# Patient Record
Sex: FEMALE | Race: BLACK OR AFRICAN AMERICAN | NOT HISPANIC OR LATINO | URBAN - METROPOLITAN AREA
[De-identification: names, ages, dates, MRNs, and addresses within clinical notes are randomized per-mention and may not be internally consistent; named-entity substitution may affect disease eponyms.]

---

## 2016-01-11 RX ORDER — ASPIRIN/CALCIUM CARB/MAGNESIUM 324 MG
1 TABLET ORAL
Qty: 0 | Refills: 0 | DISCHARGE
Start: 2016-01-11

## 2016-01-14 RX ORDER — CARVEDILOL PHOSPHATE 80 MG/1
1 CAPSULE, EXTENDED RELEASE ORAL
Qty: 0 | Refills: 0 | DISCHARGE
Start: 2016-01-14

## 2017-02-01 ENCOUNTER — INPATIENT (INPATIENT)
Facility: HOSPITAL | Age: 62
LOS: 11 days | Discharge: HOME HEALTH | DRG: 253 | End: 2017-02-13
Attending: SURGERY | Admitting: SURGERY
Payer: MEDICAID

## 2017-02-01 VITALS
DIASTOLIC BLOOD PRESSURE: 99 MMHG | WEIGHT: 149.91 LBS | SYSTOLIC BLOOD PRESSURE: 199 MMHG | OXYGEN SATURATION: 100 % | TEMPERATURE: 97 F | HEIGHT: 63 IN | RESPIRATION RATE: 24 BRPM | HEART RATE: 91 BPM

## 2017-02-01 DIAGNOSIS — Z90.710 ACQUIRED ABSENCE OF BOTH CERVIX AND UTERUS: Chronic | ICD-10-CM

## 2017-02-01 DIAGNOSIS — I73.9 PERIPHERAL VASCULAR DISEASE, UNSPECIFIED: ICD-10-CM

## 2017-02-01 DIAGNOSIS — Z41.8 ENCOUNTER FOR OTHER PROCEDURES FOR PURPOSES OTHER THAN REMEDYING HEALTH STATE: ICD-10-CM

## 2017-02-01 DIAGNOSIS — I48.91 UNSPECIFIED ATRIAL FIBRILLATION: ICD-10-CM

## 2017-02-01 DIAGNOSIS — I16.0 HYPERTENSIVE URGENCY: ICD-10-CM

## 2017-02-01 DIAGNOSIS — J45.909 UNSPECIFIED ASTHMA, UNCOMPLICATED: ICD-10-CM

## 2017-02-01 DIAGNOSIS — I25.10 ATHEROSCLEROTIC HEART DISEASE OF NATIVE CORONARY ARTERY WITHOUT ANGINA PECTORIS: ICD-10-CM

## 2017-02-01 DIAGNOSIS — G81.90 HEMIPLEGIA, UNSPECIFIED AFFECTING UNSPECIFIED SIDE: ICD-10-CM

## 2017-02-01 DIAGNOSIS — G40.909 EPILEPSY, UNSPECIFIED, NOT INTRACTABLE, WITHOUT STATUS EPILEPTICUS: ICD-10-CM

## 2017-02-01 DIAGNOSIS — E11.9 TYPE 2 DIABETES MELLITUS WITHOUT COMPLICATIONS: ICD-10-CM

## 2017-02-01 DIAGNOSIS — T82.897A OTHER SPECIFIED COMPLICATION OF CARDIAC PROSTHETIC DEVICES, IMPLANTS AND GRAFTS, INITIAL ENCOUNTER: Chronic | ICD-10-CM

## 2017-02-01 LAB
ALBUMIN SERPL ELPH-MCNC: 3.7 G/DL — SIGNIFICANT CHANGE UP (ref 3.5–5)
ALP SERPL-CCNC: 101 U/L — SIGNIFICANT CHANGE UP (ref 40–120)
ALT FLD-CCNC: 21 U/L DA — SIGNIFICANT CHANGE UP (ref 10–60)
ANION GAP SERPL CALC-SCNC: 10 MMOL/L — SIGNIFICANT CHANGE UP (ref 5–17)
AST SERPL-CCNC: 17 U/L — SIGNIFICANT CHANGE UP (ref 10–40)
BILIRUB DIRECT SERPL-MCNC: 0.1 MG/DL — SIGNIFICANT CHANGE UP (ref 0–0.2)
BILIRUB INDIRECT FLD-MCNC: 0.5 MG/DL — SIGNIFICANT CHANGE UP (ref 0.2–1)
BILIRUB SERPL-MCNC: 0.6 MG/DL — SIGNIFICANT CHANGE UP (ref 0.2–1.2)
BUN SERPL-MCNC: 10 MG/DL — SIGNIFICANT CHANGE UP (ref 7–18)
CALCIUM SERPL-MCNC: 9 MG/DL — SIGNIFICANT CHANGE UP (ref 8.4–10.5)
CHLORIDE SERPL-SCNC: 108 MMOL/L — SIGNIFICANT CHANGE UP (ref 96–108)
CHOLEST SERPL-MCNC: 231 MG/DL — HIGH (ref 10–199)
CK MB BLD-MCNC: 1.3 % — SIGNIFICANT CHANGE UP (ref 0–3.5)
CK MB BLD-MCNC: <1 % — SIGNIFICANT CHANGE UP (ref 0–3.5)
CK MB CFR SERPL CALC: 1.2 NG/ML — SIGNIFICANT CHANGE UP (ref 0–3.6)
CK MB CFR SERPL CALC: <1 NG/ML — SIGNIFICANT CHANGE UP (ref 0–3.6)
CK SERPL-CCNC: 92 U/L — SIGNIFICANT CHANGE UP (ref 21–215)
CK SERPL-CCNC: 99 U/L — SIGNIFICANT CHANGE UP (ref 21–215)
CO2 SERPL-SCNC: 23 MMOL/L — SIGNIFICANT CHANGE UP (ref 22–31)
CREAT SERPL-MCNC: 0.83 MG/DL — SIGNIFICANT CHANGE UP (ref 0.5–1.3)
D DIMER BLD IA.RAPID-MCNC: 271 NG/ML DDU — HIGH
GLUCOSE SERPL-MCNC: 96 MG/DL — SIGNIFICANT CHANGE UP (ref 70–99)
HCT VFR BLD CALC: 46 % — HIGH (ref 34.5–45)
HDLC SERPL-MCNC: 44 MG/DL — SIGNIFICANT CHANGE UP (ref 40–125)
HGB BLD-MCNC: 14.5 G/DL — SIGNIFICANT CHANGE UP (ref 11.5–15.5)
LACTATE SERPL-SCNC: 1.4 MMOL/L — SIGNIFICANT CHANGE UP (ref 0.7–2)
LIPID PNL WITH DIRECT LDL SERPL: 172 MG/DL — SIGNIFICANT CHANGE UP
MAGNESIUM SERPL-MCNC: 2.2 MG/DL — SIGNIFICANT CHANGE UP (ref 1.8–2.4)
MCHC RBC-ENTMCNC: 26.5 PG — LOW (ref 27–34)
MCHC RBC-ENTMCNC: 31.4 GM/DL — LOW (ref 32–36)
MCV RBC AUTO: 84.3 FL — SIGNIFICANT CHANGE UP (ref 80–100)
NT-PROBNP SERPL-SCNC: 221 PG/ML — HIGH (ref 0–125)
PHOSPHATE SERPL-MCNC: 2.6 MG/DL — SIGNIFICANT CHANGE UP (ref 2.5–4.5)
PLATELET # BLD AUTO: 274 K/UL — SIGNIFICANT CHANGE UP (ref 150–400)
POTASSIUM SERPL-MCNC: 3.9 MMOL/L — SIGNIFICANT CHANGE UP (ref 3.5–5.3)
POTASSIUM SERPL-SCNC: 3.9 MMOL/L — SIGNIFICANT CHANGE UP (ref 3.5–5.3)
PROT SERPL-MCNC: 7.4 G/DL — SIGNIFICANT CHANGE UP (ref 6–8.3)
RBC # BLD: 5.46 M/UL — HIGH (ref 3.8–5.2)
RBC # FLD: 12.6 % — SIGNIFICANT CHANGE UP (ref 10.3–14.5)
SODIUM SERPL-SCNC: 141 MMOL/L — SIGNIFICANT CHANGE UP (ref 135–145)
TOTAL CHOLESTEROL/HDL RATIO MEASUREMENT: 5.2 RATIO — SIGNIFICANT CHANGE UP (ref 3.3–7.1)
TRIGL SERPL-MCNC: 76 MG/DL — SIGNIFICANT CHANGE UP (ref 10–149)
TROPONIN I SERPL-MCNC: 0.06 NG/ML — HIGH (ref 0–0.04)
TROPONIN I SERPL-MCNC: 0.07 NG/ML — HIGH (ref 0–0.04)
TSH SERPL-MCNC: 1.72 UU/ML — SIGNIFICANT CHANGE UP (ref 0.34–4.82)
WBC # BLD: 5.6 K/UL — SIGNIFICANT CHANGE UP (ref 3.8–10.5)
WBC # FLD AUTO: 5.6 K/UL — SIGNIFICANT CHANGE UP (ref 3.8–10.5)

## 2017-02-01 PROCEDURE — 73620 X-RAY EXAM OF FOOT: CPT | Mod: 26,RT

## 2017-02-01 PROCEDURE — 99284 EMERGENCY DEPT VISIT MOD MDM: CPT | Mod: 25

## 2017-02-01 PROCEDURE — 99283 EMERGENCY DEPT VISIT LOW MDM: CPT | Mod: 25

## 2017-02-01 PROCEDURE — 70450 CT HEAD/BRAIN W/O DYE: CPT | Mod: 26

## 2017-02-01 PROCEDURE — 93925 LOWER EXTREMITY STUDY: CPT | Mod: 26

## 2017-02-01 PROCEDURE — 71010: CPT | Mod: 26

## 2017-02-01 RX ORDER — TOPIRAMATE 25 MG
100 TABLET ORAL DAILY
Qty: 0 | Refills: 0 | Status: DISCONTINUED | OUTPATIENT
Start: 2017-02-01 | End: 2017-02-09

## 2017-02-01 RX ORDER — MORPHINE SULFATE 50 MG/1
2 CAPSULE, EXTENDED RELEASE ORAL EVERY 6 HOURS
Qty: 0 | Refills: 0 | Status: DISCONTINUED | OUTPATIENT
Start: 2017-02-01 | End: 2017-02-08

## 2017-02-01 RX ORDER — ASPIRIN/CALCIUM CARB/MAGNESIUM 324 MG
325 TABLET ORAL DAILY
Qty: 0 | Refills: 0 | Status: DISCONTINUED | OUTPATIENT
Start: 2017-02-01 | End: 2017-02-09

## 2017-02-01 RX ORDER — ERGOCALCIFEROL 1.25 MG/1
50000 CAPSULE ORAL
Qty: 0 | Refills: 0 | Status: DISCONTINUED | OUTPATIENT
Start: 2017-02-01 | End: 2017-02-09

## 2017-02-01 RX ORDER — ENOXAPARIN SODIUM 100 MG/ML
40 INJECTION SUBCUTANEOUS DAILY
Qty: 0 | Refills: 0 | Status: DISCONTINUED | OUTPATIENT
Start: 2017-02-01 | End: 2017-02-09

## 2017-02-01 RX ORDER — PANTOPRAZOLE SODIUM 20 MG/1
40 TABLET, DELAYED RELEASE ORAL
Qty: 0 | Refills: 0 | Status: DISCONTINUED | OUTPATIENT
Start: 2017-02-01 | End: 2017-02-09

## 2017-02-01 RX ORDER — ACETAMINOPHEN 500 MG
650 TABLET ORAL EVERY 6 HOURS
Qty: 0 | Refills: 0 | Status: DISCONTINUED | OUTPATIENT
Start: 2017-02-01 | End: 2017-02-09

## 2017-02-01 RX ORDER — CARVEDILOL PHOSPHATE 80 MG/1
12.5 CAPSULE, EXTENDED RELEASE ORAL EVERY 12 HOURS
Qty: 0 | Refills: 0 | Status: DISCONTINUED | OUTPATIENT
Start: 2017-02-01 | End: 2017-02-09

## 2017-02-01 RX ORDER — IPRATROPIUM/ALBUTEROL SULFATE 18-103MCG
3 AEROSOL WITH ADAPTER (GRAM) INHALATION EVERY 6 HOURS
Qty: 0 | Refills: 0 | Status: DISCONTINUED | OUTPATIENT
Start: 2017-02-01 | End: 2017-02-01

## 2017-02-01 RX ORDER — DIVALPROEX SODIUM 500 MG/1
500 TABLET, DELAYED RELEASE ORAL DAILY
Qty: 0 | Refills: 0 | Status: DISCONTINUED | OUTPATIENT
Start: 2017-02-01 | End: 2017-02-09

## 2017-02-01 RX ORDER — DILTIAZEM HCL 120 MG
180 CAPSULE, EXT RELEASE 24 HR ORAL DAILY
Qty: 0 | Refills: 0 | Status: DISCONTINUED | OUTPATIENT
Start: 2017-02-01 | End: 2017-02-09

## 2017-02-01 RX ORDER — MORPHINE SULFATE 50 MG/1
2 CAPSULE, EXTENDED RELEASE ORAL ONCE
Qty: 0 | Refills: 0 | Status: DISCONTINUED | OUTPATIENT
Start: 2017-02-01 | End: 2017-02-01

## 2017-02-01 RX ORDER — HEPARIN SODIUM 5000 [USP'U]/ML
5000 INJECTION INTRAVENOUS; SUBCUTANEOUS EVERY 8 HOURS
Qty: 0 | Refills: 0 | Status: DISCONTINUED | OUTPATIENT
Start: 2017-02-01 | End: 2017-02-01

## 2017-02-01 RX ORDER — LABETALOL HCL 100 MG
10 TABLET ORAL ONCE
Qty: 0 | Refills: 0 | Status: COMPLETED | OUTPATIENT
Start: 2017-02-01 | End: 2017-02-01

## 2017-02-01 RX ORDER — MONTELUKAST 4 MG/1
10 TABLET, CHEWABLE ORAL AT BEDTIME
Qty: 0 | Refills: 0 | Status: DISCONTINUED | OUTPATIENT
Start: 2017-02-01 | End: 2017-02-09

## 2017-02-01 RX ORDER — SODIUM CHLORIDE 9 MG/ML
3 INJECTION INTRAMUSCULAR; INTRAVENOUS; SUBCUTANEOUS ONCE
Qty: 0 | Refills: 0 | Status: COMPLETED | OUTPATIENT
Start: 2017-02-01 | End: 2017-02-01

## 2017-02-01 RX ORDER — SIMVASTATIN 20 MG/1
40 TABLET, FILM COATED ORAL AT BEDTIME
Qty: 0 | Refills: 0 | Status: DISCONTINUED | OUTPATIENT
Start: 2017-02-01 | End: 2017-02-09

## 2017-02-01 RX ADMIN — DIVALPROEX SODIUM 500 MILLIGRAM(S): 500 TABLET, DELAYED RELEASE ORAL at 17:29

## 2017-02-01 RX ADMIN — MORPHINE SULFATE 2 MILLIGRAM(S): 50 CAPSULE, EXTENDED RELEASE ORAL at 14:30

## 2017-02-01 RX ADMIN — MORPHINE SULFATE 2 MILLIGRAM(S): 50 CAPSULE, EXTENDED RELEASE ORAL at 21:53

## 2017-02-01 RX ADMIN — Medication 40 MILLIGRAM(S): at 22:03

## 2017-02-01 RX ADMIN — MONTELUKAST 10 MILLIGRAM(S): 4 TABLET, CHEWABLE ORAL at 22:03

## 2017-02-01 RX ADMIN — CARVEDILOL PHOSPHATE 12.5 MILLIGRAM(S): 80 CAPSULE, EXTENDED RELEASE ORAL at 17:29

## 2017-02-01 RX ADMIN — Medication 10 MILLIGRAM(S): at 13:52

## 2017-02-01 RX ADMIN — MORPHINE SULFATE 2 MILLIGRAM(S): 50 CAPSULE, EXTENDED RELEASE ORAL at 18:16

## 2017-02-01 RX ADMIN — MORPHINE SULFATE 2 MILLIGRAM(S): 50 CAPSULE, EXTENDED RELEASE ORAL at 13:52

## 2017-02-01 RX ADMIN — Medication 3 MILLILITER(S): at 16:40

## 2017-02-01 RX ADMIN — MORPHINE SULFATE 2 MILLIGRAM(S): 50 CAPSULE, EXTENDED RELEASE ORAL at 18:40

## 2017-02-01 RX ADMIN — SODIUM CHLORIDE 3 MILLILITER(S): 9 INJECTION INTRAMUSCULAR; INTRAVENOUS; SUBCUTANEOUS at 10:37

## 2017-02-01 RX ADMIN — Medication 180 MILLIGRAM(S): at 17:29

## 2017-02-01 RX ADMIN — Medication 325 MILLIGRAM(S): at 17:29

## 2017-02-01 RX ADMIN — Medication 100 MILLIGRAM(S): at 17:30

## 2017-02-01 RX ADMIN — ERGOCALCIFEROL 50000 UNIT(S): 1.25 CAPSULE ORAL at 17:29

## 2017-02-01 RX ADMIN — SIMVASTATIN 40 MILLIGRAM(S): 20 TABLET, FILM COATED ORAL at 22:03

## 2017-02-01 RX ADMIN — Medication 3 MILLILITER(S): at 13:52

## 2017-02-01 RX ADMIN — MORPHINE SULFATE 2 MILLIGRAM(S): 50 CAPSULE, EXTENDED RELEASE ORAL at 22:44

## 2017-02-01 NOTE — ED PROVIDER NOTE - PLAN OF CARE
Likely the underlying factor contributing to pt's worsening RLE pain and hemiparesis. Administer labetalol 10 mg IVP X 1, morphine 2 mg IVP X 1, closely monitor vitals.  Vascular consult. Admit to medicine. CXR (-) acute pathology. EKG 86 NSR, biatrial enlargement, RBBB. F/u u/s duplex arterial lower extremity and f/u CT Head w/o cont.

## 2017-02-01 NOTE — ED PROVIDER NOTE - ATTENDING CONTRIBUTION TO CARE
62 yo with elevated BP. pt also with right LE tenderness x 2 weeks. Pt with HTNsive urgency. Vascular stuidies to r/o arteria insufficiency. MAR, PMD Dr. Metz Cardiologist and podaitry endorsed. Pt agrees with admission. I had a detailed discussion with the patient and/or guardian regarding the historical points, exam findings, and any diagnostic results supporting the admit diagnosis.   right foot with +1 pedalpulses + tenderness, distal radial and ulnar pulses intact, cap refill < 2 seconds, full range of motion of arm +elbow flexion/extension/supination and pronation intact, +flexion and extension of all digits at DIP and PIP.

## 2017-02-01 NOTE — ED PROVIDER NOTE - CARE PLAN
Principal Discharge DX:	Hypertensive urgency  Goal:	Likely the underlying factor contributing to pt's worsening RLE pain and hemiparesis. Administer labetalol 10 mg IVP X 1, morphine 2 mg IVP X 1, closely monitor vitals.  Vascular consult. Admit to medicine. CXR (-) acute pathology. EKG 86 NSR, biatrial enlargement, RBBB. F/u u/s duplex arterial lower extremity and f/u CT Head w/o cont.  Secondary Diagnosis:	Ischemic pain of foot at rest  Secondary Diagnosis:	Hemiparesis

## 2017-02-01 NOTE — H&P ADULT. - PROBLEM SELECTOR PLAN 3
- patient reports that right sided-weakness was at baseline with the exception of right foot weakness due to pain   - CT head- chronic left MCA territorial infarct without significant change and small chronic infarct also noted in the right parietal lobe unchanged; no acute infarct or hemorrhage

## 2017-02-01 NOTE — H&P ADULT. - PROBLEM SELECTOR PLAN 1
- likely due to missed medications this morning (patient said she did not take them)  - BP at the time of presentation was 199/99, repeat 190/126 without intervention in the ED; labetalol that had been ordered prior was given, improved to 165/96  - when patient arrived on floor, BP was 216/83- home medications coreg, cardizem given, morphine given for right foot pain- improved to 176/78  - continue home medications  - continue pain control  - CT head negative for acute infarct   - first troponin 0.064, follow up 2 more sets  - EKG NSR 86bpm   - continue telemetry monitoring   - cardiology consulted- Dr. Beal  - patient likely needs adjustment of antihypertensive regimen

## 2017-02-01 NOTE — H&P ADULT. - PROBLEM SELECTOR PLAN 7
- as per patient she is no longer on janumet or any diabetic medications  - monitor accuchecks, add coverage if necessary   - follow up HbA1c

## 2017-02-01 NOTE — H&P ADULT. - PROBLEM SELECTOR PLAN 8
- continue topiramate 100mg daily, depakote ER 500mg daily as per home medication regimen  - patient denies recent seizure activity

## 2017-02-01 NOTE — ED PROVIDER NOTE - PROGRESS NOTE DETAILS
Repeat vitals obtained, stable elevated BP.  Labetalol ordered.  Nebs. CT Head, U/s duplex arterial lower extremity.  Pt notified about pertinent vitals and s/s.  Pt counseled on her condition.  Pt agrees to hospital admission Repeat vitals obtained, stable elevated BP.  Labetalol ordered.  Nebs. CT Head, U/s duplex arterial lower extremity.  Podiatry consulted. Pt notified about pertinent vitals and s/s.  Pt counseled on her condition.  Pt agrees to hospital admission

## 2017-02-01 NOTE — H&P ADULT. - HISTORY OF PRESENT ILLNESS
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 She has had congestion with cough productive of white sputum for 2 weeks with mild wheezing which persists despite using inhalers. She denies fevers, chills. She does report that the grandchildren that she lives with have had similar cold symptoms. She does, however, report that her cough is baseline and that she has been short of breath on exertion for many years. She reports that the right foot pain is at the arch, 9/10 in intensity, non-radiating, and associated with increased weakness in the right foot. The pain is present at rest and at exertion. She does not have changes in vision, slurred speech. She denies headache, chest pain, nausea, vomiting, diarrhea, abdominal pain, urinary symptoms, recent travel. She reports that she did not take her medications this morning but she is usually compliant. She ambulates with a cane. She lives with her 2 sons, 1 daughter, and 3 grandchildren. She is an active smoker (1 pack of cigarettes every 2 weeks for about 40 years) but has recently tried using a vaporizer instead. She denies having ever seen a cardiologist. She cannot remember the name of her vascular surgeon.

## 2017-02-01 NOTE — H&P ADULT. - PROBLEM SELECTOR PLAN 2
- patient has +1 DP pulses bilaterally  - reports that she has not followed up with her vascular surgeon recently  - follow up vascular consult

## 2017-02-01 NOTE — ED PROVIDER NOTE - PMH
Asthma  never intubated  CAD (coronary artery disease)    CVA (cerebral infarction)  times 3 with residual rt sided weakness  Diabetes    Gastroesophageal reflux    HTN (hypertension)    S/P Cardiac Cath  3yrs ago.report unknown

## 2017-02-01 NOTE — ED ADULT NURSE NOTE - ED STAT RN HANDOFF DETAILS
Received pt A Received pt a+ox3 from JOY Dang, pt ambulatory w/ assistance, R sided weakness noted, necrosis of R great and 2nd toe noted on assessment.

## 2017-02-01 NOTE — ED ADULT NURSE NOTE - OBJECTIVE STATEMENT
I was having difficulty in breathing for 2 weeks I was having rt side weakness I uses the cane to walk I have the rt great 4th toe pain for 2 weeks

## 2017-02-01 NOTE — H&P ADULT. - PROBLEM SELECTOR PLAN 6
- patient has cold symptoms, mild bibasilar expiratory wheeze   - Chest xray negative for effusion, consolidation   - likely mild asthma exacerbation secondary to URTI  - no need to rule out influenza at this time as patient has not had fevers, myalgias  - continue duonebs  - will give 40 prednisone, to be tapered as wheezing improves - patient has cold symptoms, mild bibasilar expiratory wheeze   - Chest xray negative for effusion, consolidation   - likely mild asthma exacerbation secondary to URTI  - no need to rule out influenza at this time as patient has not had fevers, myalgias  - continue duonebs  - discussed with attending, will give solu-medrol 40mg every 12 hours and levaquin 500mg daily for bronchitis

## 2017-02-01 NOTE — H&P ADULT. - PROBLEM SELECTOR PLAN 5
- continue coreg 12.5mg twice daily, diltiazem CD 180mg daily with parameters  - patient is not on anticoagulation   - likely paroxysmal, EKG shows sinus rhythm

## 2017-02-01 NOTE — H&P ADULT. - ASSESSMENT
Patient is a 61 year-old female from home with PMH of asthma/COPD, left MCA CVA, small chronic right parietal lobe infarct, seizures, DM type II, CAD s/p stent, HTN, atrial fibrillation, PAD s/p right fem-pop bypass, GERD who presented with cold symptoms and worsening right foot pain over a course of 2 weeks, is admitted for hypertensive urgency, vascular evaluation, and asthma exacerbation.

## 2017-02-02 LAB
ANION GAP SERPL CALC-SCNC: 10 MMOL/L — SIGNIFICANT CHANGE UP (ref 5–17)
BASOPHILS # BLD AUTO: 0 K/UL — SIGNIFICANT CHANGE UP (ref 0–0.2)
BASOPHILS NFR BLD AUTO: 0.8 % — SIGNIFICANT CHANGE UP (ref 0–2)
BUN SERPL-MCNC: 15 MG/DL — SIGNIFICANT CHANGE UP (ref 7–18)
CALCIUM SERPL-MCNC: 9.2 MG/DL — SIGNIFICANT CHANGE UP (ref 8.4–10.5)
CHLORIDE SERPL-SCNC: 107 MMOL/L — SIGNIFICANT CHANGE UP (ref 96–108)
CK MB BLD-MCNC: <1 % — SIGNIFICANT CHANGE UP (ref 0–3.5)
CK MB CFR SERPL CALC: <1 NG/ML — SIGNIFICANT CHANGE UP (ref 0–3.6)
CK SERPL-CCNC: 96 U/L — SIGNIFICANT CHANGE UP (ref 21–215)
CO2 SERPL-SCNC: 21 MMOL/L — LOW (ref 22–31)
CREAT SERPL-MCNC: 0.81 MG/DL — SIGNIFICANT CHANGE UP (ref 0.5–1.3)
EOSINOPHIL # BLD AUTO: 0 K/UL — SIGNIFICANT CHANGE UP (ref 0–0.5)
EOSINOPHIL NFR BLD AUTO: 0 % — SIGNIFICANT CHANGE UP (ref 0–6)
GLUCOSE SERPL-MCNC: 142 MG/DL — HIGH (ref 70–99)
HBA1C BLD-MCNC: 6.4 % — HIGH (ref 4–5.6)
HCT VFR BLD CALC: 44.2 % — SIGNIFICANT CHANGE UP (ref 34.5–45)
HGB BLD-MCNC: 14 G/DL — SIGNIFICANT CHANGE UP (ref 11.5–15.5)
LYMPHOCYTES # BLD AUTO: 1.1 K/UL — SIGNIFICANT CHANGE UP (ref 1–3.3)
LYMPHOCYTES # BLD AUTO: 26 % — SIGNIFICANT CHANGE UP (ref 13–44)
MAGNESIUM SERPL-MCNC: 2.3 MG/DL — SIGNIFICANT CHANGE UP (ref 1.8–2.4)
MCHC RBC-ENTMCNC: 26.4 PG — LOW (ref 27–34)
MCHC RBC-ENTMCNC: 31.6 GM/DL — LOW (ref 32–36)
MCV RBC AUTO: 83.6 FL — SIGNIFICANT CHANGE UP (ref 80–100)
MONOCYTES # BLD AUTO: 0.1 K/UL — SIGNIFICANT CHANGE UP (ref 0–0.9)
MONOCYTES NFR BLD AUTO: 2.1 % — SIGNIFICANT CHANGE UP (ref 2–14)
NEUTROPHILS # BLD AUTO: 3.1 K/UL — SIGNIFICANT CHANGE UP (ref 1.8–7.4)
NEUTROPHILS NFR BLD AUTO: 71.1 % — SIGNIFICANT CHANGE UP (ref 43–77)
PHOSPHATE SERPL-MCNC: 3.3 MG/DL — SIGNIFICANT CHANGE UP (ref 2.5–4.5)
PLATELET # BLD AUTO: 292 K/UL — SIGNIFICANT CHANGE UP (ref 150–400)
POTASSIUM SERPL-MCNC: 3.9 MMOL/L — SIGNIFICANT CHANGE UP (ref 3.5–5.3)
POTASSIUM SERPL-SCNC: 3.9 MMOL/L — SIGNIFICANT CHANGE UP (ref 3.5–5.3)
RBC # BLD: 5.29 M/UL — HIGH (ref 3.8–5.2)
RBC # FLD: 12.4 % — SIGNIFICANT CHANGE UP (ref 10.3–14.5)
SODIUM SERPL-SCNC: 138 MMOL/L — SIGNIFICANT CHANGE UP (ref 135–145)
TROPONIN I SERPL-MCNC: 0.07 NG/ML — HIGH (ref 0–0.04)
VALPROATE SERPL-MCNC: 29 UG/ML — LOW (ref 50–100)
WBC # BLD: 4.3 K/UL — SIGNIFICANT CHANGE UP (ref 3.8–10.5)
WBC # FLD AUTO: 4.3 K/UL — SIGNIFICANT CHANGE UP (ref 3.8–10.5)

## 2017-02-02 PROCEDURE — 74174 CTA ABD&PLVS W/CONTRAST: CPT | Mod: 26,59

## 2017-02-02 PROCEDURE — 73706 CT ANGIO LWR EXTR W/O&W/DYE: CPT | Mod: 26,50

## 2017-02-02 RX ADMIN — Medication 100 MILLIGRAM(S): at 13:11

## 2017-02-02 RX ADMIN — MORPHINE SULFATE 2 MILLIGRAM(S): 50 CAPSULE, EXTENDED RELEASE ORAL at 06:00

## 2017-02-02 RX ADMIN — DIVALPROEX SODIUM 500 MILLIGRAM(S): 500 TABLET, DELAYED RELEASE ORAL at 13:11

## 2017-02-02 RX ADMIN — CARVEDILOL PHOSPHATE 12.5 MILLIGRAM(S): 80 CAPSULE, EXTENDED RELEASE ORAL at 17:34

## 2017-02-02 RX ADMIN — PANTOPRAZOLE SODIUM 40 MILLIGRAM(S): 20 TABLET, DELAYED RELEASE ORAL at 06:00

## 2017-02-02 RX ADMIN — Medication 325 MILLIGRAM(S): at 13:11

## 2017-02-02 RX ADMIN — SIMVASTATIN 40 MILLIGRAM(S): 20 TABLET, FILM COATED ORAL at 22:39

## 2017-02-02 RX ADMIN — Medication 40 MILLIGRAM(S): at 17:33

## 2017-02-02 RX ADMIN — MONTELUKAST 10 MILLIGRAM(S): 4 TABLET, CHEWABLE ORAL at 21:51

## 2017-02-02 RX ADMIN — MORPHINE SULFATE 2 MILLIGRAM(S): 50 CAPSULE, EXTENDED RELEASE ORAL at 06:30

## 2017-02-02 RX ADMIN — Medication 40 MILLIGRAM(S): at 05:59

## 2017-02-02 RX ADMIN — MORPHINE SULFATE 2 MILLIGRAM(S): 50 CAPSULE, EXTENDED RELEASE ORAL at 21:52

## 2017-02-02 RX ADMIN — MORPHINE SULFATE 2 MILLIGRAM(S): 50 CAPSULE, EXTENDED RELEASE ORAL at 22:25

## 2017-02-02 RX ADMIN — ENOXAPARIN SODIUM 40 MILLIGRAM(S): 100 INJECTION SUBCUTANEOUS at 13:12

## 2017-02-02 RX ADMIN — Medication 180 MILLIGRAM(S): at 05:59

## 2017-02-02 RX ADMIN — CARVEDILOL PHOSPHATE 12.5 MILLIGRAM(S): 80 CAPSULE, EXTENDED RELEASE ORAL at 05:59

## 2017-02-03 LAB
ANION GAP SERPL CALC-SCNC: 11 MMOL/L — SIGNIFICANT CHANGE UP (ref 5–17)
BUN SERPL-MCNC: 25 MG/DL — HIGH (ref 7–18)
CALCIUM SERPL-MCNC: 8.9 MG/DL — SIGNIFICANT CHANGE UP (ref 8.4–10.5)
CHLORIDE SERPL-SCNC: 106 MMOL/L — SIGNIFICANT CHANGE UP (ref 96–108)
CO2 SERPL-SCNC: 19 MMOL/L — LOW (ref 22–31)
CREAT SERPL-MCNC: 1.09 MG/DL — SIGNIFICANT CHANGE UP (ref 0.5–1.3)
GLUCOSE SERPL-MCNC: 178 MG/DL — HIGH (ref 70–99)
HCT VFR BLD CALC: 43.4 % — SIGNIFICANT CHANGE UP (ref 34.5–45)
HGB BLD-MCNC: 13.7 G/DL — SIGNIFICANT CHANGE UP (ref 11.5–15.5)
MAGNESIUM SERPL-MCNC: 2.5 MG/DL — HIGH (ref 1.8–2.4)
MCHC RBC-ENTMCNC: 26.3 PG — LOW (ref 27–34)
MCHC RBC-ENTMCNC: 31.6 GM/DL — LOW (ref 32–36)
MCV RBC AUTO: 83 FL — SIGNIFICANT CHANGE UP (ref 80–100)
PHOSPHATE SERPL-MCNC: 2.6 MG/DL — SIGNIFICANT CHANGE UP (ref 2.5–4.5)
PLATELET # BLD AUTO: 312 K/UL — SIGNIFICANT CHANGE UP (ref 150–400)
POTASSIUM SERPL-MCNC: 4.7 MMOL/L — SIGNIFICANT CHANGE UP (ref 3.5–5.3)
POTASSIUM SERPL-SCNC: 4.7 MMOL/L — SIGNIFICANT CHANGE UP (ref 3.5–5.3)
RBC # BLD: 5.23 M/UL — HIGH (ref 3.8–5.2)
RBC # FLD: 12.1 % — SIGNIFICANT CHANGE UP (ref 10.3–14.5)
SODIUM SERPL-SCNC: 136 MMOL/L — SIGNIFICANT CHANGE UP (ref 135–145)
WBC # BLD: 8.1 K/UL — SIGNIFICANT CHANGE UP (ref 3.8–10.5)
WBC # FLD AUTO: 8.1 K/UL — SIGNIFICANT CHANGE UP (ref 3.8–10.5)

## 2017-02-03 PROCEDURE — 76937 US GUIDE VASCULAR ACCESS: CPT | Mod: 26

## 2017-02-03 PROCEDURE — 77001 FLUOROGUIDE FOR VEIN DEVICE: CPT | Mod: 26

## 2017-02-03 PROCEDURE — 36569 INSJ PICC 5 YR+ W/O IMAGING: CPT

## 2017-02-03 PROCEDURE — 93880 EXTRACRANIAL BILAT STUDY: CPT | Mod: 26

## 2017-02-03 RX ORDER — SODIUM CHLORIDE 9 MG/ML
10 INJECTION INTRAMUSCULAR; INTRAVENOUS; SUBCUTANEOUS
Qty: 0 | Refills: 0 | Status: DISCONTINUED | OUTPATIENT
Start: 2017-02-03 | End: 2017-02-09

## 2017-02-03 RX ORDER — SODIUM CHLORIDE 9 MG/ML
10 INJECTION INTRAMUSCULAR; INTRAVENOUS; SUBCUTANEOUS EVERY 12 HOURS
Qty: 0 | Refills: 0 | Status: DISCONTINUED | OUTPATIENT
Start: 2017-02-03 | End: 2017-02-09

## 2017-02-03 RX ORDER — SODIUM CHLORIDE 9 MG/ML
20 INJECTION INTRAMUSCULAR; INTRAVENOUS; SUBCUTANEOUS ONCE
Qty: 0 | Refills: 0 | Status: DISCONTINUED | OUTPATIENT
Start: 2017-02-03 | End: 2017-02-09

## 2017-02-03 RX ADMIN — SODIUM CHLORIDE 10 MILLILITER(S): 9 INJECTION INTRAMUSCULAR; INTRAVENOUS; SUBCUTANEOUS at 21:38

## 2017-02-03 RX ADMIN — Medication 100 MILLIGRAM(S): at 12:18

## 2017-02-03 RX ADMIN — MORPHINE SULFATE 2 MILLIGRAM(S): 50 CAPSULE, EXTENDED RELEASE ORAL at 21:37

## 2017-02-03 RX ADMIN — Medication 40 MILLIGRAM(S): at 06:10

## 2017-02-03 RX ADMIN — PANTOPRAZOLE SODIUM 40 MILLIGRAM(S): 20 TABLET, DELAYED RELEASE ORAL at 06:10

## 2017-02-03 RX ADMIN — CARVEDILOL PHOSPHATE 12.5 MILLIGRAM(S): 80 CAPSULE, EXTENDED RELEASE ORAL at 06:10

## 2017-02-03 RX ADMIN — MORPHINE SULFATE 2 MILLIGRAM(S): 50 CAPSULE, EXTENDED RELEASE ORAL at 23:12

## 2017-02-03 RX ADMIN — Medication 180 MILLIGRAM(S): at 06:11

## 2017-02-03 RX ADMIN — CARVEDILOL PHOSPHATE 12.5 MILLIGRAM(S): 80 CAPSULE, EXTENDED RELEASE ORAL at 17:21

## 2017-02-03 RX ADMIN — MONTELUKAST 10 MILLIGRAM(S): 4 TABLET, CHEWABLE ORAL at 21:37

## 2017-02-03 RX ADMIN — SIMVASTATIN 40 MILLIGRAM(S): 20 TABLET, FILM COATED ORAL at 21:37

## 2017-02-03 RX ADMIN — DIVALPROEX SODIUM 500 MILLIGRAM(S): 500 TABLET, DELAYED RELEASE ORAL at 12:17

## 2017-02-03 RX ADMIN — Medication 325 MILLIGRAM(S): at 12:17

## 2017-02-03 RX ADMIN — SODIUM CHLORIDE 10 MILLILITER(S): 9 INJECTION INTRAMUSCULAR; INTRAVENOUS; SUBCUTANEOUS at 21:40

## 2017-02-03 RX ADMIN — Medication 100 MILLIGRAM(S): at 17:21

## 2017-02-03 RX ADMIN — MORPHINE SULFATE 2 MILLIGRAM(S): 50 CAPSULE, EXTENDED RELEASE ORAL at 07:42

## 2017-02-03 RX ADMIN — ENOXAPARIN SODIUM 40 MILLIGRAM(S): 100 INJECTION SUBCUTANEOUS at 12:17

## 2017-02-03 RX ADMIN — MORPHINE SULFATE 2 MILLIGRAM(S): 50 CAPSULE, EXTENDED RELEASE ORAL at 08:10

## 2017-02-04 LAB
ANION GAP SERPL CALC-SCNC: 10 MMOL/L — SIGNIFICANT CHANGE UP (ref 5–17)
BUN SERPL-MCNC: 21 MG/DL — HIGH (ref 7–18)
CALCIUM SERPL-MCNC: 8.8 MG/DL — SIGNIFICANT CHANGE UP (ref 8.4–10.5)
CHLORIDE SERPL-SCNC: 107 MMOL/L — SIGNIFICANT CHANGE UP (ref 96–108)
CO2 SERPL-SCNC: 20 MMOL/L — LOW (ref 22–31)
CREAT SERPL-MCNC: 0.9 MG/DL — SIGNIFICANT CHANGE UP (ref 0.5–1.3)
GLUCOSE SERPL-MCNC: 118 MG/DL — HIGH (ref 70–99)
HCT VFR BLD CALC: 39.5 % — SIGNIFICANT CHANGE UP (ref 34.5–45)
HGB BLD-MCNC: 13 G/DL — SIGNIFICANT CHANGE UP (ref 11.5–15.5)
MCHC RBC-ENTMCNC: 27.2 PG — SIGNIFICANT CHANGE UP (ref 27–34)
MCHC RBC-ENTMCNC: 32.9 GM/DL — SIGNIFICANT CHANGE UP (ref 32–36)
MCV RBC AUTO: 82.8 FL — SIGNIFICANT CHANGE UP (ref 80–100)
PLATELET # BLD AUTO: 241 K/UL — SIGNIFICANT CHANGE UP (ref 150–400)
POTASSIUM SERPL-MCNC: 4.3 MMOL/L — SIGNIFICANT CHANGE UP (ref 3.5–5.3)
POTASSIUM SERPL-SCNC: 4.3 MMOL/L — SIGNIFICANT CHANGE UP (ref 3.5–5.3)
RBC # BLD: 4.77 M/UL — SIGNIFICANT CHANGE UP (ref 3.8–5.2)
RBC # FLD: 12.2 % — SIGNIFICANT CHANGE UP (ref 10.3–14.5)
SODIUM SERPL-SCNC: 137 MMOL/L — SIGNIFICANT CHANGE UP (ref 135–145)
TOPIRAMATE SERPL-MCNC: 2.3 MCG/ML — SIGNIFICANT CHANGE UP
WBC # BLD: 11.6 K/UL — HIGH (ref 3.8–10.5)
WBC # FLD AUTO: 11.6 K/UL — HIGH (ref 3.8–10.5)

## 2017-02-04 PROCEDURE — 93971 EXTREMITY STUDY: CPT | Mod: 26,RT

## 2017-02-04 RX ORDER — DOCUSATE SODIUM 100 MG
100 CAPSULE ORAL DAILY
Qty: 0 | Refills: 0 | Status: DISCONTINUED | OUTPATIENT
Start: 2017-02-04 | End: 2017-02-09

## 2017-02-04 RX ORDER — AMLODIPINE BESYLATE 2.5 MG/1
5 TABLET ORAL ONCE
Qty: 0 | Refills: 0 | Status: COMPLETED | OUTPATIENT
Start: 2017-02-04 | End: 2017-02-04

## 2017-02-04 RX ORDER — SENNA PLUS 8.6 MG/1
2 TABLET ORAL AT BEDTIME
Qty: 0 | Refills: 0 | Status: DISCONTINUED | OUTPATIENT
Start: 2017-02-04 | End: 2017-02-09

## 2017-02-04 RX ORDER — IPRATROPIUM/ALBUTEROL SULFATE 18-103MCG
3 AEROSOL WITH ADAPTER (GRAM) INHALATION ONCE
Qty: 0 | Refills: 0 | Status: COMPLETED | OUTPATIENT
Start: 2017-02-04 | End: 2017-02-04

## 2017-02-04 RX ADMIN — Medication 40 MILLIGRAM(S): at 06:28

## 2017-02-04 RX ADMIN — MORPHINE SULFATE 2 MILLIGRAM(S): 50 CAPSULE, EXTENDED RELEASE ORAL at 07:07

## 2017-02-04 RX ADMIN — MORPHINE SULFATE 2 MILLIGRAM(S): 50 CAPSULE, EXTENDED RELEASE ORAL at 21:31

## 2017-02-04 RX ADMIN — DIVALPROEX SODIUM 500 MILLIGRAM(S): 500 TABLET, DELAYED RELEASE ORAL at 11:25

## 2017-02-04 RX ADMIN — Medication 3 MILLILITER(S): at 00:19

## 2017-02-04 RX ADMIN — SODIUM CHLORIDE 10 MILLILITER(S): 9 INJECTION INTRAMUSCULAR; INTRAVENOUS; SUBCUTANEOUS at 06:32

## 2017-02-04 RX ADMIN — CARVEDILOL PHOSPHATE 12.5 MILLIGRAM(S): 80 CAPSULE, EXTENDED RELEASE ORAL at 06:09

## 2017-02-04 RX ADMIN — CARVEDILOL PHOSPHATE 12.5 MILLIGRAM(S): 80 CAPSULE, EXTENDED RELEASE ORAL at 17:14

## 2017-02-04 RX ADMIN — MORPHINE SULFATE 2 MILLIGRAM(S): 50 CAPSULE, EXTENDED RELEASE ORAL at 21:56

## 2017-02-04 RX ADMIN — Medication 100 MILLIGRAM(S): at 11:25

## 2017-02-04 RX ADMIN — MONTELUKAST 10 MILLIGRAM(S): 4 TABLET, CHEWABLE ORAL at 21:30

## 2017-02-04 RX ADMIN — ENOXAPARIN SODIUM 40 MILLIGRAM(S): 100 INJECTION SUBCUTANEOUS at 11:24

## 2017-02-04 RX ADMIN — SENNA PLUS 2 TABLET(S): 8.6 TABLET ORAL at 21:31

## 2017-02-04 RX ADMIN — SODIUM CHLORIDE 10 MILLILITER(S): 9 INJECTION INTRAMUSCULAR; INTRAVENOUS; SUBCUTANEOUS at 21:00

## 2017-02-04 RX ADMIN — PANTOPRAZOLE SODIUM 40 MILLIGRAM(S): 20 TABLET, DELAYED RELEASE ORAL at 06:09

## 2017-02-04 RX ADMIN — MORPHINE SULFATE 2 MILLIGRAM(S): 50 CAPSULE, EXTENDED RELEASE ORAL at 06:32

## 2017-02-04 RX ADMIN — SIMVASTATIN 40 MILLIGRAM(S): 20 TABLET, FILM COATED ORAL at 21:30

## 2017-02-04 RX ADMIN — Medication 325 MILLIGRAM(S): at 11:25

## 2017-02-04 RX ADMIN — Medication 180 MILLIGRAM(S): at 06:09

## 2017-02-04 RX ADMIN — AMLODIPINE BESYLATE 5 MILLIGRAM(S): 2.5 TABLET ORAL at 21:30

## 2017-02-05 ENCOUNTER — TRANSCRIPTION ENCOUNTER (OUTPATIENT)
Age: 62
End: 2017-02-05

## 2017-02-05 LAB
ANION GAP SERPL CALC-SCNC: 9 MMOL/L — SIGNIFICANT CHANGE UP (ref 5–17)
BUN SERPL-MCNC: 19 MG/DL — HIGH (ref 7–18)
CALCIUM SERPL-MCNC: 8.8 MG/DL — SIGNIFICANT CHANGE UP (ref 8.4–10.5)
CHLORIDE SERPL-SCNC: 108 MMOL/L — SIGNIFICANT CHANGE UP (ref 96–108)
CO2 SERPL-SCNC: 21 MMOL/L — LOW (ref 22–31)
CREAT SERPL-MCNC: 0.89 MG/DL — SIGNIFICANT CHANGE UP (ref 0.5–1.3)
GLUCOSE SERPL-MCNC: 89 MG/DL — SIGNIFICANT CHANGE UP (ref 70–99)
HCT VFR BLD CALC: 42.7 % — SIGNIFICANT CHANGE UP (ref 34.5–45)
HGB BLD-MCNC: 13.6 G/DL — SIGNIFICANT CHANGE UP (ref 11.5–15.5)
INR BLD: 1.11 RATIO — SIGNIFICANT CHANGE UP (ref 0.88–1.16)
MCHC RBC-ENTMCNC: 26.4 PG — LOW (ref 27–34)
MCHC RBC-ENTMCNC: 32 GM/DL — SIGNIFICANT CHANGE UP (ref 32–36)
MCV RBC AUTO: 82.5 FL — SIGNIFICANT CHANGE UP (ref 80–100)
PLATELET # BLD AUTO: 269 K/UL — SIGNIFICANT CHANGE UP (ref 150–400)
POTASSIUM SERPL-MCNC: 3.7 MMOL/L — SIGNIFICANT CHANGE UP (ref 3.5–5.3)
POTASSIUM SERPL-SCNC: 3.7 MMOL/L — SIGNIFICANT CHANGE UP (ref 3.5–5.3)
PROTHROM AB SERPL-ACNC: 12.4 SEC — SIGNIFICANT CHANGE UP (ref 10–13.1)
RBC # BLD: 5.18 M/UL — SIGNIFICANT CHANGE UP (ref 3.8–5.2)
RBC # FLD: 12.3 % — SIGNIFICANT CHANGE UP (ref 10.3–14.5)
SODIUM SERPL-SCNC: 138 MMOL/L — SIGNIFICANT CHANGE UP (ref 135–145)
WBC # BLD: 12 K/UL — HIGH (ref 3.8–10.5)
WBC # FLD AUTO: 12 K/UL — HIGH (ref 3.8–10.5)

## 2017-02-05 PROCEDURE — 71010: CPT | Mod: 26

## 2017-02-05 RX ORDER — IPRATROPIUM/ALBUTEROL SULFATE 18-103MCG
3 AEROSOL WITH ADAPTER (GRAM) INHALATION ONCE
Qty: 0 | Refills: 0 | Status: COMPLETED | OUTPATIENT
Start: 2017-02-05 | End: 2017-02-05

## 2017-02-05 RX ORDER — IPRATROPIUM/ALBUTEROL SULFATE 18-103MCG
3 AEROSOL WITH ADAPTER (GRAM) INHALATION EVERY 6 HOURS
Qty: 0 | Refills: 0 | Status: DISCONTINUED | OUTPATIENT
Start: 2017-02-05 | End: 2017-02-08

## 2017-02-05 RX ADMIN — SODIUM CHLORIDE 10 MILLILITER(S): 9 INJECTION INTRAMUSCULAR; INTRAVENOUS; SUBCUTANEOUS at 15:41

## 2017-02-05 RX ADMIN — SENNA PLUS 2 TABLET(S): 8.6 TABLET ORAL at 21:27

## 2017-02-05 RX ADMIN — MORPHINE SULFATE 2 MILLIGRAM(S): 50 CAPSULE, EXTENDED RELEASE ORAL at 21:03

## 2017-02-05 RX ADMIN — MORPHINE SULFATE 2 MILLIGRAM(S): 50 CAPSULE, EXTENDED RELEASE ORAL at 20:31

## 2017-02-05 RX ADMIN — CARVEDILOL PHOSPHATE 12.5 MILLIGRAM(S): 80 CAPSULE, EXTENDED RELEASE ORAL at 06:03

## 2017-02-05 RX ADMIN — MONTELUKAST 10 MILLIGRAM(S): 4 TABLET, CHEWABLE ORAL at 21:27

## 2017-02-05 RX ADMIN — Medication 180 MILLIGRAM(S): at 06:03

## 2017-02-05 RX ADMIN — DIVALPROEX SODIUM 500 MILLIGRAM(S): 500 TABLET, DELAYED RELEASE ORAL at 12:02

## 2017-02-05 RX ADMIN — Medication 100 MILLIGRAM(S): at 12:02

## 2017-02-05 RX ADMIN — Medication 3 MILLILITER(S): at 00:51

## 2017-02-05 RX ADMIN — Medication 3 MILLILITER(S): at 20:37

## 2017-02-05 RX ADMIN — ENOXAPARIN SODIUM 40 MILLIGRAM(S): 100 INJECTION SUBCUTANEOUS at 12:02

## 2017-02-05 RX ADMIN — Medication 40 MILLIGRAM(S): at 06:03

## 2017-02-05 RX ADMIN — Medication 3 MILLILITER(S): at 15:17

## 2017-02-05 RX ADMIN — PANTOPRAZOLE SODIUM 40 MILLIGRAM(S): 20 TABLET, DELAYED RELEASE ORAL at 06:03

## 2017-02-05 RX ADMIN — SIMVASTATIN 40 MILLIGRAM(S): 20 TABLET, FILM COATED ORAL at 21:27

## 2017-02-05 RX ADMIN — CARVEDILOL PHOSPHATE 12.5 MILLIGRAM(S): 80 CAPSULE, EXTENDED RELEASE ORAL at 17:16

## 2017-02-05 RX ADMIN — Medication 325 MILLIGRAM(S): at 12:03

## 2017-02-05 NOTE — DISCHARGE NOTE ADULT - HOME CARE AGENCY
[facility] ([facility]) [phone]         [place] [address]

## 2017-02-05 NOTE — DISCHARGE NOTE ADULT - CARE PROVIDERS DIRECT ADDRESSES
[email or web address]

## 2017-02-05 NOTE — DISCHARGE NOTE ADULT - HOSPITAL COURSE
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 She received left side femoral-femoral vascular bypass surgery for left femoral thrombus. And right femoral artery bypass-revision surgery on 2/9/17.    1) PAD, s/p right femoral artery bypass-revision surgery  - 2/9/17, vascular Dr. Cook/Vidya.  - CTA abdomen and pelvis with contrast and L.E: Occluded femoral-femoral and femoral-popliteal bypass.   - pulses feeble in both lower extremities, check pulse every 4 hrs.   - s/p IR PROCEDURE  stent placement left extreimity, S/P bypass IN RIGHT EXTREMITY  - F/U with vascular surgery    2) s/p hypertensive urgency 2/2 noncomplaince with medications   - BP stable now B.P -199/99 on admission   - c/w coreg ,diltiazem and hydralazine for now  - F/U with cardio .    3) Hemiparesis.   - h/o CVA, patient reports that right sided-weakness at baseline   - CT head- chronic left MCA territorial infarct without significant change and small chronic infarct also noted in the right parietal lobe unchanged; no acute infarct or hemorrhage.   - c/w full dose aspirin, penidng PT evaluation.    4) CAD (coronary artery disease).    - continue with aspirin, statin, beta blocker. No chest pain.  - Stress test on 2/8/17: normal with EF 70%    5) atrial fibrillation  - continue coreg 12.5mg twice daily, diltiazem CD 180mg daily with parameters likely paroxysomal  - patient is not on anticoagulation     6) Asthma  - likely in mild asthma exacerbation secondary to URTI  - c/w duoneb q 6hrs, s/p steroid taper on the floor however solumedrol was restarted in ICU for wheezing, tapering down with prednisone now.   - Chest xray negative for effusion, consolidation.  - Incentive spirometry      Patient for discharge home with home PT and outpatient follow up.

## 2017-02-05 NOTE — DISCHARGE NOTE ADULT - NS AS DC STROKE ED MATERIALS
Risk Factors for Stroke/Prescribed Medications/Need for Followup After Discharge/Stroke Education Booklet/Stroke Warning Signs and Symptoms/Call 911 for Stroke

## 2017-02-05 NOTE — DISCHARGE NOTE ADULT - MEDICATION SUMMARY - MEDICATIONS TO TAKE
I will START or STAY ON the medications listed below when I get home from the hospital:    predniSONE 10 mg oral tablet  -- 3 tab(s) by mouth once a day x 2 days  2 tab(s) by mouth once a day x 2 days  1 tab(s) by mouth once a day x 2 days  -- It is very important that you take or use this exactly as directed.  Do not skip doses or discontinue unless directed by your doctor.  Obtain medical advice before taking any non-prescription drugs as some may affect the action of this medication.  Take with food or milk.    -- Indication: For Asthma    acetaminophen-oxyCODONE 325 mg-5 mg oral tablet  -- 1 tab(s) by mouth every 6 hours, As Needed, Moderate Pain MDD:4  -- Indication: For prn pain     aspirin 325 mg oral tablet  -- 1 tab(s) by mouth once a day  -- Indication: For CAD (coronary artery disease)    lisinopril 20 mg oral tablet  -- 1 tab(s) by mouth once a day  -- Indication: For Htn     diltiaZEM 180 mg/24 hours oral capsule, extended release  -- 1 cap(s) by mouth once a day  -- Indication: For Htn     divalproex sodium 500 mg oral tablet, extended release  -- 1 tab(s) by mouth once a day  -- Indication: For Seizure disorder    topiramate 100 mg oral tablet  -- 1 tab(s) by mouth once a day  -- Indication: For Seizure disorder    simvastatin 40 mg oral tablet  -- 1 tab(s) by mouth once a day (at bedtime)  -- Indication: For Hld    carvedilol 12.5 mg oral tablet  -- 1 tab(s) by mouth every 12 hours  -- Indication: For Htn    albuterol-ipratropium 2.5 mg-0.5 mg/3 mL inhalation solution  -- 3 milliliter(s) inhaled every 24 hours  -- Indication: For Asthma    Advair Diskus 250 mcg-50 mcg inhalation powder  -- 1 puff(s) inhaled every 6 hours  -- Indication: For Asthma    montelukast 10 mg oral tablet  -- 1 tab(s) by mouth once a day (at bedtime)  -- Indication: For Asthma    omeprazole 20 mg oral delayed release capsule  -- 1 cap(s) by mouth once a day  -- Indication: For gerd     hydrALAZINE 50 mg oral tablet  -- 1 tab(s) by mouth 3 times a day  -- Indication: For Htn     Vitamin D2 50,000 intl units (1.25 mg) oral capsule  -- 1 cap(s) by mouth once a week  -- Indication: For vitamin

## 2017-02-05 NOTE — DISCHARGE NOTE ADULT - CARE PLAN
Principal Discharge DX:	Ischemic pain of foot at rest  Goal:	wound healing  Instructions for follow-up, activity and diet:	please follow up with Dr. Dasilva within 1 week  No heavy lifting or straining  Diet as tolerated  Secondary Diagnosis:	Seizure disorder  Goal:	Please continue current regimen and follow up with PCP  Secondary Diagnosis:	Essential hypertension  Goal:	Please continue the regimen attached and follow up with PCP  Secondary Diagnosis:	Gastroesophageal reflux disease, esophagitis presence not specified  Goal:	Please continue current regimen and follow up with PCP  Secondary Diagnosis:	Coronary artery disease involving native coronary artery, angina presence unspecified, unspecified whether native or transplanted heart  Goal:	Please continue current regimen and follow up with PCP  Secondary Diagnosis:	Uncomplicated asthma, unspecified asthma severity  Goal:	Please continue current regimen and follow up with PCP

## 2017-02-05 NOTE — DISCHARGE NOTE ADULT - PLAN OF CARE
wound healing please follow up with Dr. Dasilva within 1 week  No heavy lifting or straining  Diet as tolerated Please continue current regimen and follow up with PCP Please continue the regimen attached and follow up with PCP

## 2017-02-05 NOTE — DISCHARGE NOTE ADULT - CARE PROVIDER_API CALL
Loyd Dasilva), Surgery; Vascular Surgery  [address]  [place] [address]  Phone: [phone]  Fax: [phone]

## 2017-02-05 NOTE — DISCHARGE NOTE ADULT - NSTOBACCOHOTLINE_GEN_A_CS
[facility] Smokers Quitline ([id number]) [facility] Smokers Quitline ([phone])

## 2017-02-05 NOTE — DISCHARGE NOTE ADULT - OTHER SIGNIFICANT FINDINGS
Patient ID: [id number] Patient Name: DOUG APPLE   YOB: 1955 Sex: F      EXAM: CT ANGIO LWR EXT (W)AW IC BI    EXAM: CT ANGIO ABD PELV (W)AW IC      PROCEDURE DATE: 02/02/2017      INTERPRETATION: CLINICAL INFORMATION: Femorofemoral bypass. Right  femoropopliteal bypass. Right foot pain.    CT ANGIOGRAM ABDOMEN, PELVIS, AND LOWER EXTREMITIES:    TECHNIQUE: Initially, nonenhanced CT is obtained through the calves. Then,  following the rapid administration of intravenous contrast, CT angiography  is performed through the abdomen, pelvis, and lower extremities down to the  toes. Delayed images through the calves is then also obtained. Sagittal and  coronal reformats as well as 3D multiplanar reconstruction is performed.    Initially, the study is obtained with the report of extravasation. Weight  based Omnipaque 350 was administered intravenously without complication.    PRIOR EXAMINATION: April 11, 2015    FINDINGS:    ABDOMEN AND PELVIS ARTERIAL SYSTEM:    ABDOMINAL AORTA: Widely patent.  CELIAC ARTERY: Widely patent.  SUPERIOR MESENTERIC ARTERY (SMA): Widely patent. Mild atherosclerotic  disease.  INFERIOR MESENTERIC ARTERY: Patent.  RIGHT RENAL ARTERY: Widely patent.  LEFT RENAL ARTERY: Widely patent.    RIGHT LOWER EXTREMITY:    COMMON ILIAC ARTERY: Heavy calcific atherosclerotic disease with moderate  stenosis.  EXTERNAL ILIAC ARTERY: Occluded.  INTERNAL ILIAC ARTERY: Patent but severely stenosed at their origin.  COMMON FEMORAL ARTERY: Occluded. Femoral-femoral bypass occluded.  FEMORAL ARTERY: Occluded native femoral artery and bypass graft.  PROFUNDA FEMORAL ARTERY: Origin occluded but then branches reconstituted  proximally.  POPLITEAL ARTERY: Reconstituted and patent but moderately narrowed.  ANTERIOR TIBIAL ARTERY: Occluded at the origin but then reconstituted and  widely patent proximally into the foot.  TIBIOPERONEAL TRUNK: Occluded.  POSTERIOR TIBIAL ARTERY: Only minimal flow proximally.  PERONEAL ARTERY: Only minimal flow.    LEFT LOWER EXTREMITY:    COMMON ILIAC ARTERY: Widely patent.  EXTERNAL ILIAC ARTERY: Moderate focal stenosis proximally.  INTERNAL ILIAC ARTERY: Occluded.  COMMON FEMORAL ARTERY: Widely patent.  FEMORAL ARTERY: Occluded and then reconstituted distally.  PROFUNDA FEMORAL ARTERY: Widely patent.  POPLITEAL ARTERY: Widely patent.  ANTERIOR TIBIAL ARTERY: Widely patent.  TIBIOPERONEAL TRUNK: Widely patent.  POSTERIOR TIBIAL ARTERY: Widely patent.  PERONEAL ARTERY: Widely patent.    ADDITIONAL FINDINGS: Small left hepatic cyst. Renal cysts.    IMPRESSION:    Right lower extremity: Occluded femoral-femoral and femoral-popliteal  bypass. Popliteal artery reconstituted but moderately narrowed. Poor runoff  primarily via the anterior tibial artery which is occluded at the origin but  then reconstituted proximally.    Left lower extremity: Femoral artery mostly occluded with reconstitution of  the distal femoral artery and popliteal artery with three-vessel runoff.              LISA LU M.D., ATTENDING RADIOLOGIST  This document has been electronically signed. Feb 6 2017 12:34PM

## 2017-02-05 NOTE — DISCHARGE NOTE ADULT - SECONDARY DIAGNOSIS.
Seizure disorder Essential hypertension Gastroesophageal reflux disease, esophagitis presence not specified Coronary artery disease involving native coronary artery, angina presence unspecified, unspecified whether native or transplanted heart Uncomplicated asthma, unspecified asthma severity

## 2017-02-06 LAB
ANION GAP SERPL CALC-SCNC: 10 MMOL/L — SIGNIFICANT CHANGE UP (ref 5–17)
BUN SERPL-MCNC: 17 MG/DL — SIGNIFICANT CHANGE UP (ref 7–18)
CALCIUM SERPL-MCNC: 8.5 MG/DL — SIGNIFICANT CHANGE UP (ref 8.4–10.5)
CHLORIDE SERPL-SCNC: 110 MMOL/L — HIGH (ref 96–108)
CO2 SERPL-SCNC: 21 MMOL/L — LOW (ref 22–31)
CREAT SERPL-MCNC: 0.89 MG/DL — SIGNIFICANT CHANGE UP (ref 0.5–1.3)
GLUCOSE SERPL-MCNC: 13 MG/DL — CRITICAL LOW (ref 70–99)
HCT VFR BLD CALC: 44.3 % — SIGNIFICANT CHANGE UP (ref 34.5–45)
HGB BLD-MCNC: 14.1 G/DL — SIGNIFICANT CHANGE UP (ref 11.5–15.5)
MCHC RBC-ENTMCNC: 26.2 PG — LOW (ref 27–34)
MCHC RBC-ENTMCNC: 31.8 GM/DL — LOW (ref 32–36)
MCV RBC AUTO: 82.7 FL — SIGNIFICANT CHANGE UP (ref 80–100)
PLATELET # BLD AUTO: 267 K/UL — SIGNIFICANT CHANGE UP (ref 150–400)
POTASSIUM SERPL-MCNC: 3.6 MMOL/L — SIGNIFICANT CHANGE UP (ref 3.5–5.3)
POTASSIUM SERPL-SCNC: 3.6 MMOL/L — SIGNIFICANT CHANGE UP (ref 3.5–5.3)
RBC # BLD: 5.36 M/UL — HIGH (ref 3.8–5.2)
RBC # FLD: 12.2 % — SIGNIFICANT CHANGE UP (ref 10.3–14.5)
SODIUM SERPL-SCNC: 141 MMOL/L — SIGNIFICANT CHANGE UP (ref 135–145)
WBC # BLD: 9.3 K/UL — SIGNIFICANT CHANGE UP (ref 3.8–10.5)
WBC # FLD AUTO: 9.3 K/UL — SIGNIFICANT CHANGE UP (ref 3.8–10.5)

## 2017-02-06 RX ORDER — HYDRALAZINE HCL 50 MG
25 TABLET ORAL ONCE
Qty: 0 | Refills: 0 | Status: COMPLETED | OUTPATIENT
Start: 2017-02-06 | End: 2017-02-06

## 2017-02-06 RX ADMIN — Medication 3 MILLILITER(S): at 08:05

## 2017-02-06 RX ADMIN — SIMVASTATIN 40 MILLIGRAM(S): 20 TABLET, FILM COATED ORAL at 21:37

## 2017-02-06 RX ADMIN — ENOXAPARIN SODIUM 40 MILLIGRAM(S): 100 INJECTION SUBCUTANEOUS at 12:43

## 2017-02-06 RX ADMIN — CARVEDILOL PHOSPHATE 12.5 MILLIGRAM(S): 80 CAPSULE, EXTENDED RELEASE ORAL at 05:22

## 2017-02-06 RX ADMIN — Medication 100 MILLIGRAM(S): at 17:16

## 2017-02-06 RX ADMIN — DIVALPROEX SODIUM 500 MILLIGRAM(S): 500 TABLET, DELAYED RELEASE ORAL at 12:43

## 2017-02-06 RX ADMIN — Medication 25 MILLIGRAM(S): at 01:17

## 2017-02-06 RX ADMIN — CARVEDILOL PHOSPHATE 12.5 MILLIGRAM(S): 80 CAPSULE, EXTENDED RELEASE ORAL at 17:16

## 2017-02-06 RX ADMIN — SENNA PLUS 2 TABLET(S): 8.6 TABLET ORAL at 21:37

## 2017-02-06 RX ADMIN — MONTELUKAST 10 MILLIGRAM(S): 4 TABLET, CHEWABLE ORAL at 21:37

## 2017-02-06 RX ADMIN — Medication 180 MILLIGRAM(S): at 05:22

## 2017-02-06 RX ADMIN — Medication 325 MILLIGRAM(S): at 12:43

## 2017-02-06 RX ADMIN — Medication 3 MILLILITER(S): at 14:08

## 2017-02-06 RX ADMIN — Medication 40 MILLIGRAM(S): at 05:22

## 2017-02-06 RX ADMIN — SODIUM CHLORIDE 10 MILLILITER(S): 9 INJECTION INTRAMUSCULAR; INTRAVENOUS; SUBCUTANEOUS at 21:20

## 2017-02-06 RX ADMIN — Medication 100 MILLIGRAM(S): at 12:43

## 2017-02-06 RX ADMIN — PANTOPRAZOLE SODIUM 40 MILLIGRAM(S): 20 TABLET, DELAYED RELEASE ORAL at 05:22

## 2017-02-06 RX ADMIN — Medication 3 MILLILITER(S): at 21:23

## 2017-02-07 LAB
ANION GAP SERPL CALC-SCNC: 10 MMOL/L — SIGNIFICANT CHANGE UP (ref 5–17)
BUN SERPL-MCNC: 19 MG/DL — HIGH (ref 7–18)
CALCIUM SERPL-MCNC: 9.1 MG/DL — SIGNIFICANT CHANGE UP (ref 8.4–10.5)
CHLORIDE SERPL-SCNC: 111 MMOL/L — HIGH (ref 96–108)
CO2 SERPL-SCNC: 20 MMOL/L — LOW (ref 22–31)
CREAT SERPL-MCNC: 0.99 MG/DL — SIGNIFICANT CHANGE UP (ref 0.5–1.3)
GLUCOSE SERPL-MCNC: 83 MG/DL — SIGNIFICANT CHANGE UP (ref 70–99)
POTASSIUM SERPL-MCNC: 3.8 MMOL/L — SIGNIFICANT CHANGE UP (ref 3.5–5.3)
POTASSIUM SERPL-SCNC: 3.8 MMOL/L — SIGNIFICANT CHANGE UP (ref 3.5–5.3)
SODIUM SERPL-SCNC: 141 MMOL/L — SIGNIFICANT CHANGE UP (ref 135–145)

## 2017-02-07 RX ORDER — HYDRALAZINE HCL 50 MG
25 TABLET ORAL ONCE
Qty: 0 | Refills: 0 | Status: COMPLETED | OUTPATIENT
Start: 2017-02-07 | End: 2017-02-07

## 2017-02-07 RX ADMIN — Medication 180 MILLIGRAM(S): at 06:10

## 2017-02-07 RX ADMIN — MORPHINE SULFATE 2 MILLIGRAM(S): 50 CAPSULE, EXTENDED RELEASE ORAL at 20:44

## 2017-02-07 RX ADMIN — CARVEDILOL PHOSPHATE 12.5 MILLIGRAM(S): 80 CAPSULE, EXTENDED RELEASE ORAL at 06:10

## 2017-02-07 RX ADMIN — Medication 325 MILLIGRAM(S): at 11:56

## 2017-02-07 RX ADMIN — Medication 25 MILLIGRAM(S): at 21:13

## 2017-02-07 RX ADMIN — Medication 3 MILLILITER(S): at 20:40

## 2017-02-07 RX ADMIN — Medication 3 MILLILITER(S): at 08:19

## 2017-02-07 RX ADMIN — SIMVASTATIN 40 MILLIGRAM(S): 20 TABLET, FILM COATED ORAL at 21:06

## 2017-02-07 RX ADMIN — DIVALPROEX SODIUM 500 MILLIGRAM(S): 500 TABLET, DELAYED RELEASE ORAL at 11:56

## 2017-02-07 RX ADMIN — SENNA PLUS 2 TABLET(S): 8.6 TABLET ORAL at 21:06

## 2017-02-07 RX ADMIN — ENOXAPARIN SODIUM 40 MILLIGRAM(S): 100 INJECTION SUBCUTANEOUS at 11:57

## 2017-02-07 RX ADMIN — MONTELUKAST 10 MILLIGRAM(S): 4 TABLET, CHEWABLE ORAL at 21:06

## 2017-02-07 RX ADMIN — CARVEDILOL PHOSPHATE 12.5 MILLIGRAM(S): 80 CAPSULE, EXTENDED RELEASE ORAL at 17:12

## 2017-02-07 RX ADMIN — Medication 100 MILLIGRAM(S): at 12:04

## 2017-02-07 RX ADMIN — Medication 100 MILLIGRAM(S): at 11:56

## 2017-02-07 RX ADMIN — Medication 30 MILLIGRAM(S): at 06:10

## 2017-02-07 RX ADMIN — PANTOPRAZOLE SODIUM 40 MILLIGRAM(S): 20 TABLET, DELAYED RELEASE ORAL at 06:10

## 2017-02-07 RX ADMIN — MORPHINE SULFATE 2 MILLIGRAM(S): 50 CAPSULE, EXTENDED RELEASE ORAL at 21:12

## 2017-02-08 ENCOUNTER — RESULT REVIEW (OUTPATIENT)
Age: 62
End: 2017-02-08

## 2017-02-08 PROCEDURE — 36200 PLACE CATHETER IN AORTA: CPT | Mod: 59

## 2017-02-08 PROCEDURE — 37220: CPT | Mod: LT

## 2017-02-08 PROCEDURE — 76000 FLUOROSCOPY <1 HR PHYS/QHP: CPT | Mod: 26,59

## 2017-02-08 PROCEDURE — 75716 ARTERY X-RAYS ARMS/LEGS: CPT | Mod: 26,59

## 2017-02-08 PROCEDURE — 37221: CPT | Mod: LT

## 2017-02-08 PROCEDURE — 37223: CPT | Mod: LT

## 2017-02-08 RX ORDER — ALBUTEROL 90 UG/1
2.5 AEROSOL, METERED ORAL EVERY 6 HOURS
Qty: 0 | Refills: 0 | Status: DISCONTINUED | OUTPATIENT
Start: 2017-02-08 | End: 2017-02-09

## 2017-02-08 RX ORDER — HYDRALAZINE HCL 50 MG
25 TABLET ORAL THREE TIMES A DAY
Qty: 0 | Refills: 0 | Status: DISCONTINUED | OUTPATIENT
Start: 2017-02-08 | End: 2017-02-09

## 2017-02-08 RX ORDER — IPRATROPIUM/ALBUTEROL SULFATE 18-103MCG
3 AEROSOL WITH ADAPTER (GRAM) INHALATION EVERY 6 HOURS
Qty: 0 | Refills: 0 | Status: DISCONTINUED | OUTPATIENT
Start: 2017-02-08 | End: 2017-02-08

## 2017-02-08 RX ADMIN — Medication 325 MILLIGRAM(S): at 11:49

## 2017-02-08 RX ADMIN — SENNA PLUS 2 TABLET(S): 8.6 TABLET ORAL at 22:07

## 2017-02-08 RX ADMIN — Medication 30 MILLIGRAM(S): at 05:44

## 2017-02-08 RX ADMIN — MORPHINE SULFATE 2 MILLIGRAM(S): 50 CAPSULE, EXTENDED RELEASE ORAL at 22:30

## 2017-02-08 RX ADMIN — Medication 100 MILLIGRAM(S): at 11:50

## 2017-02-08 RX ADMIN — SIMVASTATIN 40 MILLIGRAM(S): 20 TABLET, FILM COATED ORAL at 22:07

## 2017-02-08 RX ADMIN — Medication 180 MILLIGRAM(S): at 05:44

## 2017-02-08 RX ADMIN — MORPHINE SULFATE 2 MILLIGRAM(S): 50 CAPSULE, EXTENDED RELEASE ORAL at 22:08

## 2017-02-08 RX ADMIN — PANTOPRAZOLE SODIUM 40 MILLIGRAM(S): 20 TABLET, DELAYED RELEASE ORAL at 05:45

## 2017-02-08 RX ADMIN — DIVALPROEX SODIUM 500 MILLIGRAM(S): 500 TABLET, DELAYED RELEASE ORAL at 11:50

## 2017-02-08 RX ADMIN — ENOXAPARIN SODIUM 40 MILLIGRAM(S): 100 INJECTION SUBCUTANEOUS at 11:50

## 2017-02-08 RX ADMIN — ERGOCALCIFEROL 50000 UNIT(S): 1.25 CAPSULE ORAL at 19:49

## 2017-02-08 RX ADMIN — Medication 100 MILLIGRAM(S): at 11:51

## 2017-02-08 RX ADMIN — Medication 25 MILLIGRAM(S): at 22:08

## 2017-02-08 RX ADMIN — CARVEDILOL PHOSPHATE 12.5 MILLIGRAM(S): 80 CAPSULE, EXTENDED RELEASE ORAL at 18:35

## 2017-02-08 RX ADMIN — MONTELUKAST 10 MILLIGRAM(S): 4 TABLET, CHEWABLE ORAL at 22:07

## 2017-02-09 ENCOUNTER — TRANSCRIPTION ENCOUNTER (OUTPATIENT)
Age: 62
End: 2017-02-09

## 2017-02-09 LAB
ALBUMIN SERPL ELPH-MCNC: 2.6 G/DL — LOW (ref 3.5–5)
ALP SERPL-CCNC: 59 U/L — SIGNIFICANT CHANGE UP (ref 40–120)
ALT FLD-CCNC: 13 U/L DA — SIGNIFICANT CHANGE UP (ref 10–60)
ANION GAP SERPL CALC-SCNC: 12 MMOL/L — SIGNIFICANT CHANGE UP (ref 5–17)
ANION GAP SERPL CALC-SCNC: 9 MMOL/L — SIGNIFICANT CHANGE UP (ref 5–17)
APTT BLD: 26.7 SEC — LOW (ref 27.5–37.4)
AST SERPL-CCNC: 6 U/L — LOW (ref 10–40)
BILIRUB SERPL-MCNC: 0.3 MG/DL — SIGNIFICANT CHANGE UP (ref 0.2–1.2)
BUN SERPL-MCNC: 18 MG/DL — SIGNIFICANT CHANGE UP (ref 7–18)
BUN SERPL-MCNC: 23 MG/DL — HIGH (ref 7–18)
CALCIUM SERPL-MCNC: 8.4 MG/DL — SIGNIFICANT CHANGE UP (ref 8.4–10.5)
CALCIUM SERPL-MCNC: 8.7 MG/DL — SIGNIFICANT CHANGE UP (ref 8.4–10.5)
CHLORIDE SERPL-SCNC: 106 MMOL/L — SIGNIFICANT CHANGE UP (ref 96–108)
CHLORIDE SERPL-SCNC: 106 MMOL/L — SIGNIFICANT CHANGE UP (ref 96–108)
CK MB BLD-MCNC: <3.3 % — SIGNIFICANT CHANGE UP (ref 0–3.5)
CK MB CFR SERPL CALC: <1 NG/ML — SIGNIFICANT CHANGE UP (ref 0–3.6)
CK SERPL-CCNC: 30 U/L — SIGNIFICANT CHANGE UP (ref 21–215)
CO2 SERPL-SCNC: 20 MMOL/L — LOW (ref 22–31)
CO2 SERPL-SCNC: 25 MMOL/L — SIGNIFICANT CHANGE UP (ref 22–31)
CREAT SERPL-MCNC: 0.85 MG/DL — SIGNIFICANT CHANGE UP (ref 0.5–1.3)
CREAT SERPL-MCNC: 1 MG/DL — SIGNIFICANT CHANGE UP (ref 0.5–1.3)
GLUCOSE SERPL-MCNC: 83 MG/DL — SIGNIFICANT CHANGE UP (ref 70–99)
GLUCOSE SERPL-MCNC: 97 MG/DL — SIGNIFICANT CHANGE UP (ref 70–99)
HCT VFR BLD CALC: 37 % — SIGNIFICANT CHANGE UP (ref 34.5–45)
HCT VFR BLD CALC: 43.5 % — SIGNIFICANT CHANGE UP (ref 34.5–45)
HGB BLD-MCNC: 12.8 G/DL — SIGNIFICANT CHANGE UP (ref 11.5–15.5)
HGB BLD-MCNC: 13.9 G/DL — SIGNIFICANT CHANGE UP (ref 11.5–15.5)
INR BLD: 0.98 RATIO — SIGNIFICANT CHANGE UP (ref 0.88–1.16)
LACTATE SERPL-SCNC: 0.6 MMOL/L — LOW (ref 0.7–2)
LYMPHOCYTES # BLD AUTO: 36 % — SIGNIFICANT CHANGE UP (ref 13–44)
MAGNESIUM SERPL-MCNC: 2.1 MG/DL — SIGNIFICANT CHANGE UP (ref 1.8–2.4)
MCHC RBC-ENTMCNC: 26.1 PG — LOW (ref 27–34)
MCHC RBC-ENTMCNC: 28 PG — SIGNIFICANT CHANGE UP (ref 27–34)
MCHC RBC-ENTMCNC: 31.8 GM/DL — LOW (ref 32–36)
MCHC RBC-ENTMCNC: 34.4 GM/DL — SIGNIFICANT CHANGE UP (ref 32–36)
MCV RBC AUTO: 81.2 FL — SIGNIFICANT CHANGE UP (ref 80–100)
MCV RBC AUTO: 81.8 FL — SIGNIFICANT CHANGE UP (ref 80–100)
MONOCYTES NFR BLD AUTO: 15 % — HIGH (ref 2–14)
NEUTROPHILS NFR BLD AUTO: 47 % — SIGNIFICANT CHANGE UP (ref 43–77)
PHOSPHATE SERPL-MCNC: 3.2 MG/DL — SIGNIFICANT CHANGE UP (ref 2.5–4.5)
PLATELET # BLD AUTO: 283 K/UL — SIGNIFICANT CHANGE UP (ref 150–400)
PLATELET # BLD AUTO: 299 K/UL — SIGNIFICANT CHANGE UP (ref 150–400)
POTASSIUM SERPL-MCNC: 3.5 MMOL/L — SIGNIFICANT CHANGE UP (ref 3.5–5.3)
POTASSIUM SERPL-MCNC: 3.6 MMOL/L — SIGNIFICANT CHANGE UP (ref 3.5–5.3)
POTASSIUM SERPL-SCNC: 3.5 MMOL/L — SIGNIFICANT CHANGE UP (ref 3.5–5.3)
POTASSIUM SERPL-SCNC: 3.6 MMOL/L — SIGNIFICANT CHANGE UP (ref 3.5–5.3)
PROT SERPL-MCNC: 5.5 G/DL — LOW (ref 6–8.3)
PROTHROM AB SERPL-ACNC: 11 SEC — SIGNIFICANT CHANGE UP (ref 10–13.1)
RBC # BLD: 4.56 M/UL — SIGNIFICANT CHANGE UP (ref 3.8–5.2)
RBC # BLD: 5.32 M/UL — HIGH (ref 3.8–5.2)
RBC # FLD: 12.6 % — SIGNIFICANT CHANGE UP (ref 10.3–14.5)
RBC # FLD: 12.7 % — SIGNIFICANT CHANGE UP (ref 10.3–14.5)
SODIUM SERPL-SCNC: 138 MMOL/L — SIGNIFICANT CHANGE UP (ref 135–145)
SODIUM SERPL-SCNC: 140 MMOL/L — SIGNIFICANT CHANGE UP (ref 135–145)
TROPONIN I SERPL-MCNC: 0.05 NG/ML — HIGH (ref 0–0.04)
WBC # BLD: 12.8 K/UL — HIGH (ref 3.8–10.5)
WBC # BLD: 13.2 K/UL — HIGH (ref 3.8–10.5)
WBC # FLD AUTO: 12.8 K/UL — HIGH (ref 3.8–10.5)
WBC # FLD AUTO: 13.2 K/UL — HIGH (ref 3.8–10.5)

## 2017-02-09 PROCEDURE — 88304 TISSUE EXAM BY PATHOLOGIST: CPT | Mod: 26

## 2017-02-09 PROCEDURE — 35661 BPG FEMORAL-FEMORAL: CPT | Mod: RT

## 2017-02-09 PROCEDURE — 35372 RECHANNELING OF ARTERY: CPT | Mod: 59,RT

## 2017-02-09 PROCEDURE — 35875 REMOVAL OF CLOT IN GRAFT: CPT | Mod: 59,RT

## 2017-02-09 PROCEDURE — 35372 RECHANNELING OF ARTERY: CPT | Mod: 82,59,RT

## 2017-02-09 PROCEDURE — 88311 DECALCIFY TISSUE: CPT | Mod: 26

## 2017-02-09 PROCEDURE — 35661 BPG FEMORAL-FEMORAL: CPT | Mod: 82,RT

## 2017-02-09 RX ORDER — IPRATROPIUM/ALBUTEROL SULFATE 18-103MCG
3 AEROSOL WITH ADAPTER (GRAM) INHALATION EVERY 6 HOURS
Qty: 0 | Refills: 0 | Status: DISCONTINUED | OUTPATIENT
Start: 2017-02-09 | End: 2017-02-13

## 2017-02-09 RX ORDER — HYDRALAZINE HCL 50 MG
25 TABLET ORAL THREE TIMES A DAY
Qty: 0 | Refills: 0 | Status: DISCONTINUED | OUTPATIENT
Start: 2017-02-09 | End: 2017-02-11

## 2017-02-09 RX ORDER — ONDANSETRON 8 MG/1
4 TABLET, FILM COATED ORAL EVERY 6 HOURS
Qty: 0 | Refills: 0 | Status: DISCONTINUED | OUTPATIENT
Start: 2017-02-09 | End: 2017-02-13

## 2017-02-09 RX ORDER — SODIUM CHLORIDE 9 MG/ML
1000 INJECTION INTRAMUSCULAR; INTRAVENOUS; SUBCUTANEOUS ONCE
Qty: 0 | Refills: 0 | Status: COMPLETED | OUTPATIENT
Start: 2017-02-09 | End: 2017-02-09

## 2017-02-09 RX ORDER — MORPHINE SULFATE 50 MG/1
4 CAPSULE, EXTENDED RELEASE ORAL EVERY 4 HOURS
Qty: 0 | Refills: 0 | Status: DISCONTINUED | OUTPATIENT
Start: 2017-02-09 | End: 2017-02-13

## 2017-02-09 RX ORDER — DILTIAZEM HCL 120 MG
180 CAPSULE, EXT RELEASE 24 HR ORAL DAILY
Qty: 0 | Refills: 0 | Status: DISCONTINUED | OUTPATIENT
Start: 2017-02-09 | End: 2017-02-13

## 2017-02-09 RX ORDER — SODIUM CHLORIDE 9 MG/ML
1000 INJECTION INTRAMUSCULAR; INTRAVENOUS; SUBCUTANEOUS
Qty: 0 | Refills: 0 | Status: DISCONTINUED | OUTPATIENT
Start: 2017-02-09 | End: 2017-02-13

## 2017-02-09 RX ORDER — HYDROMORPHONE HYDROCHLORIDE 2 MG/ML
0.5 INJECTION INTRAMUSCULAR; INTRAVENOUS; SUBCUTANEOUS EVERY 4 HOURS
Qty: 0 | Refills: 0 | Status: DISCONTINUED | OUTPATIENT
Start: 2017-02-09 | End: 2017-02-09

## 2017-02-09 RX ORDER — ENOXAPARIN SODIUM 100 MG/ML
40 INJECTION SUBCUTANEOUS DAILY
Qty: 0 | Refills: 0 | Status: DISCONTINUED | OUTPATIENT
Start: 2017-02-09 | End: 2017-02-13

## 2017-02-09 RX ORDER — CARVEDILOL PHOSPHATE 80 MG/1
12.5 CAPSULE, EXTENDED RELEASE ORAL EVERY 12 HOURS
Qty: 0 | Refills: 0 | Status: DISCONTINUED | OUTPATIENT
Start: 2017-02-09 | End: 2017-02-13

## 2017-02-09 RX ORDER — HYDROMORPHONE HYDROCHLORIDE 2 MG/ML
0.5 INJECTION INTRAMUSCULAR; INTRAVENOUS; SUBCUTANEOUS
Qty: 0 | Refills: 0 | Status: DISCONTINUED | OUTPATIENT
Start: 2017-02-09 | End: 2017-02-13

## 2017-02-09 RX ORDER — PANTOPRAZOLE SODIUM 20 MG/1
40 TABLET, DELAYED RELEASE ORAL
Qty: 0 | Refills: 0 | Status: DISCONTINUED | OUTPATIENT
Start: 2017-02-09 | End: 2017-02-13

## 2017-02-09 RX ORDER — SODIUM CHLORIDE 9 MG/ML
1000 INJECTION, SOLUTION INTRAVENOUS
Qty: 0 | Refills: 0 | Status: DISCONTINUED | OUTPATIENT
Start: 2017-02-09 | End: 2017-02-09

## 2017-02-09 RX ORDER — MONTELUKAST 4 MG/1
10 TABLET, CHEWABLE ORAL AT BEDTIME
Qty: 0 | Refills: 0 | Status: DISCONTINUED | OUTPATIENT
Start: 2017-02-09 | End: 2017-02-13

## 2017-02-09 RX ORDER — SIMVASTATIN 20 MG/1
40 TABLET, FILM COATED ORAL AT BEDTIME
Qty: 0 | Refills: 0 | Status: DISCONTINUED | OUTPATIENT
Start: 2017-02-09 | End: 2017-02-13

## 2017-02-09 RX ORDER — ACETAMINOPHEN 500 MG
650 TABLET ORAL EVERY 6 HOURS
Qty: 0 | Refills: 0 | Status: DISCONTINUED | OUTPATIENT
Start: 2017-02-09 | End: 2017-02-13

## 2017-02-09 RX ORDER — DOCUSATE SODIUM 100 MG
100 CAPSULE ORAL DAILY
Qty: 0 | Refills: 0 | Status: DISCONTINUED | OUTPATIENT
Start: 2017-02-09 | End: 2017-02-13

## 2017-02-09 RX ORDER — ASPIRIN/CALCIUM CARB/MAGNESIUM 324 MG
325 TABLET ORAL DAILY
Qty: 0 | Refills: 0 | Status: DISCONTINUED | OUTPATIENT
Start: 2017-02-09 | End: 2017-02-13

## 2017-02-09 RX ORDER — DIVALPROEX SODIUM 500 MG/1
500 TABLET, DELAYED RELEASE ORAL DAILY
Qty: 0 | Refills: 0 | Status: DISCONTINUED | OUTPATIENT
Start: 2017-02-09 | End: 2017-02-13

## 2017-02-09 RX ORDER — INSULIN LISPRO 100/ML
VIAL (ML) SUBCUTANEOUS
Qty: 0 | Refills: 0 | Status: DISCONTINUED | OUTPATIENT
Start: 2017-02-09 | End: 2017-02-09

## 2017-02-09 RX ADMIN — HYDROMORPHONE HYDROCHLORIDE 0.5 MILLIGRAM(S): 2 INJECTION INTRAMUSCULAR; INTRAVENOUS; SUBCUTANEOUS at 15:43

## 2017-02-09 RX ADMIN — DIVALPROEX SODIUM 500 MILLIGRAM(S): 500 TABLET, DELAYED RELEASE ORAL at 23:59

## 2017-02-09 RX ADMIN — SODIUM CHLORIDE 2000 MILLILITER(S): 9 INJECTION INTRAMUSCULAR; INTRAVENOUS; SUBCUTANEOUS at 20:20

## 2017-02-09 RX ADMIN — Medication 180 MILLIGRAM(S): at 18:50

## 2017-02-09 RX ADMIN — MONTELUKAST 10 MILLIGRAM(S): 4 TABLET, CHEWABLE ORAL at 21:33

## 2017-02-09 RX ADMIN — PANTOPRAZOLE SODIUM 40 MILLIGRAM(S): 20 TABLET, DELAYED RELEASE ORAL at 18:50

## 2017-02-09 RX ADMIN — HYDROMORPHONE HYDROCHLORIDE 0.5 MILLIGRAM(S): 2 INJECTION INTRAMUSCULAR; INTRAVENOUS; SUBCUTANEOUS at 16:14

## 2017-02-09 RX ADMIN — ENOXAPARIN SODIUM 40 MILLIGRAM(S): 100 INJECTION SUBCUTANEOUS at 18:50

## 2017-02-09 RX ADMIN — PANTOPRAZOLE SODIUM 40 MILLIGRAM(S): 20 TABLET, DELAYED RELEASE ORAL at 05:46

## 2017-02-09 RX ADMIN — SIMVASTATIN 40 MILLIGRAM(S): 20 TABLET, FILM COATED ORAL at 21:29

## 2017-02-09 RX ADMIN — CARVEDILOL PHOSPHATE 12.5 MILLIGRAM(S): 80 CAPSULE, EXTENDED RELEASE ORAL at 18:52

## 2017-02-09 RX ADMIN — Medication 40 MILLIGRAM(S): at 21:29

## 2017-02-09 RX ADMIN — HYDROMORPHONE HYDROCHLORIDE 0.5 MILLIGRAM(S): 2 INJECTION INTRAMUSCULAR; INTRAVENOUS; SUBCUTANEOUS at 18:02

## 2017-02-09 RX ADMIN — Medication 325 MILLIGRAM(S): at 18:50

## 2017-02-09 RX ADMIN — Medication 180 MILLIGRAM(S): at 05:46

## 2017-02-09 RX ADMIN — Medication 25 MILLIGRAM(S): at 05:46

## 2017-02-09 RX ADMIN — Medication 3 MILLILITER(S): at 20:00

## 2017-02-09 RX ADMIN — SODIUM CHLORIDE 70 MILLILITER(S): 9 INJECTION INTRAMUSCULAR; INTRAVENOUS; SUBCUTANEOUS at 21:29

## 2017-02-09 RX ADMIN — HYDROMORPHONE HYDROCHLORIDE 0.5 MILLIGRAM(S): 2 INJECTION INTRAMUSCULAR; INTRAVENOUS; SUBCUTANEOUS at 14:50

## 2017-02-09 RX ADMIN — Medication 100 MILLIGRAM(S): at 18:50

## 2017-02-09 RX ADMIN — CARVEDILOL PHOSPHATE 12.5 MILLIGRAM(S): 80 CAPSULE, EXTENDED RELEASE ORAL at 05:46

## 2017-02-09 RX ADMIN — SODIUM CHLORIDE 2000 MILLILITER(S): 9 INJECTION INTRAMUSCULAR; INTRAVENOUS; SUBCUTANEOUS at 23:40

## 2017-02-09 NOTE — DIETITIAN INITIAL EVALUATION ADULT. - NUTRITION INTERVENTION
Collaboration and Referral of Nutrition Care/Meals and Snack/Feeding Assistance/Medical Food Supplements

## 2017-02-09 NOTE — DIETITIAN INITIAL EVALUATION ADULT. - MD RECOMMEND
Advance diet or consider alternate nutrition support if NPO prolonged further; Add Glucerna Shake 1can tid as medically feasible (660kcal, 30g protein) when diet advanced as medically feasible

## 2017-02-09 NOTE — DIETITIAN INITIAL EVALUATION ADULT. - DIET TYPE
NPO after midnight/DASH/TLC (sodium and cholesterol restricted diet)/consistent carbohydrate (evening snack)

## 2017-02-09 NOTE — DIETITIAN INITIAL EVALUATION ADULT. - OTHER INFO
nutrition assessment for length of stay; lives home with family; anorexia and wt loss per H&P and confirmed with pt; denied GI distress, chewing or swallowing problem at present, no specific food choices obtained; eating better since admission, 60 to 100% intake per flow sheets; 1st and 2dn toe necrosis, for Feb-Pop bypass surgery today, NPO at present; wt data from [facility] EMR: 137.3 lb 2/2/17-->141.9 lb 2/9/17

## 2017-02-09 NOTE — DIETITIAN INITIAL EVALUATION ADULT. - NS AS NUTRI INTERV MEALS SNACK
Advance diet or consider alternate nutrition support if NPO prolonged further/Composition of meals/snacks/Diets modified for specific foods and ingredients

## 2017-02-09 NOTE — DIETITIAN INITIAL EVALUATION ADULT. - PROBLEM SELECTOR PLAN 6
- patient has cold symptoms, mild bibasilar expiratory wheeze   - Chest xray negative for effusion, consolidation   - likely mild asthma exacerbation secondary to URTI  - no need to rule out influenza at this time as patient has not had fevers, myalgias  - continue duonebs  - discussed with attending, will give solu-medrol 40mg every 12 hours and levaquin 500mg daily for bronchitis

## 2017-02-10 LAB
ALBUMIN SERPL ELPH-MCNC: 2.7 G/DL — LOW (ref 3.5–5)
ALP SERPL-CCNC: 61 U/L — SIGNIFICANT CHANGE UP (ref 40–120)
ALT FLD-CCNC: 10 U/L DA — SIGNIFICANT CHANGE UP (ref 10–60)
ANION GAP SERPL CALC-SCNC: 9 MMOL/L — SIGNIFICANT CHANGE UP (ref 5–17)
AST SERPL-CCNC: 6 U/L — LOW (ref 10–40)
BASOPHILS # BLD AUTO: 0.2 K/UL — SIGNIFICANT CHANGE UP (ref 0–0.2)
BASOPHILS NFR BLD AUTO: 1 % — SIGNIFICANT CHANGE UP (ref 0–2)
BILIRUB SERPL-MCNC: 0.4 MG/DL — SIGNIFICANT CHANGE UP (ref 0.2–1.2)
BUN SERPL-MCNC: 18 MG/DL — SIGNIFICANT CHANGE UP (ref 7–18)
CALCIUM SERPL-MCNC: 7.6 MG/DL — LOW (ref 8.4–10.5)
CHLORIDE SERPL-SCNC: 110 MMOL/L — HIGH (ref 96–108)
CO2 SERPL-SCNC: 22 MMOL/L — SIGNIFICANT CHANGE UP (ref 22–31)
CREAT SERPL-MCNC: 0.88 MG/DL — SIGNIFICANT CHANGE UP (ref 0.5–1.3)
EOSINOPHIL # BLD AUTO: 0 K/UL — SIGNIFICANT CHANGE UP (ref 0–0.5)
EOSINOPHIL NFR BLD AUTO: 0 % — SIGNIFICANT CHANGE UP (ref 0–6)
GLUCOSE SERPL-MCNC: 156 MG/DL — HIGH (ref 70–99)
HCT VFR BLD CALC: 36.1 % — SIGNIFICANT CHANGE UP (ref 34.5–45)
HGB BLD-MCNC: 11.9 G/DL — SIGNIFICANT CHANGE UP (ref 11.5–15.5)
LYMPHOCYTES # BLD AUTO: 1.2 K/UL — SIGNIFICANT CHANGE UP (ref 1–3.3)
LYMPHOCYTES # BLD AUTO: 7.2 % — LOW (ref 13–44)
MAGNESIUM SERPL-MCNC: 2.1 MG/DL — SIGNIFICANT CHANGE UP (ref 1.8–2.4)
MCHC RBC-ENTMCNC: 27.2 PG — SIGNIFICANT CHANGE UP (ref 27–34)
MCHC RBC-ENTMCNC: 33 GM/DL — SIGNIFICANT CHANGE UP (ref 32–36)
MCV RBC AUTO: 82.4 FL — SIGNIFICANT CHANGE UP (ref 80–100)
MONOCYTES # BLD AUTO: 1 K/UL — HIGH (ref 0–0.9)
MONOCYTES NFR BLD AUTO: 6.2 % — SIGNIFICANT CHANGE UP (ref 2–14)
NEUTROPHILS # BLD AUTO: 14.4 K/UL — HIGH (ref 1.8–7.4)
NEUTROPHILS NFR BLD AUTO: 85.6 % — HIGH (ref 43–77)
PHOSPHATE SERPL-MCNC: 4.1 MG/DL — SIGNIFICANT CHANGE UP (ref 2.5–4.5)
PLATELET # BLD AUTO: 270 K/UL — SIGNIFICANT CHANGE UP (ref 150–400)
POTASSIUM SERPL-MCNC: 4 MMOL/L — SIGNIFICANT CHANGE UP (ref 3.5–5.3)
POTASSIUM SERPL-SCNC: 4 MMOL/L — SIGNIFICANT CHANGE UP (ref 3.5–5.3)
PROT SERPL-MCNC: 5.6 G/DL — LOW (ref 6–8.3)
RBC # BLD: 4.39 M/UL — SIGNIFICANT CHANGE UP (ref 3.8–5.2)
RBC # FLD: 12.9 % — SIGNIFICANT CHANGE UP (ref 10.3–14.5)
SODIUM SERPL-SCNC: 141 MMOL/L — SIGNIFICANT CHANGE UP (ref 135–145)
WBC # BLD: 16.8 K/UL — HIGH (ref 3.8–10.5)
WBC # FLD AUTO: 16.8 K/UL — HIGH (ref 3.8–10.5)

## 2017-02-10 PROCEDURE — 71010: CPT | Mod: 26

## 2017-02-10 RX ADMIN — Medication 325 MILLIGRAM(S): at 11:35

## 2017-02-10 RX ADMIN — Medication 3 MILLILITER(S): at 09:50

## 2017-02-10 RX ADMIN — Medication 3 MILLILITER(S): at 03:57

## 2017-02-10 RX ADMIN — MORPHINE SULFATE 4 MILLIGRAM(S): 50 CAPSULE, EXTENDED RELEASE ORAL at 22:30

## 2017-02-10 RX ADMIN — Medication 3 MILLILITER(S): at 20:24

## 2017-02-10 RX ADMIN — MORPHINE SULFATE 4 MILLIGRAM(S): 50 CAPSULE, EXTENDED RELEASE ORAL at 12:19

## 2017-02-10 RX ADMIN — Medication 40 MILLIGRAM(S): at 05:23

## 2017-02-10 RX ADMIN — Medication 40 MILLIGRAM(S): at 13:46

## 2017-02-10 RX ADMIN — CARVEDILOL PHOSPHATE 12.5 MILLIGRAM(S): 80 CAPSULE, EXTENDED RELEASE ORAL at 17:02

## 2017-02-10 RX ADMIN — CARVEDILOL PHOSPHATE 12.5 MILLIGRAM(S): 80 CAPSULE, EXTENDED RELEASE ORAL at 05:24

## 2017-02-10 RX ADMIN — DIVALPROEX SODIUM 500 MILLIGRAM(S): 500 TABLET, DELAYED RELEASE ORAL at 12:18

## 2017-02-10 RX ADMIN — MORPHINE SULFATE 4 MILLIGRAM(S): 50 CAPSULE, EXTENDED RELEASE ORAL at 11:38

## 2017-02-10 RX ADMIN — Medication 180 MILLIGRAM(S): at 05:25

## 2017-02-10 RX ADMIN — SIMVASTATIN 40 MILLIGRAM(S): 20 TABLET, FILM COATED ORAL at 21:39

## 2017-02-10 RX ADMIN — MONTELUKAST 10 MILLIGRAM(S): 4 TABLET, CHEWABLE ORAL at 21:39

## 2017-02-10 RX ADMIN — MORPHINE SULFATE 4 MILLIGRAM(S): 50 CAPSULE, EXTENDED RELEASE ORAL at 21:27

## 2017-02-10 RX ADMIN — Medication 25 MILLIGRAM(S): at 13:46

## 2017-02-10 RX ADMIN — Medication 3 MILLILITER(S): at 14:47

## 2017-02-10 RX ADMIN — Medication 25 MILLIGRAM(S): at 05:24

## 2017-02-10 RX ADMIN — Medication 100 MILLIGRAM(S): at 11:35

## 2017-02-10 RX ADMIN — ENOXAPARIN SODIUM 40 MILLIGRAM(S): 100 INJECTION SUBCUTANEOUS at 11:35

## 2017-02-10 RX ADMIN — Medication 25 MILLIGRAM(S): at 21:39

## 2017-02-10 RX ADMIN — SODIUM CHLORIDE 70 MILLILITER(S): 9 INJECTION INTRAMUSCULAR; INTRAVENOUS; SUBCUTANEOUS at 13:46

## 2017-02-10 RX ADMIN — PANTOPRAZOLE SODIUM 40 MILLIGRAM(S): 20 TABLET, DELAYED RELEASE ORAL at 06:34

## 2017-02-11 LAB
ANION GAP SERPL CALC-SCNC: 11 MMOL/L — SIGNIFICANT CHANGE UP (ref 5–17)
BUN SERPL-MCNC: 14 MG/DL — SIGNIFICANT CHANGE UP (ref 7–18)
CALCIUM SERPL-MCNC: 8.4 MG/DL — SIGNIFICANT CHANGE UP (ref 8.4–10.5)
CHLORIDE SERPL-SCNC: 110 MMOL/L — HIGH (ref 96–108)
CO2 SERPL-SCNC: 21 MMOL/L — LOW (ref 22–31)
CREAT SERPL-MCNC: 0.67 MG/DL — SIGNIFICANT CHANGE UP (ref 0.5–1.3)
GLUCOSE SERPL-MCNC: 154 MG/DL — HIGH (ref 70–99)
HCT VFR BLD CALC: 33.2 % — LOW (ref 34.5–45)
HGB BLD-MCNC: 11 G/DL — LOW (ref 11.5–15.5)
MAGNESIUM SERPL-MCNC: 2.3 MG/DL — SIGNIFICANT CHANGE UP (ref 1.8–2.4)
MCHC RBC-ENTMCNC: 27.5 PG — SIGNIFICANT CHANGE UP (ref 27–34)
MCHC RBC-ENTMCNC: 33.3 GM/DL — SIGNIFICANT CHANGE UP (ref 32–36)
MCV RBC AUTO: 82.5 FL — SIGNIFICANT CHANGE UP (ref 80–100)
PHOSPHATE SERPL-MCNC: 1.9 MG/DL — LOW (ref 2.5–4.5)
PLATELET # BLD AUTO: 261 K/UL — SIGNIFICANT CHANGE UP (ref 150–400)
POTASSIUM SERPL-MCNC: 3.8 MMOL/L — SIGNIFICANT CHANGE UP (ref 3.5–5.3)
POTASSIUM SERPL-SCNC: 3.8 MMOL/L — SIGNIFICANT CHANGE UP (ref 3.5–5.3)
RBC # BLD: 4.02 M/UL — SIGNIFICANT CHANGE UP (ref 3.8–5.2)
RBC # FLD: 13 % — SIGNIFICANT CHANGE UP (ref 10.3–14.5)
SODIUM SERPL-SCNC: 142 MMOL/L — SIGNIFICANT CHANGE UP (ref 135–145)
WBC # BLD: 24.4 K/UL — HIGH (ref 3.8–10.5)
WBC # FLD AUTO: 24.4 K/UL — HIGH (ref 3.8–10.5)

## 2017-02-11 RX ORDER — HYDRALAZINE HCL 50 MG
50 TABLET ORAL THREE TIMES A DAY
Qty: 0 | Refills: 0 | Status: DISCONTINUED | OUTPATIENT
Start: 2017-02-11 | End: 2017-02-11

## 2017-02-11 RX ORDER — SODIUM,POTASSIUM PHOSPHATES 278-250MG
1 POWDER IN PACKET (EA) ORAL
Qty: 0 | Refills: 0 | Status: COMPLETED | OUTPATIENT
Start: 2017-02-11 | End: 2017-02-11

## 2017-02-11 RX ORDER — HYDRALAZINE HCL 50 MG
25 TABLET ORAL ONCE
Qty: 0 | Refills: 0 | Status: COMPLETED | OUTPATIENT
Start: 2017-02-11 | End: 2017-02-11

## 2017-02-11 RX ORDER — HYDRALAZINE HCL 50 MG
50 TABLET ORAL THREE TIMES A DAY
Qty: 0 | Refills: 0 | Status: DISCONTINUED | OUTPATIENT
Start: 2017-02-11 | End: 2017-02-13

## 2017-02-11 RX ADMIN — Medication 3 MILLILITER(S): at 20:12

## 2017-02-11 RX ADMIN — Medication 40 MILLIGRAM(S): at 06:07

## 2017-02-11 RX ADMIN — MORPHINE SULFATE 4 MILLIGRAM(S): 50 CAPSULE, EXTENDED RELEASE ORAL at 21:04

## 2017-02-11 RX ADMIN — Medication 1 TABLET(S): at 17:52

## 2017-02-11 RX ADMIN — Medication 325 MILLIGRAM(S): at 11:06

## 2017-02-11 RX ADMIN — DIVALPROEX SODIUM 500 MILLIGRAM(S): 500 TABLET, DELAYED RELEASE ORAL at 11:06

## 2017-02-11 RX ADMIN — Medication 180 MILLIGRAM(S): at 06:07

## 2017-02-11 RX ADMIN — MORPHINE SULFATE 4 MILLIGRAM(S): 50 CAPSULE, EXTENDED RELEASE ORAL at 21:30

## 2017-02-11 RX ADMIN — Medication 1 TABLET(S): at 21:10

## 2017-02-11 RX ADMIN — Medication 3 MILLILITER(S): at 15:41

## 2017-02-11 RX ADMIN — Medication 3 MILLILITER(S): at 09:15

## 2017-02-11 RX ADMIN — Medication 1 TABLET(S): at 10:09

## 2017-02-11 RX ADMIN — Medication 100 MILLIGRAM(S): at 11:06

## 2017-02-11 RX ADMIN — Medication 50 MILLIGRAM(S): at 21:10

## 2017-02-11 RX ADMIN — Medication 1 TABLET(S): at 11:07

## 2017-02-11 RX ADMIN — CARVEDILOL PHOSPHATE 12.5 MILLIGRAM(S): 80 CAPSULE, EXTENDED RELEASE ORAL at 17:52

## 2017-02-11 RX ADMIN — SIMVASTATIN 40 MILLIGRAM(S): 20 TABLET, FILM COATED ORAL at 21:10

## 2017-02-11 RX ADMIN — Medication 3 MILLILITER(S): at 02:44

## 2017-02-11 RX ADMIN — Medication 25 MILLIGRAM(S): at 14:44

## 2017-02-11 RX ADMIN — SODIUM CHLORIDE 70 MILLILITER(S): 9 INJECTION INTRAMUSCULAR; INTRAVENOUS; SUBCUTANEOUS at 02:11

## 2017-02-11 RX ADMIN — ENOXAPARIN SODIUM 40 MILLIGRAM(S): 100 INJECTION SUBCUTANEOUS at 11:06

## 2017-02-11 RX ADMIN — PANTOPRAZOLE SODIUM 40 MILLIGRAM(S): 20 TABLET, DELAYED RELEASE ORAL at 06:07

## 2017-02-11 RX ADMIN — MONTELUKAST 10 MILLIGRAM(S): 4 TABLET, CHEWABLE ORAL at 21:10

## 2017-02-11 RX ADMIN — CARVEDILOL PHOSPHATE 12.5 MILLIGRAM(S): 80 CAPSULE, EXTENDED RELEASE ORAL at 06:08

## 2017-02-11 RX ADMIN — Medication 25 MILLIGRAM(S): at 13:04

## 2017-02-11 RX ADMIN — Medication 25 MILLIGRAM(S): at 06:07

## 2017-02-12 LAB
ANION GAP SERPL CALC-SCNC: 9 MMOL/L — SIGNIFICANT CHANGE UP (ref 5–17)
BUN SERPL-MCNC: 13 MG/DL — SIGNIFICANT CHANGE UP (ref 7–18)
CALCIUM SERPL-MCNC: 8.3 MG/DL — LOW (ref 8.4–10.5)
CHLORIDE SERPL-SCNC: 110 MMOL/L — HIGH (ref 96–108)
CO2 SERPL-SCNC: 23 MMOL/L — SIGNIFICANT CHANGE UP (ref 22–31)
CREAT SERPL-MCNC: 0.75 MG/DL — SIGNIFICANT CHANGE UP (ref 0.5–1.3)
GLUCOSE SERPL-MCNC: 120 MG/DL — HIGH (ref 70–99)
HCT VFR BLD CALC: 33 % — LOW (ref 34.5–45)
HGB BLD-MCNC: 10.8 G/DL — LOW (ref 11.5–15.5)
MAGNESIUM SERPL-MCNC: 2.3 MG/DL — SIGNIFICANT CHANGE UP (ref 1.8–2.4)
MCHC RBC-ENTMCNC: 27.1 PG — SIGNIFICANT CHANGE UP (ref 27–34)
MCHC RBC-ENTMCNC: 32.9 GM/DL — SIGNIFICANT CHANGE UP (ref 32–36)
MCV RBC AUTO: 82.4 FL — SIGNIFICANT CHANGE UP (ref 80–100)
PHOSPHATE SERPL-MCNC: 2.1 MG/DL — LOW (ref 2.5–4.5)
PLATELET # BLD AUTO: 286 K/UL — SIGNIFICANT CHANGE UP (ref 150–400)
POTASSIUM SERPL-MCNC: 3.7 MMOL/L — SIGNIFICANT CHANGE UP (ref 3.5–5.3)
POTASSIUM SERPL-SCNC: 3.7 MMOL/L — SIGNIFICANT CHANGE UP (ref 3.5–5.3)
RBC # BLD: 4.01 M/UL — SIGNIFICANT CHANGE UP (ref 3.8–5.2)
RBC # FLD: 12.9 % — SIGNIFICANT CHANGE UP (ref 10.3–14.5)
SODIUM SERPL-SCNC: 142 MMOL/L — SIGNIFICANT CHANGE UP (ref 135–145)
WBC # BLD: 18.4 K/UL — HIGH (ref 3.8–10.5)
WBC # FLD AUTO: 18.4 K/UL — HIGH (ref 3.8–10.5)

## 2017-02-12 RX ORDER — INSULIN LISPRO 100/ML
VIAL (ML) SUBCUTANEOUS
Qty: 0 | Refills: 0 | Status: DISCONTINUED | OUTPATIENT
Start: 2017-02-12 | End: 2017-02-13

## 2017-02-12 RX ORDER — GLUCAGON INJECTION, SOLUTION 0.5 MG/.1ML
1 INJECTION, SOLUTION SUBCUTANEOUS ONCE
Qty: 0 | Refills: 0 | Status: DISCONTINUED | OUTPATIENT
Start: 2017-02-12 | End: 2017-02-13

## 2017-02-12 RX ORDER — DEXTROSE 50 % IN WATER 50 %
25 SYRINGE (ML) INTRAVENOUS ONCE
Qty: 0 | Refills: 0 | Status: DISCONTINUED | OUTPATIENT
Start: 2017-02-12 | End: 2017-02-13

## 2017-02-12 RX ORDER — DEXTROSE 50 % IN WATER 50 %
12.5 SYRINGE (ML) INTRAVENOUS ONCE
Qty: 0 | Refills: 0 | Status: DISCONTINUED | OUTPATIENT
Start: 2017-02-12 | End: 2017-02-13

## 2017-02-12 RX ORDER — DEXTROSE 50 % IN WATER 50 %
1 SYRINGE (ML) INTRAVENOUS ONCE
Qty: 0 | Refills: 0 | Status: DISCONTINUED | OUTPATIENT
Start: 2017-02-12 | End: 2017-02-13

## 2017-02-12 RX ORDER — SODIUM CHLORIDE 9 MG/ML
1000 INJECTION, SOLUTION INTRAVENOUS
Qty: 0 | Refills: 0 | Status: DISCONTINUED | OUTPATIENT
Start: 2017-02-12 | End: 2017-02-13

## 2017-02-12 RX ADMIN — Medication 3 MILLILITER(S): at 21:15

## 2017-02-12 RX ADMIN — Medication 50 MILLIGRAM(S): at 05:33

## 2017-02-12 RX ADMIN — ENOXAPARIN SODIUM 40 MILLIGRAM(S): 100 INJECTION SUBCUTANEOUS at 12:54

## 2017-02-12 RX ADMIN — CARVEDILOL PHOSPHATE 12.5 MILLIGRAM(S): 80 CAPSULE, EXTENDED RELEASE ORAL at 18:19

## 2017-02-12 RX ADMIN — Medication 3 MILLILITER(S): at 09:00

## 2017-02-12 RX ADMIN — Medication 40 MILLIGRAM(S): at 05:33

## 2017-02-12 RX ADMIN — MONTELUKAST 10 MILLIGRAM(S): 4 TABLET, CHEWABLE ORAL at 21:42

## 2017-02-12 RX ADMIN — SIMVASTATIN 40 MILLIGRAM(S): 20 TABLET, FILM COATED ORAL at 21:42

## 2017-02-12 RX ADMIN — PANTOPRAZOLE SODIUM 40 MILLIGRAM(S): 20 TABLET, DELAYED RELEASE ORAL at 06:25

## 2017-02-12 RX ADMIN — Medication 50 MILLIGRAM(S): at 21:41

## 2017-02-12 RX ADMIN — Medication 100 MILLIGRAM(S): at 12:54

## 2017-02-12 RX ADMIN — Medication 180 MILLIGRAM(S): at 05:33

## 2017-02-12 RX ADMIN — Medication 50 MILLIGRAM(S): at 13:05

## 2017-02-12 RX ADMIN — Medication 325 MILLIGRAM(S): at 12:54

## 2017-02-12 RX ADMIN — Medication 3 MILLILITER(S): at 14:21

## 2017-02-12 RX ADMIN — CARVEDILOL PHOSPHATE 12.5 MILLIGRAM(S): 80 CAPSULE, EXTENDED RELEASE ORAL at 05:34

## 2017-02-12 RX ADMIN — DIVALPROEX SODIUM 500 MILLIGRAM(S): 500 TABLET, DELAYED RELEASE ORAL at 13:57

## 2017-02-12 NOTE — PHYSICAL THERAPY INITIAL EVALUATION ADULT - TRANSFER SAFETY CONCERNS NOTED: SIT/STAND, REHAB EVAL
decreased balance during turns/inability to maintain weight-bearing restrictions w/o assist/losing balance/decreased safety awareness/decreased weight-shifting ability

## 2017-02-12 NOTE — PHYSICAL THERAPY INITIAL EVALUATION ADULT - CRITERIA FOR SKILLED THERAPEUTIC INTERVENTIONS
risk reduction/prevention/rehab potential/functional limitations in following categories/impairments found

## 2017-02-12 NOTE — PHYSICAL THERAPY INITIAL EVALUATION ADULT - ACTIVE RANGE OF MOTION EXAMINATION, REHAB EVAL
deficits as listed below/R shoulder flexion and abduction is limited secondary to previous stroke/Left UE Active ROM was WFL (within functional limits)/bilateral  lower extremity Active ROM was WFL (within functional limits)

## 2017-02-13 VITALS
DIASTOLIC BLOOD PRESSURE: 75 MMHG | OXYGEN SATURATION: 100 % | TEMPERATURE: 99 F | SYSTOLIC BLOOD PRESSURE: 150 MMHG | RESPIRATION RATE: 16 BRPM | HEART RATE: 70 BPM

## 2017-02-13 LAB
HCT VFR BLD CALC: 32.1 % — LOW (ref 34.5–45)
HGB BLD-MCNC: 10.6 G/DL — LOW (ref 11.5–15.5)
MCHC RBC-ENTMCNC: 27.4 PG — SIGNIFICANT CHANGE UP (ref 27–34)
MCHC RBC-ENTMCNC: 33 GM/DL — SIGNIFICANT CHANGE UP (ref 32–36)
MCV RBC AUTO: 83 FL — SIGNIFICANT CHANGE UP (ref 80–100)
PLATELET # BLD AUTO: 285 K/UL — SIGNIFICANT CHANGE UP (ref 150–400)
RBC # BLD: 3.87 M/UL — SIGNIFICANT CHANGE UP (ref 3.8–5.2)
RBC # FLD: 13.3 % — SIGNIFICANT CHANGE UP (ref 10.3–14.5)
SURGICAL PATHOLOGY FINAL REPORT - CH: SIGNIFICANT CHANGE UP
WBC # BLD: 15.3 K/UL — HIGH (ref 3.8–10.5)
WBC # FLD AUTO: 15.3 K/UL — HIGH (ref 3.8–10.5)

## 2017-02-13 PROCEDURE — 85730 THROMBOPLASTIN TIME PARTIAL: CPT

## 2017-02-13 PROCEDURE — C1768: CPT

## 2017-02-13 PROCEDURE — 99285 EMERGENCY DEPT VISIT HI MDM: CPT | Mod: 25

## 2017-02-13 PROCEDURE — 88304 TISSUE EXAM BY PATHOLOGIST: CPT

## 2017-02-13 PROCEDURE — 93005 ELECTROCARDIOGRAM TRACING: CPT

## 2017-02-13 PROCEDURE — A9502: CPT

## 2017-02-13 PROCEDURE — C1769: CPT

## 2017-02-13 PROCEDURE — 36200 PLACE CATHETER IN AORTA: CPT

## 2017-02-13 PROCEDURE — 83880 ASSAY OF NATRIURETIC PEPTIDE: CPT

## 2017-02-13 PROCEDURE — 37223: CPT

## 2017-02-13 PROCEDURE — 80053 COMPREHEN METABOLIC PANEL: CPT

## 2017-02-13 PROCEDURE — 86850 RBC ANTIBODY SCREEN: CPT

## 2017-02-13 PROCEDURE — 73620 X-RAY EXAM OF FOOT: CPT

## 2017-02-13 PROCEDURE — 85379 FIBRIN DEGRADATION QUANT: CPT

## 2017-02-13 PROCEDURE — C1894: CPT

## 2017-02-13 PROCEDURE — 94640 AIRWAY INHALATION TREATMENT: CPT

## 2017-02-13 PROCEDURE — 70450 CT HEAD/BRAIN W/O DYE: CPT

## 2017-02-13 PROCEDURE — 77001 FLUOROGUIDE FOR VEIN DEVICE: CPT

## 2017-02-13 PROCEDURE — 93925 LOWER EXTREMITY STUDY: CPT

## 2017-02-13 PROCEDURE — 78452 HT MUSCLE IMAGE SPECT MULT: CPT

## 2017-02-13 PROCEDURE — 86900 BLOOD TYPING SEROLOGIC ABO: CPT

## 2017-02-13 PROCEDURE — 75716 ARTERY X-RAYS ARMS/LEGS: CPT

## 2017-02-13 PROCEDURE — 71045 X-RAY EXAM CHEST 1 VIEW: CPT

## 2017-02-13 PROCEDURE — C1751: CPT

## 2017-02-13 PROCEDURE — C1876: CPT

## 2017-02-13 PROCEDURE — 93017 CV STRESS TEST TRACING ONLY: CPT

## 2017-02-13 PROCEDURE — 93971 EXTREMITY STUDY: CPT

## 2017-02-13 PROCEDURE — 80164 ASSAY DIPROPYLACETIC ACD TOT: CPT

## 2017-02-13 PROCEDURE — 37220: CPT

## 2017-02-13 PROCEDURE — 84443 ASSAY THYROID STIM HORMONE: CPT

## 2017-02-13 PROCEDURE — 76937 US GUIDE VASCULAR ACCESS: CPT

## 2017-02-13 PROCEDURE — 36569 INSJ PICC 5 YR+ W/O IMAGING: CPT

## 2017-02-13 PROCEDURE — 83735 ASSAY OF MAGNESIUM: CPT

## 2017-02-13 PROCEDURE — C1725: CPT

## 2017-02-13 PROCEDURE — 76000 FLUOROSCOPY <1 HR PHYS/QHP: CPT

## 2017-02-13 PROCEDURE — 85610 PROTHROMBIN TIME: CPT

## 2017-02-13 PROCEDURE — 83036 HEMOGLOBIN GLYCOSYLATED A1C: CPT

## 2017-02-13 PROCEDURE — 88311 DECALCIFY TISSUE: CPT

## 2017-02-13 PROCEDURE — 82550 ASSAY OF CK (CPK): CPT

## 2017-02-13 PROCEDURE — 80201 ASSAY OF TOPIRAMATE: CPT

## 2017-02-13 PROCEDURE — 82553 CREATINE MB FRACTION: CPT

## 2017-02-13 PROCEDURE — 80061 LIPID PANEL: CPT

## 2017-02-13 PROCEDURE — 84100 ASSAY OF PHOSPHORUS: CPT

## 2017-02-13 PROCEDURE — 84484 ASSAY OF TROPONIN QUANT: CPT

## 2017-02-13 PROCEDURE — 74174 CTA ABD&PLVS W/CONTRAST: CPT

## 2017-02-13 PROCEDURE — 80048 BASIC METABOLIC PNL TOTAL CA: CPT

## 2017-02-13 PROCEDURE — C1889: CPT

## 2017-02-13 PROCEDURE — 93880 EXTRACRANIAL BILAT STUDY: CPT

## 2017-02-13 PROCEDURE — 86920 COMPATIBILITY TEST SPIN: CPT

## 2017-02-13 PROCEDURE — 86901 BLOOD TYPING SEROLOGIC RH(D): CPT

## 2017-02-13 PROCEDURE — 83605 ASSAY OF LACTIC ACID: CPT

## 2017-02-13 PROCEDURE — 73706 CT ANGIO LWR EXTR W/O&W/DYE: CPT

## 2017-02-13 PROCEDURE — 85027 COMPLETE CBC AUTOMATED: CPT

## 2017-02-13 PROCEDURE — 80076 HEPATIC FUNCTION PANEL: CPT

## 2017-02-13 RX ORDER — ALBUTEROL 90 UG/1
1 AEROSOL, METERED ORAL EVERY 6 HOURS
Qty: 0 | Refills: 0 | Status: DISCONTINUED | OUTPATIENT
Start: 2017-02-13 | End: 2017-02-13

## 2017-02-13 RX ORDER — LISINOPRIL 2.5 MG/1
1 TABLET ORAL
Qty: 30 | Refills: 0 | OUTPATIENT
Start: 2017-02-13 | End: 2017-03-15

## 2017-02-13 RX ORDER — HYDRALAZINE HCL 50 MG
1 TABLET ORAL
Qty: 0 | Refills: 0 | DISCHARGE
Start: 2017-02-13

## 2017-02-13 RX ORDER — OXYCODONE HYDROCHLORIDE 5 MG/1
1 TABLET ORAL
Qty: 20 | Refills: 0 | OUTPATIENT
Start: 2017-02-13 | End: 2017-02-18

## 2017-02-13 RX ORDER — LISINOPRIL 2.5 MG/1
20 TABLET ORAL DAILY
Qty: 0 | Refills: 0 | Status: DISCONTINUED | OUTPATIENT
Start: 2017-02-13 | End: 2017-02-13

## 2017-02-13 RX ORDER — HYDRALAZINE HCL 50 MG
1 TABLET ORAL
Qty: 90 | Refills: 0
Start: 2017-02-13

## 2017-02-13 RX ADMIN — Medication 40 MILLIGRAM(S): at 05:27

## 2017-02-13 RX ADMIN — LISINOPRIL 20 MILLIGRAM(S): 2.5 TABLET ORAL at 11:45

## 2017-02-13 RX ADMIN — Medication 2: at 16:30

## 2017-02-13 RX ADMIN — DIVALPROEX SODIUM 500 MILLIGRAM(S): 500 TABLET, DELAYED RELEASE ORAL at 11:44

## 2017-02-13 RX ADMIN — ENOXAPARIN SODIUM 40 MILLIGRAM(S): 100 INJECTION SUBCUTANEOUS at 11:44

## 2017-02-13 RX ADMIN — Medication 180 MILLIGRAM(S): at 05:27

## 2017-02-13 RX ADMIN — CARVEDILOL PHOSPHATE 12.5 MILLIGRAM(S): 80 CAPSULE, EXTENDED RELEASE ORAL at 05:27

## 2017-02-13 RX ADMIN — Medication 325 MILLIGRAM(S): at 11:44

## 2017-02-13 RX ADMIN — Medication 3 MILLILITER(S): at 09:40

## 2017-02-13 RX ADMIN — Medication 3: at 11:45

## 2017-02-13 RX ADMIN — Medication 50 MILLIGRAM(S): at 11:44

## 2017-02-13 RX ADMIN — Medication 50 MILLIGRAM(S): at 05:27

## 2017-02-13 RX ADMIN — PANTOPRAZOLE SODIUM 40 MILLIGRAM(S): 20 TABLET, DELAYED RELEASE ORAL at 07:30

## 2017-02-13 RX ADMIN — Medication 100 MILLIGRAM(S): at 11:44

## 2017-02-17 DIAGNOSIS — Z95.5 PRESENCE OF CORONARY ANGIOPLASTY IMPLANT AND GRAFT: ICD-10-CM

## 2017-02-17 DIAGNOSIS — I16.0 HYPERTENSIVE URGENCY: ICD-10-CM

## 2017-02-17 DIAGNOSIS — F17.210 NICOTINE DEPENDENCE, CIGARETTES, UNCOMPLICATED: ICD-10-CM

## 2017-02-17 DIAGNOSIS — I73.9 PERIPHERAL VASCULAR DISEASE, UNSPECIFIED: ICD-10-CM

## 2017-02-17 DIAGNOSIS — K21.9 GASTRO-ESOPHAGEAL REFLUX DISEASE WITHOUT ESOPHAGITIS: ICD-10-CM

## 2017-02-17 DIAGNOSIS — M25.571 PAIN IN RIGHT ANKLE AND JOINTS OF RIGHT FOOT: ICD-10-CM

## 2017-02-17 DIAGNOSIS — E11.9 TYPE 2 DIABETES MELLITUS WITHOUT COMPLICATIONS: ICD-10-CM

## 2017-02-17 DIAGNOSIS — J44.9 CHRONIC OBSTRUCTIVE PULMONARY DISEASE, UNSPECIFIED: ICD-10-CM

## 2017-02-17 DIAGNOSIS — G40.909 EPILEPSY, UNSPECIFIED, NOT INTRACTABLE, WITHOUT STATUS EPILEPTICUS: ICD-10-CM

## 2017-02-17 DIAGNOSIS — I48.0 PAROXYSMAL ATRIAL FIBRILLATION: ICD-10-CM

## 2017-02-17 DIAGNOSIS — I25.10 ATHEROSCLEROTIC HEART DISEASE OF NATIVE CORONARY ARTERY WITHOUT ANGINA PECTORIS: ICD-10-CM

## 2017-02-17 DIAGNOSIS — J45.901 UNSPECIFIED ASTHMA WITH (ACUTE) EXACERBATION: ICD-10-CM

## 2017-02-17 DIAGNOSIS — Z91.14 PATIENT'S OTHER NONCOMPLIANCE WITH MEDICATION REGIMEN: ICD-10-CM

## 2017-02-17 DIAGNOSIS — I69.351 HEMIPLEGIA AND HEMIPARESIS FOLLOWING CEREBRAL INFARCTION AFFECTING RIGHT DOMINANT SIDE: ICD-10-CM

## 2017-03-10 ENCOUNTER — APPOINTMENT (OUTPATIENT)
Dept: VASCULAR SURGERY | Facility: CLINIC | Age: 62
End: 2017-03-10

## 2017-03-10 DIAGNOSIS — I73.9 PERIPHERAL VASCULAR DISEASE, UNSPECIFIED: ICD-10-CM

## 2017-03-10 RX ORDER — OXYCODONE AND ACETAMINOPHEN 5; 325 MG/1; MG/1
5-325 TABLET ORAL
Qty: 15 | Refills: 0 | Status: ACTIVE | COMMUNITY
Start: 2017-03-10 | End: 1900-01-01

## 2017-04-04 ENCOUNTER — APPOINTMENT (OUTPATIENT)
Age: 62
End: 2017-04-04

## 2017-04-07 NOTE — DISCHARGE NOTE ADULT - PATIENT PORTAL LINK FT
“You can access the Follow[employer] Patient Portal, offered by [facility], by registering with the following website: [email or web address]”
[email or web address]

## 2018-07-01 ENCOUNTER — OUTPATIENT (OUTPATIENT)
Dept: OUTPATIENT SERVICES | Facility: HOSPITAL | Age: 63
LOS: 1 days | End: 2018-07-01
Payer: MEDICAID

## 2018-07-01 DIAGNOSIS — Z90.710 ACQUIRED ABSENCE OF BOTH CERVIX AND UTERUS: Chronic | ICD-10-CM

## 2018-07-01 DIAGNOSIS — T82.897A OTHER SPECIFIED COMPLICATION OF CARDIAC PROSTHETIC DEVICES, IMPLANTS AND GRAFTS, INITIAL ENCOUNTER: Chronic | ICD-10-CM

## 2018-07-02 DIAGNOSIS — Z71.89 OTHER SPECIFIED COUNSELING: ICD-10-CM

## 2018-07-19 LAB
ANION GAP SERPL CALC-SCNC: 13 MMOL/L
APTT BLD: 31.1 SEC
BASOPHILS # BLD AUTO: 0.02 K/UL
BASOPHILS NFR BLD AUTO: 0.3 %
BUN SERPL-MCNC: 8 MG/DL
CALCIUM SERPL-MCNC: 10.3 MG/DL
CHLORIDE SERPL-SCNC: 104 MMOL/L
CO2 SERPL-SCNC: 25 MMOL/L
CREAT SERPL-MCNC: 0.73 MG/DL
EOSINOPHIL # BLD AUTO: 0.16 K/UL
EOSINOPHIL NFR BLD AUTO: 2.4 %
GLUCOSE SERPL-MCNC: 85 MG/DL
HCT VFR BLD CALC: 40.6 %
HGB BLD-MCNC: 12.5 G/DL
IMM GRANULOCYTES NFR BLD AUTO: 0.4 %
INR PPP: 1.07 RATIO
LYMPHOCYTES # BLD AUTO: 2.31 K/UL
LYMPHOCYTES NFR BLD AUTO: 34.3 %
MAN DIFF?: NORMAL
MCHC RBC-ENTMCNC: 26.4 PG
MCHC RBC-ENTMCNC: 30.8 GM/DL
MCV RBC AUTO: 85.8 FL
MONOCYTES # BLD AUTO: 0.6 K/UL
MONOCYTES NFR BLD AUTO: 8.9 %
NEUTROPHILS # BLD AUTO: 3.61 K/UL
NEUTROPHILS NFR BLD AUTO: 53.7 %
PLATELET # BLD AUTO: 396 K/UL
POTASSIUM SERPL-SCNC: 4.3 MMOL/L
PT BLD: 12.1 SEC
RBC # BLD: 4.73 M/UL
RBC # FLD: 15.2 %
SODIUM SERPL-SCNC: 142 MMOL/L
WBC # FLD AUTO: 6.73 K/UL

## 2018-08-31 ENCOUNTER — APPOINTMENT (OUTPATIENT)
Dept: VASCULAR SURGERY | Facility: CLINIC | Age: 63
End: 2018-08-31
Payer: MEDICAID

## 2018-08-31 VITALS — HEIGHT: 64 IN | BODY MASS INDEX: 28.34 KG/M2 | WEIGHT: 166 LBS

## 2018-08-31 DIAGNOSIS — M79.605 PAIN IN RIGHT LEG: ICD-10-CM

## 2018-08-31 DIAGNOSIS — F17.200 NICOTINE DEPENDENCE, UNSPECIFIED, UNCOMPLICATED: ICD-10-CM

## 2018-08-31 DIAGNOSIS — Z86.79 PERSONAL HISTORY OF OTHER DISEASES OF THE CIRCULATORY SYSTEM: ICD-10-CM

## 2018-08-31 DIAGNOSIS — Z86.39 PERSONAL HISTORY OF OTHER ENDOCRINE, NUTRITIONAL AND METABOLIC DISEASE: ICD-10-CM

## 2018-08-31 DIAGNOSIS — M79.604 PAIN IN RIGHT LEG: ICD-10-CM

## 2018-08-31 DIAGNOSIS — Z86.73 PERSONAL HISTORY OF TRANSIENT ISCHEMIC ATTACK (TIA), AND CEREBRAL INFARCTION W/OUT RESIDUAL DEFICITS: ICD-10-CM

## 2018-08-31 DIAGNOSIS — Z87.09 PERSONAL HISTORY OF OTHER DISEASES OF THE RESPIRATORY SYSTEM: ICD-10-CM

## 2018-08-31 DIAGNOSIS — Z78.9 OTHER SPECIFIED HEALTH STATUS: ICD-10-CM

## 2018-08-31 DIAGNOSIS — F17.210 NICOTINE DEPENDENCE, CIGARETTES, UNCOMPLICATED: ICD-10-CM

## 2018-08-31 DIAGNOSIS — R10.30 LOWER ABDOMINAL PAIN, UNSPECIFIED: ICD-10-CM

## 2018-08-31 PROCEDURE — 93923 UPR/LXTR ART STDY 3+ LVLS: CPT

## 2018-08-31 PROCEDURE — 93925 LOWER EXTREMITY STUDY: CPT

## 2018-08-31 PROCEDURE — 99214 OFFICE O/P EST MOD 30 MIN: CPT

## 2018-08-31 RX ORDER — CARVEDILOL 12.5 MG/1
12.5 TABLET, FILM COATED ORAL
Refills: 0 | Status: ACTIVE | COMMUNITY

## 2018-08-31 RX ORDER — OMEPRAZOLE 20 MG/1
20 TABLET, DELAYED RELEASE ORAL
Refills: 0 | Status: ACTIVE | COMMUNITY

## 2018-08-31 RX ORDER — LOSARTAN POTASSIUM AND HYDROCHLOROTHIAZIDE 100; 25 MG/1; MG/1
100-25 TABLET, FILM COATED ORAL
Refills: 0 | Status: ACTIVE | COMMUNITY

## 2018-08-31 RX ORDER — DILTIAZEM HYDROCHLORIDE 180 MG/1
180 CAPSULE, COATED, EXTENDED RELEASE ORAL
Refills: 0 | Status: ACTIVE | COMMUNITY

## 2018-08-31 RX ORDER — HYDRALAZINE HYDROCHLORIDE 50 MG/1
50 TABLET ORAL
Refills: 0 | Status: ACTIVE | COMMUNITY

## 2018-08-31 RX ORDER — MONTELUKAST 10 MG/1
10 TABLET, FILM COATED ORAL
Refills: 0 | Status: ACTIVE | COMMUNITY

## 2018-08-31 RX ORDER — ASPIRIN 81 MG
81 TABLET, DELAYED RELEASE (ENTERIC COATED) ORAL
Refills: 0 | Status: ACTIVE | COMMUNITY

## 2018-08-31 RX ORDER — TOPIRAMATE 50 MG/1
50 TABLET, COATED ORAL
Refills: 0 | Status: ACTIVE | COMMUNITY

## 2018-09-07 ENCOUNTER — INPATIENT (INPATIENT)
Facility: HOSPITAL | Age: 63
LOS: 18 days | Discharge: HOME | DRG: 191 | End: 2018-09-26
Attending: INTERNAL MEDICINE | Admitting: INTERNAL MEDICINE
Payer: MEDICAID

## 2018-09-07 VITALS
DIASTOLIC BLOOD PRESSURE: 113 MMHG | HEART RATE: 99 BPM | SYSTOLIC BLOOD PRESSURE: 186 MMHG | HEIGHT: 64 IN | OXYGEN SATURATION: 98 % | WEIGHT: 156.09 LBS | RESPIRATION RATE: 16 BRPM | TEMPERATURE: 98 F

## 2018-09-07 DIAGNOSIS — T82.897A OTHER SPECIFIED COMPLICATION OF CARDIAC PROSTHETIC DEVICES, IMPLANTS AND GRAFTS, INITIAL ENCOUNTER: Chronic | ICD-10-CM

## 2018-09-07 DIAGNOSIS — I25.10 ATHEROSCLEROTIC HEART DISEASE OF NATIVE CORONARY ARTERY WITHOUT ANGINA PECTORIS: ICD-10-CM

## 2018-09-07 DIAGNOSIS — R06.09 OTHER FORMS OF DYSPNEA: ICD-10-CM

## 2018-09-07 DIAGNOSIS — Z90.710 ACQUIRED ABSENCE OF BOTH CERVIX AND UTERUS: Chronic | ICD-10-CM

## 2018-09-07 DIAGNOSIS — E11.9 TYPE 2 DIABETES MELLITUS WITHOUT COMPLICATIONS: ICD-10-CM

## 2018-09-07 DIAGNOSIS — I10 ESSENTIAL (PRIMARY) HYPERTENSION: ICD-10-CM

## 2018-09-07 LAB
ALBUMIN SERPL ELPH-MCNC: 4.1 G/DL — SIGNIFICANT CHANGE UP (ref 3.3–5)
ALP SERPL-CCNC: 97 U/L — SIGNIFICANT CHANGE UP (ref 40–120)
ALT FLD-CCNC: 10 U/L — SIGNIFICANT CHANGE UP (ref 10–45)
ANION GAP SERPL CALC-SCNC: 10 MMOL/L — SIGNIFICANT CHANGE UP (ref 5–17)
AST SERPL-CCNC: 15 U/L — SIGNIFICANT CHANGE UP (ref 10–40)
BASOPHILS # BLD AUTO: 0.1 K/UL — SIGNIFICANT CHANGE UP (ref 0–0.2)
BASOPHILS NFR BLD AUTO: 1.1 % — SIGNIFICANT CHANGE UP (ref 0–2)
BILIRUB SERPL-MCNC: 0.3 MG/DL — SIGNIFICANT CHANGE UP (ref 0.2–1.2)
BUN SERPL-MCNC: 11 MG/DL — SIGNIFICANT CHANGE UP (ref 7–23)
CALCIUM SERPL-MCNC: 10 MG/DL — SIGNIFICANT CHANGE UP (ref 8.4–10.5)
CHLORIDE SERPL-SCNC: 102 MMOL/L — SIGNIFICANT CHANGE UP (ref 96–108)
CO2 SERPL-SCNC: 24 MMOL/L — SIGNIFICANT CHANGE UP (ref 22–31)
CREAT SERPL-MCNC: 0.92 MG/DL — SIGNIFICANT CHANGE UP (ref 0.5–1.3)
EOSINOPHIL # BLD AUTO: 0.4 K/UL — SIGNIFICANT CHANGE UP (ref 0–0.5)
EOSINOPHIL NFR BLD AUTO: 3.8 % — SIGNIFICANT CHANGE UP (ref 0–6)
GLUCOSE SERPL-MCNC: 93 MG/DL — SIGNIFICANT CHANGE UP (ref 70–99)
HCT VFR BLD CALC: 46.5 % — HIGH (ref 34.5–45)
HGB BLD-MCNC: 14.9 G/DL — SIGNIFICANT CHANGE UP (ref 11.5–15.5)
LYMPHOCYTES # BLD AUTO: 3.8 K/UL — HIGH (ref 1–3.3)
LYMPHOCYTES # BLD AUTO: 39.5 % — SIGNIFICANT CHANGE UP (ref 13–44)
MCHC RBC-ENTMCNC: 26.6 PG — LOW (ref 27–34)
MCHC RBC-ENTMCNC: 32.2 GM/DL — SIGNIFICANT CHANGE UP (ref 32–36)
MCV RBC AUTO: 82.6 FL — SIGNIFICANT CHANGE UP (ref 80–100)
MONOCYTES # BLD AUTO: 1 K/UL — HIGH (ref 0–0.9)
MONOCYTES NFR BLD AUTO: 10.2 % — SIGNIFICANT CHANGE UP (ref 2–14)
NEUTROPHILS # BLD AUTO: 4.4 K/UL — SIGNIFICANT CHANGE UP (ref 1.8–7.4)
NEUTROPHILS NFR BLD AUTO: 45.4 % — SIGNIFICANT CHANGE UP (ref 43–77)
NT-PROBNP SERPL-SCNC: 108 PG/ML — SIGNIFICANT CHANGE UP (ref 0–300)
PLATELET # BLD AUTO: 275 K/UL — SIGNIFICANT CHANGE UP (ref 150–400)
POTASSIUM SERPL-MCNC: 3.8 MMOL/L — SIGNIFICANT CHANGE UP (ref 3.5–5.3)
POTASSIUM SERPL-SCNC: 3.8 MMOL/L — SIGNIFICANT CHANGE UP (ref 3.5–5.3)
PROT SERPL-MCNC: 7.4 G/DL — SIGNIFICANT CHANGE UP (ref 6–8.3)
RBC # BLD: 5.63 M/UL — HIGH (ref 3.8–5.2)
RBC # FLD: 12.8 % — SIGNIFICANT CHANGE UP (ref 10.3–14.5)
SODIUM SERPL-SCNC: 136 MMOL/L — SIGNIFICANT CHANGE UP (ref 135–145)
TROPONIN T, HIGH SENSITIVITY RESULT: 16 NG/L — SIGNIFICANT CHANGE UP (ref 0–51)
WBC # BLD: 9.7 K/UL — SIGNIFICANT CHANGE UP (ref 3.8–10.5)
WBC # FLD AUTO: 9.7 K/UL — SIGNIFICANT CHANGE UP (ref 3.8–10.5)

## 2018-09-07 PROCEDURE — 71045 X-RAY EXAM CHEST 1 VIEW: CPT | Mod: 26

## 2018-09-07 PROCEDURE — 99285 EMERGENCY DEPT VISIT HI MDM: CPT | Mod: 25

## 2018-09-07 PROCEDURE — 93010 ELECTROCARDIOGRAM REPORT: CPT

## 2018-09-07 RX ORDER — DEXTROSE 50 % IN WATER 50 %
15 SYRINGE (ML) INTRAVENOUS ONCE
Qty: 0 | Refills: 0 | Status: DISCONTINUED | OUTPATIENT
Start: 2018-09-07 | End: 2018-09-26

## 2018-09-07 RX ORDER — MONTELUKAST 4 MG/1
10 TABLET, CHEWABLE ORAL AT BEDTIME
Qty: 0 | Refills: 0 | Status: DISCONTINUED | OUTPATIENT
Start: 2018-09-07 | End: 2018-09-26

## 2018-09-07 RX ORDER — ALBUTEROL 90 UG/1
1 AEROSOL, METERED ORAL EVERY 4 HOURS
Qty: 0 | Refills: 0 | Status: DISCONTINUED | OUTPATIENT
Start: 2018-09-07 | End: 2018-09-26

## 2018-09-07 RX ORDER — ASPIRIN/CALCIUM CARB/MAGNESIUM 324 MG
81 TABLET ORAL DAILY
Qty: 0 | Refills: 0 | Status: DISCONTINUED | OUTPATIENT
Start: 2018-09-07 | End: 2018-09-26

## 2018-09-07 RX ORDER — DEXTROSE 50 % IN WATER 50 %
12.5 SYRINGE (ML) INTRAVENOUS ONCE
Qty: 0 | Refills: 0 | Status: DISCONTINUED | OUTPATIENT
Start: 2018-09-07 | End: 2018-09-26

## 2018-09-07 RX ORDER — DILTIAZEM HCL 120 MG
180 CAPSULE, EXT RELEASE 24 HR ORAL DAILY
Qty: 0 | Refills: 0 | Status: DISCONTINUED | OUTPATIENT
Start: 2018-09-07 | End: 2018-09-26

## 2018-09-07 RX ORDER — TOPIRAMATE 25 MG
100 TABLET ORAL DAILY
Qty: 0 | Refills: 0 | Status: DISCONTINUED | OUTPATIENT
Start: 2018-09-07 | End: 2018-09-26

## 2018-09-07 RX ORDER — HYDRALAZINE HCL 50 MG
50 TABLET ORAL THREE TIMES A DAY
Qty: 0 | Refills: 0 | Status: DISCONTINUED | OUTPATIENT
Start: 2018-09-07 | End: 2018-09-26

## 2018-09-07 RX ORDER — SODIUM CHLORIDE 9 MG/ML
1000 INJECTION, SOLUTION INTRAVENOUS
Qty: 0 | Refills: 0 | Status: DISCONTINUED | OUTPATIENT
Start: 2018-09-07 | End: 2018-09-26

## 2018-09-07 RX ORDER — PANTOPRAZOLE SODIUM 20 MG/1
40 TABLET, DELAYED RELEASE ORAL
Qty: 0 | Refills: 0 | Status: DISCONTINUED | OUTPATIENT
Start: 2018-09-07 | End: 2018-09-26

## 2018-09-07 RX ORDER — ASPIRIN/CALCIUM CARB/MAGNESIUM 324 MG
325 TABLET ORAL ONCE
Qty: 0 | Refills: 0 | Status: COMPLETED | OUTPATIENT
Start: 2018-09-07 | End: 2018-09-07

## 2018-09-07 RX ORDER — IPRATROPIUM/ALBUTEROL SULFATE 18-103MCG
3 AEROSOL WITH ADAPTER (GRAM) INHALATION EVERY 6 HOURS
Qty: 0 | Refills: 0 | Status: DISCONTINUED | OUTPATIENT
Start: 2018-09-07 | End: 2018-09-26

## 2018-09-07 RX ORDER — DEXTROSE 50 % IN WATER 50 %
25 SYRINGE (ML) INTRAVENOUS ONCE
Qty: 0 | Refills: 0 | Status: DISCONTINUED | OUTPATIENT
Start: 2018-09-07 | End: 2018-09-26

## 2018-09-07 RX ORDER — ENOXAPARIN SODIUM 100 MG/ML
40 INJECTION SUBCUTANEOUS EVERY 24 HOURS
Qty: 0 | Refills: 0 | Status: DISCONTINUED | OUTPATIENT
Start: 2018-09-07 | End: 2018-09-10

## 2018-09-07 RX ORDER — SIMVASTATIN 20 MG/1
40 TABLET, FILM COATED ORAL AT BEDTIME
Qty: 0 | Refills: 0 | Status: DISCONTINUED | OUTPATIENT
Start: 2018-09-07 | End: 2018-09-26

## 2018-09-07 RX ORDER — SODIUM CHLORIDE 9 MG/ML
3 INJECTION INTRAMUSCULAR; INTRAVENOUS; SUBCUTANEOUS ONCE
Qty: 0 | Refills: 0 | Status: COMPLETED | OUTPATIENT
Start: 2018-09-07 | End: 2018-09-07

## 2018-09-07 RX ORDER — IPRATROPIUM/ALBUTEROL SULFATE 18-103MCG
6 AEROSOL WITH ADAPTER (GRAM) INHALATION ONCE
Qty: 0 | Refills: 0 | Status: COMPLETED | OUTPATIENT
Start: 2018-09-07 | End: 2018-09-07

## 2018-09-07 RX ORDER — GLUCAGON INJECTION, SOLUTION 0.5 MG/.1ML
1 INJECTION, SOLUTION SUBCUTANEOUS ONCE
Qty: 0 | Refills: 0 | Status: DISCONTINUED | OUTPATIENT
Start: 2018-09-07 | End: 2018-09-26

## 2018-09-07 RX ORDER — HYDRALAZINE HCL 50 MG
50 TABLET ORAL ONCE
Qty: 0 | Refills: 0 | Status: COMPLETED | OUTPATIENT
Start: 2018-09-07 | End: 2018-09-07

## 2018-09-07 RX ORDER — TIOTROPIUM BROMIDE 18 UG/1
1 CAPSULE ORAL; RESPIRATORY (INHALATION) DAILY
Qty: 0 | Refills: 0 | Status: DISCONTINUED | OUTPATIENT
Start: 2018-09-07 | End: 2018-09-26

## 2018-09-07 RX ORDER — LOSARTAN POTASSIUM 100 MG/1
100 TABLET, FILM COATED ORAL DAILY
Qty: 0 | Refills: 0 | Status: DISCONTINUED | OUTPATIENT
Start: 2018-09-07 | End: 2018-09-26

## 2018-09-07 RX ORDER — CARVEDILOL PHOSPHATE 80 MG/1
12.5 CAPSULE, EXTENDED RELEASE ORAL EVERY 12 HOURS
Qty: 0 | Refills: 0 | Status: DISCONTINUED | OUTPATIENT
Start: 2018-09-07 | End: 2018-09-26

## 2018-09-07 RX ORDER — INSULIN LISPRO 100/ML
VIAL (ML) SUBCUTANEOUS
Qty: 0 | Refills: 0 | Status: DISCONTINUED | OUTPATIENT
Start: 2018-09-07 | End: 2018-09-26

## 2018-09-07 RX ORDER — AZITHROMYCIN 500 MG/1
500 TABLET, FILM COATED ORAL EVERY 24 HOURS
Qty: 0 | Refills: 0 | Status: DISCONTINUED | OUTPATIENT
Start: 2018-09-07 | End: 2018-09-13

## 2018-09-07 RX ADMIN — LOSARTAN POTASSIUM 100 MILLIGRAM(S): 100 TABLET, FILM COATED ORAL at 20:15

## 2018-09-07 RX ADMIN — AZITHROMYCIN 250 MILLIGRAM(S): 500 TABLET, FILM COATED ORAL at 18:03

## 2018-09-07 RX ADMIN — Medication 3 MILLILITER(S): at 20:11

## 2018-09-07 RX ADMIN — CARVEDILOL PHOSPHATE 12.5 MILLIGRAM(S): 80 CAPSULE, EXTENDED RELEASE ORAL at 22:40

## 2018-09-07 RX ADMIN — Medication 50 MILLIGRAM(S): at 22:40

## 2018-09-07 RX ADMIN — MONTELUKAST 10 MILLIGRAM(S): 4 TABLET, CHEWABLE ORAL at 22:40

## 2018-09-07 RX ADMIN — SODIUM CHLORIDE 3 MILLILITER(S): 9 INJECTION INTRAMUSCULAR; INTRAVENOUS; SUBCUTANEOUS at 14:39

## 2018-09-07 RX ADMIN — Medication 50 MILLIGRAM(S): at 18:34

## 2018-09-07 RX ADMIN — Medication 40 MILLIGRAM(S): at 20:12

## 2018-09-07 RX ADMIN — Medication 6 MILLILITER(S): at 14:55

## 2018-09-07 RX ADMIN — Medication 60 MILLIGRAM(S): at 15:40

## 2018-09-07 RX ADMIN — SIMVASTATIN 40 MILLIGRAM(S): 20 TABLET, FILM COATED ORAL at 22:40

## 2018-09-07 RX ADMIN — Medication 325 MILLIGRAM(S): at 14:55

## 2018-09-07 NOTE — H&P ADULT - NSHPLABSRESULTS_GEN_ALL_CORE
Lab Results:  CBC  CBC Full  -  ( 07 Sep 2018 14:34 )  WBC Count : 9.7 K/uL  Hemoglobin : 14.9 g/dL  Hematocrit : 46.5 %  Platelet Count - Automated : 275 K/uL  Mean Cell Volume : 82.6 fl  Mean Cell Hemoglobin : 26.6 pg  Mean Cell Hemoglobin Concentration : 32.2 gm/dL  Auto Neutrophil # : 4.4 K/uL  Auto Lymphocyte # : 3.8 K/uL  Auto Monocyte # : 1.0 K/uL  Auto Eosinophil # : 0.4 K/uL  Auto Basophil # : 0.1 K/uL  Auto Neutrophil % : 45.4 %  Auto Lymphocyte % : 39.5 %  Auto Monocyte % : 10.2 %  Auto Eosinophil % : 3.8 %  Auto Basophil % : 1.1 %    .		Differential:	[] Automated		[] Manual  Chemistry                        14.9   9.7   )-----------( 275      ( 07 Sep 2018 14:34 )             46.5     09-07    136  |  102  |  11  ----------------------------<  93  3.8   |  24  |  0.92    Ca    10.0      07 Sep 2018 14:34    TPro  7.4  /  Alb  4.1  /  TBili  0.3  /  DBili  x   /  AST  15  /  ALT  10  /  AlkPhos  97  09-07    LIVER FUNCTIONS - ( 07 Sep 2018 14:34 )  Alb: 4.1 g/dL / Pro: 7.4 g/dL / ALK PHOS: 97 U/L / ALT: 10 U/L / AST: 15 U/L / GGT: x                     MICROBIOLOGY/CULTURES:      RADIOLOGY RESULTS: reviewed

## 2018-09-07 NOTE — ED PROVIDER NOTE - MEDICAL DECISION MAKING DETAILS
62F w/PAD sent by vascular surgeon for work-up of progressive HIGH and cardiac function prior to L fem bypass. CXR clear, BNP appropriate, will admit for further work-up

## 2018-09-07 NOTE — ED PROVIDER NOTE - ATTENDING CONTRIBUTION TO CARE
62F PMH of asthma/COPD (no home O2), L MCA CVA (residual right sided hemiparesis), small chronic right parietal lobe infarct, seizures, DM2, CAD s/p stent, HTN (prior episodes of HTN urgency), AFib (no anticoagulatin), PAD s/p right fem-pop bypass (s/p occlusion and IR stent placement 2/2017), presents with 1 week of exertional chest pain and shortness of breath. started on friday when she walked outside to the grocery store, with onset of chest pain and sob. saw her vascular surgeon who referred pt to the ER. she declined coming to the hospital at that time but continued to have symptoms throughout the week. no chest pain at this time, but feels mild sob.   no f/ch, uri symptoms, cough, abd pain, N/V/D, urinary complaints.   PE lungs CTA. no resp distress. no abdominal TTP. + distention. gangrenous toes, chronic.       cards/vascular surgery- lesli mondragon 62F PMH of asthma/COPD (no home O2), L MCA CVA (residual right sided hemiparesis), small chronic right parietal lobe infarct, seizures, DM2, CAD s/p stent, HTN (prior episodes of HTN urgency), AFib (no anticoagulatin), PAD s/p right fem-pop bypass (s/p occlusion and IR stent placement 2/2017), presents with 1 week of exertional chest pain and shortness of breath. started on friday when she walked outside to the grocery store, with onset of chest pain and sob. saw her vascular surgeon who referred pt to the ER. she declined coming to the hospital at that time but continued to have symptoms throughout the week. no chest pain at this time, but feels mild sob.   no f/ch, uri symptoms, cough, abd pain, N/V/D, urinary complaints.   PE lungs with scattered wheezes. no resp distress. no abdominal TTP. + distention. gangrenous toes, chronic.       cards/vascular surgery- lesli mondragon 62F PMH of asthma/COPD (no home O2), L MCA CVA (residual right sided hemiparesis), small chronic right parietal lobe infarct, seizures, DM2, CAD s/p stent, HTN (prior episodes of HTN urgency), AFib (no anticoagulatin), PAD s/p right fem-pop bypass (s/p occlusion and IR stent placement 2/2017), presents with 1 week of exertional chest pain and shortness of breath. started on friday when she walked outside to the grocery store, with onset of chest pain and sob. saw her vascular surgeon who referred pt to the ER. she declined coming to the hospital at that time but continued to have symptoms throughout the week. no chest pain at this time, but feels mild sob.   no f/ch, uri symptoms, cough, abd pain, N/V/D, urinary complaints.   PE lungs with scattered wheezes. no resp distress. no abdominal TTP. + distention. gangrenous toes, chronic.       vascular surgery- lesli arguello 62F PMH of asthma/COPD (no home O2), L MCA CVA (residual right sided hemiparesis), small chronic right parietal lobe infarct, seizures, DM2, CAD s/p stent, HTN (prior episodes of HTN urgency), AFib (no anticoagulatin), PAD s/p right fem-pop bypass (s/p occlusion and IR stent placement 2/2017), presents with 1 week of exertional chest pain and shortness of breath. started on friday when she walked outside to the grocery store, with onset of chest pain and sob. saw her vascular surgeon who referred pt to the ER. she declined coming to the hospital at that time but continued to have symptoms throughout the week. no chest pain at this time, but feels mild sob.   no f/ch, uri symptoms, cough, abd pain, N/V/D, urinary complaints.   PE lungs with scattered wheezes. no resp distress. no abdominal TTP. + distention. gangrenous toes, chronic.   vascular surgery- lesli arguello  Patient given duonebs and prednisone in the ER given history of asthma COPD and wheezing on auscultation. She reported improvement of her shortness of breath after treatment. Unclear at this time whether exertional chest pain and shortness of breath as cardiac versus pulmonary at this time. Patient admitted in stable condition for further workup    Based on patient's HPI as well as the results of today's workup, I felt that patient warranted admission to the hospital for further workup/evaluation and continued management. I discussed the findings of today's workup with the patient and addressed the patient's questions and concerns. The patient was agreeable with admission. I spoke with the hospitalist who accepted the patient for admission and subsequently took over the patient's care.

## 2018-09-07 NOTE — ED PROVIDER NOTE - OBJECTIVE STATEMENT
62F PMH of asthma/COPD (no home O2), L MCA CVA (residual right sided hemiparesis), small chronic right parietal lobe infarct, seizures, DM2, CAD s/p stent, HTN (prior episodes of HTN urgency), AFib (no anticoagulatin), PAD s/p right fem-pop bypass (s/p occlusion and IR stent placement 2/2017), GERD who presents 62F PMH of asthma/COPD (no home O2), L MCA CVA (residual right sided hemiparesis), small chronic right parietal lobe infarct, seizures, DM2, CAD s/p stent, HTN (prior episodes of HTN urgency), AFib (no anticoagulatin), PAD s/p right fem-pop bypass (s/p occlusion and IR stent placement 2/2017), GERD who presents with shortness of breath. She started feeling short of breath about 3 weeks ago with 1-2 weeks of productive cough with clear sputum. Over the past week she endorses some wheeze and she has been using her inhaler 3 times in the morning and once at night daily. She no longer has medication for her nebulizer. She can walk <1 block as she is limited by shortness of breath and by LE claudication symptoms. She also has chest pain that is in the L breast, not pleuritic, feels that it is in the breast tissue and improves when she squeezes it. Denies fevers/chills, LE swelling, N/V, diaphoresis, diarrhea, constipation, abdominal pain, dizziness. Walks with a walker. Current daily smoker just 1-2 cigarettes daily. Has no PMD at this time.

## 2018-09-07 NOTE — ED ADULT NURSE NOTE - NSIMPLEMENTINTERV_GEN_ALL_ED
Implemented All Fall with Harm Risk Interventions:  [place] to call system. Call bell, personal items and telephone within reach. Instruct patient to call for assistance. Room bathroom lighting operational. Non-slip footwear when patient is off stretcher. Physically safe environment: no spills, clutter or unnecessary equipment. Stretcher in lowest position, wheels locked, appropriate side rails in place. Provide visual cue, wrist band, yellow gown, etc. Monitor gait and stability. Monitor for mental status changes and reorient to person, place, and time. Review medications for side effects contributing to fall risk. Reinforce activity limits and safety measures with patient and family. Provide visual clues: red socks.

## 2018-09-07 NOTE — H&P ADULT - ASSESSMENT
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 She started feeling short of breath about 3 weeks ago with 1-2 weeks of productive cough with clear sputum. Over the past week she endorses some wheeze and she has been using her inhaler 3 times in the morning and once at night daily. She no longer has medication for her nebulizer. She can walk <1 block as she is limited by shortness of breath and by LE claudication symptoms. She also has chest pain that is in the L breast, not pleuritic, feels that it is in the breast tissue and improves when she squeezes it. Denies fevers/chills, LE swelling, N/V, diaphoresis, diarrhea, constipation, abdominal pain, dizziness. Walks with a walker. Current daily smoker just 1-2 cigarettes daily

## 2018-09-07 NOTE — H&P ADULT - NSHPPHYSICALEXAM_GEN_ALL_CORE
General: WN/WD NAD  PERRLA  Neurology: A&Ox3, nonfocal, SALEH x 4  Respiratory: Coarse breath sounds bilaterally  CV: s1s2+  Abdominal: Soft, NT, ND +BS, Last BM  Extremities: No edema, + peripheral pulses  Skin Normal

## 2018-09-07 NOTE — ED ADULT NURSE NOTE - OBJECTIVE STATEMENT
pt 61 yo female sent from md office worsening sob over last month pt hx cva x3 smoker accompanied by sister to er pt denies any chest pain states complient with meds alert oriented place on continuous cardiac monitering sinus rythem bilateral diminished beath sounds with scant wheezeing asculatatedno accessory muscle use pt 63 yo female sent from md office worsening sob over last month pt hx cva x3 smoker accompanied by sister to er pt denies any chest pain states complient with meds alert oriented place on continuous cardiac monitering sinus rythem bilateral diminished beath sounds with scant wheezeing asculatatedno accessory muscle use pt with residual right sided weakness from cva uses cane for ambulation sister at bedside

## 2018-09-07 NOTE — ED PROVIDER NOTE - PLAN OF CARE
Admit, work-up for HIGH, infectious vs cards vs pulm etiology Troponin 16, proBNP 108, cxr no PNA noted.

## 2018-09-07 NOTE — ED ADULT TRIAGE NOTE - CHIEF COMPLAINT QUOTE
alan had shortness of breath and pain in my chest for months; doctors come to house took xrays but never give us results

## 2018-09-07 NOTE — ED ADULT NURSE NOTE - ED STAT RN HANDOFF DETAILS
pt with report to [address] [place] [address] endorsed to check b/p 1 hr post hydralazine given pt asymptomatic no headache no change in vision on transport to [address] [place] [address] ok by md for transport

## 2018-09-07 NOTE — ED PROVIDER NOTE - PMH
Asthma  never intubated  CAD (coronary artery disease)    CVA (cerebral infarction)  times 3 with residual rt sided weakness  Diabetes    Gastroesophageal reflux    HTN (hypertension)    Peripheral arterial disease    S/P Cardiac Cath  3yrs ago.report unknown

## 2018-09-07 NOTE — ED PROVIDER NOTE - CARE PLAN
Principal Discharge DX:	Dyspnea on exertion  Goal:	Admit, work-up for HIGH, infectious vs cards vs pulm etiology  Assessment and plan of treatment:	Troponin 16, proBNP 108, cxr no PNA noted.

## 2018-09-08 LAB
GLUCOSE BLDC GLUCOMTR-MCNC: 205 MG/DL — HIGH (ref 70–99)
GLUCOSE BLDC GLUCOMTR-MCNC: 242 MG/DL — HIGH (ref 70–99)
GLUCOSE BLDC GLUCOMTR-MCNC: 262 MG/DL — HIGH (ref 70–99)
HBA1C BLD-MCNC: 7.2 % — HIGH (ref 4–5.6)
TROPONIN T, HIGH SENSITIVITY RESULT: 12 NG/L — SIGNIFICANT CHANGE UP (ref 0–51)

## 2018-09-08 PROCEDURE — 93010 ELECTROCARDIOGRAM REPORT: CPT | Mod: 76

## 2018-09-08 PROCEDURE — 71250 CT THORAX DX C-: CPT | Mod: 26

## 2018-09-08 RX ORDER — ACETAMINOPHEN 500 MG
650 TABLET ORAL ONCE
Qty: 0 | Refills: 0 | Status: COMPLETED | OUTPATIENT
Start: 2018-09-08 | End: 2018-09-08

## 2018-09-08 RX ORDER — INFLUENZA VIRUS VACCINE 15; 15; 15; 15 UG/.5ML; UG/.5ML; UG/.5ML; UG/.5ML
0.5 SUSPENSION INTRAMUSCULAR ONCE
Qty: 0 | Refills: 0 | Status: COMPLETED | OUTPATIENT
Start: 2018-09-08 | End: 2018-09-25

## 2018-09-08 RX ORDER — INSULIN GLARGINE 100 [IU]/ML
10 INJECTION, SOLUTION SUBCUTANEOUS AT BEDTIME
Qty: 0 | Refills: 0 | Status: DISCONTINUED | OUTPATIENT
Start: 2018-09-08 | End: 2018-09-09

## 2018-09-08 RX ORDER — INSULIN LISPRO 100/ML
6 VIAL (ML) SUBCUTANEOUS
Qty: 0 | Refills: 0 | Status: DISCONTINUED | OUTPATIENT
Start: 2018-09-08 | End: 2018-09-09

## 2018-09-08 RX ADMIN — Medication 2: at 08:02

## 2018-09-08 RX ADMIN — Medication 650 MILLIGRAM(S): at 18:17

## 2018-09-08 RX ADMIN — Medication 650 MILLIGRAM(S): at 18:42

## 2018-09-08 RX ADMIN — Medication 100 MILLIGRAM(S): at 12:03

## 2018-09-08 RX ADMIN — Medication 2: at 17:22

## 2018-09-08 RX ADMIN — Medication 40 MILLIGRAM(S): at 12:02

## 2018-09-08 RX ADMIN — LOSARTAN POTASSIUM 100 MILLIGRAM(S): 100 TABLET, FILM COATED ORAL at 05:10

## 2018-09-08 RX ADMIN — Medication 40 MILLIGRAM(S): at 05:56

## 2018-09-08 RX ADMIN — PANTOPRAZOLE SODIUM 40 MILLIGRAM(S): 20 TABLET, DELAYED RELEASE ORAL at 05:10

## 2018-09-08 RX ADMIN — SIMVASTATIN 40 MILLIGRAM(S): 20 TABLET, FILM COATED ORAL at 21:42

## 2018-09-08 RX ADMIN — CARVEDILOL PHOSPHATE 12.5 MILLIGRAM(S): 80 CAPSULE, EXTENDED RELEASE ORAL at 17:28

## 2018-09-08 RX ADMIN — CARVEDILOL PHOSPHATE 12.5 MILLIGRAM(S): 80 CAPSULE, EXTENDED RELEASE ORAL at 08:20

## 2018-09-08 RX ADMIN — Medication 40 MILLIGRAM(S): at 01:07

## 2018-09-08 RX ADMIN — Medication 3 MILLILITER(S): at 12:02

## 2018-09-08 RX ADMIN — Medication 3: at 12:02

## 2018-09-08 RX ADMIN — Medication 3 MILLILITER(S): at 05:10

## 2018-09-08 RX ADMIN — INSULIN GLARGINE 10 UNIT(S): 100 INJECTION, SOLUTION SUBCUTANEOUS at 21:42

## 2018-09-08 RX ADMIN — Medication 81 MILLIGRAM(S): at 12:02

## 2018-09-08 RX ADMIN — Medication 50 MILLIGRAM(S): at 15:09

## 2018-09-08 RX ADMIN — ENOXAPARIN SODIUM 40 MILLIGRAM(S): 100 INJECTION SUBCUTANEOUS at 21:42

## 2018-09-08 RX ADMIN — ENOXAPARIN SODIUM 40 MILLIGRAM(S): 100 INJECTION SUBCUTANEOUS at 01:07

## 2018-09-08 RX ADMIN — Medication 3 MILLILITER(S): at 23:21

## 2018-09-08 RX ADMIN — AZITHROMYCIN 250 MILLIGRAM(S): 500 TABLET, FILM COATED ORAL at 18:15

## 2018-09-08 RX ADMIN — Medication 50 MILLIGRAM(S): at 05:10

## 2018-09-08 RX ADMIN — Medication 6 UNIT(S): at 17:23

## 2018-09-08 RX ADMIN — Medication 180 MILLIGRAM(S): at 05:10

## 2018-09-08 RX ADMIN — Medication 40 MILLIGRAM(S): at 23:21

## 2018-09-08 RX ADMIN — Medication 3 MILLILITER(S): at 01:06

## 2018-09-08 RX ADMIN — Medication 50 MILLIGRAM(S): at 21:41

## 2018-09-08 RX ADMIN — MONTELUKAST 10 MILLIGRAM(S): 4 TABLET, CHEWABLE ORAL at 21:41

## 2018-09-08 RX ADMIN — Medication 40 MILLIGRAM(S): at 17:28

## 2018-09-08 NOTE — CONSULT NOTE ADULT - SUBJECTIVE AND OBJECTIVE BOX
Patient is a 62y old  Female who presents with a chief complaint of sob (08 Sep 2018 08:46)      HPI:  62F PMH of asthma/COPD (no home O2), L MCA CVA (residual right sided hemiparesis), small chronic right parietal lobe infarct, seizures, DM2, CAD s/p stent, HTN (prior episodes of HTN urgency), AFib (no anticoagulatin), PAD s/p right fem-pop bypass (s/p occlusion and IR stent placement 2/2017), GERD who presents with shortness of breath. She started feeling short of breath about 3 weeks ago with 1-2 weeks of productive cough with clear sputum. Over the past week she endorses some wheeze and she has been using her inhaler 3 times in the morning and once at night daily. She no longer has medication for her nebulizer. She can walk <1 block as she is limited by shortness of breath and by LE claudication symptoms. She also has chest pain that is in the L breast, not pleuritic, feels that it is in the breast tissue and improves when she squeezes it. Denies fevers/chills, LE swelling, N/V, diaphoresis, diarrhea, constipation, abdominal pain, dizziness. Walks with a walker. Current daily smoker just 1-2 cigarettes daily (07 Sep 2018 17:38)        REVIEW OF SYSTEMS:    CONSTITUTIONAL: No fever, weight loss, or fatigue  EYES: No eye pain, visual disturbances, or discharge  ENMT:  No sore throat  NECK: No pain or stiffness  RESPIRATORY: No cough, wheezing, chills or hemoptysis; No shortness of breath  CARDIOVASCULAR: No chest pain, palpitations, dizziness, or leg swelling  GASTROINTESTINAL: No abdominal or epigastric pain. No nausea, vomiting, or hematemesis; No diarrhea or constipation. No melena or hematochezia.  GENITOURINARY: No dysuria, frequency, hematuria, or incontinence  NEUROLOGICAL: No headaches, memory loss, loss of strength, numbness, or tremors  SKIN: No itching, burning, rashes, or lesions   LYMPH NODES: No enlarged glands  MUSCULOSKELETAL: No joint pain or swelling; No muscle, back, or extremity pain      PAST MEDICAL & SURGICAL HISTORY:  Peripheral arterial disease  CAD (coronary artery disease)  CVA (cerebral infarction): times 3 with residual rt sided weakness  Gastroesophageal reflux  Asthma: never intubated  Diabetes  HTN (hypertension)  S/P Cardiac Cath: 3yrs ago.report unknown  Coronary stent occlusion  S/P total abdominal hysterectomy      Allergies    No Known Allergies    Intolerances        FAMILY HISTORY:  Family history of diabetes mellitus (Sibling)      SOCIAL HISTORY:  Current smoker      MEDICATIONS  (STANDING):  ALBUTerol    90 MICROgram(s) HFA Inhaler 1 Puff(s) Inhalation every 4 hours  ALBUTerol/ipratropium for Nebulization 3 milliLiter(s) Nebulizer every 6 hours  aspirin  chewable 81 milliGRAM(s) Oral daily  azithromycin  IVPB 500 milliGRAM(s) IV Intermittent every 24 hours  carvedilol 12.5 milliGRAM(s) Oral every 12 hours  dextrose 5%. 1000 milliLiter(s) (50 mL/Hr) IV Continuous <Continuous>  dextrose 50% Injectable 12.5 Gram(s) IV Push once  dextrose 50% Injectable 25 Gram(s) IV Push once  dextrose 50% Injectable 25 Gram(s) IV Push once  diltiazem    milliGRAM(s) Oral daily  enoxaparin Injectable 40 milliGRAM(s) SubCutaneous every 24 hours  hydrALAZINE 50 milliGRAM(s) Oral three times a day  influenza   Vaccine 0.5 milliLiter(s) IntraMuscular once  insulin lispro (HumaLOG) corrective regimen sliding scale   SubCutaneous three times a day before meals  losartan 100 milliGRAM(s) Oral daily  methylPREDNISolone sodium succinate Injectable 40 milliGRAM(s) IV Push every 6 hours  montelukast 10 milliGRAM(s) Oral at bedtime  pantoprazole    Tablet 40 milliGRAM(s) Oral before breakfast  simvastatin 40 milliGRAM(s) Oral at bedtime  tiotropium 18 MICROgram(s) Capsule 1 Capsule(s) Inhalation daily  topiramate 100 milliGRAM(s) Oral daily    MEDICATIONS  (PRN):  dextrose 40% Gel 15 Gram(s) Oral once PRN Blood Glucose LESS THAN 70 milliGRAM(s)/deciliter  glucagon  Injectable 1 milliGRAM(s) IntraMuscular once PRN Glucose LESS THAN 70 milligrams/deciliter      Vital Signs Last 24 Hrs  T(C): 37.2 (08 Sep 2018 11:51), Max: 37.2 (08 Sep 2018 11:51)  T(F): 98.9 (08 Sep 2018 11:51), Max: 98.9 (08 Sep 2018 11:51)  HR: 92 (08 Sep 2018 11:51) (80 - 111)  BP: 100/60 (08 Sep 2018 11:51) (100/60 - 197/109)  BP(mean): --  RR: 18 (08 Sep 2018 11:51) (16 - 20)  SpO2: 98% (08 Sep 2018 11:51) (95% - 100%)    PHYSICAL EXAM:    GENERAL: NAD, well-groomed  HEAD:  Atraumatic, Normocephalic  EYES: EOMI, PERRLA, conjunctiva and sclera clear  ENMT: No tonsillar erythema, exudates, or enlargement; Moist mucous membranes  NECK: Supple, No JVD  CHEST/LUNG: Clear to percussion bilaterally; No rales, rhonchi, wheezing, or rubs  HEART: Regular rate and rhythm; No murmurs, rubs, or gallops  ABDOMEN: Soft, Nontender, Nondistended; Bowel sounds present  EXTREMITIES:  2+ Peripheral Pulses, No clubbing, cyanosis, or edema  LYMPH: No lymphadenopathy noted  SKIN: No rashes or lesions    LABS:  CBC Full  -  ( 07 Sep 2018 14:34 )  WBC Count : 9.7 K/uL  Hemoglobin : 14.9 g/dL  Hematocrit : 46.5 %  Platelet Count - Automated : 275 K/uL  Mean Cell Volume : 82.6 fl  Mean Cell Hemoglobin : 26.6 pg  Mean Cell Hemoglobin Concentration : 32.2 gm/dL  Auto Neutrophil # : 4.4 K/uL  Auto Lymphocyte # : 3.8 K/uL  Auto Monocyte # : 1.0 K/uL  Auto Eosinophil # : 0.4 K/uL  Auto Basophil # : 0.1 K/uL  Auto Neutrophil % : 45.4 %  Auto Lymphocyte % : 39.5 %  Auto Monocyte % : 10.2 %  Auto Eosinophil % : 3.8 %  Auto Basophil % : 1.1 %      09-07    136  |  102  |  11  ----------------------------<  93  3.8   |  24  |  0.92    Ca    10.0      07 Sep 2018 14:34    TPro  7.4  /  Alb  4.1  /  TBili  0.3  /  DBili  x   /  AST  15  /  ALT  10  /  AlkPhos  97  09-07      LIVER FUNCTIONS - ( 07 Sep 2018 14:34 )  Alb: 4.1 g/dL / Pro: 7.4 g/dL / ALK PHOS: 97 U/L / ALT: 10 U/L / AST: 15 U/L / GGT: x                               MICROBIOLOGY:                        RADIOLOGY:    < from: Xray Chest 1 View AP/PA (09.07.18 @ 15:31) >  Findings: The heart is enlarged. Pulmonary vascularity appears normal. No   consolidation or pleural effusion is present.    There is a sigmoid scoliosis of the thoracic spine.    Impression: Clear lungs.    < end of copied text >

## 2018-09-08 NOTE — CHART NOTE - NSCHARTNOTEFT_GEN_A_CORE
CC: Chest Pain      HPI:  Called by RN  Patient denied headache, dizziness, CP, SOB, abdominal  pain, N/V/D, numbness/tingling, extremity weakness, dysuria.         ROS:  CONSTITUTIONAL:  No fever, chills, rigors  CARDIOVASCULAR:  No chest pain or palpitations  RESPIRATORY:   No SOB, cough, dyspnea on exertion.  No wheezing  GASTROINTESTINAL:  No abd pain, N/V, diarrhea/constipation  EXTREMITIES:  No swelling or joint pain  GENITOURINARY:  No burning on urination, increased frequency or urgency.  No flank pain  NEUROLOGIC:  No HA, visual disturbances  SKIN: No rashes        PAST MEDICAL & SURGICAL HISTORY:  Other form of dyspnea  Family history of diabetes mellitus (Sibling)  Handoff  MEWS Score  Peripheral arterial disease  CAD (coronary artery disease)  CVA (cerebral infarction)  Gastroesophageal reflux  Asthma  Diabetes  HTN (hypertension)  S/P Cardiac Cath  Dyspnea on exertion  HTN (hypertension)  Diabetes  CAD (coronary artery disease)  Dyspnea on exertion  Coronary stent occlusion  S/P total abdominal hysterectomy  CHEST PAIN/SOB FOR MONTHS  90+        Vital Signs Last 24 Hrs  T(C): 36.9 (08 Sep 2018 04:59), Max: 36.9 (08 Sep 2018 04:59)  T(F): 98.4 (08 Sep 2018 04:59), Max: 98.4 (08 Sep 2018 04:59)  HR: 111 (08 Sep 2018 05:55) (80 - 111)  BP: 177/110 (08 Sep 2018 05:55) (122/95 - 197/109)  BP(mean): --  RR: 20 (08 Sep 2018 05:55) (16 - 20)  SpO2: 97% (08 Sep 2018 05:55) (97% - 100%)      Physical Exam:  General: WN/WD NAD, AOx3, nontoxic appearing  Head:  NC/AT  CV: RRR, S1S2   Respiratory: CTA B/L, nonlabored  Abdominal: (+) bowel sounds x4. Soft, NT, ND, no palpable mass, no guarding, or rebound tenderness  Genitourinary: ? Doll   MSK: No BLLE edema, + peripheral pulses, FROM all 4 extremity  Skin: (+) warm, dry   Psych: Appropriate affect       Labs:                        14.9   9.7   )-----------( 275      ( 07 Sep 2018 14:34 )             46.5     09-07    136  |  102  |  11  ----------------------------<  93  3.8   |  24  |  0.92    Ca    10.0      07 Sep 2018 14:34    TPro  7.4  /  Alb  4.1  /  TBili  0.3  /  DBili  x   /  AST  15  /  ALT  10  /  AlkPhos  97  09-07              Radiology:          Assessment & Plan:  HPI:  62F PMH of asthma/COPD (no home O2), L MCA CVA (residual right sided hemiparesis), small chronic right parietal lobe infarct, seizures, DM2, CAD s/p stent, HTN (prior episodes of HTN urgency), AFib (no anticoagulatin), PAD s/p right fem-pop bypass (s/p occlusion and IR stent placement 2/2017), GERD who presents with shortness of breath. She started feeling short of breath about 3 weeks ago with 1-2 weeks of productive cough with clear sputum. Over the past week she endorses some wheeze and she has been using her inhaler 3 times in the morning and once at night daily. She no longer has medication for her nebulizer. She can walk <1 block as she is limited by shortness of breath and by LE claudication symptoms. She also has chest pain that is in the L breast, not pleuritic, feels that it is in the breast tissue and improves when she squeezes it. Denies fevers/chills, LE swelling, N/V, diaphoresis, diarrhea, constipation, abdominal pain, dizziness. Walks with a walker. Current daily smoker just 1-2 cigarettes daily (07 Sep 2018 17:38)        -  -  -  -Primary Team to follow up in AM, attending to follow       Donna Freeman PA-C  Dept of Medicine CC: Chest Pain      HPI:  Called by RN to relate pt having Lt sided chest pain. Pt relates having  worsened pain  on deep inspiration, not associated with sob, palpitations, n/v. Pt felt dizzy.   EKG revealed Acc. junctional rhythm @ 117 bpm & ST dep V4-V6 & II.        ROS:  CONSTITUTIONAL:  No fever, chills, rigors  CARDIOVASCULAR: + chest pain, no palpitations  RESPIRATORY:   No SOB, cough, dyspnea on exertion.  No wheezing  GASTROINTESTINAL:  No abd pain, N/V, diarrhea/constipation  EXTREMITIES:  No swelling or joint pain  NEUROLOGIC:  No HA, visual disturbances  SKIN: No rashes        PAST MEDICAL & SURGICAL HISTORY:  Past Medical History:  Asthma  never intubated  CAD (coronary artery disease)    CVA (cerebral infarction)  times 3 with residual rt sided weakness  Diabetes    Gastroesophageal reflux    HTN (hypertension)    Peripheral arterial disease    S/P Cardiac Cath  3yrs ago. report unknown.     Past Surgical History:  Coronary stent occlusion    S/P total abdominal hysterectomy.      Vital Signs Last 24 Hrs  T(C): 36.9 (08 Sep 2018 04:59), Max: 36.9 (08 Sep 2018 04:59)  T(F): 98.4 (08 Sep 2018 04:59), Max: 98.4 (08 Sep 2018 04:59)  HR: 111 (08 Sep 2018 05:55) (80 - 111)  BP: 177/110 (08 Sep 2018 05:55) (122/95 - 197/109)  BP(mean): --  RR: 20 (08 Sep 2018 05:55) (16 - 20)  SpO2: 97% (08 Sep 2018 05:55) (97% - 100%)      Physical Exam:  General: WN/WD NAD, AOx3, nontoxic appearing  Head:  NC/AT  CV: RRR, S1S2; + Lt sided chest pain reproducible;   Respiratory: CTA B/L, nonlabored  Abdominal: (+) bowel sounds x4. Soft, non tender, non distended  MSK: No BLLE edema, + peripheral pulses, FROM all 4 extremity  Skin: (+) warm, dry   Psych: Appropriate affect       Labs:                        14.9   9.7   )-----------( 275      ( 07 Sep 2018 14:34 )             46.5     09-07    136  |  102  |  11  ----------------------------<  93  3.8   |  24  |  0.92    Ca    10.0      07 Sep 2018 14:34    TPro  7.4  /  Alb  4.1  /  TBili  0.3  /  DBili  x   /  AST  15  /  ALT  10  /  AlkPhos  97  09-07      Radiology:    Assessment & Plan:  HPI:  62F PMH of asthma/COPD (no home O2), L MCA CVA (residual right sided hemiparesis), small chronic right parietal lobe infarct, seizures, DM2, CAD s/p stent, HTN (prior episodes of HTN urgency), AFib (no anticoagulatin), PAD s/p right fem-pop bypass (s/p occlusion and IR stent placement 2/2017), GERD who presents with shortness of breath. She started feeling short of breath about 3 weeks ago with 1-2 weeks of productive cough with clear sputum. Over the past week she endorses some wheeze and she has been using her inhaler 3 times in the morning and once at night daily. She no longer has medication for her nebulizer. She can walk <1 block as she is limited by shortness of breath and by LE claudication symptoms. She also has chest pain that is in the L breast, not pleuritic, feels that it is in the breast tissue and improves when she squeezes it. Denies fevers/chills, LE swelling, N/V, diaphoresis, diarrhea, constipation, abdominal pain, dizziness. Walks with a walker. Current daily smoker just 1-2 cigarettes daily (07 Sep 2018 17:38)    Pt seen this morning for Lt sided chest pain, pleuritic in nature, reproducible, not associated with sob.  EKG showed acc. junctional rhythm & noted ST dep II, V4-V6.  Pt also was hypertensive, having received meds prior to episode.  In light of pleuritic nature of Lt sided discomfort, unlikely to be cardiac in origin.    PLAN:  -Continue to monitor  -Send Stat HsTn & follow results  -Continue present medication  regimen  -Primary Team to follow up in AM, attending to follow       Donna Freeman PA-C  Dept of Medicine    20 min. spent during this pt encounter

## 2018-09-08 NOTE — PROGRESS NOTE ADULT - ASSESSMENT
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 She started feeling short of breath about 3 weeks ago with 1-2 weeks of productive cough with clear sputum. Over the past week she endorses some wheeze and she has been using her inhaler 3 times in the morning and once at night daily. She no longer has medication for her nebulizer. She can walk <1 block as she is limited by shortness of breath and by LE claudication symptoms. She also has chest pain that is in the L breast, not pleuritic, feels that it is in the breast tissue and improves when she squeezes it. Denies fevers/chills, LE swelling, N/V, diaphoresis, diarrhea, constipation, abdominal pain, dizziness. Walks with a walker. Current daily smoker just 1-2 cigarettes daily    Problem/Plan - 1:  ·  Problem: Dyspnea on exertion.  Plan: Jessie COPD exacerbation  duonebs  start solumedrol  cw ceftriaxone and azithromycin.   pulmonary fu    Problem/Plan - 2:  ·  Problem: CAD (coronary artery disease).  Plan:  home meds   cards consult.     Problem/Plan - 3:  ·  Problem: Diabetes.  Plan: monitor FS  ISS.     Problem/Plan - 4:  ·  Problem: HTN (hypertension).  Plan:  home meds.

## 2018-09-08 NOTE — PROGRESS NOTE ADULT - SUBJECTIVE AND OBJECTIVE BOX
Patient is a 62y old  Female who presents with a chief complaint of sob (07 Sep 2018 17:38)      INTERVAL HPI/OVERNIGHT EVENTS:  T(C): 36.9 (09-08-18 @ 04:59), Max: 36.9 (09-08-18 @ 04:59)  HR: 98 (09-08-18 @ 08:08) (80 - 111)  BP: 181/104 (09-08-18 @ 08:08) (122/95 - 197/109)  RR: 20 (09-08-18 @ 05:55) (16 - 20)  SpO2: 97% (09-08-18 @ 05:55) (97% - 100%)  Wt(kg): --  I&O's Summary    07 Sep 2018 07:01  -  08 Sep 2018 07:00  --------------------------------------------------------  IN: 240 mL / OUT: 0 mL / NET: 240 mL        LABS:                        14.9   9.7   )-----------( 275      ( 07 Sep 2018 14:34 )             46.5     09-07    136  |  102  |  11  ----------------------------<  93  3.8   |  24  |  0.92    Ca    10.0      07 Sep 2018 14:34    TPro  7.4  /  Alb  4.1  /  TBili  0.3  /  DBili  x   /  AST  15  /  ALT  10  /  AlkPhos  97  09-07        CAPILLARY BLOOD GLUCOSE      POCT Blood Glucose.: 205 mg/dL (08 Sep 2018 07:39)            MEDICATIONS  (STANDING):  ALBUTerol    90 MICROgram(s) HFA Inhaler 1 Puff(s) Inhalation every 4 hours  ALBUTerol/ipratropium for Nebulization 3 milliLiter(s) Nebulizer every 6 hours  aspirin  chewable 81 milliGRAM(s) Oral daily  azithromycin  IVPB 500 milliGRAM(s) IV Intermittent every 24 hours  carvedilol 12.5 milliGRAM(s) Oral every 12 hours  dextrose 5%. 1000 milliLiter(s) (50 mL/Hr) IV Continuous <Continuous>  dextrose 50% Injectable 12.5 Gram(s) IV Push once  dextrose 50% Injectable 25 Gram(s) IV Push once  dextrose 50% Injectable 25 Gram(s) IV Push once  diltiazem    milliGRAM(s) Oral daily  enoxaparin Injectable 40 milliGRAM(s) SubCutaneous every 24 hours  hydrALAZINE 50 milliGRAM(s) Oral three times a day  influenza   Vaccine 0.5 milliLiter(s) IntraMuscular once  insulin lispro (HumaLOG) corrective regimen sliding scale   SubCutaneous three times a day before meals  losartan 100 milliGRAM(s) Oral daily  methylPREDNISolone sodium succinate Injectable 40 milliGRAM(s) IV Push every 6 hours  montelukast 10 milliGRAM(s) Oral at bedtime  pantoprazole    Tablet 40 milliGRAM(s) Oral before breakfast  simvastatin 40 milliGRAM(s) Oral at bedtime  tiotropium 18 MICROgram(s) Capsule 1 Capsule(s) Inhalation daily  topiramate 100 milliGRAM(s) Oral daily    MEDICATIONS  (PRN):  dextrose 40% Gel 15 Gram(s) Oral once PRN Blood Glucose LESS THAN 70 milliGRAM(s)/deciliter  glucagon  Injectable 1 milliGRAM(s) IntraMuscular once PRN Glucose LESS THAN 70 milligrams/deciliter          PHYSICAL EXAM:  GENERAL: NAD, well-groomed, well-developed  HEAD:  Atraumatic, Normocephalic  CHEST/LUNG: Clear to percussion bilaterally; No rales, rhonchi, wheezing, or rubs  HEART: Regular rate and rhythm; No murmurs, rubs, or gallops  ABDOMEN: Soft, Nontender, Nondistended; Bowel sounds present  EXTREMITIES:  2+ Peripheral Pulses, No clubbing, cyanosis, or edema  LYMPH: No lymphadenopathy noted  SKIN: No rashes or lesions    Care Discussed with Consultants/Other Providers [ ] YES  [ ] NO

## 2018-09-08 NOTE — CONSULT NOTE ADULT - ASSESSMENT
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 She started feeling short of breath about 3 weeks ago with 1-2 weeks of productive cough with clear sputum. Over the past week she endorses some wheeze and she has been using her inhaler 3 times in the morning and once at night daily. She no longer has medication for her nebulizer. She can walk <1 block as she is limited by shortness of breath and by LE claudication symptoms. She also has chest pain that is in the L breast, not pleuritic, feels that it is in the breast tissue and improves when she squeezes it. Denies fevers/chills, LE swelling, N/V, diaphoresis, diarrhea, constipation, abdominal pain, dizziness. Walks with a walker. Current daily smoker just 1-2 cigarettes daily (07 Sep 2018 17:38)    Problem/Plan - 1:    ·	Cough    - Pt being treated for COPD exacerbation.  No leukocytosis or fever.  Cxr without pna.  c/o L sided chest pain for past 2 days, positive cough with phlegm.  Recommend CT chest, r/o consolidation    - Cont managment for COPD - defer to medical team    - Cont azithromycin as part of management for COPD exacerbation.  No evidence for pna thus far    - If pt spikes fever, send blood cultures x 2, Ua, urine cultures and RVP    Will follow,    Lindsey Angelo  [phone]

## 2018-09-08 NOTE — CONSULT NOTE ADULT - ASSESSMENT
Assessment  DMT2: 62y Female with DM T2 with hyperglycemia on insulin, blood sugars running high, no hypoglycemic episode,  eating meals, compliant with low carb diet.  CAD/SOB: On medications, stable, monitored.  HTN: Controlled, On med.

## 2018-09-08 NOTE — CONSULT NOTE ADULT - PROBLEM SELECTOR RECOMMENDATION 9
Will start Lantus 10 units at bed time.  Will start Humalog 6 units before each meal in addition to Humalog correction scale coverage.  Patient counseled for compliance with consistent low carb diet.

## 2018-09-09 LAB
ANION GAP SERPL CALC-SCNC: 12 MMOL/L — SIGNIFICANT CHANGE UP (ref 5–17)
BUN SERPL-MCNC: 28 MG/DL — HIGH (ref 7–23)
CALCIUM SERPL-MCNC: 9.6 MG/DL — SIGNIFICANT CHANGE UP (ref 8.4–10.5)
CHLORIDE SERPL-SCNC: 102 MMOL/L — SIGNIFICANT CHANGE UP (ref 96–108)
CO2 SERPL-SCNC: 21 MMOL/L — LOW (ref 22–31)
CREAT SERPL-MCNC: 1.24 MG/DL — SIGNIFICANT CHANGE UP (ref 0.5–1.3)
GLUCOSE BLDC GLUCOMTR-MCNC: 207 MG/DL — HIGH (ref 70–99)
GLUCOSE BLDC GLUCOMTR-MCNC: 227 MG/DL — HIGH (ref 70–99)
GLUCOSE BLDC GLUCOMTR-MCNC: 252 MG/DL — HIGH (ref 70–99)
GLUCOSE BLDC GLUCOMTR-MCNC: 265 MG/DL — HIGH (ref 70–99)
GLUCOSE SERPL-MCNC: 302 MG/DL — HIGH (ref 70–99)
HCT VFR BLD CALC: 41.4 % — SIGNIFICANT CHANGE UP (ref 34.5–45)
HGB BLD-MCNC: 13.3 G/DL — SIGNIFICANT CHANGE UP (ref 11.5–15.5)
MAGNESIUM SERPL-MCNC: 2.3 MG/DL — SIGNIFICANT CHANGE UP (ref 1.6–2.6)
MCHC RBC-ENTMCNC: 26.9 PG — LOW (ref 27–34)
MCHC RBC-ENTMCNC: 32.1 GM/DL — SIGNIFICANT CHANGE UP (ref 32–36)
MCV RBC AUTO: 83.8 FL — SIGNIFICANT CHANGE UP (ref 80–100)
PHOSPHATE SERPL-MCNC: 2.3 MG/DL — LOW (ref 2.5–4.5)
PLATELET # BLD AUTO: 274 K/UL — SIGNIFICANT CHANGE UP (ref 150–400)
POTASSIUM SERPL-MCNC: 4.1 MMOL/L — SIGNIFICANT CHANGE UP (ref 3.5–5.3)
POTASSIUM SERPL-SCNC: 4.1 MMOL/L — SIGNIFICANT CHANGE UP (ref 3.5–5.3)
RAPID RVP RESULT: SIGNIFICANT CHANGE UP
RBC # BLD: 4.94 M/UL — SIGNIFICANT CHANGE UP (ref 3.8–5.2)
RBC # FLD: 14.3 % — SIGNIFICANT CHANGE UP (ref 10.3–14.5)
SODIUM SERPL-SCNC: 135 MMOL/L — SIGNIFICANT CHANGE UP (ref 135–145)
WBC # BLD: 18.11 K/UL — HIGH (ref 3.8–10.5)
WBC # FLD AUTO: 18.11 K/UL — HIGH (ref 3.8–10.5)

## 2018-09-09 RX ORDER — SODIUM,POTASSIUM PHOSPHATES 278-250MG
1 POWDER IN PACKET (EA) ORAL ONCE
Qty: 0 | Refills: 0 | Status: COMPLETED | OUTPATIENT
Start: 2018-09-09 | End: 2018-09-09

## 2018-09-09 RX ORDER — INSULIN GLARGINE 100 [IU]/ML
18 INJECTION, SOLUTION SUBCUTANEOUS AT BEDTIME
Qty: 0 | Refills: 0 | Status: DISCONTINUED | OUTPATIENT
Start: 2018-09-09 | End: 2018-09-26

## 2018-09-09 RX ORDER — INSULIN LISPRO 100/ML
8 VIAL (ML) SUBCUTANEOUS
Qty: 0 | Refills: 0 | Status: DISCONTINUED | OUTPATIENT
Start: 2018-09-09 | End: 2018-09-26

## 2018-09-09 RX ADMIN — Medication 100 MILLIGRAM(S): at 13:07

## 2018-09-09 RX ADMIN — MONTELUKAST 10 MILLIGRAM(S): 4 TABLET, CHEWABLE ORAL at 21:59

## 2018-09-09 RX ADMIN — Medication 50 MILLIGRAM(S): at 13:07

## 2018-09-09 RX ADMIN — Medication 8 UNIT(S): at 17:45

## 2018-09-09 RX ADMIN — Medication 3: at 13:07

## 2018-09-09 RX ADMIN — Medication 40 MILLIGRAM(S): at 17:48

## 2018-09-09 RX ADMIN — Medication 3 MILLILITER(S): at 13:07

## 2018-09-09 RX ADMIN — Medication 3 MILLILITER(S): at 17:45

## 2018-09-09 RX ADMIN — Medication 3 MILLILITER(S): at 05:07

## 2018-09-09 RX ADMIN — Medication 2: at 17:45

## 2018-09-09 RX ADMIN — Medication 2: at 08:39

## 2018-09-09 RX ADMIN — Medication 180 MILLIGRAM(S): at 05:07

## 2018-09-09 RX ADMIN — Medication 50 MILLIGRAM(S): at 05:07

## 2018-09-09 RX ADMIN — PANTOPRAZOLE SODIUM 40 MILLIGRAM(S): 20 TABLET, DELAYED RELEASE ORAL at 05:07

## 2018-09-09 RX ADMIN — CARVEDILOL PHOSPHATE 12.5 MILLIGRAM(S): 80 CAPSULE, EXTENDED RELEASE ORAL at 05:07

## 2018-09-09 RX ADMIN — ENOXAPARIN SODIUM 40 MILLIGRAM(S): 100 INJECTION SUBCUTANEOUS at 21:59

## 2018-09-09 RX ADMIN — Medication 40 MILLIGRAM(S): at 13:38

## 2018-09-09 RX ADMIN — Medication 40 MILLIGRAM(S): at 05:07

## 2018-09-09 RX ADMIN — Medication 3 MILLILITER(S): at 23:45

## 2018-09-09 RX ADMIN — Medication 50 MILLIGRAM(S): at 21:59

## 2018-09-09 RX ADMIN — Medication 1 PACKET(S): at 17:48

## 2018-09-09 RX ADMIN — CARVEDILOL PHOSPHATE 12.5 MILLIGRAM(S): 80 CAPSULE, EXTENDED RELEASE ORAL at 17:45

## 2018-09-09 RX ADMIN — INSULIN GLARGINE 18 UNIT(S): 100 INJECTION, SOLUTION SUBCUTANEOUS at 21:59

## 2018-09-09 RX ADMIN — Medication 6 UNIT(S): at 08:39

## 2018-09-09 RX ADMIN — SIMVASTATIN 40 MILLIGRAM(S): 20 TABLET, FILM COATED ORAL at 21:59

## 2018-09-09 RX ADMIN — LOSARTAN POTASSIUM 100 MILLIGRAM(S): 100 TABLET, FILM COATED ORAL at 05:07

## 2018-09-09 RX ADMIN — Medication 81 MILLIGRAM(S): at 13:07

## 2018-09-09 RX ADMIN — Medication 6 UNIT(S): at 13:07

## 2018-09-09 RX ADMIN — AZITHROMYCIN 250 MILLIGRAM(S): 500 TABLET, FILM COATED ORAL at 17:46

## 2018-09-09 RX ADMIN — Medication 40 MILLIGRAM(S): at 23:45

## 2018-09-09 NOTE — PROGRESS NOTE ADULT - ASSESSMENT
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 She started feeling short of breath about 3 weeks ago with 1-2 weeks of productive cough with clear sputum. Over the past week she endorses some wheeze and she has been using her inhaler 3 times in the morning and once at night daily. She no longer has medication for her nebulizer. She can walk <1 block as she is limited by shortness of breath and by LE claudication symptoms. She also has chest pain that is in the L breast, not pleuritic, feels that it is in the breast tissue and improves when she squeezes it. Denies fevers/chills, LE swelling, N/V, diaphoresis, diarrhea, constipation, abdominal pain, dizziness. Walks with a walker. Current daily smoker just 1-2 cigarettes daily (07 Sep 2018 17:38)    Problem/Plan - 1:    ·	Cough    - Pt being treated for COPD exacerbation.  No leukocytosis or fever.  Cxr without pna.  c/o L sided chest pain for past 2 days, positive cough with phlegm.  Recommend CT chest, r/o consolidation    - Cont managment for COPD - cont duonebs, solumedrol, and supp O2.      - Cont azithromycin as part of management for COPD exacerbation.  No evidence for pna thus far    - If pt spikes fever, send blood cultures x 2, Ua, urine cultures     - Check RVP    Will follow,    Lindsey Angelo  [phone]

## 2018-09-09 NOTE — PROGRESS NOTE ADULT - ASSESSMENT
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 She started feeling short of breath about 3 weeks ago with 1-2 weeks of productive cough with clear sputum. Over the past week she endorses some wheeze and she has been using her inhaler 3 times in the morning and once at night daily. She no longer has medication for her nebulizer. She can walk <1 block as she is limited by shortness of breath and by LE claudication symptoms. She also has chest pain that is in the L breast, not pleuritic, feels that it is in the breast tissue and improves when she squeezes it. Denies fevers/chills, LE swelling, N/V, diaphoresis, diarrhea, constipation, abdominal pain, dizziness. Walks with a walker. Current daily smoker just 1-2 cigarettes daily    Problem/Plan - 1:  ·  Problem: Dyspnea on exertion.  Plan: Jessie COPD exacerbation  duonebs  start solumedrol  cw ceftriaxone and azithromycin.   pulmonary fu    Problem/Plan - 2:  ·  Problem: CAD (coronary artery disease).  Plan:  home meds   cards consult.     Problem/Plan - 3:  ·  Problem: Diabetes.  Plan: monitor FS  ISS.   uncontrolled  endodcine called    Problem/Plan - 4:  ·  Problem: HTN (hypertension).  Plan:  home meds.

## 2018-09-09 NOTE — PROGRESS NOTE ADULT - SUBJECTIVE AND OBJECTIVE BOX
Chief complaint  Patient is a 62y old  Female who presents with a chief complaint of sob (09 Sep 2018 12:09)   Review of systems  Patient in bed, looks comfortable, no fever,  no hypoglycemia.    Labs and Fingersticks  CAPILLARY BLOOD GLUCOSE      POCT Blood Glucose.: 207 mg/dL (09 Sep 2018 17:01)  POCT Blood Glucose.: 265 mg/dL (09 Sep 2018 12:48)  POCT Blood Glucose.: 227 mg/dL (09 Sep 2018 08:15)  POCT Blood Glucose.: 242 mg/dL (08 Sep 2018 21:29)      Anion Gap, Serum: 12 (09-09 @ 06:53)    Hemoglobin A1C, Whole Blood: 7.2 <H> (09-08 @ 08:18)    Calcium, Total Serum: 9.6 (09-09 @ 06:53)          09-09    135  |  102  |  28<H>  ----------------------------<  302<H>  4.1   |  21<L>  |  1.24    Ca    9.6      09 Sep 2018 06:53  Phos  2.3     09-09  Mg     2.3     09-09                          13.3   18.11 )-----------( 274      ( 09 Sep 2018 08:37 )             41.4     Medications  MEDICATIONS  (STANDING):  ALBUTerol    90 MICROgram(s) HFA Inhaler 1 Puff(s) Inhalation every 4 hours  ALBUTerol/ipratropium for Nebulization 3 milliLiter(s) Nebulizer every 6 hours  aspirin  chewable 81 milliGRAM(s) Oral daily  azithromycin  IVPB 500 milliGRAM(s) IV Intermittent every 24 hours  carvedilol 12.5 milliGRAM(s) Oral every 12 hours  dextrose 5%. 1000 milliLiter(s) (50 mL/Hr) IV Continuous <Continuous>  dextrose 50% Injectable 12.5 Gram(s) IV Push once  dextrose 50% Injectable 25 Gram(s) IV Push once  dextrose 50% Injectable 25 Gram(s) IV Push once  diltiazem    milliGRAM(s) Oral daily  enoxaparin Injectable 40 milliGRAM(s) SubCutaneous every 24 hours  hydrALAZINE 50 milliGRAM(s) Oral three times a day  influenza   Vaccine 0.5 milliLiter(s) IntraMuscular once  insulin glargine Injectable (LANTUS) 18 Unit(s) SubCutaneous at bedtime  insulin lispro (HumaLOG) corrective regimen sliding scale   SubCutaneous three times a day before meals  insulin lispro Injectable (HumaLOG) 8 Unit(s) SubCutaneous three times a day before meals  losartan 100 milliGRAM(s) Oral daily  methylPREDNISolone sodium succinate Injectable 40 milliGRAM(s) IV Push every 6 hours  montelukast 10 milliGRAM(s) Oral at bedtime  pantoprazole    Tablet 40 milliGRAM(s) Oral before breakfast  simvastatin 40 milliGRAM(s) Oral at bedtime  tiotropium 18 MICROgram(s) Capsule 1 Capsule(s) Inhalation daily  topiramate 100 milliGRAM(s) Oral daily      Physical Exam  General: Patient comfortable in bed  Vital Signs Last 12 Hrs  T(F): 98.3 (09-09-18 @ 12:12), Max: 98.3 (09-09-18 @ 12:12)  HR: 73 (09-09-18 @ 12:12) (73 - 73)  BP: 133/75 (09-09-18 @ 12:12) (133/75 - 133/75)  BP(mean): --  RR: 18 (09-09-18 @ 12:12) (18 - 18)  SpO2: 97% (09-09-18 @ 12:12) (97% - 97%)  Neck: No palpable thyroid nodules.  CVS: S1S2, No murmurs  Respiratory: No wheezing, no crepitations  GI: Abdomen soft, bowel sounds positive  Musculoskeletal:  edema lower extremities.   Skin: No skin rashes, no ecchymosis    Diagnostics

## 2018-09-09 NOTE — PROGRESS NOTE ADULT - SUBJECTIVE AND OBJECTIVE BOX
Infectious Diseases progress note:    Subjective: Pt still with coughing fits, brings up clear phlegm, increased wheezing.  No fevers    ROS:  CONSTITUTIONAL:  No fever, chills, rigors  CARDIOVASCULAR:  No chest pain or palpitations  RESPIRATORY:   No SOB, cough, dyspnea on exertion.  No wheezing  GASTROINTESTINAL:  No abd pain, N/V, diarrhea/constipation  EXTREMITIES:  No swelling or joint pain  GENITOURINARY:  No burning on urination, increased frequency or urgency.  No flank pain  NEUROLOGIC:  No HA, visual disturbances  SKIN: No rashes    Allergies    No Known Allergies    Intolerances        ANTIBIOTICS/RELEVANT:  antimicrobials  azithromycin  IVPB 500 milliGRAM(s) IV Intermittent every 24 hours    immunologic:  influenza   Vaccine 0.5 milliLiter(s) IntraMuscular once    OTHER:  ALBUTerol    90 MICROgram(s) HFA Inhaler 1 Puff(s) Inhalation every 4 hours  ALBUTerol/ipratropium for Nebulization 3 milliLiter(s) Nebulizer every 6 hours  aspirin  chewable 81 milliGRAM(s) Oral daily  carvedilol 12.5 milliGRAM(s) Oral every 12 hours  dextrose 40% Gel 15 Gram(s) Oral once PRN  dextrose 5%. 1000 milliLiter(s) IV Continuous <Continuous>  dextrose 50% Injectable 12.5 Gram(s) IV Push once  dextrose 50% Injectable 25 Gram(s) IV Push once  dextrose 50% Injectable 25 Gram(s) IV Push once  diltiazem    milliGRAM(s) Oral daily  enoxaparin Injectable 40 milliGRAM(s) SubCutaneous every 24 hours  glucagon  Injectable 1 milliGRAM(s) IntraMuscular once PRN  hydrALAZINE 50 milliGRAM(s) Oral three times a day  insulin glargine Injectable (LANTUS) 10 Unit(s) SubCutaneous at bedtime  insulin lispro (HumaLOG) corrective regimen sliding scale   SubCutaneous three times a day before meals  insulin lispro Injectable (HumaLOG) 6 Unit(s) SubCutaneous three times a day before meals  losartan 100 milliGRAM(s) Oral daily  methylPREDNISolone sodium succinate Injectable 40 milliGRAM(s) IV Push every 6 hours  montelukast 10 milliGRAM(s) Oral at bedtime  pantoprazole    Tablet 40 milliGRAM(s) Oral before breakfast  simvastatin 40 milliGRAM(s) Oral at bedtime  tiotropium 18 MICROgram(s) Capsule 1 Capsule(s) Inhalation daily  topiramate 100 milliGRAM(s) Oral daily      Objective:  Vital Signs Last 24 Hrs  T(C): 36.6 (09 Sep 2018 06:51), Max: 37.1 (08 Sep 2018 20:20)  T(F): 97.8 (09 Sep 2018 06:51), Max: 98.7 (08 Sep 2018 20:20)  HR: 78 (09 Sep 2018 06:51) (67 - 82)  BP: 154/86 (09 Sep 2018 06:51) (127/88 - 154/86)  BP(mean): --  RR: 18 (09 Sep 2018 06:51) (16 - 18)  SpO2: 100% (09 Sep 2018 06:51) (96% - 100%)    PHYSICAL EXAM:  Constitutional:NAD  Eyes:PRIYANK, EOMI  Ear/Nose/Throat: no thrush, mucositis.  Moist mucous membranes	  Neck:no JVD, no lymphadenopathy, supple  Respiratory: CTA suzanna  Cardiovascular: S1S2 RRR, no murmurs  Gastrointestinal:soft, nontender,  nondistended (+) BS  Extremities:no e/e/c  Skin:  no rashes, open wounds or ulcerations        LABS:                        13.3   18.11 )-----------( 274      ( 09 Sep 2018 08:37 )             41.4     09-09    135  |  102  |  28<H>  ----------------------------<  302<H>  4.1   |  21<L>  |  1.24    Ca    9.6      09 Sep 2018 06:53  Phos  2.3     09-09  Mg     2.3     09-09    TPro  7.4  /  Alb  4.1  /  TBili  0.3  /  DBili  x   /  AST  15  /  ALT  10  /  AlkPhos  97  09-07                            MICROBIOLOGY:          RADIOLOGY & ADDITIONAL STUDIES:    < from: Xray Chest 1 View AP/PA (09.07.18 @ 15:31) >  Impression: Clear lungs.    < end of copied text >

## 2018-09-09 NOTE — PROGRESS NOTE ADULT - PROBLEM SELECTOR PLAN 1
Will increase Lantus to 18 units at bed time.  Will increase Humalog to 8 units before each meal in addition to Humalog correction scale coverage.  Patient counseled for compliance with consistent low carb diet.

## 2018-09-10 LAB
ANION GAP SERPL CALC-SCNC: 10 MMOL/L — SIGNIFICANT CHANGE UP (ref 5–17)
BUN SERPL-MCNC: 26 MG/DL — HIGH (ref 7–23)
CALCIUM SERPL-MCNC: 9.7 MG/DL — SIGNIFICANT CHANGE UP (ref 8.4–10.5)
CHLORIDE SERPL-SCNC: 103 MMOL/L — SIGNIFICANT CHANGE UP (ref 96–108)
CO2 SERPL-SCNC: 20 MMOL/L — LOW (ref 22–31)
CREAT SERPL-MCNC: 1.05 MG/DL — SIGNIFICANT CHANGE UP (ref 0.5–1.3)
GLUCOSE BLDC GLUCOMTR-MCNC: 181 MG/DL — HIGH (ref 70–99)
GLUCOSE BLDC GLUCOMTR-MCNC: 189 MG/DL — HIGH (ref 70–99)
GLUCOSE BLDC GLUCOMTR-MCNC: 196 MG/DL — HIGH (ref 70–99)
GLUCOSE BLDC GLUCOMTR-MCNC: 208 MG/DL — HIGH (ref 70–99)
GLUCOSE BLDC GLUCOMTR-MCNC: 219 MG/DL — HIGH (ref 70–99)
GLUCOSE BLDC GLUCOMTR-MCNC: 229 MG/DL — HIGH (ref 70–99)
GLUCOSE SERPL-MCNC: 243 MG/DL — HIGH (ref 70–99)
HCT VFR BLD CALC: 40.7 % — SIGNIFICANT CHANGE UP (ref 34.5–45)
HGB BLD-MCNC: 13.4 G/DL — SIGNIFICANT CHANGE UP (ref 11.5–15.5)
MAGNESIUM SERPL-MCNC: 2.4 MG/DL — SIGNIFICANT CHANGE UP (ref 1.6–2.6)
MCHC RBC-ENTMCNC: 26.9 PG — LOW (ref 27–34)
MCHC RBC-ENTMCNC: 32.9 GM/DL — SIGNIFICANT CHANGE UP (ref 32–36)
MCV RBC AUTO: 81.6 FL — SIGNIFICANT CHANGE UP (ref 80–100)
PHOSPHATE SERPL-MCNC: 2.2 MG/DL — LOW (ref 2.5–4.5)
PLATELET # BLD AUTO: 248 K/UL — SIGNIFICANT CHANGE UP (ref 150–400)
POTASSIUM SERPL-MCNC: 3.6 MMOL/L — SIGNIFICANT CHANGE UP (ref 3.5–5.3)
POTASSIUM SERPL-SCNC: 3.6 MMOL/L — SIGNIFICANT CHANGE UP (ref 3.5–5.3)
RBC # BLD: 4.99 M/UL — SIGNIFICANT CHANGE UP (ref 3.8–5.2)
RBC # FLD: 14.7 % — HIGH (ref 10.3–14.5)
SODIUM SERPL-SCNC: 133 MMOL/L — LOW (ref 135–145)
WBC # BLD: 15.61 K/UL — HIGH (ref 3.8–10.5)
WBC # FLD AUTO: 15.61 K/UL — HIGH (ref 3.8–10.5)

## 2018-09-10 PROCEDURE — 93970 EXTREMITY STUDY: CPT | Mod: 26

## 2018-09-10 RX ORDER — SODIUM,POTASSIUM PHOSPHATES 278-250MG
1 POWDER IN PACKET (EA) ORAL
Qty: 0 | Refills: 0 | Status: COMPLETED | OUTPATIENT
Start: 2018-09-10 | End: 2018-09-11

## 2018-09-10 RX ORDER — HEPARIN SODIUM 5000 [USP'U]/ML
INJECTION INTRAVENOUS; SUBCUTANEOUS
Qty: 25000 | Refills: 0 | Status: DISCONTINUED | OUTPATIENT
Start: 2018-09-10 | End: 2018-09-12

## 2018-09-10 RX ORDER — POTASSIUM CHLORIDE 20 MEQ
20 PACKET (EA) ORAL ONCE
Qty: 0 | Refills: 0 | Status: COMPLETED | OUTPATIENT
Start: 2018-09-10 | End: 2018-09-10

## 2018-09-10 RX ORDER — HEPARIN SODIUM 5000 [USP'U]/ML
6000 INJECTION INTRAVENOUS; SUBCUTANEOUS EVERY 6 HOURS
Qty: 0 | Refills: 0 | Status: DISCONTINUED | OUTPATIENT
Start: 2018-09-10 | End: 2018-09-12

## 2018-09-10 RX ORDER — HEPARIN SODIUM 5000 [USP'U]/ML
6000 INJECTION INTRAVENOUS; SUBCUTANEOUS ONCE
Qty: 0 | Refills: 0 | Status: COMPLETED | OUTPATIENT
Start: 2018-09-10 | End: 2018-09-11

## 2018-09-10 RX ORDER — HEPARIN SODIUM 5000 [USP'U]/ML
3000 INJECTION INTRAVENOUS; SUBCUTANEOUS EVERY 6 HOURS
Qty: 0 | Refills: 0 | Status: DISCONTINUED | OUTPATIENT
Start: 2018-09-10 | End: 2018-09-12

## 2018-09-10 RX ADMIN — Medication 20 MILLIEQUIVALENT(S): at 16:52

## 2018-09-10 RX ADMIN — Medication 1 TABLET(S): at 16:56

## 2018-09-10 RX ADMIN — Medication 100 MILLIGRAM(S): at 11:32

## 2018-09-10 RX ADMIN — Medication 50 MILLIGRAM(S): at 12:44

## 2018-09-10 RX ADMIN — INSULIN GLARGINE 18 UNIT(S): 100 INJECTION, SOLUTION SUBCUTANEOUS at 21:39

## 2018-09-10 RX ADMIN — Medication 40 MILLIGRAM(S): at 11:32

## 2018-09-10 RX ADMIN — Medication 180 MILLIGRAM(S): at 05:34

## 2018-09-10 RX ADMIN — AZITHROMYCIN 250 MILLIGRAM(S): 500 TABLET, FILM COATED ORAL at 16:56

## 2018-09-10 RX ADMIN — PANTOPRAZOLE SODIUM 40 MILLIGRAM(S): 20 TABLET, DELAYED RELEASE ORAL at 05:34

## 2018-09-10 RX ADMIN — CARVEDILOL PHOSPHATE 12.5 MILLIGRAM(S): 80 CAPSULE, EXTENDED RELEASE ORAL at 16:52

## 2018-09-10 RX ADMIN — MONTELUKAST 10 MILLIGRAM(S): 4 TABLET, CHEWABLE ORAL at 21:40

## 2018-09-10 RX ADMIN — Medication 40 MILLIGRAM(S): at 05:34

## 2018-09-10 RX ADMIN — Medication 3 MILLILITER(S): at 11:32

## 2018-09-10 RX ADMIN — LOSARTAN POTASSIUM 100 MILLIGRAM(S): 100 TABLET, FILM COATED ORAL at 05:34

## 2018-09-10 RX ADMIN — Medication 1: at 08:01

## 2018-09-10 RX ADMIN — Medication 8 UNIT(S): at 11:58

## 2018-09-10 RX ADMIN — Medication 81 MILLIGRAM(S): at 11:32

## 2018-09-10 RX ADMIN — Medication 1: at 16:48

## 2018-09-10 RX ADMIN — Medication 50 MILLIGRAM(S): at 21:40

## 2018-09-10 RX ADMIN — Medication 8 UNIT(S): at 16:48

## 2018-09-10 RX ADMIN — CARVEDILOL PHOSPHATE 12.5 MILLIGRAM(S): 80 CAPSULE, EXTENDED RELEASE ORAL at 05:34

## 2018-09-10 RX ADMIN — Medication 1 TABLET(S): at 21:50

## 2018-09-10 RX ADMIN — Medication 1: at 11:58

## 2018-09-10 RX ADMIN — Medication 3 MILLILITER(S): at 17:56

## 2018-09-10 RX ADMIN — ENOXAPARIN SODIUM 40 MILLIGRAM(S): 100 INJECTION SUBCUTANEOUS at 21:40

## 2018-09-10 RX ADMIN — Medication 50 MILLIGRAM(S): at 05:34

## 2018-09-10 RX ADMIN — Medication 3 MILLILITER(S): at 05:34

## 2018-09-10 RX ADMIN — Medication 20 MILLIGRAM(S): at 16:52

## 2018-09-10 RX ADMIN — SIMVASTATIN 40 MILLIGRAM(S): 20 TABLET, FILM COATED ORAL at 21:40

## 2018-09-10 RX ADMIN — Medication 8 UNIT(S): at 08:01

## 2018-09-10 NOTE — CONSULT NOTE ADULT - SUBJECTIVE AND OBJECTIVE BOX
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 She started feeling short of breath about 3 weeks ago with 1-2 weeks of productive cough with clear sputum. Over the past week she endorses some wheeze and she has been using her inhaler 3 times in the morning and once at night daily. She no longer has medication for her nebulizer. She can walk <1 block as she is limited by shortness of breath and by LE claudication symptoms. She also has chest pain that is in the L breast, not pleuritic, feels that it is in the breast tissue and improves when she squeezes it. Denies fevers/chills, LE swelling, N/V, diaphoresis, diarrhea, constipation, abdominal pain, dizziness. Walks with a walker. Current daily smoker just 1-2 cigarettes daily (07 Sep 2018 17:38)    pt is a poor historian: She says she has COPD and still smokes: She has not been on  home oxygen: She is not clear about her nebulizers.       ?FOLLOWING PRESENT  [ x] Hx of PE/DVT, [y ] Hx COPD, [ ?] Hx of Asthma, [ y] Hx of Hospitalization, [x ]  Hx of BiPAP/CPAP use, [x ] Hx of DAMIÁN    Allergies    No Known Allergies    Intolerances        PAST MEDICAL & SURGICAL HISTORY:  Peripheral arterial disease  CAD (coronary artery disease)  CVA (cerebral infarction): times 3 with residual rt sided weakness  Gastroesophageal reflux  Asthma: never intubated  Diabetes  HTN (hypertension)  S/P Cardiac Cath: 3yrs ago.report unknown  Coronary stent occlusion  S/P total abdominal hysterectomy      FAMILY HISTORY:  Family history of diabetes mellitus (Sibling)      Social History: [  > 20 years active] TOBACCO                  [ x ] ETOH                                 [ x ] IVDA/DRUGS    REVIEW OF SYSTEMS      General:x	    Skin/Breast:x  	  Ophthalmologic:x  	  ENMT:	x    Respiratory and Thorax: sob, wheezing  	  Cardiovascular:	x    Gastrointestinal:	x    Genitourinary:	x    Musculoskeletal:	x    Neurological:	x    Psychiatric:	x    Hematology/Lymphatics:	x  Endocrine:	    Allergic/Immunologic:	x    MEDICATIONS  (STANDING):  ALBUTerol    90 MICROgram(s) HFA Inhaler 1 Puff(s) Inhalation every 4 hours  ALBUTerol/ipratropium for Nebulization 3 milliLiter(s) Nebulizer every 6 hours  aspirin  chewable 81 milliGRAM(s) Oral daily  azithromycin  IVPB 500 milliGRAM(s) IV Intermittent every 24 hours  carvedilol 12.5 milliGRAM(s) Oral every 12 hours  dextrose 5%. 1000 milliLiter(s) (50 mL/Hr) IV Continuous <Continuous>  dextrose 50% Injectable 12.5 Gram(s) IV Push once  dextrose 50% Injectable 25 Gram(s) IV Push once  dextrose 50% Injectable 25 Gram(s) IV Push once  diltiazem    milliGRAM(s) Oral daily  enoxaparin Injectable 40 milliGRAM(s) SubCutaneous every 24 hours  hydrALAZINE 50 milliGRAM(s) Oral three times a day  influenza   Vaccine 0.5 milliLiter(s) IntraMuscular once  insulin glargine Injectable (LANTUS) 18 Unit(s) SubCutaneous at bedtime  insulin lispro (HumaLOG) corrective regimen sliding scale   SubCutaneous three times a day before meals  insulin lispro Injectable (HumaLOG) 8 Unit(s) SubCutaneous three times a day before meals  losartan 100 milliGRAM(s) Oral daily  methylPREDNISolone sodium succinate Injectable 40 milliGRAM(s) IV Push every 6 hours  montelukast 10 milliGRAM(s) Oral at bedtime  pantoprazole    Tablet 40 milliGRAM(s) Oral before breakfast  potassium acid phosphate/sodium acid phosphate tablet (K-PHOS No. 2) 1 Tablet(s) Oral four times a day with meals  potassium chloride    Tablet ER 20 milliEquivalent(s) Oral once  simvastatin 40 milliGRAM(s) Oral at bedtime  tiotropium 18 MICROgram(s) Capsule 1 Capsule(s) Inhalation daily  topiramate 100 milliGRAM(s) Oral daily    MEDICATIONS  (PRN):  dextrose 40% Gel 15 Gram(s) Oral once PRN Blood Glucose LESS THAN 70 milliGRAM(s)/deciliter  glucagon  Injectable 1 milliGRAM(s) IntraMuscular once PRN Glucose LESS THAN 70 milligrams/deciliter  guaiFENesin   Syrup  (Sugar-Free) 200 milliGRAM(s) Oral every 6 hours PRN Cough       Vital Signs Last 24 Hrs  T(C): 36.6 (10 Sep 2018 11:30), Max: 36.6 (09 Sep 2018 20:32)  T(F): 97.8 (10 Sep 2018 11:30), Max: 97.8 (09 Sep 2018 20:32)  HR: 75 (10 Sep 2018 11:30) (70 - 80)  BP: 121/67 (10 Sep 2018 11:30) (121/67 - 168/96)  BP(mean): --  RR: 18 (10 Sep 2018 11:30) (18 - 18)  SpO2: 96% (10 Sep 2018 11:30) (96% - 97%)        I&O's Summary    09 Sep 2018 07:01  -  10 Sep 2018 07:00  --------------------------------------------------------  IN: 480 mL / OUT: 0 mL / NET: 480 mL    10 Sep 2018 07:01  -  10 Sep 2018 16:24  --------------------------------------------------------  IN: 500 mL / OUT: 0 mL / NET: 500 mL        Physical Exam:   GENERAL: NAD, well-groomed, well-developed  HEENT: PRIYANK/   Atraumatic, Normocephalic  ENMT: No tonsillar erythema, exudates, or enlargement; Moist mucous membranes, Good dentition, No lesions  NECK: Supple, No JVD, Normal thyroid  CHEST/LUNG: whezing+  CVS: Regular rate and rhythm; No murmurs, rubs, or gallops  GI: : Soft, Nontender, Nondistended; Bowel sounds present  NERVOUS SYSTEM:  Alert & Oriented X3  EXTREMITIES:  2+ Peripheral Pulses, No clubbing, cyanosis, or edema  LYMPH: No lymphadenopathy noted  SKIN: No rashes or lesions  ENDOCRINOLOGY: No Thyromegaly  PSYCH: Appropriate    Labs:                              13.4   15.61 )-----------( 248      ( 10 Sep 2018 08:06 )             40.7                         13.3   18.11 )-----------( 274      ( 09 Sep 2018 08:37 )             41.4                         14.9   9.7   )-----------( 275      ( 07 Sep 2018 14:34 )             46.5     09-10    133<L>  |  103  |  26<H>  ----------------------------<  243<H>  3.6   |  20<L>  |  1.05  09-09    135  |  102  |  28<H>  ----------------------------<  302<H>  4.1   |  21<L>  |  1.24  09-07    136  |  102  |  11  ----------------------------<  93  3.8   |  24  |  0.92    Ca    9.7      10 Sep 2018 07:03  Ca    9.6      09 Sep 2018 06:53  Phos  2.2     09-10  Phos  2.3     09-09  Mg     2.4     09-10  Mg     2.3     09-09    TPro  7.4  /  Alb  4.1  /  TBili  0.3  /  DBili  x   /  AST  15  /  ALT  10  /  AlkPhos  97  09-07    CAPILLARY BLOOD GLUCOSE      POCT Blood Glucose.: 196 mg/dL (10 Sep 2018 16:15)  POCT Blood Glucose.: 189 mg/dL (10 Sep 2018 11:48)  POCT Blood Glucose.: 181 mg/dL (10 Sep 2018 07:58)  POCT Blood Glucose.: 229 mg/dL (09 Sep 2018 21:13)  POCT Blood Glucose.: 207 mg/dL (09 Sep 2018 17:01)          D DImer      Studies  Chest X-RAY  CT SCAN Chest   CT Abdomen  Venous Dopplers: LE:   Others        < from: CT Chest No Cont (09.08.18 @ 15:55) >    INTERPRETATION:  CLINICAL INFORMATION: Dyspnea    COMPARISON: CT chest 9/20/2015    PROCEDURE:   CT of the Chest was performed without intravenous contrast.  [place] coronal reformats were performed.      FINDINGS:    CHEST:     LUNGS AND LARGE AIRWAYS: Patent central airways.  Minimal left basilar   subsegmental atelectasis.  PLEURA: No pleural effusion.  VESSELS: Atherosclerosis.  HEART: Heart size is normal. No pericardial effusion.  MEDIASTINUM AND LANEY: No lymphadenopathy.  CHEST WALL AND LOWER NECK: Pectus excavatum.  VISUALIZED UPPER ABDOMEN: Within normal limits.  BONES: Degenerative change. Scoliotic spine. Pectus deformity.    IMPRESSION: No evidence of pneumonia.                < from: Xray Chest 1 View AP/PA (09.07.18 @ 15:31) >  EXAM:  XR CHEST AP OR PA 1V                            PROCEDURE DATE:  09/07/2018            INTERPRETATION:  AP chest x-ray at 1529 hours on 7 September.    Clinical information: Moderate chest pain and palpitations.    Comparison: Chest x-ray dated 10 February 2017.    Findings: The heart is enlarged. Pulmonary vascularity appears normal. No   consolidation or pleural effusion is present.    There is a sigmoid scoliosis of the thoracic spine.    Impression: Clear lungs.                    ANDREA PUGH M.D., ATTENDING RADIOLOGIST  This document has been electronically signed. Sep  7 2018  3:53PM    < end of copied text >          MELIDA ESTEBAN M.D., RADIOLOGY RESIDENT  This document has been electronically signed.  DEISY ESCOTO M.D., ATTENDING RADIOLOGIST  This document has been electronically signed. Sep  9 2018  1:15PM    < end of copied text >

## 2018-09-10 NOTE — PROGRESS NOTE ADULT - ASSESSMENT
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 She started feeling short of breath about 3 weeks ago with 1-2 weeks of productive cough with clear sputum. Over the past week she endorses some wheeze and she has been using her inhaler 3 times in the morning and once at night daily. She no longer has medication for her nebulizer. She can walk <1 block as she is limited by shortness of breath and by LE claudication symptoms. She also has chest pain that is in the L breast, not pleuritic, feels that it is in the breast tissue and improves when she squeezes it. Denies fevers/chills, LE swelling, N/V, diaphoresis, diarrhea, constipation, abdominal pain, dizziness. Walks with a walker. Current daily smoker just 1-2 cigarettes daily    Problem/Plan - 1:  ·  Problem: Dyspnea on exertion.  Plan: Jessie COPD exacerbation  duonebs   steroids  antibiotics as per ID   pulmonary fu    Problem/Plan - 2:  ·  Problem: CAD (coronary artery disease).  Plan: cw home meds   cards consult.     Problem/Plan - 3:  ·  Problem: Diabetes.  Plan: monitor FS  ISS.   uncontrolled  endodcine called    Problem/Plan - 4:  ·  Problem: HTN (hypertension).  Plan: cw home meds.

## 2018-09-10 NOTE — CONSULT NOTE ADULT - PROBLEM SELECTOR RECOMMENDATION 9
Pts likely reason for her SOB seems to be COPD exacerbation: She is an active smoker as well as she has been wheezing: Her ct chest is normal without any pneumonia. Would decrease steroids to q 6 hours as well as cont BD q 6 hours: Shei s already on azithromycin for copd exacerbation. Would check her venous dopplers too as well as morning ABG

## 2018-09-10 NOTE — PROGRESS NOTE ADULT - ASSESSMENT
Assessment  DMT2: 62y Female with DM T2 with hyperglycemia started on basal bolus insulin, dose was adjusted, blood sugars improving, no hypoglycemic episode,  eating meals, compliant with low carb diet.  CAD/SOB: On medications, stable, monitored.  HTN: Controlled, On med.

## 2018-09-10 NOTE — PROGRESS NOTE ADULT - SUBJECTIVE AND OBJECTIVE BOX
Patient is a 62y old  Female who presents with a chief complaint of sob (10 Sep 2018 16:23)      INTERVAL HPI/OVERNIGHT EVENTS:  T(C): 36.6 (09-10-18 @ 11:30), Max: 36.6 (09-09-18 @ 20:32)  HR: 70 (09-10-18 @ 16:55) (70 - 80)  BP: 126/77 (09-10-18 @ 16:55) (121/67 - 168/96)  RR: 18 (09-10-18 @ 11:30) (18 - 18)  SpO2: 96% (09-10-18 @ 11:30) (96% - 97%)  Wt(kg): --  I&O's Summary    09 Sep 2018 07:01  -  10 Sep 2018 07:00  --------------------------------------------------------  IN: 480 mL / OUT: 0 mL / NET: 480 mL    10 Sep 2018 07:01  -  10 Sep 2018 18:08  --------------------------------------------------------  IN: 500 mL / OUT: 0 mL / NET: 500 mL        LABS:                        13.4   15.61 )-----------( 248      ( 10 Sep 2018 08:06 )             40.7     09-10    133<L>  |  103  |  26<H>  ----------------------------<  243<H>  3.6   |  20<L>  |  1.05    Ca    9.7      10 Sep 2018 07:03  Phos  2.2     09-10  Mg     2.4     09-10          CAPILLARY BLOOD GLUCOSE      POCT Blood Glucose.: 196 mg/dL (10 Sep 2018 16:15)  POCT Blood Glucose.: 189 mg/dL (10 Sep 2018 11:48)  POCT Blood Glucose.: 181 mg/dL (10 Sep 2018 07:58)  POCT Blood Glucose.: 229 mg/dL (09 Sep 2018 21:13)            MEDICATIONS  (STANDING):  ALBUTerol    90 MICROgram(s) HFA Inhaler 1 Puff(s) Inhalation every 4 hours  ALBUTerol/ipratropium for Nebulization 3 milliLiter(s) Nebulizer every 6 hours  aspirin  chewable 81 milliGRAM(s) Oral daily  azithromycin  IVPB 500 milliGRAM(s) IV Intermittent every 24 hours  carvedilol 12.5 milliGRAM(s) Oral every 12 hours  dextrose 5%. 1000 milliLiter(s) (50 mL/Hr) IV Continuous <Continuous>  dextrose 50% Injectable 12.5 Gram(s) IV Push once  dextrose 50% Injectable 25 Gram(s) IV Push once  dextrose 50% Injectable 25 Gram(s) IV Push once  diltiazem    milliGRAM(s) Oral daily  enoxaparin Injectable 40 milliGRAM(s) SubCutaneous every 24 hours  hydrALAZINE 50 milliGRAM(s) Oral three times a day  influenza   Vaccine 0.5 milliLiter(s) IntraMuscular once  insulin glargine Injectable (LANTUS) 18 Unit(s) SubCutaneous at bedtime  insulin lispro (HumaLOG) corrective regimen sliding scale   SubCutaneous three times a day before meals  insulin lispro Injectable (HumaLOG) 8 Unit(s) SubCutaneous three times a day before meals  losartan 100 milliGRAM(s) Oral daily  methylPREDNISolone sodium succinate Injectable 20 milliGRAM(s) IV Push every 6 hours  montelukast 10 milliGRAM(s) Oral at bedtime  pantoprazole    Tablet 40 milliGRAM(s) Oral before breakfast  potassium acid phosphate/sodium acid phosphate tablet (K-PHOS No. 2) 1 Tablet(s) Oral four times a day with meals  simvastatin 40 milliGRAM(s) Oral at bedtime  tiotropium 18 MICROgram(s) Capsule 1 Capsule(s) Inhalation daily  topiramate 100 milliGRAM(s) Oral daily    MEDICATIONS  (PRN):  dextrose 40% Gel 15 Gram(s) Oral once PRN Blood Glucose LESS THAN 70 milliGRAM(s)/deciliter  glucagon  Injectable 1 milliGRAM(s) IntraMuscular once PRN Glucose LESS THAN 70 milligrams/deciliter  guaiFENesin   Syrup  (Sugar-Free) 200 milliGRAM(s) Oral every 6 hours PRN Cough          PHYSICAL EXAM:  GENERAL: NAD, well-groomed, well-developed  HEAD:  Atraumatic, Normocephalic  CHEST/LUNG: Clear to percussion bilaterally; No rales, rhonchi, wheezing, or rubs  HEART: Regular rate and rhythm; No murmurs, rubs, or gallops  ABDOMEN: Soft, Nontender, Nondistended; Bowel sounds present  EXTREMITIES:  2+ Peripheral Pulses, No clubbing, cyanosis, or edema  LYMPH: No lymphadenopathy noted  SKIN: No rashes or lesions    Care Discussed with Consultants/Other Providers [ ] YES  [ ] NO

## 2018-09-10 NOTE — PROGRESS NOTE ADULT - SUBJECTIVE AND OBJECTIVE BOX
Chief complaint  Patient is a 62y old  Female who presents with a chief complaint of sob (09 Sep 2018 18:59)   Review of systems  Patient in bed, looks comfortable, no fever, no hypoglycemia.    Labs and Fingersticks  CAPILLARY BLOOD GLUCOSE      POCT Blood Glucose.: 189 mg/dL (10 Sep 2018 11:48)  POCT Blood Glucose.: 181 mg/dL (10 Sep 2018 07:58)  POCT Blood Glucose.: 229 mg/dL (09 Sep 2018 21:13)  POCT Blood Glucose.: 207 mg/dL (09 Sep 2018 17:01)      Anion Gap, Serum: 10 (09-10 @ 07:03)  Anion Gap, Serum: 12 (09-09 @ 06:53)      Calcium, Total Serum: 9.7 (09-10 @ 07:03)  Calcium, Total Serum: 9.6 (09-09 @ 06:53)          09-10    133<L>  |  103  |  26<H>  ----------------------------<  243<H>  3.6   |  20<L>  |  1.05    Ca    9.7      10 Sep 2018 07:03  Phos  2.2     09-10  Mg     2.4     09-10                          13.4   15.61 )-----------( 248      ( 10 Sep 2018 08:06 )             40.7     Medications  MEDICATIONS  (STANDING):  ALBUTerol    90 MICROgram(s) HFA Inhaler 1 Puff(s) Inhalation every 4 hours  ALBUTerol/ipratropium for Nebulization 3 milliLiter(s) Nebulizer every 6 hours  aspirin  chewable 81 milliGRAM(s) Oral daily  azithromycin  IVPB 500 milliGRAM(s) IV Intermittent every 24 hours  carvedilol 12.5 milliGRAM(s) Oral every 12 hours  dextrose 5%. 1000 milliLiter(s) (50 mL/Hr) IV Continuous <Continuous>  dextrose 50% Injectable 12.5 Gram(s) IV Push once  dextrose 50% Injectable 25 Gram(s) IV Push once  dextrose 50% Injectable 25 Gram(s) IV Push once  diltiazem    milliGRAM(s) Oral daily  enoxaparin Injectable 40 milliGRAM(s) SubCutaneous every 24 hours  hydrALAZINE 50 milliGRAM(s) Oral three times a day  influenza   Vaccine 0.5 milliLiter(s) IntraMuscular once  insulin glargine Injectable (LANTUS) 18 Unit(s) SubCutaneous at bedtime  insulin lispro (HumaLOG) corrective regimen sliding scale   SubCutaneous three times a day before meals  insulin lispro Injectable (HumaLOG) 8 Unit(s) SubCutaneous three times a day before meals  losartan 100 milliGRAM(s) Oral daily  methylPREDNISolone sodium succinate Injectable 40 milliGRAM(s) IV Push every 6 hours  montelukast 10 milliGRAM(s) Oral at bedtime  pantoprazole    Tablet 40 milliGRAM(s) Oral before breakfast  potassium acid phosphate/sodium acid phosphate tablet (K-PHOS No. 2) 1 Tablet(s) Oral four times a day with meals  potassium chloride    Tablet ER 20 milliEquivalent(s) Oral once  simvastatin 40 milliGRAM(s) Oral at bedtime  tiotropium 18 MICROgram(s) Capsule 1 Capsule(s) Inhalation daily  topiramate 100 milliGRAM(s) Oral daily      Physical Exam  General: Patient comfortable in bed  Vital Signs Last 12 Hrs  T(F): 97.8 (09-10-18 @ 11:30), Max: 97.8 (09-10-18 @ 05:43)  HR: 75 (09-10-18 @ 11:30) (70 - 75)  BP: 121/67 (09-10-18 @ 11:30) (121/67 - 168/96)  BP(mean): --  RR: 18 (09-10-18 @ 11:30) (18 - 18)  SpO2: 96% (09-10-18 @ 11:30) (96% - 97%)  Neck: No palpable thyroid nodules.  CVS: S1S2, No murmurs  Respiratory: No wheezing, no crepitations  GI: Abdomen soft, bowel sounds positive  Musculoskeletal:  edema lower extremities.   Skin: No skin rashes, no ecchymosis    Diagnostics

## 2018-09-10 NOTE — PROVIDER CONTACT NOTE (OTHER) - ASSESSMENT
Denies CP/SOB/palpitation, VSS, no change in LOC. Pt witnessed to be coughing when the episode occurred,

## 2018-09-10 NOTE — CHART NOTE - NSCHARTNOTEFT_GEN_A_CORE
US B/L LE results as notified by Radiologist    No evidence of bilateral lower extremity deep venous thrombosis.  Redemonstrated occluded right lower extremity bypass graft to the level   of mid and distal thigh to knee.  Left femoral artery is occluded at the mid and distal thigh.  (please see prelim results)    Seen and examined the patient. patient lying in bed, in no distress. denies, B/L LE loss of sensation, numbness, pain. Patient states that she has weakness on right side from old CVA.    Vital Signs Last 24 Hrs  T(C): 36.5 (10 Sep 2018 19:55), Max: 36.6 (10 Sep 2018 05:43)  T(F): 97.7 (10 Sep 2018 19:55), Max: 97.8 (10 Sep 2018 05:43)  HR: 77 (10 Sep 2018 19:55) (70 - 77)  BP: 126/77 (10 Sep 2018 16:55) (121/67 - 168/96)  BP(mean): --  RR: 18 (10 Sep 2018 19:55) (18 - 18)  SpO2: 97% (10 Sep 2018 19:55) (96% - 97%)      Labs:                          13.4   15.61 )-----------( 248      ( 10 Sep 2018 08:06 )             40.7     09-10    133<L>  |  103  |  26<H>  ----------------------------<  243<H>  3.6   |  20<L>  |  1.05    Ca    9.7      10 Sep 2018 07:03  Phos  2.2     09-10  Mg     2.4     09-10    Radiology: US b/l LE prelim report: There is normal compressibility of the bilateral common femoral, femoral   and popliteal veins. No calf vein thrombosis is detected.    Doppler examination shows normal spontaneous and phasic flow.    Occluded right lower extremity bypass graft to the level of mid and   distal thigh to the knee.    Left femoral artery is occluded at mid and distal thigh.    IMPRESSION:     No evidence of bilateral lower extremity deep venous thrombosis.  Redemonstrated occluded right lower extremity bypass graft to the level   of mid and distal thigh to knee.  Left femoral artery is occluded at the mid and distal thigh.    MEDICATIONS  (STANDING):  ALBUTerol    90 MICROgram(s) HFA Inhaler 1 Puff(s) Inhalation every 4 hours  ALBUTerol/ipratropium for Nebulization 3 milliLiter(s) Nebulizer every 6 hours  aspirin  chewable 81 milliGRAM(s) Oral daily  azithromycin  IVPB 500 milliGRAM(s) IV Intermittent every 24 hours  carvedilol 12.5 milliGRAM(s) Oral every 12 hours  dextrose 5%. 1000 milliLiter(s) (50 mL/Hr) IV Continuous <Continuous>  dextrose 50% Injectable 12.5 Gram(s) IV Push once  dextrose 50% Injectable 25 Gram(s) IV Push once  dextrose 50% Injectable 25 Gram(s) IV Push once  diltiazem    milliGRAM(s) Oral daily  heparin  Infusion.  Unit(s)/Hr (13 mL/Hr) IV Continuous <Continuous>  heparin  Injectable 6000 Unit(s) IV Push once  hydrALAZINE 50 milliGRAM(s) Oral three times a day  influenza   Vaccine 0.5 milliLiter(s) IntraMuscular once  insulin glargine Injectable (LANTUS) 18 Unit(s) SubCutaneous at bedtime  insulin lispro (HumaLOG) corrective regimen sliding scale   SubCutaneous three times a day before meals  insulin lispro Injectable (HumaLOG) 8 Unit(s) SubCutaneous three times a day before meals  losartan 100 milliGRAM(s) Oral daily  methylPREDNISolone sodium succinate Injectable 20 milliGRAM(s) IV Push every 6 hours  montelukast 10 milliGRAM(s) Oral at bedtime  pantoprazole    Tablet 40 milliGRAM(s) Oral before breakfast  potassium acid phosphate/sodium acid phosphate tablet (K-PHOS No. 2) 1 Tablet(s) Oral four times a day with meals  simvastatin 40 milliGRAM(s) Oral at bedtime  tiotropium 18 MICROgram(s) Capsule 1 Capsule(s) Inhalation daily  topiramate 100 milliGRAM(s) Oral daily    MEDICATIONS  (PRN):  dextrose 40% Gel 15 Gram(s) Oral once PRN Blood Glucose LESS THAN 70 milliGRAM(s)/deciliter  glucagon  Injectable 1 milliGRAM(s) IntraMuscular once PRN Glucose LESS THAN 70 milligrams/deciliter  guaiFENesin   Syrup  (Sugar-Free) 200 milliGRAM(s) Oral every 6 hours PRN Cough  heparin  Injectable 6000 Unit(s) IV Push every 6 hours PRN For aPTT less than 40  heparin  Injectable 3000 Unit(s) IV Push every 6 hours PRN For aPTT between 40 - 57    CONSTITUTIONAL: No weakness, fevers or chills  	EYES/ENT: No visual changes;  No vertigo or throat pain   	NECK: No pain or stiffness  	RESPIRATORY: No cough, wheezing, hemoptysis; No shortness of breath  	CARDIOVASCULAR: No chest pain or palpitations  	GASTROINTESTINAL: No abdominal or epigastric pain. No nausea, vomiting, or hematemesis; No diarrhea or constipation. No melena or hematochezia.  	GENITOURINARY: No dysuria, frequency or hematuria  	NEUROLOGICAL: No numbness or weakness  	SKIN: No itching, burning, rashes, or lesions   All other review of systems is negative unless indicated      Past Medical History:  Asthma  never intubated  CAD (coronary artery disease)    CVA (cerebral infarction)  times 3 with residual rt sided weakness  Diabetes    Gastroesophageal reflux    HTN (hypertension)    Peripheral arterial disease    S/P Cardiac Cath  3yrs ago. report unknown.     Past Surgical History:  Coronary stent occlusion    S/P total abdominal hysterectomy.    Physical Exam:  General: well appearing, in no distress  Neurology: A&Ox3, no acute focal defecits  Head:  Normocephalic, atraumatic  Respiratory: CTA B/L  CV: RRR, S1S2, no murmur  Abdominal: Soft, NT, ND no palpable mass  MSK: No edema, + peripheral pulses, FROM all 4 extremity  Vascular: Right 2nd toe with bluish color, B/L Femoral/popliteal/DP/Pedal pulses+    Assessment & Plan:  HPI:  62F PMH of asthma/COPD (no home O2), L MCA CVA (residual right sided hemiparesis), small chronic right parietal lobe infarct, seizures, DM2, CAD s/p stent, HTN (prior episodes of HTN urgency), AFib (no anticoagulation), PAD s/p right fem-pop bypass (s/p occlusion and IR stent placement 2/2017), GERD who admitted with HIGH likely r/t COPD exacerbation. Now patient with US B/L LE results with Redemonstrated occluded right lower extremity bypass graft to the level   of mid and distal thigh to knee. Left femoral artery is occluded at the mid and distal thigh.    1. Occluded right LE bypass graft and Left femoral artery occlusion on US LE prelim results, patient asymptomatic, slight bluish color noted on right 2nd toe which is not new per patient. patient s/p right femoral bypass, s/p occlusion and stent placement in 02/2017.    Heparin GTT with full anticoagulation started (Patient with hx of L MCA CVA (residual right sided hemiparesis), small chronic right parietal lobe infarct many years ago.   NO CTH needed per Attending) patient with stable HB/HCT, stable hemodynamics.  vascular consult called  Vascular check B/L LE [id number]  vitals per routine  trend ptt/CBC  Monitor for bleeding  Discussed with Attending, vascular surgery  Awaiting further recommendations from vascular  Will follow closely  will update with primary team    Markel Kumar P BC  [id number]                                           with Attending in AM.

## 2018-09-11 LAB
ANION GAP SERPL CALC-SCNC: 13 MMOL/L — SIGNIFICANT CHANGE UP (ref 5–17)
APTT BLD: 29.3 SEC — SIGNIFICANT CHANGE UP (ref 27.5–37.4)
APTT BLD: > 200 SEC (ref 27.5–37.4)
APTT BLD: > 200 SEC (ref 27.5–37.4)
BASE EXCESS BLDA CALC-SCNC: -2.1 MMOL/L — LOW (ref -2–2)
BUN SERPL-MCNC: 26 MG/DL — HIGH (ref 7–23)
CALCIUM SERPL-MCNC: 8.8 MG/DL — SIGNIFICANT CHANGE UP (ref 8.4–10.5)
CHLORIDE SERPL-SCNC: 107 MMOL/L — SIGNIFICANT CHANGE UP (ref 96–108)
CO2 BLDA-SCNC: 23 MMOL/L — SIGNIFICANT CHANGE UP (ref 22–30)
CO2 SERPL-SCNC: 19 MMOL/L — LOW (ref 22–31)
CREAT SERPL-MCNC: 1.03 MG/DL — SIGNIFICANT CHANGE UP (ref 0.5–1.3)
GAS PNL BLDA: SIGNIFICANT CHANGE UP
GLUCOSE BLDC GLUCOMTR-MCNC: 222 MG/DL — HIGH (ref 70–99)
GLUCOSE BLDC GLUCOMTR-MCNC: 240 MG/DL — HIGH (ref 70–99)
GLUCOSE BLDC GLUCOMTR-MCNC: 245 MG/DL — HIGH (ref 70–99)
GLUCOSE BLDC GLUCOMTR-MCNC: 281 MG/DL — HIGH (ref 70–99)
GLUCOSE SERPL-MCNC: 328 MG/DL — HIGH (ref 70–99)
HCO3 BLDA-SCNC: 22 MMOL/L — SIGNIFICANT CHANGE UP (ref 21–29)
HCT VFR BLD CALC: 42.7 % — SIGNIFICANT CHANGE UP (ref 34.5–45)
HGB BLD-MCNC: 13.6 G/DL — SIGNIFICANT CHANGE UP (ref 11.5–15.5)
MAGNESIUM SERPL-MCNC: 2.6 MG/DL — SIGNIFICANT CHANGE UP (ref 1.6–2.6)
MCHC RBC-ENTMCNC: 26.5 PG — LOW (ref 27–34)
MCHC RBC-ENTMCNC: 32 GM/DL — SIGNIFICANT CHANGE UP (ref 32–36)
MCV RBC AUTO: 83 FL — SIGNIFICANT CHANGE UP (ref 80–100)
PCO2 BLDA: 35 MMHG — SIGNIFICANT CHANGE UP (ref 32–46)
PH BLDA: 7.4 — SIGNIFICANT CHANGE UP (ref 7.35–7.45)
PHOSPHATE SERPL-MCNC: 2.2 MG/DL — LOW (ref 2.5–4.5)
PLATELET # BLD AUTO: 248 K/UL — SIGNIFICANT CHANGE UP (ref 150–400)
PO2 BLDA: 59 MMHG — LOW (ref 74–108)
POTASSIUM SERPL-MCNC: 3.8 MMOL/L — SIGNIFICANT CHANGE UP (ref 3.5–5.3)
POTASSIUM SERPL-SCNC: 3.8 MMOL/L — SIGNIFICANT CHANGE UP (ref 3.5–5.3)
RBC # BLD: 5.14 M/UL — SIGNIFICANT CHANGE UP (ref 3.8–5.2)
RBC # FLD: 13.4 % — SIGNIFICANT CHANGE UP (ref 10.3–14.5)
SAO2 % BLDA: 88 % — LOW (ref 92–96)
SODIUM SERPL-SCNC: 139 MMOL/L — SIGNIFICANT CHANGE UP (ref 135–145)
WBC # BLD: 14.4 K/UL — HIGH (ref 3.8–10.5)
WBC # FLD AUTO: 14.4 K/UL — HIGH (ref 3.8–10.5)

## 2018-09-11 PROCEDURE — 99233 SBSQ HOSP IP/OBS HIGH 50: CPT

## 2018-09-11 PROCEDURE — 73630 X-RAY EXAM OF FOOT: CPT | Mod: 26,RT

## 2018-09-11 RX ADMIN — Medication 20 MILLIGRAM(S): at 17:14

## 2018-09-11 RX ADMIN — HEPARIN SODIUM 0 UNIT(S)/HR: 5000 INJECTION INTRAVENOUS; SUBCUTANEOUS at 08:19

## 2018-09-11 RX ADMIN — SIMVASTATIN 40 MILLIGRAM(S): 20 TABLET, FILM COATED ORAL at 21:20

## 2018-09-11 RX ADMIN — CARVEDILOL PHOSPHATE 12.5 MILLIGRAM(S): 80 CAPSULE, EXTENDED RELEASE ORAL at 17:14

## 2018-09-11 RX ADMIN — HEPARIN SODIUM 0 UNIT(S)/HR: 5000 INJECTION INTRAVENOUS; SUBCUTANEOUS at 17:15

## 2018-09-11 RX ADMIN — HEPARIN SODIUM 6000 UNIT(S): 5000 INJECTION INTRAVENOUS; SUBCUTANEOUS at 00:29

## 2018-09-11 RX ADMIN — Medication 8 UNIT(S): at 08:18

## 2018-09-11 RX ADMIN — Medication 3 MILLILITER(S): at 05:48

## 2018-09-11 RX ADMIN — PANTOPRAZOLE SODIUM 40 MILLIGRAM(S): 20 TABLET, DELAYED RELEASE ORAL at 05:48

## 2018-09-11 RX ADMIN — Medication 50 MILLIGRAM(S): at 14:34

## 2018-09-11 RX ADMIN — Medication 2: at 16:44

## 2018-09-11 RX ADMIN — Medication 20 MILLIGRAM(S): at 05:48

## 2018-09-11 RX ADMIN — Medication 3 MILLILITER(S): at 00:31

## 2018-09-11 RX ADMIN — MONTELUKAST 10 MILLIGRAM(S): 4 TABLET, CHEWABLE ORAL at 21:20

## 2018-09-11 RX ADMIN — HEPARIN SODIUM 1300 UNIT(S)/HR: 5000 INJECTION INTRAVENOUS; SUBCUTANEOUS at 00:29

## 2018-09-11 RX ADMIN — Medication 81 MILLIGRAM(S): at 08:18

## 2018-09-11 RX ADMIN — Medication 8 UNIT(S): at 16:44

## 2018-09-11 RX ADMIN — CARVEDILOL PHOSPHATE 12.5 MILLIGRAM(S): 80 CAPSULE, EXTENDED RELEASE ORAL at 05:48

## 2018-09-11 RX ADMIN — Medication 180 MILLIGRAM(S): at 05:49

## 2018-09-11 RX ADMIN — LOSARTAN POTASSIUM 100 MILLIGRAM(S): 100 TABLET, FILM COATED ORAL at 05:49

## 2018-09-11 RX ADMIN — Medication 3 MILLILITER(S): at 17:14

## 2018-09-11 RX ADMIN — HEPARIN SODIUM 900 UNIT(S)/HR: 5000 INJECTION INTRAVENOUS; SUBCUTANEOUS at 18:52

## 2018-09-11 RX ADMIN — AZITHROMYCIN 250 MILLIGRAM(S): 500 TABLET, FILM COATED ORAL at 16:51

## 2018-09-11 RX ADMIN — Medication 1 TABLET(S): at 08:18

## 2018-09-11 RX ADMIN — Medication 20 MILLIGRAM(S): at 00:31

## 2018-09-11 RX ADMIN — Medication 50 MILLIGRAM(S): at 21:20

## 2018-09-11 RX ADMIN — Medication 3: at 08:18

## 2018-09-11 RX ADMIN — HEPARIN SODIUM 1100 UNIT(S)/HR: 5000 INJECTION INTRAVENOUS; SUBCUTANEOUS at 09:23

## 2018-09-11 RX ADMIN — Medication 2: at 11:44

## 2018-09-11 RX ADMIN — INSULIN GLARGINE 18 UNIT(S): 100 INJECTION, SOLUTION SUBCUTANEOUS at 21:20

## 2018-09-11 RX ADMIN — Medication 20 MILLIGRAM(S): at 11:44

## 2018-09-11 RX ADMIN — Medication 50 MILLIGRAM(S): at 05:49

## 2018-09-11 RX ADMIN — Medication 100 MILLIGRAM(S): at 08:18

## 2018-09-11 RX ADMIN — Medication 8 UNIT(S): at 11:44

## 2018-09-11 NOTE — PROGRESS NOTE ADULT - PROBLEM SELECTOR PLAN 1
pts SOB is a little better: She is an active smoker: She has been advised strongly to stop smoking: Pt is on steroids as well as BD: Would cont steroids for now at curretn dosages: Her ABG is noted: She has hypoxic resp failure , likely due to Asthma/ COPD exacerbation: She would need to be checked for home oxygen requirement: Her venous dopplers are negative as well as ct chest is without any pneumonia!

## 2018-09-11 NOTE — CONSULT NOTE ADULT - SUBJECTIVE AND OBJECTIVE BOX
HPI:  62F PMH of asthma/COPD (no home O2), L MCA CVA (residual right sided hemiparesis), small chronic right parietal lobe infarct, seizures, DM2, CAD s/p stent, HTN (prior episodes of HTN urgency), AFib (no anticoagulatin), PAD s/p right fem-pop bypass (s/p occlusion and IR stent placement 2/2017), GERD who presents with shortness of breath. She started feeling short of breath about 3 weeks ago with 1-2 weeks of productive cough with clear sputum. Over the past week she endorses some wheeze and she has been using her inhaler 3 times in the morning and once at night daily. She no longer has medication for her nebulizer. She can walk <1 block as she is limited by shortness of breath and by LE claudication symptoms. She also has chest pain that is in the L breast, not pleuritic, feels that it is in the breast tissue and improves when she squeezes it. Denies fevers/chills, LE swelling, N/V, diaphoresis, diarrhea, constipation, abdominal pain, dizziness. Walks with a walker. Current daily smoker just 1-2 cigarettes daily (07 Sep 2018 17:38)    Patient is a 62y old  Female who presents with a chief complaint of sob (11 Sep 2018 13:10)    Allergies    No Known Allergies    Intolerances      Vital Signs Last 24 Hrs  T(C): 36.6 (11 Sep 2018 11:45), Max: 36.8 (11 Sep 2018 04:51)  T(F): 97.8 (11 Sep 2018 11:45), Max: 98.2 (11 Sep 2018 04:51)  HR: 60 (11 Sep 2018 11:45) (60 - 77)  BP: 144/89 (11 Sep 2018 11:45) (126/77 - 170/90)  BP(mean): --  RR: 18 (11 Sep 2018 11:45) (18 - 18)  SpO2: 97% (11 Sep 2018 11:45) (97% - 100%)                        13.6   14.4  )-----------( 248      ( 11 Sep 2018 06:45 )             42.7     09-11    139  |  107  |  26<H>  ----------------------------<  328<H>  3.8   |  19<L>  |  1.03    Ca    8.8      11 Sep 2018 06:45  Phos  2.2     09-11  Mg     2.6     09-11      CAPILLARY BLOOD GLUCOSE      POCT Blood Glucose.: 240 mg/dL (11 Sep 2018 11:32)    MEDICATIONS  (STANDING):  ALBUTerol    90 MICROgram(s) HFA Inhaler 1 Puff(s) Inhalation every 4 hours  ALBUTerol/ipratropium for Nebulization 3 milliLiter(s) Nebulizer every 6 hours  aspirin  chewable 81 milliGRAM(s) Oral daily  azithromycin  IVPB 500 milliGRAM(s) IV Intermittent every 24 hours  carvedilol 12.5 milliGRAM(s) Oral every 12 hours  dextrose 5%. 1000 milliLiter(s) (50 mL/Hr) IV Continuous <Continuous>  dextrose 50% Injectable 12.5 Gram(s) IV Push once  dextrose 50% Injectable 25 Gram(s) IV Push once  dextrose 50% Injectable 25 Gram(s) IV Push once  diltiazem    milliGRAM(s) Oral daily  heparin  Infusion.  Unit(s)/Hr (13 mL/Hr) IV Continuous <Continuous>  hydrALAZINE 50 milliGRAM(s) Oral three times a day  influenza   Vaccine 0.5 milliLiter(s) IntraMuscular once  insulin glargine Injectable (LANTUS) 18 Unit(s) SubCutaneous at bedtime  insulin lispro (HumaLOG) corrective regimen sliding scale   SubCutaneous three times a day before meals  insulin lispro Injectable (HumaLOG) 8 Unit(s) SubCutaneous three times a day before meals  losartan 100 milliGRAM(s) Oral daily  methylPREDNISolone sodium succinate Injectable 20 milliGRAM(s) IV Push every 6 hours  montelukast 10 milliGRAM(s) Oral at bedtime  pantoprazole    Tablet 40 milliGRAM(s) Oral before breakfast  simvastatin 40 milliGRAM(s) Oral at bedtime  tiotropium 18 MICROgram(s) Capsule 1 Capsule(s) Inhalation daily  topiramate 100 milliGRAM(s) Oral daily    MEDICATIONS  (PRN):  dextrose 40% Gel 15 Gram(s) Oral once PRN Blood Glucose LESS THAN 70 milliGRAM(s)/deciliter  glucagon  Injectable 1 milliGRAM(s) IntraMuscular once PRN Glucose LESS THAN 70 milligrams/deciliter  guaiFENesin   Syrup  (Sugar-Free) 200 milliGRAM(s) Oral every 6 hours PRN Cough  heparin  Injectable 6000 Unit(s) IV Push every 6 hours PRN For aPTT less than 40  heparin  Injectable 3000 Unit(s) IV Push every 6 hours PRN For aPTT between 40 - 57    PAST MEDICAL & SURGICAL HISTORY:  Peripheral arterial disease  CAD (coronary artery disease)  CVA (cerebral infarction): times 3 with residual rt sided weakness  Gastroesophageal reflux  Asthma: never intubated  Diabetes  HTN (hypertension)  S/P Cardiac Cath: 3yrs ago.report unknown  Coronary stent occlusion  S/P total abdominal hysterectomy      HARSH:  Bilateral pedal pulses non palp DP and PT  Right foot 2nd digit slight discoloration, however, warm to touch  CFT 2s to all digits  No open lesions or signs of infection  No gross deformities  Sensation diminished

## 2018-09-11 NOTE — PROGRESS NOTE ADULT - SUBJECTIVE AND OBJECTIVE BOX
Patient is a 62y old  Female who presents with a chief complaint of sob (11 Sep 2018 02:01)  Any change in ROS: Feeling a little better    MEDICATIONS  (STANDING):  ALBUTerol    90 MICROgram(s) HFA Inhaler 1 Puff(s) Inhalation every 4 hours  ALBUTerol/ipratropium for Nebulization 3 milliLiter(s) Nebulizer every 6 hours  aspirin  chewable 81 milliGRAM(s) Oral daily  azithromycin  IVPB 500 milliGRAM(s) IV Intermittent every 24 hours  carvedilol 12.5 milliGRAM(s) Oral every 12 hours  dextrose 5%. 1000 milliLiter(s) (50 mL/Hr) IV Continuous <Continuous>  dextrose 50% Injectable 12.5 Gram(s) IV Push once  dextrose 50% Injectable 25 Gram(s) IV Push once  dextrose 50% Injectable 25 Gram(s) IV Push once  diltiazem    milliGRAM(s) Oral daily  heparin  Infusion.  Unit(s)/Hr (13 mL/Hr) IV Continuous <Continuous>  hydrALAZINE 50 milliGRAM(s) Oral three times a day  influenza   Vaccine 0.5 milliLiter(s) IntraMuscular once  insulin glargine Injectable (LANTUS) 18 Unit(s) SubCutaneous at bedtime  insulin lispro (HumaLOG) corrective regimen sliding scale   SubCutaneous three times a day before meals  insulin lispro Injectable (HumaLOG) 8 Unit(s) SubCutaneous three times a day before meals  losartan 100 milliGRAM(s) Oral daily  methylPREDNISolone sodium succinate Injectable 20 milliGRAM(s) IV Push every 6 hours  montelukast 10 milliGRAM(s) Oral at bedtime  pantoprazole    Tablet 40 milliGRAM(s) Oral before breakfast  potassium acid phosphate/sodium acid phosphate tablet (K-PHOS No. 2) 1 Tablet(s) Oral four times a day with meals  simvastatin 40 milliGRAM(s) Oral at bedtime  tiotropium 18 MICROgram(s) Capsule 1 Capsule(s) Inhalation daily  topiramate 100 milliGRAM(s) Oral daily    MEDICATIONS  (PRN):  dextrose 40% Gel 15 Gram(s) Oral once PRN Blood Glucose LESS THAN 70 milliGRAM(s)/deciliter  glucagon  Injectable 1 milliGRAM(s) IntraMuscular once PRN Glucose LESS THAN 70 milligrams/deciliter  guaiFENesin   Syrup  (Sugar-Free) 200 milliGRAM(s) Oral every 6 hours PRN Cough  heparin  Injectable 6000 Unit(s) IV Push every 6 hours PRN For aPTT less than 40  heparin  Injectable 3000 Unit(s) IV Push every 6 hours PRN For aPTT between 40 - 57    Vital Signs Last 24 Hrs  T(C): 36.8 (11 Sep 2018 04:51), Max: 36.8 (11 Sep 2018 04:51)  T(F): 98.2 (11 Sep 2018 04:51), Max: 98.2 (11 Sep 2018 04:51)  HR: 70 (11 Sep 2018 04:51) (70 - 77)  BP: 156/69 (11 Sep 2018 04:51) (121/67 - 170/90)  BP(mean): --  RR: 18 (11 Sep 2018 04:51) (18 - 18)  SpO2: 100% (11 Sep 2018 04:51) (96% - 100%)    I&O's Summary    10 Sep 2018 07:01  -  11 Sep 2018 07:00  --------------------------------------------------------  IN: 860 mL / OUT: 0 mL / NET: 860 mL          Physical Exam:   GENERAL: NAD, well-groomed, well-developed  HEENT: PRIYANK/   Atraumatic, Normocephalic  ENMT: No tonsillar erythema, exudates, or enlargement; Moist mucous membranes, Good dentition, No lesions  NECK: Supple, No JVD, Normal thyroid  CHEST/LUNG: wheeze+  CVS: Regular rate and rhythm; No murmurs, rubs, or gallops  GI: : Soft, Nontender, Nondistended; Bowel sounds present  NERVOUS SYSTEM:  Alert & Oriented X3  EXTREMITIES:  2+ Peripheral Pulses, No clubbing, cyanosis, or edema  LYMPH: No lymphadenopathy noted  SKIN: No rashes or lesions  ENDOCRINOLOGY: No Thyromegaly  PSYCH: Appropriate    Labs:  ABG - ( 11 Sep 2018 06:31 )  pH, Arterial: 7.40  pH, Blood: x     /  pCO2: 35    /  pO2: 59    / HCO3: 22    / Base Excess: -2.1  /  SaO2: 88                                          13.6   14.4  )-----------( 248      ( 11 Sep 2018 06:45 )             42.7                         13.4   15.61 )-----------( 248      ( 10 Sep 2018 08:06 )             40.7                         13.3   18.11 )-----------( 274      ( 09 Sep 2018 08:37 )             41.4                         14.9   9.7   )-----------( 275      ( 07 Sep 2018 14:34 )             46.5     09-11    139  |  107  |  26<H>  ----------------------------<  328<H>  3.8   |  19<L>  |  1.03  09-10    133<L>  |  103  |  26<H>  ----------------------------<  243<H>  3.6   |  20<L>  |  1.05  09-09    135  |  102  |  28<H>  ----------------------------<  302<H>  4.1   |  21<L>  |  1.24  09-07    136  |  102  |  11  ----------------------------<  93  3.8   |  24  |  0.92    Ca    8.8      11 Sep 2018 06:45  Ca    9.7      10 Sep 2018 07:03  Phos  2.2     09-11  Phos  2.2     09-10  Mg     2.6     09-11  Mg     2.4     09-10    TPro  7.4  /  Alb  4.1  /  TBili  0.3  /  DBili  x   /  AST  15  /  ALT  10  /  AlkPhos  97  09-07    CAPILLARY BLOOD GLUCOSE      POCT Blood Glucose.: 281 mg/dL (11 Sep 2018 07:58)  POCT Blood Glucose.: 208 mg/dL (10 Sep 2018 21:11)  POCT Blood Glucose.: 196 mg/dL (10 Sep 2018 16:15)  POCT Blood Glucose.: 189 mg/dL (10 Sep 2018 11:48)        PTT - ( 11 Sep 2018 06:45 )  PTT:> 200 sec      < from: [facility] Duplex Lower Ext Vein Scan, Bilat (09.10.18 @ 20:59) >    EXAM:  DUPLEX SCAN EXT VEINS LOWER BI                            PROCEDURE DATE:  09/10/2018            INTERPRETATION:  CLINICAL INFORMATION: Right lower extremity swelling and   shortness of breath for 3 months. Peripheral arterial disease.    COMPARISON: CT angiogram with lower extremity runoff from 2/2/2017   revealing an occluded femoral-femoral and right femoral-popliteal bypass.     TECHNIQUE: Duplex sonography of the BILATERAL LOWER extremities with   color and spectral Doppler, with and without compression.      FINDINGS: There is an occluded superficial femoral artery and bypass   graft on the right. The left superficial femoral artery is occluded as   well.    There is normal compressibility of the left and right common femoral,   femoral and popliteal veins. No calf vein thrombosis is detected.    Doppler examination shows normal spontaneous and phasic flow.    IMPRESSION:     No evidence of bilateral lower extremity deep venous thrombosis.        These findings were discussed with CHRISTOPHER Kumar at 9/10/2018 9:17 PM by Dr. Alhaji Stover (Kevin) of Radiology with read back confirmation.    < from: CT Chest No Cont (09.08.18 @ 15:55) >    EXAM:  CT CHEST                            PROCEDURE DATE:  09/08/2018            INTERPRETATION:  CLINICAL INFORMATION: Dyspnea    COMPARISON: CT chest 9/20/2015    PROCEDURE:   CT of the Chest was performed without intravenous contrast.  [place] coronal reformats were performed.      FINDINGS:    CHEST:     LUNGS AND LARGE AIRWAYS: Patent central airways.  Minimal left basilar   subsegmental atelectasis.  PLEURA: No pleural effusion.  VESSELS: Atherosclerosis.  HEART: Heart size is normal. No pericardial effusion.  MEDIASTINUM AND LANEY: No lymphadenopathy.  CHEST WALL AND LOWER NECK: Pectus excavatum.  VISUALIZED UPPER ABDOMEN: Within normal limits.  BONES: Degenerative change. Scoliotic spine. Pectus deformity.    IMPRESSION: No evidence of pneumonia.                        MELIDA ESTEBAN M.D., RADIOLOGY RESIDENT  This document has been electronically signed.  DEISY ESCOTO M.D., ATTENDING RADIOLOGIST  This document has been electronically signed. Sep  9 2018  1:15PM              < end of copied text >              DAISY VINES M.D., RADIOLOGY RESIDENT  This document has been electronically signed.  MICA YANCEY M.D., ATTENDING RADIOLOGIST  This document has been electronically signed. Sep 11 2018 10:12AM        < end of copied text >      RECENT CULTURES:        RESPIRATORY CULTURES:          Studies  Chest X-RAY  CT SCAN Chest   Venous Dopplers: LE:   CT Abdomen  Others

## 2018-09-11 NOTE — CONSULT NOTE ADULT - ASSESSMENT
ASSESSMENT: Patient is a 62y old f with chronic RLE arterial insufficiency with rest pain and claudication, with multiple previous bypasses.      PLAN:   - No acute surgical intervention  - Would rec. CTA aorta with LE runoff to eval arterial flow to b/l LEs  - Patient may benefit from RLE bypass  - Will need medical optimization prior to eventual bypass.    - Will d/w fellow and attending    Cristiana Chen MD PGY4  Vascular Surgery, #[id number] ASSESSMENT: Patient is a 62y old f with chronic RLE arterial insufficiency with rest pain and claudication, with multiple previous bypasses.      PLAN:   - No acute surgical intervention  - Would rec. CTA aorta with LE runoff to eval arterial flow to b/l LEs  - No indication for therapeutic anticoagulation at this time, arterial occlusions are likely chronic.    - Patient may benefit from RLE bypass  - Will need medical optimization prior to eventual bypass.    - Will d/w fellow and attending    Cristiana Chen MD PGY4  Vascular Surgery, #[id number]

## 2018-09-11 NOTE — PROGRESS NOTE ADULT - SUBJECTIVE AND OBJECTIVE BOX
Chief complaint  Patient is a 62y old  Female who presents with a chief complaint of sob (11 Sep 2018 16:41)   Review of systems  Patient in bed, looks comfortable, no fever, no hypoglycemia.    Labs and Fingersticks  CAPILLARY BLOOD GLUCOSE      POCT Blood Glucose.: 245 mg/dL (11 Sep 2018 16:23)  POCT Blood Glucose.: 240 mg/dL (11 Sep 2018 11:32)  POCT Blood Glucose.: 281 mg/dL (11 Sep 2018 07:58)  POCT Blood Glucose.: 208 mg/dL (10 Sep 2018 21:11)      Anion Gap, Serum: 13 (09-11 @ 06:45)  Anion Gap, Serum: 10 (09-10 @ 07:03)      Calcium, Total Serum: 8.8 (09-11 @ 06:45)  Calcium, Total Serum: 9.7 (09-10 @ 07:03)          09-11    139  |  107  |  26<H>  ----------------------------<  328<H>  3.8   |  19<L>  |  1.03    Ca    8.8      11 Sep 2018 06:45  Phos  2.2     09-11  Mg     2.6     09-11                          13.6   14.4  )-----------( 248      ( 11 Sep 2018 06:45 )             42.7     Medications  MEDICATIONS  (STANDING):  ALBUTerol    90 MICROgram(s) HFA Inhaler 1 Puff(s) Inhalation every 4 hours  ALBUTerol/ipratropium for Nebulization 3 milliLiter(s) Nebulizer every 6 hours  aspirin  chewable 81 milliGRAM(s) Oral daily  azithromycin  IVPB 500 milliGRAM(s) IV Intermittent every 24 hours  carvedilol 12.5 milliGRAM(s) Oral every 12 hours  dextrose 5%. 1000 milliLiter(s) (50 mL/Hr) IV Continuous <Continuous>  dextrose 50% Injectable 12.5 Gram(s) IV Push once  dextrose 50% Injectable 25 Gram(s) IV Push once  dextrose 50% Injectable 25 Gram(s) IV Push once  diltiazem    milliGRAM(s) Oral daily  heparin  Infusion.  Unit(s)/Hr (13 mL/Hr) IV Continuous <Continuous>  hydrALAZINE 50 milliGRAM(s) Oral three times a day  influenza   Vaccine 0.5 milliLiter(s) IntraMuscular once  insulin glargine Injectable (LANTUS) 18 Unit(s) SubCutaneous at bedtime  insulin lispro (HumaLOG) corrective regimen sliding scale   SubCutaneous three times a day before meals  insulin lispro Injectable (HumaLOG) 8 Unit(s) SubCutaneous three times a day before meals  losartan 100 milliGRAM(s) Oral daily  methylPREDNISolone sodium succinate Injectable 20 milliGRAM(s) IV Push every 6 hours  montelukast 10 milliGRAM(s) Oral at bedtime  pantoprazole    Tablet 40 milliGRAM(s) Oral before breakfast  simvastatin 40 milliGRAM(s) Oral at bedtime  tiotropium 18 MICROgram(s) Capsule 1 Capsule(s) Inhalation daily  topiramate 100 milliGRAM(s) Oral daily      Physical Exam  General: Patient comfortable in bed  Vital Signs Last 12 Hrs  T(F): 97.8 (09-11-18 @ 11:45), Max: 97.8 (09-11-18 @ 11:45)  HR: 66 (09-11-18 @ 17:10) (60 - 66)  BP: 148/72 (09-11-18 @ 17:10) (144/89 - 148/72)  BP(mean): --  RR: 18 (09-11-18 @ 11:45) (18 - 18)  SpO2: 97% (09-11-18 @ 11:45) (97% - 97%)  Neck: No palpable thyroid nodules.  CVS: S1S2, No murmurs  Respiratory: No wheezing, no crepitations  GI: Abdomen soft, bowel sounds positive  Musculoskeletal:  edema lower extremities.   Skin: No skin rashes, no ecchymosis    Diagnostics

## 2018-09-11 NOTE — CONSULT NOTE ADULT - ASSESSMENT
62 year old female with bilateral PVD, discolored right foot 2nd digit   -Right 2nd digit slightly discolored but still viable for now  -Would benefit from RLE bypass per vasc  -f/u vasc plan/recs  -No wound care or dressing needed  -No acute podiatric intervention indicated at this time   -Thank you for the consult - please reconsult as needed    Follow up with Dr. Jerry HENDRICKS within 1 month of discharge.  Call for appointment   [facility]: [phone]

## 2018-09-11 NOTE — PROGRESS NOTE ADULT - SUBJECTIVE AND OBJECTIVE BOX
Infectious Diseases progress note:    Subjective:  Events noted.   Pt with US B/L LE  showing redemonstrated occluded right lower extremity bypass graft to the level   of mid and distal thigh to knee.  Pt started on Hep gtt.  Vascular service following.  No new fevers or leukocytosis.  CT chest no pna.      ROS:  pt unavailable at time of bedside visit.      Allergies    No Known Allergies    Intolerances        ANTIBIOTICS/RELEVANT:  antimicrobials  azithromycin  IVPB 500 milliGRAM(s) IV Intermittent every 24 hours    immunologic:  influenza   Vaccine 0.5 milliLiter(s) IntraMuscular once    OTHER:  ALBUTerol    90 MICROgram(s) HFA Inhaler 1 Puff(s) Inhalation every 4 hours  ALBUTerol/ipratropium for Nebulization 3 milliLiter(s) Nebulizer every 6 hours  aspirin  chewable 81 milliGRAM(s) Oral daily  carvedilol 12.5 milliGRAM(s) Oral every 12 hours  dextrose 40% Gel 15 Gram(s) Oral once PRN  dextrose 5%. 1000 milliLiter(s) IV Continuous <Continuous>  dextrose 50% Injectable 12.5 Gram(s) IV Push once  dextrose 50% Injectable 25 Gram(s) IV Push once  dextrose 50% Injectable 25 Gram(s) IV Push once  diltiazem    milliGRAM(s) Oral daily  glucagon  Injectable 1 milliGRAM(s) IntraMuscular once PRN  guaiFENesin   Syrup  (Sugar-Free) 200 milliGRAM(s) Oral every 6 hours PRN  heparin  Infusion.  Unit(s)/Hr IV Continuous <Continuous>  heparin  Injectable 6000 Unit(s) IV Push every 6 hours PRN  heparin  Injectable 3000 Unit(s) IV Push every 6 hours PRN  hydrALAZINE 50 milliGRAM(s) Oral three times a day  insulin glargine Injectable (LANTUS) 18 Unit(s) SubCutaneous at bedtime  insulin lispro (HumaLOG) corrective regimen sliding scale   SubCutaneous three times a day before meals  insulin lispro Injectable (HumaLOG) 8 Unit(s) SubCutaneous three times a day before meals  losartan 100 milliGRAM(s) Oral daily  methylPREDNISolone sodium succinate Injectable 20 milliGRAM(s) IV Push every 6 hours  montelukast 10 milliGRAM(s) Oral at bedtime  pantoprazole    Tablet 40 milliGRAM(s) Oral before breakfast  simvastatin 40 milliGRAM(s) Oral at bedtime  tiotropium 18 MICROgram(s) Capsule 1 Capsule(s) Inhalation daily  topiramate 100 milliGRAM(s) Oral daily      Objective:  Vital Signs Last 24 Hrs  T(C): 36.6 (11 Sep 2018 11:45), Max: 36.8 (11 Sep 2018 04:51)  T(F): 97.8 (11 Sep 2018 11:45), Max: 98.2 (11 Sep 2018 04:51)  HR: 60 (11 Sep 2018 11:45) (60 - 77)  BP: 144/89 (11 Sep 2018 11:45) (126/77 - 170/90)  BP(mean): --  RR: 18 (11 Sep 2018 11:45) (18 - 18)  SpO2: 97% (11 Sep 2018 11:45) (97% - 100%)    PHYSICAL EXAM:  pt u/a        LABS:                        13.6   14.4  )-----------( 248      ( 11 Sep 2018 06:45 )             42.7     09-11    139  |  107  |  26<H>  ----------------------------<  328<H>  3.8   |  19<L>  |  1.03    Ca    8.8      11 Sep 2018 06:45  Phos  2.2     09-11  Mg     2.6     09-11      PTT - ( 11 Sep 2018 06:45 )  PTT:> 200 sec                Rapid RVP Result: [facility]          MICROBIOLOGY:    Rapid Respiratory Viral Panel (09.09.18 @ 13:17)    Rapid RVP Result: [facility]: The FilmArray RVP Rapid uses polymerase chain reaction (PCR) and melt  curve analysis to screen for adenovirus; coronavirus HKU1, NL63, 229E,  OC43; human metapneumovirus (hMPV); human enterovirus/rhinovirus  (Entero/RV); influenza A; influenza A/H1;influenza A/H3; influenza  A/H1-2009; influenza B; parainfluenza viruses 1, 2, 3, 4; respiratory  syncytial virus; Bordetella pertussis; Mycoplasma pneumoniae; and  Chlamydophila pneumoniae.          RADIOLOGY & ADDITIONAL STUDIES:    < from: [facility] Duplex Lower Ext Vein Scan, Bilat (09.10.18 @ 20:59) >  IMPRESSION:     No evidence of bilateral lower extremity deep venous thrombosis.    < end of copied text >      < from: CT Chest No Cont (09.08.18 @ 15:55) >  FINDINGS:    CHEST:     LUNGS AND LARGE AIRWAYS: Patent central airways.  Minimal left basilar   subsegmental atelectasis.  PLEURA: No pleural effusion.  VESSELS: Atherosclerosis.  HEART: Heart size is normal. No pericardial effusion.  MEDIASTINUM AND LANEY: No lymphadenopathy.  CHEST WALL AND LOWER NECK: Pectus excavatum.  VISUALIZED UPPER ABDOMEN: Within normal limits.  BONES: Degenerative change. Scoliotic spine. Pectus deformity.    IMPRESSION: No evidence of pneumonia.    < end of copied text >

## 2018-09-11 NOTE — CONSULT NOTE ADULT - SUBJECTIVE AND OBJECTIVE BOX
VASCULAR SURGERY (#9007) CONSULT NOTE  ---------------------------------------------------------------------------------------------     HPI:   Patient is a 62y old  Female who presents with a chief complaint of sob.  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 She started feeling short of breath about 3 weeks ago with 1-2 weeks of productive cough with clear sputum. Over the past week she endorses some wheeze and she has been using her inhaler 3 times in the morning and once at night daily. She no longer has medication for her nebulizer. She can walk <1 block as she is limited by shortness of breath and by LE claudication symptoms. She also has chest pain that is in the L breast, not pleuritic, feels that it is in the breast tissue and improves when she squeezes it. Denies fevers/chills, LE swelling, N/V, diaphoresis, diarrhea, constipation, abdominal pain, dizziness. Walks with a walker. Current daily smoker just 1-2 cigarettes daily.    Patient had a L to R fem-fem bypass with PTFE in Feb 2017.  Prior to that, she had a previous fem-fem bypass and a RLE fem-pop bypass which both failed.  Now, patient has had RLE pain at rest for the past 6 months.  She has disabling claudication.  Patient was seen in the vascular surgery office on 8/31, and was instructed to come to the ER for further evaluation.  Patient did not present to the ER until Sept 7th. Patient was to get a CTA of the LEs prior to possible revascularization of the RLE.  Patient has been admitted to the medical service for her SOB.  Per pulmonary, patient had a LE duplex to r/o DVT.  The duplex found an incidental arterial occlusions of the LEs (prelim: RLE with bypass occlusion, LLE with SFA occlusion).      ROS: 10-system review is otherwise negative except HPI above.      PAST MEDICAL & SURGICAL HISTORY:  Peripheral arterial disease  CAD (coronary artery disease)  CVA (cerebral infarction): times 3 with residual rt sided weakness  Gastroesophageal reflux  Asthma: never intubated  Diabetes  HTN (hypertension)  S/P Cardiac Cath: 3yrs ago. report unknown  Coronary stent occlusion  S/P total abdominal hysterectomy    FAMILY HISTORY:  Family history of diabetes mellitus (Sibling)    SOCIAL HISTORY: Current smoker    ALLERGIES: No Known Allergies    CURRENT MEDICATIONS  MEDICATIONS (STANDING): ALBUTerol    90 MICROgram(s) HFA Inhaler 1 Puff(s) Inhalation every 4 hours  ALBUTerol/ipratropium for Nebulization 3 milliLiter(s) Nebulizer every 6 hours  aspirin  chewable 81 milliGRAM(s) Oral daily  azithromycin  IVPB 500 milliGRAM(s) IV Intermittent every 24 hours  carvedilol 12.5 milliGRAM(s) Oral every 12 hours  dextrose 5%. 1000 milliLiter(s) IV Continuous <Continuous>  dextrose 50% Injectable 12.5 Gram(s) IV Push once  dextrose 50% Injectable 25 Gram(s) IV Push once  dextrose 50% Injectable 25 Gram(s) IV Push once  diltiazem    milliGRAM(s) Oral daily  heparin  Infusion.  Unit(s)/Hr IV Continuous <Continuous>  hydrALAZINE 50 milliGRAM(s) Oral three times a day  influenza   Vaccine 0.5 milliLiter(s) IntraMuscular once  insulin glargine Injectable (LANTUS) 18 Unit(s) SubCutaneous at bedtime  insulin lispro (HumaLOG) corrective regimen sliding scale   SubCutaneous three times a day before meals  insulin lispro Injectable (HumaLOG) 8 Unit(s) SubCutaneous three times a day before meals  losartan 100 milliGRAM(s) Oral daily  methylPREDNISolone sodium succinate Injectable 20 milliGRAM(s) IV Push every 6 hours  montelukast 10 milliGRAM(s) Oral at bedtime  pantoprazole    Tablet 40 milliGRAM(s) Oral before breakfast  potassium acid phosphate/sodium acid phosphate tablet (K-PHOS No. 2) 1 Tablet(s) Oral four times a day with meals  simvastatin 40 milliGRAM(s) Oral at bedtime  tiotropium 18 MICROgram(s) Capsule 1 Capsule(s) Inhalation daily  topiramate 100 milliGRAM(s) Oral daily    MEDICATIONS (PRN):dextrose 40% Gel 15 Gram(s) Oral once PRN Blood Glucose LESS THAN 70 milliGRAM(s)/deciliter  glucagon  Injectable 1 milliGRAM(s) IntraMuscular once PRN Glucose LESS THAN 70 milligrams/deciliter  guaiFENesin   Syrup  (Sugar-Free) 200 milliGRAM(s) Oral every 6 hours PRN Cough  heparin  Injectable 6000 Unit(s) IV Push every 6 hours PRN For aPTT less than 40  heparin  Injectable 3000 Unit(s) IV Push every 6 hours PRN For aPTT between 40 - 57    --------------------------------------------------------------------------------------------    Vitals:   T(C): 36.5 (09-10-18 @ 19:55), Max: 36.6 (09-10-18 @ 05:43)  HR: 77 (09-10-18 @ 19:55) (70 - 77)  BP: 170/90 (09-10-18 @ 19:55) (121/67 - 170/90)  RR: 18 (09-10-18 @ 19:55) (18 - 18)  SpO2: 97% (09-10-18 @ 19:55) (96% - 97%)  CAPILLARY BLOOD GLUCOSE      POCT Blood Glucose.: 208 mg/dL (10 Sep 2018 21:11)  POCT Blood Glucose.: 196 mg/dL (10 Sep 2018 16:15)  POCT Blood Glucose.: 189 mg/dL (10 Sep 2018 11:48)  POCT Blood Glucose.: 181 mg/dL (10 Sep 2018 07:58)      09-09 @ 07:01  -  09-10 @ 07:00  --------------------------------------------------------  IN:    Oral Fluid: 480 mL  Total IN: 480 mL    OUT:  Total OUT: 0 mL    Total NET: 480 mL      09-10 @ 07:01  -  09-11 @ 02:06  --------------------------------------------------------  IN:    Oral Fluid: 500 mL  Total IN: 500 mL  OUT:  Total OUT: 0 mL  Total NET: 500 mL    Height (cm): 157.48 (09-07 @ 22:36)  Weight (kg): 75.1 (09-07 @ 22:36)  BMI (kg/m2): 30.3 (09-07 @ 22:36)  BSA (m2): 1.76 (09-07 @ 22:36)    PHYSICAL EXAM:   General: NAD, Lying in bed comfortably  Neuro: A+Ox3  HEENT: NC/AT, EOMI  Cardio: RRR, nml S1/S2  Resp: Good effort, CTA b/l  GI/Abd: Soft, NT/ND  Vascular: All 4 extremities warm, B/l radial pulses palpable, b/l Femoral pulses palpable (L stronger than R), LLE with palpable popliteal and DP pulses and strong PT signal, RLE with monophasic DP signal  Skin: well-healed incisions at bilateral groins, R medial lower leg.  slightly discolored R distal 2nd digit and heel without gross tissue loss.   Musculoskeletal: All 4 extremities moving spontaneously, no limitations.  --------------------------------------------------------------------------------------------    LABS  CBC (09-10 @ 08:06)                              13.4                           15.61<H>  )----------------(  248        --    % Neutrophils, --    % Lymphocytes, ANC: --                                  40.7      BMP (09-10 @ 07:03)             133<L>  |  103     |  26<H> 		Ca++ --      Ca 9.7                ---------------------------------( 243<H>		Mg 2.4                3.6     |  20<L>   |  1.05  			Ph 2.2<L>    Coags (09-10 @ 23:48)  aPTT 29.3 / INR -- / PT --      --------------------------------------------------------------------------------------------    IMAGING  LE duplex: final read pending.        ASSESSMENT: Patient is a 62y old f with chronic RLE arterial insufficiency with rest pain and claudication, with multiple previous bypasses.      PLAN:   - No acute surgical intervention  - Would rec. CTA aorta with LE runoff to eval arterial flow to b/l LEs  - Patient may benefit from RLE bypass  - Will need medical optimization prior to eventual bypass.    - Will d/w fellow and attending    Cristiana Chen MD PGY4  Vascular Surgery, #[id number] VASCULAR SURGERY (#9007) CONSULT NOTE  ---------------------------------------------------------------------------------------------     HPI:   Patient is a 62y old  Female who presents with a chief complaint of sob.  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 She started feeling short of breath about 3 weeks ago with 1-2 weeks of productive cough with clear sputum. Over the past week she endorses some wheeze and she has been using her inhaler 3 times in the morning and once at night daily. She no longer has medication for her nebulizer. She can walk <1 block as she is limited by shortness of breath and by LE claudication symptoms. She also has chest pain that is in the L breast, not pleuritic, feels that it is in the breast tissue and improves when she squeezes it. Denies fevers/chills, LE swelling, N/V, diaphoresis, diarrhea, constipation, abdominal pain, dizziness. Walks with a walker. Current daily smoker just 1-2 cigarettes daily.    Patient had a L to R fem-fem bypass with PTFE in Feb 2017.  Prior to that, she had a previous fem-fem bypass and a RLE fem-pop bypass which both failed.  Now, patient has had RLE pain at rest for the past 6 months.  She has disabling claudication.  Patient was seen in the vascular surgery office on 8/31, and was instructed to come to the ER for further evaluation.  Patient did not present to the ER until Sept 7th. Patient was to get a CTA of the LEs prior to possible revascularization of the RLE.  Patient has been admitted to the medical service for her SOB.  Per pulmonary, patient had a LE duplex to r/o DVT.  The duplex found an incidental arterial occlusions of the LEs (prelim: RLE with bypass occlusion, LLE with SFA occlusion).      ROS: 10-system review is otherwise negative except HPI above.      PAST MEDICAL & SURGICAL HISTORY:  Peripheral arterial disease  CAD (coronary artery disease)  CVA (cerebral infarction): times 3 with residual rt sided weakness  Gastroesophageal reflux  Asthma: never intubated  Diabetes  HTN (hypertension)  S/P Cardiac Cath: 3yrs ago. report unknown  Coronary stent occlusion  S/P total abdominal hysterectomy    FAMILY HISTORY:  Family history of diabetes mellitus (Sibling)    SOCIAL HISTORY: Current smoker    ALLERGIES: No Known Allergies    CURRENT MEDICATIONS  MEDICATIONS (STANDING): ALBUTerol    90 MICROgram(s) HFA Inhaler 1 Puff(s) Inhalation every 4 hours  ALBUTerol/ipratropium for Nebulization 3 milliLiter(s) Nebulizer every 6 hours  aspirin  chewable 81 milliGRAM(s) Oral daily  azithromycin  IVPB 500 milliGRAM(s) IV Intermittent every 24 hours  carvedilol 12.5 milliGRAM(s) Oral every 12 hours  dextrose 5%. 1000 milliLiter(s) IV Continuous <Continuous>  dextrose 50% Injectable 12.5 Gram(s) IV Push once  dextrose 50% Injectable 25 Gram(s) IV Push once  dextrose 50% Injectable 25 Gram(s) IV Push once  diltiazem    milliGRAM(s) Oral daily  heparin  Infusion.  Unit(s)/Hr IV Continuous <Continuous>  hydrALAZINE 50 milliGRAM(s) Oral three times a day  influenza   Vaccine 0.5 milliLiter(s) IntraMuscular once  insulin glargine Injectable (LANTUS) 18 Unit(s) SubCutaneous at bedtime  insulin lispro (HumaLOG) corrective regimen sliding scale   SubCutaneous three times a day before meals  insulin lispro Injectable (HumaLOG) 8 Unit(s) SubCutaneous three times a day before meals  losartan 100 milliGRAM(s) Oral daily  methylPREDNISolone sodium succinate Injectable 20 milliGRAM(s) IV Push every 6 hours  montelukast 10 milliGRAM(s) Oral at bedtime  pantoprazole    Tablet 40 milliGRAM(s) Oral before breakfast  potassium acid phosphate/sodium acid phosphate tablet (K-PHOS No. 2) 1 Tablet(s) Oral four times a day with meals  simvastatin 40 milliGRAM(s) Oral at bedtime  tiotropium 18 MICROgram(s) Capsule 1 Capsule(s) Inhalation daily  topiramate 100 milliGRAM(s) Oral daily    MEDICATIONS (PRN):dextrose 40% Gel 15 Gram(s) Oral once PRN Blood Glucose LESS THAN 70 milliGRAM(s)/deciliter  glucagon  Injectable 1 milliGRAM(s) IntraMuscular once PRN Glucose LESS THAN 70 milligrams/deciliter  guaiFENesin   Syrup  (Sugar-Free) 200 milliGRAM(s) Oral every 6 hours PRN Cough  heparin  Injectable 6000 Unit(s) IV Push every 6 hours PRN For aPTT less than 40  heparin  Injectable 3000 Unit(s) IV Push every 6 hours PRN For aPTT between 40 - 57    --------------------------------------------------------------------------------------------    Vitals:   T(C): 36.5 (09-10-18 @ 19:55), Max: 36.6 (09-10-18 @ 05:43)  HR: 77 (09-10-18 @ 19:55) (70 - 77)  BP: 170/90 (09-10-18 @ 19:55) (121/67 - 170/90)  RR: 18 (09-10-18 @ 19:55) (18 - 18)  SpO2: 97% (09-10-18 @ 19:55) (96% - 97%)  CAPILLARY BLOOD GLUCOSE      POCT Blood Glucose.: 208 mg/dL (10 Sep 2018 21:11)  POCT Blood Glucose.: 196 mg/dL (10 Sep 2018 16:15)  POCT Blood Glucose.: 189 mg/dL (10 Sep 2018 11:48)  POCT Blood Glucose.: 181 mg/dL (10 Sep 2018 07:58)      09-09 @ 07:01  -  09-10 @ 07:00  --------------------------------------------------------  IN:    Oral Fluid: 480 mL  Total IN: 480 mL    OUT:  Total OUT: 0 mL    Total NET: 480 mL      09-10 @ 07:01  -  09-11 @ 02:06  --------------------------------------------------------  IN:    Oral Fluid: 500 mL  Total IN: 500 mL  OUT:  Total OUT: 0 mL  Total NET: 500 mL    Height (cm): 157.48 (09-07 @ 22:36)  Weight (kg): 75.1 (09-07 @ 22:36)  BMI (kg/m2): 30.3 (09-07 @ 22:36)  BSA (m2): 1.76 (09-07 @ 22:36)    PHYSICAL EXAM:   General: NAD, Lying in bed comfortably  Neuro: A+Ox3  HEENT: NC/AT, EOMI  Cardio: RRR, nml S1/S2  Resp: Good effort, CTA b/l  GI/Abd: Soft, NT/ND  Vascular: All 4 extremities warm, B/l radial pulses palpable, b/l Femoral pulses palpable (L stronger than R), LLE with palpable popliteal and DP pulses and strong PT signal, RLE with monophasic DP signal  Skin: well-healed incisions at bilateral groins, R medial lower leg.  slightly discolored R distal 2nd digit and heel without gross tissue loss.   Musculoskeletal: All 4 extremities moving spontaneously, no limitations.  --------------------------------------------------------------------------------------------    LABS  CBC (09-10 @ 08:06)                              13.4                           15.61<H>  )----------------(  248        --    % Neutrophils, --    % Lymphocytes, ANC: --                                  40.7      BMP (09-10 @ 07:03)             133<L>  |  103     |  26<H> 		Ca++ --      Ca 9.7                ---------------------------------( 243<H>		Mg 2.4                3.6     |  20<L>   |  1.05  			Ph 2.2<L>    Coags (09-10 @ 23:48)  aPTT 29.3 / INR -- / PT --      --------------------------------------------------------------------------------------------    IMAGING  LE duplex: final read pending.

## 2018-09-11 NOTE — PROGRESS NOTE ADULT - ASSESSMENT
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 She started feeling short of breath about 3 weeks ago with 1-2 weeks of productive cough with clear sputum. Over the past week she endorses some wheeze and she has been using her inhaler 3 times in the morning and once at night daily. She no longer has medication for her nebulizer. She can walk <1 block as she is limited by shortness of breath and by LE claudication symptoms. She also has chest pain that is in the L breast, not pleuritic, feels that it is in the breast tissue and improves when she squeezes it. Denies fevers/chills, LE swelling, N/V, diaphoresis, diarrhea, constipation, abdominal pain, dizziness. Walks with a walker. Current daily smoker just 1-2 cigarettes daily    Problem/Plan - 1:  ·  Problem: Dyspnea on exertion.  Plan: Jessie COPD exacerbation  duonebs  cw steroids  antibiotics as per ID   pulmonary fu    Problem/Plan - 2:  ·  Problem: CAD (coronary artery disease).  Plan: cw home meds   cards consult.     Problem/Plan - 3:  ·  Problem: Diabetes.  Plan: monitor FS  ISS.   uncontrolled  endodcine called    Problem/Plan - 4:  ·  Problem: PVD Plan doppler reviewed  cw hep gtt  check ct   vascular fu

## 2018-09-11 NOTE — CHART NOTE - NSCHARTNOTEFT_GEN_A_CORE
Called by radiology department, reported extravasation of IV contrast  about 120-130 ml. pt seen and examined at bedside, NAD, Left arm swelling noted. no redness, radial and brachial pulses palpable, no skin color changes noted. pt reports some pain, denies numbness.  - Elevate left arm above heart level  - neurovascular check q 2 hours   - warm soaks to left arm  - Surgical consult for any changes   - Plan discussed with Dr. Amaury Carrion NP-C  #[id number]

## 2018-09-11 NOTE — PROGRESS NOTE ADULT - SUBJECTIVE AND OBJECTIVE BOX
Patient is a 62y old  Female who presents with a chief complaint of sob (11 Sep 2018 14:46)      INTERVAL HPI/OVERNIGHT EVENTS:  T(C): 36.6 (09-11-18 @ 11:45), Max: 36.8 (09-11-18 @ 04:51)  HR: 60 (09-11-18 @ 11:45) (60 - 77)  BP: 144/89 (09-11-18 @ 11:45) (126/77 - 170/90)  RR: 18 (09-11-18 @ 11:45) (18 - 18)  SpO2: 97% (09-11-18 @ 11:45) (97% - 100%)  Wt(kg): --  I&O's Summary    10 Sep 2018 07:01  -  11 Sep 2018 07:00  --------------------------------------------------------  IN: 860 mL / OUT: 0 mL / NET: 860 mL    11 Sep 2018 07:01  -  11 Sep 2018 16:41  --------------------------------------------------------  IN: 480 mL / OUT: 0 mL / NET: 480 mL        LABS:                        13.6   14.4  )-----------( 248      ( 11 Sep 2018 06:45 )             42.7     09-11    139  |  107  |  26<H>  ----------------------------<  328<H>  3.8   |  19<L>  |  1.03    Ca    8.8      11 Sep 2018 06:45  Phos  2.2     09-11  Mg     2.6     09-11      PTT - ( 11 Sep 2018 06:45 )  PTT:> 200 sec    CAPILLARY BLOOD GLUCOSE      POCT Blood Glucose.: 245 mg/dL (11 Sep 2018 16:23)  POCT Blood Glucose.: 240 mg/dL (11 Sep 2018 11:32)  POCT Blood Glucose.: 281 mg/dL (11 Sep 2018 07:58)  POCT Blood Glucose.: 208 mg/dL (10 Sep 2018 21:11)    ABG - ( 11 Sep 2018 06:31 )  pH, Arterial: 7.40  pH, Blood: x     /  pCO2: 35    /  pO2: 59    / HCO3: 22    / Base Excess: -2.1  /  SaO2: 88                      MEDICATIONS  (STANDING):  ALBUTerol    90 MICROgram(s) HFA Inhaler 1 Puff(s) Inhalation every 4 hours  ALBUTerol/ipratropium for Nebulization 3 milliLiter(s) Nebulizer every 6 hours  aspirin  chewable 81 milliGRAM(s) Oral daily  azithromycin  IVPB 500 milliGRAM(s) IV Intermittent every 24 hours  carvedilol 12.5 milliGRAM(s) Oral every 12 hours  dextrose 5%. 1000 milliLiter(s) (50 mL/Hr) IV Continuous <Continuous>  dextrose 50% Injectable 12.5 Gram(s) IV Push once  dextrose 50% Injectable 25 Gram(s) IV Push once  dextrose 50% Injectable 25 Gram(s) IV Push once  diltiazem    milliGRAM(s) Oral daily  heparin  Infusion.  Unit(s)/Hr (13 mL/Hr) IV Continuous <Continuous>  hydrALAZINE 50 milliGRAM(s) Oral three times a day  influenza   Vaccine 0.5 milliLiter(s) IntraMuscular once  insulin glargine Injectable (LANTUS) 18 Unit(s) SubCutaneous at bedtime  insulin lispro (HumaLOG) corrective regimen sliding scale   SubCutaneous three times a day before meals  insulin lispro Injectable (HumaLOG) 8 Unit(s) SubCutaneous three times a day before meals  losartan 100 milliGRAM(s) Oral daily  methylPREDNISolone sodium succinate Injectable 20 milliGRAM(s) IV Push every 6 hours  montelukast 10 milliGRAM(s) Oral at bedtime  pantoprazole    Tablet 40 milliGRAM(s) Oral before breakfast  simvastatin 40 milliGRAM(s) Oral at bedtime  tiotropium 18 MICROgram(s) Capsule 1 Capsule(s) Inhalation daily  topiramate 100 milliGRAM(s) Oral daily    MEDICATIONS  (PRN):  dextrose 40% Gel 15 Gram(s) Oral once PRN Blood Glucose LESS THAN 70 milliGRAM(s)/deciliter  glucagon  Injectable 1 milliGRAM(s) IntraMuscular once PRN Glucose LESS THAN 70 milligrams/deciliter  guaiFENesin   Syrup  (Sugar-Free) 200 milliGRAM(s) Oral every 6 hours PRN Cough  heparin  Injectable 6000 Unit(s) IV Push every 6 hours PRN For aPTT less than 40  heparin  Injectable 3000 Unit(s) IV Push every 6 hours PRN For aPTT between 40 - 57          PHYSICAL EXAM:  GENERAL: NAD, well-groomed, well-developed  HEAD:  Atraumatic, Normocephalic  CHEST/LUNG: Clear to percussion bilaterally; No rales, rhonchi, wheezing, or rubs  HEART: Regular rate and rhythm; No murmurs, rubs, or gallops  ABDOMEN: Soft, Nontender, Nondistended; Bowel sounds present  EXTREMITIES:  2+ Peripheral Pulses, No clubbing, cyanosis, or edema  LYMPH: No lymphadenopathy noted  SKIN: No rashes or lesions    Care Discussed with Consultants/Other Providers [ *] YES  [ ] NO

## 2018-09-11 NOTE — PROGRESS NOTE ADULT - ASSESSMENT
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 She started feeling short of breath about 3 weeks ago with 1-2 weeks of productive cough with clear sputum. Over the past week she endorses some wheeze and she has been using her inhaler 3 times in the morning and once at night daily. She no longer has medication for her nebulizer. She can walk <1 block as she is limited by shortness of breath and by LE claudication symptoms. She also has chest pain that is in the L breast, not pleuritic, feels that it is in the breast tissue and improves when she squeezes it. Denies fevers/chills, LE swelling, N/V, diaphoresis, diarrhea, constipation, abdominal pain, dizziness. Walks with a walker. Current daily smoker just 1-2 cigarettes daily (07 Sep 2018 17:38)    Problem/Plan - 1:    ·	Cough    - Pt being treated for COPD exacerbation.  No leukocytosis or fever.  Cxr without pna.  c/o L sided chest pain for past 2 days, positive cough with phlegm.  CT chest does not show pneumonia.    - Cont managment for COPD - cont duonebs, solumedrol, and supp O2.      - Cont azithromycin as part of management for COPD exacerbation.  No evidence for pna thus far.  Complete 5 day course through 9/12    - If pt spikes fever, send blood cultures x 2, Ua, urine cultures     - Check RVP - negative          Lindsey Dela Cruzi  [phone]

## 2018-09-12 LAB
ANION GAP SERPL CALC-SCNC: 13 MMOL/L — SIGNIFICANT CHANGE UP (ref 5–17)
APTT BLD: 152.8 SEC — CRITICAL HIGH (ref 27.5–37.4)
APTT BLD: 198.6 SEC — CRITICAL HIGH (ref 27.5–37.4)
APTT BLD: 50.9 SEC — HIGH (ref 27.5–37.4)
BASOPHILS # BLD AUTO: 0.01 K/UL — SIGNIFICANT CHANGE UP (ref 0–0.2)
BASOPHILS NFR BLD AUTO: 0.1 % — SIGNIFICANT CHANGE UP (ref 0–2)
BUN SERPL-MCNC: 26 MG/DL — HIGH (ref 7–23)
CALCIUM SERPL-MCNC: 8.9 MG/DL — SIGNIFICANT CHANGE UP (ref 8.4–10.5)
CHLORIDE SERPL-SCNC: 106 MMOL/L — SIGNIFICANT CHANGE UP (ref 96–108)
CO2 SERPL-SCNC: 20 MMOL/L — LOW (ref 22–31)
CREAT SERPL-MCNC: 1.03 MG/DL — SIGNIFICANT CHANGE UP (ref 0.5–1.3)
EOSINOPHIL # BLD AUTO: 0 K/UL — SIGNIFICANT CHANGE UP (ref 0–0.5)
EOSINOPHIL NFR BLD AUTO: 0 % — SIGNIFICANT CHANGE UP (ref 0–6)
GLUCOSE BLDC GLUCOMTR-MCNC: 194 MG/DL — HIGH (ref 70–99)
GLUCOSE BLDC GLUCOMTR-MCNC: 207 MG/DL — HIGH (ref 70–99)
GLUCOSE BLDC GLUCOMTR-MCNC: 244 MG/DL — HIGH (ref 70–99)
GLUCOSE BLDC GLUCOMTR-MCNC: 295 MG/DL — HIGH (ref 70–99)
GLUCOSE SERPL-MCNC: 213 MG/DL — HIGH (ref 70–99)
HCT VFR BLD CALC: 41.3 % — SIGNIFICANT CHANGE UP (ref 34.5–45)
HGB BLD-MCNC: 13.5 G/DL — SIGNIFICANT CHANGE UP (ref 11.5–15.5)
IMM GRANULOCYTES NFR BLD AUTO: 1.5 % — SIGNIFICANT CHANGE UP (ref 0–1.5)
LYMPHOCYTES # BLD AUTO: 1.52 K/UL — SIGNIFICANT CHANGE UP (ref 1–3.3)
LYMPHOCYTES # BLD AUTO: 10.8 % — LOW (ref 13–44)
MCHC RBC-ENTMCNC: 26.8 PG — LOW (ref 27–34)
MCHC RBC-ENTMCNC: 32.7 GM/DL — SIGNIFICANT CHANGE UP (ref 32–36)
MCV RBC AUTO: 82.1 FL — SIGNIFICANT CHANGE UP (ref 80–100)
MONOCYTES # BLD AUTO: 0.95 K/UL — HIGH (ref 0–0.9)
MONOCYTES NFR BLD AUTO: 6.7 % — SIGNIFICANT CHANGE UP (ref 2–14)
NEUTROPHILS # BLD AUTO: 11.41 K/UL — HIGH (ref 1.8–7.4)
NEUTROPHILS NFR BLD AUTO: 80.9 % — HIGH (ref 43–77)
PLATELET # BLD AUTO: 255 K/UL — SIGNIFICANT CHANGE UP (ref 150–400)
POTASSIUM SERPL-MCNC: 3.9 MMOL/L — SIGNIFICANT CHANGE UP (ref 3.5–5.3)
POTASSIUM SERPL-SCNC: 3.9 MMOL/L — SIGNIFICANT CHANGE UP (ref 3.5–5.3)
RBC # BLD: 5.03 M/UL — SIGNIFICANT CHANGE UP (ref 3.8–5.2)
RBC # FLD: 14.9 % — HIGH (ref 10.3–14.5)
SODIUM SERPL-SCNC: 138 MMOL/L — SIGNIFICANT CHANGE UP (ref 135–145)
WBC # BLD: 14.1 K/UL — HIGH (ref 3.8–10.5)
WBC # FLD AUTO: 14.1 K/UL — HIGH (ref 3.8–10.5)

## 2018-09-12 PROCEDURE — 75635 CT ANGIO ABDOMINAL ARTERIES: CPT | Mod: 26

## 2018-09-12 RX ORDER — HEPARIN SODIUM 5000 [USP'U]/ML
400 INJECTION INTRAVENOUS; SUBCUTANEOUS
Qty: 25000 | Refills: 0 | Status: DISCONTINUED | OUTPATIENT
Start: 2018-09-12 | End: 2018-09-25

## 2018-09-12 RX ORDER — HEPARIN SODIUM 5000 [USP'U]/ML
3000 INJECTION INTRAVENOUS; SUBCUTANEOUS EVERY 6 HOURS
Qty: 0 | Refills: 0 | Status: DISCONTINUED | OUTPATIENT
Start: 2018-09-12 | End: 2018-09-25

## 2018-09-12 RX ORDER — HEPARIN SODIUM 5000 [USP'U]/ML
6500 INJECTION INTRAVENOUS; SUBCUTANEOUS EVERY 6 HOURS
Qty: 0 | Refills: 0 | Status: DISCONTINUED | OUTPATIENT
Start: 2018-09-12 | End: 2018-09-25

## 2018-09-12 RX ADMIN — INSULIN GLARGINE 18 UNIT(S): 100 INJECTION, SOLUTION SUBCUTANEOUS at 21:34

## 2018-09-12 RX ADMIN — Medication 3 MILLILITER(S): at 18:34

## 2018-09-12 RX ADMIN — HEPARIN SODIUM 0 UNIT(S)/HR: 5000 INJECTION INTRAVENOUS; SUBCUTANEOUS at 09:43

## 2018-09-12 RX ADMIN — Medication 8 UNIT(S): at 18:32

## 2018-09-12 RX ADMIN — AZITHROMYCIN 250 MILLIGRAM(S): 500 TABLET, FILM COATED ORAL at 18:36

## 2018-09-12 RX ADMIN — MONTELUKAST 10 MILLIGRAM(S): 4 TABLET, CHEWABLE ORAL at 21:34

## 2018-09-12 RX ADMIN — Medication 81 MILLIGRAM(S): at 12:24

## 2018-09-12 RX ADMIN — CARVEDILOL PHOSPHATE 12.5 MILLIGRAM(S): 80 CAPSULE, EXTENDED RELEASE ORAL at 18:35

## 2018-09-12 RX ADMIN — Medication 8 UNIT(S): at 08:14

## 2018-09-12 RX ADMIN — CARVEDILOL PHOSPHATE 12.5 MILLIGRAM(S): 80 CAPSULE, EXTENDED RELEASE ORAL at 05:06

## 2018-09-12 RX ADMIN — Medication 3: at 12:24

## 2018-09-12 RX ADMIN — Medication 3 MILLILITER(S): at 05:06

## 2018-09-12 RX ADMIN — PANTOPRAZOLE SODIUM 40 MILLIGRAM(S): 20 TABLET, DELAYED RELEASE ORAL at 05:06

## 2018-09-12 RX ADMIN — Medication 20 MILLIGRAM(S): at 18:35

## 2018-09-12 RX ADMIN — Medication 3 MILLILITER(S): at 22:46

## 2018-09-12 RX ADMIN — Medication 3 MILLILITER(S): at 12:25

## 2018-09-12 RX ADMIN — Medication 20 MILLIGRAM(S): at 05:06

## 2018-09-12 RX ADMIN — Medication 180 MILLIGRAM(S): at 05:06

## 2018-09-12 RX ADMIN — HEPARIN SODIUM 400 UNIT(S)/HR: 5000 INJECTION INTRAVENOUS; SUBCUTANEOUS at 18:33

## 2018-09-12 RX ADMIN — Medication 1: at 08:15

## 2018-09-12 RX ADMIN — HEPARIN SODIUM 0 UNIT(S)/HR: 5000 INJECTION INTRAVENOUS; SUBCUTANEOUS at 01:40

## 2018-09-12 RX ADMIN — Medication 3 MILLILITER(S): at 00:21

## 2018-09-12 RX ADMIN — Medication 8 UNIT(S): at 12:24

## 2018-09-12 RX ADMIN — Medication 100 MILLIGRAM(S): at 12:24

## 2018-09-12 RX ADMIN — SIMVASTATIN 40 MILLIGRAM(S): 20 TABLET, FILM COATED ORAL at 21:34

## 2018-09-12 RX ADMIN — HEPARIN SODIUM 400 UNIT(S)/HR: 5000 INJECTION INTRAVENOUS; SUBCUTANEOUS at 11:02

## 2018-09-12 RX ADMIN — Medication 20 MILLIGRAM(S): at 00:20

## 2018-09-12 RX ADMIN — Medication 2: at 18:31

## 2018-09-12 RX ADMIN — Medication 50 MILLIGRAM(S): at 18:33

## 2018-09-12 RX ADMIN — Medication 50 MILLIGRAM(S): at 21:34

## 2018-09-12 RX ADMIN — LOSARTAN POTASSIUM 100 MILLIGRAM(S): 100 TABLET, FILM COATED ORAL at 05:06

## 2018-09-12 RX ADMIN — Medication 50 MILLIGRAM(S): at 05:06

## 2018-09-12 RX ADMIN — HEPARIN SODIUM 700 UNIT(S)/HR: 5000 INJECTION INTRAVENOUS; SUBCUTANEOUS at 02:37

## 2018-09-12 NOTE — PROGRESS NOTE ADULT - PROBLEM SELECTOR PLAN 1
pts SOB is a little better: She is an active smoker: She has been advised strongly to stop smoking: Pt is on steroids as well as BD: Would cont steroids for now at curretn dosages: Her ABG is noted: She has hypoxic resp failure , likely due to Asthma/ COPD exacerbation: She would need to be checked for home oxygen requirement: Her venous dopplers are negative as well as ct chest is without any pneumonia!  9/12: Her wheezing as well as SOB has much improved: Decreases steroids to q 12 hours today: She has been strongly advised to stop smoking:

## 2018-09-12 NOTE — CHART NOTE - NSCHARTNOTEFT_GEN_A_CORE
Overnight events  Patient s/p extravasation of left arm from Iv contrast yesterday. Examined patient's left arm. Good capillary refill, left radial,  antecubital pulses strong, good capillary refill, no compartment syndrome.  Open blisters noted on left upper arm    Apply xeroform dressing to left upper arm  Vascular check [id number]  Notify provider for vascular compromise of left arm  Will update with primary team    Markel Kumar [facility]  [phone]

## 2018-09-12 NOTE — PHYSICAL THERAPY INITIAL EVALUATION ADULT - PERTINENT HX OF CURRENT PROBLEM, REHAB EVAL
as per chart review: PMH of asthma/COPD (no home O2), L MCA CVA (residual right sided hemiparesis), small chronic right parietal lobe infarct, seizures, DM2, CAD s/p stent, HTN, AFib, PAD s/p right fem-pop bypass (s/p occlusion and IR stent placement 2/2017), GERD who presents with shortness of breath. She started feeling short of breath about 3 weeks ago with 1-2 weeks of productive cough with clear sputum, chest pain that is in the L breast, not pleuritic. Admitted with COPD exacerbation.

## 2018-09-12 NOTE — ADVANCED PRACTICE NURSE CONSULT - ASSESSMENT
Pt presents with left arm swelling. As per review of the medical record, swelling is secondary to extravasation of IV contrast. Pt reports her arm is painful; able to palpate the radial pulse, unable to palpate the brachial pulse due to presence of pain and edema. there are small blisters noted in the antecubital fossa- some have ruptured.  Vascular is following the pt- will recommend further evaluation by Vascular.

## 2018-09-12 NOTE — PROGRESS NOTE ADULT - ASSESSMENT
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 She started feeling short of breath about 3 weeks ago with 1-2 weeks of productive cough with clear sputum. Over the past week she endorses some wheeze and she has been using her inhaler 3 times in the morning and once at night daily. She no longer has medication for her nebulizer. She can walk <1 block as she is limited by shortness of breath and by LE claudication symptoms. She also has chest pain that is in the L breast, not pleuritic, feels that it is in the breast tissue and improves when she squeezes it. Denies fevers/chills, LE swelling, N/V, diaphoresis, diarrhea, constipation, abdominal pain, dizziness. Walks with a walker. Current daily smoker just 1-2 cigarettes daily (07 Sep 2018 17:38)    Problem/Plan - 1:    ·	Cough    - Pt being treated for COPD exacerbation.  No leukocytosis or fever.  Cxr without pna.  c/o L sided chest pain for past 2 days, positive cough with phlegm.  CT chest does not show pneumonia.    - Cont managment for COPD - cont duonebs, solumedrol, and supp O2.      - Cont azithromycin as part of management for COPD exacerbation.  No evidence for pna thus far.  Complete 5 day course through 9/12    - If pt spikes fever, send blood cultures x 2, Ua, urine cultures     - Check RVP - negative      * Pt undergoing vascular w/u of rt foot 2nd digit cyanosis.  s/p xray, without periosteal changes or OM.  CT angio shows femoral artery occlusion with distal artery reconstitution.    No outward signs of toe infection    Lindsey Angelo  [phone]

## 2018-09-12 NOTE — PROGRESS NOTE ADULT - SUBJECTIVE AND OBJECTIVE BOX
Patient is a 62y old  Female who presents with a chief complaint of sob (12 Sep 2018 09:06)      Any change in ROS: feels much better:      MEDICATIONS  (STANDING):  ALBUTerol    90 MICROgram(s) HFA Inhaler 1 Puff(s) Inhalation every 4 hours  ALBUTerol/ipratropium for Nebulization 3 milliLiter(s) Nebulizer every 6 hours  aspirin  chewable 81 milliGRAM(s) Oral daily  azithromycin  IVPB 500 milliGRAM(s) IV Intermittent every 24 hours  carvedilol 12.5 milliGRAM(s) Oral every 12 hours  dextrose 5%. 1000 milliLiter(s) (50 mL/Hr) IV Continuous <Continuous>  dextrose 50% Injectable 12.5 Gram(s) IV Push once  dextrose 50% Injectable 25 Gram(s) IV Push once  dextrose 50% Injectable 25 Gram(s) IV Push once  diltiazem    milliGRAM(s) Oral daily  heparin  Infusion.  Unit(s)/Hr (13 mL/Hr) IV Continuous <Continuous>  hydrALAZINE 50 milliGRAM(s) Oral three times a day  influenza   Vaccine 0.5 milliLiter(s) IntraMuscular once  insulin glargine Injectable (LANTUS) 18 Unit(s) SubCutaneous at bedtime  insulin lispro (HumaLOG) corrective regimen sliding scale   SubCutaneous three times a day before meals  insulin lispro Injectable (HumaLOG) 8 Unit(s) SubCutaneous three times a day before meals  losartan 100 milliGRAM(s) Oral daily  methylPREDNISolone sodium succinate Injectable 20 milliGRAM(s) IV Push every 6 hours  montelukast 10 milliGRAM(s) Oral at bedtime  pantoprazole    Tablet 40 milliGRAM(s) Oral before breakfast  simvastatin 40 milliGRAM(s) Oral at bedtime  tiotropium 18 MICROgram(s) Capsule 1 Capsule(s) Inhalation daily  topiramate 100 milliGRAM(s) Oral daily    MEDICATIONS  (PRN):  dextrose 40% Gel 15 Gram(s) Oral once PRN Blood Glucose LESS THAN 70 milliGRAM(s)/deciliter  glucagon  Injectable 1 milliGRAM(s) IntraMuscular once PRN Glucose LESS THAN 70 milligrams/deciliter  guaiFENesin   Syrup  (Sugar-Free) 200 milliGRAM(s) Oral every 6 hours PRN Cough  heparin  Injectable 6000 Unit(s) IV Push every 6 hours PRN For aPTT less than 40  heparin  Injectable 3000 Unit(s) IV Push every 6 hours PRN For aPTT between 40 - 57    Vital Signs Last 24 Hrs  T(C): 36.6 (12 Sep 2018 04:50), Max: 36.7 (11 Sep 2018 20:21)  T(F): 97.8 (12 Sep 2018 04:50), Max: 98.1 (11 Sep 2018 20:21)  HR: 62 (12 Sep 2018 04:50) (60 - 66)  BP: 165/80 (12 Sep 2018 04:50) (144/89 - 165/80)  BP(mean): --  RR: 18 (12 Sep 2018 04:50) (18 - 18)  SpO2: 99% (12 Sep 2018 04:50) (97% - 99%)    I&O's Summary    11 Sep 2018 07:01  -  12 Sep 2018 07:00  --------------------------------------------------------  IN: 720 mL / OUT: 0 mL / NET: 720 mL          Physical Exam:   GENERAL: NAD, well-groomed, well-developed  HEENT: PRIYANK/   Atraumatic, Normocephalic  ENMT: No tonsillar erythema, exudates, or enlargement; Moist mucous membranes, Good dentition, No lesions  NECK: Supple, No JVD, Normal thyroid  CHEST/LUNG: minimal exp wheeze  CVS: Regular rate and rhythm; No murmurs, rubs, or gallops  GI: : Soft, Nontender, Nondistended; Bowel sounds present  NERVOUS SYSTEM:  Alert & Oriented X3  EXTREMITIES:  2+ Peripheral Pulses, No clubbing, cyanosis, or edema  LYMPH: No lymphadenopathy noted  SKIN: No rashes or lesions  ENDOCRINOLOGY: No Thyromegaly  PSYCH: Appropriate    Labs:  ABG - ( 11 Sep 2018 06:31 )  pH, Arterial: 7.40  pH, Blood: x     /  pCO2: 35    /  pO2: 59    / HCO3: 22    / Base Excess: -2.1  /  SaO2: 88                                          13.5   14.10 )-----------( 255      ( 12 Sep 2018 07:36 )             41.3                         13.6   14.4  )-----------( 248      ( 11 Sep 2018 06:45 )             42.7                         13.4   15.61 )-----------( 248      ( 10 Sep 2018 08:06 )             40.7                         13.3   18.11 )-----------( 274      ( 09 Sep 2018 08:37 )             41.4     09-12    138  |  106  |  26<H>  ----------------------------<  213<H>  3.9   |  20<L>  |  1.03  09-11    139  |  107  |  26<H>  ----------------------------<  328<H>  3.8   |  19<L>  |  1.03  09-10    133<L>  |  103  |  26<H>  ----------------------------<  243<H>  3.6   |  20<L>  |  1.05  09-09    135  |  102  |  28<H>  ----------------------------<  302<H>  4.1   |  21<L>  |  1.24    Ca    8.9      12 Sep 2018 05:52  Ca    8.8      11 Sep 2018 06:45  Phos  2.2     09-11  Mg     2.6     09-11      CAPILLARY BLOOD GLUCOSE      POCT Blood Glucose.: 194 mg/dL (12 Sep 2018 07:46)  POCT Blood Glucose.: 222 mg/dL (11 Sep 2018 21:02)  POCT Blood Glucose.: 245 mg/dL (11 Sep 2018 16:23)  POCT Blood Glucose.: 240 mg/dL (11 Sep 2018 11:32)        PTT - ( 12 Sep 2018 09:06 )  PTT:152.8 sec          RECENT CULTURES:        RESPIRATORY CULTURES:      < from: [facility] Duplex Lower Ext Vein Scan, [place] (09.10.18 @ 20:59) >    EXAM:  DUPLEX SCAN EXT VEINS LOWER BI                            PROCEDURE DATE:  09/10/2018            INTERPRETATION:  CLINICAL INFORMATION: Right lower extremity swelling and   shortness of breath for 3 months. Peripheral arterial disease.    COMPARISON: CT angiogram with lower extremity runoff from 2/2/2017   revealing an occluded femoral-femoral and right femoral-popliteal bypass.     TECHNIQUE: Duplex sonography of the BILATERAL LOWER extremities with   color and spectral Doppler, with and without compression.      FINDINGS: There is an occluded superficial femoral artery and bypass   graft on the right. The left superficial femoral artery is occluded as   well.    There is normal compressibility of the left and right common femoral,   femoral and popliteal veins. No calf vein thrombosis is detected.    Doppler examination shows normal spontaneous and phasic flow.    IMPRESSION:     No evidence of bilateral lower extremity deep venous thrombosis.        These findings were discussed with CHRISTOPHER Kumar at 9/10/2018 9:17 PM by Dr. Alhaji Stover (Kevin) of Radiology with read back confirmation.      < from: CT Chest No Cont (09.08.18 @ 15:55) >    EXAM:  CT CHEST                            PROCEDURE DATE:  09/08/2018            INTERPRETATION:  CLINICAL INFORMATION: Dyspnea    COMPARISON: CT chest 9/20/2015    PROCEDURE:   CT of the Chest was performed without intravenous contrast.  [place] coronal reformats were performed.      FINDINGS:    CHEST:     LUNGS AND LARGE AIRWAYS: Patent central airways.  Minimal left basilar   subsegmental atelectasis.  PLEURA: No pleural effusion.  VESSELS: Atherosclerosis.  HEART: Heart size is normal. No pericardial effusion.  MEDIASTINUM AND LANEY: No lymphadenopathy.  CHEST WALL AND LOWER NECK: Pectus excavatum.  VISUALIZED UPPER ABDOMEN: Within normal limits.  BONES: Degenerative change. Scoliotic spine. Pectus deformity.    IMPRESSION: No evidence of pneumonia.                        MELIDA ESTEBAN M.D., RADIOLOGY RESIDENT  This document has been electronically signed.  DEISY ESCOTO M.D., ATTENDING RADIOLOGIST  This document has been electronically signed. Sep  9 2018  1:15PM        < end of copied text >            DAISY VINES M.D., RADIOLOGY RESIDENT  This document has been electronically signed.  MICA YANCEY M.D., ATTENDING RADIOLOGIST  This document has been electronically signed. Sep 11 2018 10:12AM        < end of copied text >      Studies  Chest X-RAY  CT SCAN Chest   Venous Dopplers: LE:   CT Abdomen  Others

## 2018-09-12 NOTE — CHART NOTE - NSCHARTNOTEFT_GEN_A_CORE
Vascular was called for evaluation of left arm extravasation of IV contrast and pt seen by vascular resident, recommended elevation, warm soaks and continue with neurovascular check, no signs of compartment as per vascular resident.   Called vascular again in pm to request vascular document and recommendation from attending.    Marielos Carrion NP-C  #[phone]

## 2018-09-12 NOTE — ADVANCED PRACTICE NURSE CONSULT - REASON FOR CONSULT
Requested by staff to assess skin status: left arm. PMH is noted:  62F PMH of asthma/COPD (no home O2), L MCA CVA (residual right sided hemiparesis), small chronic right parietal lobe infarct, seizures, DM2, CAD s/p stent, HTN (prior episodes of HTN urgency), AFib (no anticoagulation) PAD s/p right fem-pop bypass (s/p occlusion and IR stent placement 2/2017), GERD who presents with shortness of breath. She started feeling short of breath about 3 weeks ago with 1-2 weeks of productive cough with clear sputum. Over the past week she endorses some wheeze and she has been using her inhaler 3 times in the morning and once at night daily. She no longer has medication for her nebulizer. She can walk <1 block as she is limited by shortness of breath and by LE claudication symptoms. She also has chest pain that is in the L breast, not pleuritic, feels that it is in the breast tissue and improves when she squeezes it. Denies fevers/chills, LE swelling, N/V, diaphoresis, diarrhea, constipation, abdominal pain, dizziness. Walks with a walker. Current daily smoker just 1-2 cigarettes daily

## 2018-09-12 NOTE — PROGRESS NOTE ADULT - SUBJECTIVE AND OBJECTIVE BOX
Patient is a 62y old  Female who presents with a chief complaint of sob (11 Sep 2018 18:31)      INTERVAL HPI/OVERNIGHT EVENTS:  T(C): 36.6 (09-12-18 @ 04:50), Max: 36.7 (09-11-18 @ 20:21)  HR: 62 (09-12-18 @ 04:50) (60 - 66)  BP: 165/80 (09-12-18 @ 04:50) (144/89 - 165/80)  RR: 18 (09-12-18 @ 04:50) (18 - 18)  SpO2: 99% (09-12-18 @ 04:50) (97% - 99%)  Wt(kg): --  I&O's Summary    11 Sep 2018 07:01  -  12 Sep 2018 07:00  --------------------------------------------------------  IN: 720 mL / OUT: 0 mL / NET: 720 mL        LABS:                        13.5   14.10 )-----------( 255      ( 12 Sep 2018 07:36 )             41.3     09-12    x   |  x   |  26<H>  ----------------------------<  213<H>  x    |  20<L>  |  1.03    Ca    8.9      12 Sep 2018 05:52  Phos  2.2     09-11  Mg     2.6     09-11      PTT - ( 12 Sep 2018 01:02 )  PTT:198.6 sec    CAPILLARY BLOOD GLUCOSE      POCT Blood Glucose.: 194 mg/dL (12 Sep 2018 07:46)  POCT Blood Glucose.: 222 mg/dL (11 Sep 2018 21:02)  POCT Blood Glucose.: 245 mg/dL (11 Sep 2018 16:23)  POCT Blood Glucose.: 240 mg/dL (11 Sep 2018 11:32)    ABG - ( 11 Sep 2018 06:31 )  pH, Arterial: 7.40  pH, Blood: x     /  pCO2: 35    /  pO2: 59    / HCO3: 22    / Base Excess: -2.1  /  SaO2: 88                      MEDICATIONS  (STANDING):  ALBUTerol    90 MICROgram(s) HFA Inhaler 1 Puff(s) Inhalation every 4 hours  ALBUTerol/ipratropium for Nebulization 3 milliLiter(s) Nebulizer every 6 hours  aspirin  chewable 81 milliGRAM(s) Oral daily  azithromycin  IVPB 500 milliGRAM(s) IV Intermittent every 24 hours  carvedilol 12.5 milliGRAM(s) Oral every 12 hours  dextrose 5%. 1000 milliLiter(s) (50 mL/Hr) IV Continuous <Continuous>  dextrose 50% Injectable 12.5 Gram(s) IV Push once  dextrose 50% Injectable 25 Gram(s) IV Push once  dextrose 50% Injectable 25 Gram(s) IV Push once  diltiazem    milliGRAM(s) Oral daily  heparin  Infusion.  Unit(s)/Hr (13 mL/Hr) IV Continuous <Continuous>  hydrALAZINE 50 milliGRAM(s) Oral three times a day  influenza   Vaccine 0.5 milliLiter(s) IntraMuscular once  insulin glargine Injectable (LANTUS) 18 Unit(s) SubCutaneous at bedtime  insulin lispro (HumaLOG) corrective regimen sliding scale   SubCutaneous three times a day before meals  insulin lispro Injectable (HumaLOG) 8 Unit(s) SubCutaneous three times a day before meals  losartan 100 milliGRAM(s) Oral daily  methylPREDNISolone sodium succinate Injectable 20 milliGRAM(s) IV Push every 6 hours  montelukast 10 milliGRAM(s) Oral at bedtime  pantoprazole    Tablet 40 milliGRAM(s) Oral before breakfast  simvastatin 40 milliGRAM(s) Oral at bedtime  tiotropium 18 MICROgram(s) Capsule 1 Capsule(s) Inhalation daily  topiramate 100 milliGRAM(s) Oral daily    MEDICATIONS  (PRN):  dextrose 40% Gel 15 Gram(s) Oral once PRN Blood Glucose LESS THAN 70 milliGRAM(s)/deciliter  glucagon  Injectable 1 milliGRAM(s) IntraMuscular once PRN Glucose LESS THAN 70 milligrams/deciliter  guaiFENesin   Syrup  (Sugar-Free) 200 milliGRAM(s) Oral every 6 hours PRN Cough  heparin  Injectable 6000 Unit(s) IV Push every 6 hours PRN For aPTT less than 40  heparin  Injectable 3000 Unit(s) IV Push every 6 hours PRN For aPTT between 40 - 57          PHYSICAL EXAM:  GENERAL: NAD, well-groomed, well-developed  HEAD:  Atraumatic, Normocephalic  CHEST/LUNG: Clear to percussion bilaterally; No rales, rhonchi, wheezing, or rubs  HEART: Regular rate and rhythm; No murmurs, rubs, or gallops  ABDOMEN: Soft, Nontender, Nondistended; Bowel sounds present  EXTREMITIES:  2+ Peripheral Pulses, No clubbing, cyanosis, or edema  LYMPH: No lymphadenopathy noted  SKIN: No rashes or lesions    Care Discussed with Consultants/Other Providers [ ] YES  [ ] NO Patient is a 62y old  Female who presents with a chief complaint of sob (11 Sep 2018 18:31)      INTERVAL HPI/OVERNIGHT EVENTS:  T(C): 36.6 (09-12-18 @ 04:50), Max: 36.7 (09-11-18 @ 20:21)  HR: 62 (09-12-18 @ 04:50) (60 - 66)  BP: 165/80 (09-12-18 @ 04:50) (144/89 - 165/80)  RR: 18 (09-12-18 @ 04:50) (18 - 18)  SpO2: 99% (09-12-18 @ 04:50) (97% - 99%)  Wt(kg): --  I&O's Summary    11 Sep 2018 07:01  -  12 Sep 2018 07:00  --------------------------------------------------------  IN: 720 mL / OUT: 0 mL / NET: 720 mL        LABS:                        13.5   14.10 )-----------( 255      ( 12 Sep 2018 07:36 )             41.3     09-12    x   |  x   |  26<H>  ----------------------------<  213<H>  x    |  20<L>  |  1.03    Ca    8.9      12 Sep 2018 05:52  Phos  2.2     09-11  Mg     2.6     09-11      PTT - ( 12 Sep 2018 01:02 )  PTT:198.6 sec    CAPILLARY BLOOD GLUCOSE      POCT Blood Glucose.: 194 mg/dL (12 Sep 2018 07:46)  POCT Blood Glucose.: 222 mg/dL (11 Sep 2018 21:02)  POCT Blood Glucose.: 245 mg/dL (11 Sep 2018 16:23)  POCT Blood Glucose.: 240 mg/dL (11 Sep 2018 11:32)    ABG - ( 11 Sep 2018 06:31 )  pH, Arterial: 7.40  pH, Blood: x     /  pCO2: 35    /  pO2: 59    / HCO3: 22    / Base Excess: -2.1  /  SaO2: 88                      MEDICATIONS  (STANDING):  ALBUTerol    90 MICROgram(s) HFA Inhaler 1 Puff(s) Inhalation every 4 hours  ALBUTerol/ipratropium for Nebulization 3 milliLiter(s) Nebulizer every 6 hours  aspirin  chewable 81 milliGRAM(s) Oral daily  azithromycin  IVPB 500 milliGRAM(s) IV Intermittent every 24 hours  carvedilol 12.5 milliGRAM(s) Oral every 12 hours  dextrose 5%. 1000 milliLiter(s) (50 mL/Hr) IV Continuous <Continuous>  dextrose 50% Injectable 12.5 Gram(s) IV Push once  dextrose 50% Injectable 25 Gram(s) IV Push once  dextrose 50% Injectable 25 Gram(s) IV Push once  diltiazem    milliGRAM(s) Oral daily  heparin  Infusion.  Unit(s)/Hr (13 mL/Hr) IV Continuous <Continuous>  hydrALAZINE 50 milliGRAM(s) Oral three times a day  influenza   Vaccine 0.5 milliLiter(s) IntraMuscular once  insulin glargine Injectable (LANTUS) 18 Unit(s) SubCutaneous at bedtime  insulin lispro (HumaLOG) corrective regimen sliding scale   SubCutaneous three times a day before meals  insulin lispro Injectable (HumaLOG) 8 Unit(s) SubCutaneous three times a day before meals  losartan 100 milliGRAM(s) Oral daily  methylPREDNISolone sodium succinate Injectable 20 milliGRAM(s) IV Push every 6 hours  montelukast 10 milliGRAM(s) Oral at bedtime  pantoprazole    Tablet 40 milliGRAM(s) Oral before breakfast  simvastatin 40 milliGRAM(s) Oral at bedtime  tiotropium 18 MICROgram(s) Capsule 1 Capsule(s) Inhalation daily  topiramate 100 milliGRAM(s) Oral daily    MEDICATIONS  (PRN):  dextrose 40% Gel 15 Gram(s) Oral once PRN Blood Glucose LESS THAN 70 milliGRAM(s)/deciliter  glucagon  Injectable 1 milliGRAM(s) IntraMuscular once PRN Glucose LESS THAN 70 milligrams/deciliter  guaiFENesin   Syrup  (Sugar-Free) 200 milliGRAM(s) Oral every 6 hours PRN Cough  heparin  Injectable 6000 Unit(s) IV Push every 6 hours PRN For aPTT less than 40  heparin  Injectable 3000 Unit(s) IV Push every 6 hours PRN For aPTT between 40 - 57          PHYSICAL EXAM:  GENERAL: NAD, well-groomed, well-developed  HEAD:  Atraumatic, Normocephalic  CHEST/LUNG: Clear to percussion bilaterally; No rales, rhonchi, wheezing, or rubs  HEART: Regular rate and rhythm; No murmurs, rubs, or gallops  ABDOMEN: Soft, Nontender, Nondistended; Bowel sounds present  EXTREMITIES:  Left arm swelling  LYMPH: No lymphadenopathy noted  SKIN: No rashes or lesions    Care Discussed with Consultants/Other Providers [ ] YES  [ ] NO

## 2018-09-12 NOTE — CHART NOTE - NSCHARTNOTEFT_GEN_A_CORE
Critical PTT   PTT - ( 12 Sep 2018 01:02 )  PTT:198.6 sec  patient on heparin gtt with full AC normogram  Drug dosing weight confirmed  continue Heparin infusion Ac normogram  Monitor for bleeding  trend PTT/CBC  will follow closely    Markel MCCLURE [id number]

## 2018-09-12 NOTE — ADVANCED PRACTICE NURSE CONSULT - RECOMMEDATIONS
Will recommend the following;  1. Left arm; Adaptic follow with gauze and secure with spandage. change secondary dressing daily- Adaptic may stay in place x 5days.  2. Elevate left arm  3. consider further evaluation by the Vascular team.  Tx plan discussed with RN

## 2018-09-12 NOTE — PROGRESS NOTE ADULT - ASSESSMENT
Problem/Plan - 1:  ·  Problem: Dyspnea on exertion.  Plan: Jessie COPD exacerbation  duonebs  cw steroids  antibiotics as per ID   pulmonary fu    Problem/Plan - 2:  ·  Problem: CAD (coronary artery disease).  Plan: cw home meds   cards consult.     Problem/Plan - 3:  ·  Problem: Diabetes.  Plan: monitor FS  ISS.   uncontrolled  endodcine called    Problem/Plan - 4:  ·  Problem: PVD Plan doppler reviewed  cw hep gtt  check ct angio  vascular fu Dyspnea on exertion.  Plan: Jessie COPD exacerbation  duonebs  cw steroids  antibiotics as per ID   pulmonary fu    CAD (coronary artery disease).  Plan: cw home meds   cards consult.      Diabetes.  Plan: monitor FS  ISS.   uncontrolled  endodcine called     PVD Plan doppler reviewed  cw hep gtt  check ct angio  vascular fu    Left arm iv infiltration with contrast  Vascular following  wound care consult

## 2018-09-12 NOTE — PROGRESS NOTE ADULT - ASSESSMENT
Assessment  DMT2: 62y Female with DM T2 with hyperglycemia started on basal bolus insulin, dose was adjusted, blood sugars trending up, steroid injection, no hypoglycemic episode,  eating meals, compliant with low carb diet.  CAD/SOB: On medications, stable, monitored.  HTN: Controlled, On med.

## 2018-09-12 NOTE — PROGRESS NOTE ADULT - SUBJECTIVE AND OBJECTIVE BOX
Infectious Diseases progress note:    Subjective: Cough and sob improving.  No purulent phlegm.  Afebrile.  No new complaints    ROS:  CONSTITUTIONAL:  No fever, chills, rigors  CARDIOVASCULAR:  No chest pain or palpitations  RESPIRATORY:   No SOB, cough, dyspnea on exertion.  No wheezing  GASTROINTESTINAL:  No abd pain, N/V, diarrhea/constipation  EXTREMITIES:  No swelling or joint pain  GENITOURINARY:  No burning on urination, increased frequency or urgency.  No flank pain  NEUROLOGIC:  No HA, visual disturbances  SKIN: No rashes    Allergies    No Known Allergies    Intolerances        ANTIBIOTICS/RELEVANT:  antimicrobials  azithromycin  IVPB 500 milliGRAM(s) IV Intermittent every 24 hours    immunologic:  influenza   Vaccine 0.5 milliLiter(s) IntraMuscular once    OTHER:  ALBUTerol    90 MICROgram(s) HFA Inhaler 1 Puff(s) Inhalation every 4 hours  ALBUTerol/ipratropium for Nebulization 3 milliLiter(s) Nebulizer every 6 hours  aspirin  chewable 81 milliGRAM(s) Oral daily  carvedilol 12.5 milliGRAM(s) Oral every 12 hours  dextrose 40% Gel 15 Gram(s) Oral once PRN  dextrose 5%. 1000 milliLiter(s) IV Continuous <Continuous>  dextrose 50% Injectable 12.5 Gram(s) IV Push once  dextrose 50% Injectable 25 Gram(s) IV Push once  dextrose 50% Injectable 25 Gram(s) IV Push once  diltiazem    milliGRAM(s) Oral daily  glucagon  Injectable 1 milliGRAM(s) IntraMuscular once PRN  guaiFENesin   Syrup  (Sugar-Free) 200 milliGRAM(s) Oral every 6 hours PRN  heparin  Infusion. 400 Unit(s)/Hr IV Continuous <Continuous>  heparin  Injectable 6500 Unit(s) IV Push every 6 hours PRN  heparin  Injectable 3000 Unit(s) IV Push every 6 hours PRN  hydrALAZINE 50 milliGRAM(s) Oral three times a day  insulin glargine Injectable (LANTUS) 18 Unit(s) SubCutaneous at bedtime  insulin lispro (HumaLOG) corrective regimen sliding scale   SubCutaneous three times a day before meals  insulin lispro Injectable (HumaLOG) 8 Unit(s) SubCutaneous three times a day before meals  losartan 100 milliGRAM(s) Oral daily  methylPREDNISolone sodium succinate Injectable 20 milliGRAM(s) IV Push every 12 hours  montelukast 10 milliGRAM(s) Oral at bedtime  pantoprazole    Tablet 40 milliGRAM(s) Oral before breakfast  simvastatin 40 milliGRAM(s) Oral at bedtime  tiotropium 18 MICROgram(s) Capsule 1 Capsule(s) Inhalation daily  topiramate 100 milliGRAM(s) Oral daily      Objective:  Vital Signs Last 24 Hrs  T(C): 36.9 (13 Sep 2018 04:56), Max: 36.9 (12 Sep 2018 20:08)  T(F): 98.5 (13 Sep 2018 04:56), Max: 98.5 (12 Sep 2018 20:08)  HR: 58 (13 Sep 2018 04:56) (58 - 65)  BP: 181/80 (13 Sep 2018 04:56) (151/83 - 181/80)  BP(mean): --  RR: 18 (13 Sep 2018 04:56) (18 - 18)  SpO2: 97% (13 Sep 2018 04:56) (97% - 98%)    PHYSICAL EXAM:  Constitutional:NAD  Eyes:PRIYANK, EOMI  Ear/Nose/Throat: no thrush, mucositis.  Moist mucous membranes	  Neck:no JVD, no lymphadenopathy, supple  Respiratory: CTA suzanna  Cardiovascular: S1S2 RRR, no murmurs  Gastrointestinal:soft, nontender,  nondistended (+) BS  Extremities:no e/e.  Rt foot 2nd digit dusky in appearance.  Skin:  no rashes, open wounds or ulcerations        LABS:                        13.1   13.0  )-----------( 256      ( 13 Sep 2018 00:55 )             40.2     09-13    136  |  105  |  20  ----------------------------<  157<H>  3.8   |  21<L>  |  1.02    Ca    9.0      13 Sep 2018 07:27      PTT - ( 13 Sep 2018 07:27 )  PTT:> 200 sec                Rapid RVP Result: NotDetec          MICROBIOLOGY:          RADIOLOGY & ADDITIONAL STUDIES:    < from: Xray Foot AP + Lateral + Oblique, Right (09.11.18 @ 17:07) >  IMPRESSION:    There is a indentation within the distal soft tissues of the second digit   possibly related to a sinus tract or ulceration grade there is no osseous   destruction, periosteal reaction or cortical erosion to suggest   osteomyelitis.    < end of copied text >    < from: CT Angio Abd Aorta w/run-off w/ IV Cont (09.12.18 @ 17:40) >  IMPRESSION:     RIGHT LOWER EXTREMITY: Occluded external iliac artery and common femoral   artery. Widely patent profunda femoral artery via a femoral-femoral   bypass graft. Occlusion of the femoral artery and lower extremity bypass   graft. Reconstitution of the popliteal artery with three-vessel runoff   although the tibioperoneal trunk has moderate focal stenosis.    LEFT LOWER EXTREMITY: Femoral artery is occluded with reconstitution of   the distal femoral artery. Three-vessel runoff.    < end of copied text >

## 2018-09-12 NOTE — PROGRESS NOTE ADULT - SUBJECTIVE AND OBJECTIVE BOX
Asked to re-evaluate patient for ongoing concern per primary team of LUE compartment syndrome after contrast extravsation.   Patient denies any pain.     Exam:   LUE- 1+ edema, soft, compressible compartments. Non tender. Sensory and motor intact. Palpable radial and ulnar pulses.

## 2018-09-12 NOTE — PROGRESS NOTE ADULT - ASSESSMENT
62F known and followed by vascular surgery for RLE PAD. Asked to be re-seen by primary team for concern for LUE compartment syndrome. There is low suspicion for LUE compartment syndrome at this time as all compartments are soft/compressible, pulses are palpable, sensory motor intact and patient denies any pain. Will continue to follow.

## 2018-09-12 NOTE — PHYSICAL THERAPY INITIAL EVALUATION ADULT - GAIT TRAINING, PT EVAL
Patient will ambulate 300 feet with supervision x 1, with appropriate assistive device as needed, in 4 weeks.

## 2018-09-12 NOTE — PROGRESS NOTE ADULT - SUBJECTIVE AND OBJECTIVE BOX
Chief complaint  Patient is a 62y old  Female who presents with a chief complaint of sob (12 Sep 2018 11:20)   Review of systems  Patient in bed, looks comfortable, no fever, no hypoglycemia.    Labs and Fingersticks  CAPILLARY BLOOD GLUCOSE      POCT Blood Glucose.: 295 mg/dL (12 Sep 2018 11:53)  POCT Blood Glucose.: 194 mg/dL (12 Sep 2018 07:46)  POCT Blood Glucose.: 222 mg/dL (11 Sep 2018 21:02)      Anion Gap, Serum: 13 (09-12 @ 05:52)  Anion Gap, Serum: 13 (09-11 @ 06:45)      Calcium, Total Serum: 8.9 (09-12 @ 05:52)  Calcium, Total Serum: 8.8 (09-11 @ 06:45)          09-12    138  |  106  |  26<H>  ----------------------------<  213<H>  3.9   |  20<L>  |  1.03    Ca    8.9      12 Sep 2018 05:52  Phos  2.2     09-11  Mg     2.6     09-11                          13.5   14.10 )-----------( 255      ( 12 Sep 2018 07:36 )             41.3     Medications  MEDICATIONS  (STANDING):  ALBUTerol    90 MICROgram(s) HFA Inhaler 1 Puff(s) Inhalation every 4 hours  ALBUTerol/ipratropium for Nebulization 3 milliLiter(s) Nebulizer every 6 hours  aspirin  chewable 81 milliGRAM(s) Oral daily  azithromycin  IVPB 500 milliGRAM(s) IV Intermittent every 24 hours  carvedilol 12.5 milliGRAM(s) Oral every 12 hours  dextrose 5%. 1000 milliLiter(s) (50 mL/Hr) IV Continuous <Continuous>  dextrose 50% Injectable 12.5 Gram(s) IV Push once  dextrose 50% Injectable 25 Gram(s) IV Push once  dextrose 50% Injectable 25 Gram(s) IV Push once  diltiazem    milliGRAM(s) Oral daily  heparin  Infusion. 400 Unit(s)/Hr (4 mL/Hr) IV Continuous <Continuous>  hydrALAZINE 50 milliGRAM(s) Oral three times a day  influenza   Vaccine 0.5 milliLiter(s) IntraMuscular once  insulin glargine Injectable (LANTUS) 18 Unit(s) SubCutaneous at bedtime  insulin lispro (HumaLOG) corrective regimen sliding scale   SubCutaneous three times a day before meals  insulin lispro Injectable (HumaLOG) 8 Unit(s) SubCutaneous three times a day before meals  losartan 100 milliGRAM(s) Oral daily  methylPREDNISolone sodium succinate Injectable 20 milliGRAM(s) IV Push every 12 hours  montelukast 10 milliGRAM(s) Oral at bedtime  pantoprazole    Tablet 40 milliGRAM(s) Oral before breakfast  simvastatin 40 milliGRAM(s) Oral at bedtime  tiotropium 18 MICROgram(s) Capsule 1 Capsule(s) Inhalation daily  topiramate 100 milliGRAM(s) Oral daily      Physical Exam  General: Patient comfortable in bed  Vital Signs Last 12 Hrs  T(F): 97.9 (09-12-18 @ 11:39), Max: 97.9 (09-12-18 @ 11:39)  HR: 60 (09-12-18 @ 16:22) (60 - 62)  BP: 151/83 (09-12-18 @ 16:22) (151/83 - 158/89)  BP(mean): --  RR: 18 (09-12-18 @ 11:39) (18 - 18)  SpO2: 98% (09-12-18 @ 11:39) (98% - 98%)  Neck: No palpable thyroid nodules.  CVS: S1S2, No murmurs  Respiratory: No wheezing, no crepitations  GI: Abdomen soft, bowel sounds positive  Musculoskeletal:  edema lower extremities.   Skin: No skin rashes, no ecchymosis    Diagnostics

## 2018-09-13 LAB
ANION GAP SERPL CALC-SCNC: 10 MMOL/L — SIGNIFICANT CHANGE UP (ref 5–17)
APTT BLD: 38 SEC — HIGH (ref 27.5–37.4)
APTT BLD: 81.9 SEC — HIGH (ref 27.5–37.4)
APTT BLD: > 200 SEC (ref 27.5–37.4)
BUN SERPL-MCNC: 20 MG/DL — SIGNIFICANT CHANGE UP (ref 7–23)
CALCIUM SERPL-MCNC: 9 MG/DL — SIGNIFICANT CHANGE UP (ref 8.4–10.5)
CHLORIDE SERPL-SCNC: 105 MMOL/L — SIGNIFICANT CHANGE UP (ref 96–108)
CO2 SERPL-SCNC: 21 MMOL/L — LOW (ref 22–31)
CREAT SERPL-MCNC: 1.02 MG/DL — SIGNIFICANT CHANGE UP (ref 0.5–1.3)
GLUCOSE BLDC GLUCOMTR-MCNC: 151 MG/DL — HIGH (ref 70–99)
GLUCOSE BLDC GLUCOMTR-MCNC: 162 MG/DL — HIGH (ref 70–99)
GLUCOSE BLDC GLUCOMTR-MCNC: 193 MG/DL — HIGH (ref 70–99)
GLUCOSE BLDC GLUCOMTR-MCNC: 349 MG/DL — HIGH (ref 70–99)
GLUCOSE SERPL-MCNC: 157 MG/DL — HIGH (ref 70–99)
HCT VFR BLD CALC: 40.2 % — SIGNIFICANT CHANGE UP (ref 34.5–45)
HCT VFR BLD CALC: 41.7 % — SIGNIFICANT CHANGE UP (ref 34.5–45)
HGB BLD-MCNC: 13.1 G/DL — SIGNIFICANT CHANGE UP (ref 11.5–15.5)
HGB BLD-MCNC: 13.2 G/DL — SIGNIFICANT CHANGE UP (ref 11.5–15.5)
MCHC RBC-ENTMCNC: 26 PG — LOW (ref 27–34)
MCHC RBC-ENTMCNC: 26.9 PG — LOW (ref 27–34)
MCHC RBC-ENTMCNC: 31.7 GM/DL — LOW (ref 32–36)
MCHC RBC-ENTMCNC: 32.6 GM/DL — SIGNIFICANT CHANGE UP (ref 32–36)
MCV RBC AUTO: 82.1 FL — SIGNIFICANT CHANGE UP (ref 80–100)
MCV RBC AUTO: 82.5 FL — SIGNIFICANT CHANGE UP (ref 80–100)
PLATELET # BLD AUTO: 256 K/UL — SIGNIFICANT CHANGE UP (ref 150–400)
PLATELET # BLD AUTO: 283 K/UL — SIGNIFICANT CHANGE UP (ref 150–400)
POTASSIUM SERPL-MCNC: 3.8 MMOL/L — SIGNIFICANT CHANGE UP (ref 3.5–5.3)
POTASSIUM SERPL-SCNC: 3.8 MMOL/L — SIGNIFICANT CHANGE UP (ref 3.5–5.3)
RBC # BLD: 4.87 M/UL — SIGNIFICANT CHANGE UP (ref 3.8–5.2)
RBC # BLD: 5.08 M/UL — SIGNIFICANT CHANGE UP (ref 3.8–5.2)
RBC # FLD: 13.6 % — SIGNIFICANT CHANGE UP (ref 10.3–14.5)
RBC # FLD: 14.5 % — SIGNIFICANT CHANGE UP (ref 10.3–14.5)
SODIUM SERPL-SCNC: 136 MMOL/L — SIGNIFICANT CHANGE UP (ref 135–145)
WBC # BLD: 13 K/UL — HIGH (ref 3.8–10.5)
WBC # BLD: 13.97 K/UL — HIGH (ref 3.8–10.5)
WBC # FLD AUTO: 13 K/UL — HIGH (ref 3.8–10.5)
WBC # FLD AUTO: 13.97 K/UL — HIGH (ref 3.8–10.5)

## 2018-09-13 RX ADMIN — CARVEDILOL PHOSPHATE 12.5 MILLIGRAM(S): 80 CAPSULE, EXTENDED RELEASE ORAL at 05:44

## 2018-09-13 RX ADMIN — Medication 4: at 16:45

## 2018-09-13 RX ADMIN — Medication 50 MILLIGRAM(S): at 13:34

## 2018-09-13 RX ADMIN — Medication 100 MILLIGRAM(S): at 12:26

## 2018-09-13 RX ADMIN — Medication 180 MILLIGRAM(S): at 05:44

## 2018-09-13 RX ADMIN — PANTOPRAZOLE SODIUM 40 MILLIGRAM(S): 20 TABLET, DELAYED RELEASE ORAL at 05:44

## 2018-09-13 RX ADMIN — Medication 81 MILLIGRAM(S): at 12:26

## 2018-09-13 RX ADMIN — HEPARIN SODIUM 0 UNIT(S)/HR: 5000 INJECTION INTRAVENOUS; SUBCUTANEOUS at 09:23

## 2018-09-13 RX ADMIN — Medication 8 UNIT(S): at 12:24

## 2018-09-13 RX ADMIN — Medication 1: at 12:24

## 2018-09-13 RX ADMIN — Medication 3 MILLILITER(S): at 05:44

## 2018-09-13 RX ADMIN — CARVEDILOL PHOSPHATE 12.5 MILLIGRAM(S): 80 CAPSULE, EXTENDED RELEASE ORAL at 16:46

## 2018-09-13 RX ADMIN — Medication 3 MILLILITER(S): at 12:26

## 2018-09-13 RX ADMIN — HEPARIN SODIUM 500 UNIT(S)/HR: 5000 INJECTION INTRAVENOUS; SUBCUTANEOUS at 19:01

## 2018-09-13 RX ADMIN — Medication 50 MILLIGRAM(S): at 05:44

## 2018-09-13 RX ADMIN — INSULIN GLARGINE 18 UNIT(S): 100 INJECTION, SOLUTION SUBCUTANEOUS at 21:53

## 2018-09-13 RX ADMIN — HEPARIN SODIUM 6500 UNIT(S): 5000 INJECTION INTRAVENOUS; SUBCUTANEOUS at 01:30

## 2018-09-13 RX ADMIN — HEPARIN SODIUM 500 UNIT(S)/HR: 5000 INJECTION INTRAVENOUS; SUBCUTANEOUS at 10:32

## 2018-09-13 RX ADMIN — Medication 50 MILLIGRAM(S): at 21:58

## 2018-09-13 RX ADMIN — SIMVASTATIN 40 MILLIGRAM(S): 20 TABLET, FILM COATED ORAL at 21:58

## 2018-09-13 RX ADMIN — Medication 8 UNIT(S): at 08:16

## 2018-09-13 RX ADMIN — LOSARTAN POTASSIUM 100 MILLIGRAM(S): 100 TABLET, FILM COATED ORAL at 05:44

## 2018-09-13 RX ADMIN — Medication 40 MILLIGRAM(S): at 12:28

## 2018-09-13 RX ADMIN — HEPARIN SODIUM 800 UNIT(S)/HR: 5000 INJECTION INTRAVENOUS; SUBCUTANEOUS at 01:29

## 2018-09-13 RX ADMIN — MONTELUKAST 10 MILLIGRAM(S): 4 TABLET, CHEWABLE ORAL at 21:58

## 2018-09-13 RX ADMIN — Medication 3 MILLILITER(S): at 16:45

## 2018-09-13 RX ADMIN — Medication 20 MILLIGRAM(S): at 05:44

## 2018-09-13 RX ADMIN — Medication 8 UNIT(S): at 16:44

## 2018-09-13 NOTE — PROGRESS NOTE ADULT - SUBJECTIVE AND OBJECTIVE BOX
Patient is a 62y old  Female who presents with a chief complaint of sob (12 Sep 2018 21:05)      Any change in ROS: sob is better     MEDICATIONS  (STANDING):  ALBUTerol    90 MICROgram(s) HFA Inhaler 1 Puff(s) Inhalation every 4 hours  ALBUTerol/ipratropium for Nebulization 3 milliLiter(s) Nebulizer every 6 hours  aspirin  chewable 81 milliGRAM(s) Oral daily  carvedilol 12.5 milliGRAM(s) Oral every 12 hours  dextrose 5%. 1000 milliLiter(s) (50 mL/Hr) IV Continuous <Continuous>  dextrose 50% Injectable 12.5 Gram(s) IV Push once  dextrose 50% Injectable 25 Gram(s) IV Push once  dextrose 50% Injectable 25 Gram(s) IV Push once  diltiazem    milliGRAM(s) Oral daily  heparin  Infusion. 400 Unit(s)/Hr (4 mL/Hr) IV Continuous <Continuous>  hydrALAZINE 50 milliGRAM(s) Oral three times a day  influenza   Vaccine 0.5 milliLiter(s) IntraMuscular once  insulin glargine Injectable (LANTUS) 18 Unit(s) SubCutaneous at bedtime  insulin lispro (HumaLOG) corrective regimen sliding scale   SubCutaneous three times a day before meals  insulin lispro Injectable (HumaLOG) 8 Unit(s) SubCutaneous three times a day before meals  losartan 100 milliGRAM(s) Oral daily  methylPREDNISolone sodium succinate Injectable 20 milliGRAM(s) IV Push every 12 hours  montelukast 10 milliGRAM(s) Oral at bedtime  pantoprazole    Tablet 40 milliGRAM(s) Oral before breakfast  simvastatin 40 milliGRAM(s) Oral at bedtime  tiotropium 18 MICROgram(s) Capsule 1 Capsule(s) Inhalation daily  topiramate 100 milliGRAM(s) Oral daily    MEDICATIONS  (PRN):  dextrose 40% Gel 15 Gram(s) Oral once PRN Blood Glucose LESS THAN 70 milliGRAM(s)/deciliter  glucagon  Injectable 1 milliGRAM(s) IntraMuscular once PRN Glucose LESS THAN 70 milligrams/deciliter  guaiFENesin   Syrup  (Sugar-Free) 200 milliGRAM(s) Oral every 6 hours PRN Cough  heparin  Injectable 6500 Unit(s) IV Push every 6 hours PRN For aPTT less than 40  heparin  Injectable 3000 Unit(s) IV Push every 6 hours PRN For aPTT between 40 - 57    Vital Signs Last 24 Hrs  T(C): 36.9 (13 Sep 2018 04:56), Max: 36.9 (12 Sep 2018 20:08)  T(F): 98.5 (13 Sep 2018 04:56), Max: 98.5 (12 Sep 2018 20:08)  HR: 58 (13 Sep 2018 04:56) (58 - 65)  BP: 181/80 (13 Sep 2018 04:56) (151/83 - 181/80)  BP(mean): --  RR: 18 (13 Sep 2018 04:56) (18 - 18)  SpO2: 97% (13 Sep 2018 04:56) (97% - 98%)    I&O's Summary    12 Sep 2018 07:01  -  13 Sep 2018 07:00  --------------------------------------------------------  IN: 960 mL / OUT: 851 mL / NET: 109 mL          Physical Exam:   GENERAL: NAD, well-groomed, well-developed  HEENT: PRIYANK/   Atraumatic, Normocephalic  ENMT: No tonsillar erythema, exudates, or enlargement; Moist mucous membranes, Good dentition, No lesions  NECK: Supple, No JVD, Normal thyroid  CHEST/LUNG: Clear to auscultaion  CVS: Regular rate and rhythm; No murmurs, rubs, or gallops  GI: : Soft, Nontender, Nondistended; Bowel sounds present  NERVOUS SYSTEM:  Alert & Oriented X3  EXTREMITIES:  2+ Peripheral Pulses, No clubbing, cyanosis, or edema  LYMPH: No lymphadenopathy noted  SKIN: No rashes or lesions  ENDOCRINOLOGY: No Thyromegaly  PSYCH: Appropriate    Labs:                              13.1   13.0  )-----------( 256      ( 13 Sep 2018 00:55 )             40.2                         13.5   14.10 )-----------( 255      ( 12 Sep 2018 07:36 )             41.3                         13.6   14.4  )-----------( 248      ( 11 Sep 2018 06:45 )             42.7                         13.4   15.61 )-----------( 248      ( 10 Sep 2018 08:06 )             40.7     09-13    136  |  105  |  20  ----------------------------<  157<H>  3.8   |  21<L>  |  1.02  09-12    138  |  106  |  26<H>  ----------------------------<  213<H>  3.9   |  20<L>  |  1.03  09-11    139  |  107  |  26<H>  ----------------------------<  328<H>  3.8   |  19<L>  |  1.03  09-10    133<L>  |  103  |  26<H>  ----------------------------<  243<H>  3.6   |  20<L>  |  1.05    Ca    9.0      13 Sep 2018 07:27  Ca    8.9      12 Sep 2018 05:52      CAPILLARY BLOOD GLUCOSE      POCT Blood Glucose.: 151 mg/dL (13 Sep 2018 07:37)  POCT Blood Glucose.: 207 mg/dL (12 Sep 2018 21:01)  POCT Blood Glucose.: 244 mg/dL (12 Sep 2018 17:23)  POCT Blood Glucose.: 295 mg/dL (12 Sep 2018 11:53)        PTT - ( 13 Sep 2018 07:27 )  PTT:> 200 sec      < from: CT Angio Abd Aorta w/run-off w/ IV Cont (09.12.18 @ 17:40) >  TERNAL ILIAC ARTERY: Occluded.  COMMON FEMORAL ARTERY: Widely patent.  FEMORAL ARTERY: Occluded and reconstituted in the distal femoral artery.  PROFUNDA FEMORAL ARTERY: Widely patent.  POPLITEAL ARTERY: Widely patent.  ANTERIOR TIBIAL ARTERY: Widely patent.  TIBIOPERONEAL TRUNK: Widely patent.  POSTERIOR TIBIAL ARTERY: Widely patent.  PERONEAL ARTERY: Widely patent.    ADDITIONAL FINDINGS: Small left hepatic cyst. Renal cysts. Hysterectomy.     IMPRESSION:     RIGHT LOWER EXTREMITY: Occluded external iliac artery and common femoral   artery. Widely patent profunda femoral artery via a femoral-femoral   bypass graft. Occlusion of the femoral artery and lower extremity bypass   graft. Reconstitution of the popliteal artery with three-vessel runoff   although the tibioperoneal trunk has moderate focal stenosis.    LEFT LOWER EXTREMITY: Femoral artery is occluded with reconstitution of   the distal femoral artery. Three-vessel runoff.          < from: CT Chest No Cont (09.08.18 @ 15:55) >  EXAM:  CT CHEST                            PROCEDURE DATE:  09/08/2018            INTERPRETATION:  CLINICAL INFORMATION: Dyspnea    COMPARISON: CT chest 9/20/2015    PROCEDURE:   CT of the Chest was performed without intravenous contrast.  [place] coronal reformats were performed.      FINDINGS:    CHEST:     LUNGS AND LARGE AIRWAYS: Patent central airways.  Minimal left basilar   subsegmental atelectasis.  PLEURA: No pleural effusion.  VESSELS: Atherosclerosis.  HEART: Heart size is normal. No pericardial effusion.  MEDIASTINUM AND LANEY: No lymphadenopathy.  CHEST WALL AND LOWER NECK: Pectus excavatum.  VISUALIZED UPPER ABDOMEN: Within normal limits.  BONES: Degenerative change. Scoliotic spine. Pectus deformity.    IMPRESSION: No evidence of pneumonia.                        MELIDA ESTEBAN M.D., RADIOLOGY RESIDENT  This document has been electronically signed.  DEISY ESCOTO M.D., ATTENDING RADIOLOGIST  This document has been electronically signed. Sep  9 2018  1:15PM        < end of copied text >            LISA LU M.D., ATTENDING RADIOLOGIST  This document has been electronically signed. Sep 13 2018  9:22AM        < end of copied text >      RECENT CULTURES:        RESPIRATORY CULTURES:          Studies  Chest X-RAY  CT SCAN Chest   Venous Dopplers: LE:   CT Abdomen  Others

## 2018-09-13 NOTE — CONSULT NOTE ADULT - SUBJECTIVE AND OBJECTIVE BOX
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 She started feeling short of breath about 3 weeks ago with 1-2 weeks of productive cough with clear sputum. Over the past week she endorses some wheeze and she has been using her inhaler 3 times in the morning and once at night daily. She no longer has medication for her nebulizer. She can walk <1 block as she is limited by shortness of breath and by LE claudication symptoms. She also has chest pain that is in the L breast, not pleuritic, feels that it is in the breast tissue and improves when she squeezes it. Denies fevers/chills, LE swelling, N/V, diaphoresis, diarrhea, constipation, abdominal pain, dizziness. Walks with a walker. Current daily smoker just 1-2 cigarettes daily (07 Sep 2018 17:38)           *****PAST MEDICAL / Surgical  HISTORY:  PAST MEDICAL & SURGICAL HISTORY:  Peripheral arterial disease  CAD (coronary artery disease)  CVA (cerebral infarction): times 3 with residual rt sided weakness  Gastroesophageal reflux  Asthma: never intubated  Diabetes  HTN (hypertension)  S/P Cardiac Cath: 3yrs ago.report unknown  Coronary stent occlusion  S/P total abdominal hysterectomy           *****FAMILY HISTORY:  FAMILY HISTORY:  Family history of diabetes mellitus (Sibling)           *****SOCIAL HISTORY:  Alcohol: None  Smoking: None         *****ALLERGIES:   Allergies    No Known Allergies    Intolerances             *****MEDICATIONS:  MEDICATIONS  (STANDING):  ALBUTerol    90 MICROgram(s) HFA Inhaler 1 Puff(s) Inhalation every 4 hours  ALBUTerol/ipratropium for Nebulization 3 milliLiter(s) Nebulizer every 6 hours  aspirin  chewable 81 milliGRAM(s) Oral daily  carvedilol 12.5 milliGRAM(s) Oral every 12 hours  dextrose 5%. 1000 milliLiter(s) (50 mL/Hr) IV Continuous <Continuous>  dextrose 50% Injectable 12.5 Gram(s) IV Push once  dextrose 50% Injectable 25 Gram(s) IV Push once  dextrose 50% Injectable 25 Gram(s) IV Push once  diltiazem    milliGRAM(s) Oral daily  heparin  Infusion. 400 Unit(s)/Hr (4 mL/Hr) IV Continuous <Continuous>  hydrALAZINE 50 milliGRAM(s) Oral three times a day  influenza   Vaccine 0.5 milliLiter(s) IntraMuscular once  insulin glargine Injectable (LANTUS) 18 Unit(s) SubCutaneous at bedtime  insulin lispro (HumaLOG) corrective regimen sliding scale   SubCutaneous three times a day before meals  insulin lispro Injectable (HumaLOG) 8 Unit(s) SubCutaneous three times a day before meals  losartan 100 milliGRAM(s) Oral daily  montelukast 10 milliGRAM(s) Oral at bedtime  pantoprazole    Tablet 40 milliGRAM(s) Oral before breakfast  predniSONE   Tablet 40 milliGRAM(s) Oral daily  simvastatin 40 milliGRAM(s) Oral at bedtime  tiotropium 18 MICROgram(s) Capsule 1 Capsule(s) Inhalation daily  topiramate 100 milliGRAM(s) Oral daily    MEDICATIONS  (PRN):  dextrose 40% Gel 15 Gram(s) Oral once PRN Blood Glucose LESS THAN 70 milliGRAM(s)/deciliter  glucagon  Injectable 1 milliGRAM(s) IntraMuscular once PRN Glucose LESS THAN 70 milligrams/deciliter  guaiFENesin   Syrup  (Sugar-Free) 200 milliGRAM(s) Oral every 6 hours PRN Cough  heparin  Injectable 6500 Unit(s) IV Push every 6 hours PRN For aPTT less than 40  heparin  Injectable 3000 Unit(s) IV Push every 6 hours PRN For aPTT between 40 - 57           *****REVIEW OF SYSTEM:  GEN: no fever, no chills, no pain  RESP: no SOB, no cough, no sputum  CVS: no chest pain, no palpitations, no edema  GI: no abdominal pain, no nausea, no vomiting, no constipation, no diarrhea  : no dysurea, no frequency, no hematurea  Neuro: no headache, no dizziness  PSYCH: no anxiety, no depression  Derm : no itching, no rash         *****VITAL SIGNS:  T(C): 36.9 (18 @ 04:56), Max: 36.9 (18 @ 20:08)  HR: 58 (18 @ 04:56) (58 - 65)  BP: 181/80 (18 @ 04:56) (151/83 - 181/80)  RR: 18 (18 @ 04:56) (18 - 18)  SpO2: 97% (18 @ 04:56) (97% - 98%)  Wt(kg): --     @ 07:01  -   @ 07:00  --------------------------------------------------------  IN: 960 mL / OUT: 851 mL / NET: 109 mL             *****PHYSICAL EXAM:  GEN: A&O X 3 , NAD , comfortable  HEENT: NCAT, PERRL, MMM, hearing intact  Neck: supple , no JVD  CVS: S1S2 , regular , No M/R/G appreciated  PULM: CTA B/L,  no W/R/R appreciated  ABD.: soft. non tender, non distended,  bowel sounds present  Extrem: intact pulses , no edema   Derm: No rash , no ecchymoses  PSYCH : normal mood,  no delusion not anxious         *****LAB AND IMAGIN.2   13.97 )-----------( 283      ( 13 Sep 2018 09:25 )             41.7                   136  |  105  |  20  ----------------------------<  157<H>  3.8   |  21<L>  |  1.02    Ca    9.0      13 Sep 2018 07:27      PTT - ( 13 Sep 2018 07:27 )  PTT:> 200 sec              [All pertinent recent Imaging reports reviewed]  < from: Cardiac Cath Lab (14 @ 10:53) >  CORONARY VESSELS: The coronary circulation is right dominant.  LM:   --  LM: Normal.  LAD:   --  LAD: Angiography showed moderate atherosclerosis in the distal  vessel.  CX:   --  Circumflex: Normal.  RCA:   --  RCA: Normal.    < end of copied text >    < from: Nuclear Stress Test-Pharmacologic (17 @ 06:13) >  IMPRESSIONS:Normal Study  * Negative ECG evidence of ischemia after IV of Lexiscan.  * Review of raw data shows: The study is of good technical  quality.  * The left ventricle was normal in size. Normal myocardial  perfusion scan, with no evidence of infarction or  inducible ischemia.  * Post-stress resting myocardial perfusion gated SPECT  imaging was performed (LVEF > 70%)    < end of copied text >           *****A S S E S S M E N T   A N D   P L A N :  62F with PAD, CAD, COPD adm with dyspnea  tx COPD per pulm  no active wheeze  non obstructive CAD on cath   stress test  no ischemia  severe PAD on CTA  needs ischemic eval prior to vasc surgery  c/o left breast pain r/w massage, unlikely angina  will order stress test  echo   d/w vasc    ___________________________  Will follow with you.  Thank you,  JACK Alegre D.O.

## 2018-09-13 NOTE — PROGRESS NOTE ADULT - ASSESSMENT
Dyspnea on exertion.  Plan: Jessie COPD exacerbation  duonebs  cw steroids  antibiotics as per ID   pulmonary fu    CAD (coronary artery disease).  Plan: cw home meds   cards consult appreciated  stress test before intervention for PVD     Diabetes.  Plan: monitor FS  ISS.   uncontrolled  endodcine called    Severe PVD Plan doppler reviewed  cw hep gtt  CT reviewed  vascular fu    Left arm iv infiltration with contrast  Vascular following  wound care consult

## 2018-09-13 NOTE — PROGRESS NOTE ADULT - PROBLEM SELECTOR PLAN 1
pts SOB is a little better: She is an active smoker: She has been advised strongly to stop smoking: Pt is on steroids as well as BD: Would cont steroids for now at curretn dosages: Her ABG is noted: She has hypoxic resp failure , likely due to Asthma/ COPD exacerbation: She would need to be checked for home oxygen requirement: Her venous dopplers are negative as well as ct chest is without any pneumonia!  : Her wheezing as well as SOB has much improved: Decreases steroids to q 12 hours today: She has been strongly advised to stop smokin/12: Pts SOB has improved: Wheezing has resolved: Switch to po steroids

## 2018-09-13 NOTE — PROGRESS NOTE ADULT - SUBJECTIVE AND OBJECTIVE BOX
Patient is a 62y old  Female who presents with a chief complaint of sob (13 Sep 2018 11:00)      INTERVAL HPI/OVERNIGHT EVENTS:  T(C): 36.8 (09-13-18 @ 11:58), Max: 36.9 (09-12-18 @ 20:08)  HR: 61 (09-13-18 @ 16:48) (58 - 65)  BP: 164/83 (09-13-18 @ 16:48) (134/74 - 181/80)  RR: 18 (09-13-18 @ 11:58) (18 - 18)  SpO2: 96% (09-13-18 @ 11:58) (96% - 98%)  Wt(kg): --  I&O's Summary    12 Sep 2018 07:01  -  13 Sep 2018 07:00  --------------------------------------------------------  IN: 960 mL / OUT: 851 mL / NET: 109 mL    13 Sep 2018 07:01  -  13 Sep 2018 19:08  --------------------------------------------------------  IN: 720 mL / OUT: 951 mL / NET: -231 mL        LABS:                        13.2   13.97 )-----------( 283      ( 13 Sep 2018 09:25 )             41.7     09-13    136  |  105  |  20  ----------------------------<  157<H>  3.8   |  21<L>  |  1.02    Ca    9.0      13 Sep 2018 07:27      PTT - ( 13 Sep 2018 16:58 )  PTT:81.9 sec    CAPILLARY BLOOD GLUCOSE      POCT Blood Glucose.: 349 mg/dL (13 Sep 2018 16:34)  POCT Blood Glucose.: 162 mg/dL (13 Sep 2018 11:28)  POCT Blood Glucose.: 151 mg/dL (13 Sep 2018 07:37)  POCT Blood Glucose.: 207 mg/dL (12 Sep 2018 21:01)            MEDICATIONS  (STANDING):  ALBUTerol    90 MICROgram(s) HFA Inhaler 1 Puff(s) Inhalation every 4 hours  ALBUTerol/ipratropium for Nebulization 3 milliLiter(s) Nebulizer every 6 hours  aspirin  chewable 81 milliGRAM(s) Oral daily  carvedilol 12.5 milliGRAM(s) Oral every 12 hours  dextrose 5%. 1000 milliLiter(s) (50 mL/Hr) IV Continuous <Continuous>  dextrose 50% Injectable 12.5 Gram(s) IV Push once  dextrose 50% Injectable 25 Gram(s) IV Push once  dextrose 50% Injectable 25 Gram(s) IV Push once  diltiazem    milliGRAM(s) Oral daily  heparin  Infusion. 400 Unit(s)/Hr (4 mL/Hr) IV Continuous <Continuous>  hydrALAZINE 50 milliGRAM(s) Oral three times a day  influenza   Vaccine 0.5 milliLiter(s) IntraMuscular once  insulin glargine Injectable (LANTUS) 18 Unit(s) SubCutaneous at bedtime  insulin lispro (HumaLOG) corrective regimen sliding scale   SubCutaneous three times a day before meals  insulin lispro Injectable (HumaLOG) 8 Unit(s) SubCutaneous three times a day before meals  losartan 100 milliGRAM(s) Oral daily  montelukast 10 milliGRAM(s) Oral at bedtime  pantoprazole    Tablet 40 milliGRAM(s) Oral before breakfast  predniSONE   Tablet 40 milliGRAM(s) Oral daily  simvastatin 40 milliGRAM(s) Oral at bedtime  tiotropium 18 MICROgram(s) Capsule 1 Capsule(s) Inhalation daily  topiramate 100 milliGRAM(s) Oral daily    MEDICATIONS  (PRN):  dextrose 40% Gel 15 Gram(s) Oral once PRN Blood Glucose LESS THAN 70 milliGRAM(s)/deciliter  glucagon  Injectable 1 milliGRAM(s) IntraMuscular once PRN Glucose LESS THAN 70 milligrams/deciliter  guaiFENesin   Syrup  (Sugar-Free) 200 milliGRAM(s) Oral every 6 hours PRN Cough  heparin  Injectable 6500 Unit(s) IV Push every 6 hours PRN For aPTT less than 40  heparin  Injectable 3000 Unit(s) IV Push every 6 hours PRN For aPTT between 40 - 57          PHYSICAL EXAM:  GENERAL: NAD, well-groomed, well-developed  HEAD:  Atraumatic, Normocephalic  CHEST/LUNG: Clear to percussion bilaterally; No rales, rhonchi, wheezing, or rubs  HEART: Regular rate and rhythm; No murmurs, rubs, or gallops  ABDOMEN: Soft, Nontender, Nondistended; Bowel sounds present  EXTREMITIES:  2+ Peripheral Pulses, No clubbing, cyanosis, or edema  LYMPH: No lymphadenopathy noted  SKIN: No rashes or lesions    Care Discussed with Consultants/Other Providers [ ] YES  [ ] NO

## 2018-09-13 NOTE — PROGRESS NOTE ADULT - ASSESSMENT
Assessment  DMT2: 62y Female with DM T2 with hyperglycemia started on basal bolus insulin, dose was adjusted, blood sugars improving, steroid injection, no hypoglycemic episode,  eating meals, compliant with low carb diet.  CAD/SOB: On medications, stable, monitored.  HTN: Controlled, On med.

## 2018-09-13 NOTE — PROGRESS NOTE ADULT - SUBJECTIVE AND OBJECTIVE BOX
Chief complaint  Patient is a 62y old  Female who presents with a chief complaint of sob (13 Sep 2018 19:08)   Review of systems  Patient in bed, looks comfortable, no fever, no hypoglycemia.    Labs and Fingersticks  CAPILLARY BLOOD GLUCOSE      POCT Blood Glucose.: 349 mg/dL (13 Sep 2018 16:34)  POCT Blood Glucose.: 162 mg/dL (13 Sep 2018 11:28)  POCT Blood Glucose.: 151 mg/dL (13 Sep 2018 07:37)  POCT Blood Glucose.: 207 mg/dL (12 Sep 2018 21:01)      Anion Gap, Serum: 10 (09-13 @ 07:27)  Anion Gap, Serum: 13 (09-12 @ 05:52)      Calcium, Total Serum: 9.0 (09-13 @ 07:27)  Calcium, Total Serum: 8.9 (09-12 @ 05:52)          09-13    136  |  105  |  20  ----------------------------<  157<H>  3.8   |  21<L>  |  1.02    Ca    9.0      13 Sep 2018 07:27                          13.2   13.97 )-----------( 283      ( 13 Sep 2018 09:25 )             41.7     Medications  MEDICATIONS  (STANDING):  ALBUTerol    90 MICROgram(s) HFA Inhaler 1 Puff(s) Inhalation every 4 hours  ALBUTerol/ipratropium for Nebulization 3 milliLiter(s) Nebulizer every 6 hours  aspirin  chewable 81 milliGRAM(s) Oral daily  carvedilol 12.5 milliGRAM(s) Oral every 12 hours  dextrose 5%. 1000 milliLiter(s) (50 mL/Hr) IV Continuous <Continuous>  dextrose 50% Injectable 12.5 Gram(s) IV Push once  dextrose 50% Injectable 25 Gram(s) IV Push once  dextrose 50% Injectable 25 Gram(s) IV Push once  diltiazem    milliGRAM(s) Oral daily  heparin  Infusion. 400 Unit(s)/Hr (4 mL/Hr) IV Continuous <Continuous>  hydrALAZINE 50 milliGRAM(s) Oral three times a day  influenza   Vaccine 0.5 milliLiter(s) IntraMuscular once  insulin glargine Injectable (LANTUS) 18 Unit(s) SubCutaneous at bedtime  insulin lispro (HumaLOG) corrective regimen sliding scale   SubCutaneous three times a day before meals  insulin lispro Injectable (HumaLOG) 8 Unit(s) SubCutaneous three times a day before meals  losartan 100 milliGRAM(s) Oral daily  montelukast 10 milliGRAM(s) Oral at bedtime  pantoprazole    Tablet 40 milliGRAM(s) Oral before breakfast  predniSONE   Tablet 40 milliGRAM(s) Oral daily  simvastatin 40 milliGRAM(s) Oral at bedtime  tiotropium 18 MICROgram(s) Capsule 1 Capsule(s) Inhalation daily  topiramate 100 milliGRAM(s) Oral daily      Physical Exam  General: Patient comfortable in bed  Vital Signs Last 12 Hrs  T(F): 98.3 (09-13-18 @ 11:58), Max: 98.3 (09-13-18 @ 11:58)  HR: 61 (09-13-18 @ 16:48) (58 - 61)  BP: 164/83 (09-13-18 @ 16:48) (134/74 - 164/83)  BP(mean): --  RR: 18 (09-13-18 @ 11:58) (18 - 18)  SpO2: 96% (09-13-18 @ 11:58) (96% - 96%)  Neck: No palpable thyroid nodules.  CVS: S1S2, No murmurs  Respiratory: No wheezing, no crepitations  GI: Abdomen soft, bowel sounds positive  Musculoskeletal:  edema lower extremities.   Skin: No skin rashes, no ecchymosis    Diagnostics

## 2018-09-14 DIAGNOSIS — I73.9 PERIPHERAL VASCULAR DISEASE, UNSPECIFIED: ICD-10-CM

## 2018-09-14 LAB
ANION GAP SERPL CALC-SCNC: 13 MMOL/L — SIGNIFICANT CHANGE UP (ref 5–17)
APTT BLD: 24.3 SEC — LOW (ref 27.5–37.4)
APTT BLD: 56 SEC — HIGH (ref 27.5–37.4)
APTT BLD: 76.8 SEC — HIGH (ref 27.5–37.4)
BUN SERPL-MCNC: 26 MG/DL — HIGH (ref 7–23)
CALCIUM SERPL-MCNC: 9.4 MG/DL — SIGNIFICANT CHANGE UP (ref 8.4–10.5)
CHLORIDE SERPL-SCNC: 103 MMOL/L — SIGNIFICANT CHANGE UP (ref 96–108)
CO2 SERPL-SCNC: 20 MMOL/L — LOW (ref 22–31)
CREAT SERPL-MCNC: 1.26 MG/DL — SIGNIFICANT CHANGE UP (ref 0.5–1.3)
GLUCOSE BLDC GLUCOMTR-MCNC: 153 MG/DL — HIGH (ref 70–99)
GLUCOSE BLDC GLUCOMTR-MCNC: 219 MG/DL — HIGH (ref 70–99)
GLUCOSE BLDC GLUCOMTR-MCNC: 222 MG/DL — HIGH (ref 70–99)
GLUCOSE SERPL-MCNC: 213 MG/DL — HIGH (ref 70–99)
HCT VFR BLD CALC: 46.8 % — HIGH (ref 34.5–45)
HGB BLD-MCNC: 14.9 G/DL — SIGNIFICANT CHANGE UP (ref 11.5–15.5)
MCHC RBC-ENTMCNC: 26.8 PG — LOW (ref 27–34)
MCHC RBC-ENTMCNC: 31.8 GM/DL — LOW (ref 32–36)
MCV RBC AUTO: 84.2 FL — SIGNIFICANT CHANGE UP (ref 80–100)
PLATELET # BLD AUTO: 292 K/UL — SIGNIFICANT CHANGE UP (ref 150–400)
POTASSIUM SERPL-MCNC: 3.9 MMOL/L — SIGNIFICANT CHANGE UP (ref 3.5–5.3)
POTASSIUM SERPL-SCNC: 3.9 MMOL/L — SIGNIFICANT CHANGE UP (ref 3.5–5.3)
RBC # BLD: 5.56 M/UL — HIGH (ref 3.8–5.2)
RBC # FLD: 14.9 % — HIGH (ref 10.3–14.5)
SODIUM SERPL-SCNC: 136 MMOL/L — SIGNIFICANT CHANGE UP (ref 135–145)
WBC # BLD: 14.84 K/UL — HIGH (ref 3.8–10.5)
WBC # FLD AUTO: 14.84 K/UL — HIGH (ref 3.8–10.5)

## 2018-09-14 PROCEDURE — 93016 CV STRESS TEST SUPVJ ONLY: CPT

## 2018-09-14 PROCEDURE — 93018 CV STRESS TEST I&R ONLY: CPT

## 2018-09-14 PROCEDURE — 93306 TTE W/DOPPLER COMPLETE: CPT | Mod: 26

## 2018-09-14 PROCEDURE — 78452 HT MUSCLE IMAGE SPECT MULT: CPT | Mod: 26

## 2018-09-14 RX ADMIN — CARVEDILOL PHOSPHATE 12.5 MILLIGRAM(S): 80 CAPSULE, EXTENDED RELEASE ORAL at 05:03

## 2018-09-14 RX ADMIN — SIMVASTATIN 40 MILLIGRAM(S): 20 TABLET, FILM COATED ORAL at 21:50

## 2018-09-14 RX ADMIN — Medication 50 MILLIGRAM(S): at 21:50

## 2018-09-14 RX ADMIN — Medication 2: at 16:57

## 2018-09-14 RX ADMIN — Medication 3 MILLILITER(S): at 05:04

## 2018-09-14 RX ADMIN — Medication 1: at 07:56

## 2018-09-14 RX ADMIN — HEPARIN SODIUM 700 UNIT(S)/HR: 5000 INJECTION INTRAVENOUS; SUBCUTANEOUS at 16:57

## 2018-09-14 RX ADMIN — PANTOPRAZOLE SODIUM 40 MILLIGRAM(S): 20 TABLET, DELAYED RELEASE ORAL at 05:04

## 2018-09-14 RX ADMIN — Medication 81 MILLIGRAM(S): at 16:59

## 2018-09-14 RX ADMIN — Medication 3 MILLILITER(S): at 00:44

## 2018-09-14 RX ADMIN — INSULIN GLARGINE 18 UNIT(S): 100 INJECTION, SOLUTION SUBCUTANEOUS at 21:54

## 2018-09-14 RX ADMIN — Medication 40 MILLIGRAM(S): at 05:03

## 2018-09-14 RX ADMIN — CARVEDILOL PHOSPHATE 12.5 MILLIGRAM(S): 80 CAPSULE, EXTENDED RELEASE ORAL at 16:58

## 2018-09-14 RX ADMIN — Medication 100 MILLIGRAM(S): at 16:57

## 2018-09-14 RX ADMIN — Medication 3 MILLILITER(S): at 16:58

## 2018-09-14 RX ADMIN — Medication 8 UNIT(S): at 07:56

## 2018-09-14 RX ADMIN — MONTELUKAST 10 MILLIGRAM(S): 4 TABLET, CHEWABLE ORAL at 19:36

## 2018-09-14 RX ADMIN — Medication 8 UNIT(S): at 16:57

## 2018-09-14 RX ADMIN — LOSARTAN POTASSIUM 100 MILLIGRAM(S): 100 TABLET, FILM COATED ORAL at 05:03

## 2018-09-14 RX ADMIN — HEPARIN SODIUM 700 UNIT(S)/HR: 5000 INJECTION INTRAVENOUS; SUBCUTANEOUS at 03:09

## 2018-09-14 RX ADMIN — Medication 50 MILLIGRAM(S): at 05:03

## 2018-09-14 RX ADMIN — Medication 180 MILLIGRAM(S): at 05:03

## 2018-09-14 NOTE — PROGRESS NOTE ADULT - SUBJECTIVE AND OBJECTIVE BOX
Chief complaint  Patient is a 62y old  Female who presents with a chief complaint of sob (14 Sep 2018 10:06)   Review of systems  Patient in bed, looks comfortable, no fever, no hypoglycemia.    Labs and Fingersticks  CAPILLARY BLOOD GLUCOSE      POCT Blood Glucose.: 153 mg/dL (14 Sep 2018 07:32)  POCT Blood Glucose.: 193 mg/dL (13 Sep 2018 21:04)  POCT Blood Glucose.: 349 mg/dL (13 Sep 2018 16:34)      Anion Gap, Serum: 13 (09-14 @ 10:52)  Anion Gap, Serum: 10 (09-13 @ 07:27)      Calcium, Total Serum: 9.4 (09-14 @ 10:52)  Calcium, Total Serum: 9.0 (09-13 @ 07:27)          09-14    136  |  103  |  26<H>  ----------------------------<  213<H>  3.9   |  20<L>  |  1.26    Ca    9.4      14 Sep 2018 10:52                          13.2   13.97 )-----------( 283      ( 13 Sep 2018 09:25 )             41.7     Medications  MEDICATIONS  (STANDING):  ALBUTerol    90 MICROgram(s) HFA Inhaler 1 Puff(s) Inhalation every 4 hours  ALBUTerol/ipratropium for Nebulization 3 milliLiter(s) Nebulizer every 6 hours  aspirin  chewable 81 milliGRAM(s) Oral daily  carvedilol 12.5 milliGRAM(s) Oral every 12 hours  dextrose 5%. 1000 milliLiter(s) (50 mL/Hr) IV Continuous <Continuous>  dextrose 50% Injectable 12.5 Gram(s) IV Push once  dextrose 50% Injectable 25 Gram(s) IV Push once  dextrose 50% Injectable 25 Gram(s) IV Push once  diltiazem    milliGRAM(s) Oral daily  heparin  Infusion. 400 Unit(s)/Hr (4 mL/Hr) IV Continuous <Continuous>  hydrALAZINE 50 milliGRAM(s) Oral three times a day  influenza   Vaccine 0.5 milliLiter(s) IntraMuscular once  insulin glargine Injectable (LANTUS) 18 Unit(s) SubCutaneous at bedtime  insulin lispro (HumaLOG) corrective regimen sliding scale   SubCutaneous three times a day before meals  insulin lispro Injectable (HumaLOG) 8 Unit(s) SubCutaneous three times a day before meals  losartan 100 milliGRAM(s) Oral daily  montelukast 10 milliGRAM(s) Oral at bedtime  pantoprazole    Tablet 40 milliGRAM(s) Oral before breakfast  predniSONE   Tablet 40 milliGRAM(s) Oral daily  simvastatin 40 milliGRAM(s) Oral at bedtime  tiotropium 18 MICROgram(s) Capsule 1 Capsule(s) Inhalation daily  topiramate 100 milliGRAM(s) Oral daily      Physical Exam  General: Patient comfortable in bed  Vital Signs Last 12 Hrs  T(F): 97.7 (09-14-18 @ 04:56), Max: 97.7 (09-14-18 @ 04:56)  HR: 65 (09-14-18 @ 04:56) (65 - 65)  BP: 169/94 (09-14-18 @ 04:56) (169/94 - 169/94)  BP(mean): --  RR: 18 (09-14-18 @ 04:56) (18 - 18)  SpO2: 98% (09-14-18 @ 04:56) (98% - 98%)  Eyes: ? exophthalmus.  Neck: No palpable thyroid nodules.  CVS: S1S2, No murmurs  Respiratory: No wheezing, no crepitations  GI: Abdomen soft, bowel sounds positive  Musculoskeletal:  edema lower extremities.   Skin: No skin rashes, no ecchymosis    Diagnostics    Thyroid Stimulating Hormone, Serum: AM Sched. Collection: 15-Sep-2018 06:00 (09-14 @ 12:20)  Free Thyroxine, Serum: AM Sched. Collection: 15-Sep-2018 06:00 (09-14 @ 12:20)  TSH Receptor Antibody: AM Sched. Collection: 15-Sep-2018 06:00 (09-14 @ 12:20)

## 2018-09-14 NOTE — PROGRESS NOTE ADULT - SUBJECTIVE AND OBJECTIVE BOX
covering for dr. dey      Patient is a 62y old  Female who presents with a chief complaint of sob (13 Sep 2018 11:00)      INTERVAL HPI/OVERNIGHT EVENTS: none      Vital Signs Last 24 Hrs  T(C): 36.5 (14 Sep 2018 04:56), Max: 36.5 (13 Sep 2018 20:09)  T(F): 97.7 (14 Sep 2018 04:56), Max: 97.7 (13 Sep 2018 20:09)  HR: 65 (14 Sep 2018 04:56) (60 - 65)  BP: 169/94 (14 Sep 2018 04:56) (151/72 - 169/94)  BP(mean): --  RR: 18 (14 Sep 2018 04:56) (18 - 18)  SpO2: 98% (14 Sep 2018 04:56) (95% - 98%)      LABS:                                       14.9   14.84 )-----------( 292      ( 14 Sep 2018 14:07 )             46.8   09-14    136  |  103  |  26<H>  ----------------------------<  213<H>  3.9   |  20<L>  |  1.26    Ca    9.4      14 Sep 2018 10:52              MEDICATIONS  (STANDING):  ALBUTerol    90 MICROgram(s) HFA Inhaler 1 Puff(s) Inhalation every 4 hours  ALBUTerol/ipratropium for Nebulization 3 milliLiter(s) Nebulizer every 6 hours  aspirin  chewable 81 milliGRAM(s) Oral daily  carvedilol 12.5 milliGRAM(s) Oral every 12 hours  dextrose 5%. 1000 milliLiter(s) (50 mL/Hr) IV Continuous <Continuous>  dextrose 50% Injectable 12.5 Gram(s) IV Push once  dextrose 50% Injectable 25 Gram(s) IV Push once  dextrose 50% Injectable 25 Gram(s) IV Push once  diltiazem    milliGRAM(s) Oral daily  heparin  Infusion. 400 Unit(s)/Hr (4 mL/Hr) IV Continuous <Continuous>  hydrALAZINE 50 milliGRAM(s) Oral three times a day  influenza   Vaccine 0.5 milliLiter(s) IntraMuscular once  insulin glargine Injectable (LANTUS) 18 Unit(s) SubCutaneous at bedtime  insulin lispro (HumaLOG) corrective regimen sliding scale   SubCutaneous three times a day before meals  insulin lispro Injectable (HumaLOG) 8 Unit(s) SubCutaneous three times a day before meals  losartan 100 milliGRAM(s) Oral daily  montelukast 10 milliGRAM(s) Oral at bedtime  pantoprazole    Tablet 40 milliGRAM(s) Oral before breakfast  predniSONE   Tablet 40 milliGRAM(s) Oral daily  simvastatin 40 milliGRAM(s) Oral at bedtime  tiotropium 18 MICROgram(s) Capsule 1 Capsule(s) Inhalation daily  topiramate 100 milliGRAM(s) Oral daily    MEDICATIONS  (PRN):  dextrose 40% Gel 15 Gram(s) Oral once PRN Blood Glucose LESS THAN 70 milliGRAM(s)/deciliter  glucagon  Injectable 1 milliGRAM(s) IntraMuscular once PRN Glucose LESS THAN 70 milligrams/deciliter  guaiFENesin   Syrup  (Sugar-Free) 200 milliGRAM(s) Oral every 6 hours PRN Cough  heparin  Injectable 6500 Unit(s) IV Push every 6 hours PRN For aPTT less than 40  heparin  Injectable 3000 Unit(s) IV Push every 6 hours PRN For aPTT between 40 - 57          PHYSICAL EXAM:  GENERAL: NAD, well-groomed, well-developed  HEAD:  Atraumatic, Normocephalic  CHEST/LUNG: Clear to percussion bilaterally; No rales, rhonchi, wheezing, or rubs  HEART: Regular rate and rhythm; No murmurs, rubs, or gallops  ABDOMEN: Soft, Nontender, Nondistended; Bowel sounds present  EXTREMITIES:  2+ Peripheral Pulses, No clubbing, cyanosis, or edema  LYMPH: No lymphadenopathy noted  SKIN: No rashes or lesions    Care Discussed with Consultants/Other Providers [ ] YES  [ ] NO

## 2018-09-14 NOTE — PROGRESS NOTE ADULT - SUBJECTIVE AND OBJECTIVE BOX
Patient is a 62y old  Female who presents with a chief complaint of sob (14 Sep 2018 09:57)      Any change in ROS: Doing much better: no wheezing: no chest pain     MEDICATIONS  (STANDING):  ALBUTerol    90 MICROgram(s) HFA Inhaler 1 Puff(s) Inhalation every 4 hours  ALBUTerol/ipratropium for Nebulization 3 milliLiter(s) Nebulizer every 6 hours  aspirin  chewable 81 milliGRAM(s) Oral daily  carvedilol 12.5 milliGRAM(s) Oral every 12 hours  dextrose 5%. 1000 milliLiter(s) (50 mL/Hr) IV Continuous <Continuous>  dextrose 50% Injectable 12.5 Gram(s) IV Push once  dextrose 50% Injectable 25 Gram(s) IV Push once  dextrose 50% Injectable 25 Gram(s) IV Push once  diltiazem    milliGRAM(s) Oral daily  heparin  Infusion. 400 Unit(s)/Hr (4 mL/Hr) IV Continuous <Continuous>  hydrALAZINE 50 milliGRAM(s) Oral three times a day  influenza   Vaccine 0.5 milliLiter(s) IntraMuscular once  insulin glargine Injectable (LANTUS) 18 Unit(s) SubCutaneous at bedtime  insulin lispro (HumaLOG) corrective regimen sliding scale   SubCutaneous three times a day before meals  insulin lispro Injectable (HumaLOG) 8 Unit(s) SubCutaneous three times a day before meals  losartan 100 milliGRAM(s) Oral daily  montelukast 10 milliGRAM(s) Oral at bedtime  pantoprazole    Tablet 40 milliGRAM(s) Oral before breakfast  predniSONE   Tablet 40 milliGRAM(s) Oral daily  simvastatin 40 milliGRAM(s) Oral at bedtime  tiotropium 18 MICROgram(s) Capsule 1 Capsule(s) Inhalation daily  topiramate 100 milliGRAM(s) Oral daily    MEDICATIONS  (PRN):  dextrose 40% Gel 15 Gram(s) Oral once PRN Blood Glucose LESS THAN 70 milliGRAM(s)/deciliter  glucagon  Injectable 1 milliGRAM(s) IntraMuscular once PRN Glucose LESS THAN 70 milligrams/deciliter  guaiFENesin   Syrup  (Sugar-Free) 200 milliGRAM(s) Oral every 6 hours PRN Cough  heparin  Injectable 6500 Unit(s) IV Push every 6 hours PRN For aPTT less than 40  heparin  Injectable 3000 Unit(s) IV Push every 6 hours PRN For aPTT between 40 - 57    Vital Signs Last 24 Hrs  T(C): 36.5 (14 Sep 2018 04:56), Max: 36.8 (13 Sep 2018 11:58)  T(F): 97.7 (14 Sep 2018 04:56), Max: 98.3 (13 Sep 2018 11:58)  HR: 65 (14 Sep 2018 04:56) (58 - 65)  BP: 169/94 (14 Sep 2018 04:56) (134/74 - 169/94)  BP(mean): --  RR: 18 (14 Sep 2018 04:56) (18 - 18)  SpO2: 98% (14 Sep 2018 04:56) (95% - 98%)    I&O's Summary    13 Sep 2018 07:01  -  14 Sep 2018 07:00  --------------------------------------------------------  IN: 790 mL / OUT: 1252 mL / NET: -462 mL          Physical Exam:   GENERAL: NAD, well-groomed, well-developed  HEENT: PRIYANK/   Atraumatic, Normocephalic  ENMT: No tonsillar erythema, exudates, or enlargement; Moist mucous membranes, Good dentition, No lesions  NECK: Supple, No JVD, Normal thyroid  CHEST/LUNG: Clear to auscultaion  CVS: Regular rate and rhythm; No murmurs, rubs, or gallops  GI: : Soft, Nontender, Nondistended; Bowel sounds present  NERVOUS SYSTEM:  Alert & Oriented X3  EXTREMITIES:  2+ Peripheral Pulses, No clubbing, cyanosis, or edema  LYMPH: No lymphadenopathy noted  SKIN: No rashes or lesions  ENDOCRINOLOGY: No Thyromegaly  PSYCH: Appropriate    Labs:                              13.2   13.97 )-----------( 283      ( 13 Sep 2018 09:25 )             41.7                         13.1   13.0  )-----------( 256      ( 13 Sep 2018 00:55 )             40.2                         13.5   14.10 )-----------( 255      ( 12 Sep 2018 07:36 )             41.3                         13.6   14.4  )-----------( 248      ( 11 Sep 2018 06:45 )             42.7     09-13    136  |  105  |  20  ----------------------------<  157<H>  3.8   |  21<L>  |  1.02  09-12    138  |  106  |  26<H>  ----------------------------<  213<H>  3.9   |  20<L>  |  1.03  09-11    139  |  107  |  26<H>  ----------------------------<  328<H>  3.8   |  19<L>  |  1.03    Ca    9.0      13 Sep 2018 07:27      CAPILLARY BLOOD GLUCOSE      POCT Blood Glucose.: 153 mg/dL (14 Sep 2018 07:32)  POCT Blood Glucose.: 193 mg/dL (13 Sep 2018 21:04)  POCT Blood Glucose.: 349 mg/dL (13 Sep 2018 16:34)  POCT Blood Glucose.: 162 mg/dL (13 Sep 2018 11:28)        PTT - ( 14 Sep 2018 02:34 )  PTT:56.0 sec    < from: CT Angio Abd Aorta w/run-off w/ IV Cont (09.12.18 @ 17:40) >  INTERNAL ILIAC ARTERY: Occluded.  COMMON FEMORAL ARTERY: Widely patent.  FEMORAL ARTERY: Occluded and reconstituted in the distal femoral artery.  PROFUNDA FEMORAL ARTERY: Widely patent.  POPLITEAL ARTERY: Widely patent.  ANTERIOR TIBIAL ARTERY: Widely patent.  TIBIOPERONEAL TRUNK: Widely patent.  POSTERIOR TIBIAL ARTERY: Widely patent.  PERONEAL ARTERY: Widely patent.    ADDITIONAL FINDINGS: Small left hepatic cyst. Renal cysts. Hysterectomy.     IMPRESSION:     RIGHT LOWER EXTREMITY: Occluded external iliac artery and common femoral   artery. Widely patent profunda femoral artery via a femoral-femoral   bypass graft. Occlusion of the femoral artery and lower extremity bypass   graft. Reconstitution of the popliteal artery with three-vessel runoff   although the tibioperoneal trunk has moderate focal stenosis.    LEFT LOWER EXTREMITY: Femoral artery is occluded with reconstitution of   the distal femoral artery. Three-vessel runoff.          < from: CT Chest No Cont (09.08.18 @ 15:55) >    EXAM:  CT CHEST                            PROCEDURE DATE:  09/08/2018            INTERPRETATION:  CLINICAL INFORMATION: Dyspnea    COMPARISON: CT chest 9/20/2015    PROCEDURE:   CT of the Chest was performed without intravenous contrast.  [place] coronal reformats were performed.      FINDINGS:    CHEST:     LUNGS AND LARGE AIRWAYS: Patent central airways.  Minimal left basilar   subsegmental atelectasis.  PLEURA: No pleural effusion.  VESSELS: Atherosclerosis.  HEART: Heart size is normal. No pericardial effusion.  MEDIASTINUM AND LANEY: No lymphadenopathy.  CHEST WALL AND LOWER NECK: Pectus excavatum.  VISUALIZED UPPER ABDOMEN: Within normal limits.  BONES: Degenerative change. Scoliotic spine. Pectus deformity.    IMPRESSION: No evidence of pneumonia.                        MELIDA ESTEBAN M.D., RADIOLOGY RESIDENT  This document has been electronically signed.  DEISY ESCOTO M.D., ATTENDING RADIOLOGIST  This document has been electronically signed. Sep  9 2018  1:15PM    < end of copied text >            LISA LU M.D., ATTENDING RADIOLOGIST  This document has been electronically signed. Sep 13 2018  9:22AM    < end of copied text >        RECENT CULTURES:        RESPIRATORY CULTURES:          Studies  Chest X-RAY  CT SCAN Chest   Venous Dopplers: LE:   CT Abdomen  Others

## 2018-09-14 NOTE — PROGRESS NOTE ADULT - ASSESSMENT
Dyspnea on exertion.  Plan: Jessie COPD exacerbation  duonebs  cw steroids as per pulm  antibiotics as per ID   pulmonary fu    CAD (coronary artery disease).  Plan: cw home meds   cards consult appreciated  stress test before intervention for PVD     Diabetes.  Plan: monitor FS  ISS.   uncontrolled  endodcine called    Severe PVD Plan doppler reviewed  cw hep gtt  CT reviewed  vascular fu  plan for bypass next week  pending cardiac clearance     Left arm iv infiltration with contrast  Vascular following  wound care consult

## 2018-09-14 NOTE — PROGRESS NOTE ADULT - SUBJECTIVE AND OBJECTIVE BOX
Infectious Diseases progress note:    Subjective: No fevers or acute o/n events reported.  Pt with elevated WBC, remains on prednisone taper.      ROS:  pt u/a at time of bedside visit.     Allergies    No Known Allergies    Intolerances        ANTIBIOTICS/RELEVANT:  antimicrobials    immunologic:  influenza   Vaccine 0.5 milliLiter(s) IntraMuscular once    OTHER:  ALBUTerol    90 MICROgram(s) HFA Inhaler 1 Puff(s) Inhalation every 4 hours  ALBUTerol/ipratropium for Nebulization 3 milliLiter(s) Nebulizer every 6 hours  aspirin  chewable 81 milliGRAM(s) Oral daily  carvedilol 12.5 milliGRAM(s) Oral every 12 hours  dextrose 40% Gel 15 Gram(s) Oral once PRN  dextrose 5%. 1000 milliLiter(s) IV Continuous <Continuous>  dextrose 50% Injectable 12.5 Gram(s) IV Push once  dextrose 50% Injectable 25 Gram(s) IV Push once  dextrose 50% Injectable 25 Gram(s) IV Push once  diltiazem    milliGRAM(s) Oral daily  glucagon  Injectable 1 milliGRAM(s) IntraMuscular once PRN  guaiFENesin   Syrup  (Sugar-Free) 200 milliGRAM(s) Oral every 6 hours PRN  heparin  Infusion. 400 Unit(s)/Hr IV Continuous <Continuous>  heparin  Injectable 6500 Unit(s) IV Push every 6 hours PRN  heparin  Injectable 3000 Unit(s) IV Push every 6 hours PRN  hydrALAZINE 50 milliGRAM(s) Oral three times a day  insulin glargine Injectable (LANTUS) 18 Unit(s) SubCutaneous at bedtime  insulin lispro (HumaLOG) corrective regimen sliding scale   SubCutaneous three times a day before meals  insulin lispro Injectable (HumaLOG) 8 Unit(s) SubCutaneous three times a day before meals  losartan 100 milliGRAM(s) Oral daily  montelukast 10 milliGRAM(s) Oral at bedtime  pantoprazole    Tablet 40 milliGRAM(s) Oral before breakfast  predniSONE   Tablet 40 milliGRAM(s) Oral daily  simvastatin 40 milliGRAM(s) Oral at bedtime  tiotropium 18 MICROgram(s) Capsule 1 Capsule(s) Inhalation daily  topiramate 100 milliGRAM(s) Oral daily      Objective:  Vital Signs Last 24 Hrs  T(C): 36.5 (14 Sep 2018 04:56), Max: 36.5 (13 Sep 2018 20:09)  T(F): 97.7 (14 Sep 2018 04:56), Max: 97.7 (13 Sep 2018 20:09)  HR: 65 (14 Sep 2018 04:56) (60 - 65)  BP: 169/94 (14 Sep 2018 04:56) (151/72 - 169/94)  BP(mean): --  RR: 18 (14 Sep 2018 04:56) (18 - 18)  SpO2: 98% (14 Sep 2018 04:56) (95% - 98%)    PHYSICAL EXAM:  pt u/a        LABS:                        14.9   14.84 )-----------( 292      ( 14 Sep 2018 14:07 )             46.8     09-14    136  |  103  |  26<H>  ----------------------------<  213<H>  3.9   |  20<L>  |  1.26    Ca    9.4      14 Sep 2018 10:52      PTT - ( 14 Sep 2018 10:52 )  PTT:76.8 sec                Rapid RVP Result: NotDete          MICROBIOLOGY:          RADIOLOGY & ADDITIONAL STUDIES:    < from: CT Angio Abd Aorta w/run-off w/ IV Cont (09.12.18 @ 17:40) >  IMPRESSION:     RIGHT LOWER EXTREMITY: Occluded external iliac artery and common femoral   artery. Widely patent profunda femoral artery via a femoral-femoral   bypass graft. Occlusion of the femoral artery and lower extremity bypass   graft. Reconstitution of the popliteal artery with three-vessel runoff   although the tibioperoneal trunk has moderate focal stenosis.    LEFT LOWER EXTREMITY: Femoral artery is occluded with reconstitution of   the distal femoral artery. Three-vessel runoff.    < end of copied text >

## 2018-09-14 NOTE — PROGRESS NOTE ADULT - SUBJECTIVE AND OBJECTIVE BOX
- Patient seen and examined.  - In summary, patient is a 62 year old woman who presented with sob (13 Sep 2018 20:06)  - Today, patient is without complaints.         *****MEDICATIONS:    MEDICATIONS  (STANDING):  ALBUTerol    90 MICROgram(s) HFA Inhaler 1 Puff(s) Inhalation every 4 hours  ALBUTerol/ipratropium for Nebulization 3 milliLiter(s) Nebulizer every 6 hours  aspirin  chewable 81 milliGRAM(s) Oral daily  carvedilol 12.5 milliGRAM(s) Oral every 12 hours  dextrose 5%. 1000 milliLiter(s) (50 mL/Hr) IV Continuous <Continuous>  dextrose 50% Injectable 12.5 Gram(s) IV Push once  dextrose 50% Injectable 25 Gram(s) IV Push once  dextrose 50% Injectable 25 Gram(s) IV Push once  diltiazem    milliGRAM(s) Oral daily  heparin  Infusion. 400 Unit(s)/Hr (4 mL/Hr) IV Continuous <Continuous>  hydrALAZINE 50 milliGRAM(s) Oral three times a day  influenza   Vaccine 0.5 milliLiter(s) IntraMuscular once  insulin glargine Injectable (LANTUS) 18 Unit(s) SubCutaneous at bedtime  insulin lispro (HumaLOG) corrective regimen sliding scale   SubCutaneous three times a day before meals  insulin lispro Injectable (HumaLOG) 8 Unit(s) SubCutaneous three times a day before meals  losartan 100 milliGRAM(s) Oral daily  montelukast 10 milliGRAM(s) Oral at bedtime  pantoprazole    Tablet 40 milliGRAM(s) Oral before breakfast  predniSONE   Tablet 40 milliGRAM(s) Oral daily  simvastatin 40 milliGRAM(s) Oral at bedtime  tiotropium 18 MICROgram(s) Capsule 1 Capsule(s) Inhalation daily  topiramate 100 milliGRAM(s) Oral daily    MEDICATIONS  (PRN):  dextrose 40% Gel 15 Gram(s) Oral once PRN Blood Glucose LESS THAN 70 milliGRAM(s)/deciliter  glucagon  Injectable 1 milliGRAM(s) IntraMuscular once PRN Glucose LESS THAN 70 milligrams/deciliter  guaiFENesin   Syrup  (Sugar-Free) 200 milliGRAM(s) Oral every 6 hours PRN Cough  heparin  Injectable 6500 Unit(s) IV Push every 6 hours PRN For aPTT less than 40  heparin  Injectable 3000 Unit(s) IV Push every 6 hours PRN For aPTT between 40 - 57           ***** REVIEW OF SYSTEM:  GEN: no fever, no chills, no pain  RESP: no SOB, no cough, no sputum  CVS: no chest pain, no palpitations, no edema  GI: no abdominal pain, no nausea, no vomiting, no constipation, no diarrhea  : no dysuria, no frequency  NEURO: no headache, no dizziness  PSYCH: no depression, not anxious  Derm : no itching, no rash         ***** VITAL SIGNS:  T(F): 97.7 (18 @ 04:56), Max: 98.3 (18 @ 11:58)  HR: 65 (18 @ 04:56) (58 - 65)  BP: 169/94 (18 @ 04:56) (134/74 - 169/94)  RR: 18 (18 @ 04:56) (18 - 18)  SpO2: 98% (18 @ 04:56) (95% - 98%)  Wt(kg): --  ,   I&O's Summary    13 Sep 2018 07:01  -  14 Sep 2018 07:00  --------------------------------------------------------  IN: 790 mL / OUT: 1252 mL / NET: -462 mL             *****PHYSICAL EXAM:  GEN: A&O X 3 , NAD , comfortable  HEENT: NCAT, EOMI, MMM, no icterus  NECK: Supple, No JVD  CVS: S1S2 , regular , No M/R/G appreciated  PULM: CTA B/L,  no W/R/R appreciated  ABD.: soft. non tender, non distended,  bowel sounds present  Extrem: intact pulses , no edema noted  Derm: No rash or ecchymosis noted  PSYCH: normal mood, no depression, not anxious         *****LAB AND IMAGIN.2   13.97 )-----------( 283      ( 13 Sep 2018 09:25 )             41.7                   136  |  105  |  20  ----------------------------<  157<H>  3.8   |  21<L>  |  1.02    Ca    9.0      13 Sep 2018 07:27      PTT - ( 14 Sep 2018 02:34 )  PTT:56.0 sec                     [All pertinent recent Imaging/Reports reviewed]         *****A S S E S S M E N T   A N D   P L A N :  62F with PAD, CAD, COPD adm with dyspnea  tx COPD per pulm  no active wheeze  non obstructive CAD on cath   stress test  no ischemia  severe PAD on CTA  needs ischemic eval prior to vasc surgery  c/o left breast pain r/w massage, unlikely angina  will order stress test  echo   d/w vasc        __________________________  JASMYN. SANTOS Alegre.

## 2018-09-14 NOTE — PROVIDER CONTACT NOTE (OTHER) - BACKGROUND
pt admitted to hospital with COPD exacerbation, CT angio abdomen showed occluded ext. iliac artery  and pt is on heparin drip

## 2018-09-14 NOTE — PROGRESS NOTE ADULT - ASSESSMENT
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 She started feeling short of breath about 3 weeks ago with 1-2 weeks of productive cough with clear sputum. Over the past week she endorses some wheeze and she has been using her inhaler 3 times in the morning and once at night daily. She no longer has medication for her nebulizer. She can walk <1 block as she is limited by shortness of breath and by LE claudication symptoms. She also has chest pain that is in the L breast, not pleuritic, feels that it is in the breast tissue and improves when she squeezes it. Denies fevers/chills, LE swelling, N/V, diaphoresis, diarrhea, constipation, abdominal pain, dizziness. Walks with a walker. Current daily smoker just 1-2 cigarettes daily (07 Sep 2018 17:38)    Problem/Plan - 1:    ·	Cough    - Pt being treated for COPD exacerbation.    CT chest does not show pneumonia.    - Cont managment for COPD - cont duonebs, solumedrol, and supp O2.      - Cont azithromycin as part of management for COPD exacerbation.  No evidence for pna thus far.  Complete 5 day course through 9/12    - No need for current antibiotics at this time      * Pt undergoing vascular w/u of rt foot 2nd digit cyanosis.  s/p xray, without periosteal changes or OM.  CT angio shows femoral artery occlusion with distal artery reconstitution.    No outward signs of toe infection.  Planned for bypass next week        Lindsey [facility]  [phone]

## 2018-09-14 NOTE — ED ADULT TRIAGE NOTE - PRO INTERPRETER NEED 2
respiratory distress. He has no wheezes. He has no rales. He exhibits no tenderness. Abdominal: Soft. Bowel sounds are normal. He exhibits no distension and no mass. There is no tenderness. There is no rebound and no guarding. Landin in place     Genitourinary: Rectum normal.   Genitourinary Comments: Rectal tone normal     Musculoskeletal: Normal range of motion. He exhibits no edema, tenderness or deformity. Neurological: He is alert and oriented to person, place, and time. He has normal reflexes. No cranial nerve deficit. Skin: Skin is warm and dry. No rash noted. He is not diaphoretic. No erythema. No pallor. Psychiatric: He has a normal mood and affect. His behavior is normal. Judgment and thought content normal.       Procedures  --------------------------------------------- PAST HISTORY ---------------------------------------------  Past Medical History:  has a past medical history of Hypertension and Urinary retention. Past Surgical History:  has a past surgical history that includes Appendectomy; Tonsillectomy; and Colonoscopy. Social History:  reports that he has never smoked. He has never used smokeless tobacco. He reports that he does not drink alcohol or use drugs. Family History: family history is not on file. The patients home medications have been reviewed. Allergies: Patient has no known allergies.     -------------------------------------------------- RESULTS -------------------------------------------------  Labs:  Results for orders placed or performed during the hospital encounter of 09/14/18   CBC Auto Differential   Result Value Ref Range    WBC 10.4 4.5 - 11.5 E9/L    RBC 4.63 3.80 - 5.80 E12/L    Hemoglobin 13.0 12.5 - 16.5 g/dL    Hematocrit 39.2 37.0 - 54.0 %    MCV 84.7 80.0 - 99.9 fL    MCH 28.1 26.0 - 35.0 pg    MCHC 33.2 32.0 - 34.5 %    RDW 14.3 11.5 - 15.0 fL    Platelets 236 347 - 002 E9/L    MPV 9.8 7.0 - 12.0 fL    Neutrophils % 72.6 43.0 - 80.0 % Immature Granulocytes % 2.7 0.0 - 5.0 %    Lymphocytes % 11.8 (L) 20.0 - 42.0 %    Monocytes % 9.6 2.0 - 12.0 %    Eosinophils % 2.6 0.0 - 6.0 %    Basophils % 0.7 0.0 - 2.0 %    Neutrophils # 7.59 (H) 1.80 - 7.30 E9/L    Immature Granulocytes # 0.28 E9/L    Lymphocytes # 1.23 (L) 1.50 - 4.00 E9/L    Monocytes # 1.00 (H) 0.10 - 0.95 E9/L    Eosinophils # 0.27 0.05 - 0.50 E9/L    Basophils # 0.07 0.00 - 0.20 A8/K   Basic Metabolic Panel   Result Value Ref Range    Sodium 133 132 - 146 mmol/L    Potassium 4.4 3.5 - 5.0 mmol/L    Chloride 98 98 - 107 mmol/L    CO2 23 22 - 29 mmol/L    Anion Gap 12 7 - 16 mmol/L    Glucose 101 74 - 109 mg/dL    BUN 16 8 - 23 mg/dL    CREATININE 1.0 0.7 - 1.2 mg/dL    GFR Non-African American >60 >=60 mL/min/1.73    GFR African American >60     Calcium 8.9 8.6 - 10.2 mg/dL   Lactic Acid, Plasma   Result Value Ref Range    Lactic Acid 1.9 0.5 - 2.2 mmol/L       Radiology:  CT ABDOMEN PELVIS W IV CONTRAST Additional Contrast? None   Final Result   1. New large solid bilateral pulmonary nodular masses are consistent   with metastatic disease, unknown primary. The largest confluent mass   in the right lower lung could be a primary lesion, and has a maximum   diameter 7.4 cm. There is no effusion, cardiac decompensation, or   pneumonia. There is an abnormal appearing posterior rib near the   dependent pulmonary mass on the right, which could represent   metastatic disease versus healed trauma. 2. New hypodense masses are identified in the lower right lobe of the   liver, with diameter measurements in the 2 to 2.4 cm range. 3. There is a new intraluminal nondependent mural mass in the   gallbladder. 4. There is a small mass in the intraperitoneal space in the right   upper abdomen near the liver, which has a diameter of about 1 cm.   5. Bilateral adrenal metastatic lesions are noted, greater on the   right. 6. A 13 mm exophytic cortical mass extends from the lateral right   kidney. follow-up. The plan has been discussed in detail and they are aware of the specific conditions for emergent return, as well as the importance of follow-up. Discharge Medication List as of 9/14/2018  2:51 PM      START taking these medications    Details   bisacodyl (DULCOLAX) 10 MG suppository Place 1 suppository rectally daily as needed for Constipation, Disp-30 suppository, R-0Print             Diagnosis:  1. Metastatic cancer ([address]    2. Constipation, unspecified constipation type        Disposition:  Patient's disposition: Discharge to home  Patient's condition is stable. MDM  Number of Diagnoses or Management Options  Constipation, unspecified constipation type:   Metastatic cancer [facility]):   Diagnosis management comments: Patient is a 44-year-old male who came in to the ED for constipation. Patient was evaluated and on CT scan found evidence of multiple metastases to the liver, gallbladder, kidney, sacral spine, iliac bone, and right lower lobe. Discussed case with oncologist who already had an appointment set up with the patient for Wednesday. Patient was instructed to follow-up with his PCP as well as hematologist oncologist. Patient will be discharged at this time, and told to continue his bowel regimen at this time. Patient was given Dulcolax suppositories, and told to come back to the ED if his symptoms worsen. Patient was nontoxic-appearing. Patient was agreeable with plan at this time. Goals of care were discussed with the patient, and no decision was made on goals of care at this time. Discussed with Dr. Elaina Church who agreed to maintain the same appointment at this time, and to discuss goals of care with the patient and what treatment options are available.         Amount and/or Complexity of Data Reviewed  Clinical lab tests: ordered and reviewed  Tests in the radiology section of CPT®: ordered and reviewed        ED Course as of Sep 14 1920   Fri Sep 14, 2018   1258 Discussed case with family who was informed of the findings regarding CT scan. Patient did state that he would like to live as full a life as he can. He is unsure of what options are available at this time and would like to set up an appointment with Dr. Arian Mccall to discuss what to do from here.   [KS]   2021 Discussed with oncology who instructed family to follow up with Dr. Arian Mccall for the regular appointment on Wednesday      [KS]      ED Course User Index  [KS] Regino Doss, [address] [facility]  Resident  09/14/18 1924 English

## 2018-09-14 NOTE — PROVIDER CONTACT NOTE (OTHER) - ASSESSMENT
Pt. is A&Ox4. On a heparin drip (full anticoagulation nomogram) currently running at 7ml/hr. Pt. denies any pain/discomfort, SOB. No s/sx of active bleeding noted. VSS.

## 2018-09-15 LAB
ANION GAP SERPL CALC-SCNC: 8 MMOL/L — SIGNIFICANT CHANGE UP (ref 5–17)
APTT BLD: 87.4 SEC — HIGH (ref 27.5–37.4)
BUN SERPL-MCNC: 28 MG/DL — HIGH (ref 7–23)
CALCIUM SERPL-MCNC: 8.9 MG/DL — SIGNIFICANT CHANGE UP (ref 8.4–10.5)
CHLORIDE SERPL-SCNC: 102 MMOL/L — SIGNIFICANT CHANGE UP (ref 96–108)
CO2 SERPL-SCNC: 21 MMOL/L — LOW (ref 22–31)
CREAT SERPL-MCNC: 1.3 MG/DL — SIGNIFICANT CHANGE UP (ref 0.5–1.3)
GLUCOSE BLDC GLUCOMTR-MCNC: 153 MG/DL — HIGH (ref 70–99)
GLUCOSE BLDC GLUCOMTR-MCNC: 166 MG/DL — HIGH (ref 70–99)
GLUCOSE BLDC GLUCOMTR-MCNC: 193 MG/DL — HIGH (ref 70–99)
GLUCOSE BLDC GLUCOMTR-MCNC: 210 MG/DL — HIGH (ref 70–99)
GLUCOSE SERPL-MCNC: 234 MG/DL — HIGH (ref 70–99)
HCT VFR BLD CALC: 43.7 % — SIGNIFICANT CHANGE UP (ref 34.5–45)
HGB BLD-MCNC: 14 G/DL — SIGNIFICANT CHANGE UP (ref 11.5–15.5)
MCHC RBC-ENTMCNC: 26.7 PG — LOW (ref 27–34)
MCHC RBC-ENTMCNC: 32.1 GM/DL — SIGNIFICANT CHANGE UP (ref 32–36)
MCV RBC AUTO: 83 FL — SIGNIFICANT CHANGE UP (ref 80–100)
PLATELET # BLD AUTO: 280 K/UL — SIGNIFICANT CHANGE UP (ref 150–400)
POTASSIUM SERPL-MCNC: 3.9 MMOL/L — SIGNIFICANT CHANGE UP (ref 3.5–5.3)
POTASSIUM SERPL-SCNC: 3.9 MMOL/L — SIGNIFICANT CHANGE UP (ref 3.5–5.3)
RBC # BLD: 5.26 M/UL — HIGH (ref 3.8–5.2)
RBC # FLD: 14.4 % — SIGNIFICANT CHANGE UP (ref 10.3–14.5)
SODIUM SERPL-SCNC: 131 MMOL/L — LOW (ref 135–145)
T4 FREE SERPL-MCNC: 1 NG/DL — SIGNIFICANT CHANGE UP (ref 0.9–1.8)
TSH SERPL-MCNC: 1.23 UIU/ML — SIGNIFICANT CHANGE UP (ref 0.27–4.2)
WBC # BLD: 19.1 K/UL — HIGH (ref 3.8–10.5)
WBC # FLD AUTO: 19.1 K/UL — HIGH (ref 3.8–10.5)

## 2018-09-15 RX ADMIN — Medication 3 MILLILITER(S): at 12:48

## 2018-09-15 RX ADMIN — Medication 50 MILLIGRAM(S): at 12:48

## 2018-09-15 RX ADMIN — Medication 8 UNIT(S): at 17:31

## 2018-09-15 RX ADMIN — SIMVASTATIN 40 MILLIGRAM(S): 20 TABLET, FILM COATED ORAL at 21:26

## 2018-09-15 RX ADMIN — CARVEDILOL PHOSPHATE 12.5 MILLIGRAM(S): 80 CAPSULE, EXTENDED RELEASE ORAL at 17:33

## 2018-09-15 RX ADMIN — Medication 2: at 17:33

## 2018-09-15 RX ADMIN — Medication 3 MILLILITER(S): at 17:33

## 2018-09-15 RX ADMIN — Medication 100 MILLIGRAM(S): at 12:49

## 2018-09-15 RX ADMIN — Medication 200 MILLIGRAM(S): at 00:58

## 2018-09-15 RX ADMIN — MONTELUKAST 10 MILLIGRAM(S): 4 TABLET, CHEWABLE ORAL at 21:26

## 2018-09-15 RX ADMIN — PANTOPRAZOLE SODIUM 40 MILLIGRAM(S): 20 TABLET, DELAYED RELEASE ORAL at 05:40

## 2018-09-15 RX ADMIN — Medication 8 UNIT(S): at 08:11

## 2018-09-15 RX ADMIN — Medication 180 MILLIGRAM(S): at 05:40

## 2018-09-15 RX ADMIN — INSULIN GLARGINE 18 UNIT(S): 100 INJECTION, SOLUTION SUBCUTANEOUS at 21:26

## 2018-09-15 RX ADMIN — Medication 8 UNIT(S): at 12:47

## 2018-09-15 RX ADMIN — Medication 40 MILLIGRAM(S): at 05:40

## 2018-09-15 RX ADMIN — CARVEDILOL PHOSPHATE 12.5 MILLIGRAM(S): 80 CAPSULE, EXTENDED RELEASE ORAL at 05:40

## 2018-09-15 RX ADMIN — Medication 3 MILLILITER(S): at 05:40

## 2018-09-15 RX ADMIN — Medication 1: at 12:48

## 2018-09-15 RX ADMIN — HEPARIN SODIUM 700 UNIT(S)/HR: 5000 INJECTION INTRAVENOUS; SUBCUTANEOUS at 00:48

## 2018-09-15 RX ADMIN — Medication 81 MILLIGRAM(S): at 12:49

## 2018-09-15 RX ADMIN — Medication 1: at 08:12

## 2018-09-15 RX ADMIN — LOSARTAN POTASSIUM 100 MILLIGRAM(S): 100 TABLET, FILM COATED ORAL at 05:40

## 2018-09-15 RX ADMIN — Medication 50 MILLIGRAM(S): at 05:40

## 2018-09-15 RX ADMIN — Medication 3 MILLILITER(S): at 00:49

## 2018-09-15 RX ADMIN — Medication 50 MILLIGRAM(S): at 21:26

## 2018-09-15 NOTE — PROGRESS NOTE ADULT - ASSESSMENT
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 She started feeling short of breath about 3 weeks ago with 1-2 weeks of productive cough with clear sputum. Over the past week she endorses some wheeze and she has been using her inhaler 3 times in the morning and once at night daily. She no longer has medication for her nebulizer. She can walk <1 block as she is limited by shortness of breath and by LE claudication symptoms. She also has chest pain that is in the L breast, not pleuritic, feels that it is in the breast tissue and improves when she squeezes it. Denies fevers/chills, LE swelling, N/V, diaphoresis, diarrhea, constipation, abdominal pain, dizziness. Walks with a walker. Current daily smoker just 1-2 cigarettes daily (07 Sep 2018 17:38)    Problem/Plan - 1:    ·	Cough    - Pt being treated for COPD exacerbation.    CT chest does not show pneumonia.    - Cont managment for COPD - cont duonebs, solumedrol, and supp O2.      - Cont azithromycin as part of management for COPD exacerbation.  No evidence for pna thus far.  Complete 5 day course through 9/12    - No need for current antibiotics at this time    - Leukocytosis likely secondary to steroid effect.  Check repeat CBC with diff in AM      * Pt undergoing vascular w/u of rt foot 2nd digit cyanosis.  s/p xray, without periosteal changes or OM.  CT angio shows femoral artery occlusion with distal artery reconstitution.    No outward signs of toe infection.  Planned for bypass next week        Lindsey Angelo  [phone]

## 2018-09-15 NOTE — PROGRESS NOTE ADULT - SUBJECTIVE AND OBJECTIVE BOX
Chief complaint  Patient is a 62y old  Female who presents with a chief complaint of sob (15 Sep 2018 15:47)   Review of systems  Patient in bed, looks comfortable, no fever, no hypoglycemia.    Labs and Fingersticks  CAPILLARY BLOOD GLUCOSE      POCT Blood Glucose.: 210 mg/dL (15 Sep 2018 16:22)  POCT Blood Glucose.: 153 mg/dL (15 Sep 2018 11:54)  POCT Blood Glucose.: 166 mg/dL (15 Sep 2018 08:05)  POCT Blood Glucose.: 219 mg/dL (14 Sep 2018 21:03)      Anion Gap, Serum: 8 (09-15 @ 10:27)  Anion Gap, Serum: 13 (09-14 @ 10:52)      Calcium, Total Serum: 8.9 (09-15 @ 10:27)  Calcium, Total Serum: 9.4 (09-14 @ 10:52)          09-15    131<L>  |  102  |  28<H>  ----------------------------<  234<H>  3.9   |  21<L>  |  1.30    Ca    8.9      15 Sep 2018 10:27                          14.0   19.1  )-----------( 280      ( 15 Sep 2018 10:22 )             43.7     Medications  MEDICATIONS  (STANDING):  ALBUTerol    90 MICROgram(s) HFA Inhaler 1 Puff(s) Inhalation every 4 hours  ALBUTerol/ipratropium for Nebulization 3 milliLiter(s) Nebulizer every 6 hours  aspirin  chewable 81 milliGRAM(s) Oral daily  carvedilol 12.5 milliGRAM(s) Oral every 12 hours  dextrose 5%. 1000 milliLiter(s) (50 mL/Hr) IV Continuous <Continuous>  dextrose 50% Injectable 12.5 Gram(s) IV Push once  dextrose 50% Injectable 25 Gram(s) IV Push once  dextrose 50% Injectable 25 Gram(s) IV Push once  diltiazem    milliGRAM(s) Oral daily  heparin  Infusion. 400 Unit(s)/Hr (4 mL/Hr) IV Continuous <Continuous>  hydrALAZINE 50 milliGRAM(s) Oral three times a day  influenza   Vaccine 0.5 milliLiter(s) IntraMuscular once  insulin glargine Injectable (LANTUS) 18 Unit(s) SubCutaneous at bedtime  insulin lispro (HumaLOG) corrective regimen sliding scale   SubCutaneous three times a day before meals  insulin lispro Injectable (HumaLOG) 8 Unit(s) SubCutaneous three times a day before meals  losartan 100 milliGRAM(s) Oral daily  montelukast 10 milliGRAM(s) Oral at bedtime  pantoprazole    Tablet 40 milliGRAM(s) Oral before breakfast  predniSONE   Tablet 40 milliGRAM(s) Oral daily  simvastatin 40 milliGRAM(s) Oral at bedtime  tiotropium 18 MICROgram(s) Capsule 1 Capsule(s) Inhalation daily  topiramate 100 milliGRAM(s) Oral daily      Physical Exam  General: Patient comfortable in bed  Vital Signs Last 12 Hrs  T(F): 98.4 (09-15-18 @ 12:28), Max: 98.4 (09-15-18 @ 12:28)  HR: 66 (09-15-18 @ 12:28) (66 - 66)  BP: 144/77 (09-15-18 @ 12:28) (144/77 - 144/77)  BP(mean): --  RR: 18 (09-15-18 @ 12:28) (18 - 18)  SpO2: 98% (09-15-18 @ 12:28) (98% - 98%)  Neck: No palpable thyroid nodules.  CVS: S1S2, No murmurs  Respiratory: No wheezing, no crepitations  GI: Abdomen soft, bowel sounds positive  Musculoskeletal:  edema lower extremities.   Skin: No skin rashes, no ecchymosis    Diagnostics    Thyroid Stimulating Hormone, Serum: AM Sched. Collection: 15-Sep-2018 06:00 (09-14 @ 12:20)  Free Thyroxine, Serum: AM Sched. Collection: 15-Sep-2018 06:00 (09-14 @ 12:20)  TSH Receptor Antibody: AM Sched. Collection: 15-Sep-2018 06:00 (09-14 @ 12:20)

## 2018-09-15 NOTE — PROGRESS NOTE ADULT - SUBJECTIVE AND OBJECTIVE BOX
Patient is a 62y old  Female who presents with a chief complaint of sob (15 Sep 2018 12:26)      INTERVAL HPI/OVERNIGHT EVENTS:  T(C): 36.9 (09-15-18 @ 12:28), Max: 37.1 (09-14-18 @ 19:57)  HR: 66 (09-15-18 @ 12:28) (66 - 68)  BP: 144/77 (09-15-18 @ 12:28) (144/77 - 170/73)  RR: 18 (09-15-18 @ 12:28) (18 - 18)  SpO2: 98% (09-15-18 @ 12:28) (98% - 100%)  Wt(kg): --  I&O's Summary    14 Sep 2018 07:01  -  15 Sep 2018 07:00  --------------------------------------------------------  IN: 1384 mL / OUT: 501 mL / NET: 883 mL    15 Sep 2018 07:01  -  15 Sep 2018 15:47  --------------------------------------------------------  IN: 240 mL / OUT: 0 mL / NET: 240 mL        LABS:                        14.0   19.1  )-----------( 280      ( 15 Sep 2018 10:22 )             43.7     09-15    131<L>  |  102  |  28<H>  ----------------------------<  234<H>  3.9   |  21<L>  |  1.30    Ca    8.9      15 Sep 2018 10:27      PTT - ( 15 Sep 2018 00:19 )  PTT:87.4 sec    CAPILLARY BLOOD GLUCOSE      POCT Blood Glucose.: 153 mg/dL (15 Sep 2018 11:54)  POCT Blood Glucose.: 166 mg/dL (15 Sep 2018 08:05)  POCT Blood Glucose.: 219 mg/dL (14 Sep 2018 21:03)  POCT Blood Glucose.: 222 mg/dL (14 Sep 2018 16:54)            MEDICATIONS  (STANDING):  ALBUTerol    90 MICROgram(s) HFA Inhaler 1 Puff(s) Inhalation every 4 hours  ALBUTerol/ipratropium for Nebulization 3 milliLiter(s) Nebulizer every 6 hours  aspirin  chewable 81 milliGRAM(s) Oral daily  carvedilol 12.5 milliGRAM(s) Oral every 12 hours  dextrose 5%. 1000 milliLiter(s) (50 mL/Hr) IV Continuous <Continuous>  dextrose 50% Injectable 12.5 Gram(s) IV Push once  dextrose 50% Injectable 25 Gram(s) IV Push once  dextrose 50% Injectable 25 Gram(s) IV Push once  diltiazem    milliGRAM(s) Oral daily  heparin  Infusion. 400 Unit(s)/Hr (4 mL/Hr) IV Continuous <Continuous>  hydrALAZINE 50 milliGRAM(s) Oral three times a day  influenza   Vaccine 0.5 milliLiter(s) IntraMuscular once  insulin glargine Injectable (LANTUS) 18 Unit(s) SubCutaneous at bedtime  insulin lispro (HumaLOG) corrective regimen sliding scale   SubCutaneous three times a day before meals  insulin lispro Injectable (HumaLOG) 8 Unit(s) SubCutaneous three times a day before meals  losartan 100 milliGRAM(s) Oral daily  montelukast 10 milliGRAM(s) Oral at bedtime  pantoprazole    Tablet 40 milliGRAM(s) Oral before breakfast  predniSONE   Tablet 40 milliGRAM(s) Oral daily  simvastatin 40 milliGRAM(s) Oral at bedtime  tiotropium 18 MICROgram(s) Capsule 1 Capsule(s) Inhalation daily  topiramate 100 milliGRAM(s) Oral daily    MEDICATIONS  (PRN):  dextrose 40% Gel 15 Gram(s) Oral once PRN Blood Glucose LESS THAN 70 milliGRAM(s)/deciliter  glucagon  Injectable 1 milliGRAM(s) IntraMuscular once PRN Glucose LESS THAN 70 milligrams/deciliter  guaiFENesin   Syrup  (Sugar-Free) 200 milliGRAM(s) Oral every 6 hours PRN Cough  heparin  Injectable 6500 Unit(s) IV Push every 6 hours PRN For aPTT less than 40  heparin  Injectable 3000 Unit(s) IV Push every 6 hours PRN For aPTT between 40 - 57          PHYSICAL EXAM:  GENERAL: NAD, well-groomed, well-developed  HEAD:  Atraumatic, Normocephalic  CHEST/LUNG: Clear to percussion bilaterally; No rales, rhonchi, wheezing, or rubs  HEART: Regular rate and rhythm; No murmurs, rubs, or gallops  ABDOMEN: Soft, Nontender, Nondistended; Bowel sounds present  EXTREMITIES:  2+ Peripheral Pulses, No clubbing, cyanosis, or edema  LYMPH: No lymphadenopathy noted  SKIN: No rashes or lesions    Care Discussed with Consultants/Other Providers [ ] YES  [ ] NO

## 2018-09-15 NOTE — PROGRESS NOTE ADULT - SUBJECTIVE AND OBJECTIVE BOX
Infectious Diseases progress note:    Subjective: No cough or sob.  Afebrile.  WBC elevated, on prednisone taper.  No diarrhea.  No worsening foot pain - has rt foot 2nd digit discoloration.    ROS:  CONSTITUTIONAL:  No fever, chills, rigors  CARDIOVASCULAR:  No chest pain or palpitations  RESPIRATORY:   No SOB, cough, dyspnea on exertion.  No wheezing  GASTROINTESTINAL:  No abd pain, N/V, diarrhea/constipation  EXTREMITIES:  No swelling or joint pain  GENITOURINARY:  No burning on urination, increased frequency or urgency.  No flank pain  NEUROLOGIC:  No HA, visual disturbances  SKIN: No rashes    Allergies    No Known Allergies    Intolerances        ANTIBIOTICS/RELEVANT:  antimicrobials    immunologic:  influenza   Vaccine 0.5 milliLiter(s) IntraMuscular once    OTHER:  ALBUTerol    90 MICROgram(s) HFA Inhaler 1 Puff(s) Inhalation every 4 hours  ALBUTerol/ipratropium for Nebulization 3 milliLiter(s) Nebulizer every 6 hours  aspirin  chewable 81 milliGRAM(s) Oral daily  carvedilol 12.5 milliGRAM(s) Oral every 12 hours  dextrose 40% Gel 15 Gram(s) Oral once PRN  dextrose 5%. 1000 milliLiter(s) IV Continuous <Continuous>  dextrose 50% Injectable 12.5 Gram(s) IV Push once  dextrose 50% Injectable 25 Gram(s) IV Push once  dextrose 50% Injectable 25 Gram(s) IV Push once  diltiazem    milliGRAM(s) Oral daily  glucagon  Injectable 1 milliGRAM(s) IntraMuscular once PRN  guaiFENesin   Syrup  (Sugar-Free) 200 milliGRAM(s) Oral every 6 hours PRN  heparin  Infusion. 400 Unit(s)/Hr IV Continuous <Continuous>  heparin  Injectable 6500 Unit(s) IV Push every 6 hours PRN  heparin  Injectable 3000 Unit(s) IV Push every 6 hours PRN  hydrALAZINE 50 milliGRAM(s) Oral three times a day  insulin glargine Injectable (LANTUS) 18 Unit(s) SubCutaneous at bedtime  insulin lispro (HumaLOG) corrective regimen sliding scale   SubCutaneous three times a day before meals  insulin lispro Injectable (HumaLOG) 8 Unit(s) SubCutaneous three times a day before meals  losartan 100 milliGRAM(s) Oral daily  montelukast 10 milliGRAM(s) Oral at bedtime  pantoprazole    Tablet 40 milliGRAM(s) Oral before breakfast  predniSONE   Tablet 40 milliGRAM(s) Oral daily  simvastatin 40 milliGRAM(s) Oral at bedtime  tiotropium 18 MICROgram(s) Capsule 1 Capsule(s) Inhalation daily  topiramate 100 milliGRAM(s) Oral daily      Objective:  Vital Signs Last 24 Hrs  T(C): 36.6 (15 Sep 2018 04:09), Max: 37.1 (14 Sep 2018 19:57)  T(F): 97.9 (15 Sep 2018 04:09), Max: 98.7 (14 Sep 2018 19:57)  HR: 68 (15 Sep 2018 04:09) (67 - 68)  BP: 144/78 (15 Sep 2018 04:09) (144/78 - 170/73)  BP(mean): --  RR: 18 (15 Sep 2018 04:09) (18 - 18)  SpO2: 100% (15 Sep 2018 04:09) (98% - 100%)    PHYSICAL EXAM:  Constitutional:NAD  Eyes:PRIYANK, EOMI  Ear/Nose/Throat: no thrush, mucositis.  Moist mucous membranes	  Neck:no JVD, no lymphadenopathy, supple  Respiratory: CTA suzanna  Cardiovascular: S1S2 RRR, no murmurs  Gastrointestinal:soft, nontender,  nondistended (+) BS  Extremities:no e/e/c  Skin: rt foot 2nd digit cyanosis.  No skin breakdown or gangrene        LABS:                        14.0   19.1  )-----------( 280      ( 15 Sep 2018 10:22 )             43.7     09-15    131<L>  |  102  |  28<H>  ----------------------------<  234<H>  3.9   |  21<L>  |  1.30    Ca    8.9      15 Sep 2018 10:27      PTT - ( 15 Sep 2018 00:19 )  PTT:87.4 sec                Rapid RVP Result: NotDetec          MICROBIOLOGY:          RADIOLOGY & ADDITIONAL STUDIES:

## 2018-09-15 NOTE — PROGRESS NOTE ADULT - SUBJECTIVE AND OBJECTIVE BOX
Patient is a 62y old  Female who presents with a chief complaint of sob (15 Sep 2018 10:00)      Any change in ROS: pt is doing ok : no SOB     MEDICATIONS  (STANDING):  ALBUTerol    90 MICROgram(s) HFA Inhaler 1 Puff(s) Inhalation every 4 hours  ALBUTerol/ipratropium for Nebulization 3 milliLiter(s) Nebulizer every 6 hours  aspirin  chewable 81 milliGRAM(s) Oral daily  carvedilol 12.5 milliGRAM(s) Oral every 12 hours  dextrose 5%. 1000 milliLiter(s) (50 mL/Hr) IV Continuous <Continuous>  dextrose 50% Injectable 12.5 Gram(s) IV Push once  dextrose 50% Injectable 25 Gram(s) IV Push once  dextrose 50% Injectable 25 Gram(s) IV Push once  diltiazem    milliGRAM(s) Oral daily  heparin  Infusion. 400 Unit(s)/Hr (4 mL/Hr) IV Continuous <Continuous>  hydrALAZINE 50 milliGRAM(s) Oral three times a day  influenza   Vaccine 0.5 milliLiter(s) IntraMuscular once  insulin glargine Injectable (LANTUS) 18 Unit(s) SubCutaneous at bedtime  insulin lispro (HumaLOG) corrective regimen sliding scale   SubCutaneous three times a day before meals  insulin lispro Injectable (HumaLOG) 8 Unit(s) SubCutaneous three times a day before meals  losartan 100 milliGRAM(s) Oral daily  montelukast 10 milliGRAM(s) Oral at bedtime  pantoprazole    Tablet 40 milliGRAM(s) Oral before breakfast  predniSONE   Tablet 40 milliGRAM(s) Oral daily  simvastatin 40 milliGRAM(s) Oral at bedtime  tiotropium 18 MICROgram(s) Capsule 1 Capsule(s) Inhalation daily  topiramate 100 milliGRAM(s) Oral daily    MEDICATIONS  (PRN):  dextrose 40% Gel 15 Gram(s) Oral once PRN Blood Glucose LESS THAN 70 milliGRAM(s)/deciliter  glucagon  Injectable 1 milliGRAM(s) IntraMuscular once PRN Glucose LESS THAN 70 milligrams/deciliter  guaiFENesin   Syrup  (Sugar-Free) 200 milliGRAM(s) Oral every 6 hours PRN Cough  heparin  Injectable 6500 Unit(s) IV Push every 6 hours PRN For aPTT less than 40  heparin  Injectable 3000 Unit(s) IV Push every 6 hours PRN For aPTT between 40 - 57    Vital Signs Last 24 Hrs  T(C): 36.6 (15 Sep 2018 04:09), Max: 37.1 (14 Sep 2018 19:57)  T(F): 97.9 (15 Sep 2018 04:09), Max: 98.7 (14 Sep 2018 19:57)  HR: 68 (15 Sep 2018 04:09) (67 - 68)  BP: 144/78 (15 Sep 2018 04:09) (144/78 - 170/73)  BP(mean): --  RR: 18 (15 Sep 2018 04:09) (18 - 18)  SpO2: 100% (15 Sep 2018 04:09) (98% - 100%)    I&O's Summary    14 Sep 2018 07:01  -  15 Sep 2018 07:00  --------------------------------------------------------  IN: 1384 mL / OUT: 501 mL / NET: 883 mL          Physical Exam:   GENERAL: NAD, well-groomed, well-developed  HEENT: PRIYANK/   Atraumatic, Normocephalic  ENMT: No tonsillar erythema, exudates, or enlargement; Moist mucous membranes, Good dentition, No lesions  NECK: Supple, No JVD, Normal thyroid  CHEST/LUNG: Clear to auscultaion  CVS: Regular rate and rhythm; No murmurs, rubs, or gallops  GI: : Soft, Nontender, Nondistended; Bowel sounds present  NERVOUS SYSTEM:  Alert & Oriented X3  EXTREMITIES:  2+ Peripheral Pulses, No clubbing, cyanosis, or edema  LYMPH: No lymphadenopathy noted  SKIN: No rashes or lesions  ENDOCRINOLOGY: No Thyromegaly  PSYCH: Appropriate    Labs:                              14.0   19.1  )-----------( 280      ( 15 Sep 2018 10:22 )             43.7                         14.9   14.84 )-----------( 292      ( 14 Sep 2018 14:07 )             46.8                         13.2   13.97 )-----------( 283      ( 13 Sep 2018 09:25 )             41.7                         13.1   13.0  )-----------( 256      ( 13 Sep 2018 00:55 )             40.2                         13.5   14.10 )-----------( 255      ( 12 Sep 2018 07:36 )             41.3     09-14    136  |  103  |  26<H>  ----------------------------<  213<H>  3.9   |  20<L>  |  1.26  09-13    136  |  105  |  20  ----------------------------<  157<H>  3.8   |  21<L>  |  1.02  09-12    138  |  106  |  26<H>  ----------------------------<  213<H>  3.9   |  20<L>  |  1.03    Ca    9.4      14 Sep 2018 10:52      CAPILLARY BLOOD GLUCOSE      POCT Blood Glucose.: 166 mg/dL (15 Sep 2018 08:05)  POCT Blood Glucose.: 219 mg/dL (14 Sep 2018 21:03)  POCT Blood Glucose.: 222 mg/dL (14 Sep 2018 16:54)        PTT - ( 15 Sep 2018 00:19 )  PTT:87.4 sec    < from: CT Angio Abd Aorta w/run-off w/ IV Cont (09.12.18 @ 17:40) >  ed.  COMMON FEMORAL ARTERY: Widely patent.  FEMORAL ARTERY: Occluded and reconstituted in the distal femoral artery.  PROFUNDA FEMORAL ARTERY: Widely patent.  POPLITEAL ARTERY: Widely patent.  ANTERIOR TIBIAL ARTERY: Widely patent.  TIBIOPERONEAL TRUNK: Widely patent.  POSTERIOR TIBIAL ARTERY: Widely patent.  PERONEAL ARTERY: Widely patent.    ADDITIONAL FINDINGS: Small left hepatic cyst. Renal cysts. Hysterectomy.     IMPRESSION:     RIGHT LOWER EXTREMITY: Occluded external iliac artery and common femoral   artery. Widely patent profunda femoral artery via a femoral-femoral   bypass graft. Occlusion of the femoral artery and lower extremity bypass   graft. Reconstitution of the popliteal artery with three-vessel runoff   although the tibioperoneal trunk has moderate focal stenosis.    LEFT LOWER EXTREMITY: Femoral artery is occluded with reconstitution of   the distal femoral artery. Three-vessel runoff.          < from: [facility] Duplex Lower Ext Vein Scan, Bilat (09.10.18 @ 20:59) >    There is normal compressibility of the left and right common femoral,   femoral and popliteal veins. No calf vein thrombosis is detected.    Doppler examination shows normal spontaneous and phasic flow.    IMPRESSION:     No evidence of bilateral lower extremity deep venous thrombosis.        These findings were discussed with CHRISTOPHER Kumar at 9/10/2018 9:17 PM by Dr. Alhaji Stover (Kevin) of Radiology with read back confirmation.                DAISY VINES M.D., RADIOLOGY RESIDENT  This document has been electronically signed.  MICA YANCEY M.D., ATTENDING RADIOLOGIST  This document has been electronically signed. Sep 11 2018 10:12AM    < end of copied text >            LISA LU M.D., ATTENDING RADIOLOGIST  This document has been electronically signed. Sep 13 2018  9:22AM    < end of copied text >        RECENT CULTURES:        RESPIRATORY CULTURES:          Studies  Chest X-RAY  CT SCAN Chest   Venous Dopplers: LE:   CT Abdomen  Others

## 2018-09-15 NOTE — PROGRESS NOTE ADULT - PROBLEM SELECTOR PLAN 1
much better: Her wheezing has resolved: Cont steroids for a few days more  9/15: doing ok : no SOB : finish steroids soon

## 2018-09-15 NOTE — PROGRESS NOTE ADULT - SUBJECTIVE AND OBJECTIVE BOX
- Patient seen and examined.  - In summary, patient is a 62 year old woman who presented with sob (13 Sep 2018 20:06)  - Today, patient is without complaints.         *****MEDICATIONS:    MEDICATIONS  (STANDING):  ALBUTerol    90 MICROgram(s) HFA Inhaler 1 Puff(s) Inhalation every 4 hours  ALBUTerol/ipratropium for Nebulization 3 milliLiter(s) Nebulizer every 6 hours  aspirin  chewable 81 milliGRAM(s) Oral daily  carvedilol 12.5 milliGRAM(s) Oral every 12 hours  dextrose 5%. 1000 milliLiter(s) (50 mL/Hr) IV Continuous <Continuous>  dextrose 50% Injectable 12.5 Gram(s) IV Push once  dextrose 50% Injectable 25 Gram(s) IV Push once  dextrose 50% Injectable 25 Gram(s) IV Push once  diltiazem    milliGRAM(s) Oral daily  heparin  Infusion. 400 Unit(s)/Hr (4 mL/Hr) IV Continuous <Continuous>  hydrALAZINE 50 milliGRAM(s) Oral three times a day  influenza   Vaccine 0.5 milliLiter(s) IntraMuscular once  insulin glargine Injectable (LANTUS) 18 Unit(s) SubCutaneous at bedtime  insulin lispro (HumaLOG) corrective regimen sliding scale   SubCutaneous three times a day before meals  insulin lispro Injectable (HumaLOG) 8 Unit(s) SubCutaneous three times a day before meals  losartan 100 milliGRAM(s) Oral daily  montelukast 10 milliGRAM(s) Oral at bedtime  pantoprazole    Tablet 40 milliGRAM(s) Oral before breakfast  predniSONE   Tablet 40 milliGRAM(s) Oral daily  simvastatin 40 milliGRAM(s) Oral at bedtime  tiotropium 18 MICROgram(s) Capsule 1 Capsule(s) Inhalation daily  topiramate 100 milliGRAM(s) Oral daily    MEDICATIONS  (PRN):  dextrose 40% Gel 15 Gram(s) Oral once PRN Blood Glucose LESS THAN 70 milliGRAM(s)/deciliter  glucagon  Injectable 1 milliGRAM(s) IntraMuscular once PRN Glucose LESS THAN 70 milligrams/deciliter  guaiFENesin   Syrup  (Sugar-Free) 200 milliGRAM(s) Oral every 6 hours PRN Cough  heparin  Injectable 6500 Unit(s) IV Push every 6 hours PRN For aPTT less than 40  heparin  Injectable 3000 Unit(s) IV Push every 6 hours PRN For aPTT between 40 - 57             ***** REVIEW OF SYSTEM:  GEN: no fever, no chills, no pain  RESP: no SOB, no cough, no sputum  CVS: no chest pain, no palpitations, no edema  GI: no abdominal pain, no nausea, no vomiting, no constipation, no diarrhea  : no dysuria, no frequency  NEURO: no headache, no dizziness  PSYCH: no depression, not anxious  Derm : no itching, no rash         ***** VITAL SIGNS:    T(F): 97.9 (09-15-18 @ 04:09), Max: 98.7 (18 @ 19:57)  HR: 68 (09-15-18 @ 04:09) (67 - 68)  BP: 144/78 (09-15-18 @ 04:09) (144/78 - 170/73)  RR: 18 (09-15-18 @ 04:09) (18 - 18)  SpO2: 100% (09-15-18 @ 04:09) (98% - 100%)  Wt(kg): --  ,   I&O's Summary    14 Sep 2018 07:01  -  15 Sep 2018 07:00  --------------------------------------------------------  IN: 1384 mL / OUT: 501 mL / NET: 883 mL                   *****PHYSICAL EXAM:  GEN: A&O X 3 , NAD , comfortable  HEENT: NCAT, EOMI, MMM, no icterus  NECK: Supple, No JVD  CVS: S1S2 , regular , No M/R/G appreciated  PULM: CTA B/L,  no W/R/R appreciated  ABD.: soft. non tender, non distended,  bowel sounds present  Extrem: intact pulses , no edema noted  Derm: No rash or ecchymosis noted  PSYCH: normal mood, no depression, not anxious         *****LAB AND IMAGIN.9   14.84 )-----------( 292      ( 14 Sep 2018 14:07 )             46.8               -    136  |  103  |  26<H>  ----------------------------<  213<H>  3.9   |  20<L>  |  1.26    Ca    9.4      14 Sep 2018 10:52      PTT - ( 15 Sep 2018 00:19 )  PTT:87.4 sec                   [All pertinent recent Imaging/Reports reviewed]  < from: Nuclear Stress Test-Pharmacologic (18 @ 14:02) >  * The left ventricle was normal in size. There is a small  mild reversible defect in the distal inferolateral wall  consistentwith mild ischemia.    < end of copied text >    < from: Transthoracic Echocardiogram (18 @ 15:09) >  Conclusions:  1. Calcified trileaflet aortic valve with normal opening.  Mild aortic regurgitation.  2. Increased relative wall thickness with normal left  ventricular mass index, consistent with concentric left  ventricular remodeling.  3. Hyperdynamic left ventricular systolic function. The  upper septum measures 1.6 cm with mild LVOT obstruction  45mmHG.  4. Strain imaging was performed on the [facility] EPIQ with an  average HR of 55 bpm and a BP of 169/94. Global L. Strain=  -25.7  5. Normal right ventricular size and function.    < end of copied text >         *****A S S E S S M E N T   A N D   P L A N :  62F with PAD, CAD, COPD adm with dyspnea  tx COPD per pulm  no active wheeze  non obstructive CAD on cath   stress test noted  will review stress images when lab is open in AM and decide if cath warranted  severe PAD on CTA  c/o left breast pain r/w massage, unlikely angina  echo noted  d/w vasc        __________________________  JACK Alegre D.O.

## 2018-09-16 LAB
ANION GAP SERPL CALC-SCNC: 12 MMOL/L — SIGNIFICANT CHANGE UP (ref 5–17)
APTT BLD: 67 SEC — HIGH (ref 27.5–37.4)
BASOPHILS # BLD AUTO: 0 K/UL — SIGNIFICANT CHANGE UP (ref 0–0.2)
BASOPHILS NFR BLD AUTO: 0 % — SIGNIFICANT CHANGE UP (ref 0–2)
BUN SERPL-MCNC: 22 MG/DL — SIGNIFICANT CHANGE UP (ref 7–23)
CALCIUM SERPL-MCNC: 8.8 MG/DL — SIGNIFICANT CHANGE UP (ref 8.4–10.5)
CHLORIDE SERPL-SCNC: 105 MMOL/L — SIGNIFICANT CHANGE UP (ref 96–108)
CO2 SERPL-SCNC: 22 MMOL/L — SIGNIFICANT CHANGE UP (ref 22–31)
CREAT SERPL-MCNC: 0.98 MG/DL — SIGNIFICANT CHANGE UP (ref 0.5–1.3)
EOSINOPHIL # BLD AUTO: 0.23 K/UL — SIGNIFICANT CHANGE UP (ref 0–0.5)
EOSINOPHIL NFR BLD AUTO: 1 % — SIGNIFICANT CHANGE UP (ref 0–6)
GLUCOSE BLDC GLUCOMTR-MCNC: 117 MG/DL — HIGH (ref 70–99)
GLUCOSE BLDC GLUCOMTR-MCNC: 144 MG/DL — HIGH (ref 70–99)
GLUCOSE BLDC GLUCOMTR-MCNC: 171 MG/DL — HIGH (ref 70–99)
GLUCOSE BLDC GLUCOMTR-MCNC: 182 MG/DL — HIGH (ref 70–99)
GLUCOSE SERPL-MCNC: 117 MG/DL — HIGH (ref 70–99)
HCT VFR BLD CALC: 41.8 % — SIGNIFICANT CHANGE UP (ref 34.5–45)
HGB BLD-MCNC: 13.6 G/DL — SIGNIFICANT CHANGE UP (ref 11.5–15.5)
LYMPHOCYTES # BLD AUTO: 28 % — SIGNIFICANT CHANGE UP (ref 13–44)
LYMPHOCYTES # BLD AUTO: 6.45 K/UL — HIGH (ref 1–3.3)
MCHC RBC-ENTMCNC: 26.5 PG — LOW (ref 27–34)
MCHC RBC-ENTMCNC: 32.5 GM/DL — SIGNIFICANT CHANGE UP (ref 32–36)
MCV RBC AUTO: 81.3 FL — SIGNIFICANT CHANGE UP (ref 80–100)
MONOCYTES # BLD AUTO: 1.84 K/UL — HIGH (ref 0–0.9)
MONOCYTES NFR BLD AUTO: 8 % — SIGNIFICANT CHANGE UP (ref 2–14)
NEUTROPHILS # BLD AUTO: 12.9 K/UL — HIGH (ref 1.8–7.4)
NEUTROPHILS NFR BLD AUTO: 52 % — SIGNIFICANT CHANGE UP (ref 43–77)
PHOSPHATE SERPL-MCNC: 2.4 MG/DL — LOW (ref 2.5–4.5)
PLATELET # BLD AUTO: 224 K/UL — SIGNIFICANT CHANGE UP (ref 150–400)
POTASSIUM SERPL-MCNC: 3.9 MMOL/L — SIGNIFICANT CHANGE UP (ref 3.5–5.3)
POTASSIUM SERPL-SCNC: 3.9 MMOL/L — SIGNIFICANT CHANGE UP (ref 3.5–5.3)
RBC # BLD: 5.14 M/UL — SIGNIFICANT CHANGE UP (ref 3.8–5.2)
RBC # FLD: 15.4 % — HIGH (ref 10.3–14.5)
SODIUM SERPL-SCNC: 139 MMOL/L — SIGNIFICANT CHANGE UP (ref 135–145)
WBC # BLD: 23.04 K/UL — HIGH (ref 3.8–10.5)
WBC # FLD AUTO: 23.04 K/UL — HIGH (ref 3.8–10.5)

## 2018-09-16 RX ADMIN — Medication 50 MILLIGRAM(S): at 05:06

## 2018-09-16 RX ADMIN — MONTELUKAST 10 MILLIGRAM(S): 4 TABLET, CHEWABLE ORAL at 21:04

## 2018-09-16 RX ADMIN — CARVEDILOL PHOSPHATE 12.5 MILLIGRAM(S): 80 CAPSULE, EXTENDED RELEASE ORAL at 05:06

## 2018-09-16 RX ADMIN — Medication 81 MILLIGRAM(S): at 12:33

## 2018-09-16 RX ADMIN — SIMVASTATIN 40 MILLIGRAM(S): 20 TABLET, FILM COATED ORAL at 21:03

## 2018-09-16 RX ADMIN — Medication 1: at 12:32

## 2018-09-16 RX ADMIN — Medication 8 UNIT(S): at 16:28

## 2018-09-16 RX ADMIN — Medication 100 MILLIGRAM(S): at 12:33

## 2018-09-16 RX ADMIN — Medication 8 UNIT(S): at 12:32

## 2018-09-16 RX ADMIN — Medication 40 MILLIGRAM(S): at 05:06

## 2018-09-16 RX ADMIN — HEPARIN SODIUM 700 UNIT(S)/HR: 5000 INJECTION INTRAVENOUS; SUBCUTANEOUS at 09:35

## 2018-09-16 RX ADMIN — CARVEDILOL PHOSPHATE 12.5 MILLIGRAM(S): 80 CAPSULE, EXTENDED RELEASE ORAL at 16:29

## 2018-09-16 RX ADMIN — Medication 3 MILLILITER(S): at 00:01

## 2018-09-16 RX ADMIN — Medication 1: at 16:28

## 2018-09-16 RX ADMIN — Medication 3 MILLILITER(S): at 23:26

## 2018-09-16 RX ADMIN — Medication 50 MILLIGRAM(S): at 21:04

## 2018-09-16 RX ADMIN — Medication 3 MILLILITER(S): at 12:32

## 2018-09-16 RX ADMIN — LOSARTAN POTASSIUM 100 MILLIGRAM(S): 100 TABLET, FILM COATED ORAL at 05:06

## 2018-09-16 RX ADMIN — Medication 3 MILLILITER(S): at 16:29

## 2018-09-16 RX ADMIN — Medication 3 MILLILITER(S): at 05:06

## 2018-09-16 RX ADMIN — PANTOPRAZOLE SODIUM 40 MILLIGRAM(S): 20 TABLET, DELAYED RELEASE ORAL at 05:06

## 2018-09-16 RX ADMIN — INSULIN GLARGINE 18 UNIT(S): 100 INJECTION, SOLUTION SUBCUTANEOUS at 21:49

## 2018-09-16 RX ADMIN — Medication 50 MILLIGRAM(S): at 13:00

## 2018-09-16 RX ADMIN — Medication 8 UNIT(S): at 08:17

## 2018-09-16 RX ADMIN — Medication 180 MILLIGRAM(S): at 05:06

## 2018-09-16 NOTE — PROGRESS NOTE ADULT - SUBJECTIVE AND OBJECTIVE BOX
Infectious Diseases progress note:    Subjective:  c/o pain/mild swelling at prior pIV site in L brachial area.  No fevers/chills.  WBC elevated      ROS:  CONSTITUTIONAL:  No fever, chills, rigors  CARDIOVASCULAR:  No chest pain or palpitations  RESPIRATORY:   No SOB, cough, dyspnea on exertion.  No wheezing  GASTROINTESTINAL:  No abd pain, N/V, diarrhea/constipation  EXTREMITIES:  No swelling or joint pain  GENITOURINARY:  No burning on urination, increased frequency or urgency.  No flank pain  NEUROLOGIC:  No HA, visual disturbances  SKIN: No rashes    Allergies    No Known Allergies    Intolerances        ANTIBIOTICS/RELEVANT:  antimicrobials    immunologic:  influenza   Vaccine 0.5 milliLiter(s) IntraMuscular once    OTHER:  ALBUTerol    90 MICROgram(s) HFA Inhaler 1 Puff(s) Inhalation every 4 hours  ALBUTerol/ipratropium for Nebulization 3 milliLiter(s) Nebulizer every 6 hours  aspirin  chewable 81 milliGRAM(s) Oral daily  carvedilol 12.5 milliGRAM(s) Oral every 12 hours  dextrose 40% Gel 15 Gram(s) Oral once PRN  dextrose 5%. 1000 milliLiter(s) IV Continuous <Continuous>  dextrose 50% Injectable 12.5 Gram(s) IV Push once  dextrose 50% Injectable 25 Gram(s) IV Push once  dextrose 50% Injectable 25 Gram(s) IV Push once  diltiazem    milliGRAM(s) Oral daily  glucagon  Injectable 1 milliGRAM(s) IntraMuscular once PRN  guaiFENesin   Syrup  (Sugar-Free) 200 milliGRAM(s) Oral every 6 hours PRN  heparin  Infusion. 400 Unit(s)/Hr IV Continuous <Continuous>  heparin  Injectable 6500 Unit(s) IV Push every 6 hours PRN  heparin  Injectable 3000 Unit(s) IV Push every 6 hours PRN  hydrALAZINE 50 milliGRAM(s) Oral three times a day  insulin glargine Injectable (LANTUS) 18 Unit(s) SubCutaneous at bedtime  insulin lispro (HumaLOG) corrective regimen sliding scale   SubCutaneous three times a day before meals  insulin lispro Injectable (HumaLOG) 8 Unit(s) SubCutaneous three times a day before meals  losartan 100 milliGRAM(s) Oral daily  montelukast 10 milliGRAM(s) Oral at bedtime  pantoprazole    Tablet 40 milliGRAM(s) Oral before breakfast  predniSONE   Tablet 40 milliGRAM(s) Oral daily  simvastatin 40 milliGRAM(s) Oral at bedtime  tiotropium 18 MICROgram(s) Capsule 1 Capsule(s) Inhalation daily  topiramate 100 milliGRAM(s) Oral daily      Objective:  Vital Signs Last 24 Hrs  T(C): 36.9 (16 Sep 2018 12:37), Max: 36.9 (15 Sep 2018 20:28)  T(F): 98.4 (16 Sep 2018 12:37), Max: 98.5 (16 Sep 2018 08:21)  HR: 71 (16 Sep 2018 12:37) (60 - 71)  BP: 132/97 (16 Sep 2018 12:37) (132/97 - 193/97)  BP(mean): --  RR: 18 (16 Sep 2018 12:37) (18 - 18)  SpO2: 99% (16 Sep 2018 12:37) (97% - 99%)    PHYSICAL EXAM:  Constitutional:NAD  Eyes:PRIYANK, EOMI  Ear/Nose/Throat: no thrush, mucositis.  Moist mucous membranes	  Neck:no JVD, no lymphadenopathy, supple  Respiratory: CTA suzanna  Cardiovascular: S1S2 RRR, no murmurs  Gastrointestinal:soft, nontender,  nondistended (+) BS  Extremities:no e/e/c  Skin:  no rashes, open wounds or ulcerations        LABS:                        13.6   23.04 )-----------( 224      ( 16 Sep 2018 08:02 )             41.8     09-16    139  |  105  |  22  ----------------------------<  117<H>  3.9   |  22  |  0.98    Ca    8.8      16 Sep 2018 06:43  Phos  2.4     09-16      PTT - ( 16 Sep 2018 08:02 )  PTT:67.0 sec                Rapid RVP Result: NotDete          MICROBIOLOGY:            RADIOLOGY & ADDITIONAL STUDIES:

## 2018-09-16 NOTE — PROGRESS NOTE ADULT - PROBLEM SELECTOR PLAN 1
much better: Her wheezing has resolved: Cont steroids for a few days more  9/15: doing ok : no SOB : finish steroids soon  9/16: Resolved: She has been afebrile: Her WBC is significantly high which is likely due to steroids: Steroids will finish soon" Pt should be optimized by the time she will go for surgery for legs:

## 2018-09-16 NOTE — PROGRESS NOTE ADULT - SUBJECTIVE AND OBJECTIVE BOX
Chief complaint  Patient is a 62y old  Female who presents with a chief complaint of sob (16 Sep 2018 16:09)   Review of systems  Patient in bed, looks comfortable, no fever, no hypoglycemia.    Labs and Fingersticks  CAPILLARY BLOOD GLUCOSE      POCT Blood Glucose.: 171 mg/dL (16 Sep 2018 16:15)  POCT Blood Glucose.: 182 mg/dL (16 Sep 2018 11:42)  POCT Blood Glucose.: 117 mg/dL (16 Sep 2018 07:58)  POCT Blood Glucose.: 193 mg/dL (15 Sep 2018 21:22)      Anion Gap, Serum: 12 (09-16 @ 06:43)  Anion Gap, Serum: 8 (09-15 @ 10:27)      Calcium, Total Serum: 8.8 (09-16 @ 06:43)  Calcium, Total Serum: 8.9 (09-15 @ 10:27)          09-16    139  |  105  |  22  ----------------------------<  117<H>  3.9   |  22  |  0.98    Ca    8.8      16 Sep 2018 06:43  Phos  2.4     09-16                          13.6   23.04 )-----------( 224      ( 16 Sep 2018 08:02 )             41.8     Medications  MEDICATIONS  (STANDING):  ALBUTerol    90 MICROgram(s) HFA Inhaler 1 Puff(s) Inhalation every 4 hours  ALBUTerol/ipratropium for Nebulization 3 milliLiter(s) Nebulizer every 6 hours  aspirin  chewable 81 milliGRAM(s) Oral daily  carvedilol 12.5 milliGRAM(s) Oral every 12 hours  dextrose 5%. 1000 milliLiter(s) (50 mL/Hr) IV Continuous <Continuous>  dextrose 50% Injectable 12.5 Gram(s) IV Push once  dextrose 50% Injectable 25 Gram(s) IV Push once  dextrose 50% Injectable 25 Gram(s) IV Push once  diltiazem    milliGRAM(s) Oral daily  heparin  Infusion. 400 Unit(s)/Hr (4 mL/Hr) IV Continuous <Continuous>  hydrALAZINE 50 milliGRAM(s) Oral three times a day  influenza   Vaccine 0.5 milliLiter(s) IntraMuscular once  insulin glargine Injectable (LANTUS) 18 Unit(s) SubCutaneous at bedtime  insulin lispro (HumaLOG) corrective regimen sliding scale   SubCutaneous three times a day before meals  insulin lispro Injectable (HumaLOG) 8 Unit(s) SubCutaneous three times a day before meals  losartan 100 milliGRAM(s) Oral daily  montelukast 10 milliGRAM(s) Oral at bedtime  pantoprazole    Tablet 40 milliGRAM(s) Oral before breakfast  predniSONE   Tablet 40 milliGRAM(s) Oral daily  simvastatin 40 milliGRAM(s) Oral at bedtime  tiotropium 18 MICROgram(s) Capsule 1 Capsule(s) Inhalation daily  topiramate 100 milliGRAM(s) Oral daily      Physical Exam  General: Patient comfortable in bed  Vital Signs Last 12 Hrs  T(F): 98.4 (09-16-18 @ 12:37), Max: 98.5 (09-16-18 @ 08:21)  HR: 71 (09-16-18 @ 12:37) (67 - 71)  BP: 132/97 (09-16-18 @ 12:37) (132/97 - 147/87)  BP(mean): --  RR: 18 (09-16-18 @ 12:37) (18 - 18)  SpO2: 99% (09-16-18 @ 12:37) (97% - 99%)  Neck: No palpable thyroid nodules.  CVS: S1S2, No murmurs  Respiratory: No wheezing, no crepitations  GI: Abdomen soft, bowel sounds positive  Musculoskeletal:  edema lower extremities.   Skin: No skin rashes, no ecchymosis    Diagnostics    Thyroid Stimulating Hormone, Serum: AM Sched. Collection: 15-Sep-2018 06:00 (09-14 @ 12:20)  Free Thyroxine, Serum: AM Sched. Collection: 15-Sep-2018 06:00 (09-14 @ 12:20)  TSH Receptor Antibody: AM Sched. Collection: 15-Sep-2018 06:00 (09-14 @ 12:20)

## 2018-09-16 NOTE — PROGRESS NOTE ADULT - SUBJECTIVE AND OBJECTIVE BOX
Patient is a 62y old  Female who presents with a chief complaint of sob (16 Sep 2018 12:01)      INTERVAL HPI/OVERNIGHT EVENTS:  T(C): 36.9 (09-16-18 @ 12:37), Max: 36.9 (09-15-18 @ 20:28)  HR: 71 (09-16-18 @ 12:37) (60 - 71)  BP: 132/97 (09-16-18 @ 12:37) (132/97 - 193/97)  RR: 18 (09-16-18 @ 12:37) (18 - 18)  SpO2: 99% (09-16-18 @ 12:37) (97% - 99%)  Wt(kg): --  I&O's Summary    15 Sep 2018 07:01  -  16 Sep 2018 07:00  --------------------------------------------------------  IN: 964 mL / OUT: 0 mL / NET: 964 mL        LABS:                        13.6   23.04 )-----------( 224      ( 16 Sep 2018 08:02 )             41.8     09-16    139  |  105  |  22  ----------------------------<  117<H>  3.9   |  22  |  0.98    Ca    8.8      16 Sep 2018 06:43  Phos  2.4     09-16      PTT - ( 16 Sep 2018 08:02 )  PTT:67.0 sec    CAPILLARY BLOOD GLUCOSE      POCT Blood Glucose.: 182 mg/dL (16 Sep 2018 11:42)  POCT Blood Glucose.: 117 mg/dL (16 Sep 2018 07:58)  POCT Blood Glucose.: 193 mg/dL (15 Sep 2018 21:22)  POCT Blood Glucose.: 210 mg/dL (15 Sep 2018 16:22)            MEDICATIONS  (STANDING):  ALBUTerol    90 MICROgram(s) HFA Inhaler 1 Puff(s) Inhalation every 4 hours  ALBUTerol/ipratropium for Nebulization 3 milliLiter(s) Nebulizer every 6 hours  aspirin  chewable 81 milliGRAM(s) Oral daily  carvedilol 12.5 milliGRAM(s) Oral every 12 hours  dextrose 5%. 1000 milliLiter(s) (50 mL/Hr) IV Continuous <Continuous>  dextrose 50% Injectable 12.5 Gram(s) IV Push once  dextrose 50% Injectable 25 Gram(s) IV Push once  dextrose 50% Injectable 25 Gram(s) IV Push once  diltiazem    milliGRAM(s) Oral daily  heparin  Infusion. 400 Unit(s)/Hr (4 mL/Hr) IV Continuous <Continuous>  hydrALAZINE 50 milliGRAM(s) Oral three times a day  influenza   Vaccine 0.5 milliLiter(s) IntraMuscular once  insulin glargine Injectable (LANTUS) 18 Unit(s) SubCutaneous at bedtime  insulin lispro (HumaLOG) corrective regimen sliding scale   SubCutaneous three times a day before meals  insulin lispro Injectable (HumaLOG) 8 Unit(s) SubCutaneous three times a day before meals  losartan 100 milliGRAM(s) Oral daily  montelukast 10 milliGRAM(s) Oral at bedtime  pantoprazole    Tablet 40 milliGRAM(s) Oral before breakfast  predniSONE   Tablet 40 milliGRAM(s) Oral daily  simvastatin 40 milliGRAM(s) Oral at bedtime  tiotropium 18 MICROgram(s) Capsule 1 Capsule(s) Inhalation daily  topiramate 100 milliGRAM(s) Oral daily    MEDICATIONS  (PRN):  dextrose 40% Gel 15 Gram(s) Oral once PRN Blood Glucose LESS THAN 70 milliGRAM(s)/deciliter  glucagon  Injectable 1 milliGRAM(s) IntraMuscular once PRN Glucose LESS THAN 70 milligrams/deciliter  guaiFENesin   Syrup  (Sugar-Free) 200 milliGRAM(s) Oral every 6 hours PRN Cough  heparin  Injectable 6500 Unit(s) IV Push every 6 hours PRN For aPTT less than 40  heparin  Injectable 3000 Unit(s) IV Push every 6 hours PRN For aPTT between 40 - 57          PHYSICAL EXAM:  GENERAL: NAD, well-groomed, well-developed  HEAD:  Atraumatic, Normocephalic  CHEST/LUNG: Clear to percussion bilaterally; No rales, rhonchi, wheezing, or rubs  HEART: Regular rate and rhythm; No murmurs, rubs, or gallops  ABDOMEN: Soft, Nontender, Nondistended; Bowel sounds present  EXTREMITIES:  2+ Peripheral Pulses, No clubbing, cyanosis, or edema  LYMPH: No lymphadenopathy noted  SKIN: No rashes or lesions    Care Discussed with Consultants/Other Providers [ ] YES  [ ] NO

## 2018-09-16 NOTE — PROGRESS NOTE ADULT - SUBJECTIVE AND OBJECTIVE BOX
- Patient seen and examined.  - In summary, patient is a 62 year old woman who presented with sob (13 Sep 2018 20:06)  - Today, patient is without complaints.         *****MEDICATIONS:    MEDICATIONS  (STANDING):  ALBUTerol    90 MICROgram(s) HFA Inhaler 1 Puff(s) Inhalation every 4 hours  ALBUTerol/ipratropium for Nebulization 3 milliLiter(s) Nebulizer every 6 hours  aspirin  chewable 81 milliGRAM(s) Oral daily  carvedilol 12.5 milliGRAM(s) Oral every 12 hours  dextrose 5%. 1000 milliLiter(s) (50 mL/Hr) IV Continuous <Continuous>  dextrose 50% Injectable 12.5 Gram(s) IV Push once  dextrose 50% Injectable 25 Gram(s) IV Push once  dextrose 50% Injectable 25 Gram(s) IV Push once  diltiazem    milliGRAM(s) Oral daily  heparin  Infusion. 400 Unit(s)/Hr (4 mL/Hr) IV Continuous <Continuous>  hydrALAZINE 50 milliGRAM(s) Oral three times a day  influenza   Vaccine 0.5 milliLiter(s) IntraMuscular once  insulin glargine Injectable (LANTUS) 18 Unit(s) SubCutaneous at bedtime  insulin lispro (HumaLOG) corrective regimen sliding scale   SubCutaneous three times a day before meals  insulin lispro Injectable (HumaLOG) 8 Unit(s) SubCutaneous three times a day before meals  losartan 100 milliGRAM(s) Oral daily  montelukast 10 milliGRAM(s) Oral at bedtime  pantoprazole    Tablet 40 milliGRAM(s) Oral before breakfast  predniSONE   Tablet 40 milliGRAM(s) Oral daily  simvastatin 40 milliGRAM(s) Oral at bedtime  tiotropium 18 MICROgram(s) Capsule 1 Capsule(s) Inhalation daily  topiramate 100 milliGRAM(s) Oral daily    MEDICATIONS  (PRN):  dextrose 40% Gel 15 Gram(s) Oral once PRN Blood Glucose LESS THAN 70 milliGRAM(s)/deciliter  glucagon  Injectable 1 milliGRAM(s) IntraMuscular once PRN Glucose LESS THAN 70 milligrams/deciliter  guaiFENesin   Syrup  (Sugar-Free) 200 milliGRAM(s) Oral every 6 hours PRN Cough  heparin  Injectable 6500 Unit(s) IV Push every 6 hours PRN For aPTT less than 40  heparin  Injectable 3000 Unit(s) IV Push every 6 hours PRN For aPTT between 40 - 57               ***** REVIEW OF SYSTEM:  GEN: no fever, no chills, no pain  RESP: no SOB, no cough, no sputum  CVS: no chest pain, no palpitations, no edema  GI: no abdominal pain, no nausea, no vomiting, no constipation, no diarrhea  : no dysuria, no frequency  NEURO: no headache, no dizziness  PSYCH: no depression, not anxious  Derm : no itching, no rash         ***** VITAL SIGNS:    T(F): 98.4 (18 @ 12:37), Max: 98.5 (18 @ 08:21)  HR: 71 (18 @ 12:37) (60 - 71)  BP: 132/97 (18 @ 12:37) (132/97 - 193/97)  RR: 18 (18 @ 12:37) (18 - 18)  SpO2: 99% (18 @ 12:37) (97% - 99%)  Wt(kg): --  ,   I&O's Summary    15 Sep 2018 07:01  -  16 Sep 2018 07:00  --------------------------------------------------------  IN: 964 mL / OUT: 0 mL / NET: 964 mL                 *****PHYSICAL EXAM:  GEN: A&O X 3 , NAD , comfortable  HEENT: NCAT, EOMI, MMM, no icterus  NECK: Supple, No JVD  CVS: S1S2 , regular , No M/R/G appreciated  PULM: CTA B/L,  no W/R/R appreciated  ABD.: soft. non tender, non distended,  bowel sounds present  Extrem: intact pulses , no edema noted  Derm: No rash or ecchymosis noted  PSYCH: normal mood, no depression, not anxious         *****LAB AND IMAGIN.6   23.04 )-----------( 224      ( 16 Sep 2018 08:02 )             41.8               -    139  |  105  |  22  ----------------------------<  117<H>  3.9   |  22  |  0.98    Ca    8.8      16 Sep 2018 06:43  Phos  2.4     09-16      PTT - ( 16 Sep 2018 08:02 )  PTT:67.0 sec                         [All pertinent recent Imaging/Reports reviewed]  < from: Nuclear Stress Test-Pharmacologic (18 @ 14:02) >  * The left ventricle was normal in size. There is a small  mild reversible defect in the distal inferolateral wall  consistentwith mild ischemia.    < end of copied text >    < from: Transthoracic Echocardiogram (18 @ 15:09) >  Conclusions:  1. Calcified trileaflet aortic valve with normal opening.  Mild aortic regurgitation.  2. Increased relative wall thickness with normal left  ventricular mass index, consistent with concentric left  ventricular remodeling.  3. Hyperdynamic left ventricular systolic function. The  upper septum measures 1.6 cm with mild LVOT obstruction  45mmHG.  4. Strain imaging was performed on the [facility] EPIQ with an  average HR of 55 bpm and a BP of 169/94. Global L. Strain=  -25.7  5. Normal right ventricular size and function.    < end of copied text >         *****A S S E S S M E N T   A N D   P L A N :  62F with PAD, CAD, COPD adm with dyspnea  tx COPD per pulm  no active wheeze  non obstructive CAD on cath   stress test without significant ischemia  breast pain r/w massage, unlikely angina  no cardiac symptoms  severe PAD on CTA  echo noted  would proceed with surgery at moderate risk  d/w vasc        __________________________  JACK Alegre D.O.

## 2018-09-16 NOTE — PROGRESS NOTE ADULT - ASSESSMENT
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 She started feeling short of breath about 3 weeks ago with 1-2 weeks of productive cough with clear sputum. Over the past week she endorses some wheeze and she has been using her inhaler 3 times in the morning and once at night daily. She no longer has medication for her nebulizer. She can walk <1 block as she is limited by shortness of breath and by LE claudication symptoms. She also has chest pain that is in the L breast, not pleuritic, feels that it is in the breast tissue and improves when she squeezes it. Denies fevers/chills, LE swelling, N/V, diaphoresis, diarrhea, constipation, abdominal pain, dizziness. Walks with a walker. Current daily smoker just 1-2 cigarettes daily (07 Sep 2018 17:38)    Problem/Plan - 1:    ·	Cough    - Pt being treated for COPD exacerbation.    CT chest does not show pneumonia.  s/p course of azithromycin x days      -  Pt with significant leukocytosis likely secondary to steroid effect.  Will cont to monitor.  Pt also c/o tenderness in left forearm brachial area for last few days secondary to prior IV site.  Mild blistering present.  Cont warm compresses.  No obvious cellulitis or phlebitis, although mild eccymosis is present.        * Pt undergoing vascular w/u of rt foot 2nd digit cyanosis.  s/p xray, without periosteal changes or OM.  CT angio shows femoral artery occlusion with distal artery reconstitution.    No outward signs of toe infection.  Planned for bypass next week        Lindsey Angelo  [phone]

## 2018-09-16 NOTE — PROGRESS NOTE ADULT - ASSESSMENT
Dyspnea on exertion.  Plan: Jessie COPD exacerbation  duonebs  cw steroids as per pulm  antibiotics as per ID   pulmonary fu    CAD (coronary artery disease).  Plan: cw home meds   cards consult appreciated  ischemia webb as per cards     Diabetes.  Plan: monitor FS  ISS.   uncontrolled  endodcine called    Severe PVD Plan doppler reviewed  cw hep gtt  CT reviewed  vascular fu  plan for bypass next week  pending cardiac clearance     Left arm iv infiltration with contrast  Vascular following  wound care consult

## 2018-09-16 NOTE — PROGRESS NOTE ADULT - SUBJECTIVE AND OBJECTIVE BOX
Patient is a 62y old  Female who presents with a chief complaint of sob (15 Sep 2018 17:45)      Any change in ROS: No SOB : no cough :    MEDICATIONS  (STANDING):  ALBUTerol    90 MICROgram(s) HFA Inhaler 1 Puff(s) Inhalation every 4 hours  ALBUTerol/ipratropium for Nebulization 3 milliLiter(s) Nebulizer every 6 hours  aspirin  chewable 81 milliGRAM(s) Oral daily  carvedilol 12.5 milliGRAM(s) Oral every 12 hours  dextrose 5%. 1000 milliLiter(s) (50 mL/Hr) IV Continuous <Continuous>  dextrose 50% Injectable 12.5 Gram(s) IV Push once  dextrose 50% Injectable 25 Gram(s) IV Push once  dextrose 50% Injectable 25 Gram(s) IV Push once  diltiazem    milliGRAM(s) Oral daily  heparin  Infusion. 400 Unit(s)/Hr (4 mL/Hr) IV Continuous <Continuous>  hydrALAZINE 50 milliGRAM(s) Oral three times a day  influenza   Vaccine 0.5 milliLiter(s) IntraMuscular once  insulin glargine Injectable (LANTUS) 18 Unit(s) SubCutaneous at bedtime  insulin lispro (HumaLOG) corrective regimen sliding scale   SubCutaneous three times a day before meals  insulin lispro Injectable (HumaLOG) 8 Unit(s) SubCutaneous three times a day before meals  losartan 100 milliGRAM(s) Oral daily  montelukast 10 milliGRAM(s) Oral at bedtime  pantoprazole    Tablet 40 milliGRAM(s) Oral before breakfast  predniSONE   Tablet 40 milliGRAM(s) Oral daily  simvastatin 40 milliGRAM(s) Oral at bedtime  tiotropium 18 MICROgram(s) Capsule 1 Capsule(s) Inhalation daily  topiramate 100 milliGRAM(s) Oral daily    MEDICATIONS  (PRN):  dextrose 40% Gel 15 Gram(s) Oral once PRN Blood Glucose LESS THAN 70 milliGRAM(s)/deciliter  glucagon  Injectable 1 milliGRAM(s) IntraMuscular once PRN Glucose LESS THAN 70 milligrams/deciliter  guaiFENesin   Syrup  (Sugar-Free) 200 milliGRAM(s) Oral every 6 hours PRN Cough  heparin  Injectable 6500 Unit(s) IV Push every 6 hours PRN For aPTT less than 40  heparin  Injectable 3000 Unit(s) IV Push every 6 hours PRN For aPTT between 40 - 57    Vital Signs Last 24 Hrs  T(C): 36.9 (16 Sep 2018 08:21), Max: 36.9 (15 Sep 2018 12:28)  T(F): 98.5 (16 Sep 2018 08:21), Max: 98.5 (16 Sep 2018 08:21)  HR: 67 (16 Sep 2018 08:21) (60 - 67)  BP: 147/87 (16 Sep 2018 08:21) (144/77 - 193/97)  BP(mean): --  RR: 18 (16 Sep 2018 08:21) (18 - 18)  SpO2: 97% (16 Sep 2018 08:21) (97% - 98%)    I&O's Summary    15 Sep 2018 07:01  -  16 Sep 2018 07:00  --------------------------------------------------------  IN: 964 mL / OUT: 0 mL / NET: 964 mL          Physical Exam:   GENERAL: NAD, well-groomed, well-developed  HEENT: PRIYANK/   Atraumatic, Normocephalic  ENMT: No tonsillar erythema, exudates, or enlargement; Moist mucous membranes, Good dentition, No lesions  NECK: Supple, No JVD, Normal thyroid  CHEST/LUNG: No Wheezing  CVS: Regular rate and rhythm; No murmurs, rubs, or gallops  GI: : Soft, Nontender, Nondistended; Bowel sounds present  NERVOUS SYSTEM:  Alert & Oriented X3  EXTREMITIES:  2+ Peripheral Pulses, No clubbing, cyanosis, or edema  LYMPH: No lymphadenopathy noted  SKIN: No rashes or lesions  ENDOCRINOLOGY: No Thyromegaly  PSYCH: Appropriate    Labs:                              13.6   23.04 )-----------( 224      ( 16 Sep 2018 08:02 )             41.8                         14.0   19.1  )-----------( 280      ( 15 Sep 2018 10:22 )             43.7                         14.9   14.84 )-----------( 292      ( 14 Sep 2018 14:07 )             46.8                         13.2   13.97 )-----------( 283      ( 13 Sep 2018 09:25 )             41.7                         13.1   13.0  )-----------( 256      ( 13 Sep 2018 00:55 )             40.2     09-16    139  |  105  |  22  ----------------------------<  117<H>  3.9   |  22  |  0.98  09-15    131<L>  |  102  |  28<H>  ----------------------------<  234<H>  3.9   |  21<L>  |  1.30  09-14    136  |  103  |  26<H>  ----------------------------<  213<H>  3.9   |  20<L>  |  1.26  09-13    136  |  105  |  20  ----------------------------<  157<H>  3.8   |  21<L>  |  1.02    Ca    8.8      16 Sep 2018 06:43  Ca    8.9      15 Sep 2018 10:27  Phos  2.4     09-16      CAPILLARY BLOOD GLUCOSE      POCT Blood Glucose.: 182 mg/dL (16 Sep 2018 11:42)  POCT Blood Glucose.: 117 mg/dL (16 Sep 2018 07:58)  POCT Blood Glucose.: 193 mg/dL (15 Sep 2018 21:22)  POCT Blood Glucose.: 210 mg/dL (15 Sep 2018 16:22)        PTT - ( 16 Sep 2018 08:02 )  PTT:67.0 sec    < from: CT Angio Abd Aorta w/run-off w/ IV Cont (09.12.18 @ 17:40) >  INTERNAL ILIAC ARTERY: Occluded.  COMMON FEMORAL ARTERY: Widely patent.  FEMORAL ARTERY: Occluded and reconstituted in the distal femoral artery.  PROFUNDA FEMORAL ARTERY: Widely patent.  POPLITEAL ARTERY: Widely patent.  ANTERIOR TIBIAL ARTERY: Widely patent.  TIBIOPERONEAL TRUNK: Widely patent.  POSTERIOR TIBIAL ARTERY: Widely patent.  PERONEAL ARTERY: Widely patent.    ADDITIONAL FINDINGS: Small left hepatic cyst. Renal cysts. Hysterectomy.     IMPRESSION:     RIGHT LOWER EXTREMITY: Occluded external iliac artery and common femoral   artery. Widely patent profunda femoral artery via a femoral-femoral   bypass graft. Occlusion of the femoral artery and lower extremity bypass   graft. Reconstitution of the popliteal artery with three-vessel runoff   although the tibioperoneal trunk has moderate focal stenosis.    LEFT LOWER EXTREMITY: Femoral artery is occluded with reconstitution of   the distal femoral artery. Three-vessel runoff.                    LISA LU M.D., ATTENDING RADIOLOGIST  This document has been electronically signed. Sep 13 2018  9:22AM              < end of copied text >        RECENT CULTURES:        RESPIRATORY CULTURES:          Studies  Chest X-RAY  CT SCAN Chest   Venous Dopplers: LE:   CT Abdomen  Others        < from: CT Chest No Cont (09.08.18 @ 15:55) >    EXAM:  CT CHEST                            PROCEDURE DATE:  09/08/2018            INTERPRETATION:  CLINICAL INFORMATION: Dyspnea    COMPARISON: CT chest 9/20/2015    PROCEDURE:   CT of the Chest was performed without intravenous contrast.  [place] coronal reformats were performed.      FINDINGS:    CHEST:     LUNGS AND LARGE AIRWAYS: Patent central airways.  Minimal left basilar   subsegmental atelectasis.  PLEURA: No pleural effusion.  VESSELS: Atherosclerosis.  HEART: Heart size is normal. No pericardial effusion.  MEDIASTINUM AND LANEY: No lymphadenopathy.  CHEST WALL AND LOWER NECK: Pectus excavatum.  VISUALIZED UPPER ABDOMEN: Within normal limits.  BONES: Degenerative change. Scoliotic spine. Pectus deformity.    IMPRESSION: No evidence of pneumonia.                        MELIDA ESTEBAN M.D., RADIOLOGY RESIDENT  This document has been electronically signed.  DEISY ESCOTO M.D., ATTENDING RADIOLOGIST  This document has been electronically signed. Sep  9 2018  1:15PM        < end of copied text >

## 2018-09-17 ENCOUNTER — APPOINTMENT (OUTPATIENT)
Dept: VASCULAR SURGERY | Facility: HOSPITAL | Age: 63
End: 2018-09-17

## 2018-09-17 LAB
ANION GAP SERPL CALC-SCNC: 11 MMOL/L — SIGNIFICANT CHANGE UP (ref 5–17)
APTT BLD: 79.1 SEC — HIGH (ref 27.5–37.4)
BUN SERPL-MCNC: 20 MG/DL — SIGNIFICANT CHANGE UP (ref 7–23)
CALCIUM SERPL-MCNC: 8.4 MG/DL — SIGNIFICANT CHANGE UP (ref 8.4–10.5)
CHLORIDE SERPL-SCNC: 103 MMOL/L — SIGNIFICANT CHANGE UP (ref 96–108)
CO2 SERPL-SCNC: 22 MMOL/L — SIGNIFICANT CHANGE UP (ref 22–31)
CREAT SERPL-MCNC: 0.97 MG/DL — SIGNIFICANT CHANGE UP (ref 0.5–1.3)
GLUCOSE BLDC GLUCOMTR-MCNC: 127 MG/DL — HIGH (ref 70–99)
GLUCOSE BLDC GLUCOMTR-MCNC: 140 MG/DL — HIGH (ref 70–99)
GLUCOSE BLDC GLUCOMTR-MCNC: 146 MG/DL — HIGH (ref 70–99)
GLUCOSE BLDC GLUCOMTR-MCNC: 87 MG/DL — SIGNIFICANT CHANGE UP (ref 70–99)
GLUCOSE SERPL-MCNC: 72 MG/DL — SIGNIFICANT CHANGE UP (ref 70–99)
HCT VFR BLD CALC: 39.6 % — SIGNIFICANT CHANGE UP (ref 34.5–45)
HGB BLD-MCNC: 12.7 G/DL — SIGNIFICANT CHANGE UP (ref 11.5–15.5)
INR BLD: 1.05 RATIO — SIGNIFICANT CHANGE UP (ref 0.88–1.16)
MCHC RBC-ENTMCNC: 26.4 PG — LOW (ref 27–34)
MCHC RBC-ENTMCNC: 32.1 GM/DL — SIGNIFICANT CHANGE UP (ref 32–36)
MCV RBC AUTO: 82.3 FL — SIGNIFICANT CHANGE UP (ref 80–100)
PLATELET # BLD AUTO: 265 K/UL — SIGNIFICANT CHANGE UP (ref 150–400)
POTASSIUM SERPL-MCNC: 3.7 MMOL/L — SIGNIFICANT CHANGE UP (ref 3.5–5.3)
POTASSIUM SERPL-SCNC: 3.7 MMOL/L — SIGNIFICANT CHANGE UP (ref 3.5–5.3)
PROTHROM AB SERPL-ACNC: 11.9 SEC — SIGNIFICANT CHANGE UP (ref 10–13.1)
RBC # BLD: 4.81 M/UL — SIGNIFICANT CHANGE UP (ref 3.8–5.2)
RBC # FLD: 15.3 % — HIGH (ref 10.3–14.5)
SODIUM SERPL-SCNC: 136 MMOL/L — SIGNIFICANT CHANGE UP (ref 135–145)
TSH RECEP AB FLD-ACNC: <0.5 IU/L — SIGNIFICANT CHANGE UP (ref 0–1.75)
WBC # BLD: 21.26 K/UL — HIGH (ref 3.8–10.5)
WBC # FLD AUTO: 21.26 K/UL — HIGH (ref 3.8–10.5)

## 2018-09-17 RX ORDER — BUDESONIDE AND FORMOTEROL FUMARATE DIHYDRATE 160; 4.5 UG/1; UG/1
2 AEROSOL RESPIRATORY (INHALATION)
Qty: 0 | Refills: 0 | Status: DISCONTINUED | OUTPATIENT
Start: 2018-09-17 | End: 2018-09-26

## 2018-09-17 RX ADMIN — HEPARIN SODIUM 700 UNIT(S)/HR: 5000 INJECTION INTRAVENOUS; SUBCUTANEOUS at 08:16

## 2018-09-17 RX ADMIN — CARVEDILOL PHOSPHATE 12.5 MILLIGRAM(S): 80 CAPSULE, EXTENDED RELEASE ORAL at 17:17

## 2018-09-17 RX ADMIN — BUDESONIDE AND FORMOTEROL FUMARATE DIHYDRATE 2 PUFF(S): 160; 4.5 AEROSOL RESPIRATORY (INHALATION) at 17:20

## 2018-09-17 RX ADMIN — Medication 40 MILLIGRAM(S): at 05:16

## 2018-09-17 RX ADMIN — SIMVASTATIN 40 MILLIGRAM(S): 20 TABLET, FILM COATED ORAL at 21:38

## 2018-09-17 RX ADMIN — Medication 50 MILLIGRAM(S): at 21:38

## 2018-09-17 RX ADMIN — LOSARTAN POTASSIUM 100 MILLIGRAM(S): 100 TABLET, FILM COATED ORAL at 05:15

## 2018-09-17 RX ADMIN — Medication 8 UNIT(S): at 16:48

## 2018-09-17 RX ADMIN — Medication 100 MILLIGRAM(S): at 11:29

## 2018-09-17 RX ADMIN — Medication 3 MILLILITER(S): at 05:17

## 2018-09-17 RX ADMIN — Medication 8 UNIT(S): at 12:15

## 2018-09-17 RX ADMIN — Medication 50 MILLIGRAM(S): at 05:16

## 2018-09-17 RX ADMIN — Medication 180 MILLIGRAM(S): at 05:16

## 2018-09-17 RX ADMIN — MONTELUKAST 10 MILLIGRAM(S): 4 TABLET, CHEWABLE ORAL at 21:38

## 2018-09-17 RX ADMIN — CARVEDILOL PHOSPHATE 12.5 MILLIGRAM(S): 80 CAPSULE, EXTENDED RELEASE ORAL at 05:16

## 2018-09-17 RX ADMIN — Medication 3 MILLILITER(S): at 17:17

## 2018-09-17 RX ADMIN — Medication 50 MILLIGRAM(S): at 13:23

## 2018-09-17 RX ADMIN — Medication 8 UNIT(S): at 08:03

## 2018-09-17 RX ADMIN — Medication 3 MILLILITER(S): at 13:23

## 2018-09-17 RX ADMIN — Medication 3 MILLILITER(S): at 23:01

## 2018-09-17 RX ADMIN — INSULIN GLARGINE 18 UNIT(S): 100 INJECTION, SOLUTION SUBCUTANEOUS at 21:38

## 2018-09-17 RX ADMIN — Medication 81 MILLIGRAM(S): at 11:29

## 2018-09-17 RX ADMIN — PANTOPRAZOLE SODIUM 40 MILLIGRAM(S): 20 TABLET, DELAYED RELEASE ORAL at 05:16

## 2018-09-17 NOTE — PROGRESS NOTE ADULT - SUBJECTIVE AND OBJECTIVE BOX
Patient is a 62y old  Female who presents with a chief complaint of sob (17 Sep 2018 15:16)      INTERVAL HPI/OVERNIGHT EVENTS:  T(C): 36.7 (09-17-18 @ 12:36), Max: 37.1 (09-16-18 @ 20:16)  HR: 72 (09-17-18 @ 17:15) (63 - 72)  BP: 165/82 (09-17-18 @ 17:15) (125/70 - 194/87)  RR: 18 (09-17-18 @ 12:36) (18 - 18)  SpO2: 99% (09-17-18 @ 12:36) (97% - 99%)  Wt(kg): --  I&O's Summary    16 Sep 2018 07:01  -  17 Sep 2018 07:00  --------------------------------------------------------  IN: 360 mL / OUT: 0 mL / NET: 360 mL    17 Sep 2018 07:01  -  17 Sep 2018 17:23  --------------------------------------------------------  IN: 240 mL / OUT: 0 mL / NET: 240 mL        LABS:                        12.7   21.26 )-----------( 265      ( 17 Sep 2018 08:02 )             39.6     09-17    136  |  103  |  20  ----------------------------<  72  3.7   |  22  |  0.97    Ca    8.4      17 Sep 2018 06:19  Phos  2.4     09-16      PT/INR - ( 17 Sep 2018 07:52 )   PT: 11.9 sec;   INR: 1.05 ratio         PTT - ( 17 Sep 2018 07:52 )  PTT:79.1 sec    CAPILLARY BLOOD GLUCOSE      POCT Blood Glucose.: 146 mg/dL (17 Sep 2018 16:40)  POCT Blood Glucose.: 127 mg/dL (17 Sep 2018 11:48)  POCT Blood Glucose.: 87 mg/dL (17 Sep 2018 07:45)  POCT Blood Glucose.: 144 mg/dL (16 Sep 2018 21:37)            MEDICATIONS  (STANDING):  ALBUTerol    90 MICROgram(s) HFA Inhaler 1 Puff(s) Inhalation every 4 hours  ALBUTerol/ipratropium for Nebulization 3 milliLiter(s) Nebulizer every 6 hours  aspirin  chewable 81 milliGRAM(s) Oral daily  buDESOnide 160 MICROgram(s)/formoterol 4.5 MICROgram(s) Inhaler 2 Puff(s) Inhalation two times a day  carvedilol 12.5 milliGRAM(s) Oral every 12 hours  dextrose 5%. 1000 milliLiter(s) (50 mL/Hr) IV Continuous <Continuous>  dextrose 50% Injectable 12.5 Gram(s) IV Push once  dextrose 50% Injectable 25 Gram(s) IV Push once  dextrose 50% Injectable 25 Gram(s) IV Push once  diltiazem    milliGRAM(s) Oral daily  heparin  Infusion. 400 Unit(s)/Hr (4 mL/Hr) IV Continuous <Continuous>  hydrALAZINE 50 milliGRAM(s) Oral three times a day  influenza   Vaccine 0.5 milliLiter(s) IntraMuscular once  insulin glargine Injectable (LANTUS) 18 Unit(s) SubCutaneous at bedtime  insulin lispro (HumaLOG) corrective regimen sliding scale   SubCutaneous three times a day before meals  insulin lispro Injectable (HumaLOG) 8 Unit(s) SubCutaneous three times a day before meals  losartan 100 milliGRAM(s) Oral daily  montelukast 10 milliGRAM(s) Oral at bedtime  pantoprazole    Tablet 40 milliGRAM(s) Oral before breakfast  simvastatin 40 milliGRAM(s) Oral at bedtime  tiotropium 18 MICROgram(s) Capsule 1 Capsule(s) Inhalation daily  topiramate 100 milliGRAM(s) Oral daily    MEDICATIONS  (PRN):  dextrose 40% Gel 15 Gram(s) Oral once PRN Blood Glucose LESS THAN 70 milliGRAM(s)/deciliter  glucagon  Injectable 1 milliGRAM(s) IntraMuscular once PRN Glucose LESS THAN 70 milligrams/deciliter  guaiFENesin   Syrup  (Sugar-Free) 200 milliGRAM(s) Oral every 6 hours PRN Cough  heparin  Injectable 6500 Unit(s) IV Push every 6 hours PRN For aPTT less than 40  heparin  Injectable 3000 Unit(s) IV Push every 6 hours PRN For aPTT between 40 - 57          PHYSICAL EXAM:  GENERAL: NAD, well-groomed, well-developed  HEAD:  Atraumatic, Normocephalic  CHEST/LUNG: Clear to percussion bilaterally; No rales, rhonchi, wheezing, or rubs  HEART: Regular rate and rhythm; No murmurs, rubs, or gallops  ABDOMEN: Soft, Nontender, Nondistended; Bowel sounds present  EXTREMITIES:  2+ Peripheral Pulses, No clubbing, cyanosis, or edema  LYMPH: No lymphadenopathy noted  SKIN: No rashes or lesions    Care Discussed with Consultants/Other Providers [ +] YES  [ ] NO

## 2018-09-17 NOTE — PROGRESS NOTE ADULT - SUBJECTIVE AND OBJECTIVE BOX
- Patient seen and examined.  - In summary, patient is a 62 year old woman who presented with sob (13 Sep 2018 20:06)  - Today, patient is without complaints.         *****MEDICATIONS:    MEDICATIONS  (STANDING):  ALBUTerol    90 MICROgram(s) HFA Inhaler 1 Puff(s) Inhalation every 4 hours  ALBUTerol/ipratropium for Nebulization 3 milliLiter(s) Nebulizer every 6 hours  aspirin  chewable 81 milliGRAM(s) Oral daily  carvedilol 12.5 milliGRAM(s) Oral every 12 hours  dextrose 5%. 1000 milliLiter(s) (50 mL/Hr) IV Continuous <Continuous>  dextrose 50% Injectable 12.5 Gram(s) IV Push once  dextrose 50% Injectable 25 Gram(s) IV Push once  dextrose 50% Injectable 25 Gram(s) IV Push once  diltiazem    milliGRAM(s) Oral daily  heparin  Infusion. 400 Unit(s)/Hr (4 mL/Hr) IV Continuous <Continuous>  hydrALAZINE 50 milliGRAM(s) Oral three times a day  influenza   Vaccine 0.5 milliLiter(s) IntraMuscular once  insulin glargine Injectable (LANTUS) 18 Unit(s) SubCutaneous at bedtime  insulin lispro (HumaLOG) corrective regimen sliding scale   SubCutaneous three times a day before meals  insulin lispro Injectable (HumaLOG) 8 Unit(s) SubCutaneous three times a day before meals  losartan 100 milliGRAM(s) Oral daily  montelukast 10 milliGRAM(s) Oral at bedtime  pantoprazole    Tablet 40 milliGRAM(s) Oral before breakfast  simvastatin 40 milliGRAM(s) Oral at bedtime  tiotropium 18 MICROgram(s) Capsule 1 Capsule(s) Inhalation daily  topiramate 100 milliGRAM(s) Oral daily    MEDICATIONS  (PRN):  dextrose 40% Gel 15 Gram(s) Oral once PRN Blood Glucose LESS THAN 70 milliGRAM(s)/deciliter  glucagon  Injectable 1 milliGRAM(s) IntraMuscular once PRN Glucose LESS THAN 70 milligrams/deciliter  guaiFENesin   Syrup  (Sugar-Free) 200 milliGRAM(s) Oral every 6 hours PRN Cough  heparin  Injectable 6500 Unit(s) IV Push every 6 hours PRN For aPTT less than 40  heparin  Injectable 3000 Unit(s) IV Push every 6 hours PRN For aPTT between 40 - 57               ***** REVIEW OF SYSTEM:  GEN: no fever, no chills, no pain  RESP: no SOB, no cough, no sputum  CVS: no chest pain, no palpitations, no edema  GI: no abdominal pain, no nausea, no vomiting, no constipation, no diarrhea  : no dysuria, no frequency  NEURO: no headache, no dizziness  PSYCH: no depression, not anxious  Derm : no itching, no rash         ***** VITAL SIGNS:    T(F): 98.1 (18 @ 04:43), Max: 98.7 (18 @ 20:16)  HR: 63 (18 @ 04:43) (63 - 71)  BP: 156/76 (18 @ 04:56) (125/70 - 194/87)  RR: 18 (18 @ 04:43) (18 - 18)  SpO2: 97% (18 @ 04:43) (97% - 99%)  Wt(kg): --  ,   I&O's Summary    16 Sep 2018 07:01  -  17 Sep 2018 07:00  --------------------------------------------------------  IN: 360 mL / OUT: 0 mL / NET: 360 mL                   *****PHYSICAL EXAM:  GEN: A&O X 3 , NAD , comfortable  HEENT: NCAT, EOMI, MMM, no icterus  NECK: Supple, No JVD  CVS: S1S2 , regular , No M/R/G appreciated  PULM: CTA B/L,  no W/R/R appreciated  ABD.: soft. non tender, non distended,  bowel sounds present  Extrem: intact pulses , no edema noted  Derm: No rash or ecchymosis noted  PSYCH: normal mood, no depression, not anxious         *****LAB AND IMAGIN.7   21.26 )-----------( 265      ( 17 Sep 2018 08:02 )             39.6                   136  |  103  |  20  ----------------------------<  72  3.7   |  22  |  0.97    Ca    8.4      17 Sep 2018 06:19  Phos  2.4     09-16      PT/INR - ( 17 Sep 2018 07:52 )   PT: 11.9 sec;   INR: 1.05 ratio         PTT - ( 17 Sep 2018 07:52 )  PTT:79.1 sec                   [All pertinent recent Imaging/Reports reviewed]  < from: Nuclear Stress Test-Pharmacologic (18 @ 14:02) >  * The left ventricle was normal in size. There is a small  mild reversible defect in the distal inferolateral wall  consistentwith mild ischemia.    < end of copied text >    < from: Transthoracic Echocardiogram (18 @ 15:09) >  Conclusions:  1. Calcified trileaflet aortic valve with normal opening.  Mild aortic regurgitation.  2. Increased relative wall thickness with normal left  ventricular mass index, consistent with concentric left  ventricular remodeling.  3. Hyperdynamic left ventricular systolic function. The  upper septum measures 1.6 cm with mild LVOT obstruction  45mmHG.  4. Strain imaging was performed on the [facility] EPIQ with an  average HR of 55 bpm and a BP of 169/94. Global L. Strain=  -25.7  5. Normal right ventricular size and function.    < end of copied text >         *****A S S E S S M E N T   A N D   P L A N :  62F with PAD, CAD, COPD adm with dyspnea  tx COPD per pulm  no active wheeze  non obstructive CAD on cath   stress test without significant ischemia  breast pain r/w massage, unlikely angina  no cardiac symptoms  severe PAD on CTA  echo noted  would proceed with surgery at moderate risk  d/w vasc      __________________________  JACK Alegre D.O.

## 2018-09-17 NOTE — PROGRESS NOTE ADULT - SUBJECTIVE AND OBJECTIVE BOX
Chief complaint  Patient is a 62y old  Female who presents with a chief complaint of sob (17 Sep 2018 10:16)   Review of systems  Patient in bed, looks comfortable, no fever, no hypoglycemia.    Labs and Fingersticks  CAPILLARY BLOOD GLUCOSE      POCT Blood Glucose.: 127 mg/dL (17 Sep 2018 11:48)  POCT Blood Glucose.: 87 mg/dL (17 Sep 2018 07:45)  POCT Blood Glucose.: 144 mg/dL (16 Sep 2018 21:37)  POCT Blood Glucose.: 171 mg/dL (16 Sep 2018 16:15)      Anion Gap, Serum: 11 (09-17 @ 06:19)  Anion Gap, Serum: 12 (09-16 @ 06:43)      Calcium, Total Serum: 8.4 (09-17 @ 06:19)  Calcium, Total Serum: 8.8 (09-16 @ 06:43)          09-17    136  |  103  |  20  ----------------------------<  72  3.7   |  22  |  0.97    Ca    8.4      17 Sep 2018 06:19  Phos  2.4     09-16                          12.7   21.26 )-----------( 265      ( 17 Sep 2018 08:02 )             39.6     Medications  MEDICATIONS  (STANDING):  ALBUTerol    90 MICROgram(s) HFA Inhaler 1 Puff(s) Inhalation every 4 hours  ALBUTerol/ipratropium for Nebulization 3 milliLiter(s) Nebulizer every 6 hours  aspirin  chewable 81 milliGRAM(s) Oral daily  buDESOnide 160 MICROgram(s)/formoterol 4.5 MICROgram(s) Inhaler 2 Puff(s) Inhalation two times a day  carvedilol 12.5 milliGRAM(s) Oral every 12 hours  dextrose 5%. 1000 milliLiter(s) (50 mL/Hr) IV Continuous <Continuous>  dextrose 50% Injectable 12.5 Gram(s) IV Push once  dextrose 50% Injectable 25 Gram(s) IV Push once  dextrose 50% Injectable 25 Gram(s) IV Push once  diltiazem    milliGRAM(s) Oral daily  heparin  Infusion. 400 Unit(s)/Hr (4 mL/Hr) IV Continuous <Continuous>  hydrALAZINE 50 milliGRAM(s) Oral three times a day  influenza   Vaccine 0.5 milliLiter(s) IntraMuscular once  insulin glargine Injectable (LANTUS) 18 Unit(s) SubCutaneous at bedtime  insulin lispro (HumaLOG) corrective regimen sliding scale   SubCutaneous three times a day before meals  insulin lispro Injectable (HumaLOG) 8 Unit(s) SubCutaneous three times a day before meals  losartan 100 milliGRAM(s) Oral daily  montelukast 10 milliGRAM(s) Oral at bedtime  pantoprazole    Tablet 40 milliGRAM(s) Oral before breakfast  simvastatin 40 milliGRAM(s) Oral at bedtime  tiotropium 18 MICROgram(s) Capsule 1 Capsule(s) Inhalation daily  topiramate 100 milliGRAM(s) Oral daily      Physical Exam  General: Patient comfortable in bed  Vital Signs Last 12 Hrs  T(F): 98 (09-17-18 @ 12:36), Max: 98.1 (09-17-18 @ 04:43)  HR: 68 (09-17-18 @ 12:36) (63 - 68)  BP: 156/79 (09-17-18 @ 12:36) (156/76 - 194/87)  BP(mean): --  RR: 18 (09-17-18 @ 12:36) (18 - 18)  SpO2: 99% (09-17-18 @ 12:36) (97% - 99%)  Neck: No palpable thyroid nodules.  CVS: S1S2, No murmurs  Respiratory: No wheezing, no crepitations  GI: Abdomen soft, bowel sounds positive  Musculoskeletal:  edema lower extremities.   Skin: No skin rashes, no ecchymosis    Diagnostics    Thyroid Stimulating Hormone, Serum: AM Sched. Collection: 15-Sep-2018 06:00 (09-14 @ 12:20)  Free Thyroxine, Serum: AM Sched. Collection: 15-Sep-2018 06:00 (09-14 @ 12:20)  TSH Receptor Antibody: AM Sched. Collection: 15-Sep-2018 06:00 (09-14 @ 12:20)

## 2018-09-17 NOTE — PROGRESS NOTE ADULT - ASSESSMENT
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 She started feeling short of breath about 3 weeks ago with 1-2 weeks of productive cough with clear sputum. Over the past week she endorses some wheeze and she has been using her inhaler 3 times in the morning and once at night daily. She no longer has medication for her nebulizer. She can walk <1 block as she is limited by shortness of breath and by LE claudication symptoms. She also has chest pain that is in the L breast, not pleuritic, feels that it is in the breast tissue and improves when she squeezes it. Denies fevers/chills, LE swelling, N/V, diaphoresis, diarrhea, constipation, abdominal pain, dizziness. Walks with a walker. Current daily smoker just 1-2 cigarettes daily (07 Sep 2018 17:38)    Problem/Plan - 1:    ·	Cough    - Pt being treated for COPD exacerbation.    CT chest does not show pneumonia.  s/p course of azithromycin x days      -  Pt with significant leukocytosis likely secondary to steroid effect, starting to trend down.  Will cont to monitor.     - Pt also c/o tenderness in left forearm brachial area for last few days secondary to prior IV site.  Looks better today.  Mild blistering present.  Cont warm compresses.  No obvious cellulitis or phlebitis, although mild eccymosis is present.        * Pt undergoing vascular w/u of rt foot 2nd digit cyanosis.  s/p xray, without periosteal changes or OM.  CT angio shows femoral artery occlusion with distal artery reconstitution.    No outward signs of toe infection.  Planned for bypass.        Lindsey Angelo  [phone]

## 2018-09-17 NOTE — DIETITIAN INITIAL EVALUATION ADULT. - NS AS NUTRI INTERV ED CONTENT
Purpose of the nutrition education/Priority modifications/Reviewed Type 2 Diabetes Nutrition Therapy handout from the [employer]. Discussed balanced meal pattern, monitoring portion sizes, carbohydrate counting, including protein at each meal and snack, and limiting concentrated sweets. Reinforced importance of self-monitoring blood glucose levels, healthy weight maintenance, and physical activity as feasible. Pt made aware RD remains available.

## 2018-09-17 NOTE — PROGRESS NOTE ADULT - PROBLEM SELECTOR PLAN 1
much better: Her wheezing has resolved: Cont steroids for a few days more  9/15: doing ok : no SOB : finish steroids soon  9/16: Resolved: She has been afebrile: Her WBC is significantly high which is likely due to steroids: Steroids will finish soon" Pt should be optimized by the time she will go for surgery for legs:  9/17: finished steroids: Her WBC count is high but she is afebrile: Would add Symbicort: pt has not been wheezing: And she has been optimized for the said surgery this week:

## 2018-09-17 NOTE — DIETITIAN INITIAL EVALUATION ADULT. - NS AS NUTRI INTERV MEALS SNACK
General/healthful diet/Continue to provide Consistent Carbohydrate (no evening snacks) diet. Monitor for hypoglycemia/need for evening snack as pt is receiving Lantus.

## 2018-09-17 NOTE — DIETITIAN INITIAL EVALUATION ADULT. - OTHER INFO
Pt seen on [facility] for length of stay. Pt reports good po and good appetite during hospitalization. Pt does the cooking at home, she does not cook with salt, but otherwise does not follow any other therapeutic diet restrictions and denies being educated on Consistent Carbohydrate diet guidelines in the past. Pt expresses interest in nutrition education, provided at this time with written materials. Pt does not weigh herself at home but has not noticed any recent wt changes and notes clothing fitting the same. Pt was not on insulin PTA, she reports she used to check her BG many years ago but does not check it in recent years. Last HgbA1c 7.2%.

## 2018-09-17 NOTE — PROGRESS NOTE ADULT - SUBJECTIVE AND OBJECTIVE BOX
Patient is a 62y old  Female who presents with a chief complaint of sob (17 Sep 2018 09:53)      Any change in ROS: SOB has resolved:  doing ok : no SOB : no wheezing     MEDICATIONS  (STANDING):  ALBUTerol    90 MICROgram(s) HFA Inhaler 1 Puff(s) Inhalation every 4 hours  ALBUTerol/ipratropium for Nebulization 3 milliLiter(s) Nebulizer every 6 hours  aspirin  chewable 81 milliGRAM(s) Oral daily  carvedilol 12.5 milliGRAM(s) Oral every 12 hours  dextrose 5%. 1000 milliLiter(s) (50 mL/Hr) IV Continuous <Continuous>  dextrose 50% Injectable 12.5 Gram(s) IV Push once  dextrose 50% Injectable 25 Gram(s) IV Push once  dextrose 50% Injectable 25 Gram(s) IV Push once  diltiazem    milliGRAM(s) Oral daily  heparin  Infusion. 400 Unit(s)/Hr (4 mL/Hr) IV Continuous <Continuous>  hydrALAZINE 50 milliGRAM(s) Oral three times a day  influenza   Vaccine 0.5 milliLiter(s) IntraMuscular once  insulin glargine Injectable (LANTUS) 18 Unit(s) SubCutaneous at bedtime  insulin lispro (HumaLOG) corrective regimen sliding scale   SubCutaneous three times a day before meals  insulin lispro Injectable (HumaLOG) 8 Unit(s) SubCutaneous three times a day before meals  losartan 100 milliGRAM(s) Oral daily  montelukast 10 milliGRAM(s) Oral at bedtime  pantoprazole    Tablet 40 milliGRAM(s) Oral before breakfast  simvastatin 40 milliGRAM(s) Oral at bedtime  tiotropium 18 MICROgram(s) Capsule 1 Capsule(s) Inhalation daily  topiramate 100 milliGRAM(s) Oral daily    MEDICATIONS  (PRN):  dextrose 40% Gel 15 Gram(s) Oral once PRN Blood Glucose LESS THAN 70 milliGRAM(s)/deciliter  glucagon  Injectable 1 milliGRAM(s) IntraMuscular once PRN Glucose LESS THAN 70 milligrams/deciliter  guaiFENesin   Syrup  (Sugar-Free) 200 milliGRAM(s) Oral every 6 hours PRN Cough  heparin  Injectable 6500 Unit(s) IV Push every 6 hours PRN For aPTT less than 40  heparin  Injectable 3000 Unit(s) IV Push every 6 hours PRN For aPTT between 40 - 57    Vital Signs Last 24 Hrs  T(C): 36.7 (17 Sep 2018 04:43), Max: 37.1 (16 Sep 2018 20:16)  T(F): 98.1 (17 Sep 2018 04:43), Max: 98.7 (16 Sep 2018 20:16)  HR: 63 (17 Sep 2018 04:43) (63 - 71)  BP: 156/76 (17 Sep 2018 04:56) (125/70 - 194/87)  BP(mean): 88 (16 Sep 2018 20:16) (88 - 88)  RR: 18 (17 Sep 2018 04:43) (18 - 18)  SpO2: 97% (17 Sep 2018 04:43) (97% - 99%)    I&O's Summary    16 Sep 2018 07:01  -  17 Sep 2018 07:00  --------------------------------------------------------  IN: 360 mL / OUT: 0 mL / NET: 360 mL          Physical Exam:   GENERAL: NAD, well-groomed, well-developed  HEENT: PRIYANK/   Atraumatic, Normocephalic  ENMT: No tonsillar erythema, exudates, or enlargement; Moist mucous membranes, Good dentition, No lesions  NECK: Supple, No JVD, Normal thyroid  CHEST/LUNG: Clear to auscultaion  CVS: Regular rate and rhythm; No murmurs, rubs, or gallops  GI: : Soft, Nontender, Nondistended; Bowel sounds present  NERVOUS SYSTEM:  Alert & Oriented X3  EXTREMITIES:  2+ Peripheral Pulses, No clubbing, cyanosis, or edema  LYMPH: No lymphadenopathy noted  SKIN: No rashes or lesions  ENDOCRINOLOGY: No Thyromegaly  PSYCH: Appropriate    Labs:                              12.7   21.26 )-----------( 265      ( 17 Sep 2018 08:02 )             39.6                         13.6   23.04 )-----------( 224      ( 16 Sep 2018 08:02 )             41.8                         14.0   19.1  )-----------( 280      ( 15 Sep 2018 10:22 )             43.7                         14.9   14.84 )-----------( 292      ( 14 Sep 2018 14:07 )             46.8     09-17    136  |  103  |  20  ----------------------------<  72  3.7   |  22  |  0.97  09-16    139  |  105  |  22  ----------------------------<  117<H>  3.9   |  22  |  0.98  09-15    131<L>  |  102  |  28<H>  ----------------------------<  234<H>  3.9   |  21<L>  |  1.30  09-14    136  |  103  |  26<H>  ----------------------------<  213<H>  3.9   |  20<L>  |  1.26    Ca    8.4      17 Sep 2018 06:19  Ca    8.8      16 Sep 2018 06:43  Ca    8.9      15 Sep 2018 10:27  Phos  2.4     09-16      CAPILLARY BLOOD GLUCOSE      POCT Blood Glucose.: 87 mg/dL (17 Sep 2018 07:45)  POCT Blood Glucose.: 144 mg/dL (16 Sep 2018 21:37)  POCT Blood Glucose.: 171 mg/dL (16 Sep 2018 16:15)  POCT Blood Glucose.: 182 mg/dL (16 Sep 2018 11:42)        PT/INR - ( 17 Sep 2018 07:52 )   PT: 11.9 sec;   INR: 1.05 ratio         PTT - ( 17 Sep 2018 07:52 )  PTT:79.1 sec          RECENT CULTURES:        RESPIRATORY CULTURES:  < from: CT Chest No Cont (09.08.18 @ 15:55) >      PROCEDURE DATE:  09/08/2018            INTERPRETATION:  CLINICAL INFORMATION: Dyspnea    COMPARISON: CT chest 9/20/2015    PROCEDURE:   CT of the Chest was performed without intravenous contrast.  [place] coronal reformats were performed.      FINDINGS:    CHEST:     LUNGS AND LARGE AIRWAYS: Patent central airways.  Minimal left basilar   subsegmental atelectasis.  PLEURA: No pleural effusion.  VESSELS: Atherosclerosis.  HEART: Heart size is normal. No pericardial effusion.  MEDIASTINUM AND LANEY: No lymphadenopathy.  CHEST WALL AND LOWER NECK: Pectus excavatum.  VISUALIZED UPPER ABDOMEN: Within normal limits.  BONES: Degenerative change. Scoliotic spine. Pectus deformity.    IMPRESSION: No evidence of pneumonia.                        MELIDA ESTEBAN M.D., RADIOLOGY RESIDENT  This document has been electronically signed.  DEISY ESCOTO M.D., ATTENDING RADIOLOGIST  This document has been electronically signed. Sep  9 2018  1:15PM    < end of copied text >          Studies  Chest X-RAY  CT SCAN Chest   Venous Dopplers: LE:   CT Abdomen  Others

## 2018-09-17 NOTE — PROGRESS NOTE ADULT - SUBJECTIVE AND OBJECTIVE BOX
Infectious Diseases progress note:    Subjective: NAD, afebrile.  L arm less tender and swollen.  WBC better today    ROS:  CONSTITUTIONAL:  No fever, chills, rigors  CARDIOVASCULAR:  No chest pain or palpitations  RESPIRATORY:   No SOB, cough, dyspnea on exertion.  No wheezing  GASTROINTESTINAL:  No abd pain, N/V, diarrhea/constipation  EXTREMITIES:  No swelling or joint pain  GENITOURINARY:  No burning on urination, increased frequency or urgency.  No flank pain  NEUROLOGIC:  No HA, visual disturbances  SKIN: No rashes    Allergies    No Known Allergies    Intolerances        ANTIBIOTICS/RELEVANT:  antimicrobials    immunologic:  influenza   Vaccine 0.5 milliLiter(s) IntraMuscular once    OTHER:  ALBUTerol    90 MICROgram(s) HFA Inhaler 1 Puff(s) Inhalation every 4 hours  ALBUTerol/ipratropium for Nebulization 3 milliLiter(s) Nebulizer every 6 hours  aspirin  chewable 81 milliGRAM(s) Oral daily  buDESOnide 160 MICROgram(s)/formoterol 4.5 MICROgram(s) Inhaler 2 Puff(s) Inhalation two times a day  carvedilol 12.5 milliGRAM(s) Oral every 12 hours  dextrose 40% Gel 15 Gram(s) Oral once PRN  dextrose 5%. 1000 milliLiter(s) IV Continuous <Continuous>  dextrose 50% Injectable 12.5 Gram(s) IV Push once  dextrose 50% Injectable 25 Gram(s) IV Push once  dextrose 50% Injectable 25 Gram(s) IV Push once  diltiazem    milliGRAM(s) Oral daily  glucagon  Injectable 1 milliGRAM(s) IntraMuscular once PRN  guaiFENesin   Syrup  (Sugar-Free) 200 milliGRAM(s) Oral every 6 hours PRN  heparin  Infusion. 400 Unit(s)/Hr IV Continuous <Continuous>  heparin  Injectable 6500 Unit(s) IV Push every 6 hours PRN  heparin  Injectable 3000 Unit(s) IV Push every 6 hours PRN  hydrALAZINE 50 milliGRAM(s) Oral three times a day  insulin glargine Injectable (LANTUS) 18 Unit(s) SubCutaneous at bedtime  insulin lispro (HumaLOG) corrective regimen sliding scale   SubCutaneous three times a day before meals  insulin lispro Injectable (HumaLOG) 8 Unit(s) SubCutaneous three times a day before meals  losartan 100 milliGRAM(s) Oral daily  montelukast 10 milliGRAM(s) Oral at bedtime  pantoprazole    Tablet 40 milliGRAM(s) Oral before breakfast  simvastatin 40 milliGRAM(s) Oral at bedtime  tiotropium 18 MICROgram(s) Capsule 1 Capsule(s) Inhalation daily  topiramate 100 milliGRAM(s) Oral daily      Objective:  Vital Signs Last 24 Hrs  T(C): 36.7 (17 Sep 2018 12:36), Max: 37.1 (16 Sep 2018 20:16)  T(F): 98 (17 Sep 2018 12:36), Max: 98.7 (16 Sep 2018 20:16)  HR: 68 (17 Sep 2018 12:36) (63 - 68)  BP: 156/79 (17 Sep 2018 12:36) (125/70 - 194/87)  BP(mean): 88 (16 Sep 2018 20:16) (88 - 88)  RR: 18 (17 Sep 2018 12:36) (18 - 18)  SpO2: 99% (17 Sep 2018 12:36) (97% - 99%)    PHYSICAL EXAM:  Constitutional:NAD  Eyes:PRIYANK, EOMI  Ear/Nose/Throat: no thrush, mucositis.  Moist mucous membranes	  Neck:no JVD, no lymphadenopathy, supple  Respiratory: CTA suzanna  Cardiovascular: S1S2 RRR, no murmurs  Gastrointestinal:soft, nontender,  nondistended (+) BS  Extremities:no e/e/c  Skin:  L UE brachial area with induration, mild ecchymosis, mild blistering.  No TTP        LABS:                        12.7   21.26 )-----------( 265      ( 17 Sep 2018 08:02 )             39.6     09-17    136  |  103  |  20  ----------------------------<  72  3.7   |  22  |  0.97    Ca    8.4      17 Sep 2018 06:19  Phos  2.4     09-16      PT/INR - ( 17 Sep 2018 07:52 )   PT: 11.9 sec;   INR: 1.05 ratio         PTT - ( 17 Sep 2018 07:52 )  PTT:79.1 sec                Rapid RVP Result: NotDete          MICROBIOLOGY:          RADIOLOGY & ADDITIONAL STUDIES:

## 2018-09-17 NOTE — PROGRESS NOTE ADULT - ASSESSMENT
Dyspnea on exertion.  Plan: Jessie COPD exacerbation  duonebs  cw steroids as per pulm  antibiotics as per ID   pulmonary fu    CAD (coronary artery disease).  Plan: cw home meds   cards consult appreciated  ischemia webb as per cards     Diabetes.  Plan: monitor FS  ISS.   uncontrolled  endodcine called    Severe PVD Plan doppler reviewed  cw hep gtt  CT reviewed  vascular fu  plan for bypass this  week  pending cardiac clearance     Left arm iv infiltration with contrast  Vascular following  wound care consult

## 2018-09-17 NOTE — DIETITIAN INITIAL EVALUATION ADULT. - ADHERENCE
poor/Pt reports following [place] diet. No Consistent Carbohydrate diet followed PTA, diet recall reflects pt with excess/inconsistent CHO at meals.

## 2018-09-17 NOTE — DIETITIAN INITIAL EVALUATION ADULT. - ORAL INTAKE PTA
pt reports eating 3 balanced meals daily PTA. NKFA reported. No micronutrient supplementation reported./good

## 2018-09-17 NOTE — DIETITIAN INITIAL EVALUATION ADULT. - ENERGY NEEDS
Height: 62 inches, Weight: 179 pounds (9/12, standing)  BMI: 32.8 kg/m2 IBW: 110 pounds (+/-10%), %IBW: 163%  Pertinent Info: No edema, no pressure ulcers, skin tear noted at this time.   Other pertinent info: This is 62yoF admitted for SOB, now much improved. Pt with PAD, vascular surgery planned for next week. PMH significant for T2DM not on long term insulin PTA, GERD, Afib no A/C, CVA, current smoker. Pt with no hypoglycemia events noted in-patient. Of note, pt with hypophosphatemia on 9/16.

## 2018-09-18 LAB
ANION GAP SERPL CALC-SCNC: 10 MMOL/L — SIGNIFICANT CHANGE UP (ref 5–17)
APPEARANCE UR: CLEAR — SIGNIFICANT CHANGE UP
APTT BLD: 66.9 SEC — HIGH (ref 27.5–37.4)
BILIRUB UR-MCNC: NEGATIVE — SIGNIFICANT CHANGE UP
BUN SERPL-MCNC: 23 MG/DL — SIGNIFICANT CHANGE UP (ref 7–23)
CALCIUM SERPL-MCNC: 9 MG/DL — SIGNIFICANT CHANGE UP (ref 8.4–10.5)
CHLORIDE SERPL-SCNC: 105 MMOL/L — SIGNIFICANT CHANGE UP (ref 96–108)
CO2 SERPL-SCNC: 19 MMOL/L — LOW (ref 22–31)
COLOR SPEC: COLORLESS — SIGNIFICANT CHANGE UP
CREAT SERPL-MCNC: 1.17 MG/DL — SIGNIFICANT CHANGE UP (ref 0.5–1.3)
DIFF PNL FLD: NEGATIVE — SIGNIFICANT CHANGE UP
GLUCOSE BLDC GLUCOMTR-MCNC: 111 MG/DL — HIGH (ref 70–99)
GLUCOSE BLDC GLUCOMTR-MCNC: 131 MG/DL — HIGH (ref 70–99)
GLUCOSE BLDC GLUCOMTR-MCNC: 160 MG/DL — HIGH (ref 70–99)
GLUCOSE BLDC GLUCOMTR-MCNC: 74 MG/DL — SIGNIFICANT CHANGE UP (ref 70–99)
GLUCOSE SERPL-MCNC: 82 MG/DL — SIGNIFICANT CHANGE UP (ref 70–99)
GLUCOSE UR QL: NEGATIVE — SIGNIFICANT CHANGE UP
HCT VFR BLD CALC: 42.3 % — SIGNIFICANT CHANGE UP (ref 34.5–45)
HGB BLD-MCNC: 13.5 G/DL — SIGNIFICANT CHANGE UP (ref 11.5–15.5)
INR BLD: 0.99 RATIO — SIGNIFICANT CHANGE UP (ref 0.88–1.16)
KETONES UR-MCNC: NEGATIVE — SIGNIFICANT CHANGE UP
LEUKOCYTE ESTERASE UR-ACNC: NEGATIVE — SIGNIFICANT CHANGE UP
MCHC RBC-ENTMCNC: 26.3 PG — LOW (ref 27–34)
MCHC RBC-ENTMCNC: 31.9 GM/DL — LOW (ref 32–36)
MCV RBC AUTO: 82.3 FL — SIGNIFICANT CHANGE UP (ref 80–100)
NITRITE UR-MCNC: NEGATIVE — SIGNIFICANT CHANGE UP
PH UR: 6.5 — SIGNIFICANT CHANGE UP (ref 5–8)
PLATELET # BLD AUTO: 185 K/UL — SIGNIFICANT CHANGE UP (ref 150–400)
POTASSIUM SERPL-MCNC: 4 MMOL/L — SIGNIFICANT CHANGE UP (ref 3.5–5.3)
POTASSIUM SERPL-SCNC: 4 MMOL/L — SIGNIFICANT CHANGE UP (ref 3.5–5.3)
PROT UR-MCNC: NEGATIVE — SIGNIFICANT CHANGE UP
PROTHROM AB SERPL-ACNC: 11.2 SEC — SIGNIFICANT CHANGE UP (ref 10–13.1)
RBC # BLD: 5.14 M/UL — SIGNIFICANT CHANGE UP (ref 3.8–5.2)
RBC # FLD: 16.3 % — HIGH (ref 10.3–14.5)
SODIUM SERPL-SCNC: 134 MMOL/L — LOW (ref 135–145)
SP GR SPEC: 1.01 — SIGNIFICANT CHANGE UP
UROBILINOGEN FLD QL: NEGATIVE — SIGNIFICANT CHANGE UP
WBC # BLD: 17.69 K/UL — HIGH (ref 3.8–10.5)
WBC # FLD AUTO: 17.69 K/UL — HIGH (ref 3.8–10.5)

## 2018-09-18 RX ORDER — ACETAMINOPHEN 500 MG
650 TABLET ORAL ONCE
Qty: 0 | Refills: 0 | Status: COMPLETED | OUTPATIENT
Start: 2018-09-18 | End: 2018-09-18

## 2018-09-18 RX ORDER — INSULIN LISPRO 100/ML
4 VIAL (ML) SUBCUTANEOUS ONCE
Qty: 0 | Refills: 0 | Status: COMPLETED | OUTPATIENT
Start: 2018-09-18 | End: 2018-09-18

## 2018-09-18 RX ADMIN — Medication 100 MILLIGRAM(S): at 11:19

## 2018-09-18 RX ADMIN — Medication 81 MILLIGRAM(S): at 11:19

## 2018-09-18 RX ADMIN — BUDESONIDE AND FORMOTEROL FUMARATE DIHYDRATE 2 PUFF(S): 160; 4.5 AEROSOL RESPIRATORY (INHALATION) at 05:26

## 2018-09-18 RX ADMIN — Medication 1: at 16:54

## 2018-09-18 RX ADMIN — LOSARTAN POTASSIUM 100 MILLIGRAM(S): 100 TABLET, FILM COATED ORAL at 05:27

## 2018-09-18 RX ADMIN — Medication 4 UNIT(S): at 13:21

## 2018-09-18 RX ADMIN — Medication 3 MILLILITER(S): at 11:21

## 2018-09-18 RX ADMIN — Medication 650 MILLIGRAM(S): at 23:01

## 2018-09-18 RX ADMIN — Medication 50 MILLIGRAM(S): at 05:27

## 2018-09-18 RX ADMIN — Medication 180 MILLIGRAM(S): at 05:26

## 2018-09-18 RX ADMIN — HEPARIN SODIUM 700 UNIT(S)/HR: 5000 INJECTION INTRAVENOUS; SUBCUTANEOUS at 08:50

## 2018-09-18 RX ADMIN — Medication 50 MILLIGRAM(S): at 21:38

## 2018-09-18 RX ADMIN — MONTELUKAST 10 MILLIGRAM(S): 4 TABLET, CHEWABLE ORAL at 21:38

## 2018-09-18 RX ADMIN — Medication 3 MILLILITER(S): at 18:04

## 2018-09-18 RX ADMIN — Medication 3 MILLILITER(S): at 05:25

## 2018-09-18 RX ADMIN — CARVEDILOL PHOSPHATE 12.5 MILLIGRAM(S): 80 CAPSULE, EXTENDED RELEASE ORAL at 05:26

## 2018-09-18 RX ADMIN — Medication 8 UNIT(S): at 08:02

## 2018-09-18 RX ADMIN — Medication 650 MILLIGRAM(S): at 22:01

## 2018-09-18 RX ADMIN — INSULIN GLARGINE 18 UNIT(S): 100 INJECTION, SOLUTION SUBCUTANEOUS at 21:37

## 2018-09-18 RX ADMIN — BUDESONIDE AND FORMOTEROL FUMARATE DIHYDRATE 2 PUFF(S): 160; 4.5 AEROSOL RESPIRATORY (INHALATION) at 18:03

## 2018-09-18 RX ADMIN — Medication 8 UNIT(S): at 16:54

## 2018-09-18 RX ADMIN — PANTOPRAZOLE SODIUM 40 MILLIGRAM(S): 20 TABLET, DELAYED RELEASE ORAL at 05:27

## 2018-09-18 RX ADMIN — Medication 50 MILLIGRAM(S): at 13:27

## 2018-09-18 RX ADMIN — CARVEDILOL PHOSPHATE 12.5 MILLIGRAM(S): 80 CAPSULE, EXTENDED RELEASE ORAL at 18:03

## 2018-09-18 RX ADMIN — SIMVASTATIN 40 MILLIGRAM(S): 20 TABLET, FILM COATED ORAL at 21:38

## 2018-09-18 NOTE — PROGRESS NOTE ADULT - SUBJECTIVE AND OBJECTIVE BOX
Patient is a 62y old  Female who presents with a chief complaint of sob (18 Sep 2018 18:35)      INTERVAL HPI/OVERNIGHT EVENTS:  T(C): 36.4 (18 @ 13:25), Max: 36.8 (18 @ 20:48)  HR: 74 (18 @ 18:01) (54 - 74)  BP: 128/81 (18 @ 18:01) (117/79 - 128/81)  RR: 18 (18 @ 13:25) (18 - 18)  SpO2: 98% (18 @ 13:25) (96% - 98%)  Wt(kg): --  I&O's Summary    17 Sep 2018 07:  -  18 Sep 2018 07:00  --------------------------------------------------------  IN: 480 mL / OUT: 0 mL / NET: 480 mL    18 Sep 2018 07:01  -  18 Sep 2018 18:44  --------------------------------------------------------  IN: 590 mL / OUT: 0 mL / NET: 590 mL        LABS:                        13.5   17.69 )-----------( 185      ( 18 Sep 2018 07:56 )             42.3         134<L>  |  105  |  23  ----------------------------<  82  4.0   |  19<L>  |  1.17    Ca    9.0      18 Sep 2018 06:30      PT/INR - ( 18 Sep 2018 07:57 )   PT: 11.2 sec;   INR: 0.99 ratio         PTT - ( 18 Sep 2018 07:57 )  PTT:66.9 sec  Urinalysis Basic - ( 18 Sep 2018 13:11 )    Color: Colorless / Appearance: Clear / S.006 / pH: x  Gluc: x / Ketone: Negative  / Bili: Negative / Urobili: Negative   Blood: x / Protein: Negative / Nitrite: Negative   Leuk Esterase: Negative / RBC: x / WBC x   Sq Epi: x / Non Sq Epi: x / Bacteria: x      CAPILLARY BLOOD GLUCOSE      POCT Blood Glucose.: 160 mg/dL (18 Sep 2018 16:40)  POCT Blood Glucose.: 74 mg/dL (18 Sep 2018 12:11)  POCT Blood Glucose.: 111 mg/dL (18 Sep 2018 07:58)  POCT Blood Glucose.: 140 mg/dL (17 Sep 2018 21:05)        Urinalysis Basic - ( 18 Sep 2018 13:11 )    Color: Colorless / Appearance: Clear / S.006 / pH: x  Gluc: x / Ketone: Negative  / Bili: Negative / Urobili: Negative   Blood: x / Protein: Negative / Nitrite: Negative   Leuk Esterase: Negative / RBC: x / WBC x   Sq Epi: x / Non Sq Epi: x / Bacteria: x        MEDICATIONS  (STANDING):  ALBUTerol    90 MICROgram(s) HFA Inhaler 1 Puff(s) Inhalation every 4 hours  ALBUTerol/ipratropium for Nebulization 3 milliLiter(s) Nebulizer every 6 hours  aspirin  chewable 81 milliGRAM(s) Oral daily  buDESOnide 160 MICROgram(s)/formoterol 4.5 MICROgram(s) Inhaler 2 Puff(s) Inhalation two times a day  carvedilol 12.5 milliGRAM(s) Oral every 12 hours  dextrose 5%. 1000 milliLiter(s) (50 mL/Hr) IV Continuous <Continuous>  dextrose 50% Injectable 12.5 Gram(s) IV Push once  dextrose 50% Injectable 25 Gram(s) IV Push once  dextrose 50% Injectable 25 Gram(s) IV Push once  diltiazem    milliGRAM(s) Oral daily  heparin  Infusion. 400 Unit(s)/Hr (4 mL/Hr) IV Continuous <Continuous>  hydrALAZINE 50 milliGRAM(s) Oral three times a day  influenza   Vaccine 0.5 milliLiter(s) IntraMuscular once  insulin glargine Injectable (LANTUS) 18 Unit(s) SubCutaneous at bedtime  insulin lispro (HumaLOG) corrective regimen sliding scale   SubCutaneous three times a day before meals  insulin lispro Injectable (HumaLOG) 8 Unit(s) SubCutaneous three times a day before meals  losartan 100 milliGRAM(s) Oral daily  montelukast 10 milliGRAM(s) Oral at bedtime  pantoprazole    Tablet 40 milliGRAM(s) Oral before breakfast  simvastatin 40 milliGRAM(s) Oral at bedtime  tiotropium 18 MICROgram(s) Capsule 1 Capsule(s) Inhalation daily  topiramate 100 milliGRAM(s) Oral daily    MEDICATIONS  (PRN):  dextrose 40% Gel 15 Gram(s) Oral once PRN Blood Glucose LESS THAN 70 milliGRAM(s)/deciliter  glucagon  Injectable 1 milliGRAM(s) IntraMuscular once PRN Glucose LESS THAN 70 milligrams/deciliter  guaiFENesin   Syrup  (Sugar-Free) 200 milliGRAM(s) Oral every 6 hours PRN Cough  heparin  Injectable 6500 Unit(s) IV Push every 6 hours PRN For aPTT less than 40  heparin  Injectable 3000 Unit(s) IV Push every 6 hours PRN For aPTT between 40 - 57          PHYSICAL EXAM:  GENERAL: NAD, well-groomed, well-developed  HEAD:  Atraumatic, Normocephalic  CHEST/LUNG: Clear to percussion bilaterally; No rales, rhonchi, wheezing, or rubs  HEART: Regular rate and rhythm; No murmurs, rubs, or gallops  ABDOMEN: Soft, Nontender, Nondistended; Bowel sounds present  EXTREMITIES:  2+ Peripheral Pulses, No clubbing, cyanosis, or edema  LYMPH: No lymphadenopathy noted  SKIN: No rashes or lesions    Care Discussed with Consultants/Other Providers [+ ] YES  [ ] NO

## 2018-09-18 NOTE — PROGRESS NOTE ADULT - SUBJECTIVE AND OBJECTIVE BOX
Infectious Diseases progress note:    Subjective:  No acute o/n events.  Afebrile.  WBC improving    ROS:  CONSTITUTIONAL:  No fever, chills, rigors  CARDIOVASCULAR:  No chest pain or palpitations  RESPIRATORY:   No SOB, cough, dyspnea on exertion.  No wheezing  GASTROINTESTINAL:  No abd pain, N/V, diarrhea/constipation  EXTREMITIES:  No swelling or joint pain  GENITOURINARY:  No burning on urination, increased frequency or urgency.  No flank pain  NEUROLOGIC:  No HA, visual disturbances  SKIN: No rashes    Allergies    No Known Allergies    Intolerances        ANTIBIOTICS/RELEVANT:  antimicrobials    immunologic:  influenza   Vaccine 0.5 milliLiter(s) IntraMuscular once    OTHER:  ALBUTerol    90 MICROgram(s) HFA Inhaler 1 Puff(s) Inhalation every 4 hours  ALBUTerol/ipratropium for Nebulization 3 milliLiter(s) Nebulizer every 6 hours  aspirin  chewable 81 milliGRAM(s) Oral daily  buDESOnide 160 MICROgram(s)/formoterol 4.5 MICROgram(s) Inhaler 2 Puff(s) Inhalation two times a day  carvedilol 12.5 milliGRAM(s) Oral every 12 hours  dextrose 40% Gel 15 Gram(s) Oral once PRN  dextrose 5%. 1000 milliLiter(s) IV Continuous <Continuous>  dextrose 50% Injectable 12.5 Gram(s) IV Push once  dextrose 50% Injectable 25 Gram(s) IV Push once  dextrose 50% Injectable 25 Gram(s) IV Push once  diltiazem    milliGRAM(s) Oral daily  glucagon  Injectable 1 milliGRAM(s) IntraMuscular once PRN  guaiFENesin   Syrup  (Sugar-Free) 200 milliGRAM(s) Oral every 6 hours PRN  heparin  Infusion. 400 Unit(s)/Hr IV Continuous <Continuous>  heparin  Injectable 6500 Unit(s) IV Push every 6 hours PRN  heparin  Injectable 3000 Unit(s) IV Push every 6 hours PRN  hydrALAZINE 50 milliGRAM(s) Oral three times a day  insulin glargine Injectable (LANTUS) 18 Unit(s) SubCutaneous at bedtime  insulin lispro (HumaLOG) corrective regimen sliding scale   SubCutaneous three times a day before meals  insulin lispro Injectable (HumaLOG) 8 Unit(s) SubCutaneous three times a day before meals  losartan 100 milliGRAM(s) Oral daily  montelukast 10 milliGRAM(s) Oral at bedtime  pantoprazole    Tablet 40 milliGRAM(s) Oral before breakfast  simvastatin 40 milliGRAM(s) Oral at bedtime  tiotropium 18 MICROgram(s) Capsule 1 Capsule(s) Inhalation daily  topiramate 100 milliGRAM(s) Oral daily      Objective:  Vital Signs Last 24 Hrs  T(C): 36.4 (18 Sep 2018 13:25), Max: 36.8 (17 Sep 2018 20:48)  T(F): 97.6 (18 Sep 2018 13:25), Max: 98.2 (17 Sep 2018 20:48)  HR: 60 (18 Sep 2018 13:25) (54 - 60)  BP: 121/76 (18 Sep 2018 14:33) (117/79 - 121/76)  BP(mean): 89 (17 Sep 2018 20:48) (89 - 89)  RR: 18 (18 Sep 2018 13:25) (18 - 18)  SpO2: 98% (18 Sep 2018 13:25) (96% - 98%)    PHYSICAL EXAM:  Constitutional:NAD  Eyes:PRIYANK, EOMI  Ear/Nose/Throat: no thrush, mucositis.  Moist mucous membranes	  Neck:no JVD, no lymphadenopathy, supple  Respiratory: CTA suzanna  Cardiovascular: S1S2 RRR, no murmurs  Gastrointestinal:soft, nontender,  nondistended (+) BS  Extremities:no e/e/c  Skin:  no rashes, open wounds or ulcerations        LABS:                        13.5   17.69 )-----------( 185      ( 18 Sep 2018 07:56 )             42.3     09-18    134<L>  |  105  |  23  ----------------------------<  82  4.0   |  19<L>  |  1.17    Ca    9.0      18 Sep 2018 06:30      PT/INR - ( 18 Sep 2018 07:57 )   PT: 11.2 sec;   INR: 0.99 ratio         PTT - ( 18 Sep 2018 07:57 )  PTT:66.9 sec  Urinalysis Basic - ( 18 Sep 2018 13:11 )    Color: Colorless / Appearance: Clear / S.006 / pH: x  Gluc: x / Ketone: Negative  / Bili: Negative / Urobili: Negative   Blood: x / Protein: Negative / Nitrite: Negative   Leuk Esterase: Negative / RBC: x / WBC x   Sq Epi: x / Non Sq Epi: x / Bacteria: x                  Rapid RVP Result: NotDetec          MICROBIOLOGY:          RADIOLOGY & ADDITIONAL STUDIES:

## 2018-09-18 NOTE — PROGRESS NOTE ADULT - SUBJECTIVE AND OBJECTIVE BOX
- Patient seen and examined.  - In summary, patient is a 62 year old woman who presented with sob (13 Sep 2018 20:06)  - Today, patient is without complaints.         *****MEDICATIONS:    MEDICATIONS  (STANDING):  ALBUTerol    90 MICROgram(s) HFA Inhaler 1 Puff(s) Inhalation every 4 hours  ALBUTerol/ipratropium for Nebulization 3 milliLiter(s) Nebulizer every 6 hours  aspirin  chewable 81 milliGRAM(s) Oral daily  buDESOnide 160 MICROgram(s)/formoterol 4.5 MICROgram(s) Inhaler 2 Puff(s) Inhalation two times a day  carvedilol 12.5 milliGRAM(s) Oral every 12 hours  dextrose 5%. 1000 milliLiter(s) (50 mL/Hr) IV Continuous <Continuous>  dextrose 50% Injectable 12.5 Gram(s) IV Push once  dextrose 50% Injectable 25 Gram(s) IV Push once  dextrose 50% Injectable 25 Gram(s) IV Push once  diltiazem    milliGRAM(s) Oral daily  heparin  Infusion. 400 Unit(s)/Hr (4 mL/Hr) IV Continuous <Continuous>  hydrALAZINE 50 milliGRAM(s) Oral three times a day  influenza   Vaccine 0.5 milliLiter(s) IntraMuscular once  insulin glargine Injectable (LANTUS) 18 Unit(s) SubCutaneous at bedtime  insulin lispro (HumaLOG) corrective regimen sliding scale   SubCutaneous three times a day before meals  insulin lispro Injectable (HumaLOG) 8 Unit(s) SubCutaneous three times a day before meals  losartan 100 milliGRAM(s) Oral daily  montelukast 10 milliGRAM(s) Oral at bedtime  pantoprazole    Tablet 40 milliGRAM(s) Oral before breakfast  simvastatin 40 milliGRAM(s) Oral at bedtime  tiotropium 18 MICROgram(s) Capsule 1 Capsule(s) Inhalation daily  topiramate 100 milliGRAM(s) Oral daily    MEDICATIONS  (PRN):  dextrose 40% Gel 15 Gram(s) Oral once PRN Blood Glucose LESS THAN 70 milliGRAM(s)/deciliter  glucagon  Injectable 1 milliGRAM(s) IntraMuscular once PRN Glucose LESS THAN 70 milligrams/deciliter  guaiFENesin   Syrup  (Sugar-Free) 200 milliGRAM(s) Oral every 6 hours PRN Cough  heparin  Injectable 6500 Unit(s) IV Push every 6 hours PRN For aPTT less than 40  heparin  Injectable 3000 Unit(s) IV Push every 6 hours PRN For aPTT between 40 - 57                 ***** REVIEW OF SYSTEM:  GEN: no fever, no chills, no pain  RESP: no SOB, no cough, no sputum  CVS: no chest pain, no palpitations, no edema  GI: no abdominal pain, no nausea, no vomiting, no constipation, no diarrhea  : no dysuria, no frequency  NEURO: no headache, no dizziness  PSYCH: no depression, not anxious  Derm : no itching, no rash         ***** VITAL SIGNS:    T(F): 98 (18 @ 04:54), Max: 98.2 (18 @ 20:48)  HR: 60 (18 @ 04:54) (54 - 72)  BP: 117/79 (18 @ 04:54) (117/79 - 165/82)  RR: 18 (18 @ 04:54) (18 - 18)  SpO2: 96% (18 @ 04:54) (96% - 99%)  Wt(kg): --  ,   I&O's Summary    17 Sep 2018 07:01  -  18 Sep 2018 07:00  --------------------------------------------------------  IN: 480 mL / OUT: 0 mL / NET: 480 mL                 *****PHYSICAL EXAM:  GEN: A&O X 3 , NAD , comfortable  HEENT: NCAT, EOMI, MMM, no icterus  NECK: Supple, No JVD  CVS: S1S2 , regular , No M/R/G appreciated  PULM: CTA B/L,  no W/R/R appreciated  ABD.: soft. non tender, non distended,  bowel sounds present  Extrem: intact pulses , no edema noted  Derm: No rash or ecchymosis noted  PSYCH: normal mood, no depression, not anxious         *****LAB AND IMAGIN.7   21.26 )-----------( 265      ( 17 Sep 2018 08:02 )             39.6                   134<L>  |  105  |  23  ----------------------------<  82  4.0   |  19<L>  |  1.17    Ca    9.0      18 Sep 2018 06:30      PT/INR - ( 18 Sep 2018 07:57 )   PT: 11.2 sec;   INR: 0.99 ratio         PTT - ( 18 Sep 2018 07:57 )  PTT:66.9 sec                               [All pertinent recent Imaging/Reports reviewed]  < from: Nuclear Stress Test-Pharmacologic (18 @ 14:02) >  * The left ventricle was normal in size. There is a small  mild reversible defect in the distal inferolateral wall  consistentwith mild ischemia.    < end of copied text >    < from: Transthoracic Echocardiogram (18 @ 15:09) >  Conclusions:  1. Calcified trileaflet aortic valve with normal opening.  Mild aortic regurgitation.  2. Increased relative wall thickness with normal left  ventricular mass index, consistent with concentric left  ventricular remodeling.  3. Hyperdynamic left ventricular systolic function. The  upper septum measures 1.6 cm with mild LVOT obstruction  45mmHG.  4. Strain imaging was performed on the [facility] EPIQ with an  average HR of 55 bpm and a BP of 169/94. Global L. Strain=  -25.7  5. Normal right ventricular size and function.    < end of copied text >         *****A S S E S S M E N T   A N D   P L A N :  62F with PAD, CAD, COPD adm with dyspnea  tx COPD per pulm  non obstructive CAD on cath   stress test without significant ischemia  breast pain r/w massage, unlikely angina  no cardiac symptoms  severe PAD on CTA  echo noted  would proceed with surgery at moderate risk  timing of OR per vasc      __________________________  JACK Alegre D.O.

## 2018-09-18 NOTE — PROGRESS NOTE ADULT - PROBLEM SELECTOR PLAN 1
much better: Her wheezing has resolved: Cont steroids for a few days more  9/15: doing ok : no SOB : finish steroids soon  9/16: Resolved: She has been afebrile: Her WBC is significantly high which is likely due to steroids: Steroids will finish soon" Pt should be optimized by the time she will go for surgery for legs:  9/17: finished steroids: Her WBC count is high but she is afebrile: Would add Symbicort: pt has not been wheezing: And she has been optimized for the said surgery this week:  9/18: resolved: no wheezing: no SOB : pt seems optimized from pulmonary perspective for the said vascular surgery

## 2018-09-18 NOTE — PROGRESS NOTE ADULT - SUBJECTIVE AND OBJECTIVE BOX
Patient is a 62y old  Female who presents with a chief complaint of sob (18 Sep 2018 10:10)    Doing prettyy good: no wheezing or SOB   Any change in ROS:     MEDICATIONS  (STANDING):  ALBUTerol    90 MICROgram(s) HFA Inhaler 1 Puff(s) Inhalation every 4 hours  ALBUTerol/ipratropium for Nebulization 3 milliLiter(s) Nebulizer every 6 hours  aspirin  chewable 81 milliGRAM(s) Oral daily  buDESOnide 160 MICROgram(s)/formoterol 4.5 MICROgram(s) Inhaler 2 Puff(s) Inhalation two times a day  carvedilol 12.5 milliGRAM(s) Oral every 12 hours  dextrose 5%. 1000 milliLiter(s) (50 mL/Hr) IV Continuous <Continuous>  dextrose 50% Injectable 12.5 Gram(s) IV Push once  dextrose 50% Injectable 25 Gram(s) IV Push once  dextrose 50% Injectable 25 Gram(s) IV Push once  diltiazem    milliGRAM(s) Oral daily  heparin  Infusion. 400 Unit(s)/Hr (4 mL/Hr) IV Continuous <Continuous>  hydrALAZINE 50 milliGRAM(s) Oral three times a day  influenza   Vaccine 0.5 milliLiter(s) IntraMuscular once  insulin glargine Injectable (LANTUS) 18 Unit(s) SubCutaneous at bedtime  insulin lispro (HumaLOG) corrective regimen sliding scale   SubCutaneous three times a day before meals  insulin lispro Injectable (HumaLOG) 8 Unit(s) SubCutaneous three times a day before meals  losartan 100 milliGRAM(s) Oral daily  montelukast 10 milliGRAM(s) Oral at bedtime  pantoprazole    Tablet 40 milliGRAM(s) Oral before breakfast  simvastatin 40 milliGRAM(s) Oral at bedtime  tiotropium 18 MICROgram(s) Capsule 1 Capsule(s) Inhalation daily  topiramate 100 milliGRAM(s) Oral daily    MEDICATIONS  (PRN):  dextrose 40% Gel 15 Gram(s) Oral once PRN Blood Glucose LESS THAN 70 milliGRAM(s)/deciliter  glucagon  Injectable 1 milliGRAM(s) IntraMuscular once PRN Glucose LESS THAN 70 milligrams/deciliter  guaiFENesin   Syrup  (Sugar-Free) 200 milliGRAM(s) Oral every 6 hours PRN Cough  heparin  Injectable 6500 Unit(s) IV Push every 6 hours PRN For aPTT less than 40  heparin  Injectable 3000 Unit(s) IV Push every 6 hours PRN For aPTT between 40 - 57    Vital Signs Last 24 Hrs  T(C): 36.7 (18 Sep 2018 04:54), Max: 36.8 (17 Sep 2018 20:48)  T(F): 98 (18 Sep 2018 04:54), Max: 98.2 (17 Sep 2018 20:48)  HR: 60 (18 Sep 2018 04:54) (54 - 72)  BP: 117/79 (18 Sep 2018 04:54) (117/79 - 165/82)  BP(mean): 89 (17 Sep 2018 20:48) (89 - 89)  RR: 18 (18 Sep 2018 04:54) (18 - 18)  SpO2: 96% (18 Sep 2018 04:54) (96% - 99%)    I&O's Summary    17 Sep 2018 07:01  -  18 Sep 2018 07:00  --------------------------------------------------------  IN: 480 mL / OUT: 0 mL / NET: 480 mL          Physical Exam:   GENERAL: NAD, well-groomed, well-developed  HEENT: PRIYANK/   Atraumatic, Normocephalic  ENMT: No tonsillar erythema, exudates, or enlargement; Moist mucous membranes, Good dentition, No lesions  NECK: Supple, No JVD, Normal thyroid  CHEST/LUNG: Clear to auscultaion, ; No rales, rhonchi, wheezing, or rubs  CVS: Regular rate and rhythm; No murmurs, rubs, or gallops  GI: : Soft, Nontender, Nondistended; Bowel sounds present  NERVOUS SYSTEM:  Alert & Oriented X3  EXTREMITIES:  2+ Peripheral Pulses, No clubbing, cyanosis, or edema  LYMPH: No lymphadenopathy noted  SKIN: No rashes or lesions  ENDOCRINOLOGY: No Thyromegaly  PSYCH: Appropriate    Labs:                              12.7   21.26 )-----------( 265      ( 17 Sep 2018 08:02 )             39.6                         13.6   23.04 )-----------( 224      ( 16 Sep 2018 08:02 )             41.8                         14.0   19.1  )-----------( 280      ( 15 Sep 2018 10:22 )             43.7                         14.9   14.84 )-----------( 292      ( 14 Sep 2018 14:07 )             46.8     09-18    134<L>  |  105  |  23  ----------------------------<  82  4.0   |  19<L>  |  1.17  09-17    136  |  103  |  20  ----------------------------<  72  3.7   |  22  |  0.97  09-16    139  |  105  |  22  ----------------------------<  117<H>  3.9   |  22  |  0.98  09-15    131<L>  |  102  |  28<H>  ----------------------------<  234<H>  3.9   |  21<L>  |  1.30  09-14    136  |  103  |  26<H>  ----------------------------<  213<H>  3.9   |  20<L>  |  1.26    Ca    9.0      18 Sep 2018 06:30  Ca    8.4      17 Sep 2018 06:19      CAPILLARY BLOOD GLUCOSE      POCT Blood Glucose.: 111 mg/dL (18 Sep 2018 07:58)  POCT Blood Glucose.: 140 mg/dL (17 Sep 2018 21:05)  POCT Blood Glucose.: 146 mg/dL (17 Sep 2018 16:40)  POCT Blood Glucose.: 127 mg/dL (17 Sep 2018 11:48)        PT/INR - ( 18 Sep 2018 07:57 )   PT: 11.2 sec;   INR: 0.99 ratio         PTT - ( 18 Sep 2018 07:57 )  PTT:66.9 sec      < from: CT Angio Abd Aorta w/run-off w/ IV Cont (09.12.18 @ 17:40) >  LIAC ARTERY: Heavy calcific atherosclerotic disease with moderate   stenosis.  EXTERNAL ILIAC ARTERY: Occluded.  INTERNAL ILIAC ARTERY: Moderate to severe stenosis at the origin.  COMMON FEMORAL ARTERY: Occluded.  FEMORAL ARTERY: Occluded right lower extremity bypass graft. Native right   femoral artery also occluded.  PROFUNDA FEMORAL ARTERY: Widely patent with a widely patent   femoral-femoral bypass graft. Note, there is also an occluded   femoral-femoral bypass.  POPLITEAL ARTERY: Reconstituted and widely patent.  ANTERIOR TIBIAL ARTERY: Widelypatent.  TIBIOPERONEAL TRUNK: Moderate focal stenosis.  POSTERIOR TIBIAL ARTERY: Widely patent.  PERONEAL ARTERY: Widely patent.    LEFT LOWER EXTREMITY:    COMMON ILIAC ARTERY: Widely patent.  EXTERNAL ILIAC ARTERY: Widely patent.  INTERNAL ILIAC ARTERY: Occluded.  COMMON FEMORAL ARTERY: Widely patent.  FEMORAL ARTERY: Occluded and reconstituted in the distal femoral artery.  PROFUNDA FEMORAL ARTERY: Widely patent.  POPLITEAL ARTERY: Widely patent.  ANTERIOR TIBIAL ARTERY: Widely patent.  TIBIOPERONEAL TRUNK: Widely patent.  POSTERIOR TIBIAL ARTERY: Widely patent.  PERONEAL ARTERY: Widely patent.    ADDITIONAL FINDINGS: Small left hepatic cyst. Renal cysts. Hysterectomy.     IMPRESSION:     RIGHT LOWER EXTREMITY: Occluded external iliac artery and common femoral   artery. Widely patent profunda femoral artery via a femoral-femoral   bypass graft. Occlusion of the femoral artery and lower extremity bypass   graft. Reconstitution of the popliteal artery with three-vessel runoff   although the tibioperoneal trunk has moderate focal stenosis.    LEFT LOWER EXTREMITY: Femoral artery is occluded with reconstitution of   the distal femoral artery. Three-vessel runoff.        < end of copied text >      RECENT CULTURES:        RESPIRATORY CULTURES:          Studies  Chest X-RAY  CT SCAN Chest   Venous Dopplers: LE:   CT Abdomen  Others

## 2018-09-18 NOTE — PROGRESS NOTE ADULT - ASSESSMENT
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 She started feeling short of breath about 3 weeks ago with 1-2 weeks of productive cough with clear sputum. Over the past week she endorses some wheeze and she has been using her inhaler 3 times in the morning and once at night daily. She no longer has medication for her nebulizer. She can walk <1 block as she is limited by shortness of breath and by LE claudication symptoms. She also has chest pain that is in the L breast, not pleuritic, feels that it is in the breast tissue and improves when she squeezes it. Denies fevers/chills, LE swelling, N/V, diaphoresis, diarrhea, constipation, abdominal pain, dizziness. Walks with a walker. Current daily smoker just 1-2 cigarettes daily (07 Sep 2018 17:38)    Problem/Plan - 1:    ·	Cough    - Pt being treated for COPD exacerbation.    CT chest does not show pneumonia.  s/p course of azithromycin x days      -  Pt with significant leukocytosis likely secondary to steroid effect, continues to improve.      - Induration in left forearm brachial area secondary to prior IV site.  Site improving with resolving tenderness/induration.  No signs of infection        * Pt undergoing vascular w/u of rt foot 2nd digit cyanosis.  s/p xray, without periosteal changes or OM.  CT angio shows femoral artery occlusion with distal artery reconstitution.    No outward signs of toe infection.  Planned for bypass as per [facility]        Lindsey Angelo  [phone]

## 2018-09-18 NOTE — PROGRESS NOTE ADULT - SUBJECTIVE AND OBJECTIVE BOX
Chief complaint  Patient is a 62y old  Female who presents with a chief complaint of sob (18 Sep 2018 17:24)   Review of systems  Patient in bed, looks comfortable, no fever, no hypoglycemia.    Labs and Fingersticks  CAPILLARY BLOOD GLUCOSE      POCT Blood Glucose.: 160 mg/dL (18 Sep 2018 16:40)  POCT Blood Glucose.: 74 mg/dL (18 Sep 2018 12:11)  POCT Blood Glucose.: 111 mg/dL (18 Sep 2018 07:58)  POCT Blood Glucose.: 140 mg/dL (17 Sep 2018 21:05)      Anion Gap, Serum: 10 (09-18 @ 06:30)  Anion Gap, Serum: 11 (09-17 @ 06:19)      Calcium, Total Serum: 9.0 (09-18 @ 06:30)  Calcium, Total Serum: 8.4 (09-17 @ 06:19)          09-18    134<L>  |  105  |  23  ----------------------------<  82  4.0   |  19<L>  |  1.17    Ca    9.0      18 Sep 2018 06:30                          13.5   17.69 )-----------( 185      ( 18 Sep 2018 07:56 )             42.3     Medications  MEDICATIONS  (STANDING):  ALBUTerol    90 MICROgram(s) HFA Inhaler 1 Puff(s) Inhalation every 4 hours  ALBUTerol/ipratropium for Nebulization 3 milliLiter(s) Nebulizer every 6 hours  aspirin  chewable 81 milliGRAM(s) Oral daily  buDESOnide 160 MICROgram(s)/formoterol 4.5 MICROgram(s) Inhaler 2 Puff(s) Inhalation two times a day  carvedilol 12.5 milliGRAM(s) Oral every 12 hours  dextrose 5%. 1000 milliLiter(s) (50 mL/Hr) IV Continuous <Continuous>  dextrose 50% Injectable 12.5 Gram(s) IV Push once  dextrose 50% Injectable 25 Gram(s) IV Push once  dextrose 50% Injectable 25 Gram(s) IV Push once  diltiazem    milliGRAM(s) Oral daily  heparin  Infusion. 400 Unit(s)/Hr (4 mL/Hr) IV Continuous <Continuous>  hydrALAZINE 50 milliGRAM(s) Oral three times a day  influenza   Vaccine 0.5 milliLiter(s) IntraMuscular once  insulin glargine Injectable (LANTUS) 18 Unit(s) SubCutaneous at bedtime  insulin lispro (HumaLOG) corrective regimen sliding scale   SubCutaneous three times a day before meals  insulin lispro Injectable (HumaLOG) 8 Unit(s) SubCutaneous three times a day before meals  losartan 100 milliGRAM(s) Oral daily  montelukast 10 milliGRAM(s) Oral at bedtime  pantoprazole    Tablet 40 milliGRAM(s) Oral before breakfast  simvastatin 40 milliGRAM(s) Oral at bedtime  tiotropium 18 MICROgram(s) Capsule 1 Capsule(s) Inhalation daily  topiramate 100 milliGRAM(s) Oral daily      Physical Exam  General: Patient comfortable in bed  Vital Signs Last 12 Hrs  T(F): 97.6 (09-18-18 @ 13:25), Max: 97.6 (09-18-18 @ 13:25)  HR: 74 (09-18-18 @ 18:01) (60 - 74)  BP: 128/81 (09-18-18 @ 18:01) (121/74 - 128/81)  BP(mean): --  RR: 18 (09-18-18 @ 13:25) (18 - 18)  SpO2: 98% (09-18-18 @ 13:25) (98% - 98%)  Neck: No palpable thyroid nodules.  CVS: S1S2, No murmurs  Respiratory: No wheezing, no crepitations  GI: Abdomen soft, bowel sounds positive  Musculoskeletal:  edema lower extremities.   Skin: No skin rashes, no ecchymosis    Diagnostics    Thyroid Stimulating Hormone, Serum: AM Sched. Collection: 15-Sep-2018 06:00 (09-14 @ 12:20)  Free Thyroxine, Serum: AM Sched. Collection: 15-Sep-2018 06:00 (09-14 @ 12:20)  TSH Receptor Antibody: AM Sched. Collection: 15-Sep-2018 06:00 (09-14 @ 12:20)

## 2018-09-18 NOTE — PROGRESS NOTE ADULT - ASSESSMENT
Dyspnea on exertion.  Plan: Jessie COPD exacerbation  duonebs  cw steroids as per pulm  antibiotics as per ID   pulmonary fu    CAD (coronary artery disease).  Plan: cw home meds   cards consult appreciated  ischemia webb as per cards     Diabetes.  Plan: monitor FS  ISS.   uncontrolled  endodcine called    Severe PVD Plan doppler reviewed  cw hep gtt  CT reviewed  vascular fu  plan for bypass this  week    Left arm iv infiltration with contrast  Vascular following  wound care and ID following

## 2018-09-19 LAB
APTT BLD: 75.5 SEC — HIGH (ref 27.5–37.4)
CULTURE RESULTS: SIGNIFICANT CHANGE UP
GLUCOSE BLDC GLUCOMTR-MCNC: 100 MG/DL — HIGH (ref 70–99)
GLUCOSE BLDC GLUCOMTR-MCNC: 143 MG/DL — HIGH (ref 70–99)
GLUCOSE BLDC GLUCOMTR-MCNC: 159 MG/DL — HIGH (ref 70–99)
GLUCOSE BLDC GLUCOMTR-MCNC: 75 MG/DL — SIGNIFICANT CHANGE UP (ref 70–99)
INR BLD: 1.05 RATIO — SIGNIFICANT CHANGE UP (ref 0.88–1.16)
PROTHROM AB SERPL-ACNC: 11.4 SEC — SIGNIFICANT CHANGE UP (ref 9.8–12.7)
SPECIMEN SOURCE: SIGNIFICANT CHANGE UP
TROPONIN T, HIGH SENSITIVITY RESULT: 20 NG/L — SIGNIFICANT CHANGE UP (ref 0–51)

## 2018-09-19 PROCEDURE — 93010 ELECTROCARDIOGRAM REPORT: CPT

## 2018-09-19 RX ORDER — ACETAMINOPHEN 500 MG
1000 TABLET ORAL ONCE
Qty: 0 | Refills: 0 | Status: COMPLETED | OUTPATIENT
Start: 2018-09-19 | End: 2018-09-19

## 2018-09-19 RX ADMIN — PANTOPRAZOLE SODIUM 40 MILLIGRAM(S): 20 TABLET, DELAYED RELEASE ORAL at 05:09

## 2018-09-19 RX ADMIN — INSULIN GLARGINE 18 UNIT(S): 100 INJECTION, SOLUTION SUBCUTANEOUS at 22:06

## 2018-09-19 RX ADMIN — LOSARTAN POTASSIUM 100 MILLIGRAM(S): 100 TABLET, FILM COATED ORAL at 05:08

## 2018-09-19 RX ADMIN — Medication 50 MILLIGRAM(S): at 22:07

## 2018-09-19 RX ADMIN — Medication 100 MILLIGRAM(S): at 12:00

## 2018-09-19 RX ADMIN — Medication 3 MILLILITER(S): at 17:06

## 2018-09-19 RX ADMIN — Medication 8 UNIT(S): at 16:58

## 2018-09-19 RX ADMIN — Medication 50 MILLIGRAM(S): at 13:29

## 2018-09-19 RX ADMIN — BUDESONIDE AND FORMOTEROL FUMARATE DIHYDRATE 2 PUFF(S): 160; 4.5 AEROSOL RESPIRATORY (INHALATION) at 05:08

## 2018-09-19 RX ADMIN — Medication 3 MILLILITER(S): at 00:54

## 2018-09-19 RX ADMIN — BUDESONIDE AND FORMOTEROL FUMARATE DIHYDRATE 2 PUFF(S): 160; 4.5 AEROSOL RESPIRATORY (INHALATION) at 17:08

## 2018-09-19 RX ADMIN — Medication 8 UNIT(S): at 08:41

## 2018-09-19 RX ADMIN — HEPARIN SODIUM 700 UNIT(S)/HR: 5000 INJECTION INTRAVENOUS; SUBCUTANEOUS at 10:51

## 2018-09-19 RX ADMIN — Medication 50 MILLIGRAM(S): at 05:08

## 2018-09-19 RX ADMIN — Medication 3 MILLILITER(S): at 12:00

## 2018-09-19 RX ADMIN — SIMVASTATIN 40 MILLIGRAM(S): 20 TABLET, FILM COATED ORAL at 22:07

## 2018-09-19 RX ADMIN — Medication 1: at 16:58

## 2018-09-19 RX ADMIN — Medication 180 MILLIGRAM(S): at 05:09

## 2018-09-19 RX ADMIN — CARVEDILOL PHOSPHATE 12.5 MILLIGRAM(S): 80 CAPSULE, EXTENDED RELEASE ORAL at 05:08

## 2018-09-19 RX ADMIN — Medication 81 MILLIGRAM(S): at 12:00

## 2018-09-19 RX ADMIN — Medication 3 MILLILITER(S): at 05:09

## 2018-09-19 RX ADMIN — CARVEDILOL PHOSPHATE 12.5 MILLIGRAM(S): 80 CAPSULE, EXTENDED RELEASE ORAL at 17:06

## 2018-09-19 RX ADMIN — MONTELUKAST 10 MILLIGRAM(S): 4 TABLET, CHEWABLE ORAL at 22:07

## 2018-09-19 NOTE — PROGRESS NOTE ADULT - PROBLEM SELECTOR PLAN 1
much better: Her wheezing has resolved: Cont steroids for a few days more  9/15: doing ok : no SOB : finish steroids soon  9/16: Resolved: She has been afebrile: Her WBC is significantly high which is likely due to steroids: Steroids will finish soon" Pt should be optimized by the time she will go for surgery for legs:  9/17: finished steroids: Her WBC count is high but she is afebrile: Would add Symbicort: pt has not been wheezing: And she has been optimized for the said surgery this week:  9/18: resolved: no wheezing: no SOB : pt seems optimized from pulmonary perspective for the said vascular surgery  9/19: pts is much better: no SOB at this time as well as no wheezing  ;

## 2018-09-19 NOTE — PROGRESS NOTE ADULT - SUBJECTIVE AND OBJECTIVE BOX
Patient is a 63y old  Female who presents with a chief complaint of sob (19 Sep 2018 11:37)      INTERVAL HPI/OVERNIGHT EVENTS:  T(C): 36.7 (18 @ 12:40), Max: 36.7 (18 @ 12:40)  HR: 70 (18 @ 12:40) (64 - 74)  BP: 128/78 (18 @ 12:40) (120/70 - 128/81)  RR: 18 (18 @ 12:40) (16 - 18)  SpO2: 99% (18 @ 12:40) (99% - 100%)  Wt(kg): --  I&O's Summary    18 Sep 2018 07:01  -  19 Sep 2018 07:00  --------------------------------------------------------  IN: 830 mL / OUT: 0 mL / NET: 830 mL        LABS:                        13.5   17.69 )-----------( 185      ( 18 Sep 2018 07:56 )             42.3         134<L>  |  105  |  23  ----------------------------<  82  4.0   |  19<L>  |  1.17    Ca    9.0      18 Sep 2018 06:30      PT/INR - ( 19 Sep 2018 10:18 )   PT: 11.4 sec;   INR: 1.05 ratio         PTT - ( 19 Sep 2018 10:18 )  PTT:75.5 sec  Urinalysis Basic - ( 18 Sep 2018 13:11 )    Color: Colorless / Appearance: Clear / S.006 / pH: x  Gluc: x / Ketone: Negative  / Bili: Negative / Urobili: Negative   Blood: x / Protein: Negative / Nitrite: Negative   Leuk Esterase: Negative / RBC: x / WBC x   Sq Epi: x / Non Sq Epi: x / Bacteria: x      CAPILLARY BLOOD GLUCOSE      POCT Blood Glucose.: 75 mg/dL (19 Sep 2018 11:57)  POCT Blood Glucose.: 100 mg/dL (19 Sep 2018 08:33)  POCT Blood Glucose.: 131 mg/dL (18 Sep 2018 21:18)  POCT Blood Glucose.: 160 mg/dL (18 Sep 2018 16:40)        Urinalysis Basic - ( 18 Sep 2018 13:11 )    Color: Colorless / Appearance: Clear / S.006 / pH: x  Gluc: x / Ketone: Negative  / Bili: Negative / Urobili: Negative   Blood: x / Protein: Negative / Nitrite: Negative   Leuk Esterase: Negative / RBC: x / WBC x   Sq Epi: x / Non Sq Epi: x / Bacteria: x        MEDICATIONS  (STANDING):  ALBUTerol    90 MICROgram(s) HFA Inhaler 1 Puff(s) Inhalation every 4 hours  ALBUTerol/ipratropium for Nebulization 3 milliLiter(s) Nebulizer every 6 hours  aspirin  chewable 81 milliGRAM(s) Oral daily  buDESOnide 160 MICROgram(s)/formoterol 4.5 MICROgram(s) Inhaler 2 Puff(s) Inhalation two times a day  carvedilol 12.5 milliGRAM(s) Oral every 12 hours  dextrose 5%. 1000 milliLiter(s) (50 mL/Hr) IV Continuous <Continuous>  dextrose 50% Injectable 12.5 Gram(s) IV Push once  dextrose 50% Injectable 25 Gram(s) IV Push once  dextrose 50% Injectable 25 Gram(s) IV Push once  diltiazem    milliGRAM(s) Oral daily  heparin  Infusion. 400 Unit(s)/Hr (4 mL/Hr) IV Continuous <Continuous>  hydrALAZINE 50 milliGRAM(s) Oral three times a day  influenza   Vaccine 0.5 milliLiter(s) IntraMuscular once  insulin glargine Injectable (LANTUS) 18 Unit(s) SubCutaneous at bedtime  insulin lispro (HumaLOG) corrective regimen sliding scale   SubCutaneous three times a day before meals  insulin lispro Injectable (HumaLOG) 8 Unit(s) SubCutaneous three times a day before meals  losartan 100 milliGRAM(s) Oral daily  montelukast 10 milliGRAM(s) Oral at bedtime  pantoprazole    Tablet 40 milliGRAM(s) Oral before breakfast  simvastatin 40 milliGRAM(s) Oral at bedtime  tiotropium 18 MICROgram(s) Capsule 1 Capsule(s) Inhalation daily  topiramate 100 milliGRAM(s) Oral daily    MEDICATIONS  (PRN):  dextrose 40% Gel 15 Gram(s) Oral once PRN Blood Glucose LESS THAN 70 milliGRAM(s)/deciliter  glucagon  Injectable 1 milliGRAM(s) IntraMuscular once PRN Glucose LESS THAN 70 milligrams/deciliter  guaiFENesin   Syrup  (Sugar-Free) 200 milliGRAM(s) Oral every 6 hours PRN Cough  heparin  Injectable 6500 Unit(s) IV Push every 6 hours PRN For aPTT less than 40  heparin  Injectable 3000 Unit(s) IV Push every 6 hours PRN For aPTT between 40 - 57          PHYSICAL EXAM:  GENERAL: NAD, well-groomed, well-developed  HEAD:  Atraumatic, Normocephalic  CHEST/LUNG: Clear to percussion bilaterally; No rales, rhonchi, wheezing, or rubs  HEART: Regular rate and rhythm; No murmurs, rubs, or gallops  ABDOMEN: Soft, Nontender, Nondistended; Bowel sounds present  EXTREMITIES:  2+ Peripheral Pulses, No clubbing, cyanosis, or edema  LYMPH: No lymphadenopathy noted  SKIN: No rashes or lesions    Care Discussed with Consultants/Other Providers [ ] YES  [ ] NO

## 2018-09-19 NOTE — PROGRESS NOTE ADULT - ASSESSMENT
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 She started feeling short of breath about 3 weeks ago with 1-2 weeks of productive cough with clear sputum. Over the past week she endorses some wheeze and she has been using her inhaler 3 times in the morning and once at night daily. She no longer has medication for her nebulizer. She can walk <1 block as she is limited by shortness of breath and by LE claudication symptoms. She also has chest pain that is in the L breast, not pleuritic, feels that it is in the breast tissue and improves when she squeezes it. Denies fevers/chills, LE swelling, N/V, diaphoresis, diarrhea, constipation, abdominal pain, dizziness. Walks with a walker. Current daily smoker just 1-2 cigarettes daily (07 Sep 2018 17:38)    Problem/Plan - 1:    ·	Cough    - Pt being treated for COPD exacerbation.    CT chest does not show pneumonia.  s/p course of azithromycin x 5 days      -  Pt with significant leukocytosis likely secondary to steroid effect, continues to improve.      - Induration in left forearm brachial area secondary to prior infiltrated IV site.  Site improving with resolving tenderness/induration.  No signs of infection        * Pt undergoing vascular w/u of rt foot 2nd digit cyanosis.  s/p xray, without periosteal changes or OM.  CT angio shows femoral artery occlusion with distal artery reconstitution.    No outward signs of toe infection.  Planned for bypass as per Vasc    No acute ID issues at this time        [facility]  [phone]

## 2018-09-19 NOTE — PROGRESS NOTE ADULT - SUBJECTIVE AND OBJECTIVE BOX
Infectious Diseases progress note:    Subjective:  Events noted.  Pt currently NAD, denies chest pain, sob, cough, fever    ROS:  CONSTITUTIONAL:  No fever, chills, rigors  CARDIOVASCULAR:  No chest pain or palpitations  RESPIRATORY:   No SOB, cough, dyspnea on exertion.  No wheezing  GASTROINTESTINAL:  No abd pain, N/V, diarrhea/constipation  EXTREMITIES:  No swelling or joint pain  GENITOURINARY:  No burning on urination, increased frequency or urgency.  No flank pain  NEUROLOGIC:  No HA, visual disturbances  SKIN: No rashes    Allergies    No Known Allergies    Intolerances        ANTIBIOTICS/RELEVANT:  antimicrobials    immunologic:  influenza   Vaccine 0.5 milliLiter(s) IntraMuscular once    OTHER:  ALBUTerol    90 MICROgram(s) HFA Inhaler 1 Puff(s) Inhalation every 4 hours  ALBUTerol/ipratropium for Nebulization 3 milliLiter(s) Nebulizer every 6 hours  aspirin  chewable 81 milliGRAM(s) Oral daily  buDESOnide 160 MICROgram(s)/formoterol 4.5 MICROgram(s) Inhaler 2 Puff(s) Inhalation two times a day  carvedilol 12.5 milliGRAM(s) Oral every 12 hours  dextrose 40% Gel 15 Gram(s) Oral once PRN  dextrose 5%. 1000 milliLiter(s) IV Continuous <Continuous>  dextrose 50% Injectable 12.5 Gram(s) IV Push once  dextrose 50% Injectable 25 Gram(s) IV Push once  dextrose 50% Injectable 25 Gram(s) IV Push once  diltiazem    milliGRAM(s) Oral daily  glucagon  Injectable 1 milliGRAM(s) IntraMuscular once PRN  guaiFENesin   Syrup  (Sugar-Free) 200 milliGRAM(s) Oral every 6 hours PRN  heparin  Infusion. 400 Unit(s)/Hr IV Continuous <Continuous>  heparin  Injectable 6500 Unit(s) IV Push every 6 hours PRN  heparin  Injectable 3000 Unit(s) IV Push every 6 hours PRN  hydrALAZINE 50 milliGRAM(s) Oral three times a day  insulin glargine Injectable (LANTUS) 18 Unit(s) SubCutaneous at bedtime  insulin lispro (HumaLOG) corrective regimen sliding scale   SubCutaneous three times a day before meals  insulin lispro Injectable (HumaLOG) 8 Unit(s) SubCutaneous three times a day before meals  losartan 100 milliGRAM(s) Oral daily  montelukast 10 milliGRAM(s) Oral at bedtime  pantoprazole    Tablet 40 milliGRAM(s) Oral before breakfast  simvastatin 40 milliGRAM(s) Oral at bedtime  tiotropium 18 MICROgram(s) Capsule 1 Capsule(s) Inhalation daily  topiramate 100 milliGRAM(s) Oral daily      Objective:  Vital Signs Last 24 Hrs  T(C): 36.9 (19 Sep 2018 17:03), Max: 36.9 (19 Sep 2018 17:03)  T(F): 98.4 (19 Sep 2018 17:03), Max: 98.4 (19 Sep 2018 17:03)  HR: 71 (19 Sep 2018 17:03) (64 - 74)  BP: 144/85 (19 Sep 2018 17:03) (120/70 - 144/85)  BP(mean): --  RR: 18 (19 Sep 2018 17:03) (16 - 18)  SpO2: 98% (19 Sep 2018 17:03) (98% - 100%)    PHYSICAL EXAM:  Constitutional:NAD  Eyes:PRIYANK, EOMI  Ear/Nose/Throat: no thrush, mucositis.  Moist mucous membranes	  Neck:no JVD, no lymphadenopathy, supple  Respiratory: CTA suzanna  Cardiovascular: S1S2 RRR, no murmurs  Gastrointestinal:soft, nontender,  nondistended (+) BS  Extremities:no e/e/c  Skin:  L arm induration resolved        LABS:                        13.5   17.69 )-----------( 185      ( 18 Sep 2018 07:56 )             42.3     09-18    134<L>  |  105  |  23  ----------------------------<  82  4.0   |  19<L>  |  1.17    Ca    9.0      18 Sep 2018 06:30      PT/INR - ( 19 Sep 2018 10:18 )   PT: 11.4 sec;   INR: 1.05 ratio         PTT - ( 19 Sep 2018 10:18 )  PTT:75.5 sec  Urinalysis Basic - ( 18 Sep 2018 13:11 )    Color: Colorless / Appearance: Clear / S.006 / pH: x  Gluc: x / Ketone: Negative  / Bili: Negative / Urobili: Negative   Blood: x / Protein: Negative / Nitrite: Negative   Leuk Esterase: Negative / RBC: x / WBC x   Sq Epi: x / Non Sq Epi: x / Bacteria: x                  Rapid RVP Result: NotDetec          MICROBIOLOGY:          RADIOLOGY & ADDITIONAL STUDIES:

## 2018-09-19 NOTE — PROGRESS NOTE ADULT - ASSESSMENT
Dyspnea on exertion.  Plan: Jessie COPD exacerbation  duonebs  cw steroids as per pulm  antibiotics as per ID   pulmonary fu    CAD (coronary artery disease).  Plan: cw home meds   cards consult appreciated  ischemia webb as per cards     Diabetes.  Plan: monitor FS  ISS.   uncontrolled  endodcine called    Severe PVD Plan doppler reviewed  cw hep gtt  CT reviewed  vascular fu  plan for bypass this  week, timing as per vascular    Left arm iv infiltration with contrast  Vascular following  wound care and ID following

## 2018-09-19 NOTE — PROGRESS NOTE ADULT - SUBJECTIVE AND OBJECTIVE BOX
- Patient seen and examined.  - In summary, patient is a 62 year old woman who presented with sob (13 Sep 2018 20:06)  - Today, patient is without complaints.         *****MEDICATIONS:    MEDICATIONS  (STANDING):  ALBUTerol    90 MICROgram(s) HFA Inhaler 1 Puff(s) Inhalation every 4 hours  ALBUTerol/ipratropium for Nebulization 3 milliLiter(s) Nebulizer every 6 hours  aspirin  chewable 81 milliGRAM(s) Oral daily  buDESOnide 160 MICROgram(s)/formoterol 4.5 MICROgram(s) Inhaler 2 Puff(s) Inhalation two times a day  carvedilol 12.5 milliGRAM(s) Oral every 12 hours  dextrose 5%. 1000 milliLiter(s) (50 mL/Hr) IV Continuous <Continuous>  dextrose 50% Injectable 12.5 Gram(s) IV Push once  dextrose 50% Injectable 25 Gram(s) IV Push once  dextrose 50% Injectable 25 Gram(s) IV Push once  diltiazem    milliGRAM(s) Oral daily  heparin  Infusion. 400 Unit(s)/Hr (4 mL/Hr) IV Continuous <Continuous>  hydrALAZINE 50 milliGRAM(s) Oral three times a day  influenza   Vaccine 0.5 milliLiter(s) IntraMuscular once  insulin glargine Injectable (LANTUS) 18 Unit(s) SubCutaneous at bedtime  insulin lispro (HumaLOG) corrective regimen sliding scale   SubCutaneous three times a day before meals  insulin lispro Injectable (HumaLOG) 8 Unit(s) SubCutaneous three times a day before meals  losartan 100 milliGRAM(s) Oral daily  montelukast 10 milliGRAM(s) Oral at bedtime  pantoprazole    Tablet 40 milliGRAM(s) Oral before breakfast  simvastatin 40 milliGRAM(s) Oral at bedtime  tiotropium 18 MICROgram(s) Capsule 1 Capsule(s) Inhalation daily  topiramate 100 milliGRAM(s) Oral daily    MEDICATIONS  (PRN):  dextrose 40% Gel 15 Gram(s) Oral once PRN Blood Glucose LESS THAN 70 milliGRAM(s)/deciliter  glucagon  Injectable 1 milliGRAM(s) IntraMuscular once PRN Glucose LESS THAN 70 milligrams/deciliter  guaiFENesin   Syrup  (Sugar-Free) 200 milliGRAM(s) Oral every 6 hours PRN Cough  heparin  Injectable 6500 Unit(s) IV Push every 6 hours PRN For aPTT less than 40  heparin  Injectable 3000 Unit(s) IV Push every 6 hours PRN For aPTT between 40 - 57                 ***** REVIEW OF SYSTEM:  GEN: no fever, no chills, no pain  RESP: no SOB, no cough, no sputum  CVS: no chest pain, no palpitations, no edema  GI: no abdominal pain, no nausea, no vomiting, no constipation, no diarrhea  : no dysuria, no frequency  NEURO: no headache, no dizziness  PSYCH: no depression, not anxious  Derm : no itching, no rash         ***** VITAL SIGNS:    T(F): 97.8 (18 @ 05:21), Max: 97.9 (18 @ 20:29)  HR: 64 (18 @ 05:21) (60 - 74)  BP: 128/74 (18 @ 05:21) (120/70 - 128/81)  RR: 16 (18 @ 05:21) (16 - 18)  SpO2: 100% (18 @ 05:21) (98% - 100%)  Wt(kg): --  ,   I&O's Summary    18 Sep 2018 07:01  -  19 Sep 2018 07:00  --------------------------------------------------------  IN: 830 mL / OUT: 0 mL / NET: 830 mL                     *****PHYSICAL EXAM:  GEN: A&O X 3 , NAD , comfortable  HEENT: NCAT, EOMI, MMM, no icterus  NECK: Supple, No JVD  CVS: S1S2 , regular , No M/R/G appreciated  PULM: CTA B/L,  no W/R/R appreciated  ABD.: soft. non tender, non distended,  bowel sounds present  Extrem: intact pulses , no edema noted  Derm: No rash or ecchymosis noted  PSYCH: normal mood, no depression, not anxious         *****LAB AND IMAGIN.5   17.69 )-----------( 185      ( 18 Sep 2018 07:56 )             42.3                   134<L>  |  105  |  23  ----------------------------<  82  4.0   |  19<L>  |  1.17    Ca    9.0      18 Sep 2018 06:30      PT/INR - ( 18 Sep 2018 07:57 )   PT: 11.2 sec;   INR: 0.99 ratio         PTT - ( 18 Sep 2018 07:57 )  PTT:66.9 sec                   Urinalysis Basic - ( 18 Sep 2018 13:11 )    Color: Colorless / Appearance: Clear / S.006 / pH: x  Gluc: x / Ketone: Negative  / Bili: Negative / Urobili: Negative   Blood: x / Protein: Negative / Nitrite: Negative   Leuk Esterase: Negative / RBC: x / WBC x   Sq Epi: x / Non Sq Epi: x / Bacteria: x                    [All pertinent recent Imaging/Reports reviewed]  < from: Nuclear Stress Test-Pharmacologic (18 @ 14:02) >  * The left ventricle was normal in size. There is a small  mild reversible defect in the distal inferolateral wall  consistentwith mild ischemia.    < end of copied text >    < from: Transthoracic Echocardiogram (18 @ 15:09) >  Conclusions:  1. Calcified trileaflet aortic valve with normal opening.  Mild aortic regurgitation.  2. Increased relative wall thickness with normal left  ventricular mass index, consistent with concentric left  ventricular remodeling.  3. Hyperdynamic left ventricular systolic function. The  upper septum measures 1.6 cm with mild LVOT obstruction  45mmHG.  4. Strain imaging was performed on the [facility] EPIQ with an  average HR of 55 bpm and a BP of 169/94. Global L. Strain=  -25.7  5. Normal right ventricular size and function.    < end of copied text >         *****A S S E S S M E N T   A N D   P L A N :  62F with PAD, CAD, COPD adm with dyspnea  tx COPD per pulm  non obstructive CAD on cath   stress test without significant ischemia  breast pain r/w massage, unlikely angina  no cardiac symptoms  severe PAD on CTA  echo noted  would proceed with surgery at moderate risk  timing of OR per vasc      __________________________  JACK Alegre D.O.

## 2018-09-19 NOTE — PROGRESS NOTE ADULT - SUBJECTIVE AND OBJECTIVE BOX
Patient is a 63y old  Female who presents with a chief complaint of sob (19 Sep 2018 09:07)      Any change in ROS: Doing ok : no SOB     MEDICATIONS  (STANDING):  ALBUTerol    90 MICROgram(s) HFA Inhaler 1 Puff(s) Inhalation every 4 hours  ALBUTerol/ipratropium for Nebulization 3 milliLiter(s) Nebulizer every 6 hours  aspirin  chewable 81 milliGRAM(s) Oral daily  buDESOnide 160 MICROgram(s)/formoterol 4.5 MICROgram(s) Inhaler 2 Puff(s) Inhalation two times a day  carvedilol 12.5 milliGRAM(s) Oral every 12 hours  dextrose 5%. 1000 milliLiter(s) (50 mL/Hr) IV Continuous <Continuous>  dextrose 50% Injectable 12.5 Gram(s) IV Push once  dextrose 50% Injectable 25 Gram(s) IV Push once  dextrose 50% Injectable 25 Gram(s) IV Push once  diltiazem    milliGRAM(s) Oral daily  heparin  Infusion. 400 Unit(s)/Hr (4 mL/Hr) IV Continuous <Continuous>  hydrALAZINE 50 milliGRAM(s) Oral three times a day  influenza   Vaccine 0.5 milliLiter(s) IntraMuscular once  insulin glargine Injectable (LANTUS) 18 Unit(s) SubCutaneous at bedtime  insulin lispro (HumaLOG) corrective regimen sliding scale   SubCutaneous three times a day before meals  insulin lispro Injectable (HumaLOG) 8 Unit(s) SubCutaneous three times a day before meals  losartan 100 milliGRAM(s) Oral daily  montelukast 10 milliGRAM(s) Oral at bedtime  pantoprazole    Tablet 40 milliGRAM(s) Oral before breakfast  simvastatin 40 milliGRAM(s) Oral at bedtime  tiotropium 18 MICROgram(s) Capsule 1 Capsule(s) Inhalation daily  topiramate 100 milliGRAM(s) Oral daily    MEDICATIONS  (PRN):  dextrose 40% Gel 15 Gram(s) Oral once PRN Blood Glucose LESS THAN 70 milliGRAM(s)/deciliter  glucagon  Injectable 1 milliGRAM(s) IntraMuscular once PRN Glucose LESS THAN 70 milligrams/deciliter  guaiFENesin   Syrup  (Sugar-Free) 200 milliGRAM(s) Oral every 6 hours PRN Cough  heparin  Injectable 6500 Unit(s) IV Push every 6 hours PRN For aPTT less than 40  heparin  Injectable 3000 Unit(s) IV Push every 6 hours PRN For aPTT between 40 - 57    Vital Signs Last 24 Hrs  T(C): 36.6 (19 Sep 2018 05:21), Max: 36.6 (18 Sep 2018 20:29)  T(F): 97.8 (19 Sep 2018 05:21), Max: 97.9 (18 Sep 2018 20:29)  HR: 64 (19 Sep 2018 05:21) (60 - 74)  BP: 128/74 (19 Sep 2018 05:21) (120/70 - 128/81)  BP(mean): --  RR: 16 (19 Sep 2018 05:21) (16 - 18)  SpO2: 100% (19 Sep 2018 05:21) (98% - 100%)    I&O's Summary    18 Sep 2018 07:01  -  19 Sep 2018 07:00  --------------------------------------------------------  IN: 830 mL / OUT: 0 mL / NET: 830 mL          Physical Exam:   GENERAL: NAD, well-groomed, well-developed  HEENT: PRIYANK/   Atraumatic, Normocephalic  ENMT: No tonsillar erythema, exudates, or enlargement; Moist mucous membranes, Good dentition, No lesions  NECK: Supple, No JVD, Normal thyroid  CHEST/LUNG: Clear to auscultaion  CVS: Regular rate and rhythm; No murmurs, rubs, or gallops  GI: : Soft, Nontender, Nondistended; Bowel sounds present  NERVOUS SYSTEM:  Alert & Oriented X3  EXTREMITIES:  2+ Peripheral Pulses, No clubbing, cyanosis, or edema  LYMPH: No lymphadenopathy noted  SKIN: No rashes or lesions  ENDOCRINOLOGY: No Thyromegaly  PSYCH: Appropriate    Labs:                              13.5   17.69 )-----------( 185      ( 18 Sep 2018 07:56 )             42.3                         12.7   21.26 )-----------( 265      ( 17 Sep 2018 08:02 )             39.6                         13.6   23.04 )-----------( 224      ( 16 Sep 2018 08:02 )             41.8         134<L>  |  105  |  23  ----------------------------<  82  4.0   |  19<L>  |  1.17      136  |  103  |  20  ----------------------------<  72  3.7   |  22  |  0.97      139  |  105  |  22  ----------------------------<  117<H>  3.9   |  22  |  0.98    Ca    9.0      18 Sep 2018 06:30      CAPILLARY BLOOD GLUCOSE      POCT Blood Glucose.: 100 mg/dL (19 Sep 2018 08:33)  POCT Blood Glucose.: 131 mg/dL (18 Sep 2018 21:18)  POCT Blood Glucose.: 160 mg/dL (18 Sep 2018 16:40)  POCT Blood Glucose.: 74 mg/dL (18 Sep 2018 12:11)        PT/INR - ( 19 Sep 2018 10:18 )   PT: 11.4 sec;   INR: 1.05 ratio         PTT - ( 19 Sep 2018 10:18 )  PTT:75.5 sec  Urinalysis Basic - ( 18 Sep 2018 13:11 )    Color: Colorless / Appearance: Clear / S.006 / pH: x  Gluc: x / Ketone: Negative  / Bili: Negative / Urobili: Negative   Blood: x / Protein: Negative / Nitrite: Negative   Leuk Esterase: Negative / RBC: x / WBC x   Sq Epi: x / Non Sq Epi: x / Bacteria: x      < from: CT Angio Abd Aorta w/run-off w/ IV Cont (18 @ 17:40) >  NTERNAL ILIAC ARTERY: Occluded.  COMMON FEMORAL ARTERY: Widely patent.  FEMORAL ARTERY: Occluded and reconstituted in the distal femoral artery.  PROFUNDA FEMORAL ARTERY: Widely patent.  POPLITEAL ARTERY: Widely patent.  ANTERIOR TIBIAL ARTERY: Widely patent.  TIBIOPERONEAL TRUNK: Widely patent.  POSTERIOR TIBIAL ARTERY: Widely patent.  PERONEAL ARTERY: Widely patent.    ADDITIONAL FINDINGS: Small left hepatic cyst. Renal cysts. Hysterectomy.     IMPRESSION:     RIGHT LOWER EXTREMITY: Occluded external iliac artery and common femoral   artery. Widely patent profunda femoral artery via a femoral-femoral   bypass graft. Occlusion of the femoral artery and lower extremity bypass   graft. Reconstitution of the popliteal artery with three-vessel runoff   although the tibioperoneal trunk has moderate focal stenosis.    LEFT LOWER EXTREMITY: Femoral artery is occluded with reconstitution of   the distal femoral artery. Three-vessel runoff.                    LISA LU M.D., ATTENDING RADIOLOGIST  This document has been electronically signed. Sep 13 2018  9:22AM        < end of copied text >        RECENT CULTURES:        RESPIRATORY CULTURES:          Studies  Chest X-RAY  CT SCAN Chest   Venous Dopplers: LE:   CT Abdomen  Others

## 2018-09-19 NOTE — PROGRESS NOTE ADULT - SUBJECTIVE AND OBJECTIVE BOX
Chief complaint  Patient is a 63y old  Female who presents with a chief complaint of sob (19 Sep 2018 14:17)   Review of systems  Patient in bed, looks comfortable, no fever, no hypoglycemia.    Labs and Fingersticks  CAPILLARY BLOOD GLUCOSE      POCT Blood Glucose.: 75 mg/dL (19 Sep 2018 11:57)  POCT Blood Glucose.: 100 mg/dL (19 Sep 2018 08:33)  POCT Blood Glucose.: 131 mg/dL (18 Sep 2018 21:18)  POCT Blood Glucose.: 160 mg/dL (18 Sep 2018 16:40)      Anion Gap, Serum: 10 (09-18 @ 06:30)      Calcium, Total Serum: 9.0 (09-18 @ 06:30)          09-18    134<L>  |  105  |  23  ----------------------------<  82  4.0   |  19<L>  |  1.17    Ca    9.0      18 Sep 2018 06:30                          13.5   17.69 )-----------( 185      ( 18 Sep 2018 07:56 )             42.3     Medications  MEDICATIONS  (STANDING):  ALBUTerol    90 MICROgram(s) HFA Inhaler 1 Puff(s) Inhalation every 4 hours  ALBUTerol/ipratropium for Nebulization 3 milliLiter(s) Nebulizer every 6 hours  aspirin  chewable 81 milliGRAM(s) Oral daily  buDESOnide 160 MICROgram(s)/formoterol 4.5 MICROgram(s) Inhaler 2 Puff(s) Inhalation two times a day  carvedilol 12.5 milliGRAM(s) Oral every 12 hours  dextrose 5%. 1000 milliLiter(s) (50 mL/Hr) IV Continuous <Continuous>  dextrose 50% Injectable 12.5 Gram(s) IV Push once  dextrose 50% Injectable 25 Gram(s) IV Push once  dextrose 50% Injectable 25 Gram(s) IV Push once  diltiazem    milliGRAM(s) Oral daily  heparin  Infusion. 400 Unit(s)/Hr (4 mL/Hr) IV Continuous <Continuous>  hydrALAZINE 50 milliGRAM(s) Oral three times a day  influenza   Vaccine 0.5 milliLiter(s) IntraMuscular once  insulin glargine Injectable (LANTUS) 18 Unit(s) SubCutaneous at bedtime  insulin lispro (HumaLOG) corrective regimen sliding scale   SubCutaneous three times a day before meals  insulin lispro Injectable (HumaLOG) 8 Unit(s) SubCutaneous three times a day before meals  losartan 100 milliGRAM(s) Oral daily  montelukast 10 milliGRAM(s) Oral at bedtime  pantoprazole    Tablet 40 milliGRAM(s) Oral before breakfast  simvastatin 40 milliGRAM(s) Oral at bedtime  tiotropium 18 MICROgram(s) Capsule 1 Capsule(s) Inhalation daily  topiramate 100 milliGRAM(s) Oral daily      Physical Exam  General: Patient comfortable in bed  Vital Signs Last 12 Hrs  T(F): 98.1 (09-19-18 @ 12:40), Max: 98.1 (09-19-18 @ 12:40)  HR: 70 (09-19-18 @ 12:40) (64 - 70)  BP: 128/78 (09-19-18 @ 12:40) (128/74 - 128/78)  BP(mean): --  RR: 18 (09-19-18 @ 12:40) (16 - 18)  SpO2: 99% (09-19-18 @ 12:40) (99% - 100%)  Neck: No palpable thyroid nodules.  CVS: S1S2, No murmurs  Respiratory: No wheezing, no crepitations  GI: Abdomen soft, bowel sounds positive  Musculoskeletal:  edema lower extremities.   Skin: No skin rashes, no ecchymosis    Diagnostics    Thyroid Stimulating Hormone, Serum: AM Sched. Collection: 15-Sep-2018 06:00 (09-14 @ 12:20)  Free Thyroxine, Serum: AM Sched. Collection: 15-Sep-2018 06:00 (09-14 @ 12:20)  TSH Receptor Antibody: AM Sched. Collection: 15-Sep-2018 06:00 (09-14 @ 12:20)

## 2018-09-19 NOTE — CHART NOTE - NSCHARTNOTEFT_GEN_A_CORE
Notified by RN that patient c/o chest pain.     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  Patient with redemonstrated occluded right lower extremity occluded bypass graft and left femoral artery occlusion, for bypass scheduled on next week, patient now with c/o chest pain.    Chest pain  EKG with no acute   TRop ordered stat Notified by RN that patient c/o chest pain. Seen and examined the patient. patient verbalizes that she had left chest tightness which is getting better. patient attributes chest pain to musculoskeletal in nature. denies any other complaints.     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  Patient with redemonstrated occluded right lower extremity occluded bypass graft and left femoral artery occlusion, for bypass scheduled on next week, patient now with c/o chest pain.    Chest pain: likely non cardiac, instant in nature, improving now  EKG with no acute ST/t cahnges  TRop ordered stat  IV tylenol 100mg one dose now  F/u trop level  Will follow closely  Will update with primary team    Markel Kumar P BC  [id number]

## 2018-09-20 LAB
ANION GAP SERPL CALC-SCNC: 10 MMOL/L — SIGNIFICANT CHANGE UP (ref 5–17)
APTT BLD: 34.1 SEC — SIGNIFICANT CHANGE UP (ref 27.5–37.4)
APTT BLD: >200 SEC — CRITICAL HIGH (ref 27.5–37.4)
BUN SERPL-MCNC: 20 MG/DL — SIGNIFICANT CHANGE UP (ref 7–23)
CALCIUM SERPL-MCNC: 8.8 MG/DL — SIGNIFICANT CHANGE UP (ref 8.4–10.5)
CHLORIDE SERPL-SCNC: 106 MMOL/L — SIGNIFICANT CHANGE UP (ref 96–108)
CO2 SERPL-SCNC: 20 MMOL/L — LOW (ref 22–31)
CREAT SERPL-MCNC: 1.04 MG/DL — SIGNIFICANT CHANGE UP (ref 0.5–1.3)
GLUCOSE BLDC GLUCOMTR-MCNC: 105 MG/DL — HIGH (ref 70–99)
GLUCOSE BLDC GLUCOMTR-MCNC: 117 MG/DL — HIGH (ref 70–99)
GLUCOSE BLDC GLUCOMTR-MCNC: 122 MG/DL — HIGH (ref 70–99)
GLUCOSE BLDC GLUCOMTR-MCNC: 94 MG/DL — SIGNIFICANT CHANGE UP (ref 70–99)
GLUCOSE SERPL-MCNC: 110 MG/DL — HIGH (ref 70–99)
HCT VFR BLD CALC: 37.1 % — SIGNIFICANT CHANGE UP (ref 34.5–45)
HGB BLD-MCNC: 11.9 G/DL — SIGNIFICANT CHANGE UP (ref 11.5–15.5)
MCHC RBC-ENTMCNC: 26.9 PG — LOW (ref 27–34)
MCHC RBC-ENTMCNC: 32.1 GM/DL — SIGNIFICANT CHANGE UP (ref 32–36)
MCV RBC AUTO: 83.9 FL — SIGNIFICANT CHANGE UP (ref 80–100)
PLATELET # BLD AUTO: SIGNIFICANT CHANGE UP K/UL (ref 150–400)
POTASSIUM SERPL-MCNC: 4 MMOL/L — SIGNIFICANT CHANGE UP (ref 3.5–5.3)
POTASSIUM SERPL-SCNC: 4 MMOL/L — SIGNIFICANT CHANGE UP (ref 3.5–5.3)
RBC # BLD: 4.42 M/UL — SIGNIFICANT CHANGE UP (ref 3.8–5.2)
RBC # FLD: 16.4 % — HIGH (ref 10.3–14.5)
SODIUM SERPL-SCNC: 136 MMOL/L — SIGNIFICANT CHANGE UP (ref 135–145)
WBC # BLD: 12.75 K/UL — HIGH (ref 3.8–10.5)
WBC # FLD AUTO: 12.75 K/UL — HIGH (ref 3.8–10.5)

## 2018-09-20 RX ORDER — ACETAMINOPHEN 500 MG
650 TABLET ORAL EVERY 6 HOURS
Qty: 0 | Refills: 0 | Status: DISCONTINUED | OUTPATIENT
Start: 2018-09-20 | End: 2018-09-26

## 2018-09-20 RX ADMIN — HEPARIN SODIUM 1100 UNIT(S)/HR: 5000 INJECTION INTRAVENOUS; SUBCUTANEOUS at 10:39

## 2018-09-20 RX ADMIN — Medication 3 MILLILITER(S): at 05:35

## 2018-09-20 RX ADMIN — CARVEDILOL PHOSPHATE 12.5 MILLIGRAM(S): 80 CAPSULE, EXTENDED RELEASE ORAL at 05:36

## 2018-09-20 RX ADMIN — BUDESONIDE AND FORMOTEROL FUMARATE DIHYDRATE 2 PUFF(S): 160; 4.5 AEROSOL RESPIRATORY (INHALATION) at 05:35

## 2018-09-20 RX ADMIN — MONTELUKAST 10 MILLIGRAM(S): 4 TABLET, CHEWABLE ORAL at 21:54

## 2018-09-20 RX ADMIN — HEPARIN SODIUM 6500 UNIT(S): 5000 INJECTION INTRAVENOUS; SUBCUTANEOUS at 10:44

## 2018-09-20 RX ADMIN — CARVEDILOL PHOSPHATE 12.5 MILLIGRAM(S): 80 CAPSULE, EXTENDED RELEASE ORAL at 17:11

## 2018-09-20 RX ADMIN — LOSARTAN POTASSIUM 100 MILLIGRAM(S): 100 TABLET, FILM COATED ORAL at 05:36

## 2018-09-20 RX ADMIN — Medication 8 UNIT(S): at 12:20

## 2018-09-20 RX ADMIN — Medication 650 MILLIGRAM(S): at 12:23

## 2018-09-20 RX ADMIN — Medication 8 UNIT(S): at 17:10

## 2018-09-20 RX ADMIN — Medication 650 MILLIGRAM(S): at 13:23

## 2018-09-20 RX ADMIN — Medication 81 MILLIGRAM(S): at 12:21

## 2018-09-20 RX ADMIN — Medication 50 MILLIGRAM(S): at 21:54

## 2018-09-20 RX ADMIN — Medication 3 MILLILITER(S): at 00:36

## 2018-09-20 RX ADMIN — Medication 100 MILLIGRAM(S): at 12:22

## 2018-09-20 RX ADMIN — INSULIN GLARGINE 18 UNIT(S): 100 INJECTION, SOLUTION SUBCUTANEOUS at 21:54

## 2018-09-20 RX ADMIN — Medication 3 MILLILITER(S): at 17:09

## 2018-09-20 RX ADMIN — Medication 180 MILLIGRAM(S): at 05:35

## 2018-09-20 RX ADMIN — PANTOPRAZOLE SODIUM 40 MILLIGRAM(S): 20 TABLET, DELAYED RELEASE ORAL at 05:36

## 2018-09-20 RX ADMIN — HEPARIN SODIUM 800 UNIT(S)/HR: 5000 INJECTION INTRAVENOUS; SUBCUTANEOUS at 18:25

## 2018-09-20 RX ADMIN — HEPARIN SODIUM 0 UNIT(S)/HR: 5000 INJECTION INTRAVENOUS; SUBCUTANEOUS at 17:23

## 2018-09-20 RX ADMIN — Medication 50 MILLIGRAM(S): at 15:02

## 2018-09-20 RX ADMIN — BUDESONIDE AND FORMOTEROL FUMARATE DIHYDRATE 2 PUFF(S): 160; 4.5 AEROSOL RESPIRATORY (INHALATION) at 17:10

## 2018-09-20 RX ADMIN — SIMVASTATIN 40 MILLIGRAM(S): 20 TABLET, FILM COATED ORAL at 21:54

## 2018-09-20 RX ADMIN — Medication 8 UNIT(S): at 08:10

## 2018-09-20 RX ADMIN — Medication 50 MILLIGRAM(S): at 05:36

## 2018-09-20 RX ADMIN — Medication 3 MILLILITER(S): at 12:29

## 2018-09-20 NOTE — PROVIDER CONTACT NOTE (OTHER) - ASSESSMENT
Pt is A&Ox4 denies chest pain/ dizziness/ discomfort/ palpitations/ SOB. IVL WDL clean/dry/intact flushes without difficulty

## 2018-09-20 NOTE — PROGRESS NOTE ADULT - SUBJECTIVE AND OBJECTIVE BOX
Patient is a 63y old  Female who presents with a chief complaint of sob (20 Sep 2018 12:52)      INTERVAL HPI/OVERNIGHT EVENTS:  T(C): 36.7 (09-20-18 @ 14:12), Max: 37 (09-20-18 @ 07:57)  HR: 65 (09-20-18 @ 14:12) (65 - 79)  BP: 115/73 (09-20-18 @ 15:00) (96/59 - 144/85)  RR: 18 (09-20-18 @ 14:12) (18 - 18)  SpO2: 98% (09-20-18 @ 14:12) (96% - 99%)  Wt(kg): --  I&O's Summary    19 Sep 2018 07:01  -  20 Sep 2018 07:00  --------------------------------------------------------  IN: 774 mL / OUT: 800 mL / NET: -26 mL    20 Sep 2018 07:01  -  20 Sep 2018 16:17  --------------------------------------------------------  IN: 520 mL / OUT: 0 mL / NET: 520 mL        LABS:                        11.9   12.75 )-----------( Clumped    ( 20 Sep 2018 07:43 )             37.1     09-20    136  |  106  |  20  ----------------------------<  110<H>  4.0   |  20<L>  |  1.04    Ca    8.8      20 Sep 2018 07:11      PT/INR - ( 19 Sep 2018 10:18 )   PT: 11.4 sec;   INR: 1.05 ratio         PTT - ( 20 Sep 2018 09:21 )  PTT:34.1 sec    CAPILLARY BLOOD GLUCOSE      POCT Blood Glucose.: 94 mg/dL (20 Sep 2018 11:42)  POCT Blood Glucose.: 117 mg/dL (20 Sep 2018 07:28)  POCT Blood Glucose.: 143 mg/dL (19 Sep 2018 21:16)  POCT Blood Glucose.: 159 mg/dL (19 Sep 2018 16:31)            MEDICATIONS  (STANDING):  ALBUTerol    90 MICROgram(s) HFA Inhaler 1 Puff(s) Inhalation every 4 hours  ALBUTerol/ipratropium for Nebulization 3 milliLiter(s) Nebulizer every 6 hours  aspirin  chewable 81 milliGRAM(s) Oral daily  buDESOnide 160 MICROgram(s)/formoterol 4.5 MICROgram(s) Inhaler 2 Puff(s) Inhalation two times a day  carvedilol 12.5 milliGRAM(s) Oral every 12 hours  dextrose 5%. 1000 milliLiter(s) (50 mL/Hr) IV Continuous <Continuous>  dextrose 50% Injectable 12.5 Gram(s) IV Push once  dextrose 50% Injectable 25 Gram(s) IV Push once  dextrose 50% Injectable 25 Gram(s) IV Push once  diltiazem    milliGRAM(s) Oral daily  heparin  Infusion. 400 Unit(s)/Hr (4 mL/Hr) IV Continuous <Continuous>  hydrALAZINE 50 milliGRAM(s) Oral three times a day  influenza   Vaccine 0.5 milliLiter(s) IntraMuscular once  insulin glargine Injectable (LANTUS) 18 Unit(s) SubCutaneous at bedtime  insulin lispro (HumaLOG) corrective regimen sliding scale   SubCutaneous three times a day before meals  insulin lispro Injectable (HumaLOG) 8 Unit(s) SubCutaneous three times a day before meals  losartan 100 milliGRAM(s) Oral daily  montelukast 10 milliGRAM(s) Oral at bedtime  pantoprazole    Tablet 40 milliGRAM(s) Oral before breakfast  simvastatin 40 milliGRAM(s) Oral at bedtime  tiotropium 18 MICROgram(s) Capsule 1 Capsule(s) Inhalation daily  topiramate 100 milliGRAM(s) Oral daily    MEDICATIONS  (PRN):  acetaminophen   Tablet .. 650 milliGRAM(s) Oral every 6 hours PRN Mild Pain (1 - 3)  dextrose 40% Gel 15 Gram(s) Oral once PRN Blood Glucose LESS THAN 70 milliGRAM(s)/deciliter  glucagon  Injectable 1 milliGRAM(s) IntraMuscular once PRN Glucose LESS THAN 70 milligrams/deciliter  guaiFENesin   Syrup  (Sugar-Free) 200 milliGRAM(s) Oral every 6 hours PRN Cough  heparin  Injectable 6500 Unit(s) IV Push every 6 hours PRN For aPTT less than 40  heparin  Injectable 3000 Unit(s) IV Push every 6 hours PRN For aPTT between 40 - 57          PHYSICAL EXAM:  GENERAL: NAD, well-groomed, well-developed  HEAD:  Atraumatic, Normocephalic  CHEST/LUNG: Clear to percussion bilaterally; No rales, rhonchi, wheezing, or rubs  HEART: Regular rate and rhythm; No murmurs, rubs, or gallops  ABDOMEN: Soft, Nontender, Nondistended; Bowel sounds present  EXTREMITIES:  2+ Peripheral Pulses, No clubbing, cyanosis, or edema  LYMPH: No lymphadenopathy noted  SKIN: No rashes or lesions    Care Discussed with Consultants/Other Providers [+ ] YES  [ ] NO

## 2018-09-20 NOTE — PROGRESS NOTE ADULT - SUBJECTIVE AND OBJECTIVE BOX
Chief complaint  Patient is a 63y old  Female who presents with a chief complaint of sob (20 Sep 2018 09:38)   Review of systems  Patient in bed, looks comfortable, no fever,  had no hypoglycemia.    Labs and Fingersticks  CAPILLARY BLOOD GLUCOSE      POCT Blood Glucose.: 94 mg/dL (20 Sep 2018 11:42)  POCT Blood Glucose.: 117 mg/dL (20 Sep 2018 07:28)  POCT Blood Glucose.: 143 mg/dL (19 Sep 2018 21:16)  POCT Blood Glucose.: 159 mg/dL (19 Sep 2018 16:31)      Anion Gap, Serum: 10 (09-20 @ 07:11)      Calcium, Total Serum: 8.8 (09-20 @ 07:11)          09-20    136  |  106  |  20  ----------------------------<  110<H>  4.0   |  20<L>  |  1.04    Ca    8.8      20 Sep 2018 07:11                          11.9   12.75 )-----------( Clumped    ( 20 Sep 2018 07:43 )             37.1     Medications  MEDICATIONS  (STANDING):  ALBUTerol    90 MICROgram(s) HFA Inhaler 1 Puff(s) Inhalation every 4 hours  ALBUTerol/ipratropium for Nebulization 3 milliLiter(s) Nebulizer every 6 hours  aspirin  chewable 81 milliGRAM(s) Oral daily  buDESOnide 160 MICROgram(s)/formoterol 4.5 MICROgram(s) Inhaler 2 Puff(s) Inhalation two times a day  carvedilol 12.5 milliGRAM(s) Oral every 12 hours  dextrose 5%. 1000 milliLiter(s) (50 mL/Hr) IV Continuous <Continuous>  dextrose 50% Injectable 12.5 Gram(s) IV Push once  dextrose 50% Injectable 25 Gram(s) IV Push once  dextrose 50% Injectable 25 Gram(s) IV Push once  diltiazem    milliGRAM(s) Oral daily  heparin  Infusion. 400 Unit(s)/Hr (4 mL/Hr) IV Continuous <Continuous>  hydrALAZINE 50 milliGRAM(s) Oral three times a day  influenza   Vaccine 0.5 milliLiter(s) IntraMuscular once  insulin glargine Injectable (LANTUS) 18 Unit(s) SubCutaneous at bedtime  insulin lispro (HumaLOG) corrective regimen sliding scale   SubCutaneous three times a day before meals  insulin lispro Injectable (HumaLOG) 8 Unit(s) SubCutaneous three times a day before meals  losartan 100 milliGRAM(s) Oral daily  montelukast 10 milliGRAM(s) Oral at bedtime  pantoprazole    Tablet 40 milliGRAM(s) Oral before breakfast  simvastatin 40 milliGRAM(s) Oral at bedtime  tiotropium 18 MICROgram(s) Capsule 1 Capsule(s) Inhalation daily  topiramate 100 milliGRAM(s) Oral daily      Physical Exam  General: Patient comfortable in bed  Vital Signs Last 12 Hrs  T(F): 98.6 (09-20-18 @ 07:57), Max: 98.6 (09-20-18 @ 07:57)  HR: 79 (09-20-18 @ 07:57) (79 - 79)  BP: 114/68 (09-20-18 @ 07:57) (114/68 - 114/68)  BP(mean): --  RR: 18 (09-20-18 @ 07:57) (18 - 18)  SpO2: 96% (09-20-18 @ 07:57) (96% - 96%)  Neck: No palpable thyroid nodules.  CVS: S1S2, No murmurs  Respiratory: No wheezing, no crepitations  GI: Abdomen soft, bowel sounds positive  Musculoskeletal:  edema lower extremities.   Skin: No skin rashes, no ecchymosis    Diagnostics    Thyroid Stimulating Hormone, Serum: AM Sched. Collection: 15-Sep-2018 06:00 (09-14 @ 12:20)  Free Thyroxine, Serum: AM Sched. Collection: 15-Sep-2018 06:00 (09-14 @ 12:20)  TSH Receptor Antibody: AM Sched. Collection: 15-Sep-2018 06:00 (09-14 @ 12:20)

## 2018-09-20 NOTE — PROVIDER CONTACT NOTE (CRITICAL VALUE NOTIFICATION) - RECOMMENDATIONS
Per heparin nomogram- hold hep gtt for one hour then begin new rate at 7ml/hr.
Follow heparin nomogram, monitor for bleeding &maintain safety
Monitor patient, follow heparin nomogram.  Bleeding precautions maintained.

## 2018-09-20 NOTE — PROGRESS NOTE ADULT - SUBJECTIVE AND OBJECTIVE BOX
VASCULAR SURGERY PROGRESS NOTE    Subjective: No acute events overnight. Patient seen and examined during morning rounds. Pt c/o having R groin pain yesterday that improved. Denies calf pain, c/o pain on right toe.    Objective:  Vital Signs Last 24 Hrs  T(C): 36.8 (19 Sep 2018 20:24), Max: 36.9 (19 Sep 2018 17:03)  T(F): 98.3 (19 Sep 2018 20:24), Max: 98.4 (19 Sep 2018 17:03)  HR: 66 (19 Sep 2018 20:24) (66 - 71)  BP: 121/76 (19 Sep 2018 20:24) (121/76 - 144/85)  BP(mean): --  RR: 18 (19 Sep 2018 20:24) (18 - 18)  SpO2: 99% (19 Sep 2018 20:24) (98% - 99%)    Physical Exam:  General: Well developed, well nourished, No Acute Distress  HEENT: Normocephalic Atraumatic, EOMI bl  Neurology: A&Ox3  Abdominal: Soft, Non-Tender, Non-Distended  Extremities: Warm, no clubbing, cyanosis or edema, FROM  Skin: warm, dry, normal color, no rash or abnormal lesions    I&O's Detail    18 Sep 2018 07:01  -  19 Sep 2018 07:00  --------------------------------------------------------  IN:    Oral Fluid: 830 mL  Total IN: 830 mL    OUT:  Total OUT: 0 mL    Total NET: 830 mL      19 Sep 2018 07:01  -  20 Sep 2018 06:33  --------------------------------------------------------  IN:    heparin  Infusion.: 84 mL    Oral Fluid: 690 mL  Total IN: 774 mL    OUT:    Voided: 800 mL  Total OUT: 800 mL    Total NET: -26 mL          MEDICATIONS  (STANDING):  ALBUTerol    90 MICROgram(s) HFA Inhaler 1 Puff(s) Inhalation every 4 hours  ALBUTerol/ipratropium for Nebulization 3 milliLiter(s) Nebulizer every 6 hours  aspirin  chewable 81 milliGRAM(s) Oral daily  buDESOnide 160 MICROgram(s)/formoterol 4.5 MICROgram(s) Inhaler 2 Puff(s) Inhalation two times a day  carvedilol 12.5 milliGRAM(s) Oral every 12 hours  dextrose 5%. 1000 milliLiter(s) (50 mL/Hr) IV Continuous <Continuous>  dextrose 50% Injectable 12.5 Gram(s) IV Push once  dextrose 50% Injectable 25 Gram(s) IV Push once  dextrose 50% Injectable 25 Gram(s) IV Push once  diltiazem    milliGRAM(s) Oral daily  heparin  Infusion. 400 Unit(s)/Hr (4 mL/Hr) IV Continuous <Continuous>  hydrALAZINE 50 milliGRAM(s) Oral three times a day  influenza   Vaccine 0.5 milliLiter(s) IntraMuscular once  insulin glargine Injectable (LANTUS) 18 Unit(s) SubCutaneous at bedtime  insulin lispro (HumaLOG) corrective regimen sliding scale   SubCutaneous three times a day before meals  insulin lispro Injectable (HumaLOG) 8 Unit(s) SubCutaneous three times a day before meals  losartan 100 milliGRAM(s) Oral daily  montelukast 10 milliGRAM(s) Oral at bedtime  pantoprazole    Tablet 40 milliGRAM(s) Oral before breakfast  simvastatin 40 milliGRAM(s) Oral at bedtime  tiotropium 18 MICROgram(s) Capsule 1 Capsule(s) Inhalation daily  topiramate 100 milliGRAM(s) Oral daily    MEDICATIONS  (PRN):  dextrose 40% Gel 15 Gram(s) Oral once PRN Blood Glucose LESS THAN 70 milliGRAM(s)/deciliter  glucagon  Injectable 1 milliGRAM(s) IntraMuscular once PRN Glucose LESS THAN 70 milligrams/deciliter  guaiFENesin   Syrup  (Sugar-Free) 200 milliGRAM(s) Oral every 6 hours PRN Cough  heparin  Injectable 6500 Unit(s) IV Push every 6 hours PRN For aPTT less than 40  heparin  Injectable 3000 Unit(s) IV Push every 6 hours PRN For aPTT between 40 - 57      LABS:                        13.5   17.69 )-----------( 185      ( 18 Sep 2018 07:56 )             42.3                   Negative 09-18-18 @ 13:11      --   09-18-18 @ 16:48   <10,000 CFU/ml Normal Urogenital belkys present

## 2018-09-20 NOTE — PROGRESS NOTE ADULT - PROBLEM SELECTOR PLAN 1
much better: Her wheezing has resolved: Cont steroids for a few days more  9/15: doing ok : no SOB : finish steroids soon  9/16: Resolved: She has been afebrile: Her WBC is significantly high which is likely due to steroids: Steroids will finish soon" Pt should be optimized by the time she will go for surgery for legs:  9/17: finished steroids: Her WBC count is high but she is afebrile: Would add Symbicort: pt has not been wheezing: And she has been optimized for the said surgery this week:  9/18: resolved: no wheezing: no SOB : pt seems optimized from pulmonary perspective for the said vascular surgery  9/19: pts is much better: no SOB at this time as well as no wheezing  9/20: pt is doing OK : Her leucocytosis is likely due to a course of steroids which she goe earlier: She has been afebrile and leucocytosis is improving anyway: SURGERY CANCELLED DUE TO ? HIGH wbc COUNT  ;

## 2018-09-20 NOTE — PROGRESS NOTE ADULT - ASSESSMENT
Dyspnea on exertion.  Plan: Jessie COPD exacerbation  duonebs  cw steroids as per pulm  antibiotics as per ID   pulmonary fu    CAD (coronary artery disease).  Plan: cw home meds   cards consult appreciated  cards cleared for vascular procedure     Diabetes.  Plan: monitor FS  ISS.   uncontrolled  endodcine called    Severe PVD Plan doppler reviewed  cw hep gtt  CT reviewed  vascular fu  plan for bypass now as outpt, called the resident today who will dw attendiong and let me know if it can be done as inpt    Left arm iv infiltration with contrast  Vascular following  wound care and ID following

## 2018-09-20 NOTE — PROGRESS NOTE ADULT - SUBJECTIVE AND OBJECTIVE BOX
- Patient seen and examined.  - In summary, patient is a 62 year old woman who presented with sob (13 Sep 2018 20:06)  - Today, patient is without complaints.         *****MEDICATIONS:    MEDICATIONS  (STANDING):  ALBUTerol    90 MICROgram(s) HFA Inhaler 1 Puff(s) Inhalation every 4 hours  ALBUTerol/ipratropium for Nebulization 3 milliLiter(s) Nebulizer every 6 hours  aspirin  chewable 81 milliGRAM(s) Oral daily  buDESOnide 160 MICROgram(s)/formoterol 4.5 MICROgram(s) Inhaler 2 Puff(s) Inhalation two times a day  carvedilol 12.5 milliGRAM(s) Oral every 12 hours  dextrose 5%. 1000 milliLiter(s) (50 mL/Hr) IV Continuous <Continuous>  dextrose 50% Injectable 12.5 Gram(s) IV Push once  dextrose 50% Injectable 25 Gram(s) IV Push once  dextrose 50% Injectable 25 Gram(s) IV Push once  diltiazem    milliGRAM(s) Oral daily  heparin  Infusion. 400 Unit(s)/Hr (4 mL/Hr) IV Continuous <Continuous>  hydrALAZINE 50 milliGRAM(s) Oral three times a day  influenza   Vaccine 0.5 milliLiter(s) IntraMuscular once  insulin glargine Injectable (LANTUS) 18 Unit(s) SubCutaneous at bedtime  insulin lispro (HumaLOG) corrective regimen sliding scale   SubCutaneous three times a day before meals  insulin lispro Injectable (HumaLOG) 8 Unit(s) SubCutaneous three times a day before meals  losartan 100 milliGRAM(s) Oral daily  montelukast 10 milliGRAM(s) Oral at bedtime  pantoprazole    Tablet 40 milliGRAM(s) Oral before breakfast  simvastatin 40 milliGRAM(s) Oral at bedtime  tiotropium 18 MICROgram(s) Capsule 1 Capsule(s) Inhalation daily  topiramate 100 milliGRAM(s) Oral daily    MEDICATIONS  (PRN):  dextrose 40% Gel 15 Gram(s) Oral once PRN Blood Glucose LESS THAN 70 milliGRAM(s)/deciliter  glucagon  Injectable 1 milliGRAM(s) IntraMuscular once PRN Glucose LESS THAN 70 milligrams/deciliter  guaiFENesin   Syrup  (Sugar-Free) 200 milliGRAM(s) Oral every 6 hours PRN Cough  heparin  Injectable 6500 Unit(s) IV Push every 6 hours PRN For aPTT less than 40  heparin  Injectable 3000 Unit(s) IV Push every 6 hours PRN For aPTT between 40 - 57                   ***** REVIEW OF SYSTEM:  GEN: no fever, no chills, no pain  RESP: no SOB, no cough, no sputum  CVS: no chest pain, no palpitations, no edema  GI: no abdominal pain, no nausea, no vomiting, no constipation, no diarrhea  : no dysuria, no frequency  NEURO: no headache, no dizziness  PSYCH: no depression, not anxious  Derm : no itching, no rash         ***** VITAL SIGNS:    T(F): 98.6 (18 @ 07:57), Max: 98.6 (18 @ 07:57)  HR: 79 (18 @ 07:57) (66 - 79)  BP: 114/68 (18 @ 07:57) (114/68 - 144/85)  RR: 18 (18 @ 07:57) (18 - 18)  SpO2: 96% (18 @ 07:57) (96% - 99%)  Wt(kg): --  ,   I&O's Summary    19 Sep 2018 07:01  -  20 Sep 2018 07:00  --------------------------------------------------------  IN: 774 mL / OUT: 800 mL / NET: -26 mL                   *****PHYSICAL EXAM:  GEN: A&O X 3 , NAD , comfortable  HEENT: NCAT, EOMI, MMM, no icterus  NECK: Supple, No JVD  CVS: S1S2 , regular , No M/R/G appreciated  PULM: CTA B/L,  no W/R/R appreciated  ABD.: soft. non tender, non distended,  bowel sounds present  Extrem: intact pulses , no edema noted  Derm: No rash or ecchymosis noted  PSYCH: normal mood, no depression, not anxious         *****LAB AND IMAGIN-20    136  |  106  |  20  ----------------------------<  110<H>  4.0   |  20<L>  |  1.04    Ca    8.8      20 Sep 2018 07:11      PT/INR - ( 19 Sep 2018 10:18 )   PT: 11.4 sec;   INR: 1.05 ratio         PTT - ( 19 Sep 2018 10:18 )  PTT:75.5 sec                   Urinalysis Basic - ( 18 Sep 2018 13:11 )    Color: Colorless / Appearance: Clear / S.006 / pH: x  Gluc: x / Ketone: Negative  / Bili: Negative / Urobili: Negative   Blood: x / Protein: Negative / Nitrite: Negative   Leuk Esterase: Negative / RBC: x / WBC x   Sq Epi: x / Non Sq Epi: x / Bacteria: x                    [All pertinent recent Imaging/Reports reviewed]  < from: Nuclear Stress Test-Pharmacologic (18 @ 14:02) >  * The left ventricle was normal in size. There is a small  mild reversible defect in the distal inferolateral wall  consistentwith mild ischemia.    < end of copied text >    < from: Transthoracic Echocardiogram (18 @ 15:09) >  Conclusions:  1. Calcified trileaflet aortic valve with normal opening.  Mild aortic regurgitation.  2. Increased relative wall thickness with normal left  ventricular mass index, consistent with concentric left  ventricular remodeling.  3. Hyperdynamic left ventricular systolic function. The  upper septum measures 1.6 cm with mild LVOT obstruction  45mmHG.  4. Strain imaging was performed on the [facility] EPIQ with an  average HR of 55 bpm and a BP of 169/94. Global L. Strain=  -25.7  5. Normal right ventricular size and function.    < end of copied text >         *****A S S E S S M E N T   A N D   P L A N :  62F with PAD, CAD, COPD adm with dyspnea  tx COPD per pulm  non obstructive CAD on cath   stress test without significant ischemia  breast pain r/w massage, unlikely angina  no cardiac symptoms  severe PAD on CTA  echo noted  would proceed with surgery at moderate risk  vasc f/u noted      __________________________  AAnanda Alegre D.O.

## 2018-09-20 NOTE — PROGRESS NOTE ADULT - SUBJECTIVE AND OBJECTIVE BOX
Patient is a 63y old  Female who presents with a chief complaint of sob (20 Sep 2018 12:00)      Any change in ROS: Doing ok : no SOB : no cough "      MEDICATIONS  (STANDING):  ALBUTerol    90 MICROgram(s) HFA Inhaler 1 Puff(s) Inhalation every 4 hours  ALBUTerol/ipratropium for Nebulization 3 milliLiter(s) Nebulizer every 6 hours  aspirin  chewable 81 milliGRAM(s) Oral daily  buDESOnide 160 MICROgram(s)/formoterol 4.5 MICROgram(s) Inhaler 2 Puff(s) Inhalation two times a day  carvedilol 12.5 milliGRAM(s) Oral every 12 hours  dextrose 5%. 1000 milliLiter(s) (50 mL/Hr) IV Continuous <Continuous>  dextrose 50% Injectable 12.5 Gram(s) IV Push once  dextrose 50% Injectable 25 Gram(s) IV Push once  dextrose 50% Injectable 25 Gram(s) IV Push once  diltiazem    milliGRAM(s) Oral daily  heparin  Infusion. 400 Unit(s)/Hr (4 mL/Hr) IV Continuous <Continuous>  hydrALAZINE 50 milliGRAM(s) Oral three times a day  influenza   Vaccine 0.5 milliLiter(s) IntraMuscular once  insulin glargine Injectable (LANTUS) 18 Unit(s) SubCutaneous at bedtime  insulin lispro (HumaLOG) corrective regimen sliding scale   SubCutaneous three times a day before meals  insulin lispro Injectable (HumaLOG) 8 Unit(s) SubCutaneous three times a day before meals  losartan 100 milliGRAM(s) Oral daily  montelukast 10 milliGRAM(s) Oral at bedtime  pantoprazole    Tablet 40 milliGRAM(s) Oral before breakfast  simvastatin 40 milliGRAM(s) Oral at bedtime  tiotropium 18 MICROgram(s) Capsule 1 Capsule(s) Inhalation daily  topiramate 100 milliGRAM(s) Oral daily    MEDICATIONS  (PRN):  acetaminophen   Tablet .. 650 milliGRAM(s) Oral every 6 hours PRN Mild Pain (1 - 3)  dextrose 40% Gel 15 Gram(s) Oral once PRN Blood Glucose LESS THAN 70 milliGRAM(s)/deciliter  glucagon  Injectable 1 milliGRAM(s) IntraMuscular once PRN Glucose LESS THAN 70 milligrams/deciliter  guaiFENesin   Syrup  (Sugar-Free) 200 milliGRAM(s) Oral every 6 hours PRN Cough  heparin  Injectable 6500 Unit(s) IV Push every 6 hours PRN For aPTT less than 40  heparin  Injectable 3000 Unit(s) IV Push every 6 hours PRN For aPTT between 40 - 57    Vital Signs Last 24 Hrs  T(C): 37 (20 Sep 2018 07:57), Max: 37 (20 Sep 2018 07:57)  T(F): 98.6 (20 Sep 2018 07:57), Max: 98.6 (20 Sep 2018 07:57)  HR: 79 (20 Sep 2018 07:57) (66 - 79)  BP: 114/68 (20 Sep 2018 07:57) (114/68 - 144/85)  BP(mean): --  RR: 18 (20 Sep 2018 07:57) (18 - 18)  SpO2: 96% (20 Sep 2018 07:57) (96% - 99%)    I&O's Summary    19 Sep 2018 07:01  -  20 Sep 2018 07:00  --------------------------------------------------------  IN: 774 mL / OUT: 800 mL / NET: -26 mL          Physical Exam:   GENERAL: NAD, well-groomed, well-developed  HEENT: PRIYANK/   Atraumatic, Normocephalic  ENMT: No tonsillar erythema, exudates, or enlargement; Moist mucous membranes, Good dentition, No lesions  NECK: Supple, No JVD, Normal thyroid  CHEST/LUNG: Clear to auscultaion, ; No rales, rhonchi, wheezing, or rubs  CVS: Regular rate and rhythm; No murmurs, rubs, or gallops  GI: : Soft, Nontender, Nondistended; Bowel sounds present  NERVOUS SYSTEM:  Alert & Oriented X3  EXTREMITIES:  2+ Peripheral Pulses, No clubbing, cyanosis, or edema  LYMPH: No lymphadenopathy noted  SKIN: No rashes or lesions  ENDOCRINOLOGY: No Thyromegaly  PSYCH: Appropriate    Labs:                              11.9   12.75 )-----------( Clumped    ( 20 Sep 2018 07:43 )             37.1                         13.5   17.69 )-----------( 185      ( 18 Sep 2018 07:56 )             42.3                         12.7   21.26 )-----------( 265      ( 17 Sep 2018 08:02 )             39.6         136  |  106  |  20  ----------------------------<  110<H>  4.0   |  20<L>  |  1.04      134<L>  |  105  |  23  ----------------------------<  82  4.0   |  19<L>  |  1.17      136  |  103  |  20  ----------------------------<  72  3.7   |  22  |  0.97    Ca    8.8      20 Sep 2018 07:11      CAPILLARY BLOOD GLUCOSE      POCT Blood Glucose.: 94 mg/dL (20 Sep 2018 11:42)  POCT Blood Glucose.: 117 mg/dL (20 Sep 2018 07:28)  POCT Blood Glucose.: 143 mg/dL (19 Sep 2018 21:16)  POCT Blood Glucose.: 159 mg/dL (19 Sep 2018 16:31)        PT/INR - ( 19 Sep 2018 10:18 )   PT: 11.4 sec;   INR: 1.05 ratio         PTT - ( 20 Sep 2018 09:21 )  PTT:34.1 sec  Urinalysis Basic - ( 18 Sep 2018 13:11 )    Color: Colorless / Appearance: Clear / S.006 / pH: x  Gluc: x / Ketone: Negative  / Bili: Negative / Urobili: Negative   Blood: x / Protein: Negative / Nitrite: Negative   Leuk Esterase: Negative / RBC: x / WBC x   Sq Epi: x / Non Sq Epi: x / Bacteria: x            RECENT CULTURES:   @ 16:48 .Urine Clean Catch (Midstream)         < from: CT Angio Abd Aorta w/run-off w/ IV Cont (18 @ 17:40) >  TERNAL ILIAC ARTERY: Widely patent.  INTERNAL ILIAC ARTERY: Occluded.  COMMON FEMORAL ARTERY: Widely patent.  FEMORAL ARTERY: Occluded and reconstituted in the distal femoral artery.  PROFUNDA FEMORAL ARTERY: Widely patent.  POPLITEAL ARTERY: Widely patent.  ANTERIOR TIBIAL ARTERY: Widely patent.  TIBIOPERONEAL TRUNK: Widely patent.  POSTERIOR TIBIAL ARTERY: Widely patent.  PERONEAL ARTERY: Widely patent.    ADDITIONAL FINDINGS: Small left hepatic cyst. Renal cysts. Hysterectomy.     IMPRESSION:     RIGHT LOWER EXTREMITY: Occluded external iliac artery and common femoral   artery. Widely patent profunda femoral artery via a femoral-femoral   bypass graft. Occlusion of the femoral artery and lower extremity bypass   graft. Reconstitution of the popliteal artery with three-vessel runoff   although the tibioperoneal trunk has moderate focal stenosis.    LEFT LOWER EXTREMITY: Femoral artery is occluded with reconstitution of   the distal femoral artery. Three-vessel runoff.                    LISA LU M.D., ATTENDING RADIOLOGIST  This document has been electronically signed. Sep 13 2018  9:22AM    < end of copied text >         <10,000 CFU/ml Normal Urogenital belkys present          RESPIRATORY CULTURES:          Studies  Chest X-RAY  CT SCAN Chest   Venous Dopplers: LE:   CT Abdomen  Others

## 2018-09-21 LAB
ANION GAP SERPL CALC-SCNC: 9 MMOL/L — SIGNIFICANT CHANGE UP (ref 5–17)
APTT BLD: 82.2 SEC — HIGH (ref 27.5–37.4)
APTT BLD: 91.8 SEC — HIGH (ref 27.5–37.4)
BUN SERPL-MCNC: 19 MG/DL — SIGNIFICANT CHANGE UP (ref 7–23)
CALCIUM SERPL-MCNC: 9.1 MG/DL — SIGNIFICANT CHANGE UP (ref 8.4–10.5)
CHLORIDE SERPL-SCNC: 103 MMOL/L — SIGNIFICANT CHANGE UP (ref 96–108)
CO2 SERPL-SCNC: 21 MMOL/L — LOW (ref 22–31)
CREAT SERPL-MCNC: 1.09 MG/DL — SIGNIFICANT CHANGE UP (ref 0.5–1.3)
GLUCOSE BLDC GLUCOMTR-MCNC: 109 MG/DL — HIGH (ref 70–99)
GLUCOSE BLDC GLUCOMTR-MCNC: 126 MG/DL — HIGH (ref 70–99)
GLUCOSE BLDC GLUCOMTR-MCNC: 135 MG/DL — HIGH (ref 70–99)
GLUCOSE BLDC GLUCOMTR-MCNC: 73 MG/DL — SIGNIFICANT CHANGE UP (ref 70–99)
GLUCOSE SERPL-MCNC: 124 MG/DL — HIGH (ref 70–99)
HCT VFR BLD CALC: 37.7 % — SIGNIFICANT CHANGE UP (ref 34.5–45)
HGB BLD-MCNC: 12.3 G/DL — SIGNIFICANT CHANGE UP (ref 11.5–15.5)
INR BLD: 1.05 RATIO — SIGNIFICANT CHANGE UP (ref 0.88–1.16)
MCHC RBC-ENTMCNC: 27.1 PG — SIGNIFICANT CHANGE UP (ref 27–34)
MCHC RBC-ENTMCNC: 32.6 GM/DL — SIGNIFICANT CHANGE UP (ref 32–36)
MCV RBC AUTO: 83 FL — SIGNIFICANT CHANGE UP (ref 80–100)
PLATELET # BLD AUTO: SIGNIFICANT CHANGE UP K/UL (ref 150–400)
POTASSIUM SERPL-MCNC: 4.1 MMOL/L — SIGNIFICANT CHANGE UP (ref 3.5–5.3)
POTASSIUM SERPL-SCNC: 4.1 MMOL/L — SIGNIFICANT CHANGE UP (ref 3.5–5.3)
PROTHROM AB SERPL-ACNC: 11.5 SEC — SIGNIFICANT CHANGE UP (ref 9.8–12.7)
RBC # BLD: 4.54 M/UL — SIGNIFICANT CHANGE UP (ref 3.8–5.2)
RBC # FLD: 16.6 % — HIGH (ref 10.3–14.5)
SODIUM SERPL-SCNC: 133 MMOL/L — LOW (ref 135–145)
WBC # BLD: 11.33 K/UL — HIGH (ref 3.8–10.5)
WBC # FLD AUTO: 11.33 K/UL — HIGH (ref 3.8–10.5)

## 2018-09-21 RX ADMIN — Medication 50 MILLIGRAM(S): at 14:39

## 2018-09-21 RX ADMIN — MONTELUKAST 10 MILLIGRAM(S): 4 TABLET, CHEWABLE ORAL at 21:21

## 2018-09-21 RX ADMIN — Medication 3 MILLILITER(S): at 17:42

## 2018-09-21 RX ADMIN — Medication 3 MILLILITER(S): at 06:58

## 2018-09-21 RX ADMIN — BUDESONIDE AND FORMOTEROL FUMARATE DIHYDRATE 2 PUFF(S): 160; 4.5 AEROSOL RESPIRATORY (INHALATION) at 17:42

## 2018-09-21 RX ADMIN — Medication 81 MILLIGRAM(S): at 12:33

## 2018-09-21 RX ADMIN — Medication 100 MILLIGRAM(S): at 12:32

## 2018-09-21 RX ADMIN — HEPARIN SODIUM 800 UNIT(S)/HR: 5000 INJECTION INTRAVENOUS; SUBCUTANEOUS at 08:19

## 2018-09-21 RX ADMIN — Medication 50 MILLIGRAM(S): at 06:58

## 2018-09-21 RX ADMIN — PANTOPRAZOLE SODIUM 40 MILLIGRAM(S): 20 TABLET, DELAYED RELEASE ORAL at 06:58

## 2018-09-21 RX ADMIN — INSULIN GLARGINE 18 UNIT(S): 100 INJECTION, SOLUTION SUBCUTANEOUS at 22:01

## 2018-09-21 RX ADMIN — SIMVASTATIN 40 MILLIGRAM(S): 20 TABLET, FILM COATED ORAL at 21:21

## 2018-09-21 RX ADMIN — Medication 8 UNIT(S): at 08:17

## 2018-09-21 RX ADMIN — HEPARIN SODIUM 800 UNIT(S)/HR: 5000 INJECTION INTRAVENOUS; SUBCUTANEOUS at 00:57

## 2018-09-21 RX ADMIN — Medication 650 MILLIGRAM(S): at 21:23

## 2018-09-21 RX ADMIN — CARVEDILOL PHOSPHATE 12.5 MILLIGRAM(S): 80 CAPSULE, EXTENDED RELEASE ORAL at 17:42

## 2018-09-21 RX ADMIN — Medication 650 MILLIGRAM(S): at 22:15

## 2018-09-21 RX ADMIN — LOSARTAN POTASSIUM 100 MILLIGRAM(S): 100 TABLET, FILM COATED ORAL at 06:58

## 2018-09-21 RX ADMIN — Medication 50 MILLIGRAM(S): at 21:21

## 2018-09-21 RX ADMIN — Medication 3 MILLILITER(S): at 12:33

## 2018-09-21 RX ADMIN — BUDESONIDE AND FORMOTEROL FUMARATE DIHYDRATE 2 PUFF(S): 160; 4.5 AEROSOL RESPIRATORY (INHALATION) at 06:58

## 2018-09-21 RX ADMIN — Medication 8 UNIT(S): at 12:33

## 2018-09-21 RX ADMIN — Medication 3 MILLILITER(S): at 00:23

## 2018-09-21 RX ADMIN — Medication 180 MILLIGRAM(S): at 06:58

## 2018-09-21 RX ADMIN — CARVEDILOL PHOSPHATE 12.5 MILLIGRAM(S): 80 CAPSULE, EXTENDED RELEASE ORAL at 06:58

## 2018-09-21 NOTE — PROGRESS NOTE ADULT - SUBJECTIVE AND OBJECTIVE BOX
Infectious Diseases progress note:    Subjective: No acute o/n events.  Afebrile.  No dysuria/sob/cough/diarrhea.  WBC downtrending.      ROS:  CONSTITUTIONAL:  No fever, chills, rigors  CARDIOVASCULAR:  No chest pain or palpitations  RESPIRATORY:   No SOB, cough, dyspnea on exertion.  No wheezing  GASTROINTESTINAL:  No abd pain, N/V, diarrhea/constipation  EXTREMITIES:  No swelling or joint pain  GENITOURINARY:  No burning on urination, increased frequency or urgency.  No flank pain  NEUROLOGIC:  No HA, visual disturbances  SKIN: No rashes    Allergies    No Known Allergies    Intolerances        ANTIBIOTICS/RELEVANT:  antimicrobials    immunologic:  influenza   Vaccine 0.5 milliLiter(s) IntraMuscular once    OTHER:  acetaminophen   Tablet .. 650 milliGRAM(s) Oral every 6 hours PRN  ALBUTerol    90 MICROgram(s) HFA Inhaler 1 Puff(s) Inhalation every 4 hours  ALBUTerol/ipratropium for Nebulization 3 milliLiter(s) Nebulizer every 6 hours  aspirin  chewable 81 milliGRAM(s) Oral daily  buDESOnide 160 MICROgram(s)/formoterol 4.5 MICROgram(s) Inhaler 2 Puff(s) Inhalation two times a day  carvedilol 12.5 milliGRAM(s) Oral every 12 hours  dextrose 40% Gel 15 Gram(s) Oral once PRN  dextrose 5%. 1000 milliLiter(s) IV Continuous <Continuous>  dextrose 50% Injectable 12.5 Gram(s) IV Push once  dextrose 50% Injectable 25 Gram(s) IV Push once  dextrose 50% Injectable 25 Gram(s) IV Push once  diltiazem    milliGRAM(s) Oral daily  glucagon  Injectable 1 milliGRAM(s) IntraMuscular once PRN  guaiFENesin   Syrup  (Sugar-Free) 200 milliGRAM(s) Oral every 6 hours PRN  heparin  Infusion. 400 Unit(s)/Hr IV Continuous <Continuous>  heparin  Injectable 6500 Unit(s) IV Push every 6 hours PRN  heparin  Injectable 3000 Unit(s) IV Push every 6 hours PRN  hydrALAZINE 50 milliGRAM(s) Oral three times a day  insulin glargine Injectable (LANTUS) 18 Unit(s) SubCutaneous at bedtime  insulin lispro (HumaLOG) corrective regimen sliding scale   SubCutaneous three times a day before meals  insulin lispro Injectable (HumaLOG) 8 Unit(s) SubCutaneous three times a day before meals  losartan 100 milliGRAM(s) Oral daily  montelukast 10 milliGRAM(s) Oral at bedtime  pantoprazole    Tablet 40 milliGRAM(s) Oral before breakfast  simvastatin 40 milliGRAM(s) Oral at bedtime  tiotropium 18 MICROgram(s) Capsule 1 Capsule(s) Inhalation daily  topiramate 100 milliGRAM(s) Oral daily      Objective:  Vital Signs Last 24 Hrs  T(C): 36.9 (21 Sep 2018 11:56), Max: 37.2 (21 Sep 2018 04:20)  T(F): 98.5 (21 Sep 2018 11:56), Max: 99 (21 Sep 2018 04:20)  HR: 69 (21 Sep 2018 11:56) (65 - 85)  BP: 95/60 (21 Sep 2018 11:56) (95/60 - 140/90)  BP(mean): --  RR: 18 (21 Sep 2018 11:56) (16 - 18)  SpO2: 96% (21 Sep 2018 11:56) (96% - 99%)    PHYSICAL EXAM:  Constitutional:NAD  Eyes:PRIYANK, EOMI  Ear/Nose/Throat: no thrush, mucositis.  Moist mucous membranes	  Neck:no JVD, no lymphadenopathy, supple  Respiratory: CTA suzanna  Cardiovascular: S1S2 RRR, no murmurs  Gastrointestinal:soft, nontender,  nondistended (+) BS  Extremities:no e/e/c  Skin:  no rashes, open wounds or ulcerations        LABS:                        12.3   11.33 )-----------( Clumped    ( 21 Sep 2018 07:54 )             37.7     09-21    133<L>  |  103  |  19  ----------------------------<  124<H>  4.1   |  21<L>  |  1.09    Ca    9.1      21 Sep 2018 07:15      PT/INR - ( 21 Sep 2018 07:19 )   PT: 11.5 sec;   INR: 1.05 ratio         PTT - ( 21 Sep 2018 07:19 )  PTT:82.2 sec                Rapid RVP Result: NotDete          MICROBIOLOGY:          RADIOLOGY & ADDITIONAL STUDIES:

## 2018-09-21 NOTE — PROGRESS NOTE ADULT - PROBLEM SELECTOR PLAN 1
much better: Her wheezing has resolved: Cont steroids for a few days more  9/15: doing ok : no SOB : finish steroids soon  9/16: Resolved: She has been afebrile: Her WBC is significantly high which is likely due to steroids: Steroids will finish soon" Pt should be optimized by the time she will go for surgery for legs:  9/17: finished steroids: Her WBC count is high but she is afebrile: Would add Symbicort: pt has not been wheezing: And she has been optimized for the said surgery this week:  9/18: resolved: no wheezing: no SOB : pt seems optimized from pulmonary perspective for the said vascular surgery  9/19: pts is much better: no SOB at this time as well as no wheezing  9/20: pt is doing OK : Her leucocytosis is likely due to a course of steroids which she goe earlier: She has been afebrile and leucocytosis is improving anyway: SURGERY CANCELLED DUE TO ? HIGH wbc COUNT  9/21 stable. continue Duoneb, Symbicort, Spiriva

## 2018-09-21 NOTE — PROGRESS NOTE ADULT - ASSESSMENT
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 She started feeling short of breath about 3 weeks ago with 1-2 weeks of productive cough with clear sputum. Over the past week she endorses some wheeze and she has been using her inhaler 3 times in the morning and once at night daily. She no longer has medication for her nebulizer. She can walk <1 block as she is limited by shortness of breath and by LE claudication symptoms. She also has chest pain that is in the L breast, not pleuritic, feels that it is in the breast tissue and improves when she squeezes it. Denies fevers/chills, LE swelling, N/V, diaphoresis, diarrhea, constipation, abdominal pain, dizziness. Walks with a walker. Current daily smoker just 1-2 cigarettes daily (07 Sep 2018 17:38)    Problem/Plan - 1:    ·	Cough    - Pt being treated for COPD exacerbation.    CT chest does not show pneumonia.  s/p course of azithromycin x 5 days      -  Pt with significant leukocytosis likely secondary to steroid effect, continues to improve and downward trending.      - Induration in left forearm brachial area secondary to prior infiltrated IV site.  Induration has resolved.  No signs of infection        * Pt undergoing vascular w/u of rt foot 2nd digit cyanosis.  s/p xray, without periosteal changes or OM.  CT angio shows femoral artery occlusion with distal artery reconstitution.    No outward signs of toe infection.  Planned for bypass as per Vasc.     No acute ID issues at this time        [facility]  [phone]

## 2018-09-21 NOTE — PROGRESS NOTE ADULT - SUBJECTIVE AND OBJECTIVE BOX
Chief complaint  Patient is a 63y old  Female who presents with a chief complaint of sob (21 Sep 2018 10:39)   Review of systems  Patient in bed, looks comfortable, no fever, no hypoglycemia.    Labs and Fingersticks  CAPILLARY BLOOD GLUCOSE      POCT Blood Glucose.: 126 mg/dL (21 Sep 2018 07:59)  POCT Blood Glucose.: 122 mg/dL (20 Sep 2018 21:03)  POCT Blood Glucose.: 105 mg/dL (20 Sep 2018 16:33)  POCT Blood Glucose.: 94 mg/dL (20 Sep 2018 11:42)      Anion Gap, Serum: 9 (09-21 @ 07:15)  Anion Gap, Serum: 10 (09-20 @ 07:11)      Calcium, Total Serum: 9.1 (09-21 @ 07:15)  Calcium, Total Serum: 8.8 (09-20 @ 07:11)          09-21    133<L>  |  103  |  19  ----------------------------<  124<H>  4.1   |  21<L>  |  1.09    Ca    9.1      21 Sep 2018 07:15                          11.9   12.75 )-----------( Clumped    ( 20 Sep 2018 07:43 )             37.1     Medications  MEDICATIONS  (STANDING):  ALBUTerol    90 MICROgram(s) HFA Inhaler 1 Puff(s) Inhalation every 4 hours  ALBUTerol/ipratropium for Nebulization 3 milliLiter(s) Nebulizer every 6 hours  aspirin  chewable 81 milliGRAM(s) Oral daily  buDESOnide 160 MICROgram(s)/formoterol 4.5 MICROgram(s) Inhaler 2 Puff(s) Inhalation two times a day  carvedilol 12.5 milliGRAM(s) Oral every 12 hours  dextrose 5%. 1000 milliLiter(s) (50 mL/Hr) IV Continuous <Continuous>  dextrose 50% Injectable 12.5 Gram(s) IV Push once  dextrose 50% Injectable 25 Gram(s) IV Push once  dextrose 50% Injectable 25 Gram(s) IV Push once  diltiazem    milliGRAM(s) Oral daily  heparin  Infusion. 400 Unit(s)/Hr (4 mL/Hr) IV Continuous <Continuous>  hydrALAZINE 50 milliGRAM(s) Oral three times a day  influenza   Vaccine 0.5 milliLiter(s) IntraMuscular once  insulin glargine Injectable (LANTUS) 18 Unit(s) SubCutaneous at bedtime  insulin lispro (HumaLOG) corrective regimen sliding scale   SubCutaneous three times a day before meals  insulin lispro Injectable (HumaLOG) 8 Unit(s) SubCutaneous three times a day before meals  losartan 100 milliGRAM(s) Oral daily  montelukast 10 milliGRAM(s) Oral at bedtime  pantoprazole    Tablet 40 milliGRAM(s) Oral before breakfast  simvastatin 40 milliGRAM(s) Oral at bedtime  tiotropium 18 MICROgram(s) Capsule 1 Capsule(s) Inhalation daily  topiramate 100 milliGRAM(s) Oral daily      Physical Exam  General: Patient comfortable in bed  Vital Signs Last 12 Hrs  T(F): 99 (09-21-18 @ 04:20), Max: 99 (09-21-18 @ 04:20)  HR: 85 (09-21-18 @ 04:20) (85 - 85)  BP: 125/71 (09-21-18 @ 04:20) (125/71 - 125/71)  BP(mean): --  RR: 18 (09-21-18 @ 04:20) (18 - 18)  SpO2: 98% (09-21-18 @ 04:20) (98% - 98%)  Neck: No palpable thyroid nodules.  CVS: S1S2, No murmurs  Respiratory: No wheezing, no crepitations  GI: Abdomen soft, bowel sounds positive  Musculoskeletal:  edema lower extremities.   Skin: No skin rashes, no ecchymosis    Diagnostics    Thyroid Stimulating Hormone, Serum: AM Sched. Collection: 15-Sep-2018 06:00 (09-14 @ 12:20)  Free Thyroxine, Serum: AM Sched. Collection: 15-Sep-2018 06:00 (09-14 @ 12:20)  TSH Receptor Antibody: AM Sched. Collection: 15-Sep-2018 06:00 (09-14 @ 12:20)

## 2018-09-21 NOTE — PROGRESS NOTE ADULT - PROBLEM SELECTOR PLAN 1
Will continue current insulin regimen for now. Will continue monitoring FS, log, will Follow up.   Reluctant to do insulin injections at home.  For DC home start on Janumet 50/1000mg PO BID, Glimeperide 1mg PO daily am, FU 2 weeks.  Patient counseled for compliance with consistent low carb diet. Will continue current insulin regimen for now. Will continue monitoring FS, log, will Follow up.   Has done insulin injections at home in past. Suggest to DC home on current insulin regimen, FU 1 week with FS log..  Patient counseled for compliance with consistent low carb diet.

## 2018-09-21 NOTE — PROGRESS NOTE ADULT - ASSESSMENT
Assessment  DMT2: 62y Female with DM T2 with hyperglycemia started on basal bolus insulin, dose was adjusted, off steroids, blood sugars improving, no hypoglycemic episode,  eating meals, compliant with low carb diet.  CAD/SOB: On medications, stable, monitored.   HTN: Controlled, On med.

## 2018-09-21 NOTE — PROGRESS NOTE ADULT - SUBJECTIVE AND OBJECTIVE BOX
- Patient seen and examined.  - In summary, patient is a 62 year old woman who presented with sob (13 Sep 2018 20:06)  - Today, patient is without complaints.         *****MEDICATIONS:    MEDICATIONS  (STANDING):  ALBUTerol    90 MICROgram(s) HFA Inhaler 1 Puff(s) Inhalation every 4 hours  ALBUTerol/ipratropium for Nebulization 3 milliLiter(s) Nebulizer every 6 hours  aspirin  chewable 81 milliGRAM(s) Oral daily  buDESOnide 160 MICROgram(s)/formoterol 4.5 MICROgram(s) Inhaler 2 Puff(s) Inhalation two times a day  carvedilol 12.5 milliGRAM(s) Oral every 12 hours  dextrose 5%. 1000 milliLiter(s) (50 mL/Hr) IV Continuous <Continuous>  dextrose 50% Injectable 12.5 Gram(s) IV Push once  dextrose 50% Injectable 25 Gram(s) IV Push once  dextrose 50% Injectable 25 Gram(s) IV Push once  diltiazem    milliGRAM(s) Oral daily  heparin  Infusion. 400 Unit(s)/Hr (4 mL/Hr) IV Continuous <Continuous>  hydrALAZINE 50 milliGRAM(s) Oral three times a day  influenza   Vaccine 0.5 milliLiter(s) IntraMuscular once  insulin glargine Injectable (LANTUS) 18 Unit(s) SubCutaneous at bedtime  insulin lispro (HumaLOG) corrective regimen sliding scale   SubCutaneous three times a day before meals  insulin lispro Injectable (HumaLOG) 8 Unit(s) SubCutaneous three times a day before meals  losartan 100 milliGRAM(s) Oral daily  montelukast 10 milliGRAM(s) Oral at bedtime  pantoprazole    Tablet 40 milliGRAM(s) Oral before breakfast  simvastatin 40 milliGRAM(s) Oral at bedtime  tiotropium 18 MICROgram(s) Capsule 1 Capsule(s) Inhalation daily  topiramate 100 milliGRAM(s) Oral daily    MEDICATIONS  (PRN):  acetaminophen   Tablet .. 650 milliGRAM(s) Oral every 6 hours PRN Mild Pain (1 - 3)  dextrose 40% Gel 15 Gram(s) Oral once PRN Blood Glucose LESS THAN 70 milliGRAM(s)/deciliter  glucagon  Injectable 1 milliGRAM(s) IntraMuscular once PRN Glucose LESS THAN 70 milligrams/deciliter  guaiFENesin   Syrup  (Sugar-Free) 200 milliGRAM(s) Oral every 6 hours PRN Cough  heparin  Injectable 6500 Unit(s) IV Push every 6 hours PRN For aPTT less than 40  heparin  Injectable 3000 Unit(s) IV Push every 6 hours PRN For aPTT between 40 - 57                 ***** REVIEW OF SYSTEM:  GEN: no fever, no chills, no pain  RESP: no SOB, no cough, no sputum  CVS: no chest pain, no palpitations, no edema  GI: no abdominal pain, no nausea, no vomiting, no constipation, no diarrhea  : no dysuria, no frequency  NEURO: no headache, no dizziness  PSYCH: no depression, not anxious  Derm : no itching, no rash         ***** VITAL SIGNS:    T(F): 99 (18 @ 04:20), Max: 99 (18 @ 04:20)  HR: 85 (18 @ 04:20) (65 - 85)  BP: 125/71 (18 @ 04:20) (96/59 - 140/90)  RR: 18 (18 @ 04:20) (16 - 18)  SpO2: 98% (18 @ 04:20) (98% - 99%)  Wt(kg): --  ,   I&O's Summary    20 Sep 2018 07:01  -  21 Sep 2018 07:00  --------------------------------------------------------  IN: 760 mL / OUT: 0 mL / NET: 760 mL                 *****PHYSICAL EXAM:  GEN: A&O X 3 , NAD , comfortable  HEENT: NCAT, EOMI, MMM, no icterus  NECK: Supple, No JVD  CVS: S1S2 , regular , No M/R/G appreciated  PULM: CTA B/L,  no W/R/R appreciated  ABD.: soft. non tender, non distended,  bowel sounds present  Extrem: intact pulses , no edema noted  Derm: No rash or ecchymosis noted  PSYCH: normal mood, no depression, not anxious         *****LAB AND IMAGIN.9   12.75 )-----------( Clumped    ( 20 Sep 2018 07:43 )             37.1               09-    133<L>  |  103  |  19  ----------------------------<  124<H>  4.1   |  21<L>  |  1.09    Ca    9.1      21 Sep 2018 07:15      PT/INR - ( 21 Sep 2018 07:19 )   PT: 11.5 sec;   INR: 1.05 ratio         PTT - ( 21 Sep 2018 07:19 )  PTT:82.2 sec         [All pertinent recent Imaging/Reports reviewed]  < from: Nuclear Stress Test-Pharmacologic (18 @ 14:02) >  * The left ventricle was normal in size. There is a small  mild reversible defect in the distal inferolateral wall  consistentwith mild ischemia.    < end of copied text >    < from: Transthoracic Echocardiogram (18 @ 15:09) >  Conclusions:  1. Calcified trileaflet aortic valve with normal opening.  Mild aortic regurgitation.  2. Increased relative wall thickness with normal left  ventricular mass index, consistent with concentric left  ventricular remodeling.  3. Hyperdynamic left ventricular systolic function. The  upper septum measures 1.6 cm with mild LVOT obstruction  45mmHG.  4. Strain imaging was performed on the [facility] EPIQ with an  average HR of 55 bpm and a BP of 169/94. Global L. Strain=  -25.7  5. Normal right ventricular size and function.    < end of copied text >         *****A S S E S S M E N T   A N D   P L A N :  62F with PAD, CAD, COPD adm with dyspnea  tx COPD per pulm  non obstructive CAD on cath   stress test without significant ischemia  breast pain r/w massage, unlikely angina  no cardiac symptoms  severe PAD on CTA  echo noted  would proceed with surgery at moderate risk  vasc f/u       __________________________  JACK Alegre D.O.

## 2018-09-21 NOTE — PROGRESS NOTE ADULT - SUBJECTIVE AND OBJECTIVE BOX
Patient is a 63y old  Female who presents with a chief complaint of sob (21 Sep 2018 13:22)      INTERVAL HPI/OVERNIGHT EVENTS:  T(C): 36.9 (09-21-18 @ 11:56), Max: 37.2 (09-21-18 @ 04:20)  HR: 99 (09-21-18 @ 17:40) (65 - 99)  BP: 125/76 (09-21-18 @ 17:40) (95/60 - 125/76)  RR: 18 (09-21-18 @ 17:40) (18 - 18)  SpO2: 96% (09-21-18 @ 17:40) (96% - 99%)  Wt(kg): --  I&O's Summary    20 Sep 2018 07:01  -  21 Sep 2018 07:00  --------------------------------------------------------  IN: 760 mL / OUT: 0 mL / NET: 760 mL    21 Sep 2018 07:01  -  21 Sep 2018 18:56  --------------------------------------------------------  IN: 480 mL / OUT: 0 mL / NET: 480 mL        LABS:                        12.3   11.33 )-----------( Clumped    ( 21 Sep 2018 07:54 )             37.7     09-21    133<L>  |  103  |  19  ----------------------------<  124<H>  4.1   |  21<L>  |  1.09    Ca    9.1      21 Sep 2018 07:15      PT/INR - ( 21 Sep 2018 07:19 )   PT: 11.5 sec;   INR: 1.05 ratio         PTT - ( 21 Sep 2018 07:19 )  PTT:82.2 sec    CAPILLARY BLOOD GLUCOSE      POCT Blood Glucose.: 73 mg/dL (21 Sep 2018 16:16)  POCT Blood Glucose.: 109 mg/dL (21 Sep 2018 11:34)  POCT Blood Glucose.: 126 mg/dL (21 Sep 2018 07:59)  POCT Blood Glucose.: 122 mg/dL (20 Sep 2018 21:03)            MEDICATIONS  (STANDING):  ALBUTerol    90 MICROgram(s) HFA Inhaler 1 Puff(s) Inhalation every 4 hours  ALBUTerol/ipratropium for Nebulization 3 milliLiter(s) Nebulizer every 6 hours  aspirin  chewable 81 milliGRAM(s) Oral daily  buDESOnide 160 MICROgram(s)/formoterol 4.5 MICROgram(s) Inhaler 2 Puff(s) Inhalation two times a day  carvedilol 12.5 milliGRAM(s) Oral every 12 hours  dextrose 5%. 1000 milliLiter(s) (50 mL/Hr) IV Continuous <Continuous>  dextrose 50% Injectable 12.5 Gram(s) IV Push once  dextrose 50% Injectable 25 Gram(s) IV Push once  dextrose 50% Injectable 25 Gram(s) IV Push once  diltiazem    milliGRAM(s) Oral daily  heparin  Infusion. 400 Unit(s)/Hr (4 mL/Hr) IV Continuous <Continuous>  hydrALAZINE 50 milliGRAM(s) Oral three times a day  influenza   Vaccine 0.5 milliLiter(s) IntraMuscular once  insulin glargine Injectable (LANTUS) 18 Unit(s) SubCutaneous at bedtime  insulin lispro (HumaLOG) corrective regimen sliding scale   SubCutaneous three times a day before meals  insulin lispro Injectable (HumaLOG) 8 Unit(s) SubCutaneous three times a day before meals  losartan 100 milliGRAM(s) Oral daily  montelukast 10 milliGRAM(s) Oral at bedtime  pantoprazole    Tablet 40 milliGRAM(s) Oral before breakfast  simvastatin 40 milliGRAM(s) Oral at bedtime  tiotropium 18 MICROgram(s) Capsule 1 Capsule(s) Inhalation daily  topiramate 100 milliGRAM(s) Oral daily    MEDICATIONS  (PRN):  acetaminophen   Tablet .. 650 milliGRAM(s) Oral every 6 hours PRN Mild Pain (1 - 3)  dextrose 40% Gel 15 Gram(s) Oral once PRN Blood Glucose LESS THAN 70 milliGRAM(s)/deciliter  glucagon  Injectable 1 milliGRAM(s) IntraMuscular once PRN Glucose LESS THAN 70 milligrams/deciliter  guaiFENesin   Syrup  (Sugar-Free) 200 milliGRAM(s) Oral every 6 hours PRN Cough  heparin  Injectable 6500 Unit(s) IV Push every 6 hours PRN For aPTT less than 40  heparin  Injectable 3000 Unit(s) IV Push every 6 hours PRN For aPTT between 40 - 57          PHYSICAL EXAM:  GENERAL: NAD, well-groomed, well-developed  HEAD:  Atraumatic, Normocephalic  CHEST/LUNG: Clear to percussion bilaterally; No rales, rhonchi, wheezing, or rubs  HEART: Regular rate and rhythm; No murmurs, rubs, or gallops  ABDOMEN: Soft, Nontender, Nondistended; Bowel sounds present  EXTREMITIES:  2+ Peripheral Pulses, No clubbing, cyanosis, or edema  LYMPH: No lymphadenopathy noted  SKIN: No rashes or lesions    Care Discussed with Consultants/Other Providers [ ] YES  [ ] NO

## 2018-09-21 NOTE — PROGRESS NOTE ADULT - ASSESSMENT
Dyspnea on exertion.  Plan: Jessie COPD exacerbation  duonebs  cw steroids as per pulm  antibiotics as per ID   pulmonary fu    CAD (coronary artery disease).  Plan: cw home meds   cards consult appreciated  cards cleared for vascular procedure     Diabetes.  Plan: monitor FS  ISS.   uncontrolled  endodcine called    Severe PVD Plan doppler reviewed  cw hep gtt  CT reviewed  vascular fu  bypass and angio planned for next week    Left arm iv infiltration with contrast  Vascular following  wound care and ID following

## 2018-09-21 NOTE — PROGRESS NOTE ADULT - SUBJECTIVE AND OBJECTIVE BOX
Patient is a 63y old  Female who presents with a chief complaint of sob (21 Sep 2018 09:34)    Any change in ROS: SOB alleviated. on Heparin gtt. no cough, no sputum, no hemoptysis     MEDICATIONS  (STANDING):  ALBUTerol    90 MICROgram(s) HFA Inhaler 1 Puff(s) Inhalation every 4 hours  ALBUTerol/ipratropium for Nebulization 3 milliLiter(s) Nebulizer every 6 hours  aspirin  chewable 81 milliGRAM(s) Oral daily  buDESOnide 160 MICROgram(s)/formoterol 4.5 MICROgram(s) Inhaler 2 Puff(s) Inhalation two times a day  carvedilol 12.5 milliGRAM(s) Oral every 12 hours  dextrose 5%. 1000 milliLiter(s) (50 mL/Hr) IV Continuous <Continuous>  dextrose 50% Injectable 12.5 Gram(s) IV Push once  dextrose 50% Injectable 25 Gram(s) IV Push once  dextrose 50% Injectable 25 Gram(s) IV Push once  diltiazem    milliGRAM(s) Oral daily  heparin  Infusion. 400 Unit(s)/Hr (4 mL/Hr) IV Continuous <Continuous>  hydrALAZINE 50 milliGRAM(s) Oral three times a day  influenza   Vaccine 0.5 milliLiter(s) IntraMuscular once  insulin glargine Injectable (LANTUS) 18 Unit(s) SubCutaneous at bedtime  insulin lispro (HumaLOG) corrective regimen sliding scale   SubCutaneous three times a day before meals  insulin lispro Injectable (HumaLOG) 8 Unit(s) SubCutaneous three times a day before meals  losartan 100 milliGRAM(s) Oral daily  montelukast 10 milliGRAM(s) Oral at bedtime  pantoprazole    Tablet 40 milliGRAM(s) Oral before breakfast  simvastatin 40 milliGRAM(s) Oral at bedtime  tiotropium 18 MICROgram(s) Capsule 1 Capsule(s) Inhalation daily  topiramate 100 milliGRAM(s) Oral daily    MEDICATIONS  (PRN):  acetaminophen   Tablet .. 650 milliGRAM(s) Oral every 6 hours PRN Mild Pain (1 - 3)  dextrose 40% Gel 15 Gram(s) Oral once PRN Blood Glucose LESS THAN 70 milliGRAM(s)/deciliter  glucagon  Injectable 1 milliGRAM(s) IntraMuscular once PRN Glucose LESS THAN 70 milligrams/deciliter  guaiFENesin   Syrup  (Sugar-Free) 200 milliGRAM(s) Oral every 6 hours PRN Cough  heparin  Injectable 6500 Unit(s) IV Push every 6 hours PRN For aPTT less than 40  heparin  Injectable 3000 Unit(s) IV Push every 6 hours PRN For aPTT between 40 - 57    Vital Signs Last 24 Hrs  T(C): 37.2 (21 Sep 2018 04:20), Max: 37.2 (21 Sep 2018 04:20)  T(F): 99 (21 Sep 2018 04:20), Max: 99 (21 Sep 2018 04:20)  HR: 85 (21 Sep 2018 04:20) (65 - 85)  BP: 125/71 (21 Sep 2018 04:20) (96/59 - 140/90)  BP(mean): --  RR: 18 (21 Sep 2018 04:20) (16 - 18)  SpO2: 98% (21 Sep 2018 04:20) (98% - 99%)    I&O's Summary    20 Sep 2018 07:01  -  21 Sep 2018 07:00  --------------------------------------------------------  IN: 760 mL / OUT: 0 mL / NET: 760 mL          Physical Exam:   GENERAL: The patient comfortable with no apparent distress.   HEENT: Head is normocephalic and atraumatic.    NECK: Supple with no elevated JVP.  LUNGS: Clear to auscultation without wheeze and rhonchi.  HEART: S1 and S2 present without murmur.  ABDOMEN: Soft, nontender, and nondistended. No hepatosplenomegaly is noted.  EXTREMITIES: No edema or calf tenderness.  NEUROLOGIC: Grossly intact.    Labs:             11.9   12.75 )-----------( Clumped    ( 20 Sep 2018 07:43 )             37.1                         13.5   17.69 )-----------( 185      ( 18 Sep 2018 07:56 )             42.3     09-21    133<L>  |  103  |  19  ----------------------------<  124<H>  4.1   |  21<L>  |  1.09  09-20    136  |  106  |  20  ----------------------------<  110<H>  4.0   |  20<L>  |  1.04  09-18    134<L>  |  105  |  23  ----------------------------<  82  4.0   |  19<L>  |  1.17    Ca    9.1      21 Sep 2018 07:15  Ca    8.8      20 Sep 2018 07:11      CAPILLARY BLOOD GLUCOSE      POCT Blood Glucose.: 126 mg/dL (21 Sep 2018 07:59)  POCT Blood Glucose.: 122 mg/dL (20 Sep 2018 21:03)  POCT Blood Glucose.: 105 mg/dL (20 Sep 2018 16:33)  POCT Blood Glucose.: 94 mg/dL (20 Sep 2018 11:42)        PT/INR - ( 21 Sep 2018 07:19 )   PT: 11.5 sec;   INR: 1.05 ratio         PTT - ( 21 Sep 2018 07:19 )  PTT:82.2 sec        Studies  Chest X-RAY < from: Xray Chest 1 View AP/PA (09.07.18 @ 15:31) >    EXAM:  XR CHEST AP OR PA 1V                            PROCEDURE DATE:  09/07/2018            INTERPRETATION:  AP chest x-ray at 1529 hours on 7 September.    Clinical information: Moderate chest pain and palpitations.    Comparison: Chest x-ray dated 10 February 2017.    Findings: The heart is enlarged. Pulmonary vascularity appears normal. No   consolidation or pleural effusion is present.    There is a sigmoid scoliosis of the thoracic spine.    Impression: Clear lungs.      < end of copied text >    CT SCAN Chest < from: CT Chest No Cont (09.08.18 @ 15:55) >  EXAM:  CT CHEST                            PROCEDURE DATE:  09/08/2018            INTERPRETATION:  CLINICAL INFORMATION: Dyspnea    COMPARISON: CT chest 9/20/2015    PROCEDURE:   CT of the Chest was performed without intravenous contrast.  [place] coronal reformats were performed.      FINDINGS:    CHEST:     LUNGS AND LARGE AIRWAYS: Patent central airways.  Minimal left basilar   subsegmental atelectasis.  PLEURA: No pleural effusion.  VESSELS: Atherosclerosis.  HEART: Heart size is normal. No pericardial effusion.  MEDIASTINUM AND LANEY: No lymphadenopathy.  CHEST WALL AND LOWER NECK: Pectus excavatum.  VISUALIZED UPPER ABDOMEN: Within normal limits.  BONES: Degenerative change. Scoliotic spine. Pectus deformity.    IMPRESSION: No evidence of pneumonia.    < end of copied text >    Venous Dopplers: LE: < from: [facility] Duplex Lower Ext Vein Scan, Bilat (09.10.18 @ 20:59) >  EXAM:  DUPLEX SCAN EXT VEINS LOWER BI                            PROCEDURE DATE:  09/10/2018            INTERPRETATION:  CLINICAL INFORMATION: Right lower extremity swelling and   shortness of breath for 3 months. Peripheral arterial disease.    COMPARISON: CT angiogram with lower extremity runoff from 2/2/2017   revealing an occluded femoral-femoral and right femoral-popliteal bypass.     TECHNIQUE: Duplex sonography of the BILATERAL LOWER extremities with   color and spectral Doppler, with and without compression.      FINDINGS: There is an occluded superficial femoral artery and bypass   graft on the right. The left superficial femoral artery is occluded as   well.    There is normal compressibility of the left and right common femoral,   femoral and popliteal veins. No calf vein thrombosis is detected.    Doppler examination shows normal spontaneous and phasic flow.    IMPRESSION:     No evidence of bilateral lower extremity deep venous thrombosis.    < end of copied text >    CT Abdomen < from: CT Angio Abdomen and Pelvis w/ IV Cont (02.02.17 @ 13:43) >  EXAM:  CT ANGIO LWR EXT (W)AW IC BI                          EXAM:  CT ANGIO ABD PELV (W)AW IC                            PROCEDURE DATE:  02/02/2017        INTERPRETATION:  CLINICAL INFORMATION: Femorofemoral bypass. Right   femoropopliteal bypass. Right foot pain.    CT ANGIOGRAM ABDOMEN, PELVIS, AND LOWER EXTREMITIES:    TECHNIQUE: Initially, nonenhanced CT is obtained through the calves.   Then, following the rapid administration of intravenous contrast, CT   angiography is performed through the abdomen, pelvis, and lower   extremities down to the toes. Delayed images through the calves is then   also obtained. Sagittal and coronal reformats as well as 3D multiplanar   reconstruction is performed.    Initially, the study is obtained with the report of extravasation. Weight   based Omnipaque 350 was administered intravenously without complication.    PRIOR EXAMINATION: April 11, 2015    FINDINGS:    ABDOMEN AND PELVIS ARTERIAL SYSTEM:     ABDOMINAL AORTA: Widely patent.  CELIAC ARTERY: Widely patent.  SUPERIOR MESENTERIC ARTERY (SMA): Widely patent. Mild atherosclerotic   disease.  INFERIOR MESENTERIC ARTERY: Patent.  RIGHT RENAL ARTERY: Widely patent.  LEFT RENAL ARTERY: Widely patent.    RIGHT LOWER EXTREMITY:    COMMON ILIAC ARTERY: Heavy calcific atherosclerotic disease with moderate   stenosis.  EXTERNAL ILIAC ARTERY: Occluded.  INTERNAL ILIAC ARTERY: Patent but severely stenosed at their origin.  COMMON FEMORAL ARTERY: Occluded. Femoral-femoral bypass occluded.  FEMORALARTERY: Occluded native femoral artery and bypass graft.  PROFUNDA FEMORAL ARTERY: Origin occluded but then branches reconstituted   proximally.  POPLITEAL ARTERY: Reconstituted and patent but moderately narrowed.  ANTERIOR TIBIAL ARTERY: Occluded atthe origin but then reconstituted and   widely patent proximally into the foot.  TIBIOPERONEAL TRUNK: Occluded.  POSTERIOR TIBIAL ARTERY: Only minimal flow proximally.  PERONEAL ARTERY: Only minimal flow.    LEFT LOWER EXTREMITY:    COMMON ILIAC ARTERY: Widely patent.  EXTERNAL ILIAC ARTERY: Moderate focal stenosis proximally.  INTERNAL ILIAC ARTERY: Occluded.  COMMON FEMORAL ARTERY: Widely patent.  FEMORAL ARTERY: Occluded and then reconstituted distally.  PROFUNDA FEMORAL ARTERY: Widely patent.  POPLITEAL ARTERY: Widely patent.  ANTERIOR TIBIAL ARTERY: Widely patent.  TIBIOPERONEAL TRUNK: Widely patent.  POSTERIOR TIBIAL ARTERY: Widely patent.  PERONEAL ARTERY: Widely patent.    ADDITIONAL FINDINGS: Small left hepatic cyst. Renal cysts.    IMPRESSION:     Right lower extremity: Occluded femoral-femoral and femoral-popliteal   bypass. Popliteal artery reconstituted but moderately narrowed. Poor   runoff primarily via the anterior tibial artery which is occluded at the   origin but then reconstituted proximally.    Left lower extremity: Femoral artery mostly occluded with reconstitution   of the distal femoral artery and popliteal artery with three-vessel   runoff.     < end of copied text >    Others  < from: Transthoracic Echocardiogram (09.14.18 @ 15:09) >    Patient name: DOUG APPLE  YOB: 1955   Age: 62 (F)   MR#: [id number]  Study Date: 9/14/2018  Location: [address] [place], [place] [id number]: Janette Jimenez RDCS  Study quality: Technically fair  Referring Physician: Fiona Rebolledo MD  Blood Pressure: 169/94 mmHg  Height: 157 cm  Weight: 81 kg  BSA: 1.8 m2  ------------------------------------------------------------------------  PROCEDURE: Transthoracic echocardiogram with 2-D, M-Mode  and complete spectral and color flow Doppler. Strain  Imaging.  INDICATION: Atherosclerotic heart disease of native  coronary artery without angina pectoris (I25.10)  ------------------------------------------------------------------------  Dimensions:    Normal Values:  LA:     3.1    2.0 - 4.0 cm  Ao: 2.8    2.0 - 3.8 cm  SEPTUM: 1.5    0.6 - 1.2 cm  PWT:    1.2    0.6 - 1.1 cm  LVIDd:  3.8    3.0 - 5.6 cm  LVIDs:  2.0    1.8 - 4.0 cm  Derived variables:  LVMI: 101 g/m2  RWT: 0.63  Fractional short: 47 %  EF (Teicholtz): 79 %  ------------------------------------------------------------------------  Observations:  Mitral Valve: Thickened mitral valve with normal opening.  Mild-moderate mitral regurgitation.  Aortic Valve/Aorta: Calcified trileaflet aortic valve with  normal opening. Mild aortic regurgitation.  Aortic Root: 2.8 cm.  Left Atrium: Normal left atrium.  LA volume index = 28  cc/m2.  Left Ventricle: Hyperdynamic left ventricular systolic  function. The upper septum measures 1.6 cm with mild LVOT  obstruction 45mmHG.  Increased relative wall thickness with  normal left ventricular mass index, consistent with  concentric left ventricular remodeling.  Right Heart: Normal right atrium. Normal right ventricular  size and function. Normal tricuspid valve. Mild tricuspid  regurgitation. Normal pulmonic valve.  Pericardium/Pleura: Normal pericardium with no pericardial  effusion.  Hemodynamic: Estimated right atrial pressure is 8 mm Hg.  ------------------------------------------------------------------------  Conclusions:  1. Calcified trileaflet aortic valve with normal opening.  Mild aortic regurgitation.  2. Increased relative wall thickness with normal left  ventricular mass index, consistent with concentric left  ventricular remodeling.  3. Hyperdynamic left ventricular systolic function. The  upper septum measures 1.6 cm with mild LVOT obstruction  45mmHG.  4. Strain imaging was performed on the [facility] EPIQ with an  average HR of 55 bpm and a BP of 169/94. Global L. Strain=  -25.7  5. Normal right ventricular size and function.  *** No previous Echo exam.    < end of copied text >

## 2018-09-22 LAB
ANION GAP SERPL CALC-SCNC: 10 MMOL/L — SIGNIFICANT CHANGE UP (ref 5–17)
APTT BLD: 174.5 SEC — CRITICAL HIGH (ref 27.5–37.4)
APTT BLD: 35.7 SEC — SIGNIFICANT CHANGE UP (ref 27.5–37.4)
BUN SERPL-MCNC: 25 MG/DL — HIGH (ref 7–23)
CALCIUM SERPL-MCNC: 9.5 MG/DL — SIGNIFICANT CHANGE UP (ref 8.4–10.5)
CHLORIDE SERPL-SCNC: 101 MMOL/L — SIGNIFICANT CHANGE UP (ref 96–108)
CO2 SERPL-SCNC: 20 MMOL/L — LOW (ref 22–31)
CREAT SERPL-MCNC: 1.07 MG/DL — SIGNIFICANT CHANGE UP (ref 0.5–1.3)
GLUCOSE BLDC GLUCOMTR-MCNC: 116 MG/DL — HIGH (ref 70–99)
GLUCOSE BLDC GLUCOMTR-MCNC: 134 MG/DL — HIGH (ref 70–99)
GLUCOSE BLDC GLUCOMTR-MCNC: 208 MG/DL — HIGH (ref 70–99)
GLUCOSE BLDC GLUCOMTR-MCNC: 88 MG/DL — SIGNIFICANT CHANGE UP (ref 70–99)
GLUCOSE SERPL-MCNC: 170 MG/DL — HIGH (ref 70–99)
HCT VFR BLD CALC: 37 % — SIGNIFICANT CHANGE UP (ref 34.5–45)
HGB BLD-MCNC: 11.8 G/DL — SIGNIFICANT CHANGE UP (ref 11.5–15.5)
INR BLD: 0.97 RATIO — SIGNIFICANT CHANGE UP (ref 0.88–1.16)
MCHC RBC-ENTMCNC: 26.6 PG — LOW (ref 27–34)
MCHC RBC-ENTMCNC: 31.9 GM/DL — LOW (ref 32–36)
MCV RBC AUTO: 83.5 FL — SIGNIFICANT CHANGE UP (ref 80–100)
PLATELET # BLD AUTO: SIGNIFICANT CHANGE UP K/UL (ref 150–400)
POTASSIUM SERPL-MCNC: 4.9 MMOL/L — SIGNIFICANT CHANGE UP (ref 3.5–5.3)
POTASSIUM SERPL-SCNC: 4.9 MMOL/L — SIGNIFICANT CHANGE UP (ref 3.5–5.3)
PROTHROM AB SERPL-ACNC: 11 SEC — SIGNIFICANT CHANGE UP (ref 10–13.1)
RBC # BLD: 4.43 M/UL — SIGNIFICANT CHANGE UP (ref 3.8–5.2)
RBC # FLD: 16.5 % — HIGH (ref 10.3–14.5)
SODIUM SERPL-SCNC: 131 MMOL/L — LOW (ref 135–145)
WBC # BLD: 10.08 K/UL — SIGNIFICANT CHANGE UP (ref 3.8–10.5)
WBC # FLD AUTO: 10.08 K/UL — SIGNIFICANT CHANGE UP (ref 3.8–10.5)

## 2018-09-22 RX ADMIN — SIMVASTATIN 40 MILLIGRAM(S): 20 TABLET, FILM COATED ORAL at 22:23

## 2018-09-22 RX ADMIN — HEPARIN SODIUM 6500 UNIT(S): 5000 INJECTION INTRAVENOUS; SUBCUTANEOUS at 11:19

## 2018-09-22 RX ADMIN — Medication 3 MILLILITER(S): at 00:15

## 2018-09-22 RX ADMIN — Medication 180 MILLIGRAM(S): at 06:06

## 2018-09-22 RX ADMIN — Medication 50 MILLIGRAM(S): at 14:40

## 2018-09-22 RX ADMIN — Medication 50 MILLIGRAM(S): at 22:23

## 2018-09-22 RX ADMIN — CARVEDILOL PHOSPHATE 12.5 MILLIGRAM(S): 80 CAPSULE, EXTENDED RELEASE ORAL at 06:06

## 2018-09-22 RX ADMIN — HEPARIN SODIUM 900 UNIT(S)/HR: 5000 INJECTION INTRAVENOUS; SUBCUTANEOUS at 19:13

## 2018-09-22 RX ADMIN — Medication 3 MILLILITER(S): at 17:21

## 2018-09-22 RX ADMIN — Medication 650 MILLIGRAM(S): at 08:10

## 2018-09-22 RX ADMIN — Medication 2: at 17:21

## 2018-09-22 RX ADMIN — Medication 8 UNIT(S): at 08:10

## 2018-09-22 RX ADMIN — Medication 100 MILLIGRAM(S): at 12:14

## 2018-09-22 RX ADMIN — Medication 3 MILLILITER(S): at 12:14

## 2018-09-22 RX ADMIN — HEPARIN SODIUM 0 UNIT(S)/HR: 5000 INJECTION INTRAVENOUS; SUBCUTANEOUS at 18:09

## 2018-09-22 RX ADMIN — BUDESONIDE AND FORMOTEROL FUMARATE DIHYDRATE 2 PUFF(S): 160; 4.5 AEROSOL RESPIRATORY (INHALATION) at 17:21

## 2018-09-22 RX ADMIN — PANTOPRAZOLE SODIUM 40 MILLIGRAM(S): 20 TABLET, DELAYED RELEASE ORAL at 06:06

## 2018-09-22 RX ADMIN — Medication 650 MILLIGRAM(S): at 09:00

## 2018-09-22 RX ADMIN — Medication 3 MILLILITER(S): at 23:58

## 2018-09-22 RX ADMIN — BUDESONIDE AND FORMOTEROL FUMARATE DIHYDRATE 2 PUFF(S): 160; 4.5 AEROSOL RESPIRATORY (INHALATION) at 06:05

## 2018-09-22 RX ADMIN — Medication 3 MILLILITER(S): at 06:05

## 2018-09-22 RX ADMIN — HEPARIN SODIUM 1200 UNIT(S)/HR: 5000 INJECTION INTRAVENOUS; SUBCUTANEOUS at 11:18

## 2018-09-22 RX ADMIN — Medication 8 UNIT(S): at 12:14

## 2018-09-22 RX ADMIN — Medication 8 UNIT(S): at 17:21

## 2018-09-22 RX ADMIN — INSULIN GLARGINE 18 UNIT(S): 100 INJECTION, SOLUTION SUBCUTANEOUS at 22:23

## 2018-09-22 RX ADMIN — Medication 50 MILLIGRAM(S): at 06:06

## 2018-09-22 RX ADMIN — MONTELUKAST 10 MILLIGRAM(S): 4 TABLET, CHEWABLE ORAL at 22:23

## 2018-09-22 RX ADMIN — LOSARTAN POTASSIUM 100 MILLIGRAM(S): 100 TABLET, FILM COATED ORAL at 07:14

## 2018-09-22 RX ADMIN — Medication 81 MILLIGRAM(S): at 12:14

## 2018-09-22 RX ADMIN — CARVEDILOL PHOSPHATE 12.5 MILLIGRAM(S): 80 CAPSULE, EXTENDED RELEASE ORAL at 17:21

## 2018-09-22 NOTE — PROGRESS NOTE ADULT - SUBJECTIVE AND OBJECTIVE BOX
- Patient seen and examined.  - In summary, patient is a 62 year old woman who presented with sob (13 Sep 2018 20:06)  - Today, patient is without complaints.         *****MEDICATIONS:    MEDICATIONS  (STANDING):  ALBUTerol    90 MICROgram(s) HFA Inhaler 1 Puff(s) Inhalation every 4 hours  ALBUTerol/ipratropium for Nebulization 3 milliLiter(s) Nebulizer every 6 hours  aspirin  chewable 81 milliGRAM(s) Oral daily  buDESOnide 160 MICROgram(s)/formoterol 4.5 MICROgram(s) Inhaler 2 Puff(s) Inhalation two times a day  carvedilol 12.5 milliGRAM(s) Oral every 12 hours  dextrose 5%. 1000 milliLiter(s) (50 mL/Hr) IV Continuous <Continuous>  dextrose 50% Injectable 12.5 Gram(s) IV Push once  dextrose 50% Injectable 25 Gram(s) IV Push once  dextrose 50% Injectable 25 Gram(s) IV Push once  diltiazem    milliGRAM(s) Oral daily  heparin  Infusion. 400 Unit(s)/Hr (4 mL/Hr) IV Continuous <Continuous>  hydrALAZINE 50 milliGRAM(s) Oral three times a day  influenza   Vaccine 0.5 milliLiter(s) IntraMuscular once  insulin glargine Injectable (LANTUS) 18 Unit(s) SubCutaneous at bedtime  insulin lispro (HumaLOG) corrective regimen sliding scale   SubCutaneous three times a day before meals  insulin lispro Injectable (HumaLOG) 8 Unit(s) SubCutaneous three times a day before meals  losartan 100 milliGRAM(s) Oral daily  montelukast 10 milliGRAM(s) Oral at bedtime  pantoprazole    Tablet 40 milliGRAM(s) Oral before breakfast  simvastatin 40 milliGRAM(s) Oral at bedtime  tiotropium 18 MICROgram(s) Capsule 1 Capsule(s) Inhalation daily  topiramate 100 milliGRAM(s) Oral daily    MEDICATIONS  (PRN):  acetaminophen   Tablet .. 650 milliGRAM(s) Oral every 6 hours PRN Mild Pain (1 - 3)  dextrose 40% Gel 15 Gram(s) Oral once PRN Blood Glucose LESS THAN 70 milliGRAM(s)/deciliter  glucagon  Injectable 1 milliGRAM(s) IntraMuscular once PRN Glucose LESS THAN 70 milligrams/deciliter  guaiFENesin   Syrup  (Sugar-Free) 200 milliGRAM(s) Oral every 6 hours PRN Cough  heparin  Injectable 6500 Unit(s) IV Push every 6 hours PRN For aPTT less than 40  heparin  Injectable 3000 Unit(s) IV Push every 6 hours PRN For aPTT between 40 - 57               ***** REVIEW OF SYSTEM:  GEN: no fever, no chills, no pain  RESP: no SOB, no cough, no sputum  CVS: no chest pain, no palpitations, no edema  GI: no abdominal pain, no nausea, no vomiting, no constipation, no diarrhea  : no dysuria, no frequency  NEURO: no headache, no dizziness  PSYCH: no depression, not anxious  Derm : no itching, no rash         ***** VITAL SIGNS:    T(F): 98.7 (18 @ 04:43), Max: 98.7 (18 @ 20:06)  HR: 78 (18 @ 07:01) (69 - 99)  BP: 110/69 (18 @ 07:01) (95/60 - 125/76)  RR: 18 (18 @ 07:01) (17 - 18)  SpO2: 98% (18 @ 07:01) (95% - 100%)  Wt(kg): --  ,   I&O's Summary    21 Sep 2018 07:  -  22 Sep 2018 07:00  --------------------------------------------------------  IN: 892 mL / OUT: 0 mL / NET: 892 mL    22 Sep 2018 07:  -  22 Sep 2018 10:15  --------------------------------------------------------  IN: 220 mL / OUT: 0 mL / NET: 220 mL                   *****PHYSICAL EXAM:  GEN: A&O X 3 , NAD , comfortable  HEENT: NCAT, EOMI, MMM, no icterus  NECK: Supple, No JVD  CVS: S1S2 , regular , No M/R/G appreciated  PULM: CTA B/L,  no W/R/R appreciated  ABD.: soft. non tender, non distended,  bowel sounds present  Extrem: intact pulses , no edema noted  Derm: No rash or ecchymosis noted  PSYCH: normal mood, no depression, not anxious         *****LAB AND IMAGIN.3   11.33 )-----------( Clumped    ( 21 Sep 2018 07:54 )             37.7               -    131<L>  |  101  |  25<H>  ----------------------------<  170<H>  4.9   |  20<L>  |  1.07    Ca    9.5      22 Sep 2018 09:36      PT/INR - ( 22 Sep 2018 09:05 )   PT: 11.0 sec;   INR: 0.97 ratio         PTT - ( 22 Sep 2018 09:05 )  PTT:35.7 sec                              [All pertinent recent Imaging/Reports reviewed]  < from: Nuclear Stress Test-Pharmacologic (18 @ 14:02) >  * The left ventricle was normal in size. There is a small  mild reversible defect in the distal inferolateral wall  consistentwith mild ischemia.    < end of copied text >    < from: Transthoracic Echocardiogram (18 @ 15:09) >  Conclusions:  1. Calcified trileaflet aortic valve with normal opening.  Mild aortic regurgitation.  2. Increased relative wall thickness with normal left  ventricular mass index, consistent with concentric left  ventricular remodeling.  3. Hyperdynamic left ventricular systolic function. The  upper septum measures 1.6 cm with mild LVOT obstruction  45mmHG.  4. Strain imaging was performed on the [facility] EPIQ with an  average HR of 55 bpm and a BP of 169/94. Global L. Strain=  -25.7  5. Normal right ventricular size and function.    < end of copied text >         *****A S S E S S M E N T   A N D   P L A N :  62F with PAD, CAD, COPD adm with dyspnea  tx COPD per pulm  non obstructive CAD on cath   stress test without significant ischemia  breast pain r/w massage, unlikely angina  no cardiac symptoms  severe PAD on CTA  echo noted  would proceed with surgery at moderate risk  vasc f/u       __________________________  JACK Alegre D.O.

## 2018-09-22 NOTE — PROVIDER CONTACT NOTE (CRITICAL VALUE NOTIFICATION) - SITUATION
Critical Aptt at 198.6
APTT 174.5
Pt is on a heparin gtt full anticoagulation nomogram, STAT aPTT resulted >200
Pt. had a PTT greater than 200

## 2018-09-22 NOTE — PROGRESS NOTE ADULT - PROBLEM SELECTOR PLAN 1
Will continue current insulin regimen for now. Will continue monitoring FS, log, will Follow up.   Has done insulin injections at home in past. Suggest to DC home on current insulin regimen, FU 1 week with FS log..  Patient counseled for compliance with consistent low carb diet.

## 2018-09-22 NOTE — PROVIDER CONTACT NOTE (CRITICAL VALUE NOTIFICATION) - ASSESSMENT
Pt alert and oriented x 4 female. No s/s of bleeding noted at this time. Heparin gtt to be held for one hour and then decreased to 7ml/hr.
Pt aox4, responsive, ambulatory. No signs/sx of distress observed/reported. Pt asymptomatic, VSS, no bleeding observed/reported.
Pt is A&Ox4 denies pain/ discomfort/ dizziness/ SOB. No s/s bleeding noted. As per nomogram heparin gtt paused for 60minutes and to be restarted at 8ml/hr
Pt. asymptomatic, vitals stable.

## 2018-09-22 NOTE — PROGRESS NOTE ADULT - SUBJECTIVE AND OBJECTIVE BOX
Patient is a 63y old  Female who presents with a chief complaint of sob (21 Sep 2018 18:56)    Any change in ROS: doing ok. on heparin gtt. denies hemoptysis. no chest pain, no cough    MEDICATIONS  (STANDING):  ALBUTerol    90 MICROgram(s) HFA Inhaler 1 Puff(s) Inhalation every 4 hours  ALBUTerol/ipratropium for Nebulization 3 milliLiter(s) Nebulizer every 6 hours  aspirin  chewable 81 milliGRAM(s) Oral daily  buDESOnide 160 MICROgram(s)/formoterol 4.5 MICROgram(s) Inhaler 2 Puff(s) Inhalation two times a day  carvedilol 12.5 milliGRAM(s) Oral every 12 hours  dextrose 5%. 1000 milliLiter(s) (50 mL/Hr) IV Continuous <Continuous>  dextrose 50% Injectable 12.5 Gram(s) IV Push once  dextrose 50% Injectable 25 Gram(s) IV Push once  dextrose 50% Injectable 25 Gram(s) IV Push once  diltiazem    milliGRAM(s) Oral daily  heparin  Infusion. 400 Unit(s)/Hr (4 mL/Hr) IV Continuous <Continuous>  hydrALAZINE 50 milliGRAM(s) Oral three times a day  influenza   Vaccine 0.5 milliLiter(s) IntraMuscular once  insulin glargine Injectable (LANTUS) 18 Unit(s) SubCutaneous at bedtime  insulin lispro (HumaLOG) corrective regimen sliding scale   SubCutaneous three times a day before meals  insulin lispro Injectable (HumaLOG) 8 Unit(s) SubCutaneous three times a day before meals  losartan 100 milliGRAM(s) Oral daily  montelukast 10 milliGRAM(s) Oral at bedtime  pantoprazole    Tablet 40 milliGRAM(s) Oral before breakfast  simvastatin 40 milliGRAM(s) Oral at bedtime  tiotropium 18 MICROgram(s) Capsule 1 Capsule(s) Inhalation daily  topiramate 100 milliGRAM(s) Oral daily    MEDICATIONS  (PRN):  acetaminophen   Tablet .. 650 milliGRAM(s) Oral every 6 hours PRN Mild Pain (1 - 3)  dextrose 40% Gel 15 Gram(s) Oral once PRN Blood Glucose LESS THAN 70 milliGRAM(s)/deciliter  glucagon  Injectable 1 milliGRAM(s) IntraMuscular once PRN Glucose LESS THAN 70 milligrams/deciliter  guaiFENesin   Syrup  (Sugar-Free) 200 milliGRAM(s) Oral every 6 hours PRN Cough  heparin  Injectable 6500 Unit(s) IV Push every 6 hours PRN For aPTT less than 40  heparin  Injectable 3000 Unit(s) IV Push every 6 hours PRN For aPTT between 40 - 57    Vital Signs Last 24 Hrs  T(C): 37.1 (22 Sep 2018 04:43), Max: 37.1 (21 Sep 2018 20:06)  T(F): 98.7 (22 Sep 2018 04:43), Max: 98.7 (21 Sep 2018 20:06)  HR: 78 (22 Sep 2018 07:01) (69 - 99)  BP: 110/69 (22 Sep 2018 07:01) (95/60 - 125/76)  BP(mean): --  RR: 18 (22 Sep 2018 07:01) (17 - 18)  SpO2: 98% (22 Sep 2018 07:01) (95% - 100%)    I&O's Summary    21 Sep 2018 07:01  -  22 Sep 2018 07:00  --------------------------------------------------------  IN: 892 mL / OUT: 0 mL / NET: 892 mL          Physical Exam:   GENERAL: The patient comfortable with no apparent distress.   HEENT: Head is normocephalic and atraumatic.    NECK: Supple with no elevated JVP.  LUNGS: Clear to auscultation without wheeze and rhonchi. diminished to RLL posterior   HEART: S1 and S2 present without murmur.  ABDOMEN: Soft, nontender, and nondistended. No hepatosplenomegaly is noted.  EXTREMITIES: No edema or calf tenderness.  NEUROLOGIC: Grossly intact.    Labs:                              12.3   11.33 )-----------( Clumped    ( 21 Sep 2018 07:54 )             37.7                         11.9   12.75 )-----------( Clumped    ( 20 Sep 2018 07:43 )             37.1     09-21    133<L>  |  103  |  19  ----------------------------<  124<H>  4.1   |  21<L>  |  1.09  09-20    136  |  106  |  20  ----------------------------<  110<H>  4.0   |  20<L>  |  1.04    Ca    9.1      21 Sep 2018 07:15      CAPILLARY BLOOD GLUCOSE      POCT Blood Glucose.: 116 mg/dL (22 Sep 2018 07:27)  POCT Blood Glucose.: 135 mg/dL (21 Sep 2018 21:21)  POCT Blood Glucose.: 73 mg/dL (21 Sep 2018 16:16)  POCT Blood Glucose.: 109 mg/dL (21 Sep 2018 11:34)        PT/INR - ( 21 Sep 2018 07:19 )   PT: 11.5 sec;   INR: 1.05 ratio         PTT - ( 21 Sep 2018 07:19 )  PTT:82.2 sec        Studies  Chest X-RAY < from: Xray Chest 1 View AP/PA (09.07.18 @ 15:31) >  EXAM:  XR CHEST AP OR PA 1V                            PROCEDURE DATE:  09/07/2018            INTERPRETATION:  AP chest x-ray at 1529 hours on 7 September.    Clinical information: Moderate chest pain and palpitations.    Comparison: Chest x-ray dated 10 February 2017.    Findings: The heart is enlarged. Pulmonary vascularity appears normal. No   consolidation or pleural effusion is present.    There is a sigmoid scoliosis of the thoracic spine.    Impression: Clear lungs.      < end of copied text >    CT SCAN Chest < from: CT Chest No Cont (09.08.18 @ 15:55) >  EXAM:  CT CHEST                            PROCEDURE DATE:  09/08/2018            INTERPRETATION:  CLINICAL INFORMATION: Dyspnea    COMPARISON: CT chest 9/20/2015    PROCEDURE:   CT of the Chest was performed without intravenous contrast.  [place] coronal reformats were performed.      FINDINGS:    CHEST:     LUNGS AND LARGE AIRWAYS: Patent central airways.  Minimal left basilar   subsegmental atelectasis.  PLEURA: No pleural effusion.  VESSELS: Atherosclerosis.  HEART: Heart size is normal. No pericardial effusion.  MEDIASTINUM AND LANEY: No lymphadenopathy.  CHEST WALL AND LOWER NECK: Pectus excavatum.  VISUALIZED UPPER ABDOMEN: Within normal limits.  BONES: Degenerative change. Scoliotic spine. Pectus deformity.    IMPRESSION: No evidence of pneumonia.    < end of copied text >    Venous Dopplers: LE: < from: [facility] Duplex Lower Ext Vein Scan, Bilat (09.10.18 @ 20:59) >  EXAM:  DUPLEX SCAN EXT VEINS LOWER BI                            PROCEDURE DATE:  09/10/2018            INTERPRETATION:  CLINICAL INFORMATION: Right lower extremity swelling and   shortness of breath for 3 months. Peripheral arterial disease.    COMPARISON: CT angiogram with lower extremity runoff from 2/2/2017   revealing an occluded femoral-femoral and right femoral-popliteal bypass.     TECHNIQUE: Duplex sonography of the BILATERAL LOWER extremities with   color and spectral Doppler, with and without compression.      FINDINGS: There is an occluded superficial femoral artery and bypass   graft on the right. The left superficial femoral artery is occluded as   well.    There is normal compressibility of the left and right common femoral,   femoral and popliteal veins. No calf vein thrombosis is detected.    Doppler examination shows normal spontaneous and phasic flow.    IMPRESSION:     No evidence of bilateral lower extremity deep venous thrombosis.        These findings were discussed with CHRISTOPHER Kumar at 9/10/2018 9:17 PM by Dr. Alhaji Stover (Kevin) of Radiology with read back confirmation.    < end of copied text >    CT Abdomen  Others  < from: Transthoracic Echocardiogram (09.14.18 @ 15:09) >    Patient name: DOUG APPLE  YOB: 1955   Age: 62 (F)   MR#: [id number]  Study Date: 9/14/2018  Location: [address] [place], [place] [id number]: Janette Jimenez RDCS  Study quality: Technically fair  Referring Physician: Fiona Rebolledo MD  Blood Pressure: 169/94 mmHg  Height: 157 cm  Weight: 81 kg  BSA: 1.8 m2  ------------------------------------------------------------------------  PROCEDURE: Transthoracic echocardiogram with 2-D, M-Mode  and complete spectral and color flow Doppler. Strain  Imaging.  INDICATION: Atherosclerotic heart disease of native  coronary artery without angina pectoris (I25.10)  ------------------------------------------------------------------------  Dimensions:    Normal Values:  LA:     3.1    2.0 - 4.0 cm  Ao: 2.8    2.0 - 3.8 cm  SEPTUM: 1.5    0.6 - 1.2 cm  PWT:    1.2    0.6 - 1.1 cm  LVIDd:  3.8    3.0 - 5.6 cm  LVIDs:  2.0    1.8 - 4.0 cm  Derived variables:  LVMI: 101 g/m2  RWT: 0.63  Fractional short: 47 %  EF (Teicholtz): 79 %  ------------------------------------------------------------------------  Observations:  Mitral Valve: Thickened mitral valve with normal opening.  Mild-moderate mitral regurgitation.  Aortic Valve/Aorta: Calcified trileaflet aortic valve with  normal opening. Mild aortic regurgitation.  Aortic Root: 2.8 cm.  Left Atrium: Normal left atrium.  LA volume index = 28  cc/m2.  Left Ventricle: Hyperdynamic left ventricular systolic  function. The upper septum measures 1.6 cm with mild LVOT  obstruction 45mmHG.  Increased relative wall thickness with  normal left ventricular mass index, consistent with  concentric left ventricular remodeling.  Right Heart: Normal right atrium. Normal right ventricular  size and function. Normal tricuspid valve. Mild tricuspid  regurgitation. Normal pulmonic valve.  Pericardium/Pleura: Normal pericardium with no pericardial  effusion.  Hemodynamic: Estimated right atrial pressure is 8 mm Hg.  ------------------------------------------------------------------------  Conclusions:  1. Calcified trileaflet aortic valve with normal opening.  Mild aortic regurgitation.  2. Increased relative wall thickness with normal left  ventricular mass index, consistent with concentric left  ventricular remodeling.  3. Hyperdynamic left ventricular systolic function. The  upper septum measures 1.6 cm with mild LVOT obstruction  45mmHG.    < end of copied text >

## 2018-09-22 NOTE — PROGRESS NOTE ADULT - SUBJECTIVE AND OBJECTIVE BOX
Patient is a 63y old  Female who presents with a chief complaint of sob (22 Sep 2018 15:10)      INTERVAL HPI/OVERNIGHT EVENTS:  T(C): 36.6 (09-22-18 @ 12:03), Max: 37.1 (09-21-18 @ 20:06)  HR: 67 (09-22-18 @ 17:08) (61 - 99)  BP: 123/67 (09-22-18 @ 17:08) (97/57 - 125/76)  RR: 18 (09-22-18 @ 17:08) (17 - 18)  SpO2: 97% (09-22-18 @ 17:08) (95% - 100%)  Wt(kg): --  I&O's Summary    21 Sep 2018 07:01  -  22 Sep 2018 07:00  --------------------------------------------------------  IN: 892 mL / OUT: 0 mL / NET: 892 mL    22 Sep 2018 07:01  -  22 Sep 2018 17:37  --------------------------------------------------------  IN: 460 mL / OUT: 0 mL / NET: 460 mL        LABS:                        11.8   10.08 )-----------( Clumped    ( 22 Sep 2018 10:16 )             37.0     09-22    131<L>  |  101  |  25<H>  ----------------------------<  170<H>  4.9   |  20<L>  |  1.07    Ca    9.5      22 Sep 2018 09:36      PT/INR - ( 22 Sep 2018 09:05 )   PT: 11.0 sec;   INR: 0.97 ratio         PTT - ( 22 Sep 2018 09:05 )  PTT:35.7 sec    CAPILLARY BLOOD GLUCOSE      POCT Blood Glucose.: 208 mg/dL (22 Sep 2018 16:43)  POCT Blood Glucose.: 88 mg/dL (22 Sep 2018 11:34)  POCT Blood Glucose.: 116 mg/dL (22 Sep 2018 07:27)  POCT Blood Glucose.: 135 mg/dL (21 Sep 2018 21:21)            MEDICATIONS  (STANDING):  ALBUTerol    90 MICROgram(s) HFA Inhaler 1 Puff(s) Inhalation every 4 hours  ALBUTerol/ipratropium for Nebulization 3 milliLiter(s) Nebulizer every 6 hours  aspirin  chewable 81 milliGRAM(s) Oral daily  buDESOnide 160 MICROgram(s)/formoterol 4.5 MICROgram(s) Inhaler 2 Puff(s) Inhalation two times a day  carvedilol 12.5 milliGRAM(s) Oral every 12 hours  dextrose 5%. 1000 milliLiter(s) (50 mL/Hr) IV Continuous <Continuous>  dextrose 50% Injectable 12.5 Gram(s) IV Push once  dextrose 50% Injectable 25 Gram(s) IV Push once  dextrose 50% Injectable 25 Gram(s) IV Push once  diltiazem    milliGRAM(s) Oral daily  heparin  Infusion. 400 Unit(s)/Hr (4 mL/Hr) IV Continuous <Continuous>  hydrALAZINE 50 milliGRAM(s) Oral three times a day  influenza   Vaccine 0.5 milliLiter(s) IntraMuscular once  insulin glargine Injectable (LANTUS) 18 Unit(s) SubCutaneous at bedtime  insulin lispro (HumaLOG) corrective regimen sliding scale   SubCutaneous three times a day before meals  insulin lispro Injectable (HumaLOG) 8 Unit(s) SubCutaneous three times a day before meals  losartan 100 milliGRAM(s) Oral daily  montelukast 10 milliGRAM(s) Oral at bedtime  pantoprazole    Tablet 40 milliGRAM(s) Oral before breakfast  simvastatin 40 milliGRAM(s) Oral at bedtime  tiotropium 18 MICROgram(s) Capsule 1 Capsule(s) Inhalation daily  topiramate 100 milliGRAM(s) Oral daily    MEDICATIONS  (PRN):  acetaminophen   Tablet .. 650 milliGRAM(s) Oral every 6 hours PRN Mild Pain (1 - 3)  dextrose 40% Gel 15 Gram(s) Oral once PRN Blood Glucose LESS THAN 70 milliGRAM(s)/deciliter  glucagon  Injectable 1 milliGRAM(s) IntraMuscular once PRN Glucose LESS THAN 70 milligrams/deciliter  guaiFENesin   Syrup  (Sugar-Free) 200 milliGRAM(s) Oral every 6 hours PRN Cough  heparin  Injectable 6500 Unit(s) IV Push every 6 hours PRN For aPTT less than 40  heparin  Injectable 3000 Unit(s) IV Push every 6 hours PRN For aPTT between 40 - 57          PHYSICAL EXAM:  GENERAL: NAD, well-groomed, well-developed  HEAD:  Atraumatic, Normocephalic  CHEST/LUNG: Clear to percussion bilaterally; No rales, rhonchi, wheezing, or rubs  HEART: Regular rate and rhythm; No murmurs, rubs, or gallops  ABDOMEN: Soft, Nontender, Nondistended; Bowel sounds present  EXTREMITIES:  2+ Peripheral Pulses, No clubbing, cyanosis, or edema  LYMPH: No lymphadenopathy noted  SKIN: No rashes or lesions    Care Discussed with Consultants/Other Providers [ ] YES  [ ] NO

## 2018-09-22 NOTE — PROGRESS NOTE ADULT - PROBLEM SELECTOR PLAN 1
much better: Her wheezing has resolved: Cont steroids for a few days more  9/15: doing ok : no SOB : finish steroids soon  9/16: Resolved: She has been afebrile: Her WBC is significantly high which is likely due to steroids: Steroids will finish soon" Pt should be optimized by the time she will go for surgery for legs:  9/17: finished steroids: Her WBC count is high but she is afebrile: Would add Symbicort: pt has not been wheezing: And she has been optimized for the said surgery this week:  9/18: resolved: no wheezing: no SOB : pt seems optimized from pulmonary perspective for the said vascular surgery  9/19: pts is much better: no SOB at this time as well as no wheezing  9/20: pt is doing OK : Her leucocytosis is likely due to a course of steroids which she goe earlier: She has been afebrile and leucocytosis is improving anyway: SURGERY CANCELLED DUE TO ? HIGH wbc COUNT  9/21 stable. continue Duoneb, Symbicort, Spiriva  9/22 stable. continue bronchodilators

## 2018-09-22 NOTE — PROGRESS NOTE ADULT - SUBJECTIVE AND OBJECTIVE BOX
Chief complaint  Patient is a 63y old  Female who presents with a chief complaint of sob (22 Sep 2018 10:14)   Review of systems  Patient in bed, looks comfortable, no fever, no hypoglycemia.    Labs and Fingersticks  CAPILLARY BLOOD GLUCOSE      POCT Blood Glucose.: 88 mg/dL (22 Sep 2018 11:34)  POCT Blood Glucose.: 116 mg/dL (22 Sep 2018 07:27)  POCT Blood Glucose.: 135 mg/dL (21 Sep 2018 21:21)  POCT Blood Glucose.: 73 mg/dL (21 Sep 2018 16:16)      Anion Gap, Serum: 10 (09-22 @ 09:36)  Anion Gap, Serum: 9 (09-21 @ 07:15)      Calcium, Total Serum: 9.5 (09-22 @ 09:36)  Calcium, Total Serum: 9.1 (09-21 @ 07:15)          09-22    131<L>  |  101  |  25<H>  ----------------------------<  170<H>  4.9   |  20<L>  |  1.07    Ca    9.5      22 Sep 2018 09:36                          11.8   10.08 )-----------( Clumped    ( 22 Sep 2018 10:16 )             37.0     Medications  MEDICATIONS  (STANDING):  ALBUTerol    90 MICROgram(s) HFA Inhaler 1 Puff(s) Inhalation every 4 hours  ALBUTerol/ipratropium for Nebulization 3 milliLiter(s) Nebulizer every 6 hours  aspirin  chewable 81 milliGRAM(s) Oral daily  buDESOnide 160 MICROgram(s)/formoterol 4.5 MICROgram(s) Inhaler 2 Puff(s) Inhalation two times a day  carvedilol 12.5 milliGRAM(s) Oral every 12 hours  dextrose 5%. 1000 milliLiter(s) (50 mL/Hr) IV Continuous <Continuous>  dextrose 50% Injectable 12.5 Gram(s) IV Push once  dextrose 50% Injectable 25 Gram(s) IV Push once  dextrose 50% Injectable 25 Gram(s) IV Push once  diltiazem    milliGRAM(s) Oral daily  heparin  Infusion. 400 Unit(s)/Hr (4 mL/Hr) IV Continuous <Continuous>  hydrALAZINE 50 milliGRAM(s) Oral three times a day  influenza   Vaccine 0.5 milliLiter(s) IntraMuscular once  insulin glargine Injectable (LANTUS) 18 Unit(s) SubCutaneous at bedtime  insulin lispro (HumaLOG) corrective regimen sliding scale   SubCutaneous three times a day before meals  insulin lispro Injectable (HumaLOG) 8 Unit(s) SubCutaneous three times a day before meals  losartan 100 milliGRAM(s) Oral daily  montelukast 10 milliGRAM(s) Oral at bedtime  pantoprazole    Tablet 40 milliGRAM(s) Oral before breakfast  simvastatin 40 milliGRAM(s) Oral at bedtime  tiotropium 18 MICROgram(s) Capsule 1 Capsule(s) Inhalation daily  topiramate 100 milliGRAM(s) Oral daily      Physical Exam  General: Patient comfortable in bed  Vital Signs Last 12 Hrs  T(F): 97.9 (09-22-18 @ 12:03), Max: 98.7 (09-22-18 @ 04:43)  HR: 68 (09-22-18 @ 14:39) (61 - 78)  BP: 120/76 (09-22-18 @ 14:39) (97/57 - 120/78)  BP(mean): --  RR: 18 (09-22-18 @ 14:39) (18 - 18)  SpO2: 98% (09-22-18 @ 14:39) (98% - 100%)  Neck: No palpable thyroid nodules.  CVS: S1S2, No murmurs  Respiratory: No wheezing, no crepitations  GI: Abdomen soft, bowel sounds positive  Musculoskeletal:  edema lower extremities.   Skin: No skin rashes, no ecchymosis    Diagnostics    Thyroid Stimulating Hormone, Serum: AM Sched. Collection: 15-Sep-2018 06:00 (09-14 @ 12:20)  Free Thyroxine, Serum: AM Sched. Collection: 15-Sep-2018 06:00 (09-14 @ 12:20)  TSH Receptor Antibody: AM Sched. Collection: 15-Sep-2018 06:00 (09-14 @ 12:20)

## 2018-09-22 NOTE — PROVIDER CONTACT NOTE (CRITICAL VALUE NOTIFICATION) - ACTION/TREATMENT ORDERED:
heparin nomogram followed
CHRISTOPHER Jean Baptiste aware and following heparin nomogram recommendation.
Follow heparin nomogram, stop heparin gtt for one hour, then restart at new rate of 9ml/hr.
Follow heparin nomogram.  Bleeding precautions maintained.
NP aware, will continue to follow heparin gtt nomogram

## 2018-09-22 NOTE — PROVIDER CONTACT NOTE (CRITICAL VALUE NOTIFICATION) - BACKGROUND
Pt admitted for COPD exacerbation.  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
Dx SOB/COPD, Femoral artery occlusion. Hx CAD, CVA, HTN, DM2, GERD.
Pt admitted for dyspnea. Pt is on heparin gtt for femoral artery occlusion.
Pt. came in with shortness of breath.  Pt. is on a heparin drip for an occluded stent in the lower extremity

## 2018-09-23 LAB
ANION GAP SERPL CALC-SCNC: 12 MMOL/L — SIGNIFICANT CHANGE UP (ref 5–17)
APTT BLD: 78.3 SEC — HIGH (ref 27.5–37.4)
APTT BLD: 86.5 SEC — HIGH (ref 27.5–37.4)
BASOPHILS # BLD AUTO: 0 K/UL — SIGNIFICANT CHANGE UP (ref 0–0.2)
BASOPHILS NFR BLD AUTO: 0 % — SIGNIFICANT CHANGE UP (ref 0–2)
BUN SERPL-MCNC: 20 MG/DL — SIGNIFICANT CHANGE UP (ref 7–23)
CALCIUM SERPL-MCNC: 9.5 MG/DL — SIGNIFICANT CHANGE UP (ref 8.4–10.5)
CHLORIDE SERPL-SCNC: 105 MMOL/L — SIGNIFICANT CHANGE UP (ref 96–108)
CO2 SERPL-SCNC: 19 MMOL/L — LOW (ref 22–31)
CREAT SERPL-MCNC: 1.1 MG/DL — SIGNIFICANT CHANGE UP (ref 0.5–1.3)
EOSINOPHIL # BLD AUTO: 0.19 K/UL — SIGNIFICANT CHANGE UP (ref 0–0.5)
EOSINOPHIL NFR BLD AUTO: 2 % — SIGNIFICANT CHANGE UP (ref 0–6)
GLUCOSE BLDC GLUCOMTR-MCNC: 129 MG/DL — HIGH (ref 70–99)
GLUCOSE BLDC GLUCOMTR-MCNC: 137 MG/DL — HIGH (ref 70–99)
GLUCOSE BLDC GLUCOMTR-MCNC: 158 MG/DL — HIGH (ref 70–99)
GLUCOSE BLDC GLUCOMTR-MCNC: 174 MG/DL — HIGH (ref 70–99)
GLUCOSE SERPL-MCNC: 123 MG/DL — HIGH (ref 70–99)
HCT VFR BLD CALC: 35.7 % — SIGNIFICANT CHANGE UP (ref 34.5–45)
HGB BLD-MCNC: 11.3 G/DL — LOW (ref 11.5–15.5)
LYMPHOCYTES # BLD AUTO: 2.11 K/UL — SIGNIFICANT CHANGE UP (ref 1–3.3)
LYMPHOCYTES # BLD AUTO: 22 % — SIGNIFICANT CHANGE UP (ref 13–44)
MANUAL SMEAR VERIFICATION: SIGNIFICANT CHANGE UP
MCHC RBC-ENTMCNC: 26.3 PG — LOW (ref 27–34)
MCHC RBC-ENTMCNC: 31.7 GM/DL — LOW (ref 32–36)
MCV RBC AUTO: 83 FL — SIGNIFICANT CHANGE UP (ref 80–100)
MONOCYTES # BLD AUTO: 0.96 K/UL — HIGH (ref 0–0.9)
MONOCYTES NFR BLD AUTO: 10 % — SIGNIFICANT CHANGE UP (ref 2–14)
NEUTROPHILS # BLD AUTO: 6.34 K/UL — SIGNIFICANT CHANGE UP (ref 1.8–7.4)
NEUTROPHILS NFR BLD AUTO: 66 % — SIGNIFICANT CHANGE UP (ref 43–77)
NRBC # BLD: 0 /100 — SIGNIFICANT CHANGE UP (ref 0–0)
PLAT MORPH BLD: NORMAL — SIGNIFICANT CHANGE UP
PLATELET # BLD AUTO: SIGNIFICANT CHANGE UP K/UL (ref 150–400)
POTASSIUM SERPL-MCNC: 4.1 MMOL/L — SIGNIFICANT CHANGE UP (ref 3.5–5.3)
POTASSIUM SERPL-SCNC: 4.1 MMOL/L — SIGNIFICANT CHANGE UP (ref 3.5–5.3)
RBC # BLD: 4.3 M/UL — SIGNIFICANT CHANGE UP (ref 3.8–5.2)
RBC # FLD: 16.8 % — HIGH (ref 10.3–14.5)
RBC BLD AUTO: SIGNIFICANT CHANGE UP
SODIUM SERPL-SCNC: 136 MMOL/L — SIGNIFICANT CHANGE UP (ref 135–145)
WBC # BLD: 9.6 K/UL — SIGNIFICANT CHANGE UP (ref 3.8–10.5)
WBC # FLD AUTO: 9.6 K/UL — SIGNIFICANT CHANGE UP (ref 3.8–10.5)

## 2018-09-23 RX ORDER — OXYCODONE AND ACETAMINOPHEN 5; 325 MG/1; MG/1
1 TABLET ORAL ONCE
Qty: 0 | Refills: 0 | Status: DISCONTINUED | OUTPATIENT
Start: 2018-09-23 | End: 2018-09-23

## 2018-09-23 RX ADMIN — Medication 3 MILLILITER(S): at 23:20

## 2018-09-23 RX ADMIN — Medication 3 MILLILITER(S): at 12:06

## 2018-09-23 RX ADMIN — INSULIN GLARGINE 18 UNIT(S): 100 INJECTION, SOLUTION SUBCUTANEOUS at 22:07

## 2018-09-23 RX ADMIN — CARVEDILOL PHOSPHATE 12.5 MILLIGRAM(S): 80 CAPSULE, EXTENDED RELEASE ORAL at 17:16

## 2018-09-23 RX ADMIN — Medication 50 MILLIGRAM(S): at 22:07

## 2018-09-23 RX ADMIN — BUDESONIDE AND FORMOTEROL FUMARATE DIHYDRATE 2 PUFF(S): 160; 4.5 AEROSOL RESPIRATORY (INHALATION) at 17:16

## 2018-09-23 RX ADMIN — SIMVASTATIN 40 MILLIGRAM(S): 20 TABLET, FILM COATED ORAL at 22:07

## 2018-09-23 RX ADMIN — Medication 8 UNIT(S): at 12:07

## 2018-09-23 RX ADMIN — LOSARTAN POTASSIUM 100 MILLIGRAM(S): 100 TABLET, FILM COATED ORAL at 05:24

## 2018-09-23 RX ADMIN — Medication 100 MILLIGRAM(S): at 12:06

## 2018-09-23 RX ADMIN — Medication 50 MILLIGRAM(S): at 05:24

## 2018-09-23 RX ADMIN — Medication 3 MILLILITER(S): at 17:16

## 2018-09-23 RX ADMIN — OXYCODONE AND ACETAMINOPHEN 1 TABLET(S): 5; 325 TABLET ORAL at 12:11

## 2018-09-23 RX ADMIN — HEPARIN SODIUM 900 UNIT(S)/HR: 5000 INJECTION INTRAVENOUS; SUBCUTANEOUS at 02:01

## 2018-09-23 RX ADMIN — Medication 8 UNIT(S): at 17:17

## 2018-09-23 RX ADMIN — CARVEDILOL PHOSPHATE 12.5 MILLIGRAM(S): 80 CAPSULE, EXTENDED RELEASE ORAL at 05:24

## 2018-09-23 RX ADMIN — PANTOPRAZOLE SODIUM 40 MILLIGRAM(S): 20 TABLET, DELAYED RELEASE ORAL at 05:25

## 2018-09-23 RX ADMIN — Medication 50 MILLIGRAM(S): at 15:20

## 2018-09-23 RX ADMIN — Medication 180 MILLIGRAM(S): at 05:24

## 2018-09-23 RX ADMIN — BUDESONIDE AND FORMOTEROL FUMARATE DIHYDRATE 2 PUFF(S): 160; 4.5 AEROSOL RESPIRATORY (INHALATION) at 05:25

## 2018-09-23 RX ADMIN — Medication 1: at 12:07

## 2018-09-23 RX ADMIN — MONTELUKAST 10 MILLIGRAM(S): 4 TABLET, CHEWABLE ORAL at 22:07

## 2018-09-23 RX ADMIN — Medication 8 UNIT(S): at 08:25

## 2018-09-23 RX ADMIN — OXYCODONE AND ACETAMINOPHEN 1 TABLET(S): 5; 325 TABLET ORAL at 11:09

## 2018-09-23 RX ADMIN — HEPARIN SODIUM 900 UNIT(S)/HR: 5000 INJECTION INTRAVENOUS; SUBCUTANEOUS at 10:00

## 2018-09-23 RX ADMIN — Medication 3 MILLILITER(S): at 05:24

## 2018-09-23 RX ADMIN — Medication 81 MILLIGRAM(S): at 12:06

## 2018-09-23 NOTE — PROGRESS NOTE ADULT - SUBJECTIVE AND OBJECTIVE BOX
Chief complaint  Patient is a 63y old  Female who presents with a chief complaint of sob (23 Sep 2018 13:07)   Review of systems  Patient in bed, looks comfortable, no fever, no hypoglycemia.    Labs and Fingersticks  CAPILLARY BLOOD GLUCOSE      POCT Blood Glucose.: 137 mg/dL (23 Sep 2018 16:43)  POCT Blood Glucose.: 158 mg/dL (23 Sep 2018 11:33)  POCT Blood Glucose.: 129 mg/dL (23 Sep 2018 07:38)  POCT Blood Glucose.: 134 mg/dL (22 Sep 2018 21:27)      Anion Gap, Serum: 12 (09-23 @ 08:33)  Anion Gap, Serum: 10 (09-22 @ 09:36)      Calcium, Total Serum: 9.5 (09-23 @ 08:33)  Calcium, Total Serum: 9.5 (09-22 @ 09:36)          09-23    136  |  105  |  20  ----------------------------<  123<H>  4.1   |  19<L>  |  1.10    Ca    9.5      23 Sep 2018 08:33                          11.3   9.60  )-----------( Clumped    ( 23 Sep 2018 11:05 )             35.7     Medications  MEDICATIONS  (STANDING):  ALBUTerol    90 MICROgram(s) HFA Inhaler 1 Puff(s) Inhalation every 4 hours  ALBUTerol/ipratropium for Nebulization 3 milliLiter(s) Nebulizer every 6 hours  aspirin  chewable 81 milliGRAM(s) Oral daily  buDESOnide 160 MICROgram(s)/formoterol 4.5 MICROgram(s) Inhaler 2 Puff(s) Inhalation two times a day  carvedilol 12.5 milliGRAM(s) Oral every 12 hours  dextrose 5%. 1000 milliLiter(s) (50 mL/Hr) IV Continuous <Continuous>  dextrose 50% Injectable 12.5 Gram(s) IV Push once  dextrose 50% Injectable 25 Gram(s) IV Push once  dextrose 50% Injectable 25 Gram(s) IV Push once  diltiazem    milliGRAM(s) Oral daily  heparin  Infusion. 400 Unit(s)/Hr (4 mL/Hr) IV Continuous <Continuous>  hydrALAZINE 50 milliGRAM(s) Oral three times a day  influenza   Vaccine 0.5 milliLiter(s) IntraMuscular once  insulin glargine Injectable (LANTUS) 18 Unit(s) SubCutaneous at bedtime  insulin lispro (HumaLOG) corrective regimen sliding scale   SubCutaneous three times a day before meals  insulin lispro Injectable (HumaLOG) 8 Unit(s) SubCutaneous three times a day before meals  losartan 100 milliGRAM(s) Oral daily  montelukast 10 milliGRAM(s) Oral at bedtime  pantoprazole    Tablet 40 milliGRAM(s) Oral before breakfast  simvastatin 40 milliGRAM(s) Oral at bedtime  tiotropium 18 MICROgram(s) Capsule 1 Capsule(s) Inhalation daily  topiramate 100 milliGRAM(s) Oral daily      Physical Exam  General: Patient comfortable in bed  Vital Signs Last 12 Hrs  T(F): 98.7 (09-23-18 @ 20:50), Max: 98.7 (09-23-18 @ 20:50)  HR: 68 (09-23-18 @ 20:50) (66 - 74)  BP: 112/55 (09-23-18 @ 20:50) (96/58 - 112/55)  BP(mean): --  RR: 18 (09-23-18 @ 20:50) (16 - 18)  SpO2: 96% (09-23-18 @ 20:50) (96% - 97%)  Neck: No palpable thyroid nodules.  CVS: S1S2, No murmurs  Respiratory: No wheezing, no crepitations  GI: Abdomen soft, bowel sounds positive  Musculoskeletal:  edema lower extremities.   Skin: No skin rashes, no ecchymosis    Diagnostics    Thyroid Stimulating Hormone, Serum: AM Sched. Collection: 15-Sep-2018 06:00 (09-14 @ 12:20)  Free Thyroxine, Serum: AM Sched. Collection: 15-Sep-2018 06:00 (09-14 @ 12:20)  TSH Receptor Antibody: AM Sched. Collection: 15-Sep-2018 06:00 (09-14 @ 12:20)

## 2018-09-23 NOTE — PROGRESS NOTE ADULT - SUBJECTIVE AND OBJECTIVE BOX
Patient is a 63y old  Female who presents with a chief complaint of sob (22 Sep 2018 17:37)      Any change in ROS: doing ok. denies sob while resting, cough, sputum     MEDICATIONS  (STANDING):  ALBUTerol    90 MICROgram(s) HFA Inhaler 1 Puff(s) Inhalation every 4 hours  ALBUTerol/ipratropium for Nebulization 3 milliLiter(s) Nebulizer every 6 hours  aspirin  chewable 81 milliGRAM(s) Oral daily  buDESOnide 160 MICROgram(s)/formoterol 4.5 MICROgram(s) Inhaler 2 Puff(s) Inhalation two times a day  carvedilol 12.5 milliGRAM(s) Oral every 12 hours  dextrose 5%. 1000 milliLiter(s) (50 mL/Hr) IV Continuous <Continuous>  dextrose 50% Injectable 12.5 Gram(s) IV Push once  dextrose 50% Injectable 25 Gram(s) IV Push once  dextrose 50% Injectable 25 Gram(s) IV Push once  diltiazem    milliGRAM(s) Oral daily  heparin  Infusion. 400 Unit(s)/Hr (4 mL/Hr) IV Continuous <Continuous>  hydrALAZINE 50 milliGRAM(s) Oral three times a day  influenza   Vaccine 0.5 milliLiter(s) IntraMuscular once  insulin glargine Injectable (LANTUS) 18 Unit(s) SubCutaneous at bedtime  insulin lispro (HumaLOG) corrective regimen sliding scale   SubCutaneous three times a day before meals  insulin lispro Injectable (HumaLOG) 8 Unit(s) SubCutaneous three times a day before meals  losartan 100 milliGRAM(s) Oral daily  montelukast 10 milliGRAM(s) Oral at bedtime  pantoprazole    Tablet 40 milliGRAM(s) Oral before breakfast  simvastatin 40 milliGRAM(s) Oral at bedtime  tiotropium 18 MICROgram(s) Capsule 1 Capsule(s) Inhalation daily  topiramate 100 milliGRAM(s) Oral daily    MEDICATIONS  (PRN):  acetaminophen   Tablet .. 650 milliGRAM(s) Oral every 6 hours PRN Mild Pain (1 - 3)  dextrose 40% Gel 15 Gram(s) Oral once PRN Blood Glucose LESS THAN 70 milliGRAM(s)/deciliter  glucagon  Injectable 1 milliGRAM(s) IntraMuscular once PRN Glucose LESS THAN 70 milligrams/deciliter  guaiFENesin   Syrup  (Sugar-Free) 200 milliGRAM(s) Oral every 6 hours PRN Cough  heparin  Injectable 6500 Unit(s) IV Push every 6 hours PRN For aPTT less than 40  heparin  Injectable 3000 Unit(s) IV Push every 6 hours PRN For aPTT between 40 - 57    Vital Signs Last 24 Hrs  T(C): 36.9 (23 Sep 2018 05:05), Max: 36.9 (23 Sep 2018 05:05)  T(F): 98.4 (23 Sep 2018 05:05), Max: 98.4 (23 Sep 2018 05:05)  HR: 78 (23 Sep 2018 05:05) (61 - 78)  BP: 129/75 (23 Sep 2018 05:05) (97/57 - 129/75)  BP(mean): --  RR: 16 (23 Sep 2018 05:05) (16 - 18)  SpO2: 98% (23 Sep 2018 05:05) (97% - 99%)    I&O's Summary    22 Sep 2018 07:01  -  23 Sep 2018 07:00  --------------------------------------------------------  IN: 674 mL / OUT: 0 mL / NET: 674 mL      Physical Exam:   GENERAL: The patient comfortable with no apparent distress.   HEENT: Head is normocephalic and atraumatic.    NECK: Supple with no elevated JVP.  LUNGS: Clear to auscultation without wheeze and rhonchi.  HEART: S1 and S2 present without murmur.  ABDOMEN: Soft, nontender, and nondistended. No hepatosplenomegaly is noted.  EXTREMITIES: No edema or calf tenderness.  NEUROLOGIC: Grossly intact.    Labs:                              11.8   10.08 )-----------( Clumped    ( 22 Sep 2018 10:16 )             37.0                         12.3   11.33 )-----------( Clumped    ( 21 Sep 2018 07:54 )             37.7                         11.9   12.75 )-----------( Clumped    ( 20 Sep 2018 07:43 )             37.1     09-22    131<L>  |  101  |  25<H>  ----------------------------<  170<H>  4.9   |  20<L>  |  1.07  09-21    133<L>  |  103  |  19  ----------------------------<  124<H>  4.1   |  21<L>  |  1.09  09-20    136  |  106  |  20  ----------------------------<  110<H>  4.0   |  20<L>  |  1.04    Ca    9.5      22 Sep 2018 09:36      CAPILLARY BLOOD GLUCOSE      POCT Blood Glucose.: 129 mg/dL (23 Sep 2018 07:38)  POCT Blood Glucose.: 134 mg/dL (22 Sep 2018 21:27)  POCT Blood Glucose.: 208 mg/dL (22 Sep 2018 16:43)  POCT Blood Glucose.: 88 mg/dL (22 Sep 2018 11:34)        PT/INR - ( 22 Sep 2018 09:05 )   PT: 11.0 sec;   INR: 0.97 ratio         PTT - ( 23 Sep 2018 01:32 )  PTT:78.3 sec        Studies  Chest X-RAY < from: Xray Chest 1 View AP/PA (09.07.18 @ 15:31) >    EXAM:  XR CHEST AP OR PA 1V                            PROCEDURE DATE:  09/07/2018            INTERPRETATION:  AP chest x-ray at 1529 hours on 7 September.    Clinical information: Moderate chest pain and palpitations.    Comparison: Chest x-ray dated 10 February 2017.    Findings: The heart is enlarged. Pulmonary vascularity appears normal. No   consolidation or pleural effusion is present.    There is a sigmoid scoliosis of the thoracic spine.    Impression: Clear lungs.          < end of copied text >    < from: CT Chest No Cont (09.08.18 @ 15:55) >    EXAM:  CT CHEST                            PROCEDURE DATE:  09/08/2018            INTERPRETATION:  CLINICAL INFORMATION: Dyspnea    COMPARISON: CT chest 9/20/2015    PROCEDURE:   CT of the Chest was performed without intravenous contrast.  [place] coronal reformats were performed.      FINDINGS:    CHEST:     LUNGS AND LARGE AIRWAYS: Patent central airways.  Minimal left basilar   subsegmental atelectasis.  PLEURA: No pleural effusion.  VESSELS: Atherosclerosis.  HEART: Heart size is normal. No pericardial effusion.  MEDIASTINUM AND LANEY: No lymphadenopathy.  CHEST WALL AND LOWER NECK: Pectus excavatum.  VISUALIZED UPPER ABDOMEN: Within normal limits.  BONES: Degenerative change. Scoliotic spine. Pectus deformity.    IMPRESSION: No evidence of pneumonia.          < end of copied text >        < end of copied text >    Venous Dopplers: LE: < from: [facility] Duplex Lower Ext Vein Scan, Bilat (09.10.18 @ 20:59) >    EXAM:  DUPLEX SCAN EXT VEINS LOWER BI                            PROCEDURE DATE:  09/10/2018            INTERPRETATION:  CLINICAL INFORMATION: Right lower extremity swelling and   shortness of breath for 3 months. Peripheral arterial disease.    COMPARISON: CT angiogram with lower extremity runoff from 2/2/2017   revealing an occluded femoral-femoral and right femoral-popliteal bypass.     TECHNIQUE: Duplex sonography of the BILATERAL LOWER extremities with   color and spectral Doppler, with and without compression.      FINDINGS: There is an occluded superficial femoral artery and bypass   graft on the right. The left superficial femoral artery is occluded as   well.    There is normal compressibility of the left and right common femoral,   femoral and popliteal veins. No calf vein thrombosis is detected.    Doppler examination shows normal spontaneous and phasic flow.    IMPRESSION:     No evidence of bilateral lower extremity deep venous thrombosis.    < end of copied text >    CT Abdomen  Others    < from: Transthoracic Echocardiogram (09.14.18 @ 15:09) >  Patient name: DOUG APPLE  YOB: 1955   Age: 62 (F)   MR#: [id number]  Study Date: 9/14/2018  Location: [address] [place], [place] [id number]: Janette Jimenez RDCS  Study quality: Technically fair  Referring Physician: Fiona Rebolledo MD  Blood Pressure: 169/94 mmHg  Height: 157 cm  Weight: 81 kg  BSA: 1.8 m2  ------------------------------------------------------------------------  PROCEDURE: Transthoracic echocardiogram with 2-D, M-Mode  and complete spectral and color flow Doppler. Strain  Imaging.  INDICATION: Atherosclerotic heart disease of native  coronary artery without angina pectoris (I25.10)  ------------------------------------------------------------------------  Dimensions:    Normal Values:  LA:     3.1    2.0 - 4.0 cm  Ao: 2.8    2.0 - 3.8 cm  SEPTUM: 1.5    0.6 - 1.2 cm  PWT:    1.2    0.6 - 1.1 cm  LVIDd:  3.8    3.0 - 5.6 cm  LVIDs:  2.0    1.8 - 4.0 cm  Derived variables:  LVMI: 101 g/m2  RWT: 0.63  Fractional short: 47 %  EF (Teicholtz): 79 %  ------------------------------------------------------------------------  Observations:  Mitral Valve: Thickened mitral valve with normal opening.  Mild-moderate mitral regurgitation.  Aortic Valve/Aorta: Calcified trileaflet aortic valve with  normal opening. Mild aortic regurgitation.  Aortic Root: 2.8 cm.  Left Atrium: Normal left atrium.  LA volume index = 28  cc/m2.  Left Ventricle: Hyperdynamic left ventricular systolic  function. The upper septum measures 1.6 cm with mild LVOT  obstruction 45mmHG.  Increased relative wall thickness with  normal left ventricular mass index, consistent with  concentric left ventricular remodeling.  Right Heart: Normal right atrium. Normal right ventricular  size and function. Normal tricuspid valve. Mild tricuspid  regurgitation. Normal pulmonic valve.  Pericardium/Pleura: Normal pericardium with no pericardial  effusion.  Hemodynamic: Estimated right atrial pressure is 8 mm Hg.  ------------------------------------------------------------------------  Conclusions:  1. Calcified trileaflet aortic valve with normal opening.  Mild aortic regurgitation.  2. Increased relative wall thickness with normal left  ventricular mass index, consistent with concentric left  ventricular remodeling.  3. Hyperdynamic left ventricular systolic function. The  upper septum measures 1.6 cm with mild LVOT obstruction  45mmHG.  4. Strain imaging was performed on the [facility] EPIQ with an  average HR of 55 bpm and a BP of 169/94. Global L. Strain=  -25.7  5. Normal right ventricular size and function.  *** No previous Echo exam.  ----------------------------------------------    < end of copied text >

## 2018-09-23 NOTE — PROGRESS NOTE ADULT - SUBJECTIVE AND OBJECTIVE BOX
- Patient seen and examined.  - In summary, patient is a 62 year old woman who presented with sob (13 Sep 2018 20:06)  - Today, patient is without complaints.         *****MEDICATIONS:    MEDICATIONS  (STANDING):  ALBUTerol    90 MICROgram(s) HFA Inhaler 1 Puff(s) Inhalation every 4 hours  ALBUTerol/ipratropium for Nebulization 3 milliLiter(s) Nebulizer every 6 hours  aspirin  chewable 81 milliGRAM(s) Oral daily  buDESOnide 160 MICROgram(s)/formoterol 4.5 MICROgram(s) Inhaler 2 Puff(s) Inhalation two times a day  carvedilol 12.5 milliGRAM(s) Oral every 12 hours  dextrose 5%. 1000 milliLiter(s) (50 mL/Hr) IV Continuous <Continuous>  dextrose 50% Injectable 12.5 Gram(s) IV Push once  dextrose 50% Injectable 25 Gram(s) IV Push once  dextrose 50% Injectable 25 Gram(s) IV Push once  diltiazem    milliGRAM(s) Oral daily  heparin  Infusion. 400 Unit(s)/Hr (4 mL/Hr) IV Continuous <Continuous>  hydrALAZINE 50 milliGRAM(s) Oral three times a day  influenza   Vaccine 0.5 milliLiter(s) IntraMuscular once  insulin glargine Injectable (LANTUS) 18 Unit(s) SubCutaneous at bedtime  insulin lispro (HumaLOG) corrective regimen sliding scale   SubCutaneous three times a day before meals  insulin lispro Injectable (HumaLOG) 8 Unit(s) SubCutaneous three times a day before meals  losartan 100 milliGRAM(s) Oral daily  montelukast 10 milliGRAM(s) Oral at bedtime  pantoprazole    Tablet 40 milliGRAM(s) Oral before breakfast  simvastatin 40 milliGRAM(s) Oral at bedtime  tiotropium 18 MICROgram(s) Capsule 1 Capsule(s) Inhalation daily  topiramate 100 milliGRAM(s) Oral daily    MEDICATIONS  (PRN):  acetaminophen   Tablet .. 650 milliGRAM(s) Oral every 6 hours PRN Mild Pain (1 - 3)  dextrose 40% Gel 15 Gram(s) Oral once PRN Blood Glucose LESS THAN 70 milliGRAM(s)/deciliter  glucagon  Injectable 1 milliGRAM(s) IntraMuscular once PRN Glucose LESS THAN 70 milligrams/deciliter  guaiFENesin   Syrup  (Sugar-Free) 200 milliGRAM(s) Oral every 6 hours PRN Cough  heparin  Injectable 6500 Unit(s) IV Push every 6 hours PRN For aPTT less than 40  heparin  Injectable 3000 Unit(s) IV Push every 6 hours PRN For aPTT between 40 - 57                 ***** REVIEW OF SYSTEM:  GEN: no fever, no chills, no pain  RESP: no SOB, no cough, no sputum  CVS: no chest pain, no palpitations, no edema  GI: no abdominal pain, no nausea, no vomiting, no constipation, no diarrhea  : no dysuria, no frequency  NEURO: no headache, no dizziness  PSYCH: no depression, not anxious  Derm : no itching, no rash         ***** VITAL SIGNS:    T(F): 98.4 (18 @ 05:05), Max: 98.4 (18 @ 05:05)  HR: 78 (18 @ 05:05) (61 - 78)  BP: 129/75 (18 @ 05:05) (97/57 - 129/75)  RR: 16 (18 @ 05:05) (16 - 18)  SpO2: 98% (18 @ 05:05) (97% - 99%)  Wt(kg): --  ,   I&O's Summary    22 Sep 2018 07:  -  23 Sep 2018 07:00  --------------------------------------------------------  IN: 674 mL / OUT: 0 mL / NET: 674 mL    23 Sep 2018 07:  -  23 Sep 2018 10:38  --------------------------------------------------------  IN: 240 mL / OUT: 0 mL / NET: 240 mL                   *****PHYSICAL EXAM:  GEN: A&O X 3 , NAD , comfortable  HEENT: NCAT, EOMI, MMM, no icterus  NECK: Supple, No JVD  CVS: S1S2 , regular , No M/R/G appreciated  PULM: CTA B/L,  no W/R/R appreciated  ABD.: soft. non tender, non distended,  bowel sounds present  Extrem: intact pulses , no edema noted  Derm: No rash or ecchymosis noted  PSYCH: normal mood, no depression, not anxious         *****LAB AND IMAGIN.8   10.08 )-----------( Clumped    ( 22 Sep 2018 10:16 )             37.0               -    136  |  105  |  20  ----------------------------<  123<H>  4.1   |  19<L>  |  1.10    Ca    9.5      23 Sep 2018 08:33      PT/INR - ( 22 Sep 2018 09:05 )   PT: 11.0 sec;   INR: 0.97 ratio         PTT - ( 23 Sep 2018 08:33 )  PTT:86.5 sec         [All pertinent recent Imaging/Reports reviewed]  < from: Nuclear Stress Test-Pharmacologic (18 @ 14:02) >  * The left ventricle was normal in size. There is a small  mild reversible defect in the distal inferolateral wall  consistentwith mild ischemia.    < end of copied text >    < from: Transthoracic Echocardiogram (18 @ 15:09) >  Conclusions:  1. Calcified trileaflet aortic valve with normal opening.  Mild aortic regurgitation.  2. Increased relative wall thickness with normal left  ventricular mass index, consistent with concentric left  ventricular remodeling.  3. Hyperdynamic left ventricular systolic function. The  upper septum measures 1.6 cm with mild LVOT obstruction  45mmHG.  4. Strain imaging was performed on the [facility] EPIQ with an  average HR of 55 bpm and a BP of 169/94. Global L. Strain=  -25.7  5. Normal right ventricular size and function.    < end of copied text >         *****A S S E S S M E N T   A N D   P L A N :  62F with PAD, CAD, COPD adm with dyspnea  tx COPD per pulm  non obstructive CAD on cath   stress test without significant ischemia  breast pain r/w massage, unlikely angina  no cardiac symptoms  severe PAD on CTA  echo noted  would proceed with surgery at moderate risk  vasc f/u       __________________________  JACK Alegre D.O.

## 2018-09-23 NOTE — PROGRESS NOTE ADULT - ASSESSMENT
Dyspnea on exertion.  Plan: Jessie COPD exacerbation  duonebs  cw steroids as per pulm  antibiotics as per ID   pulmonary fu    CAD (coronary artery disease).  Plan: cw home meds   cards consult appreciated  cards cleared for vascular procedure     Diabetes.  Plan: monitor FS  ISS.   management as per endocrine    Severe PVD Plan doppler reviewed  cw hep gtt  CT reviewed  vascular fu  bypass and angio planned for next week    Left arm iv infiltration with contrast  Vascular following  wound care and ID following

## 2018-09-23 NOTE — PROGRESS NOTE ADULT - SUBJECTIVE AND OBJECTIVE BOX
Patient is a 63y old  Female who presents with a chief complaint of sob (23 Sep 2018 10:38)      INTERVAL HPI/OVERNIGHT EVENTS:  T(C): 36.9 (09-23-18 @ 11:34), Max: 36.9 (09-23-18 @ 05:05)  HR: 66 (09-23-18 @ 11:34) (66 - 78)  BP: 96/58 (09-23-18 @ 11:34) (96/58 - 129/75)  RR: 18 (09-23-18 @ 11:34) (16 - 18)  SpO2: 97% (09-23-18 @ 11:34) (97% - 98%)  Wt(kg): --  I&O's Summary    22 Sep 2018 07:01  -  23 Sep 2018 07:00  --------------------------------------------------------  IN: 674 mL / OUT: 0 mL / NET: 674 mL    23 Sep 2018 07:01  -  23 Sep 2018 13:07  --------------------------------------------------------  IN: 240 mL / OUT: 0 mL / NET: 240 mL        LABS:                        11.8   10.08 )-----------( Clumped    ( 22 Sep 2018 10:16 )             37.0     09-23    136  |  105  |  20  ----------------------------<  123<H>  4.1   |  19<L>  |  1.10    Ca    9.5      23 Sep 2018 08:33      PT/INR - ( 22 Sep 2018 09:05 )   PT: 11.0 sec;   INR: 0.97 ratio         PTT - ( 23 Sep 2018 08:33 )  PTT:86.5 sec    CAPILLARY BLOOD GLUCOSE      POCT Blood Glucose.: 158 mg/dL (23 Sep 2018 11:33)  POCT Blood Glucose.: 129 mg/dL (23 Sep 2018 07:38)  POCT Blood Glucose.: 134 mg/dL (22 Sep 2018 21:27)  POCT Blood Glucose.: 208 mg/dL (22 Sep 2018 16:43)            MEDICATIONS  (STANDING):  ALBUTerol    90 MICROgram(s) HFA Inhaler 1 Puff(s) Inhalation every 4 hours  ALBUTerol/ipratropium for Nebulization 3 milliLiter(s) Nebulizer every 6 hours  aspirin  chewable 81 milliGRAM(s) Oral daily  buDESOnide 160 MICROgram(s)/formoterol 4.5 MICROgram(s) Inhaler 2 Puff(s) Inhalation two times a day  carvedilol 12.5 milliGRAM(s) Oral every 12 hours  dextrose 5%. 1000 milliLiter(s) (50 mL/Hr) IV Continuous <Continuous>  dextrose 50% Injectable 12.5 Gram(s) IV Push once  dextrose 50% Injectable 25 Gram(s) IV Push once  dextrose 50% Injectable 25 Gram(s) IV Push once  diltiazem    milliGRAM(s) Oral daily  heparin  Infusion. 400 Unit(s)/Hr (4 mL/Hr) IV Continuous <Continuous>  hydrALAZINE 50 milliGRAM(s) Oral three times a day  influenza   Vaccine 0.5 milliLiter(s) IntraMuscular once  insulin glargine Injectable (LANTUS) 18 Unit(s) SubCutaneous at bedtime  insulin lispro (HumaLOG) corrective regimen sliding scale   SubCutaneous three times a day before meals  insulin lispro Injectable (HumaLOG) 8 Unit(s) SubCutaneous three times a day before meals  losartan 100 milliGRAM(s) Oral daily  montelukast 10 milliGRAM(s) Oral at bedtime  pantoprazole    Tablet 40 milliGRAM(s) Oral before breakfast  simvastatin 40 milliGRAM(s) Oral at bedtime  tiotropium 18 MICROgram(s) Capsule 1 Capsule(s) Inhalation daily  topiramate 100 milliGRAM(s) Oral daily    MEDICATIONS  (PRN):  acetaminophen   Tablet .. 650 milliGRAM(s) Oral every 6 hours PRN Mild Pain (1 - 3)  dextrose 40% Gel 15 Gram(s) Oral once PRN Blood Glucose LESS THAN 70 milliGRAM(s)/deciliter  glucagon  Injectable 1 milliGRAM(s) IntraMuscular once PRN Glucose LESS THAN 70 milligrams/deciliter  guaiFENesin   Syrup  (Sugar-Free) 200 milliGRAM(s) Oral every 6 hours PRN Cough  heparin  Injectable 6500 Unit(s) IV Push every 6 hours PRN For aPTT less than 40  heparin  Injectable 3000 Unit(s) IV Push every 6 hours PRN For aPTT between 40 - 57          PHYSICAL EXAM:  GENERAL: NAD, well-groomed, well-developed  HEAD:  Atraumatic, Normocephalic  CHEST/LUNG: Clear to percussion bilaterally; No rales, rhonchi, wheezing, or rubs  HEART: Regular rate and rhythm; No murmurs, rubs, or gallops  ABDOMEN: Soft, Nontender, Nondistended; Bowel sounds present  EXTREMITIES:  2+ Peripheral Pulses, No clubbing, cyanosis, or edema  LYMPH: No lymphadenopathy noted  SKIN: No rashes or lesions    Care Discussed with Consultants/Other Providers [ ] YES  [ ] NO

## 2018-09-23 NOTE — PROGRESS NOTE ADULT - PROBLEM SELECTOR PLAN 1
much better: Her wheezing has resolved: Cont steroids for a few days more  9/15: doing ok : no SOB : finish steroids soon  9/16: Resolved: She has been afebrile: Her WBC is significantly high which is likely due to steroids: Steroids will finish soon" Pt should be optimized by the time she will go for surgery for legs:  9/17: finished steroids: Her WBC count is high but she is afebrile: Would add Symbicort: pt has not been wheezing: And she has been optimized for the said surgery this week:  9/18: resolved: no wheezing: no SOB : pt seems optimized from pulmonary perspective for the said vascular surgery  9/19: pts is much better: no SOB at this time as well as no wheezing  9/20: pt is doing OK : Her leucocytosis is likely due to a course of steroids which she goe earlier: She has been afebrile and leucocytosis is improving anyway: SURGERY CANCELLED DUE TO ? HIGH wbc COUNT  9/21 stable. continue Duoneb, Symbicort, Spiriva  9/22 stable. continue bronchodilators  9/23 stable with current management

## 2018-09-24 LAB
ANION GAP SERPL CALC-SCNC: 7 MMOL/L — SIGNIFICANT CHANGE UP (ref 5–17)
APTT BLD: 67.4 SEC — HIGH (ref 27.5–37.4)
BUN SERPL-MCNC: 22 MG/DL — SIGNIFICANT CHANGE UP (ref 7–23)
CALCIUM SERPL-MCNC: 9.8 MG/DL — SIGNIFICANT CHANGE UP (ref 8.4–10.5)
CHLORIDE SERPL-SCNC: 103 MMOL/L — SIGNIFICANT CHANGE UP (ref 96–108)
CO2 SERPL-SCNC: 21 MMOL/L — LOW (ref 22–31)
CREAT SERPL-MCNC: 1.14 MG/DL — SIGNIFICANT CHANGE UP (ref 0.5–1.3)
GLUCOSE BLDC GLUCOMTR-MCNC: 105 MG/DL — HIGH (ref 70–99)
GLUCOSE BLDC GLUCOMTR-MCNC: 123 MG/DL — HIGH (ref 70–99)
GLUCOSE BLDC GLUCOMTR-MCNC: 131 MG/DL — HIGH (ref 70–99)
GLUCOSE BLDC GLUCOMTR-MCNC: 146 MG/DL — HIGH (ref 70–99)
GLUCOSE BLDC GLUCOMTR-MCNC: 64 MG/DL — LOW (ref 70–99)
GLUCOSE BLDC GLUCOMTR-MCNC: 65 MG/DL — LOW (ref 70–99)
GLUCOSE BLDC GLUCOMTR-MCNC: 68 MG/DL — LOW (ref 70–99)
GLUCOSE BLDC GLUCOMTR-MCNC: 84 MG/DL — SIGNIFICANT CHANGE UP (ref 70–99)
GLUCOSE SERPL-MCNC: 112 MG/DL — HIGH (ref 70–99)
HCT VFR BLD CALC: 34.6 % — SIGNIFICANT CHANGE UP (ref 34.5–45)
HGB BLD-MCNC: 10.8 G/DL — LOW (ref 11.5–15.5)
INR BLD: 1.08 RATIO — SIGNIFICANT CHANGE UP (ref 0.88–1.16)
MCHC RBC-ENTMCNC: 26 PG — LOW (ref 27–34)
MCHC RBC-ENTMCNC: 31.2 GM/DL — LOW (ref 32–36)
MCV RBC AUTO: 83.2 FL — SIGNIFICANT CHANGE UP (ref 80–100)
PLATELET # BLD AUTO: SIGNIFICANT CHANGE UP K/UL (ref 150–400)
POTASSIUM SERPL-MCNC: 3.9 MMOL/L — SIGNIFICANT CHANGE UP (ref 3.5–5.3)
POTASSIUM SERPL-SCNC: 3.9 MMOL/L — SIGNIFICANT CHANGE UP (ref 3.5–5.3)
PROTHROM AB SERPL-ACNC: 12.2 SEC — SIGNIFICANT CHANGE UP (ref 10–13.1)
RBC # BLD: 4.16 M/UL — SIGNIFICANT CHANGE UP (ref 3.8–5.2)
RBC # FLD: 16.7 % — HIGH (ref 10.3–14.5)
SODIUM SERPL-SCNC: 131 MMOL/L — LOW (ref 135–145)
WBC # BLD: 9.19 K/UL — SIGNIFICANT CHANGE UP (ref 3.8–10.5)
WBC # FLD AUTO: 9.19 K/UL — SIGNIFICANT CHANGE UP (ref 3.8–10.5)

## 2018-09-24 RX ADMIN — INSULIN GLARGINE 18 UNIT(S): 100 INJECTION, SOLUTION SUBCUTANEOUS at 21:27

## 2018-09-24 RX ADMIN — Medication 50 MILLIGRAM(S): at 15:01

## 2018-09-24 RX ADMIN — Medication 8 UNIT(S): at 08:27

## 2018-09-24 RX ADMIN — CARVEDILOL PHOSPHATE 12.5 MILLIGRAM(S): 80 CAPSULE, EXTENDED RELEASE ORAL at 05:28

## 2018-09-24 RX ADMIN — Medication 50 MILLIGRAM(S): at 21:27

## 2018-09-24 RX ADMIN — Medication 180 MILLIGRAM(S): at 05:28

## 2018-09-24 RX ADMIN — CARVEDILOL PHOSPHATE 12.5 MILLIGRAM(S): 80 CAPSULE, EXTENDED RELEASE ORAL at 17:24

## 2018-09-24 RX ADMIN — Medication 3 MILLILITER(S): at 05:29

## 2018-09-24 RX ADMIN — SIMVASTATIN 40 MILLIGRAM(S): 20 TABLET, FILM COATED ORAL at 21:28

## 2018-09-24 RX ADMIN — Medication 3 MILLILITER(S): at 23:32

## 2018-09-24 RX ADMIN — Medication 81 MILLIGRAM(S): at 12:48

## 2018-09-24 RX ADMIN — Medication 3 MILLILITER(S): at 12:48

## 2018-09-24 RX ADMIN — MONTELUKAST 10 MILLIGRAM(S): 4 TABLET, CHEWABLE ORAL at 21:28

## 2018-09-24 RX ADMIN — Medication 3 MILLILITER(S): at 17:24

## 2018-09-24 RX ADMIN — Medication 8 UNIT(S): at 12:46

## 2018-09-24 RX ADMIN — BUDESONIDE AND FORMOTEROL FUMARATE DIHYDRATE 2 PUFF(S): 160; 4.5 AEROSOL RESPIRATORY (INHALATION) at 05:28

## 2018-09-24 RX ADMIN — HEPARIN SODIUM 900 UNIT(S)/HR: 5000 INJECTION INTRAVENOUS; SUBCUTANEOUS at 10:35

## 2018-09-24 RX ADMIN — LOSARTAN POTASSIUM 100 MILLIGRAM(S): 100 TABLET, FILM COATED ORAL at 05:28

## 2018-09-24 RX ADMIN — PANTOPRAZOLE SODIUM 40 MILLIGRAM(S): 20 TABLET, DELAYED RELEASE ORAL at 05:29

## 2018-09-24 RX ADMIN — Medication 650 MILLIGRAM(S): at 08:32

## 2018-09-24 RX ADMIN — Medication 650 MILLIGRAM(S): at 09:56

## 2018-09-24 RX ADMIN — Medication 100 MILLIGRAM(S): at 15:01

## 2018-09-24 RX ADMIN — Medication 50 MILLIGRAM(S): at 05:28

## 2018-09-24 RX ADMIN — BUDESONIDE AND FORMOTEROL FUMARATE DIHYDRATE 2 PUFF(S): 160; 4.5 AEROSOL RESPIRATORY (INHALATION) at 17:24

## 2018-09-24 NOTE — PROVIDER CONTACT NOTE (OTHER) - ACTION/TREATMENT ORDERED:
FRANCISCO Riley aware, ordered to follow current nomogram & dose of Heparin 3000(units) bolus IV push held as per PA. Will continue to monitor and maintain safety.
NP aware, give 6500 unit heparin bolus as ordered and follow nomogram instructions. Will continue to monitor
12 lead EKG ordered, trops/ electrolytes sent, NP came to assess patient
Blood sugar increase to 131 with apple juice, cookies, and dinner. Ordered to continue to monitor per provider.
Continue to monitor pt closely.
FRANCISCO Thompson assessed pt at bedside. EKG done. stat labs ordered. patient felt better with pain.  will continue to monitor pt.
Mag & Phos x 1 to be added to AM lab.
advised to repeat PTT test  and wait for the result
ekg done

## 2018-09-24 NOTE — CHART NOTE - NSCHARTNOTEFT_GEN_A_CORE
Patient is a 62y old f with chronic RLE arterial insufficiency with rest pain and claudication, with multiple previous bypasses.  RLE bypass scheduled for 9/17 but cancelled due to leukocytosis. Leukocytosis has improved s/p azithromycin course (also was likely secondary to steroids for COPD exacerbation). CTA (9/12) shows occluded R external iliac artery and common femoral artery. Widely patent profunda femoral artery via a femoral-femoral bypass graft. Occlusion of the femoral artery and lower extremity bypass graft with reconstitution of the popliteal artery with three-vessel runoff although the tibioperoneal trunk has moderate focal stenosis. LLE with femoral artery occluded with reconstitution of the distal femoral artery. Three-vessel runoff. Patient may follow up with Dr. Dasilva as an outpatient for planning of angiogram and bypass. Plan discussed with Dr. Dasilva and Dr. Moran (second opinion vascular surgeon) who agrees with plan. Please page with any questions.     [place] [phone]

## 2018-09-24 NOTE — PROGRESS NOTE ADULT - ASSESSMENT
Dyspnea on exertion.  Plan: Jessie COPD exacerbation  duonebs  cw steroids as per pulm  antibiotics as per ID   pulmonary fu    CAD (coronary artery disease).  Plan: cw home meds   cards consult appreciated  cards cleared for vascular procedure     Diabetes.  Plan: monitor FS  ISS.   management as per endocrine    Severe PVD Plan doppler reviewed  cw hep gtt  CT reviewed  vascular fu  bypass and angio planned for this thursday    Left arm iv infiltration with contrast  Vascular following  wound care and ID following

## 2018-09-24 NOTE — PROGRESS NOTE ADULT - SUBJECTIVE AND OBJECTIVE BOX
Chief complaint  Patient is a 63y old  Female who presents with a chief complaint of sob (24 Sep 2018 11:14)   Review of systems  Patient in bed, looks comfortable, no fever, no hypoglycemia.    Labs and Fingersticks  CAPILLARY BLOOD GLUCOSE      POCT Blood Glucose.: 123 mg/dL (24 Sep 2018 08:05)  POCT Blood Glucose.: 174 mg/dL (23 Sep 2018 21:27)  POCT Blood Glucose.: 137 mg/dL (23 Sep 2018 16:43)      Anion Gap, Serum: 7 (09-24 @ 06:55)  Anion Gap, Serum: 12 (09-23 @ 08:33)      Calcium, Total Serum: 9.8 (09-24 @ 06:55)  Calcium, Total Serum: 9.5 (09-23 @ 08:33)          09-24    131<L>  |  103  |  22  ----------------------------<  112<H>  3.9   |  21<L>  |  1.14    Ca    9.8      24 Sep 2018 06:55                          10.8   9.19  )-----------( Clumped    ( 24 Sep 2018 09:21 )             34.6     Medications  MEDICATIONS  (STANDING):  ALBUTerol    90 MICROgram(s) HFA Inhaler 1 Puff(s) Inhalation every 4 hours  ALBUTerol/ipratropium for Nebulization 3 milliLiter(s) Nebulizer every 6 hours  aspirin  chewable 81 milliGRAM(s) Oral daily  buDESOnide 160 MICROgram(s)/formoterol 4.5 MICROgram(s) Inhaler 2 Puff(s) Inhalation two times a day  carvedilol 12.5 milliGRAM(s) Oral every 12 hours  dextrose 5%. 1000 milliLiter(s) (50 mL/Hr) IV Continuous <Continuous>  dextrose 50% Injectable 12.5 Gram(s) IV Push once  dextrose 50% Injectable 25 Gram(s) IV Push once  dextrose 50% Injectable 25 Gram(s) IV Push once  diltiazem    milliGRAM(s) Oral daily  heparin  Infusion. 400 Unit(s)/Hr (4 mL/Hr) IV Continuous <Continuous>  hydrALAZINE 50 milliGRAM(s) Oral three times a day  influenza   Vaccine 0.5 milliLiter(s) IntraMuscular once  insulin glargine Injectable (LANTUS) 18 Unit(s) SubCutaneous at bedtime  insulin lispro (HumaLOG) corrective regimen sliding scale   SubCutaneous three times a day before meals  insulin lispro Injectable (HumaLOG) 8 Unit(s) SubCutaneous three times a day before meals  losartan 100 milliGRAM(s) Oral daily  montelukast 10 milliGRAM(s) Oral at bedtime  pantoprazole    Tablet 40 milliGRAM(s) Oral before breakfast  simvastatin 40 milliGRAM(s) Oral at bedtime  tiotropium 18 MICROgram(s) Capsule 1 Capsule(s) Inhalation daily  topiramate 100 milliGRAM(s) Oral daily      Physical Exam  General: Patient comfortable in bed  Vital Signs Last 12 Hrs  T(F): 97.6 (09-24-18 @ 04:12), Max: 97.6 (09-24-18 @ 04:12)  HR: 61 (09-24-18 @ 04:12) (61 - 61)  BP: 146/70 (09-24-18 @ 04:12) (146/70 - 146/70)  BP(mean): --  RR: 18 (09-24-18 @ 04:12) (18 - 18)  SpO2: 95% (09-24-18 @ 04:12) (95% - 95%)  Neck: No palpable thyroid nodules.  CVS: S1S2, No murmurs  Respiratory: No wheezing, no crepitations  GI: Abdomen soft, bowel sounds positive  Musculoskeletal:  edema lower extremities.   Skin: No skin rashes, no ecchymosis    Diagnostics    Thyroid Stimulating Hormone, Serum: AM Sched. Collection: 15-Sep-2018 06:00 (09-14 @ 12:20)  Free Thyroxine, Serum: AM Sched. Collection: 15-Sep-2018 06:00 (09-14 @ 12:20)  TSH Receptor Antibody: AM Sched. Collection: 15-Sep-2018 06:00 (09-14 @ 12:20)

## 2018-09-24 NOTE — PROGRESS NOTE ADULT - SUBJECTIVE AND OBJECTIVE BOX
Patient is a 63y old  Female who presents with a chief complaint of sob (24 Sep 2018 11:53)      INTERVAL HPI/OVERNIGHT EVENTS:  T(C): 36.6 (09-24-18 @ 12:20), Max: 37.1 (09-23-18 @ 20:50)  HR: 68 (09-24-18 @ 12:20) (61 - 74)  BP: 106/58 (09-24-18 @ 12:20) (102/58 - 146/70)  RR: 18 (09-24-18 @ 12:20) (18 - 18)  SpO2: 98% (09-24-18 @ 12:20) (95% - 98%)  Wt(kg): --  I&O's Summary    23 Sep 2018 07:01  -  24 Sep 2018 07:00  --------------------------------------------------------  IN: 696 mL / OUT: 200 mL / NET: 496 mL        LABS:                        10.8   9.19  )-----------( Clumped    ( 24 Sep 2018 09:21 )             34.6     09-24    131<L>  |  103  |  22  ----------------------------<  112<H>  3.9   |  21<L>  |  1.14    Ca    9.8      24 Sep 2018 06:55      PT/INR - ( 24 Sep 2018 09:29 )   PT: 12.2 sec;   INR: 1.08 ratio         PTT - ( 24 Sep 2018 09:29 )  PTT:67.4 sec    CAPILLARY BLOOD GLUCOSE      POCT Blood Glucose.: 105 mg/dL (24 Sep 2018 12:06)  POCT Blood Glucose.: 123 mg/dL (24 Sep 2018 08:05)  POCT Blood Glucose.: 174 mg/dL (23 Sep 2018 21:27)  POCT Blood Glucose.: 137 mg/dL (23 Sep 2018 16:43)            MEDICATIONS  (STANDING):  ALBUTerol    90 MICROgram(s) HFA Inhaler 1 Puff(s) Inhalation every 4 hours  ALBUTerol/ipratropium for Nebulization 3 milliLiter(s) Nebulizer every 6 hours  aspirin  chewable 81 milliGRAM(s) Oral daily  buDESOnide 160 MICROgram(s)/formoterol 4.5 MICROgram(s) Inhaler 2 Puff(s) Inhalation two times a day  carvedilol 12.5 milliGRAM(s) Oral every 12 hours  dextrose 5%. 1000 milliLiter(s) (50 mL/Hr) IV Continuous <Continuous>  dextrose 50% Injectable 12.5 Gram(s) IV Push once  dextrose 50% Injectable 25 Gram(s) IV Push once  dextrose 50% Injectable 25 Gram(s) IV Push once  diltiazem    milliGRAM(s) Oral daily  heparin  Infusion. 400 Unit(s)/Hr (4 mL/Hr) IV Continuous <Continuous>  hydrALAZINE 50 milliGRAM(s) Oral three times a day  influenza   Vaccine 0.5 milliLiter(s) IntraMuscular once  insulin glargine Injectable (LANTUS) 18 Unit(s) SubCutaneous at bedtime  insulin lispro (HumaLOG) corrective regimen sliding scale   SubCutaneous three times a day before meals  insulin lispro Injectable (HumaLOG) 8 Unit(s) SubCutaneous three times a day before meals  losartan 100 milliGRAM(s) Oral daily  montelukast 10 milliGRAM(s) Oral at bedtime  pantoprazole    Tablet 40 milliGRAM(s) Oral before breakfast  simvastatin 40 milliGRAM(s) Oral at bedtime  tiotropium 18 MICROgram(s) Capsule 1 Capsule(s) Inhalation daily  topiramate 100 milliGRAM(s) Oral daily    MEDICATIONS  (PRN):  acetaminophen   Tablet .. 650 milliGRAM(s) Oral every 6 hours PRN Mild Pain (1 - 3)  dextrose 40% Gel 15 Gram(s) Oral once PRN Blood Glucose LESS THAN 70 milliGRAM(s)/deciliter  glucagon  Injectable 1 milliGRAM(s) IntraMuscular once PRN Glucose LESS THAN 70 milligrams/deciliter  guaiFENesin   Syrup  (Sugar-Free) 200 milliGRAM(s) Oral every 6 hours PRN Cough  heparin  Injectable 6500 Unit(s) IV Push every 6 hours PRN For aPTT less than 40  heparin  Injectable 3000 Unit(s) IV Push every 6 hours PRN For aPTT between 40 - 57          PHYSICAL EXAM:  GENERAL: NAD, well-groomed, well-developed  HEAD:  Atraumatic, Normocephalic  CHEST/LUNG: Clear to percussion bilaterally; No rales, rhonchi, wheezing, or rubs  HEART: Regular rate and rhythm; No murmurs, rubs, or gallops  ABDOMEN: Soft, Nontender, Nondistended; Bowel sounds present  EXTREMITIES:  2+ Peripheral Pulses, No clubbing, cyanosis, or edema  LYMPH: No lymphadenopathy noted  SKIN: No rashes or lesions    Care Discussed with Consultants/Other Providers [ ] YES  [ ] NO

## 2018-09-24 NOTE — PROGRESS NOTE ADULT - ASSESSMENT
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 She started feeling short of breath about 3 weeks ago with 1-2 weeks of productive cough with clear sputum. Over the past week she endorses some wheeze and she has been using her inhaler 3 times in the morning and once at night daily. She no longer has medication for her nebulizer. She can walk <1 block as she is limited by shortness of breath and by LE claudication symptoms. She also has chest pain that is in the L breast, not pleuritic, feels that it is in the breast tissue and improves when she squeezes it. Denies fevers/chills, LE swelling, N/V, diaphoresis, diarrhea, constipation, abdominal pain, dizziness. Walks with a walker. Current daily smoker just 1-2 cigarettes daily (07 Sep 2018 17:38)    Problem/Plan - 1:    ·	Cough    - Pt being treated for COPD exacerbation.    CT chest does not show pneumonia.  s/p course of azithromycin x 5 days      -  Pt with significant leukocytosis likely secondary to steroid effect, continues to improve and has now normalized         * Pt undergoing vascular w/u of rt foot 2nd digit cyanosis.  s/p xray, without periosteal changes or OM.  CT angio shows femoral artery occlusion with distal artery reconstitution.    No outward signs of toe infection.  Planned for bypass as per Vasc - outpatient f/u and scheduling of angiogram is being recommended.    No acute ID issues at this time, no further w/u needed.        [facility]  [phone]

## 2018-09-24 NOTE — PROGRESS NOTE ADULT - SUBJECTIVE AND OBJECTIVE BOX
Infectious Diseases progress note:    Subjective: No acute o/n events.  Pt is afebrile.  No cough/sob.  WBC has normalized.      ROS:  CONSTITUTIONAL:  No fever, chills, rigors  CARDIOVASCULAR:  No chest pain or palpitations  RESPIRATORY:   No SOB, cough, dyspnea on exertion.  No wheezing  GASTROINTESTINAL:  No abd pain, N/V, diarrhea/constipation  EXTREMITIES:  No swelling or joint pain  GENITOURINARY:  No burning on urination, increased frequency or urgency.  No flank pain  NEUROLOGIC:  No HA, visual disturbances  SKIN: No rashes    Allergies    No Known Allergies    Intolerances        ANTIBIOTICS/RELEVANT:  antimicrobials    immunologic:  influenza   Vaccine 0.5 milliLiter(s) IntraMuscular once    OTHER:  acetaminophen   Tablet .. 650 milliGRAM(s) Oral every 6 hours PRN  ALBUTerol    90 MICROgram(s) HFA Inhaler 1 Puff(s) Inhalation every 4 hours  ALBUTerol/ipratropium for Nebulization 3 milliLiter(s) Nebulizer every 6 hours  aspirin  chewable 81 milliGRAM(s) Oral daily  buDESOnide 160 MICROgram(s)/formoterol 4.5 MICROgram(s) Inhaler 2 Puff(s) Inhalation two times a day  carvedilol 12.5 milliGRAM(s) Oral every 12 hours  dextrose 40% Gel 15 Gram(s) Oral once PRN  dextrose 5%. 1000 milliLiter(s) IV Continuous <Continuous>  dextrose 50% Injectable 12.5 Gram(s) IV Push once  dextrose 50% Injectable 25 Gram(s) IV Push once  dextrose 50% Injectable 25 Gram(s) IV Push once  diltiazem    milliGRAM(s) Oral daily  glucagon  Injectable 1 milliGRAM(s) IntraMuscular once PRN  guaiFENesin   Syrup  (Sugar-Free) 200 milliGRAM(s) Oral every 6 hours PRN  heparin  Infusion. 400 Unit(s)/Hr IV Continuous <Continuous>  heparin  Injectable 6500 Unit(s) IV Push every 6 hours PRN  heparin  Injectable 3000 Unit(s) IV Push every 6 hours PRN  hydrALAZINE 50 milliGRAM(s) Oral three times a day  insulin glargine Injectable (LANTUS) 18 Unit(s) SubCutaneous at bedtime  insulin lispro (HumaLOG) corrective regimen sliding scale   SubCutaneous three times a day before meals  insulin lispro Injectable (HumaLOG) 8 Unit(s) SubCutaneous three times a day before meals  losartan 100 milliGRAM(s) Oral daily  montelukast 10 milliGRAM(s) Oral at bedtime  pantoprazole    Tablet 40 milliGRAM(s) Oral before breakfast  simvastatin 40 milliGRAM(s) Oral at bedtime  tiotropium 18 MICROgram(s) Capsule 1 Capsule(s) Inhalation daily  topiramate 100 milliGRAM(s) Oral daily      Objective:  Vital Signs Last 24 Hrs  T(C): 36.4 (24 Sep 2018 04:12), Max: 37.1 (23 Sep 2018 20:50)  T(F): 97.6 (24 Sep 2018 04:12), Max: 98.7 (23 Sep 2018 20:50)  HR: 61 (24 Sep 2018 04:12) (61 - 74)  BP: 146/70 (24 Sep 2018 04:12) (96/58 - 146/70)  BP(mean): --  RR: 18 (24 Sep 2018 04:12) (16 - 18)  SpO2: 95% (24 Sep 2018 04:12) (95% - 97%)    PHYSICAL EXAM:  Constitutional:NAD  Eyes:PRIYANK, EOMI  Ear/Nose/Throat: no thrush, mucositis.  Moist mucous membranes	  Neck:no JVD, no lymphadenopathy, supple  Respiratory: CTA suzanna  Cardiovascular: S1S2 RRR, no murmurs  Gastrointestinal:soft, nontender,  nondistended (+) BS  Extremities:no e/e/c  Skin:  rt ft 2nd toe cyanosis        LABS:                        10.8   9.19  )-----------( x        ( 24 Sep 2018 09:21 )             34.6     09-24    131<L>  |  103  |  22  ----------------------------<  112<H>  3.9   |  21<L>  |  1.14    Ca    9.8      24 Sep 2018 06:55      PT/INR - ( 24 Sep 2018 09:29 )   PT: 12.2 sec;   INR: 1.08 ratio         PTT - ( 24 Sep 2018 09:29 )  PTT:67.4 sec                Rapid RVP Result: [facility]          MICROBIOLOGY:    Culture - Urine (09.18.18 @ 16:48)    Specimen Source: .Urine Clean Catch (Midstream)    Culture Results:   <10,000 CFU/ml Normal Urogenital belkys present          RADIOLOGY & ADDITIONAL STUDIES:

## 2018-09-24 NOTE — PROGRESS NOTE ADULT - SUBJECTIVE AND OBJECTIVE BOX
- Patient seen and examined.  - In summary, patient is a 62 year old woman who presented with sob (13 Sep 2018 20:06)  - Today, patient is without complaints.         *****MEDICATIONS:    MEDICATIONS  (STANDING):  ALBUTerol    90 MICROgram(s) HFA Inhaler 1 Puff(s) Inhalation every 4 hours  ALBUTerol/ipratropium for Nebulization 3 milliLiter(s) Nebulizer every 6 hours  aspirin  chewable 81 milliGRAM(s) Oral daily  buDESOnide 160 MICROgram(s)/formoterol 4.5 MICROgram(s) Inhaler 2 Puff(s) Inhalation two times a day  carvedilol 12.5 milliGRAM(s) Oral every 12 hours  dextrose 5%. 1000 milliLiter(s) (50 mL/Hr) IV Continuous <Continuous>  dextrose 50% Injectable 12.5 Gram(s) IV Push once  dextrose 50% Injectable 25 Gram(s) IV Push once  dextrose 50% Injectable 25 Gram(s) IV Push once  diltiazem    milliGRAM(s) Oral daily  heparin  Infusion. 400 Unit(s)/Hr (4 mL/Hr) IV Continuous <Continuous>  hydrALAZINE 50 milliGRAM(s) Oral three times a day  influenza   Vaccine 0.5 milliLiter(s) IntraMuscular once  insulin glargine Injectable (LANTUS) 18 Unit(s) SubCutaneous at bedtime  insulin lispro (HumaLOG) corrective regimen sliding scale   SubCutaneous three times a day before meals  insulin lispro Injectable (HumaLOG) 8 Unit(s) SubCutaneous three times a day before meals  losartan 100 milliGRAM(s) Oral daily  montelukast 10 milliGRAM(s) Oral at bedtime  pantoprazole    Tablet 40 milliGRAM(s) Oral before breakfast  simvastatin 40 milliGRAM(s) Oral at bedtime  tiotropium 18 MICROgram(s) Capsule 1 Capsule(s) Inhalation daily  topiramate 100 milliGRAM(s) Oral daily    MEDICATIONS  (PRN):  acetaminophen   Tablet .. 650 milliGRAM(s) Oral every 6 hours PRN Mild Pain (1 - 3)  dextrose 40% Gel 15 Gram(s) Oral once PRN Blood Glucose LESS THAN 70 milliGRAM(s)/deciliter  glucagon  Injectable 1 milliGRAM(s) IntraMuscular once PRN Glucose LESS THAN 70 milligrams/deciliter  guaiFENesin   Syrup  (Sugar-Free) 200 milliGRAM(s) Oral every 6 hours PRN Cough  heparin  Injectable 6500 Unit(s) IV Push every 6 hours PRN For aPTT less than 40  heparin  Injectable 3000 Unit(s) IV Push every 6 hours PRN For aPTT between 40 - 57           ***** REVIEW OF SYSTEM:  GEN: no fever, no chills, no pain  RESP: no SOB, no cough, no sputum  CVS: no chest pain, no palpitations, no edema  GI: no abdominal pain, no nausea, no vomiting, no constipation, no diarrhea  : no dysuria, no frequency  NEURO: no headache, no dizziness  PSYCH: no depression, not anxious  Derm : no itching, no rash         ***** VITAL SIGNS:    T(F): 97.6 (09-24-18 @ 04:12), Max: 98.7 (09-23-18 @ 20:50)  HR: 61 (09-24-18 @ 04:12) (61 - 74)  BP: 146/70 (09-24-18 @ 04:12) (96/58 - 146/70)  RR: 18 (09-24-18 @ 04:12) (16 - 18)  SpO2: 95% (09-24-18 @ 04:12) (95% - 97%)  Wt(kg): --  ,   I&O's Summary    23 Sep 2018 07:01  -  24 Sep 2018 07:00  --------------------------------------------------------  IN: 696 mL / OUT: 200 mL / NET: 496 mL                     *****PHYSICAL EXAM:  GEN: A&O X 3 , NAD , comfortable  HEENT: NCAT, EOMI, MMM, no icterus  NECK: Supple, No JVD  CVS: S1S2 , regular , No M/R/G appreciated  PULM: CTA B/L,  no W/R/R appreciated  ABD.: soft. non tender, non distended,  bowel sounds present  Extrem: intact pulses , no edema noted  Derm: No rash or ecchymosis noted  PSYCH: normal mood, no depression, not anxious         *****LAB AND IMAGING:                                 10.8   9.19  )-----------( x        ( 24 Sep 2018 09:21 )             34.6               09-24    131<L>  |  103  |  22  ----------------------------<  112<H>  3.9   |  21<L>  |  1.14    Ca    9.8      24 Sep 2018 06:55      PT/INR - ( 24 Sep 2018 09:29 )   PT: 12.2 sec;   INR: 1.08 ratio         PTT - ( 24 Sep 2018 09:29 )  PTT:67.4 sec                         [All pertinent recent Imaging/Reports reviewed]  < from: Nuclear Stress Test-Pharmacologic (09.14.18 @ 14:02) >  * The left ventricle was normal in size. There is a small  mild reversible defect in the distal inferolateral wall  consistentwith mild ischemia.    < end of copied text >    < from: Transthoracic Echocardiogram (09.14.18 @ 15:09) >  Conclusions:  1. Calcified trileaflet aortic valve with normal opening.  Mild aortic regurgitation.  2. Increased relative wall thickness with normal left  ventricular mass index, consistent with concentric left  ventricular remodeling.  3. Hyperdynamic left ventricular systolic function. The  upper septum measures 1.6 cm with mild LVOT obstruction  45mmHG.  4. Strain imaging was performed on the [facility] EPIQ with an  average HR of 55 bpm and a BP of 169/94. Global L. Strain=  -25.7  5. Normal right ventricular size and function.    < end of copied text >         *****A S S E S S M E N T   A N D   P L A N :  62F with PAD, CAD, COPD adm with dyspnea  tx COPD per pulm  non obstructive CAD on cath 2014  stress test without significant ischemia  breast pain r/w massage, unlikely angina  no cardiac symptoms  severe PAD on CTA  echo noted  would proceed with surgery at moderate risk  vasc f/u noted  ?DCP    __________________________  JACK Alegre D.O.

## 2018-09-24 NOTE — PROGRESS NOTE ADULT - SUBJECTIVE AND OBJECTIVE BOX
Patient is a 63y old  Female who presents with a chief complaint of sob (24 Sep 2018 10:54)      Any change in ROS: She is doing ok : no SOB    MEDICATIONS  (STANDING):  ALBUTerol    90 MICROgram(s) HFA Inhaler 1 Puff(s) Inhalation every 4 hours  ALBUTerol/ipratropium for Nebulization 3 milliLiter(s) Nebulizer every 6 hours  aspirin  chewable 81 milliGRAM(s) Oral daily  buDESOnide 160 MICROgram(s)/formoterol 4.5 MICROgram(s) Inhaler 2 Puff(s) Inhalation two times a day  carvedilol 12.5 milliGRAM(s) Oral every 12 hours  dextrose 5%. 1000 milliLiter(s) (50 mL/Hr) IV Continuous <Continuous>  dextrose 50% Injectable 12.5 Gram(s) IV Push once  dextrose 50% Injectable 25 Gram(s) IV Push once  dextrose 50% Injectable 25 Gram(s) IV Push once  diltiazem    milliGRAM(s) Oral daily  heparin  Infusion. 400 Unit(s)/Hr (4 mL/Hr) IV Continuous <Continuous>  hydrALAZINE 50 milliGRAM(s) Oral three times a day  influenza   Vaccine 0.5 milliLiter(s) IntraMuscular once  insulin glargine Injectable (LANTUS) 18 Unit(s) SubCutaneous at bedtime  insulin lispro (HumaLOG) corrective regimen sliding scale   SubCutaneous three times a day before meals  insulin lispro Injectable (HumaLOG) 8 Unit(s) SubCutaneous three times a day before meals  losartan 100 milliGRAM(s) Oral daily  montelukast 10 milliGRAM(s) Oral at bedtime  pantoprazole    Tablet 40 milliGRAM(s) Oral before breakfast  simvastatin 40 milliGRAM(s) Oral at bedtime  tiotropium 18 MICROgram(s) Capsule 1 Capsule(s) Inhalation daily  topiramate 100 milliGRAM(s) Oral daily    MEDICATIONS  (PRN):  acetaminophen   Tablet .. 650 milliGRAM(s) Oral every 6 hours PRN Mild Pain (1 - 3)  dextrose 40% Gel 15 Gram(s) Oral once PRN Blood Glucose LESS THAN 70 milliGRAM(s)/deciliter  glucagon  Injectable 1 milliGRAM(s) IntraMuscular once PRN Glucose LESS THAN 70 milligrams/deciliter  guaiFENesin   Syrup  (Sugar-Free) 200 milliGRAM(s) Oral every 6 hours PRN Cough  heparin  Injectable 6500 Unit(s) IV Push every 6 hours PRN For aPTT less than 40  heparin  Injectable 3000 Unit(s) IV Push every 6 hours PRN For aPTT between 40 - 57    Vital Signs Last 24 Hrs  T(C): 36.4 (24 Sep 2018 04:12), Max: 37.1 (23 Sep 2018 20:50)  T(F): 97.6 (24 Sep 2018 04:12), Max: 98.7 (23 Sep 2018 20:50)  HR: 61 (24 Sep 2018 04:12) (61 - 74)  BP: 146/70 (24 Sep 2018 04:12) (96/58 - 146/70)  BP(mean): --  RR: 18 (24 Sep 2018 04:12) (18 - 18)  SpO2: 95% (24 Sep 2018 04:12) (95% - 97%)    I&O's Summary    23 Sep 2018 07:01  -  24 Sep 2018 07:00  --------------------------------------------------------  IN: 696 mL / OUT: 200 mL / NET: 496 mL          Physical Exam:   GENERAL: NAD, well-groomed, well-developed  HEENT: PRIYANK/   Atraumatic, Normocephalic  ENMT: No tonsillar erythema, exudates, or enlargement; Moist mucous membranes, Good dentition, No lesions  NECK: Supple, No JVD, Normal thyroid  CHEST/LUNG: Clear to auscultaion, ; No rales, rhonchi, wheezing, or rubs  CVS: Regular rate and rhythm; No murmurs, rubs, or gallops  GI: : Soft, Nontender, Nondistended; Bowel sounds present  NERVOUS SYSTEM:  Alert & Oriented X3  EXTREMITIES:  2+ Peripheral Pulses, No clubbing, cyanosis, or edema  LYMPH: No lymphadenopathy noted  SKIN: No rashes or lesions  ENDOCRINOLOGY: No Thyromegaly  PSYCH: Appropriate    Labs:                              10.8   9.19  )-----------( Clumped    ( 24 Sep 2018 09:21 )             34.6                         11.3   9.60  )-----------( Clumped    ( 23 Sep 2018 11:05 )             35.7                         11.8   10.08 )-----------( Clumped    ( 22 Sep 2018 10:16 )             37.0                         12.3   11.33 )-----------( Clumped    ( 21 Sep 2018 07:54 )             37.7     09-24    131<L>  |  103  |  22  ----------------------------<  112<H>  3.9   |  21<L>  |  1.14  09-23    136  |  105  |  20  ----------------------------<  123<H>  4.1   |  19<L>  |  1.10  09-22    131<L>  |  101  |  25<H>  ----------------------------<  170<H>  4.9   |  20<L>  |  1.07  09-21    133<L>  |  103  |  19  ----------------------------<  124<H>  4.1   |  21<L>  |  1.09    Ca    9.8      24 Sep 2018 06:55  Ca    9.5      23 Sep 2018 08:33      CAPILLARY BLOOD GLUCOSE      POCT Blood Glucose.: 123 mg/dL (24 Sep 2018 08:05)  POCT Blood Glucose.: 174 mg/dL (23 Sep 2018 21:27)  POCT Blood Glucose.: 137 mg/dL (23 Sep 2018 16:43)  POCT Blood Glucose.: 158 mg/dL (23 Sep 2018 11:33)        PT/INR - ( 24 Sep 2018 09:29 )   PT: 12.2 sec;   INR: 1.08 ratio         PTT - ( 24 Sep 2018 09:29 )  PTT:67.4 sec          RECENT CULTURES:  09-18 @ 16:48 .Urine Clean Catch (Midstream)       < from: CT Angio Abd Aorta w/run-off w/ IV Cont (09.12.18 @ 17:40) >  ILIAC ARTERY: Occluded.  COMMON FEMORAL ARTERY: Widely patent.  FEMORAL ARTERY: Occluded and reconstituted in the distal femoral artery.  PROFUNDA FEMORAL ARTERY: Widely patent.  POPLITEAL ARTERY: Widely patent.  ANTERIOR TIBIAL ARTERY: Widely patent.  TIBIOPERONEAL TRUNK: Widely patent.  POSTERIOR TIBIAL ARTERY: Widely patent.  PERONEAL ARTERY: Widely patent.    ADDITIONAL FINDINGS: Small left hepatic cyst. Renal cysts. Hysterectomy.     IMPRESSION:     RIGHT LOWER EXTREMITY: Occluded external iliac artery and common femoral   artery. Widely patent profunda femoral artery via a femoral-femoral   bypass graft. Occlusion of the femoral artery and lower extremity bypass   graft. Reconstitution of the popliteal artery with three-vessel runoff   although the tibioperoneal trunk has moderate focal stenosis.    LEFT LOWER EXTREMITY: Femoral artery is occluded with reconstitution of   the distal femoral artery. Three-vessel runoff.                    LISA LU M.D., ATTENDING RADIOLOGIST  This document has been electronically signed. Sep 13 2018  9:22AM        < end of copied text >           <10,000 CFU/ml Normal Urogenital belkys present          RESPIRATORY CULTURES:          Studies  Chest X-RAY  CT SCAN Chest   Venous Dopplers: LE:   CT Abdomen  Others

## 2018-09-25 ENCOUNTER — TRANSCRIPTION ENCOUNTER (OUTPATIENT)
Age: 63
End: 2018-09-25

## 2018-09-25 LAB
ANION GAP SERPL CALC-SCNC: 11 MMOL/L — SIGNIFICANT CHANGE UP (ref 5–17)
APTT BLD: 67.1 SEC — HIGH (ref 27.5–37.4)
BUN SERPL-MCNC: 20 MG/DL — SIGNIFICANT CHANGE UP (ref 7–23)
CALCIUM SERPL-MCNC: 10 MG/DL — SIGNIFICANT CHANGE UP (ref 8.4–10.5)
CHLORIDE SERPL-SCNC: 103 MMOL/L — SIGNIFICANT CHANGE UP (ref 96–108)
CO2 SERPL-SCNC: 20 MMOL/L — LOW (ref 22–31)
CREAT SERPL-MCNC: 1.17 MG/DL — SIGNIFICANT CHANGE UP (ref 0.5–1.3)
GLUCOSE BLDC GLUCOMTR-MCNC: 110 MG/DL — HIGH (ref 70–99)
GLUCOSE BLDC GLUCOMTR-MCNC: 125 MG/DL — HIGH (ref 70–99)
GLUCOSE BLDC GLUCOMTR-MCNC: 146 MG/DL — HIGH (ref 70–99)
GLUCOSE BLDC GLUCOMTR-MCNC: 77 MG/DL — SIGNIFICANT CHANGE UP (ref 70–99)
GLUCOSE BLDC GLUCOMTR-MCNC: 95 MG/DL — SIGNIFICANT CHANGE UP (ref 70–99)
GLUCOSE SERPL-MCNC: 114 MG/DL — HIGH (ref 70–99)
INR BLD: 1.06 RATIO — SIGNIFICANT CHANGE UP (ref 0.88–1.16)
POTASSIUM SERPL-MCNC: 4.1 MMOL/L — SIGNIFICANT CHANGE UP (ref 3.5–5.3)
POTASSIUM SERPL-SCNC: 4.1 MMOL/L — SIGNIFICANT CHANGE UP (ref 3.5–5.3)
PROTHROM AB SERPL-ACNC: 12 SEC — SIGNIFICANT CHANGE UP (ref 10–13.1)
SODIUM SERPL-SCNC: 134 MMOL/L — LOW (ref 135–145)

## 2018-09-25 RX ORDER — ENOXAPARIN SODIUM 100 MG/ML
18 INJECTION SUBCUTANEOUS
Qty: 1 | Refills: 0
Start: 2018-09-25 | End: 2018-10-24

## 2018-09-25 RX ORDER — INSULIN LISPRO 100/ML
8 VIAL (ML) SUBCUTANEOUS
Qty: 1 | Refills: 0 | OUTPATIENT
Start: 2018-09-25

## 2018-09-25 RX ORDER — APIXABAN 2.5 MG/1
5 TABLET, FILM COATED ORAL EVERY 12 HOURS
Qty: 0 | Refills: 0 | Status: DISCONTINUED | OUTPATIENT
Start: 2018-09-25 | End: 2018-09-26

## 2018-09-25 RX ORDER — TIOTROPIUM BROMIDE 18 UG/1
1 CAPSULE ORAL; RESPIRATORY (INHALATION)
Qty: 1 | Refills: 0
Start: 2018-09-25

## 2018-09-25 RX ORDER — APIXABAN 2.5 MG/1
1 TABLET, FILM COATED ORAL
Qty: 60 | Refills: 0
Start: 2018-09-25 | End: 2018-10-24

## 2018-09-25 RX ORDER — ISOPROPYL ALCOHOL, BENZOCAINE .7; .06 ML/ML; ML/ML
0 SWAB TOPICAL
Qty: 1 | Refills: 0 | OUTPATIENT
Start: 2018-09-25

## 2018-09-25 RX ORDER — DILTIAZEM HCL 120 MG
1 CAPSULE, EXT RELEASE 24 HR ORAL
Qty: 0 | Refills: 0 | DISCHARGE
Start: 2018-09-25

## 2018-09-25 RX ORDER — ACETAMINOPHEN 500 MG
2 TABLET ORAL
Qty: 0 | Refills: 0 | DISCHARGE
Start: 2018-09-25

## 2018-09-25 RX ORDER — ENOXAPARIN SODIUM 100 MG/ML
18 INJECTION SUBCUTANEOUS
Qty: 1 | Refills: 0 | OUTPATIENT
Start: 2018-09-25 | End: 2018-10-24

## 2018-09-25 RX ORDER — INSULIN LISPRO 100/ML
8 VIAL (ML) SUBCUTANEOUS
Qty: 1 | Refills: 0
Start: 2018-09-25

## 2018-09-25 RX ORDER — ISOPROPYL ALCOHOL, BENZOCAINE .7; .06 ML/ML; ML/ML
0 SWAB TOPICAL
Qty: 1 | Refills: 0
Start: 2018-09-25

## 2018-09-25 RX ADMIN — CARVEDILOL PHOSPHATE 12.5 MILLIGRAM(S): 80 CAPSULE, EXTENDED RELEASE ORAL at 05:08

## 2018-09-25 RX ADMIN — BUDESONIDE AND FORMOTEROL FUMARATE DIHYDRATE 2 PUFF(S): 160; 4.5 AEROSOL RESPIRATORY (INHALATION) at 16:45

## 2018-09-25 RX ADMIN — INSULIN GLARGINE 18 UNIT(S): 100 INJECTION, SOLUTION SUBCUTANEOUS at 22:54

## 2018-09-25 RX ADMIN — Medication 8 UNIT(S): at 16:45

## 2018-09-25 RX ADMIN — Medication 8 UNIT(S): at 08:39

## 2018-09-25 RX ADMIN — SIMVASTATIN 40 MILLIGRAM(S): 20 TABLET, FILM COATED ORAL at 21:23

## 2018-09-25 RX ADMIN — LOSARTAN POTASSIUM 100 MILLIGRAM(S): 100 TABLET, FILM COATED ORAL at 05:08

## 2018-09-25 RX ADMIN — Medication 50 MILLIGRAM(S): at 05:07

## 2018-09-25 RX ADMIN — Medication 50 MILLIGRAM(S): at 21:23

## 2018-09-25 RX ADMIN — Medication 100 MILLIGRAM(S): at 10:19

## 2018-09-25 RX ADMIN — APIXABAN 5 MILLIGRAM(S): 2.5 TABLET, FILM COATED ORAL at 17:58

## 2018-09-25 RX ADMIN — Medication 81 MILLIGRAM(S): at 10:19

## 2018-09-25 RX ADMIN — MONTELUKAST 10 MILLIGRAM(S): 4 TABLET, CHEWABLE ORAL at 21:23

## 2018-09-25 RX ADMIN — Medication 180 MILLIGRAM(S): at 05:07

## 2018-09-25 RX ADMIN — PANTOPRAZOLE SODIUM 40 MILLIGRAM(S): 20 TABLET, DELAYED RELEASE ORAL at 05:07

## 2018-09-25 RX ADMIN — Medication 3 MILLILITER(S): at 16:45

## 2018-09-25 RX ADMIN — HEPARIN SODIUM 900 UNIT(S)/HR: 5000 INJECTION INTRAVENOUS; SUBCUTANEOUS at 08:43

## 2018-09-25 RX ADMIN — Medication 50 MILLIGRAM(S): at 13:21

## 2018-09-25 RX ADMIN — Medication 3 MILLILITER(S): at 10:18

## 2018-09-25 RX ADMIN — BUDESONIDE AND FORMOTEROL FUMARATE DIHYDRATE 2 PUFF(S): 160; 4.5 AEROSOL RESPIRATORY (INHALATION) at 05:08

## 2018-09-25 RX ADMIN — CARVEDILOL PHOSPHATE 12.5 MILLIGRAM(S): 80 CAPSULE, EXTENDED RELEASE ORAL at 17:13

## 2018-09-25 RX ADMIN — Medication 3 MILLILITER(S): at 05:08

## 2018-09-25 RX ADMIN — Medication 650 MILLIGRAM(S): at 10:18

## 2018-09-25 RX ADMIN — Medication 650 MILLIGRAM(S): at 11:48

## 2018-09-25 RX ADMIN — INFLUENZA VIRUS VACCINE 0.5 MILLILITER(S): 15; 15; 15; 15 SUSPENSION INTRAMUSCULAR at 10:34

## 2018-09-25 RX ADMIN — Medication 8 UNIT(S): at 12:00

## 2018-09-25 NOTE — PROGRESS NOTE ADULT - SUBJECTIVE AND OBJECTIVE BOX
- Patient seen and examined.  - In summary, patient is a 62 year old woman who presented with sob (13 Sep 2018 20:06)  - Today, patient is without complaints.         *****MEDICATIONS:    MEDICATIONS  (STANDING):  ALBUTerol    90 MICROgram(s) HFA Inhaler 1 Puff(s) Inhalation every 4 hours  ALBUTerol/ipratropium for Nebulization 3 milliLiter(s) Nebulizer every 6 hours  aspirin  chewable 81 milliGRAM(s) Oral daily  buDESOnide 160 MICROgram(s)/formoterol 4.5 MICROgram(s) Inhaler 2 Puff(s) Inhalation two times a day  carvedilol 12.5 milliGRAM(s) Oral every 12 hours  dextrose 5%. 1000 milliLiter(s) (50 mL/Hr) IV Continuous <Continuous>  dextrose 50% Injectable 12.5 Gram(s) IV Push once  dextrose 50% Injectable 25 Gram(s) IV Push once  dextrose 50% Injectable 25 Gram(s) IV Push once  diltiazem    milliGRAM(s) Oral daily  heparin  Infusion. 400 Unit(s)/Hr (4 mL/Hr) IV Continuous <Continuous>  hydrALAZINE 50 milliGRAM(s) Oral three times a day  influenza   Vaccine 0.5 milliLiter(s) IntraMuscular once  insulin glargine Injectable (LANTUS) 18 Unit(s) SubCutaneous at bedtime  insulin lispro (HumaLOG) corrective regimen sliding scale   SubCutaneous three times a day before meals  insulin lispro Injectable (HumaLOG) 8 Unit(s) SubCutaneous three times a day before meals  losartan 100 milliGRAM(s) Oral daily  montelukast 10 milliGRAM(s) Oral at bedtime  pantoprazole    Tablet 40 milliGRAM(s) Oral before breakfast  simvastatin 40 milliGRAM(s) Oral at bedtime  tiotropium 18 MICROgram(s) Capsule 1 Capsule(s) Inhalation daily  topiramate 100 milliGRAM(s) Oral daily    MEDICATIONS  (PRN):  acetaminophen   Tablet .. 650 milliGRAM(s) Oral every 6 hours PRN Mild Pain (1 - 3)  dextrose 40% Gel 15 Gram(s) Oral once PRN Blood Glucose LESS THAN 70 milliGRAM(s)/deciliter  glucagon  Injectable 1 milliGRAM(s) IntraMuscular once PRN Glucose LESS THAN 70 milligrams/deciliter  guaiFENesin   Syrup  (Sugar-Free) 200 milliGRAM(s) Oral every 6 hours PRN Cough  heparin  Injectable 6500 Unit(s) IV Push every 6 hours PRN For aPTT less than 40  heparin  Injectable 3000 Unit(s) IV Push every 6 hours PRN For aPTT between 40 - 57             ***** REVIEW OF SYSTEM:  GEN: no fever, no chills, no pain  RESP: no SOB, no cough, no sputum  CVS: no chest pain, no palpitations, no edema  GI: no abdominal pain, no nausea, no vomiting, no constipation, no diarrhea  : no dysuria, no frequency  NEURO: no headache, no dizziness  PSYCH: no depression, not anxious  Derm : no itching, no rash         ***** VITAL SIGNS:    T(F): 98.8 (09-25-18 @ 04:16), Max: 98.8 (09-25-18 @ 04:16)  HR: 76 (09-25-18 @ 04:16) (67 - 76)  BP: 115/70 (09-25-18 @ 04:16) (102/69 - 125/78)  RR: 18 (09-25-18 @ 04:16) (18 - 19)  SpO2: 96% (09-25-18 @ 04:16) (96% - 99%)  Wt(kg): --  ,   I&O's Summary    24 Sep 2018 07:01  -  25 Sep 2018 07:00  --------------------------------------------------------  IN: 1466 mL / OUT: 1000 mL / NET: 466 mL             *****PHYSICAL EXAM:  GEN: A&O X 3 , NAD , comfortable  HEENT: NCAT, EOMI, MMM, no icterus  NECK: Supple, No JVD  CVS: S1S2 , regular , No M/R/G appreciated  PULM: CTA B/L,  no W/R/R appreciated  ABD.: soft. non tender, non distended,  bowel sounds present  Extrem: intact pulses , no edema noted  Derm: No rash or ecchymosis noted  PSYCH: normal mood, no depression, not anxious         *****LAB AND IMAGING:                                                   10.8   9.19  )-----------( Clumped    ( 24 Sep 2018 09:21 )             34.6               09-25    134<L>  |  103  |  20  ----------------------------<  114<H>  4.1   |  20<L>  |  1.17    Ca    10.0      25 Sep 2018 07:07      PT/INR - ( 25 Sep 2018 07:41 )   PT: 12.0 sec;   INR: 1.06 ratio         PTT - ( 25 Sep 2018 07:41 )  PTT:67.1 sec                         [All pertinent recent Imaging/Reports reviewed]  < from: Nuclear Stress Test-Pharmacologic (09.14.18 @ 14:02) >  * The left ventricle was normal in size. There is a small  mild reversible defect in the distal inferolateral wall  consistentwith mild ischemia.    < end of copied text >    < from: Transthoracic Echocardiogram (09.14.18 @ 15:09) >  Conclusions:  1. Calcified trileaflet aortic valve with normal opening.  Mild aortic regurgitation.  2. Increased relative wall thickness with normal left  ventricular mass index, consistent with concentric left  ventricular remodeling.  3. Hyperdynamic left ventricular systolic function. The  upper septum measures 1.6 cm with mild LVOT obstruction  45mmHG.  4. Strain imaging was performed on the [facility] EPIQ with an  average HR of 55 bpm and a BP of 169/94. Global L. Strain=  -25.7  5. Normal right ventricular size and function.    < end of copied text >         *****A S S E S S M E N T   A N D   P L A N :  62F with PAD, CAD, COPD adm with dyspnea  tx COPD per pulm  non obstructive CAD on cath 2014  stress test without significant ischemia  breast pain r/w massage, unlikely angina  no cardiac symptoms  severe PAD on CTA  echo noted  would proceed with surgery at moderate risk  vasc f/u noted  ?timing OR, ?DCP    __________________________  AAnanda Alegre D.O.

## 2018-09-25 NOTE — PROGRESS NOTE ADULT - SUBJECTIVE AND OBJECTIVE BOX
Patient is a 63y old  Female who presents with a chief complaint of sob (25 Sep 2018 09:36)      Any change in ROS: Doing ok: no SOB : no wheezing: on IV heparin     MEDICATIONS  (STANDING):  ALBUTerol    90 MICROgram(s) HFA Inhaler 1 Puff(s) Inhalation every 4 hours  ALBUTerol/ipratropium for Nebulization 3 milliLiter(s) Nebulizer every 6 hours  aspirin  chewable 81 milliGRAM(s) Oral daily  buDESOnide 160 MICROgram(s)/formoterol 4.5 MICROgram(s) Inhaler 2 Puff(s) Inhalation two times a day  carvedilol 12.5 milliGRAM(s) Oral every 12 hours  dextrose 5%. 1000 milliLiter(s) (50 mL/Hr) IV Continuous <Continuous>  dextrose 50% Injectable 12.5 Gram(s) IV Push once  dextrose 50% Injectable 25 Gram(s) IV Push once  dextrose 50% Injectable 25 Gram(s) IV Push once  diltiazem    milliGRAM(s) Oral daily  heparin  Infusion. 400 Unit(s)/Hr (4 mL/Hr) IV Continuous <Continuous>  hydrALAZINE 50 milliGRAM(s) Oral three times a day  influenza   Vaccine 0.5 milliLiter(s) IntraMuscular once  insulin glargine Injectable (LANTUS) 18 Unit(s) SubCutaneous at bedtime  insulin lispro (HumaLOG) corrective regimen sliding scale   SubCutaneous three times a day before meals  insulin lispro Injectable (HumaLOG) 8 Unit(s) SubCutaneous three times a day before meals  losartan 100 milliGRAM(s) Oral daily  montelukast 10 milliGRAM(s) Oral at bedtime  pantoprazole    Tablet 40 milliGRAM(s) Oral before breakfast  simvastatin 40 milliGRAM(s) Oral at bedtime  tiotropium 18 MICROgram(s) Capsule 1 Capsule(s) Inhalation daily  topiramate 100 milliGRAM(s) Oral daily    MEDICATIONS  (PRN):  acetaminophen   Tablet .. 650 milliGRAM(s) Oral every 6 hours PRN Mild Pain (1 - 3)  dextrose 40% Gel 15 Gram(s) Oral once PRN Blood Glucose LESS THAN 70 milliGRAM(s)/deciliter  glucagon  Injectable 1 milliGRAM(s) IntraMuscular once PRN Glucose LESS THAN 70 milligrams/deciliter  guaiFENesin   Syrup  (Sugar-Free) 200 milliGRAM(s) Oral every 6 hours PRN Cough  heparin  Injectable 6500 Unit(s) IV Push every 6 hours PRN For aPTT less than 40  heparin  Injectable 3000 Unit(s) IV Push every 6 hours PRN For aPTT between 40 - 57    Vital Signs Last 24 Hrs  T(C): 37.1 (25 Sep 2018 04:16), Max: 37.1 (24 Sep 2018 20:53)  T(F): 98.8 (25 Sep 2018 04:16), Max: 98.8 (25 Sep 2018 04:16)  HR: 76 (25 Sep 2018 04:16) (67 - 76)  BP: 115/70 (25 Sep 2018 04:16) (102/69 - 125/78)  BP(mean): --  RR: 18 (25 Sep 2018 04:16) (18 - 19)  SpO2: 96% (25 Sep 2018 04:16) (96% - 99%)    I&O's Summary    24 Sep 2018 07:01  -  25 Sep 2018 07:00  --------------------------------------------------------  IN: 1466 mL / OUT: 1000 mL / NET: 466 mL          Physical Exam:   GENERAL: NAD, well-groomed, well-developed  HEENT: PRIYANK/   Atraumatic, Normocephalic  ENMT: No tonsillar erythema, exudates, or enlargement; Moist mucous membranes, Good dentition, No lesions  NECK: Supple, No JVD, Normal thyroid  CHEST/LUNG: Clear to auscultaion  CVS: Regular rate and rhythm; No murmurs, rubs, or gallops  GI: : Soft, Nontender, Nondistended; Bowel sounds present  NERVOUS SYSTEM:  Alert & Oriented X3  EXTREMITIES:  2+ Peripheral Pulses, No clubbing, cyanosis, or edema  LYMPH: No lymphadenopathy noted  SKIN: No rashes or lesions  ENDOCRINOLOGY: No Thyromegaly  PSYCH: Appropriate    Labs:                              10.8   9.19  )-----------( Clumped    ( 24 Sep 2018 09:21 )             34.6                         11.3   9.60  )-----------( Clumped    ( 23 Sep 2018 11:05 )             35.7                         11.8   10.08 )-----------( Clumped    ( 22 Sep 2018 10:16 )             37.0     09-25    134<L>  |  103  |  20  ----------------------------<  114<H>  4.1   |  20<L>  |  1.17  09-24    131<L>  |  103  |  22  ----------------------------<  112<H>  3.9   |  21<L>  |  1.14  09-23    136  |  105  |  20  ----------------------------<  123<H>  4.1   |  19<L>  |  1.10  09-22    131<L>  |  101  |  25<H>  ----------------------------<  170<H>  4.9   |  20<L>  |  1.07    Ca    10.0      25 Sep 2018 07:07  Ca    9.8      24 Sep 2018 06:55      CAPILLARY BLOOD GLUCOSE      POCT Blood Glucose.: 125 mg/dL (25 Sep 2018 08:12)  POCT Blood Glucose.: 146 mg/dL (24 Sep 2018 21:12)  POCT Blood Glucose.: 131 mg/dL (24 Sep 2018 17:21)  POCT Blood Glucose.: 84 mg/dL (24 Sep 2018 16:59)  POCT Blood Glucose.: 68 mg/dL (24 Sep 2018 16:46)  POCT Blood Glucose.: 65 mg/dL (24 Sep 2018 16:29)  POCT Blood Glucose.: 64 mg/dL (24 Sep 2018 16:28)  POCT Blood Glucose.: 105 mg/dL (24 Sep 2018 12:06)        PT/INR - ( 25 Sep 2018 07:41 )   PT: 12.0 sec;   INR: 1.06 ratio         PTT - ( 25 Sep 2018 07:41 )  PTT:67.1 sec          RECENT CULTURES:  09-18 @ 16:48 .Urine Clean Catch (Midstream)     < from: CT Chest No Cont (09.08.18 @ 15:55) >      INTERPRETATION:  CLINICAL INFORMATION: Dyspnea    COMPARISON: CT chest 9/20/2015    PROCEDURE:   CT of the Chest was performed without intravenous contrast.  [place] coronal reformats were performed.      FINDINGS:    CHEST:     LUNGS AND LARGE AIRWAYS: Patent central airways.  Minimal left basilar   subsegmental atelectasis.  PLEURA: No pleural effusion.  VESSELS: Atherosclerosis.  HEART: Heart size is normal. No pericardial effusion.  MEDIASTINUM AND LANEY: No lymphadenopathy.  CHEST WALL AND LOWER NECK: Pectus excavatum.  VISUALIZED UPPER ABDOMEN: Within normal limits.  BONES: Degenerative change. Scoliotic spine. Pectus deformity.    IMPRESSION: No evidence of pneumonia.                        MELIDA ESTEBAN M.D., RADIOLOGY RESIDENT  This document has been electronically signed.  DEISY ESCOTO M.D., ATTENDING RADIOLOGIST  This document has been electronically signed. Sep  9 2018  1:15PM        < end of copied text >             <10,000 CFU/ml Normal Urogenital belkys present          RESPIRATORY CULTURES:          Studies  Chest X-RAY  CT SCAN Chest   Venous Dopplers: LE:   CT Abdomen  Others

## 2018-09-25 NOTE — DISCHARGE NOTE ADULT - PLAN OF CARE
Remain without shortness of breath Continue current medications  Follow up with your doctor in 1 week Call your Health Care provider upon arrival home to make a follow up appointment within one week.  Take all inhalers as prescribed by your Health Care Provider.  Take steroids as prescribed by your Health Care Provider.  If your cough increases infrequency and severity and/or you have shortness of breath or increased shortness of breath call your Health Care Provider.  If you develop fever, chills, night sweats, malaise, and/or change in mental status call your Health care Provider.  Nutrition is very important.  Eat small frequent meals.  Increase your activity as tolerated.  Do not stay in bed all day  Pt. verbalized an understanding of all instructions. Occluded R external iliac artery and common femoral artery.    Follow up with Dr. Dasilva as an outpatient for planning of angiogram and bypass. Call for an appointment. Coronary artery disease is a condition where the arteries the supply the heart muscle get clogged with fatty deposits & puts you at risk for a heart attack  Call your doctor if you have any new pain, pressure, or discomfort in the center of your chest, pain, tingling or discomfort in arms, back, neck, jaw, or stomach, shortness of breath, nausea, vomiting, burping or heartburn, sweating, cold and clammy skin, racing or abnormal heartbeat for more than 10 minutes or if they keep coming & going.  Call 911 and do not tr to get to hospital by care  You can help yourself with lifestyle changes (quitting smoking if you smoke), eat lots of fruits & vegetables & low fat dairy products, not a lot of meat & fatty foods, walk or some form of physical activity most days of the week, lose weight if you are overweight  Take your cardiac medication as prescribed to lower cholesterol, to lower blood pressure, aspirin to prevent blood clots, and diabetes control  Make sure to keep appointments with doctor for cardiac follow up care Low salt diet  Activity as tolerated.  Take all medication as prescribed.  Follow up with your medical doctor for routine blood pressure monitoring at your next visit.  Notify your doctor if you have any of the following symptoms:   Dizziness, Lightheadedness, Blurry vision, Headache, Chest pain, Shortness of breath HgA1C this admission.  Make sure you get your HgA1c checked every three months.  If you take oral diabetes medications, check your blood glucose two times a day.  If you take insulin, check your blood glucose before meals and at bedtime.  It's important not to skip any meals.  Keep a log of your blood glucose results and always take it with you to your doctor appointments.  Keep a list of your current medications including injectables and over the counter medications and bring this medication list with you to all your doctor appointments.  If you have not seen your ophthalmologist this year call for appointment.  Check your feet daily for redness, sores, or openings. Do not self treat. If no improvement in two days call your primary care physician for an appointment.  Low blood sugar (hypoglycemia) is a blood sugar below 70mg/dl. Check your blood sugar if you feel signs/symptoms of hypoglycemia. If your blood sugar is below 70 take 15 grams of carbohydrates (ex 4 oz of apple juice, 3-4 glucose tablets, or 4-6 oz of regular soda) wait 15 minutes and repeat blood sugar to make sure it comes up above 70.  If your blood sugar is above 70 and you are due for a meal, have a meal.  If you are not due for a meal have a snack.  This snack helps keeps your blood sugar at a safe range.  Follow up with Dr. Hernandez in 1 week. Occluded R external iliac artery and common femoral artery.    Follow up with Dr. Dasilva as an outpatient for planning of angiogram and bypass. Call for an appointment.  You are being discharge on Eliquis, a blood thinner.

## 2018-09-25 NOTE — PROGRESS NOTE ADULT - SUBJECTIVE AND OBJECTIVE BOX
Chief complaint  Patient is a 63y old  Female who presents with a chief complaint of sob (25 Sep 2018 10:13)   Review of systems  Patient in bed, looks comfortable, no fever, no hypoglycemia.    Labs and Fingersticks  CAPILLARY BLOOD GLUCOSE      POCT Blood Glucose.: 110 mg/dL (25 Sep 2018 16:17)  POCT Blood Glucose.: 77 mg/dL (25 Sep 2018 11:56)  POCT Blood Glucose.: 125 mg/dL (25 Sep 2018 08:12)  POCT Blood Glucose.: 146 mg/dL (24 Sep 2018 21:12)      Anion Gap, Serum: 11 (09-25 @ 07:07)  Anion Gap, Serum: 7 (09-24 @ 06:55)      Calcium, Total Serum: 10.0 (09-25 @ 07:07)  Calcium, Total Serum: 9.8 (09-24 @ 06:55)          09-25    134<L>  |  103  |  20  ----------------------------<  114<H>  4.1   |  20<L>  |  1.17    Ca    10.0      25 Sep 2018 07:07                          10.8   9.19  )-----------( Clumped    ( 24 Sep 2018 09:21 )             34.6     Medications  MEDICATIONS  (STANDING):  ALBUTerol    90 MICROgram(s) HFA Inhaler 1 Puff(s) Inhalation every 4 hours  ALBUTerol/ipratropium for Nebulization 3 milliLiter(s) Nebulizer every 6 hours  apixaban 5 milliGRAM(s) Oral every 12 hours  aspirin  chewable 81 milliGRAM(s) Oral daily  buDESOnide 160 MICROgram(s)/formoterol 4.5 MICROgram(s) Inhaler 2 Puff(s) Inhalation two times a day  carvedilol 12.5 milliGRAM(s) Oral every 12 hours  dextrose 5%. 1000 milliLiter(s) (50 mL/Hr) IV Continuous <Continuous>  dextrose 50% Injectable 12.5 Gram(s) IV Push once  dextrose 50% Injectable 25 Gram(s) IV Push once  dextrose 50% Injectable 25 Gram(s) IV Push once  diltiazem    milliGRAM(s) Oral daily  hydrALAZINE 50 milliGRAM(s) Oral three times a day  insulin glargine Injectable (LANTUS) 18 Unit(s) SubCutaneous at bedtime  insulin lispro (HumaLOG) corrective regimen sliding scale   SubCutaneous three times a day before meals  insulin lispro Injectable (HumaLOG) 8 Unit(s) SubCutaneous three times a day before meals  losartan 100 milliGRAM(s) Oral daily  montelukast 10 milliGRAM(s) Oral at bedtime  pantoprazole    Tablet 40 milliGRAM(s) Oral before breakfast  simvastatin 40 milliGRAM(s) Oral at bedtime  tiotropium 18 MICROgram(s) Capsule 1 Capsule(s) Inhalation daily  topiramate 100 milliGRAM(s) Oral daily      Physical Exam  General: Patient comfortable in bed  Vital Signs Last 12 Hrs  T(F): 98.5 (09-25-18 @ 12:19), Max: 98.5 (09-25-18 @ 12:19)  HR: 85 (09-25-18 @ 17:14) (60 - 85)  BP: 127/85 (09-25-18 @ 17:14) (104/69 - 148/75)  BP(mean): --  RR: 18 (09-25-18 @ 12:19) (18 - 18)  SpO2: 98% (09-25-18 @ 12:19) (98% - 98%)  Neck: No palpable thyroid nodules.  CVS: S1S2, No murmurs  Respiratory: No wheezing, no crepitations  GI: Abdomen soft, bowel sounds positive  Musculoskeletal:  edema lower extremities.   Skin: No skin rashes, no ecchymosis    Diagnostics    Thyroid Stimulating Hormone, Serum: AM Sched. Collection: 15-Sep-2018 06:00 (09-14 @ 12:20)  Free Thyroxine, Serum: AM Sched. Collection: 15-Sep-2018 06:00 (09-14 @ 12:20)  TSH Receptor Antibody: AM Sched. Collection: 15-Sep-2018 06:00 (09-14 @ 12:20)

## 2018-09-25 NOTE — DISCHARGE NOTE ADULT - CARE PLAN
Principal Discharge DX:	Dyspnea on exertion  Goal:	Remain without shortness of breath  Assessment and plan of treatment:	Continue current medications  Follow up with your doctor in 1 week  Secondary Diagnosis:	COPD exacerbation  Assessment and plan of treatment:	Call your Health Care provider upon arrival home to make a follow up appointment within one week.  Take all inhalers as prescribed by your Health Care Provider.  Take steroids as prescribed by your Health Care Provider.  If your cough increases infrequency and severity and/or you have shortness of breath or increased shortness of breath call your Health Care Provider.  If you develop fever, chills, night sweats, malaise, and/or change in mental status call your Health care Provider.  Nutrition is very important.  Eat small frequent meals.  Increase your activity as tolerated.  Do not stay in bed all day  Pt. verbalized an understanding of all instructions.  Secondary Diagnosis:	Peripheral arterial disease  Assessment and plan of treatment:	Occluded R external iliac artery and common femoral artery.    Follow up with Dr. Dasilva as an outpatient for planning of angiogram and bypass. Call for an appointment.  Secondary Diagnosis:	CAD (coronary artery disease)  Assessment and plan of treatment:	Coronary artery disease is a condition where the arteries the supply the heart muscle get clogged with fatty deposits & puts you at risk for a heart attack  Call your doctor if you have any new pain, pressure, or discomfort in the center of your chest, pain, tingling or discomfort in arms, back, neck, jaw, or stomach, shortness of breath, nausea, vomiting, burping or heartburn, sweating, cold and clammy skin, racing or abnormal heartbeat for more than 10 minutes or if they keep coming & going.  Call 911 and do not tr to get to hospital by care  You can help yourself with lifestyle changes (quitting smoking if you smoke), eat lots of fruits & vegetables & low fat dairy products, not a lot of meat & fatty foods, walk or some form of physical activity most days of the week, lose weight if you are overweight  Take your cardiac medication as prescribed to lower cholesterol, to lower blood pressure, aspirin to prevent blood clots, and diabetes control  Make sure to keep appointments with doctor for cardiac follow up care  Secondary Diagnosis:	Essential hypertension  Assessment and plan of treatment:	Low salt diet  Activity as tolerated.  Take all medication as prescribed.  Follow up with your medical doctor for routine blood pressure monitoring at your next visit.  Notify your doctor if you have any of the following symptoms:   Dizziness, Lightheadedness, Blurry vision, Headache, Chest pain, Shortness of breath  Secondary Diagnosis:	Diabetes  Assessment and plan of treatment:	HgA1C this admission.  Make sure you get your HgA1c checked every three months.  If you take oral diabetes medications, check your blood glucose two times a day.  If you take insulin, check your blood glucose before meals and at bedtime.  It's important not to skip any meals.  Keep a log of your blood glucose results and always take it with you to your doctor appointments.  Keep a list of your current medications including injectables and over the counter medications and bring this medication list with you to all your doctor appointments.  If you have not seen your ophthalmologist this year call for appointment.  Check your feet daily for redness, sores, or openings. Do not self treat. If no improvement in two days call your primary care physician for an appointment.  Low blood sugar (hypoglycemia) is a blood sugar below 70mg/dl. Check your blood sugar if you feel signs/symptoms of hypoglycemia. If your blood sugar is below 70 take 15 grams of carbohydrates (ex 4 oz of apple juice, 3-4 glucose tablets, or 4-6 oz of regular soda) wait 15 minutes and repeat blood sugar to make sure it comes up above 70.  If your blood sugar is above 70 and you are due for a meal, have a meal.  If you are not due for a meal have a snack.  This snack helps keeps your blood sugar at a safe range.  Follow up with Dr. Hernandez in 1 week. Principal Discharge DX:	Dyspnea on exertion  Goal:	Remain without shortness of breath  Assessment and plan of treatment:	Continue current medications  Follow up with your doctor in 1 week  Secondary Diagnosis:	COPD exacerbation  Assessment and plan of treatment:	Call your Health Care provider upon arrival home to make a follow up appointment within one week.  Take all inhalers as prescribed by your Health Care Provider.  Take steroids as prescribed by your Health Care Provider.  If your cough increases infrequency and severity and/or you have shortness of breath or increased shortness of breath call your Health Care Provider.  If you develop fever, chills, night sweats, malaise, and/or change in mental status call your Health care Provider.  Nutrition is very important.  Eat small frequent meals.  Increase your activity as tolerated.  Do not stay in bed all day  Pt. verbalized an understanding of all instructions.  Secondary Diagnosis:	Peripheral arterial disease  Assessment and plan of treatment:	Occluded R external iliac artery and common femoral artery.    Follow up with Dr. Dasilva as an outpatient for planning of angiogram and bypass. Call for an appointment.  You are being discharge on Eliquis, a blood thinner.  Secondary Diagnosis:	CAD (coronary artery disease)  Assessment and plan of treatment:	Coronary artery disease is a condition where the arteries the supply the heart muscle get clogged with fatty deposits & puts you at risk for a heart attack  Call your doctor if you have any new pain, pressure, or discomfort in the center of your chest, pain, tingling or discomfort in arms, back, neck, jaw, or stomach, shortness of breath, nausea, vomiting, burping or heartburn, sweating, cold and clammy skin, racing or abnormal heartbeat for more than 10 minutes or if they keep coming & going.  Call 911 and do not tr to get to hospital by care  You can help yourself with lifestyle changes (quitting smoking if you smoke), eat lots of fruits & vegetables & low fat dairy products, not a lot of meat & fatty foods, walk or some form of physical activity most days of the week, lose weight if you are overweight  Take your cardiac medication as prescribed to lower cholesterol, to lower blood pressure, aspirin to prevent blood clots, and diabetes control  Make sure to keep appointments with doctor for cardiac follow up care  Secondary Diagnosis:	Essential hypertension  Assessment and plan of treatment:	Low salt diet  Activity as tolerated.  Take all medication as prescribed.  Follow up with your medical doctor for routine blood pressure monitoring at your next visit.  Notify your doctor if you have any of the following symptoms:   Dizziness, Lightheadedness, Blurry vision, Headache, Chest pain, Shortness of breath  Secondary Diagnosis:	Diabetes  Assessment and plan of treatment:	HgA1C this admission.  Make sure you get your HgA1c checked every three months.  If you take oral diabetes medications, check your blood glucose two times a day.  If you take insulin, check your blood glucose before meals and at bedtime.  It's important not to skip any meals.  Keep a log of your blood glucose results and always take it with you to your doctor appointments.  Keep a list of your current medications including injectables and over the counter medications and bring this medication list with you to all your doctor appointments.  If you have not seen your ophthalmologist this year call for appointment.  Check your feet daily for redness, sores, or openings. Do not self treat. If no improvement in two days call your primary care physician for an appointment.  Low blood sugar (hypoglycemia) is a blood sugar below 70mg/dl. Check your blood sugar if you feel signs/symptoms of hypoglycemia. If your blood sugar is below 70 take 15 grams of carbohydrates (ex 4 oz of apple juice, 3-4 glucose tablets, or 4-6 oz of regular soda) wait 15 minutes and repeat blood sugar to make sure it comes up above 70.  If your blood sugar is above 70 and you are due for a meal, have a meal.  If you are not due for a meal have a snack.  This snack helps keeps your blood sugar at a safe range.  Follow up with Dr. Hernandez in 1 week.

## 2018-09-25 NOTE — DISCHARGE NOTE ADULT - PATIENT PORTAL LINK FT
You can access the [facility] Patient Portal, offered by [facility], by registering with the following website: [email or web address]

## 2018-09-25 NOTE — DISCHARGE NOTE ADULT - CARE PROVIDER_API CALL
Loyd Dasilva), Surgery; Vascular Surgery  [address]  [address]  [place] [address]  Phone: [phone]  Fax: [phone]    Gamaliel Hernandez), EndocrinologyMetabDiabetes; Internal Medicine  [address] [place] [address]  Phone: [phone]  Fax: [phone]

## 2018-09-25 NOTE — DISCHARGE NOTE ADULT - MEDICATION SUMMARY - MEDICATIONS TO TAKE
I will START or STAY ON the medications listed below when I get home from the hospital:    glucometer  -- Check blood sugar before meals and at bedtime   -- Indication: For Diabetes    insulin pen needles  -- Use as directed 4 times a day  -- Indication: For Diabetes    lancets  -- Check blood sugar 4 times a day before meals and at bedtime  -- Indication: For Diabetes    test strips  -- Check blood sugar 4 times a day before meals and at bedtime  -- Indication: For Diabetes    alcohol swabs  -- Use as directed 4 times a day  -- Indication: For Diabetes    acetaminophen 325 mg oral tablet  -- 2 tab(s) by mouth every 6 hours, As needed, Mild Pain (1 - 3)  -- Indication: For Pain    aspirin 81 mg oral tablet, chewable  -- 1 tab(s) by mouth once a day  -- Indication: For CAD (coronary artery disease)    dilTIAZem 180 mg/24 hours oral capsule, extended release  -- 1 cap(s) by mouth once a day  -- Indication: For HTN (hypertension)    apixaban 5 mg oral tablet  -- 1 tab(s) by mouth 2 times a day   -- Check with your doctor before becoming pregnant.  It is very important that you take or use this exactly as directed.  Do not skip doses or discontinue unless directed by your doctor.  Obtain medical advice before taking any non-prescription drugs as some may affect the action of this medication.    -- Indication: For Peripheral arterial disease    topiramate 100 mg oral tablet  -- 1 tab(s) by mouth once a day  -- Indication: For seizures    Lantus Solostar Pen 100 units/mL subcutaneous solution  -- 18 unit(s) subcutaneous 3 times a day before meals  -- Indication: For Diabetes    HumaLOG KwikPen 200 units/mL (Concentrated) subcutaneous solution  -- 8 unit(s) subcutaneous 3 times a day before meals  -- Indication: For Diabetes    losartan-hydroCHLOROthiazide 100 mg-25 mg oral tablet  -- 1 tab(s) by mouth once a day  -- Indication: For HTN (hypertension)    carvedilol 12.5 mg oral tablet  -- 1 tab(s) by mouth every 12 hours  -- Indication: For HTN (hypertension)    tiotropium 18 mcg inhalation capsule  -- 1 cap(s) inhaled once a day  -- Indication: For Dyspnea on exertion    Advair Diskus 250 mcg-50 mcg inhalation powder  -- 1 puff(s) inhaled 2 times a day  -- Indication: For Dyspnea on exertion    guaiFENesin 100 mg/5 mL oral liquid  -- 10 milliliter(s) by mouth every 6 hours, As needed, Cough  -- Indication: For Cough    montelukast 10 mg oral tablet  -- 1 tab(s) by mouth once a day (at bedtime)  -- Indication: For Dyspnea on exertion    omeprazole 20 mg oral delayed release capsule  -- 1 cap(s) by mouth once a day  -- Indication: For Dyspepsia    hydrALAZINE 50 mg oral tablet  -- 1 tab(s) by mouth 3 times a day  -- Indication: For HTN (hypertension)    Vitamin D2 50,000 intl units (1.25 mg) oral capsule  -- 1 cap(s) by mouth once a week on Fridays  -- Indication: For supplement I will START or STAY ON the medications listed below when I get home from the hospital:    glucometer  -- Check blood sugar before meals and at bedtime   -- Indication: For Diabetes    insulin pen needles  -- Use as directed 4 times a day  -- Indication: For Diabetes    lancets  -- Check blood sugar 4 times a day before meals and at bedtime  -- Indication: For Diabetes    test strips  -- Check blood sugar 4 times a day before meals and at bedtime  -- Indication: For Diabetes    alcohol swabs  -- Use as directed 4 times a day  -- Indication: For Diabetes    acetaminophen 325 mg oral tablet  -- 2 tab(s) by mouth every 6 hours, As needed, Mild Pain (1 - 3)  -- Indication: For Pain    aspirin 81 mg oral tablet, chewable  -- 1 tab(s) by mouth once a day  -- Indication: For CAD (coronary artery disease)    dilTIAZem 180 mg/24 hours oral capsule, extended release  -- 1 cap(s) by mouth once a day  -- Indication: For HTN (hypertension)    apixaban 5 mg oral tablet  -- 1 tab(s) by mouth 2 times a day   -- Check with your doctor before becoming pregnant.  It is very important that you take or use this exactly as directed.  Do not skip doses or discontinue unless directed by your doctor.  Obtain medical advice before taking any non-prescription drugs as some may affect the action of this medication.    -- Indication: For Peripheral arterial disease    topiramate 100 mg oral tablet  -- 1 tab(s) by mouth once a day  -- Indication: For seizures    HumaLOG KwikPen 200 units/mL (Concentrated) subcutaneous solution  -- 8 unit(s) subcutaneous 3 times a day before meals  -- Indication: For Diabetes    Lantus Solostar Pen 100 units/mL subcutaneous solution  -- 18 unit(s) subcutaneous once a day (at bedtime)  before meals   -- Indication: For Diabetes    losartan-hydroCHLOROthiazide 100 mg-25 mg oral tablet  -- 1 tab(s) by mouth once a day  -- Indication: For HTN (hypertension)    carvedilol 12.5 mg oral tablet  -- 1 tab(s) by mouth every 12 hours  -- Indication: For HTN (hypertension)    Advair Diskus 250 mcg-50 mcg inhalation powder  -- 1 puff(s) inhaled 2 times a day  -- Indication: For Dyspnea on exertion    tiotropium 18 mcg inhalation capsule  -- 1 cap(s) inhaled once a day  -- Indication: For Dyspnea on exertion    guaiFENesin 100 mg/5 mL oral liquid  -- 10 milliliter(s) by mouth every 6 hours, As needed, Cough  -- Indication: For Cough    montelukast 10 mg oral tablet  -- 1 tab(s) by mouth once a day (at bedtime)  -- Indication: For Dyspnea on exertion    omeprazole 20 mg oral delayed release capsule  -- 1 cap(s) by mouth once a day  -- Indication: For Dyspepsia    hydrALAZINE 50 mg oral tablet  -- 1 tab(s) by mouth 3 times a day  -- Indication: For HTN (hypertension)    Vitamin D2 50,000 intl units (1.25 mg) oral capsule  -- 1 cap(s) by mouth once a week on Fridays  -- Indication: For supplement

## 2018-09-25 NOTE — DISCHARGE NOTE ADULT - HOSPITAL COURSE
62F PMH of asthma/COPD (no home O2), L MCA CVA (residual right sided hemiparesis), small chronic right parietal lobe infarct, seizures, DM2, CAD s/p stent, HTN (prior episodes of HTN urgency), AFib (no anticoagulatin), PAD s/p Rt fem-pop bypass (s/p occlusion and IR stent placement 2/2017), GERD who presents with SOB. She started feeling SOB about 3 weeks ago with 1-2 weeks of productive cough with clear sputum. Over the past week she endorses some wheeze and she has been using her inhaler 3 times in the morning and once at night daily. She no longer has medication for her nebulizer. She can walk <1 block as she is limited by shortness of breath and by LE claudication symptoms. She also has chest pain that is in the L breast, not pleuritic, feels that it is in the breast tissue and improves when she squeezes it. Denies fevers/chills, LE swelling, N/V, diaphoresis, diarrhea, constipation, abdominal pain, dizziness. Walks with a walker. Current daily smoker just 1-2 cigarettes daily   Dyspnea on exertion.  Plan: Jessie COPD exacerbation  duonebs  cw steroids as per pulm  antibiotics as per ID   pulmonary fu    CAD (coronary artery disease).  Plan: cw home meds   cards consult appreciated  cards cleared for vascular procedure     Diabetes.  Plan: monitor FS  ISS.   management as per endocrine    Severe PVD Plan doppler reviewed  hep gtt converted to Eliquis for discharge  Follow up with endocrine and vascular out pt

## 2018-09-25 NOTE — DISCHARGE NOTE ADULT - SECONDARY DIAGNOSIS.
COPD exacerbation Peripheral arterial disease CAD (coronary artery disease) Essential hypertension Diabetes

## 2018-09-25 NOTE — DISCHARGE NOTE ADULT - MEDICATION SUMMARY - MEDICATIONS TO STOP TAKING
I will STOP taking the medications listed below when I get home from the hospital:    Tylenol PM  -- 2 tab(s) by mouth once a day (at bedtime), As Needed for pain from toothache

## 2018-09-26 VITALS
DIASTOLIC BLOOD PRESSURE: 68 MMHG | OXYGEN SATURATION: 98 % | SYSTOLIC BLOOD PRESSURE: 105 MMHG | TEMPERATURE: 98 F | HEART RATE: 75 BPM | RESPIRATION RATE: 18 BRPM

## 2018-09-26 LAB
APTT BLD: 32.1 SEC — SIGNIFICANT CHANGE UP (ref 27.5–37.4)
GLUCOSE BLDC GLUCOMTR-MCNC: 119 MG/DL — HIGH (ref 70–99)
GLUCOSE BLDC GLUCOMTR-MCNC: 131 MG/DL — HIGH (ref 70–99)

## 2018-09-26 RX ADMIN — BUDESONIDE AND FORMOTEROL FUMARATE DIHYDRATE 2 PUFF(S): 160; 4.5 AEROSOL RESPIRATORY (INHALATION) at 05:05

## 2018-09-26 RX ADMIN — Medication 3 MILLILITER(S): at 10:36

## 2018-09-26 RX ADMIN — APIXABAN 5 MILLIGRAM(S): 2.5 TABLET, FILM COATED ORAL at 05:08

## 2018-09-26 RX ADMIN — Medication 180 MILLIGRAM(S): at 05:06

## 2018-09-26 RX ADMIN — PANTOPRAZOLE SODIUM 40 MILLIGRAM(S): 20 TABLET, DELAYED RELEASE ORAL at 05:06

## 2018-09-26 RX ADMIN — Medication 8 UNIT(S): at 08:03

## 2018-09-26 RX ADMIN — Medication 50 MILLIGRAM(S): at 05:06

## 2018-09-26 RX ADMIN — CARVEDILOL PHOSPHATE 12.5 MILLIGRAM(S): 80 CAPSULE, EXTENDED RELEASE ORAL at 05:06

## 2018-09-26 RX ADMIN — Medication 100 MILLIGRAM(S): at 08:50

## 2018-09-26 RX ADMIN — Medication 3 MILLILITER(S): at 05:05

## 2018-09-26 RX ADMIN — LOSARTAN POTASSIUM 100 MILLIGRAM(S): 100 TABLET, FILM COATED ORAL at 05:06

## 2018-09-26 RX ADMIN — Medication 81 MILLIGRAM(S): at 08:50

## 2018-09-26 NOTE — PROGRESS NOTE ADULT - PROBLEM SELECTOR PROBLEM 1
Dyspnea on exertion
Dyspnea on exertion
Diabetes
Dyspnea on exertion
Diabetes
Dyspnea on exertion

## 2018-09-26 NOTE — PROGRESS NOTE ADULT - PROVIDER SPECIALTY LIST ADULT
Cardiology
Endocrinology
Hospitalist
Infectious Disease
Internal Medicine
Pulmonology
Vascular Surgery
Vascular Surgery
Cardiology
Cardiology
Infectious Disease
Infectious Disease
Endocrinology
Pulmonology

## 2018-09-26 NOTE — PROGRESS NOTE ADULT - REASON FOR ADMISSION
sob

## 2018-09-26 NOTE — PROGRESS NOTE ADULT - PROBLEM SELECTOR PLAN 5
on heparin: VSCULAR FOLLOWING  9/15: Remains on heparin, vasc f/u  9/16: Planning for surgery next week:  9/17: on IV heparin: for surgery this week ?  9/21  RLE bypass scheduled for 9/17 but cancelled due to leukocytosis.   Outpatient follow up to reschedule RLE bypass per vascular surgery
on heparin: VSCULAR FOLLOWING
on heparin: VSCULAR FOLLOWING  9/15: Remains on heparin, vasc f/u  9/16: Planning for surgery next week:  9/17: on IV heparin: for surgery this week ?  9/21  RLE bypass scheduled for 9/17 but cancelled due to leukocytosis.   Outpatient follow up to reschedule RLE bypass per vascular surgery  9/22 pending surgical intervention.  9/23 pending surgical intervention.
9/24; surgery per vascular
9/24; surgery per vascular  9/25: surgery planned for Thursday: Pt is optimised from lung point of view: chest ct scan reviewed: normal with no pneumonia:
9/24; surgery per vascular  9/25: surgery planned for Thursday: Pt is optimised from lung point of view: chest ct scan reviewed: normal with no pneumonia:  9/26: no surgery now, ? as an outpatient:
on heparin: DAWITCULAALICIA FOLLOWING  9/15: Remains on heparin, vasc f/u  9/16: Planning for surgery next week:  9/17: on IV heparin: for surgery this week ?
on heparin: VSCULAR FOLLOWING  9/15: Remains on heparin, vasc f/u
on heparin: VSCULAR FOLLOWING  9/15: Remains on heparin, vasc f/u  9/16: Planning for surgery next week:
on heparin: VSCULAR FOLLOWING  9/15: Remains on heparin, vasc f/u  9/16: Planning for surgery next week:  9/17: on IV heparin: for surgery this week ?  9/21  RLE bypass scheduled for 9/17 but cancelled due to leukocytosis.   Outpatient follow up to reschedule RLE bypass per vascular surgery  9/22 pending surgical intervention.

## 2018-09-26 NOTE — PROGRESS NOTE ADULT - SUBJECTIVE AND OBJECTIVE BOX
Chief complaint  Patient is a 63y old  Female who presents with a chief complaint of sob (26 Sep 2018 10:19)   Review of systems  Patient in bed, looks comfortable, no fever, no hypoglycemia.    Labs and Fingersticks  CAPILLARY BLOOD GLUCOSE      POCT Blood Glucose.: 119 mg/dL (26 Sep 2018 11:59)  POCT Blood Glucose.: 131 mg/dL (26 Sep 2018 07:37)  POCT Blood Glucose.: 146 mg/dL (25 Sep 2018 22:26)  POCT Blood Glucose.: 95 mg/dL (25 Sep 2018 21:10)  POCT Blood Glucose.: 110 mg/dL (25 Sep 2018 16:17)      Anion Gap, Serum: 11 (09-25 @ 07:07)      Calcium, Total Serum: 10.0 (09-25 @ 07:07)          09-25    134<L>  |  103  |  20  ----------------------------<  114<H>  4.1   |  20<L>  |  1.17    Ca    10.0      25 Sep 2018 07:07      Medications  MEDICATIONS  (STANDING):  ALBUTerol    90 MICROgram(s) HFA Inhaler 1 Puff(s) Inhalation every 4 hours  ALBUTerol/ipratropium for Nebulization 3 milliLiter(s) Nebulizer every 6 hours  apixaban 5 milliGRAM(s) Oral every 12 hours  aspirin  chewable 81 milliGRAM(s) Oral daily  buDESOnide 160 MICROgram(s)/formoterol 4.5 MICROgram(s) Inhaler 2 Puff(s) Inhalation two times a day  carvedilol 12.5 milliGRAM(s) Oral every 12 hours  dextrose 5%. 1000 milliLiter(s) (50 mL/Hr) IV Continuous <Continuous>  dextrose 50% Injectable 12.5 Gram(s) IV Push once  dextrose 50% Injectable 25 Gram(s) IV Push once  dextrose 50% Injectable 25 Gram(s) IV Push once  diltiazem    milliGRAM(s) Oral daily  hydrALAZINE 50 milliGRAM(s) Oral three times a day  insulin glargine Injectable (LANTUS) 18 Unit(s) SubCutaneous at bedtime  insulin lispro (HumaLOG) corrective regimen sliding scale   SubCutaneous three times a day before meals  insulin lispro Injectable (HumaLOG) 8 Unit(s) SubCutaneous three times a day before meals  losartan 100 milliGRAM(s) Oral daily  montelukast 10 milliGRAM(s) Oral at bedtime  pantoprazole    Tablet 40 milliGRAM(s) Oral before breakfast  simvastatin 40 milliGRAM(s) Oral at bedtime  tiotropium 18 MICROgram(s) Capsule 1 Capsule(s) Inhalation daily  topiramate 100 milliGRAM(s) Oral daily      Physical Exam  General: Patient comfortable in bed  Vital Signs Last 12 Hrs  T(F): 98.5 (09-26-18 @ 10:09), Max: 98.9 (09-26-18 @ 04:16)  HR: 75 (09-26-18 @ 10:09) (75 - 102)  BP: 105/68 (09-26-18 @ 10:09) (105/68 - 110/75)  BP(mean): --  RR: 18 (09-26-18 @ 10:09) (18 - 18)  SpO2: 98% (09-26-18 @ 10:09) (95% - 98%)  Neck: No palpable thyroid nodules.  CVS: S1S2, No murmurs  Respiratory: No wheezing, no crepitations  GI: Abdomen soft, bowel sounds positive  Musculoskeletal:  edema lower extremities.   Skin: No skin rashes, no ecchymosis    Diagnostics    Thyroid Stimulating Hormone, Serum: AM Sched. Collection: 15-Sep-2018 06:00 (09-14 @ 12:20)  Free Thyroxine, Serum: AM Sched. Collection: 15-Sep-2018 06:00 (09-14 @ 12:20)  TSH Receptor Antibody: AM Sched. Collection: 15-Sep-2018 06:00 (09-14 @ 12:20)

## 2018-09-26 NOTE — PROGRESS NOTE ADULT - PROBLEM SELECTOR PROBLEM 4
HTN (hypertension)

## 2018-09-26 NOTE — PROGRESS NOTE ADULT - ATTENDING COMMENTS
she is being evaluated by vascular surgery for possible surgery: Pt is improving clinically from copd point of view!
she is being evaluated by vascular surgery for possible surgery: Pt is improving clinically from copd point of view!  9/13: Pt has been getting better: Her wheezing has resolved: Switch to po prednisone for a few days more:  PAD: Vacular follow up noted:
she is being evaluated by vascular surgery for possible surgery: Pt is improving clinically from copd point of view!  9/13: Pt has been getting better: Her wheezing has resolved: Switch to po prednisone for a few days more:  PAD: Vacular follow up noted:  9/14: Pt is stable from pulmonary point of view!
pt is stable from pulmonary point of view: She has not been wheezing and is off steroids:  9/24: stable: optimized for surgery!
pt is stable from pulmonary point of view: She has not been wheezing and is off steroids:  9/24: stable: optimized for surgery!  9/25: stable from pulmonary perspective!
pt is stable from pulmonary point of view: She has not been wheezing and is off steroids:  9/24: stable: optimized for surgery!  9/25: stable from pulmonary perspective!  9/26: stable: ? for dc today??
she is being evaluated by vascular surgery for possible surgery: Pt is improving clinically from copd point of view!  9/13: Pt has been getting better: Her wheezing has resolved: Switch to po prednisone for a few days more:  PAD: Vacular follow up noted:  9/14: Pt is stable from pulmonary point of view!  9/15: doing ok : no SOB
she is being evaluated by vascular surgery for possible surgery: Pt is improving clinically from copd point of view!  9/13: Pt has been getting better: Her wheezing has resolved: Switch to po prednisone for a few days more:  PAD: Vacular follow up noted:  9/14: Pt is stable from pulmonary point of view!  9/15: doing ok : no SOB  9/16: COPD exacerbation is resolving: Her SOb as well as wheezing has improved significantly:
she is being evaluated by vascular surgery for possible surgery: Pt is improving clinically from copd point of view!  9/13: Pt has been getting better: Her wheezing has resolved: Switch to po prednisone for a few days more:  PAD: Vacular follow up noted:  9/14: Pt is stable from pulmonary point of view!  9/15: doing ok : no SOB  9/16: COPD exacerbation is resolving: Her SOb as well as wheezing has improved significantly:  9/17: Stable pulmonary wise now
she is being evaluated by vascular surgery for possible surgery: Pt is improving clinically from copd point of view!  9/13: Pt has been getting better: Her wheezing has resolved: Switch to po prednisone for a few days more:  PAD: Vacular follow up noted:  9/14: Pt is stable from pulmonary point of view!  9/15: doing ok : no SOB  9/16: COPD exacerbation is resolving: Her SOb as well as wheezing has improved significantly:  9/17: Stable pulmonary wise now  9/18: stable
she is being evaluated by vascular surgery for possible surgery: Pt is improving clinically from copd point of view!  9/13: Pt has been getting better: Her wheezing has resolved: Switch to po prednisone for a few days more:  PAD: Vacular follow up noted:  9/14: Pt is stable from pulmonary point of view!  9/15: doing ok : no SOB  9/16: COPD exacerbation is resolving: Her SOb as well as wheezing has improved significantly:  9/17: Stable pulmonary wise now  9/18: stable  9/19: for surgery this week
she is being evaluated by vascular surgery for possible surgery: Pt is improving clinically from copd point of view!  9/13: Pt has been getting better: Her wheezing has resolved: Switch to po prednisone for a few days more:  PAD: Vacular follow up noted:  9/14: Pt is stable from pulmonary point of view!  9/15: doing ok : no SOB  9/16: COPD exacerbation is resolving: Her SOb as well as wheezing has improved significantly:  9/17: Stable pulmonary wise now  9/18: stable  9/19: for surgery this week  9/20: surgery cancelled due to high wbc count: Doing ok : p t is afebrile: ok from pulmonary point of view for the surgery!
No wheezing, comfortable breathing with no SOb as well as no wheezing
pt is stable from pulmonary point of view: She has not been wheezing and is off steroids:
stable from pulmonary point of view: for eventual surgery

## 2018-09-26 NOTE — PROGRESS NOTE ADULT - PROBLEM SELECTOR PROBLEM 5
Peripheral arterial disease

## 2018-09-26 NOTE — PROGRESS NOTE ADULT - PROBLEM SELECTOR PROBLEM 3
HTN (hypertension)
Diabetes
HTN (hypertension)

## 2018-09-26 NOTE — PROGRESS NOTE ADULT - PROBLEM SELECTOR PLAN 2
On medications, monitored and followed up by primary/cardiology team. Will get full TFTs to r/o thyroid disease.
9/18: Stable: no chest pain  9/19: stable  9/20: NO CHEST PAIN : DOING OK  9/21 stable
9/18: Stable: no chest pain  9/19: stable  9/20: NO CHEST PAIN : DOING OK  9/21 stable  9/22 stable.  9/23 on ASA
stable with no pain: Cont current treatment
9/18: Stable: no chest pain
9/18: Stable: no chest pain  9/19: stable
9/18: Stable: no chest pain  9/19: stable  9/20: NO CHEST PAIN : DOING OK
9/18: Stable: no chest pain  9/19: stable  9/20: NO CHEST PAIN : DOING OK  9/21 stable  9/22 stable.
9/24: Stable: Cont current Treatment
9/24: Stable: Cont current Treatment  9/25: stable
9/24: Stable: Cont current Treatment  9/25: stable  9/26: cont thuan saavedra
Cont current meds  9/12: Stable , no chest pian
Cont current meds  9/12: Stable , no chest pian  9/13: Cont curretn rx
Cont curretn meds
On medications, monitored and followed up by primary/cardiology team
On medications, monitored and followed up by primary/cardiology team.
On medications, monitored and followed up by primary/cardiology team. Will get full TFTs to r/o thyroid disease.
stable with no pain: Cont current treatment  9/15: no chest apin
stable with no pain: Cont current treatment  9/15: no chest pain  9/16: no chest pain: Defer to cardiology
stable with no pain: Cont current treatment  9/15: no chest pain  9/16: no chest pain: Defer to cardiology  9/17: No chest pain

## 2018-09-26 NOTE — PROGRESS NOTE ADULT - PROBLEM SELECTOR PLAN 1
9/24: resolved: no wheezing: finished prednisone treatment:  9/25: stable  9/26: no wheeze, no dyspnea: pt has been strongly advised to not to smoke:

## 2018-09-26 NOTE — PROGRESS NOTE ADULT - SUBJECTIVE AND OBJECTIVE BOX
- Patient seen and examined.  - In summary, patient is a 62 year old woman who presented with sob (13 Sep 2018 20:06)  - Today, patient is without complaints.         *****MEDICATIONS:    MEDICATIONS  (STANDING):  ALBUTerol    90 MICROgram(s) HFA Inhaler 1 Puff(s) Inhalation every 4 hours  ALBUTerol/ipratropium for Nebulization 3 milliLiter(s) Nebulizer every 6 hours  apixaban 5 milliGRAM(s) Oral every 12 hours  aspirin  chewable 81 milliGRAM(s) Oral daily  buDESOnide 160 MICROgram(s)/formoterol 4.5 MICROgram(s) Inhaler 2 Puff(s) Inhalation two times a day  carvedilol 12.5 milliGRAM(s) Oral every 12 hours  dextrose 5%. 1000 milliLiter(s) (50 mL/Hr) IV Continuous <Continuous>  dextrose 50% Injectable 12.5 Gram(s) IV Push once  dextrose 50% Injectable 25 Gram(s) IV Push once  dextrose 50% Injectable 25 Gram(s) IV Push once  diltiazem    milliGRAM(s) Oral daily  hydrALAZINE 50 milliGRAM(s) Oral three times a day  insulin glargine Injectable (LANTUS) 18 Unit(s) SubCutaneous at bedtime  insulin lispro (HumaLOG) corrective regimen sliding scale   SubCutaneous three times a day before meals  insulin lispro Injectable (HumaLOG) 8 Unit(s) SubCutaneous three times a day before meals  losartan 100 milliGRAM(s) Oral daily  montelukast 10 milliGRAM(s) Oral at bedtime  pantoprazole    Tablet 40 milliGRAM(s) Oral before breakfast  simvastatin 40 milliGRAM(s) Oral at bedtime  tiotropium 18 MICROgram(s) Capsule 1 Capsule(s) Inhalation daily  topiramate 100 milliGRAM(s) Oral daily    MEDICATIONS  (PRN):  acetaminophen   Tablet .. 650 milliGRAM(s) Oral every 6 hours PRN Mild Pain (1 - 3)  dextrose 40% Gel 15 Gram(s) Oral once PRN Blood Glucose LESS THAN 70 milliGRAM(s)/deciliter  glucagon  Injectable 1 milliGRAM(s) IntraMuscular once PRN Glucose LESS THAN 70 milligrams/deciliter  guaiFENesin   Syrup  (Sugar-Free) 200 milliGRAM(s) Oral every 6 hours PRN Cough               ***** REVIEW OF SYSTEM:  GEN: no fever, no chills, no pain  RESP: no SOB, no cough, no sputum  CVS: no chest pain, no palpitations, no edema  GI: no abdominal pain, no nausea, no vomiting, no constipation, no diarrhea  : no dysuria, no frequency  NEURO: no headache, no dizziness  PSYCH: no depression, not anxious  Derm : no itching, no rash         ***** VITAL SIGNS:    T(F): 98.5 (18 @ 10:09), Max: 98.9 (18 @ 04:16)  HR: 75 (18 @ 10:09) (60 - 102)  BP: 105/68 (18 @ 10:09) (105/68 - 148/75)  RR: 18 (18 @ 10:09) (18 - 118)  SpO2: 98% (18 @ 10:09) (95% - 98%)  Wt(kg): --  ,   I&O's Summary    25 Sep 2018 07:  -  26 Sep 2018 07:00  --------------------------------------------------------  IN: 1057 mL / OUT: 950 mL / NET: 107 mL    26 Sep 2018 07:  -  26 Sep 2018 13:13  --------------------------------------------------------  IN: 480 mL / OUT: 0 mL / NET: 480 mL                   *****PHYSICAL EXAM:  GEN: A&O X 3 , NAD , comfortable  HEENT: NCAT, EOMI, MMM, no icterus  NECK: Supple, No JVD  CVS: S1S2 , regular , No M/R/G appreciated  PULM: CTA B/L,  no W/R/R appreciated  ABD.: soft. non tender, non distended,  bowel sounds present  Extrem: intact pulses , no edema noted  Derm: No rash or ecchymosis noted  PSYCH: normal mood, no depression, not anxious         *****LAB AND IMAGIN-25    134<L>  |  103  |  20  ----------------------------<  114<H>  4.1   |  20<L>  |  1.17    Ca    10.0      25 Sep 2018 07:07      PT/INR - ( 25 Sep 2018 07:41 )   PT: 12.0 sec;   INR: 1.06 ratio         PTT - ( 26 Sep 2018 08:06 )  PTT:32.1 sec         [All pertinent recent Imaging/Reports reviewed]  < from: Nuclear Stress Test-Pharmacologic (18 @ 14:02) >  * The left ventricle was normal in size. There is a small  mild reversible defect in the distal inferolateral wall  consistentwith mild ischemia.    < end of copied text >    < from: Transthoracic Echocardiogram (18 @ 15:09) >  Conclusions:  1. Calcified trileaflet aortic valve with normal opening.  Mild aortic regurgitation.  2. Increased relative wall thickness with normal left  ventricular mass index, consistent with concentric left  ventricular remodeling.  3. Hyperdynamic left ventricular systolic function. The  upper septum measures 1.6 cm with mild LVOT obstruction  45mmHG.  4. Strain imaging was performed on the [facility] EPIQ with an  average HR of 55 bpm and a BP of 169/94. Global L. Strain=  -25.7  5. Normal right ventricular size and function.    < end of copied text >         *****A S S E S S M E N T   A N D   P L A N :  62F with PAD, CAD, COPD adm with dyspnea  tx COPD per pulm  non obstructive CAD on cath   stress test without significant ischemia  breast pain r/w massage, unlikely angina  no cardiac symptoms  severe PAD on CTA  echo noted  would proceed with surgery at moderate risk  vasc f/u noted  ?timing OR,   DCP    __________________________  JACK Alegre D.O.

## 2018-09-26 NOTE — PROGRESS NOTE ADULT - SUBJECTIVE AND OBJECTIVE BOX
Patient is a 63y old  Female who presents with a chief complaint of sob (25 Sep 2018 19:02)      Any change in ROS:   events noted: no SOB : no wheezing: no surgery now:     MEDICATIONS  (STANDING):  ALBUTerol    90 MICROgram(s) HFA Inhaler 1 Puff(s) Inhalation every 4 hours  ALBUTerol/ipratropium for Nebulization 3 milliLiter(s) Nebulizer every 6 hours  apixaban 5 milliGRAM(s) Oral every 12 hours  aspirin  chewable 81 milliGRAM(s) Oral daily  buDESOnide 160 MICROgram(s)/formoterol 4.5 MICROgram(s) Inhaler 2 Puff(s) Inhalation two times a day  carvedilol 12.5 milliGRAM(s) Oral every 12 hours  dextrose 5%. 1000 milliLiter(s) (50 mL/Hr) IV Continuous <Continuous>  dextrose 50% Injectable 12.5 Gram(s) IV Push once  dextrose 50% Injectable 25 Gram(s) IV Push once  dextrose 50% Injectable 25 Gram(s) IV Push once  diltiazem    milliGRAM(s) Oral daily  hydrALAZINE 50 milliGRAM(s) Oral three times a day  insulin glargine Injectable (LANTUS) 18 Unit(s) SubCutaneous at bedtime  insulin lispro (HumaLOG) corrective regimen sliding scale   SubCutaneous three times a day before meals  insulin lispro Injectable (HumaLOG) 8 Unit(s) SubCutaneous three times a day before meals  losartan 100 milliGRAM(s) Oral daily  montelukast 10 milliGRAM(s) Oral at bedtime  pantoprazole    Tablet 40 milliGRAM(s) Oral before breakfast  simvastatin 40 milliGRAM(s) Oral at bedtime  tiotropium 18 MICROgram(s) Capsule 1 Capsule(s) Inhalation daily  topiramate 100 milliGRAM(s) Oral daily    MEDICATIONS  (PRN):  acetaminophen   Tablet .. 650 milliGRAM(s) Oral every 6 hours PRN Mild Pain (1 - 3)  dextrose 40% Gel 15 Gram(s) Oral once PRN Blood Glucose LESS THAN 70 milliGRAM(s)/deciliter  glucagon  Injectable 1 milliGRAM(s) IntraMuscular once PRN Glucose LESS THAN 70 milligrams/deciliter  guaiFENesin   Syrup  (Sugar-Free) 200 milliGRAM(s) Oral every 6 hours PRN Cough    Vital Signs Last 24 Hrs  T(C): 36.9 (26 Sep 2018 10:09), Max: 37.2 (26 Sep 2018 04:16)  T(F): 98.5 (26 Sep 2018 10:09), Max: 98.9 (26 Sep 2018 04:16)  HR: 75 (26 Sep 2018 10:09) (60 - 102)  BP: 105/68 (26 Sep 2018 10:09) (104/69 - 148/75)  BP(mean): --  RR: 18 (26 Sep 2018 10:09) (18 - 118)  SpO2: 98% (26 Sep 2018 10:09) (95% - 98%)    I&O's Summary    25 Sep 2018 07:01  -  26 Sep 2018 07:00  --------------------------------------------------------  IN: 1057 mL / OUT: 950 mL / NET: 107 mL          Physical Exam:   GENERAL: NAD, well-groomed, well-developed  HEENT: PRIYANK/   Atraumatic, Normocephalic  ENMT: No tonsillar erythema, exudates, or enlargement; Moist mucous membranes, Good dentition, No lesions  NECK: Supple, No JVD, Normal thyroid  CHEST/LUNG: Clear to auscultaion, ; No rales, rhonchi, wheezing, or rubs  CVS: Regular rate and rhythm; No murmurs, rubs, or gallops  GI: : Soft, Nontender, Nondistended; Bowel sounds present  NERVOUS SYSTEM:  Alert & Oriented X3  EXTREMITIES:  2+ Peripheral Pulses, No clubbing, cyanosis, or edema  LYMPH: No lymphadenopathy noted  SKIN: No rashes or lesions  ENDOCRINOLOGY: No Thyromegaly  PSYCH: Appropriate    Labs:                              10.8   9.19  )-----------( Clumped    ( 24 Sep 2018 09:21 )             34.6                         11.3   9.60  )-----------( Clumped    ( 23 Sep 2018 11:05 )             35.7     09-25    134<L>  |  103  |  20  ----------------------------<  114<H>  4.1   |  20<L>  |  1.17  09-24    131<L>  |  103  |  22  ----------------------------<  112<H>  3.9   |  21<L>  |  1.14  09-23    136  |  105  |  20  ----------------------------<  123<H>  4.1   |  19<L>  |  1.10    Ca    10.0      25 Sep 2018 07:07      CAPILLARY BLOOD GLUCOSE      POCT Blood Glucose.: 131 mg/dL (26 Sep 2018 07:37)  POCT Blood Glucose.: 146 mg/dL (25 Sep 2018 22:26)  POCT Blood Glucose.: 95 mg/dL (25 Sep 2018 21:10)  POCT Blood Glucose.: 110 mg/dL (25 Sep 2018 16:17)  POCT Blood Glucose.: 77 mg/dL (25 Sep 2018 11:56)        PT/INR - ( 25 Sep 2018 07:41 )   PT: 12.0 sec;   INR: 1.06 ratio         PTT - ( 26 Sep 2018 08:06 )  PTT:32.1 sec          RECENT CULTURES:        RESPIRATORY CULTURES:          Studies  Chest X-RAY  CT SCAN Chest   Venous Dopplers: LE:   CT Abdomen  Others        < from: CT Angio Abd Aorta w/run-off w/ IV Cont (09.12.18 @ 17:40) >  PROFUNDA FEMORAL ARTERY: Widely patent.  POPLITEAL ARTERY: Widely patent.  ANTERIOR TIBIAL ARTERY: Widely patent.  TIBIOPERONEAL TRUNK: Widely patent.  POSTERIOR TIBIAL ARTERY: Widely patent.  PERONEAL ARTERY: Widely patent.    ADDITIONAL FINDINGS: Small left hepatic cyst. Renal cysts. Hysterectomy.     IMPRESSION:     RIGHT LOWER EXTREMITY: Occluded external iliac artery and common femoral   artery. Widely patent profunda femoral artery via a femoral-femoral   bypass graft. Occlusion of the femoral artery and lower extremity bypass   graft. Reconstitution of the popliteal artery with three-vessel runoff   although the tibioperoneal trunk has moderate focal stenosis.    LEFT LOWER EXTREMITY: Femoral artery is occluded with reconstitution of   the distal femoral artery. Three-vessel runoff.                    LISA LU M.D., ATTENDING RADIOLOGIST  This document has been electronically signed. Sep 13 2018  9:22AM    < end of copied text >

## 2018-09-26 NOTE — PROGRESS NOTE ADULT - PROBLEM SELECTOR PLAN 4
controlled  9/12: running little high, cont top optimize his anti hypertensives:  9/13:Blood pressure is high: cont to optimize the anti hypertensives!  6/19: Blood pressure seems reasonable:  9/15: cont current meds  9/*16: stable  9/18: controlled  9/19: Controlled  9/21 stable. continue home meds
controlled  9/12: running little high, cont top optimize his anti hypertensives:  9/13:Blood pressure is high: cont to optimize the anti hypertensives!
controlled  9/12: running little high, cont top optimize his anti hypertensives:  9/13:Blood pressure is high: cont to optimize the anti hypertensives!  6/19: Blood pressure seems reasonable:  9/15: cont current meds  9/*16: stable  9/18: controlled  9/19: Controlled  9/21 stable. continue home meds  9/22 stable with current management  9/23 stable continue current meds
9/24; stable
9/24; stable
9/24; stable  9/26: controlled
controlled
controlled  9/12: running little high, cont top optimize his anti hypertensives:
controlled  9/12: running little high, cont top optimize his anti hypertensives:  9/13:Blood pressure is high: cont to optimize the anti hypertensives!
controlled  9/12: running little high, cont top optimize his anti hypertensives:  9/13:Blood pressure is high: cont to optimize the anti hypertensives!  6/19: Blood pressure seems reasonable:  9/15: cont current meds
controlled  9/12: running little high, cont top optimize his anti hypertensives:  9/13:Blood pressure is high: cont to optimize the anti hypertensives!  6/19: Blood pressure seems reasonable:  9/15: cont current meds  9/*16: stable
controlled  9/12: running little high, cont top optimize his anti hypertensives:  9/13:Blood pressure is high: cont to optimize the anti hypertensives!  6/19: Blood pressure seems reasonable:  9/15: cont current meds  9/*16: stable  9/18: controlled
controlled  9/12: running little high, cont top optimize his anti hypertensives:  9/13:Blood pressure is high: cont to optimize the anti hypertensives!  6/19: Blood pressure seems reasonable:  9/15: cont current meds  9/*16: stable  9/18: controlled  9/19: Controlled
controlled  9/12: running little high, cont top optimize his anti hypertensives:  9/13:Blood pressure is high: cont to optimize the anti hypertensives!  6/19: Blood pressure seems reasonable:  9/15: cont current meds  9/*16: stable  9/18: controlled  9/19: Controlled
controlled  9/12: running little high, cont top optimize his anti hypertensives:  9/13:Blood pressure is high: cont to optimize the anti hypertensives!  6/19: Blood pressure seems reasonable:  9/15: cont current meds  9/*16: stable  9/18: controlled  9/19: Controlled  9/21 stable. continue home meds  9/22 stable with current management
controlled  9/12: running little high, cont top optimize his anti hypertensives:  9/13:Blood pressure is high: cont to optimize the anti hypertensives!  9/15: cont current meds

## 2018-09-26 NOTE — PROGRESS NOTE ADULT - PROBLEM SELECTOR PLAN 3
On meds primary team following up
9/17: Controlled  9/18: blood glucose controlled!  9/19: controlled  9/20: Bloofd glucose corie  9/21 under control. continue FS with sliding scale.
9/17: Controlled  9/18: blood glucose controlled!  9/19: controlled  9/20: Bloofd glucose corie  9/21 under control. continue FS with sliding scale.  9/22 FS with sliding scale  9/23 stable
controlled: monitor on steroids  9/*12: High likely secondary to steroids: Being decreased today!  9/13: Reasonable!  9/14: Controlled
9/17: Controlled
9/17: Controlled  9/18: blood glucose controlled!
9/17: Controlled  9/18: blood glucose controlled!  9/19: controlled
9/17: Controlled  9/18: blood glucose controlled!  9/19: controlled  9/20: Bloofd glucose corie
9/17: Controlled  9/18: blood glucose controlled!  9/19: controlled  9/20: Bloofd glucose corie  9/21 under control. continue FS with sliding scale.  9/22 FS with sliding scale
9/24: Blood glucose controlled
9/24: Blood glucose controlled  9/25: controlled
9/24: Blood glucose controlled  9/25: controlled  9/26: controlled
On meds primary team following up
controlled: monitor on steroids
controlled: monitor on steroids  9/*12: High likely secondary to steroids: Being decreased today!
controlled: monitor on steroids  9/*12: High likely secondary to steroids: Being decreased today!  9/13: Reasonable!
controlled: monitor on steroids  9/*12: High likely secondary to steroids: Being decreased today!  9/13: Reasonable!  9/14: Controlled  9/15: controlled
controlled: monitor on steroids  9/*12: High likely secondary to steroids: Being decreased today!  9/13: Reasonable!  9/14: Controlled  9/15: controlled  9/16: Controlled

## 2018-09-26 NOTE — PROGRESS NOTE ADULT - PROBLEM SELECTOR PROBLEM 2
CAD (coronary artery disease)

## 2018-10-10 ENCOUNTER — APPOINTMENT (OUTPATIENT)
Dept: ENDOVASCULAR SURGERY | Facility: CLINIC | Age: 63
End: 2018-10-10

## 2018-12-26 PROCEDURE — 93017 CV STRESS TEST TRACING ONLY: CPT

## 2018-12-26 PROCEDURE — 83880 ASSAY OF NATRIURETIC PEPTIDE: CPT

## 2018-12-26 PROCEDURE — 82962 GLUCOSE BLOOD TEST: CPT

## 2018-12-26 PROCEDURE — 94640 AIRWAY INHALATION TREATMENT: CPT

## 2018-12-26 PROCEDURE — 81003 URINALYSIS AUTO W/O SCOPE: CPT

## 2018-12-26 PROCEDURE — 83735 ASSAY OF MAGNESIUM: CPT

## 2018-12-26 PROCEDURE — 71250 CT THORAX DX C-: CPT

## 2018-12-26 PROCEDURE — 87086 URINE CULTURE/COLONY COUNT: CPT

## 2018-12-26 PROCEDURE — 85027 COMPLETE CBC AUTOMATED: CPT

## 2018-12-26 PROCEDURE — 78452 HT MUSCLE IMAGE SPECT MULT: CPT

## 2018-12-26 PROCEDURE — 93970 EXTREMITY STUDY: CPT

## 2018-12-26 PROCEDURE — 36600 WITHDRAWAL OF ARTERIAL BLOOD: CPT

## 2018-12-26 PROCEDURE — 75635 CT ANGIO ABDOMINAL ARTERIES: CPT

## 2018-12-26 PROCEDURE — 87581 M.PNEUMON DNA AMP PROBE: CPT

## 2018-12-26 PROCEDURE — 97116 GAIT TRAINING THERAPY: CPT

## 2018-12-26 PROCEDURE — 85730 THROMBOPLASTIN TIME PARTIAL: CPT

## 2018-12-26 PROCEDURE — 90686 IIV4 VACC NO PRSV 0.5 ML IM: CPT

## 2018-12-26 PROCEDURE — 93005 ELECTROCARDIOGRAM TRACING: CPT

## 2018-12-26 PROCEDURE — A9500: CPT

## 2018-12-26 PROCEDURE — 85610 PROTHROMBIN TIME: CPT

## 2018-12-26 PROCEDURE — 84484 ASSAY OF TROPONIN QUANT: CPT

## 2018-12-26 PROCEDURE — 83520 IMMUNOASSAY QUANT NOS NONAB: CPT

## 2018-12-26 PROCEDURE — 97110 THERAPEUTIC EXERCISES: CPT

## 2018-12-26 PROCEDURE — 87633 RESP VIRUS 12-25 TARGETS: CPT

## 2018-12-26 PROCEDURE — 87486 CHLMYD PNEUM DNA AMP PROBE: CPT

## 2018-12-26 PROCEDURE — 80048 BASIC METABOLIC PNL TOTAL CA: CPT

## 2018-12-26 PROCEDURE — 97161 PT EVAL LOW COMPLEX 20 MIN: CPT

## 2018-12-26 PROCEDURE — 84443 ASSAY THYROID STIM HORMONE: CPT

## 2018-12-26 PROCEDURE — 87798 DETECT AGENT NOS DNA AMP: CPT

## 2018-12-26 PROCEDURE — 83036 HEMOGLOBIN GLYCOSYLATED A1C: CPT

## 2018-12-26 PROCEDURE — 80053 COMPREHEN METABOLIC PANEL: CPT

## 2018-12-26 PROCEDURE — 84100 ASSAY OF PHOSPHORUS: CPT

## 2018-12-26 PROCEDURE — 82803 BLOOD GASES ANY COMBINATION: CPT

## 2018-12-26 PROCEDURE — 71045 X-RAY EXAM CHEST 1 VIEW: CPT

## 2018-12-26 PROCEDURE — 73630 X-RAY EXAM OF FOOT: CPT

## 2018-12-26 PROCEDURE — 93306 TTE W/DOPPLER COMPLETE: CPT

## 2018-12-26 PROCEDURE — 99285 EMERGENCY DEPT VISIT HI MDM: CPT | Mod: 25

## 2018-12-26 PROCEDURE — 84439 ASSAY OF FREE THYROXINE: CPT

## 2019-07-02 ENCOUNTER — EMERGENCY (EMERGENCY)
Facility: HOSPITAL | Age: 64
LOS: 1 days | Discharge: HOME | End: 2019-07-02
Attending: EMERGENCY MEDICINE
Payer: MEDICAID

## 2019-07-02 VITALS
DIASTOLIC BLOOD PRESSURE: 74 MMHG | SYSTOLIC BLOOD PRESSURE: 121 MMHG | OXYGEN SATURATION: 98 % | RESPIRATION RATE: 18 BRPM | TEMPERATURE: 98 F | HEART RATE: 89 BPM

## 2019-07-02 VITALS
OXYGEN SATURATION: 100 % | SYSTOLIC BLOOD PRESSURE: 117 MMHG | RESPIRATION RATE: 18 BRPM | DIASTOLIC BLOOD PRESSURE: 60 MMHG | WEIGHT: 164.91 LBS | HEIGHT: 64 IN | HEART RATE: 76 BPM | TEMPERATURE: 98 F

## 2019-07-02 DIAGNOSIS — Z90.710 ACQUIRED ABSENCE OF BOTH CERVIX AND UTERUS: Chronic | ICD-10-CM

## 2019-07-02 DIAGNOSIS — T82.897A OTHER SPECIFIED COMPLICATION OF CARDIAC PROSTHETIC DEVICES, IMPLANTS AND GRAFTS, INITIAL ENCOUNTER: Chronic | ICD-10-CM

## 2019-07-02 LAB
ALBUMIN SERPL ELPH-MCNC: 3.6 G/DL — SIGNIFICANT CHANGE UP (ref 3.5–5)
ALP SERPL-CCNC: 94 U/L — SIGNIFICANT CHANGE UP (ref 40–120)
ALT FLD-CCNC: 16 U/L DA — SIGNIFICANT CHANGE UP (ref 10–60)
ANION GAP SERPL CALC-SCNC: 11 MMOL/L — SIGNIFICANT CHANGE UP (ref 5–17)
APTT BLD: 36 SEC — SIGNIFICANT CHANGE UP (ref 27.5–36.3)
AST SERPL-CCNC: 10 U/L — SIGNIFICANT CHANGE UP (ref 10–40)
BASOPHILS # BLD AUTO: 0.1 K/UL — SIGNIFICANT CHANGE UP (ref 0–0.2)
BASOPHILS NFR BLD AUTO: 1.2 % — SIGNIFICANT CHANGE UP (ref 0–2)
BILIRUB SERPL-MCNC: 0.3 MG/DL — SIGNIFICANT CHANGE UP (ref 0.2–1.2)
BUN SERPL-MCNC: 13 MG/DL — SIGNIFICANT CHANGE UP (ref 7–18)
CALCIUM SERPL-MCNC: 9 MG/DL — SIGNIFICANT CHANGE UP (ref 8.4–10.5)
CHLORIDE SERPL-SCNC: 111 MMOL/L — HIGH (ref 96–108)
CO2 SERPL-SCNC: 20 MMOL/L — LOW (ref 22–31)
CREAT SERPL-MCNC: 1.22 MG/DL — SIGNIFICANT CHANGE UP (ref 0.5–1.3)
D DIMER BLD IA.RAPID-MCNC: 382 NG/ML DDU — HIGH
EOSINOPHIL # BLD AUTO: 0.32 K/UL — SIGNIFICANT CHANGE UP (ref 0–0.5)
EOSINOPHIL NFR BLD AUTO: 3.7 % — SIGNIFICANT CHANGE UP (ref 0–6)
GLUCOSE SERPL-MCNC: 133 MG/DL — HIGH (ref 70–99)
HCT VFR BLD CALC: 47.2 % — HIGH (ref 34.5–45)
HGB BLD-MCNC: 14.7 G/DL — SIGNIFICANT CHANGE UP (ref 11.5–15.5)
IMM GRANULOCYTES NFR BLD AUTO: 0.2 % — SIGNIFICANT CHANGE UP (ref 0–1.5)
INR BLD: 1.06 RATIO — SIGNIFICANT CHANGE UP (ref 0.88–1.16)
LIDOCAIN IGE QN: 232 U/L — SIGNIFICANT CHANGE UP (ref 73–393)
LYMPHOCYTES # BLD AUTO: 2.89 K/UL — SIGNIFICANT CHANGE UP (ref 1–3.3)
LYMPHOCYTES # BLD AUTO: 33.3 % — SIGNIFICANT CHANGE UP (ref 13–44)
MCHC RBC-ENTMCNC: 26.3 PG — LOW (ref 27–34)
MCHC RBC-ENTMCNC: 31.1 GM/DL — LOW (ref 32–36)
MCV RBC AUTO: 84.6 FL — SIGNIFICANT CHANGE UP (ref 80–100)
MONOCYTES # BLD AUTO: 0.68 K/UL — SIGNIFICANT CHANGE UP (ref 0–0.9)
MONOCYTES NFR BLD AUTO: 7.8 % — SIGNIFICANT CHANGE UP (ref 2–14)
NEUTROPHILS # BLD AUTO: 4.67 K/UL — SIGNIFICANT CHANGE UP (ref 1.8–7.4)
NEUTROPHILS NFR BLD AUTO: 53.8 % — SIGNIFICANT CHANGE UP (ref 43–77)
NRBC # BLD: 0 /100 WBCS — SIGNIFICANT CHANGE UP (ref 0–0)
NT-PROBNP SERPL-SCNC: 202 PG/ML — HIGH (ref 0–125)
PLATELET # BLD AUTO: 267 K/UL — SIGNIFICANT CHANGE UP (ref 150–400)
POTASSIUM SERPL-MCNC: 3.7 MMOL/L — SIGNIFICANT CHANGE UP (ref 3.5–5.3)
POTASSIUM SERPL-SCNC: 3.7 MMOL/L — SIGNIFICANT CHANGE UP (ref 3.5–5.3)
PROT SERPL-MCNC: 7.3 G/DL — SIGNIFICANT CHANGE UP (ref 6–8.3)
PROTHROM AB SERPL-ACNC: 11.8 SEC — SIGNIFICANT CHANGE UP (ref 10–12.9)
RBC # BLD: 5.58 M/UL — HIGH (ref 3.8–5.2)
RBC # FLD: 14.6 % — HIGH (ref 10.3–14.5)
SODIUM SERPL-SCNC: 142 MMOL/L — SIGNIFICANT CHANGE UP (ref 135–145)
TROPONIN I SERPL-MCNC: 0.04 NG/ML — SIGNIFICANT CHANGE UP (ref 0–0.04)
TROPONIN I SERPL-MCNC: 0.04 NG/ML — SIGNIFICANT CHANGE UP (ref 0–0.04)
WBC # BLD: 8.68 K/UL — SIGNIFICANT CHANGE UP (ref 3.8–10.5)
WBC # FLD AUTO: 8.68 K/UL — SIGNIFICANT CHANGE UP (ref 3.8–10.5)

## 2019-07-02 PROCEDURE — 99284 EMERGENCY DEPT VISIT MOD MDM: CPT | Mod: 25

## 2019-07-02 PROCEDURE — 83880 ASSAY OF NATRIURETIC PEPTIDE: CPT

## 2019-07-02 PROCEDURE — 71275 CT ANGIOGRAPHY CHEST: CPT

## 2019-07-02 PROCEDURE — 36415 COLL VENOUS BLD VENIPUNCTURE: CPT

## 2019-07-02 PROCEDURE — 84484 ASSAY OF TROPONIN QUANT: CPT

## 2019-07-02 PROCEDURE — 85379 FIBRIN DEGRADATION QUANT: CPT

## 2019-07-02 PROCEDURE — 94640 AIRWAY INHALATION TREATMENT: CPT

## 2019-07-02 PROCEDURE — 96374 THER/PROPH/DIAG INJ IV PUSH: CPT | Mod: XU

## 2019-07-02 PROCEDURE — 80053 COMPREHEN METABOLIC PANEL: CPT

## 2019-07-02 PROCEDURE — 85610 PROTHROMBIN TIME: CPT

## 2019-07-02 PROCEDURE — 99285 EMERGENCY DEPT VISIT HI MDM: CPT

## 2019-07-02 PROCEDURE — 93971 EXTREMITY STUDY: CPT

## 2019-07-02 PROCEDURE — 85730 THROMBOPLASTIN TIME PARTIAL: CPT

## 2019-07-02 PROCEDURE — 93971 EXTREMITY STUDY: CPT | Mod: 26,RT

## 2019-07-02 PROCEDURE — 71045 X-RAY EXAM CHEST 1 VIEW: CPT

## 2019-07-02 PROCEDURE — 83690 ASSAY OF LIPASE: CPT

## 2019-07-02 PROCEDURE — 71275 CT ANGIOGRAPHY CHEST: CPT | Mod: 26

## 2019-07-02 PROCEDURE — 93005 ELECTROCARDIOGRAM TRACING: CPT

## 2019-07-02 PROCEDURE — 71045 X-RAY EXAM CHEST 1 VIEW: CPT | Mod: 26

## 2019-07-02 PROCEDURE — 85027 COMPLETE CBC AUTOMATED: CPT

## 2019-07-02 RX ORDER — IPRATROPIUM/ALBUTEROL SULFATE 18-103MCG
3 AEROSOL WITH ADAPTER (GRAM) INHALATION ONCE
Refills: 0 | Status: COMPLETED | OUTPATIENT
Start: 2019-07-02 | End: 2019-07-02

## 2019-07-02 RX ORDER — APIXABAN 2.5 MG/1
1 TABLET, FILM COATED ORAL
Qty: 20 | Refills: 0
Start: 2019-07-02 | End: 2019-07-11

## 2019-07-02 RX ORDER — SODIUM CHLORIDE 9 MG/ML
1000 INJECTION INTRAMUSCULAR; INTRAVENOUS; SUBCUTANEOUS ONCE
Refills: 0 | Status: COMPLETED | OUTPATIENT
Start: 2019-07-02 | End: 2019-07-02

## 2019-07-02 RX ADMIN — Medication 3 MILLILITER(S): at 14:57

## 2019-07-02 RX ADMIN — Medication 125 MILLIGRAM(S): at 17:53

## 2019-07-02 RX ADMIN — Medication 3 MILLILITER(S): at 14:30

## 2019-07-02 RX ADMIN — SODIUM CHLORIDE 1000 MILLILITER(S): 9 INJECTION INTRAMUSCULAR; INTRAVENOUS; SUBCUTANEOUS at 15:27

## 2019-07-02 NOTE — ED PROVIDER NOTE - OBJECTIVE STATEMENT
64 y/o F pt with PMHx of HTN, HLD, DM, COPD, CVA, PVD with RLE stent, presents to ED c/o intermittent chest pain x2 weeks. Patient reports sudden onset of mid sternal sharp pain that is severe and last 10-15 minutes before self resolving. Pt states that pain is noon radiating and there are no triggering, aggravating, or alleviating factors. Pt also reports having increased shortness of breath and cough x 2 weeks. Patient denies h/o similar chest pain symptoms in the past. Pt also says he has been tired the last 2 weeks and lying in bed most of the time. Of note, pt ran out of GlocalReach last December and has not refilled it. Patient denies fever, chills, night sweats, abd pain, urinary symptoms, LE symptoms, or any other complaints.

## 2019-07-02 NOTE — ED PROVIDER NOTE - ATTENDING CONTRIBUTION TO CARE
I was physically present for the E/M service provided. I agree with above history, physical, and plan which I have reviewed and edited where appropriate. I was physically present for the key portions of the service provided.    estrella: 62 y/o F pt with PMHx of HTN, HLD, DM, COPD, CVA, PVD with RLE stent, presents to ED c/o intermittent chest pain x2 weeks. Patient reports sudden onset of mid sternal sharp pain that is severe and last 10-15 minutes before self resolving.  +active smoker. no diaphoresis, no n/v, no abd pain    *GEN:   comfortable, in no apparent distress, AOx3  *CV:   regular rate and rhythm, normal S1/S2, no murmur  *RESP:   rhonchi L >R, non-labored, speaking in full sentences  *ABD:   soft, non tender, no guarding  *MSK:   no musculoskeletal tenderness, 5/5 strength, moving all extremity  *SKIN:   dry, intact, no rash  *NEURO:   AOx3, no focal weakness or loss of sensation, GCS 15    a/p: chest pain with sob likely copd r/o pna vs pe vs dvt vs acs vs chf.  labs, d dimer, dvt study, nebs, re-assess

## 2019-07-02 NOTE — ED ADULT NURSE NOTE - NSFALLRSKUNASSIST_ED_ALL_ED
TRANSITIONAL CARE MANAGEMENT - HOSPITAL DISCHARGE FOLLOW-UP    Contacted Mr. Smith regarding follow-up for TCM after hospital discharge. He was discharged from the hospital on 8/22/2018. Review of the After Visit Summary from the recent hospitalization indicates that the patient needs to follow-up with Dr Petra Atwood.    He feels that he is doing well at home.   His diet concern is poor appetite. Overall, the patient is not eating well.   Ambulation: improved; cane  Fever: is not present  Pain: he is experiencing pain in the mouth. The pain is perceived as severe (6-8 pain scale)  Activities of Daily Living (global): Self-care   Patient states that he does have sufficient family support. He feels that he is able to ask for assistance when needed.     Additional patient/family concerns: Questioning if taking a probiotic would be good to be taking. Wondering if he should be avoid taking dairy products until stools are back to within normal.    Discharge medications were verified with the patient. He is fully compliant with the medication regimen prescribed at the time of discharge. He reports that he is not experiencing any medication side effects.    Upon discharge, the patient was to receive no additional services. Therapy was suggested to him in hospital, but would prefer to do his own thing.    Patient has an appointment on 8/24/2018 with Petra Atwood DO. Mr. Smith was reminded about the importance of keeping this appointment.       
no

## 2019-07-02 NOTE — ED PROVIDER NOTE - CLINICAL SUMMARY MEDICAL DECISION MAKING FREE TEXT BOX
64 y/o F pt presents with intermittent chest pain x 2 weeks associated with shortness of breath and cough. Pt is noncompliant with Xarelto. Will check labs, UA, CXR r/o PNA, EKG r/o ischemia, DDimer and reassess.

## 2019-07-02 NOTE — ED PROVIDER NOTE - PROGRESS NOTE DETAILS
Fitzpatrick: pt had IV guided sono at left ac.  + blood return without swelling.  no pain.  flushes easily and visually saw needle inside lumen.  pending cta CTA negative, trops x 2 negative. ECG NSR. Patient feels much better. No chest pain since arrival. SOB/cough likely related to COPD exacerbation. no infection on CXR. Will give Rx for Prednisone. WIll also refill her Eliquis. Low suspicion of ACS or dissection as cause of chest pain. PLeuritic chest pain possible costochondritis. WIll refer to PMD for follow up in 1-2 days without fail. Pt is well appearing walking with steady gait, stable for discharge and follow up without fail with medical doctor. I had a detailed discussion with the patient and/or guardian regarding the historical points, exam findings, and any diagnostic results supporting the discharge diagnosis. Pt educated on care and need for follow up. Strict return instructions and red flag signs and symptoms discussed with patient. Questions answered. Pt shows understanding of discharge information and agrees to follow.

## 2019-07-02 NOTE — ED ADULT NURSE NOTE - OBJECTIVE STATEMENT
Pt came in accompanied by daughter, c/o chest pain intermittently x 2 weeks, cough, wheezing, shortness of breath

## 2020-03-01 PROCEDURE — G9005: CPT

## 2021-02-20 NOTE — ED PROVIDER NOTE - OBJECTIVE STATEMENT
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  The cough and dyspnea on exertion is chronic and at baseline.  Pt did not take her morning medications, yet endorses compliance to her medications.  No recent abx use or change in medications.  States recent contact to grandchildren with URTI symptoms.  Denies objective fevers, chills, N/V, chest pain, palpitations, paresthesias, sensation abnormalities, AMS, acute facial changes, dysarthria, dysuria, diarrhea, or recent travel. done

## 2021-06-09 NOTE — PROGRESS NOTE ADULT - ASSESSMENT
Assessment  DMT2: 62y Female with DM T2 with hyperglycemia started on basal bolus insulin, blood sugars still running high, no hypoglycemic episode,  eating meals, compliant with low carb diet.  CAD/SOB: On medications, stable, monitored.  HTN: Controlled, On med.

## 2021-10-09 NOTE — H&P ADULT. - MOTOR
[FreeTextEntry1] : This patient presents today for general medical exam and to follow up for any medical issues. They deny any new health problems or new family medical history. The patient denies heat/cold intolerance, weight gain or loss, changes in their hair pattern, alteration in sleep habits, or change in their mood patterns. They also deny joint pain, rash, change in vision, or alteration in their bowel patterns.\par Patient also presents today for follow-up of obesity. The patient states they have not lost significant weight since the last visit. Patient is currently obese and remains sedentary. The patient has not been compliant with medical follow-up instructions for weight-loss and exercise since the last visit.\par pt states today that they had both covid 19 vaccine . pt had the Pfzier /   Vaccine without   side effects.  pt denies any recent sore throat, fever, chills  loss of taste or smell.  May 2nd shot\par \par 
right sided hemiparesis

## 2022-02-01 NOTE — ED PROVIDER NOTE - PACKS YRS CALCULATION
Weeks 34 to 36 of Your Pregnancy: Care Instructions  Overview     By now, your baby and your belly have grown quite large. It's almost time to give birth! Your baby's lungs are almost ready to breathe air. The skull bones are firm enough to protect your baby's head, but soft enough to move down through the birth canal.  You may be feeling excited and happy at times--but also anxious or scared. You might wonder how you'll know if you're in labor or what to expect during labor. Try to be open and flexible in your expectations of the birth. Because each birth is different, there's no way to know exactly what childbirth will be like for you. Talk to your doctor or midwife about any concerns you have. If you haven't already had the Tdap shot during this pregnancy, talk to your doctor about getting it. It will help protect your  against pertussis infection. In the 36th week, you'll probably have a test for group B streptococcus (GBS). GBS is a common type of bacteria that can live in the vagina and rectum. It can make your baby sick after birth. If you test positive, you will get antibiotics during labor. The medicine will help keep your baby from getting the bacteria. Follow-up care is a key part of your treatment and safety. Be sure to make and go to all appointments, and call your doctor if you are having problems. It's also a good idea to know your test results and keep a list of the medicines you take. How can you care for yourself at home? Learn about pain relief choices  · Pain is different for everyone. Talk with your doctor about your feelings about pain. · You can choose from several types of pain relief. These include medicine, breathing techniques, and comfort measures. You can use more than one option. · If you choose to have pain medicine during labor, talk to your doctor about your options. Some medicines lower anxiety and help with some of the pain.  Others make your lower body numb so that you won't feel pain. · Be sure to tell your doctor about your pain medicine choice before you start labor or very early in your labor. You may be able to change your mind as labor progresses. Labor and delivery  · The first stage of labor has three parts: early, active, and transition. ? It's common to have early labor at home. You can stay busy or rest, eat light snacks, drink clear fluids, and start counting contractions. ? When talking during a contraction gets hard, you may be moving to active labor. During active labor, you should head for the hospital if you aren't there already. ? You are in active labor when contractions come every 3 to 4 minutes and last about 60 seconds. Your cervix is opening more rapidly. ? If your water breaks, contractions will come faster and stronger. ? During transition, your cervix is stretching, and contractions are coming more rapidly. ? You may want to push, but your cervix might not be ready. Your doctor will tell you when to push. · The second stage starts when your cervix is completely opened and you are ready to push. ? Contractions are very strong to push the baby down the birth canal.  ? You will probably feel the urge to push. You may feel like you need to have a bowel movement. ? You may be coached to push with contractions. These contractions will be very strong, but you won't have them as often. You can get a little rest between contractions. ? One last push, and your baby is born. · The third stage is when a few more contractions push out the placenta. This may take 30 minutes or less. Where can you learn more? Go to http://www.gray.com/  Enter B912 in the search box to learn more about \"Weeks 34 to 36 of Your Pregnancy: Care Instructions. \"  Current as of: June 16, 2021               Content Version: 13.0  © 8914-2761 [employer].    Care instructions adapted under license by [employer] (which disclaims liability or warranty for this information). If you have questions about a medical condition or this instruction, always ask your healthcare professional. [address] any warranty or liability for your use of this information. 40

## 2022-04-07 ENCOUNTER — INPATIENT (INPATIENT)
Facility: HOSPITAL | Age: 67
LOS: 0 days | Discharge: ANOTHER HOSPITAL | DRG: 305 | End: 2022-04-08
Attending: INTERNAL MEDICINE | Admitting: INTERNAL MEDICINE
Payer: MEDICARE

## 2022-04-07 VITALS
TEMPERATURE: 98 F | WEIGHT: 153 LBS | OXYGEN SATURATION: 98 % | DIASTOLIC BLOOD PRESSURE: 93 MMHG | SYSTOLIC BLOOD PRESSURE: 137 MMHG | HEART RATE: 98 BPM | HEIGHT: 64 IN | RESPIRATION RATE: 18 BRPM

## 2022-04-07 DIAGNOSIS — T82.897A OTHER SPECIFIED COMPLICATION OF CARDIAC PROSTHETIC DEVICES, IMPLANTS AND GRAFTS, INITIAL ENCOUNTER: Chronic | ICD-10-CM

## 2022-04-07 DIAGNOSIS — I24.9 ACUTE ISCHEMIC HEART DISEASE, UNSPECIFIED: ICD-10-CM

## 2022-04-07 DIAGNOSIS — Z29.9 ENCOUNTER FOR PROPHYLACTIC MEASURES, UNSPECIFIED: ICD-10-CM

## 2022-04-07 DIAGNOSIS — R56.9 UNSPECIFIED CONVULSIONS: ICD-10-CM

## 2022-04-07 DIAGNOSIS — J44.9 CHRONIC OBSTRUCTIVE PULMONARY DISEASE, UNSPECIFIED: ICD-10-CM

## 2022-04-07 DIAGNOSIS — Z90.710 ACQUIRED ABSENCE OF BOTH CERVIX AND UTERUS: Chronic | ICD-10-CM

## 2022-04-07 DIAGNOSIS — E11.9 TYPE 2 DIABETES MELLITUS WITHOUT COMPLICATIONS: ICD-10-CM

## 2022-04-07 DIAGNOSIS — I21.4 NON-ST ELEVATION (NSTEMI) MYOCARDIAL INFARCTION: ICD-10-CM

## 2022-04-07 LAB
ALBUMIN SERPL ELPH-MCNC: 3 G/DL — LOW (ref 3.5–5)
ALP SERPL-CCNC: 85 U/L — SIGNIFICANT CHANGE UP (ref 40–120)
ALT FLD-CCNC: 21 U/L DA — SIGNIFICANT CHANGE UP (ref 10–60)
ANION GAP SERPL CALC-SCNC: 10 MMOL/L — SIGNIFICANT CHANGE UP (ref 5–17)
APTT BLD: 29.8 SEC — SIGNIFICANT CHANGE UP (ref 27.5–35.5)
APTT BLD: 98.5 SEC — HIGH (ref 27.5–35.5)
AST SERPL-CCNC: 19 U/L — SIGNIFICANT CHANGE UP (ref 10–40)
BASOPHILS # BLD AUTO: 0.08 K/UL — SIGNIFICANT CHANGE UP (ref 0–0.2)
BASOPHILS NFR BLD AUTO: 1 % — SIGNIFICANT CHANGE UP (ref 0–2)
BILIRUB SERPL-MCNC: 0.2 MG/DL — SIGNIFICANT CHANGE UP (ref 0.2–1.2)
BUN SERPL-MCNC: 10 MG/DL — SIGNIFICANT CHANGE UP (ref 7–18)
CALCIUM SERPL-MCNC: 9.5 MG/DL — SIGNIFICANT CHANGE UP (ref 8.4–10.5)
CHLORIDE SERPL-SCNC: 106 MMOL/L — SIGNIFICANT CHANGE UP (ref 96–108)
CO2 SERPL-SCNC: 20 MMOL/L — LOW (ref 22–31)
CREAT SERPL-MCNC: 1 MG/DL — SIGNIFICANT CHANGE UP (ref 0.5–1.3)
D DIMER BLD IA.RAPID-MCNC: 297 NG/ML DDU — HIGH
EGFR: 63 ML/MIN/1.73M2 — SIGNIFICANT CHANGE UP
EOSINOPHIL # BLD AUTO: 0.17 K/UL — SIGNIFICANT CHANGE UP (ref 0–0.5)
EOSINOPHIL NFR BLD AUTO: 2.2 % — SIGNIFICANT CHANGE UP (ref 0–6)
FLUAV AG NPH QL: SIGNIFICANT CHANGE UP
FLUBV AG NPH QL: SIGNIFICANT CHANGE UP
GLUCOSE BLDC GLUCOMTR-MCNC: 251 MG/DL — HIGH (ref 70–99)
GLUCOSE SERPL-MCNC: 259 MG/DL — HIGH (ref 70–99)
HCT VFR BLD CALC: 44 % — SIGNIFICANT CHANGE UP (ref 34.5–45)
HCT VFR BLD CALC: 45.9 % — HIGH (ref 34.5–45)
HGB BLD-MCNC: 14.4 G/DL — SIGNIFICANT CHANGE UP (ref 11.5–15.5)
HGB BLD-MCNC: 14.6 G/DL — SIGNIFICANT CHANGE UP (ref 11.5–15.5)
IMM GRANULOCYTES NFR BLD AUTO: 0.5 % — SIGNIFICANT CHANGE UP (ref 0–1.5)
INR BLD: 0.92 RATIO — SIGNIFICANT CHANGE UP (ref 0.88–1.16)
LYMPHOCYTES # BLD AUTO: 2.55 K/UL — SIGNIFICANT CHANGE UP (ref 1–3.3)
LYMPHOCYTES # BLD AUTO: 32.8 % — SIGNIFICANT CHANGE UP (ref 13–44)
MCHC RBC-ENTMCNC: 26.7 PG — LOW (ref 27–34)
MCHC RBC-ENTMCNC: 27.3 PG — SIGNIFICANT CHANGE UP (ref 27–34)
MCHC RBC-ENTMCNC: 31.8 GM/DL — LOW (ref 32–36)
MCHC RBC-ENTMCNC: 32.7 GM/DL — SIGNIFICANT CHANGE UP (ref 32–36)
MCV RBC AUTO: 83.3 FL — SIGNIFICANT CHANGE UP (ref 80–100)
MCV RBC AUTO: 84.1 FL — SIGNIFICANT CHANGE UP (ref 80–100)
MONOCYTES # BLD AUTO: 0.64 K/UL — SIGNIFICANT CHANGE UP (ref 0–0.9)
MONOCYTES NFR BLD AUTO: 8.2 % — SIGNIFICANT CHANGE UP (ref 2–14)
NEUTROPHILS # BLD AUTO: 4.3 K/UL — SIGNIFICANT CHANGE UP (ref 1.8–7.4)
NEUTROPHILS NFR BLD AUTO: 55.3 % — SIGNIFICANT CHANGE UP (ref 43–77)
NRBC # BLD: 0 /100 WBCS — SIGNIFICANT CHANGE UP (ref 0–0)
NRBC # BLD: 0 /100 WBCS — SIGNIFICANT CHANGE UP (ref 0–0)
NT-PROBNP SERPL-SCNC: 661 PG/ML — HIGH (ref 0–125)
PLATELET # BLD AUTO: 280 K/UL — SIGNIFICANT CHANGE UP (ref 150–400)
PLATELET # BLD AUTO: 287 K/UL — SIGNIFICANT CHANGE UP (ref 150–400)
POTASSIUM SERPL-MCNC: 4.4 MMOL/L — SIGNIFICANT CHANGE UP (ref 3.5–5.3)
POTASSIUM SERPL-SCNC: 4.4 MMOL/L — SIGNIFICANT CHANGE UP (ref 3.5–5.3)
PROT SERPL-MCNC: 7 G/DL — SIGNIFICANT CHANGE UP (ref 6–8.3)
PROTHROM AB SERPL-ACNC: 10.9 SEC — SIGNIFICANT CHANGE UP (ref 10.5–13.4)
RBC # BLD: 5.28 M/UL — HIGH (ref 3.8–5.2)
RBC # BLD: 5.46 M/UL — HIGH (ref 3.8–5.2)
RBC # FLD: 13.8 % — SIGNIFICANT CHANGE UP (ref 10.3–14.5)
RBC # FLD: 13.8 % — SIGNIFICANT CHANGE UP (ref 10.3–14.5)
SARS-COV-2 RNA SPEC QL NAA+PROBE: SIGNIFICANT CHANGE UP
SODIUM SERPL-SCNC: 136 MMOL/L — SIGNIFICANT CHANGE UP (ref 135–145)
TROPONIN I, HIGH SENSITIVITY RESULT: 113.9 NG/L — HIGH
TROPONIN I, HIGH SENSITIVITY RESULT: 121.6 NG/L — HIGH
TROPONIN I, HIGH SENSITIVITY RESULT: 124.3 NG/L — HIGH
WBC # BLD: 7.78 K/UL — SIGNIFICANT CHANGE UP (ref 3.8–10.5)
WBC # BLD: 8.5 K/UL — SIGNIFICANT CHANGE UP (ref 3.8–10.5)
WBC # FLD AUTO: 7.78 K/UL — SIGNIFICANT CHANGE UP (ref 3.8–10.5)
WBC # FLD AUTO: 8.5 K/UL — SIGNIFICANT CHANGE UP (ref 3.8–10.5)

## 2022-04-07 PROCEDURE — 99285 EMERGENCY DEPT VISIT HI MDM: CPT

## 2022-04-07 PROCEDURE — 71275 CT ANGIOGRAPHY CHEST: CPT | Mod: 26

## 2022-04-07 PROCEDURE — 71046 X-RAY EXAM CHEST 2 VIEWS: CPT | Mod: 26

## 2022-04-07 PROCEDURE — 93010 ELECTROCARDIOGRAM REPORT: CPT | Mod: 76

## 2022-04-07 RX ORDER — HEPARIN SODIUM 5000 [USP'U]/ML
INJECTION INTRAVENOUS; SUBCUTANEOUS
Qty: 25000 | Refills: 0 | Status: DISCONTINUED | OUTPATIENT
Start: 2022-04-07 | End: 2022-04-07

## 2022-04-07 RX ORDER — MONTELUKAST 4 MG/1
10 TABLET, CHEWABLE ORAL AT BEDTIME
Refills: 0 | Status: DISCONTINUED | OUTPATIENT
Start: 2022-04-07 | End: 2022-04-08

## 2022-04-07 RX ORDER — ASPIRIN/CALCIUM CARB/MAGNESIUM 324 MG
162 TABLET ORAL ONCE
Refills: 0 | Status: DISCONTINUED | OUTPATIENT
Start: 2022-04-07 | End: 2022-04-07

## 2022-04-07 RX ORDER — DILTIAZEM HCL 120 MG
180 CAPSULE, EXT RELEASE 24 HR ORAL DAILY
Refills: 0 | Status: DISCONTINUED | OUTPATIENT
Start: 2022-04-07 | End: 2022-04-08

## 2022-04-07 RX ORDER — METOPROLOL TARTRATE 50 MG
5 TABLET ORAL ONCE
Refills: 0 | Status: COMPLETED | OUTPATIENT
Start: 2022-04-07 | End: 2022-04-07

## 2022-04-07 RX ORDER — ATORVASTATIN CALCIUM 80 MG/1
40 TABLET, FILM COATED ORAL AT BEDTIME
Refills: 0 | Status: DISCONTINUED | OUTPATIENT
Start: 2022-04-07 | End: 2022-04-08

## 2022-04-07 RX ORDER — ACETAMINOPHEN 500 MG
650 TABLET ORAL EVERY 6 HOURS
Refills: 0 | Status: DISCONTINUED | OUTPATIENT
Start: 2022-04-07 | End: 2022-04-08

## 2022-04-07 RX ORDER — HEPARIN SODIUM 5000 [USP'U]/ML
5500 INJECTION INTRAVENOUS; SUBCUTANEOUS EVERY 6 HOURS
Refills: 0 | Status: DISCONTINUED | OUTPATIENT
Start: 2022-04-07 | End: 2022-04-07

## 2022-04-07 RX ORDER — ENOXAPARIN SODIUM 100 MG/ML
70 INJECTION SUBCUTANEOUS EVERY 12 HOURS
Refills: 0 | Status: DISCONTINUED | OUTPATIENT
Start: 2022-04-07 | End: 2022-04-08

## 2022-04-07 RX ORDER — TOPIRAMATE 25 MG
100 TABLET ORAL DAILY
Refills: 0 | Status: DISCONTINUED | OUTPATIENT
Start: 2022-04-07 | End: 2022-04-08

## 2022-04-07 RX ORDER — INSULIN LISPRO 100/ML
5 VIAL (ML) SUBCUTANEOUS
Refills: 0 | Status: DISCONTINUED | OUTPATIENT
Start: 2022-04-07 | End: 2022-04-07

## 2022-04-07 RX ORDER — ASPIRIN/CALCIUM CARB/MAGNESIUM 324 MG
325 TABLET ORAL DAILY
Refills: 0 | Status: DISCONTINUED | OUTPATIENT
Start: 2022-04-07 | End: 2022-04-08

## 2022-04-07 RX ORDER — PANTOPRAZOLE SODIUM 20 MG/1
40 TABLET, DELAYED RELEASE ORAL
Refills: 0 | Status: DISCONTINUED | OUTPATIENT
Start: 2022-04-07 | End: 2022-04-08

## 2022-04-07 RX ORDER — ENOXAPARIN SODIUM 100 MG/ML
70 INJECTION SUBCUTANEOUS ONCE
Refills: 0 | Status: COMPLETED | OUTPATIENT
Start: 2022-04-07 | End: 2022-04-07

## 2022-04-07 RX ORDER — HYDRALAZINE HCL 50 MG
50 TABLET ORAL THREE TIMES A DAY
Refills: 0 | Status: DISCONTINUED | OUTPATIENT
Start: 2022-04-07 | End: 2022-04-08

## 2022-04-07 RX ORDER — ACETAMINOPHEN 500 MG
1000 TABLET ORAL ONCE
Refills: 0 | Status: COMPLETED | OUTPATIENT
Start: 2022-04-07 | End: 2022-04-07

## 2022-04-07 RX ORDER — METOPROLOL TARTRATE 50 MG
12.5 TABLET ORAL
Refills: 0 | Status: DISCONTINUED | OUTPATIENT
Start: 2022-04-07 | End: 2022-04-08

## 2022-04-07 RX ORDER — LOSARTAN/HYDROCHLOROTHIAZIDE 100MG-25MG
1 TABLET ORAL
Qty: 0 | Refills: 0 | DISCHARGE

## 2022-04-07 RX ORDER — INSULIN GLARGINE 100 [IU]/ML
10 INJECTION, SOLUTION SUBCUTANEOUS AT BEDTIME
Refills: 0 | Status: DISCONTINUED | OUTPATIENT
Start: 2022-04-07 | End: 2022-04-07

## 2022-04-07 RX ORDER — HEPARIN SODIUM 5000 [USP'U]/ML
2500 INJECTION INTRAVENOUS; SUBCUTANEOUS EVERY 6 HOURS
Refills: 0 | Status: DISCONTINUED | OUTPATIENT
Start: 2022-04-07 | End: 2022-04-07

## 2022-04-07 RX ORDER — INSULIN LISPRO 100/ML
VIAL (ML) SUBCUTANEOUS
Refills: 0 | Status: DISCONTINUED | OUTPATIENT
Start: 2022-04-07 | End: 2022-04-08

## 2022-04-07 RX ORDER — HEPARIN SODIUM 5000 [USP'U]/ML
5500 INJECTION INTRAVENOUS; SUBCUTANEOUS ONCE
Refills: 0 | Status: COMPLETED | OUTPATIENT
Start: 2022-04-07 | End: 2022-04-07

## 2022-04-07 RX ORDER — NITROGLYCERIN 6.5 MG
0.4 CAPSULE, EXTENDED RELEASE ORAL ONCE
Refills: 0 | Status: COMPLETED | OUTPATIENT
Start: 2022-04-07 | End: 2022-04-07

## 2022-04-07 RX ADMIN — Medication 400 MILLIGRAM(S): at 16:05

## 2022-04-07 RX ADMIN — MONTELUKAST 10 MILLIGRAM(S): 4 TABLET, CHEWABLE ORAL at 21:24

## 2022-04-07 RX ADMIN — ATORVASTATIN CALCIUM 40 MILLIGRAM(S): 80 TABLET, FILM COATED ORAL at 21:23

## 2022-04-07 RX ADMIN — HEPARIN SODIUM 1200 UNIT(S)/HR: 5000 INJECTION INTRAVENOUS; SUBCUTANEOUS at 16:26

## 2022-04-07 RX ADMIN — Medication 325 MILLIGRAM(S): at 21:23

## 2022-04-07 RX ADMIN — Medication 0.4 MILLIGRAM(S): at 16:10

## 2022-04-07 RX ADMIN — Medication 1000 MILLIGRAM(S): at 16:30

## 2022-04-07 RX ADMIN — Medication 5 MILLIGRAM(S): at 16:52

## 2022-04-07 RX ADMIN — Medication 50 MILLIGRAM(S): at 21:24

## 2022-04-07 RX ADMIN — ENOXAPARIN SODIUM 70 MILLIGRAM(S): 100 INJECTION SUBCUTANEOUS at 16:06

## 2022-04-07 RX ADMIN — HEPARIN SODIUM 5000 UNIT(S): 5000 INJECTION INTRAVENOUS; SUBCUTANEOUS at 16:11

## 2022-04-07 RX ADMIN — Medication 180 MILLIGRAM(S): at 21:25

## 2022-04-07 RX ADMIN — Medication 12.5 MILLIGRAM(S): at 18:34

## 2022-04-07 RX ADMIN — Medication 100 MILLIGRAM(S): at 21:25

## 2022-04-07 NOTE — CHART NOTE - NSCHARTNOTEFT_GEN_A_CORE
Heparin drip was ordered but RN gave Lovenox 70mg ordered by ED. Will hold heparin drip and will restart after 12 hours. Heparin drip was ordered but RN gave Lovenox 70mg ordered by ED. Will hold heparin drip and will restart after 12 hours.      Patient doesn't remember insulin regimen at home; Started on Lantus 10 bedtime and 5 units TID and will reassess. Heparin drip was ordered but RN gave Lovenox 70mg ordered by ED. Will hold heparin drip and will restart after 12 hours.      Patient doesn't remember insulin regimen at home; Started on Lantus 10 bedtime and 5 units TID and will reassess.    UPDATE: Patient doesn't take insulin anymore; She takes Tablets but will confirm tomorrow which med. Will continue on sliding scale.

## 2022-04-07 NOTE — ED PROVIDER NOTE - NSICDXPASTMEDICALHX_GEN_ALL_CORE_FT
PAST MEDICAL HISTORY:  Asthma never intubated    CAD (coronary artery disease)     CVA (cerebral infarction) times 3 with residual rt sided weakness    Diabetes     Gastroesophageal reflux     HTN (hypertension)     Peripheral arterial disease     S/P Cardiac Cath 3yrs ago.report unknown

## 2022-04-07 NOTE — ED ADULT TRIAGE NOTE - PRO INTERPRETER NEED 2
2019 [facility] Clinical Data Registry (for Quality Improvement)     Postoperative nausea/vomiting risk protocol (Adult = 18 yrs and Pediatric 3-17 yrs)- (430 and 463)  General inhalation anesthetic (NOT TIVA) with PONV risk factors: No  Provision of anti-emetic therapy with at least 2 different classes of agents: N/A  Patient DID NOT receive anti-emetic therapy and reason is documented in Medical Record: N/A    Multimodal Pain Management- (477)  Non-emergent surgery AND patient age >= 18: Yes  Use of Multimodal Pain Management, two or more drugs and/or interventions, NOT including systemic opioids: Yes  Exception: Documented allergy to multiple classes of analgesics: N/A    Smoking Abstinence (404)  Patient is current smoker (cigarette, pipe, e-cig, marijuanna): No  Elective Surgery:   Abstinence instructions provided prior to day of surgery:   Patient abstained from smoking on day of surgery:     Pre-Op Beta-Blocker in Isolated CABG (44)  Isolated CABG AND patient age >= 18: No  Beta-blocker admin within 24 hours of surgical incision:   Exception:of medical reason(s) for not administering beta blocker within 24 hours prior to surgical incision (e.g., not  indicated,other medical reason):     PACU assessment of acute postoperative pain prior to Anesthesia Care End- Applies to Patients Age = 18- (ABG7)  Initial PACU pain score is which of the following: < 7/10  Patient unable to report pain score: N/A    Post-anesthetic transfer of care checklist/protocol to PACU/ICU- (426 and 427)  Upon conclusion of case, patient transferred to which of the following locations: PACU/Non-ICU  Use of transfer checklist/protocol: Yes  Exclusion: Service Performed in Patient Hospital Room (and thus did not require transfer): N/A  Unplanned admission to ICU related to anesthesia service up through end of PACU care- (MD51)  Unplanned admission to ICU (not initially anticipated at anesthesia start time): No          English

## 2022-04-07 NOTE — H&P ADULT - ASSESSMENT
Patient is 66 years old Female with hx asthma/COPD (no home O2), L MCA CVA (no residual deficits), small chronic right parietal lobe infarct, seizures, T2DM, CAD s/p ZHAO (on Plavix HTN, AFib (no AC), PAD s/p right fem-pop bypass (s/p occlusion and IR stent placement 2/2017), GERD p/w midsternal -left  chest pain started this morning worse with exertion and SOB, 6/10, non radiating, comes and goes. EMS gave 4 ASA and felt better after then. During Examination, Patient was complaining of acute chest pain, holding her chest. Nitro was ordered and heparin drip was started. EKG showed sinus tachycardia / incomplete right bundle branch block. Patient will be admitted for ACS workup.

## 2022-04-07 NOTE — CONSULT NOTE ADULT - SUBJECTIVE AND OBJECTIVE BOX
PATIENT SEEN AND EXAMINED ON :-04/07/2022  DATE OF SERVICE:     04/07/2022        Interim events noted,Labs ,Radiological studies and Cardiology tests reviewed .    History of Present Illness:  Reason for Admission: ACS  History of Present Illness:   Patient is 66 years old Female with hx asthma/COPD (no home O2), L MCA CVA (no residual deficits), small chronic right parietal lobe infarct, seizures, T2DM, CAD s/p ZHAO (on Plavix HTN, AFib (no AC), PAD s/p right fem-pop bypass (s/p occlusion and IR stent placement 2/2017), GERD p/w midsternal -left  chest pain started this morning worse with exertion and SOB, 6/10, non radiating, comes and goes. EMS gave 4 ASA and felt better after then. During Examination, Patient was complaining of acute chest pain, holding her chest. Nitro was ordered and heparin drip was started. Pt has not had chest pain work up in years and does not have a cardiologist. Denies fevers, chills, nausea, vomiting, palpitations, cough, abdominal p    Review of Systems:  Review of Systems: REVIEW OF SYSTEMS:  CONSTITUTIONAL: No weakness, fevers or chills  EYES/ENT: No visual changes;  No vertigo or throat pain   RESPIRATORY: No cough, wheezing, hemoptysis; No shortness of breath  CARDIOVASCULAR: Left side chest pain   GASTROINTESTINAL: No abdominal  pain. No nausea, vomiting. No diarrhea or constipation. No melena or hematochezia.  GENITOURINARY: No dysuria, frequency or hematuria  NEUROLOGICAL: No numbness or weakness  SKIN: No itching, rashes      Allergies and Intolerances:        Allergies:  	No Known Allergies:     Home Medications:   * Patient Currently Takes Medications as of 02-Jul-2019 18:56 documented in Structured Notes  · 	Eliquis 5 mg oral tablet: 1 tab(s) orally 2 times a day   · 	predniSONE 50 mg oral tablet: 1 tab(s) orally once a day  	Next dose tomorrow  · 	Lantus Solostar Pen 100 units/mL subcutaneous solution: 18 unit(s) subcutaneous once a day (at bedtime)  before meals   · 	HumaLOG KwikPen 200 units/mL (Concentrated) subcutaneous solution: 8 unit(s) subcutaneous 3 times a day before meals  · 	alcohol swabs: Use as directed 4 times a day  · 	tiotropium 18 mcg inhalation capsule: 1 cap(s) inhaled once a day  · 	test strips: Check blood sugar 4 times a day before meals and at bedtime  · 	lancets: Check blood sugar 4 times a day before meals and at bedtime  · 	insulin pen needles: Use as directed 4 times a day  · 	glucometer: Check blood sugar before meals and at bedtime   · 	apixaban 5 mg oral tablet: 1 tab(s) orally 2 times a day   · 	guaiFENesin 100 mg/5 mL oral liquid: 10 milliliter(s) orally every 6 hours, As needed, Cough  · 	dilTIAZem 180 mg/24 hours oral capsule, extended release: 1 cap(s) orally once a day  · 	acetaminophen 325 mg oral tablet: 2 tab(s) orally every 6 hours, As needed, Mild Pain (1 - 3)  · 	hydrALAZINE 50 mg oral tablet: 1 tab(s) orally 3 times a day  · 	carvedilol 12.5 mg oral tablet: 1 tab(s) orally every 12 hours  · 	topiramate 100 mg oral tablet: 1 tab(s) orally once a day  · 	montelukast 10 mg oral tablet: 1 tab(s) orally once a day (at bedtime)  · 	aspirin 81 mg oral tablet, chewable: 1 tab(s) orally once a day  · 	losartan-hydroCHLOROthiazide 100 mg-25 mg oral tablet: 1 tab(s) orally once a day  · 	Advair Diskus 250 mcg-50 mcg inhalation powder: 1 puff(s) inhaled 2 times a day  · 	Vitamin D2 50,000 intl units (1.25 mg) oral capsule: 1 cap(s) orally once a week on Fridays  · 	omeprazole 20 mg oral delayed release capsule: 1 cap(s) orally once a day    Patient History:   Past Medical, Past Surgical, and Family History:  PAST MEDICAL HISTORY:  Asthma never intubated    CAD (coronary artery disease)     CVA (cerebral infarction) times 3 with residual rt sided weakness    Diabetes     Gastroesophageal reflux     HTN (hypertension)     Peripheral arterial disease     S/P Cardiac Cath 3yrs ago.report unknown.     PAST SURGICAL HISTORY:  Coronary stent occlusion     S/P total abdominal hysterectomy.     FAMILY HISTORY:  Sibling  Still living? Unknown  Family history of diabetes mellitus, Age at diagnosis: Age Unknown.    Social History:  Social History (marital status, living situation, occupation, tobacco use, alcohol and drug use, and sexual history): patient is from home, non smoker or alcohol hx            Physical Exam:   Physical Exam: ICU Vital Signs Last 24 Hrs  T(C): 36.6 (07 Apr 2022 15:29), Max: 36.8 (07 Apr 2022 12:24)  T(F): 97.9 (07 Apr 2022 15:29), Max: 98.3 (07 Apr 2022 12:24)  HR: 84 (07 Apr 2022 15:29) (84 - 98)  BP: 172/104 (07 Apr 2022 15:29) (137/93 - 172/104)  RR: 18 (07 Apr 2022 15:29) (18 - 18)  SpO2: 99% (07 Apr 2022 15:29) (98% - 99%)    GENERAL: NAD, lying in bed, AAOx3  HEAD:  Atraumatic, Normocephalic  EYES: EOMI, PERRLA, conjunctiva and sclera clear  ENT: Moist mucous membranes  NECK: Supple, No JVD  CHEST/LUNG: Mid-left chest pain non radiating. 6/10.   HEART: Regular rate and rhythm; No murmurs, rubs, or gallops  ABDOMEN: Bowel sounds present; Soft, Nontender, Nondistended. No hepatomegally  EXTREMITIES:  2+ Peripheral Pulses, brisk capillary refill. No clubbing, cyanosis, or edema  NERVOUS SYSTEM:  Alert & Oriented X3, speech clear. No deficits   MSK: FROM all 4 extremities, full and equal strength  SKIN: No rashes or lesions                    · EKG Date/Time: 07-Apr-2022 13:50  · Rate: 91  · Interpretation: normal  · Axis: Normal  · MT: 222  · QRS: 110  · ST/T Wave: no evidence of ischemia  · Other Findings: [id number]  · Prior EKG Status: the EKG is unchanged from prior EKG

## 2022-04-07 NOTE — CHART NOTE - NSCHARTNOTEFT_GEN_A_CORE
EVENT:     HPI:  66 year old Female with hx asthma/COPD (no home O2), L MCA CVA (no residual deficits), small chronic right parietal lobe infarct, seizures, T2DM, CAD s/p ZHAO (on Plavix HTN, AFib (no AC), PAD s/p right fem-pop bypass (s/p occlusion and IR stent placement 2/2017), GERD p/w midsternal -left  chest pain started this morning worse with exertion and SOB, 6/10, non radiating, comes and goes. EKG showed sinus tachycardia / incomplete right bundle branch block. Admitted for ACS workup.      SUBJECTIVE:    OBJECTIVE:  Vital Signs Last 24 Hrs  T(C): 36.6 (07 Apr 2022 19:07), Max: 36.8 (07 Apr 2022 12:24)  T(F): 97.8 (07 Apr 2022 19:07), Max: 98.3 (07 Apr 2022 12:24)  HR: 74 (07 Apr 2022 19:07) (74 - 98)  BP: 167/98 (07 Apr 2022 19:07) (137/93 - 172/104)  BP(mean): --  RR: 18 (07 Apr 2022 19:07) (18 - 18)  SpO2: 99% (07 Apr 2022 19:07) (97% - 99%)    PHYSICAL EXAM:  Neuro: Awake and alert, oriented to person, place, and time  Cardiovascular: + S1, S2, no murmurs, rubs, or bruits  Respiratory: clear to auscultation bilaterally with good air entry   GI: Abdomen soft, non-tender, bowel sounds present   : Non distended;   Skin: warm and dry; no rash      LABS:                        14.4   8.50  )-----------( 287      ( 07 Apr 2022 22:51 )             44.0     04-07    136  |  106  |  10  ----------------------------<  259<H>  4.4   |  20<L>  |  1.00    Ca    9.5      07 Apr 2022 14:15    TPro  7.0  /  Alb  3.0<L>  /  TBili  0.2  /  DBili  x   /  AST  19  /  ALT  21  /  AlkPhos  85  04-07        EKG:   IMAGING:    ASSESSMENT/PROBLEM:    PLAN:     FOLLOW UP / RESULT: EVENT: Elevated Troponin 113.9 --->124.3    HPI:  66 year old Female with hx asthma/COPD (no home O2), L MCA CVA (no residual deficits), small chronic right parietal lobe infarct, seizures, T2DM, CAD s/p ZHAO (on Plavix HTN, AFib (no AC), PAD s/p right fem-pop bypass (s/p occlusion and IR stent placement 2/2017), GERD p/w midsternal -left  chest pain started this morning worse with exertion and SOB, 6/10, non radiating, comes and goes. EKG showed sinus tachycardia / incomplete right bundle branch block. Admitted for ACS workup.      SUBJECTIVE: Pt seen and evaluated at bedside, A&Ox3--appears in no acute distress. Reports improvement in chest pain since receiving SL Nitro earlier in the day. However, she reports intermittent shortness of breath and difficulty falling asleep due to this. Pt with history of asthma and COPD (not on home O2).    OBJECTIVE:  Vital Signs Last 24 Hrs  T(C): 36.6 (07 Apr 2022 19:07), Max: 36.8 (07 Apr 2022 12:24)  T(F): 97.8 (07 Apr 2022 19:07), Max: 98.3 (07 Apr 2022 12:24)  HR: 74 (07 Apr 2022 19:07) (74 - 98)  BP: 167/98 (07 Apr 2022 19:07) (137/93 - 172/104)  BP(mean): --  RR: 18 (07 Apr 2022 19:07) (18 - 18)  SpO2: 99% (07 Apr 2022 19:07) (97% - 99%)    PHYSICAL EXAM:  Neuro: Awake and alert, oriented to person, place, and time  Cardiovascular: + S1, S2, no murmurs, rubs, or bruits  Respiratory: clear to auscultation bilaterally with good air entry   GI: Abdomen soft, non-tender, bowel sounds present   : Non distended;   Skin: warm and dry; no rash      LABS:                        14.4   8.50  )-----------( 287      ( 07 Apr 2022 22:51 )             44.0     04-07    136  |  106  |  10  ----------------------------<  259<H>  4.4   |  20<L>  |  1.00    Ca    9.5      07 Apr 2022 14:15    TPro  7.0  /  Alb  3.0<L>  /  TBili  0.2  /  DBili  x   /  AST  19  /  ALT  21  /  AlkPhos  85  04-07        EKG:   IMAGING:     ASSESSMENT/PROBLEM: 1. Elevated troponin possibly r/t ACS vs. demand ischemia 2. Shortness of breath possibly due to underlying Asthma/COPD    PLAN:     -Repeat EKG ordered--showing NSR with 1st degree HB with right BBB (unchanged from previous reviewed in physical chart)  -Repeat troponin ordered for 0200   -Albuterol nebulizer q6 PRN for shortness of breath  -Melatonin 5 mg PO ordered  -C/W ASA, AC with SQ Lovenox---consider restarting heparin gtt in AM     FOLLOW UP / RESULT:    -F/U trop at 0200 EVENT: Elevated Troponin 113.9 --->124.3    HPI:  66 year old Female with hx asthma/COPD (no home O2), L MCA CVA (no residual deficits), small chronic right parietal lobe infarct, seizures, T2DM, CAD s/p ZHAO (on Plavix HTN, AFib (no AC), PAD s/p right fem-pop bypass (s/p occlusion and IR stent placement 2/2017), GERD p/w midsternal -left  chest pain started this morning worse with exertion and SOB, 6/10, non radiating, comes and goes. EKG showed sinus tachycardia / incomplete right bundle branch block. Admitted for ACS workup.      SUBJECTIVE: Pt seen and evaluated at bedside, A&Ox3--appears in no acute distress. Reports improvement in chest pain since receiving SL Nitro earlier in the day. However, she reports intermittent shortness of breath and difficulty falling asleep due to this. Pt with history of asthma and COPD (not on home O2).    OBJECTIVE:  Vital Signs Last 24 Hrs  T(C): 36.6 (07 Apr 2022 19:07), Max: 36.8 (07 Apr 2022 12:24)  T(F): 97.8 (07 Apr 2022 19:07), Max: 98.3 (07 Apr 2022 12:24)  HR: 74 (07 Apr 2022 19:07) (74 - 98)  BP: 167/98 (07 Apr 2022 19:07) (137/93 - 172/104)  BP(mean): --  RR: 18 (07 Apr 2022 19:07) (18 - 18)  SpO2: 99% (07 Apr 2022 19:07) (97% - 99%)    PHYSICAL EXAM:  Neuro: Awake and alert, oriented to person, place, and time  Cardiovascular: + S1, S2, no murmurs, rubs, or bruits  Respiratory: clear to auscultation bilaterally with good air entry   GI: Abdomen soft, non-tender, bowel sounds present   : Non distended;   Skin: warm and dry; no rash      LABS:                        14.4   8.50  )-----------( 287      ( 07 Apr 2022 22:51 )             44.0     04-07    136  |  106  |  10  ----------------------------<  259<H>  4.4   |  20<L>  |  1.00    Ca    9.5      07 Apr 2022 14:15    TPro  7.0  /  Alb  3.0<L>  /  TBili  0.2  /  DBili  x   /  AST  19  /  ALT  21  /  AlkPhos  85  04-07        EKG:   IMAGING:     ASSESSMENT/PROBLEM: 1. Elevated troponin possibly r/t ACS vs. demand ischemia 2. Shortness of breath possibly due to underlying Asthma/COPD    PLAN:     -Repeat EKG ordered--showing NSR with 1st degree HB with right BBB (unchanged from previous reviewed in physical chart)  -Repeat troponin ordered for 0200   -Albuterol nebulizer q6 PRN for shortness of breath  -Melatonin 5 mg PO ordered  -C/W ASA, AC with SQ Lovenox---primary team to consider restarting heparin gtt in AM     FOLLOW UP / RESULT:    -F/U trop at 0200

## 2022-04-07 NOTE — PATIENT PROFILE ADULT - CAREGIVER ADDRESS
----- Message from Mike Cohen MD sent at 12/10/2020  9:37 PM CST -----  Please notify patient ok no change  
E-advice sent to patient.   
Estee

## 2022-04-07 NOTE — H&P ADULT - HISTORY OF PRESENT ILLNESS
Patient is 66 years old Female with hx asthma/COPD (no home O2), L MCA CVA (no residual deficits), small chronic right parietal lobe infarct, seizures, T2DM, CAD s/p ZHAO (on Plavix HTN, AFib (no AC), PAD s/p right fem-pop bypass (s/p occlusion and IR stent placement 2/2017), GERD p/w midsternal -left  chest pain started this morning worse with exertion and SOB, 6/10, non radiating, comes and goes. EMS gave 4 ASA and felt better after then. During Examination, Patient was complaining of acute chest pain, holding her chest. Nitro was ordered and heparin drip was started. Pt has not had chest pain work up in years and does not have a cardiologist. Denies fevers, chills, nausea, vomiting, palpitations, cough, abdominal p

## 2022-04-07 NOTE — ED PROVIDER NOTE - CLINICAL SUMMARY MEDICAL DECISION MAKING FREE TEXT BOX
67y/o F w/ multiple medical problems p/w exertional midsternal chest pain a/w SOB. Largely unchanged EKG but concern for stable angina. Plan check cardiac markers, CXR, EKG, labs and admission. 65y/o F w/ multiple medical problems p/w exertional midsternal chest pain a/w SOB. Largely unchanged EKG but concern for stable angina. Other ddx PE, no consistent with COPD w/o cough. Plan check cardiac markers, CXR, EKG, labs and admission.

## 2022-04-07 NOTE — ED PROVIDER NOTE - ATTENDING CONTRIBUTION TO CARE
Nemes - 65yo F w/ h/o asthma/COPD (no home O2), L MCA CVA (no residual deficits), small chronic right parietal lobe infarct, seizures, DM, CAD s/p ZHAO (on plavix), HTN, AFib (no AC), PAD s/p right fem-pop bypass (s/p occlusion and IR stent placement 2/2017), GERD p/w midsternal chest pain started this morning worse with exertion and SOB. EMS gave 4 ASA and now feeling better, no active chest pain upon exam. Pt has not had chest pain work up in years and does not have a cardiologist. Denies fevers, chills, nausea, vomiting, palpitations, cough, abdominal pain, back pain, dysuria, numbness, tingling, recent surgeries, travel history, calf pain.  VS wnl, well appearing, in NAD. Moist mucosae, pink conjunctivae. Neck supple, neuro grossly intact. Lungs clear, cardiac wnl, no JVD. Abdomen soft/NT, no CVAT. No pedal edema, no calf TTP.   Likely ACS, but given SOB and CP will get CTA r/o PE. Will get cardiac w/u admit for high risk/HEART score

## 2022-04-07 NOTE — CONSULT NOTE ADULT - ASSESSMENT
66 years old Female with hx asthma/COPD (no home O2), L MCA CVA (no residual deficits), small chronic right parietal lobe infarct, seizures, T2DM, CAD s/p ZHAO (on Plavix HTN, AFib (no AC), PAD s/p right fem-pop bypass (s/p occlusion and IR stent placement 2/2017), GERD p/w midsternal -left  chest pain started this morning worse with exertion and SOB, 6/10, non radiating, comes and goes. EMS gave 4 ASA and felt better after then. During Examination, Patient was complaining of acute chest pain, holding her chest. Nitro was ordered and heparin drip was started. EKG showed sinus tachycardia / incomplete right bundle branch block. Patient will be admitted for ACS workup.      Problem/Plan - 1:  ·  Problem: ACS (acute coronary syndrome).   ·  Plan: - Mid-left side chest pain non radiating   - Nitro was given in ED   - EKG showed Sinus tachy and incomplete right bundle branch block  - Will start on aspirin, statin, lopressor 12.5 BID   - Will admit to tele  - Will start heparin drip for now; given elevated troponin and active chest pain  - f/u CTA to r/o PE   -- Low threshold for Cath / Possible transfer   - f/u repeated Trops   - f/u Echo   - Monitor vitals.    Problem/Plan - 2:  ·  Problem: COPD without exacerbation.   ·  Plan: - Continue on home meds.    Problem/Plan - 3:  ·  Problem: Seizure.   ·  Plan: - Continue on home meds  - Fall precautions.    Problem/Plan - 4:  ·  Problem: Diabetes mellitus.   ·  Plan: - Sliding scale   - Daibetic diet   - A1c.    Problem/Plan - 5:  ·  Problem: Need for prophylactic measure.   ·  Plan: - Heparin drip.

## 2022-04-07 NOTE — H&P ADULT - NSHPPHYSICALEXAM_GEN_ALL_CORE
ICU Vital Signs Last 24 Hrs  T(C): 36.6 (07 Apr 2022 15:29), Max: 36.8 (07 Apr 2022 12:24)  T(F): 97.9 (07 Apr 2022 15:29), Max: 98.3 (07 Apr 2022 12:24)  HR: 84 (07 Apr 2022 15:29) (84 - 98)  BP: 172/104 (07 Apr 2022 15:29) (137/93 - 172/104)  RR: 18 (07 Apr 2022 15:29) (18 - 18)  SpO2: 99% (07 Apr 2022 15:29) (98% - 99%)    GENERAL: NAD, lying in bed, AAOx3  HEAD:  Atraumatic, Normocephalic  EYES: EOMI, PERRLA, conjunctiva and sclera clear  ENT: Moist mucous membranes  NECK: Supple, No JVD  CHEST/LUNG: Mid-left chest pain non radiating. 6/10.   HEART: Regular rate and rhythm; No murmurs, rubs, or gallops  ABDOMEN: Bowel sounds present; Soft, Nontender, Nondistended. No hepatomegally  EXTREMITIES:  2+ Peripheral Pulses, brisk capillary refill. No clubbing, cyanosis, or edema  NERVOUS SYSTEM:  Alert & Oriented X3, speech clear. No deficits   MSK: FROM all 4 extremities, full and equal strength  SKIN: No rashes or lesions

## 2022-04-07 NOTE — H&P ADULT - NSTOBACCOSCREENHP_GEN_A_CS
No

## 2022-04-07 NOTE — CONSULT NOTE ADULT - TIME BILLING
- Review of records, telemetry, vital signs and daily labs.   - General and cardiovascular physical examination.  - Generation of cardiovascular treatment plan.  - Coordination of care.      Patient was seen and examined by me on 04/07/2022,interim events noted,labs and radiology studies reviewed.  Jonel Stevenson MD,FACC.  [address] [place], [place] [id number].  [phone]

## 2022-04-07 NOTE — ED PROVIDER NOTE - OBJECTIVE STATEMENT
67y/o F w/ h/o asthma/COPD (no home O2), L MCA CVA (no residual deficits), small chronic right parietal lobe infarct, seizures, T2DM, CAD s/p ZHAO (on plavix), HTN, AFib (no AC), PAD s/p right fem-pop bypass (s/p occlusion and IR stent placement 2/2017), GERD p/w midsternal chest pain started this morning worse with exertion and SOB. EMS gave 4 ASA and now feeling better, no active chest pain. Pt has not had chest pain work up in years and does not have a cardiologist. Denies fevers, chills, nausea, vomiting, palpitations, cough, abdominal pain, back pain, dysuria, numbness, tingling, recent surgeries, travel history, calf pain.

## 2022-04-07 NOTE — H&P ADULT - PROBLEM SELECTOR PLAN 1
- Mid-left side chest pain non radiating   - Nitro was given in ED   - EKG showed Sinus tachy and incomplete right bundle branch block  - Will start on aspirin, statin, lopressor 12.5 BID   - Will admit to tele  - Will start heparin drip for now; given elevated troponin and active chest pain  - f/u CTA to r/o PE   - Cardiology Dr. Stevenson   - Low threshold for Cath / Possible transfer   - f/u repeated Trops   - f/u Echo   - Monitor vitals

## 2022-04-07 NOTE — PATIENT PROFILE ADULT - FALL HARM RISK - HARM RISK INTERVENTIONS

## 2022-04-07 NOTE — ED ADULT NURSE NOTE - OBJECTIVE STATEMENT
AOX4 +ambulatory patient reports sudden onset of midsternal chest pain and shortness of breath x today. denies any fevers or chills

## 2022-04-07 NOTE — ED ADULT NURSE NOTE - NS PRO PASSIVE SMOKE EXP
No Erythromycin Pregnancy And Lactation Text: This medication is Pregnancy Category B and is considered safe during pregnancy. It is also excreted in breast milk.

## 2022-04-07 NOTE — ED PROVIDER NOTE - PROGRESS NOTE DETAILS
Nils, PGY3: spoke with Dr. Metz, will admit to service. Torrey - spoke w MAR, aware of pt admission, agree w plan. Also, pt complaining of CP and palpitations, EKG repeated, no signs of ischemia but sinus tach to 138. MAR at bedside, will give full dose Heparin/AC, Nitroglycerin. Will observe closely

## 2022-04-07 NOTE — H&P ADULT - NSHPREVIEWOFSYSTEMS_GEN_ALL_CORE
REVIEW OF SYSTEMS:  CONSTITUTIONAL: No weakness, fevers or chills  EYES/ENT: No visual changes;  No vertigo or throat pain   RESPIRATORY: No cough, wheezing, hemoptysis; No shortness of breath  CARDIOVASCULAR: Left side chest pain   GASTROINTESTINAL: No abdominal  pain. No nausea, vomiting. No diarrhea or constipation. No melena or hematochezia.  GENITOURINARY: No dysuria, frequency or hematuria  NEUROLOGICAL: No numbness or weakness  SKIN: No itching, rashes

## 2022-04-08 ENCOUNTER — INPATIENT (INPATIENT)
Facility: HOSPITAL | Age: 67
LOS: 2 days | Discharge: HOME HEALTH | End: 2022-04-11
Attending: INTERNAL MEDICINE | Admitting: INTERNAL MEDICINE
Payer: MEDICARE

## 2022-04-08 VITALS
OXYGEN SATURATION: 98 % | RESPIRATION RATE: 17 BRPM | TEMPERATURE: 98 F | SYSTOLIC BLOOD PRESSURE: 128 MMHG | DIASTOLIC BLOOD PRESSURE: 79 MMHG | HEART RATE: 60 BPM

## 2022-04-08 VITALS
HEART RATE: 56 BPM | SYSTOLIC BLOOD PRESSURE: 134 MMHG | RESPIRATION RATE: 17 BRPM | DIASTOLIC BLOOD PRESSURE: 73 MMHG | TEMPERATURE: 98 F | OXYGEN SATURATION: 92 %

## 2022-04-08 DIAGNOSIS — Z90.710 ACQUIRED ABSENCE OF BOTH CERVIX AND UTERUS: Chronic | ICD-10-CM

## 2022-04-08 DIAGNOSIS — I10 ESSENTIAL (PRIMARY) HYPERTENSION: ICD-10-CM

## 2022-04-08 DIAGNOSIS — I21.4 NON-ST ELEVATION (NSTEMI) MYOCARDIAL INFARCTION: ICD-10-CM

## 2022-04-08 DIAGNOSIS — K21.9 GASTRO-ESOPHAGEAL REFLUX DISEASE WITHOUT ESOPHAGITIS: ICD-10-CM

## 2022-04-08 DIAGNOSIS — E11.9 TYPE 2 DIABETES MELLITUS WITHOUT COMPLICATIONS: ICD-10-CM

## 2022-04-08 DIAGNOSIS — Z98.890 OTHER SPECIFIED POSTPROCEDURAL STATES: Chronic | ICD-10-CM

## 2022-04-08 DIAGNOSIS — T82.897A OTHER SPECIFIED COMPLICATION OF CARDIAC PROSTHETIC DEVICES, IMPLANTS AND GRAFTS, INITIAL ENCOUNTER: Chronic | ICD-10-CM

## 2022-04-08 DIAGNOSIS — I48.91 UNSPECIFIED ATRIAL FIBRILLATION: ICD-10-CM

## 2022-04-08 DIAGNOSIS — J45.909 UNSPECIFIED ASTHMA, UNCOMPLICATED: ICD-10-CM

## 2022-04-08 DIAGNOSIS — I63.9 CEREBRAL INFARCTION, UNSPECIFIED: ICD-10-CM

## 2022-04-08 DIAGNOSIS — I73.9 PERIPHERAL VASCULAR DISEASE, UNSPECIFIED: ICD-10-CM

## 2022-04-08 DIAGNOSIS — R07.9 CHEST PAIN, UNSPECIFIED: ICD-10-CM

## 2022-04-08 LAB
A1C WITH ESTIMATED AVERAGE GLUCOSE RESULT: 11.9 % — HIGH (ref 4–5.6)
ALBUMIN SERPL ELPH-MCNC: 2.9 G/DL — LOW (ref 3.5–5)
ALP SERPL-CCNC: 81 U/L — SIGNIFICANT CHANGE UP (ref 40–120)
ALT FLD-CCNC: 19 U/L DA — SIGNIFICANT CHANGE UP (ref 10–60)
ANION GAP SERPL CALC-SCNC: 7 MMOL/L — SIGNIFICANT CHANGE UP (ref 5–17)
AST SERPL-CCNC: 16 U/L — SIGNIFICANT CHANGE UP (ref 10–40)
BASOPHILS # BLD AUTO: 0.12 K/UL — SIGNIFICANT CHANGE UP (ref 0–0.2)
BASOPHILS NFR BLD AUTO: 1.7 % — SIGNIFICANT CHANGE UP (ref 0–2)
BILIRUB SERPL-MCNC: 0.4 MG/DL — SIGNIFICANT CHANGE UP (ref 0.2–1.2)
BUN SERPL-MCNC: 11 MG/DL — SIGNIFICANT CHANGE UP (ref 7–18)
CALCIUM SERPL-MCNC: 9.4 MG/DL — SIGNIFICANT CHANGE UP (ref 8.4–10.5)
CHLORIDE SERPL-SCNC: 108 MMOL/L — SIGNIFICANT CHANGE UP (ref 96–108)
CO2 SERPL-SCNC: 24 MMOL/L — SIGNIFICANT CHANGE UP (ref 22–31)
CREAT SERPL-MCNC: 0.97 MG/DL — SIGNIFICANT CHANGE UP (ref 0.5–1.3)
EGFR: 65 ML/MIN/1.73M2 — SIGNIFICANT CHANGE UP
EOSINOPHIL # BLD AUTO: 0.2 K/UL — SIGNIFICANT CHANGE UP (ref 0–0.5)
EOSINOPHIL NFR BLD AUTO: 2.8 % — SIGNIFICANT CHANGE UP (ref 0–6)
ESTIMATED AVERAGE GLUCOSE: 295 MG/DL — HIGH (ref 68–114)
GLUCOSE BLDC GLUCOMTR-MCNC: 146 MG/DL — HIGH (ref 70–99)
GLUCOSE BLDC GLUCOMTR-MCNC: 260 MG/DL — HIGH (ref 70–99)
GLUCOSE SERPL-MCNC: 164 MG/DL — HIGH (ref 70–99)
HCT VFR BLD CALC: 45.9 % — HIGH (ref 34.5–45)
HCV AB S/CO SERPL IA: 0.14 S/CO — SIGNIFICANT CHANGE UP (ref 0–0.99)
HCV AB SERPL-IMP: SIGNIFICANT CHANGE UP
HGB BLD-MCNC: 14.8 G/DL — SIGNIFICANT CHANGE UP (ref 11.5–15.5)
IMM GRANULOCYTES NFR BLD AUTO: 0.3 % — SIGNIFICANT CHANGE UP (ref 0–1.5)
LYMPHOCYTES # BLD AUTO: 2.79 K/UL — SIGNIFICANT CHANGE UP (ref 1–3.3)
LYMPHOCYTES # BLD AUTO: 38.4 % — SIGNIFICANT CHANGE UP (ref 13–44)
MAGNESIUM SERPL-MCNC: 2.2 MG/DL — SIGNIFICANT CHANGE UP (ref 1.6–2.6)
MCHC RBC-ENTMCNC: 27.2 PG — SIGNIFICANT CHANGE UP (ref 27–34)
MCHC RBC-ENTMCNC: 32.2 GM/DL — SIGNIFICANT CHANGE UP (ref 32–36)
MCV RBC AUTO: 84.2 FL — SIGNIFICANT CHANGE UP (ref 80–100)
MONOCYTES # BLD AUTO: 0.63 K/UL — SIGNIFICANT CHANGE UP (ref 0–0.9)
MONOCYTES NFR BLD AUTO: 8.7 % — SIGNIFICANT CHANGE UP (ref 2–14)
NEUTROPHILS # BLD AUTO: 3.5 K/UL — SIGNIFICANT CHANGE UP (ref 1.8–7.4)
NEUTROPHILS NFR BLD AUTO: 48.1 % — SIGNIFICANT CHANGE UP (ref 43–77)
NRBC # BLD: 0 /100 WBCS — SIGNIFICANT CHANGE UP (ref 0–0)
PHOSPHATE SERPL-MCNC: 3.5 MG/DL — SIGNIFICANT CHANGE UP (ref 2.5–4.5)
PLATELET # BLD AUTO: 279 K/UL — SIGNIFICANT CHANGE UP (ref 150–400)
POTASSIUM SERPL-MCNC: 3.9 MMOL/L — SIGNIFICANT CHANGE UP (ref 3.5–5.3)
POTASSIUM SERPL-SCNC: 3.9 MMOL/L — SIGNIFICANT CHANGE UP (ref 3.5–5.3)
PROT SERPL-MCNC: 6.5 G/DL — SIGNIFICANT CHANGE UP (ref 6–8.3)
RBC # BLD: 5.45 M/UL — HIGH (ref 3.8–5.2)
RBC # FLD: 13.9 % — SIGNIFICANT CHANGE UP (ref 10.3–14.5)
SODIUM SERPL-SCNC: 139 MMOL/L — SIGNIFICANT CHANGE UP (ref 135–145)
TROPONIN I, HIGH SENSITIVITY RESULT: 115.1 NG/L — HIGH
TROPONIN I, HIGH SENSITIVITY RESULT: 131.8 NG/L — HIGH
WBC # BLD: 7.26 K/UL — SIGNIFICANT CHANGE UP (ref 3.8–10.5)
WBC # FLD AUTO: 7.26 K/UL — SIGNIFICANT CHANGE UP (ref 3.8–10.5)

## 2022-04-08 PROCEDURE — 85730 THROMBOPLASTIN TIME PARTIAL: CPT

## 2022-04-08 PROCEDURE — 84484 ASSAY OF TROPONIN QUANT: CPT

## 2022-04-08 PROCEDURE — 85025 COMPLETE CBC W/AUTO DIFF WBC: CPT

## 2022-04-08 PROCEDURE — 85027 COMPLETE CBC AUTOMATED: CPT

## 2022-04-08 PROCEDURE — 80053 COMPREHEN METABOLIC PANEL: CPT

## 2022-04-08 PROCEDURE — 84100 ASSAY OF PHOSPHORUS: CPT

## 2022-04-08 PROCEDURE — 83036 HEMOGLOBIN GLYCOSYLATED A1C: CPT

## 2022-04-08 PROCEDURE — 36415 COLL VENOUS BLD VENIPUNCTURE: CPT

## 2022-04-08 PROCEDURE — 71275 CT ANGIOGRAPHY CHEST: CPT | Mod: MA

## 2022-04-08 PROCEDURE — 93306 TTE W/DOPPLER COMPLETE: CPT

## 2022-04-08 PROCEDURE — 85610 PROTHROMBIN TIME: CPT

## 2022-04-08 PROCEDURE — 93458 L HRT ARTERY/VENTRICLE ANGIO: CPT | Mod: 26

## 2022-04-08 PROCEDURE — 82962 GLUCOSE BLOOD TEST: CPT

## 2022-04-08 PROCEDURE — 85379 FIBRIN DEGRADATION QUANT: CPT

## 2022-04-08 PROCEDURE — 83880 ASSAY OF NATRIURETIC PEPTIDE: CPT

## 2022-04-08 PROCEDURE — 93010 ELECTROCARDIOGRAM REPORT: CPT

## 2022-04-08 PROCEDURE — 87637 SARSCOV2&INF A&B&RSV AMP PRB: CPT

## 2022-04-08 PROCEDURE — 99285 EMERGENCY DEPT VISIT HI MDM: CPT

## 2022-04-08 PROCEDURE — 96365 THER/PROPH/DIAG IV INF INIT: CPT

## 2022-04-08 PROCEDURE — 83735 ASSAY OF MAGNESIUM: CPT

## 2022-04-08 PROCEDURE — 93005 ELECTROCARDIOGRAM TRACING: CPT

## 2022-04-08 PROCEDURE — 71046 X-RAY EXAM CHEST 2 VIEWS: CPT

## 2022-04-08 PROCEDURE — 93010 ELECTROCARDIOGRAM REPORT: CPT | Mod: 77

## 2022-04-08 PROCEDURE — 86803 HEPATITIS C AB TEST: CPT

## 2022-04-08 PROCEDURE — 96375 TX/PRO/DX INJ NEW DRUG ADDON: CPT

## 2022-04-08 RX ORDER — ATORVASTATIN CALCIUM 80 MG/1
80 TABLET, FILM COATED ORAL AT BEDTIME
Refills: 0 | Status: DISCONTINUED | OUTPATIENT
Start: 2022-04-08 | End: 2022-04-11

## 2022-04-08 RX ORDER — MONTELUKAST 4 MG/1
10 TABLET, CHEWABLE ORAL AT BEDTIME
Refills: 0 | Status: DISCONTINUED | OUTPATIENT
Start: 2022-04-08 | End: 2022-04-11

## 2022-04-08 RX ORDER — LANOLIN ALCOHOL/MO/W.PET/CERES
5 CREAM (GRAM) TOPICAL ONCE
Refills: 0 | Status: COMPLETED | OUTPATIENT
Start: 2022-04-08 | End: 2022-04-08

## 2022-04-08 RX ORDER — DEXTROSE 50 % IN WATER 50 %
25 SYRINGE (ML) INTRAVENOUS ONCE
Refills: 0 | Status: DISCONTINUED | OUTPATIENT
Start: 2022-04-08 | End: 2022-04-11

## 2022-04-08 RX ORDER — DEXTROSE 50 % IN WATER 50 %
12.5 SYRINGE (ML) INTRAVENOUS ONCE
Refills: 0 | Status: DISCONTINUED | OUTPATIENT
Start: 2022-04-08 | End: 2022-04-11

## 2022-04-08 RX ORDER — OMEPRAZOLE 10 MG/1
1 CAPSULE, DELAYED RELEASE ORAL
Qty: 0 | Refills: 0 | DISCHARGE

## 2022-04-08 RX ORDER — METOPROLOL TARTRATE 50 MG
12.5 TABLET ORAL
Qty: 0 | Refills: 0 | DISCHARGE
Start: 2022-04-08

## 2022-04-08 RX ORDER — ALBUTEROL 90 UG/1
2 AEROSOL, METERED ORAL EVERY 6 HOURS
Refills: 0 | Status: DISCONTINUED | OUTPATIENT
Start: 2022-04-08 | End: 2022-04-11

## 2022-04-08 RX ORDER — ALBUTEROL 90 UG/1
2 AEROSOL, METERED ORAL
Qty: 0 | Refills: 0 | DISCHARGE
Start: 2022-04-08

## 2022-04-08 RX ORDER — CARVEDILOL PHOSPHATE 80 MG/1
25 CAPSULE, EXTENDED RELEASE ORAL EVERY 12 HOURS
Refills: 0 | Status: DISCONTINUED | OUTPATIENT
Start: 2022-04-08 | End: 2022-04-11

## 2022-04-08 RX ORDER — ERGOCALCIFEROL 1.25 MG/1
1 CAPSULE ORAL
Qty: 0 | Refills: 0 | DISCHARGE

## 2022-04-08 RX ORDER — HYDRALAZINE HCL 50 MG
50 TABLET ORAL THREE TIMES A DAY
Refills: 0 | Status: DISCONTINUED | OUTPATIENT
Start: 2022-04-08 | End: 2022-04-11

## 2022-04-08 RX ORDER — CLOPIDOGREL BISULFATE 75 MG/1
75 TABLET, FILM COATED ORAL DAILY
Refills: 0 | Status: DISCONTINUED | OUTPATIENT
Start: 2022-04-08 | End: 2022-04-11

## 2022-04-08 RX ORDER — DEXTROSE 50 % IN WATER 50 %
15 SYRINGE (ML) INTRAVENOUS ONCE
Refills: 0 | Status: DISCONTINUED | OUTPATIENT
Start: 2022-04-08 | End: 2022-04-11

## 2022-04-08 RX ORDER — DILTIAZEM HCL 120 MG
180 CAPSULE, EXT RELEASE 24 HR ORAL DAILY
Refills: 0 | Status: DISCONTINUED | OUTPATIENT
Start: 2022-04-08 | End: 2022-04-11

## 2022-04-08 RX ORDER — GLUCAGON INJECTION, SOLUTION 0.5 MG/.1ML
1 INJECTION, SOLUTION SUBCUTANEOUS ONCE
Refills: 0 | Status: DISCONTINUED | OUTPATIENT
Start: 2022-04-08 | End: 2022-04-11

## 2022-04-08 RX ORDER — SODIUM CHLORIDE 9 MG/ML
1000 INJECTION, SOLUTION INTRAVENOUS
Refills: 0 | Status: DISCONTINUED | OUTPATIENT
Start: 2022-04-08 | End: 2022-04-11

## 2022-04-08 RX ORDER — PANTOPRAZOLE SODIUM 20 MG/1
40 TABLET, DELAYED RELEASE ORAL
Refills: 0 | Status: DISCONTINUED | OUTPATIENT
Start: 2022-04-08 | End: 2022-04-11

## 2022-04-08 RX ORDER — ATORVASTATIN CALCIUM 80 MG/1
1 TABLET, FILM COATED ORAL
Qty: 0 | Refills: 0 | DISCHARGE
Start: 2022-04-08

## 2022-04-08 RX ORDER — INSULIN LISPRO 100/ML
VIAL (ML) SUBCUTANEOUS AT BEDTIME
Refills: 0 | Status: DISCONTINUED | OUTPATIENT
Start: 2022-04-08 | End: 2022-04-11

## 2022-04-08 RX ORDER — ENOXAPARIN SODIUM 100 MG/ML
70 INJECTION SUBCUTANEOUS
Qty: 0 | Refills: 0 | DISCHARGE
Start: 2022-04-08

## 2022-04-08 RX ORDER — TOPIRAMATE 25 MG
100 TABLET ORAL DAILY
Refills: 0 | Status: DISCONTINUED | OUTPATIENT
Start: 2022-04-08 | End: 2022-04-11

## 2022-04-08 RX ORDER — HEPARIN SODIUM 5000 [USP'U]/ML
5000 INJECTION INTRAVENOUS; SUBCUTANEOUS EVERY 12 HOURS
Refills: 0 | Status: DISCONTINUED | OUTPATIENT
Start: 2022-04-09 | End: 2022-04-11

## 2022-04-08 RX ORDER — ASPIRIN/CALCIUM CARB/MAGNESIUM 324 MG
81 TABLET ORAL DAILY
Refills: 0 | Status: DISCONTINUED | OUTPATIENT
Start: 2022-04-09 | End: 2022-04-11

## 2022-04-08 RX ORDER — INSULIN LISPRO 100/ML
VIAL (ML) SUBCUTANEOUS
Refills: 0 | Status: DISCONTINUED | OUTPATIENT
Start: 2022-04-08 | End: 2022-04-11

## 2022-04-08 RX ORDER — SODIUM CHLORIDE 9 MG/ML
3 INJECTION INTRAMUSCULAR; INTRAVENOUS; SUBCUTANEOUS EVERY 8 HOURS
Refills: 0 | Status: DISCONTINUED | OUTPATIENT
Start: 2022-04-08 | End: 2022-04-11

## 2022-04-08 RX ORDER — FLUTICASONE PROPIONATE AND SALMETEROL 50; 250 UG/1; UG/1
1 POWDER ORAL; RESPIRATORY (INHALATION)
Qty: 0 | Refills: 0 | DISCHARGE

## 2022-04-08 RX ORDER — ALBUTEROL 90 UG/1
2 AEROSOL, METERED ORAL EVERY 6 HOURS
Refills: 0 | Status: DISCONTINUED | OUTPATIENT
Start: 2022-04-08 | End: 2022-04-08

## 2022-04-08 RX ADMIN — PANTOPRAZOLE SODIUM 40 MILLIGRAM(S): 20 TABLET, DELAYED RELEASE ORAL at 07:39

## 2022-04-08 RX ADMIN — ENOXAPARIN SODIUM 70 MILLIGRAM(S): 100 INJECTION SUBCUTANEOUS at 05:41

## 2022-04-08 RX ADMIN — MONTELUKAST 10 MILLIGRAM(S): 4 TABLET, CHEWABLE ORAL at 21:14

## 2022-04-08 RX ADMIN — Medication 325 MILLIGRAM(S): at 12:31

## 2022-04-08 RX ADMIN — Medication 50 MILLIGRAM(S): at 06:35

## 2022-04-08 RX ADMIN — Medication 3: at 22:27

## 2022-04-08 RX ADMIN — Medication 50 MILLIGRAM(S): at 21:15

## 2022-04-08 RX ADMIN — Medication 180 MILLIGRAM(S): at 06:35

## 2022-04-08 RX ADMIN — Medication 100 MILLIGRAM(S): at 12:32

## 2022-04-08 RX ADMIN — SODIUM CHLORIDE 3 MILLILITER(S): 9 INJECTION INTRAMUSCULAR; INTRAVENOUS; SUBCUTANEOUS at 22:45

## 2022-04-08 RX ADMIN — Medication 3: at 12:31

## 2022-04-08 RX ADMIN — ATORVASTATIN CALCIUM 80 MILLIGRAM(S): 80 TABLET, FILM COATED ORAL at 21:14

## 2022-04-08 RX ADMIN — Medication 12.5 MILLIGRAM(S): at 05:40

## 2022-04-08 RX ADMIN — Medication 5 MILLIGRAM(S): at 00:41

## 2022-04-08 RX ADMIN — CARVEDILOL PHOSPHATE 25 MILLIGRAM(S): 80 CAPSULE, EXTENDED RELEASE ORAL at 18:21

## 2022-04-08 NOTE — TRANSFER ACCEPTANCE NOTE - PROBLEM SELECTOR PLAN 3
hx of afib not on NOAC, takes ASA and Plavix at home   fall risk  continue topamax  PT consult  educated on strict medication compliance given moderate to high risk of recurrent CVA

## 2022-04-08 NOTE — TRANSFER ACCEPTANCE NOTE - HISTORY OF PRESENT ILLNESS
66 years old Female with asthma/COPD (no home O2), L MCA CVA (right sided weakness), small chronic right parietal lobe infarct, seizures, Diabetes mellitus type 2, known CAD s/p stent,  HTN, AFib (no AC), PAD s/p right fem-pop bypass (s/p occlusion and IR stent placement 2/2017), GERD who presented to [facility] ED 4/7 complaining of midsternal, 6/10 left sided chest pain started. EMS was activated and BIBA to [facility] ED. Patient R/I NSTEMI with peak high sensitivity troponin 131.8, CTA PE protocol unremarkable for PE and admitted for ACS. Started on Lovenox therapy, ASA, statin and BB.    In light of patients known CAD, symptoms and abnormal noninvasive test findings there is high suspicion for CAD. Patient is now transferred to [facility] 4/8 for a cardiac catheterization with possible PTCA/stent.   On arrival to [facility] patient chest pain free, without complaints.   MEDS at [facility]: ASA 325mg po daily, Lovenox 70mg subc BID, Admelog sliding scale, Albuterol INH PRN, Atorvastatin 40mg po daily, Diltiazem CD 180mg po daily, Hydralazine 50mg po TID, Metoprolol 12.5mg po BID, Singulair 10mg po daily, Topamax 100mg po daily, protonix 40mg po daily    COVID PCR not detected on 4/7/2022   66 years old Female with asthma/COPD (no home O2), L MCA CVA (right sided weakness/word finding difficulty), small chronic right parietal lobe infarct/seizure ("none in a long time"), Diabetes mellitus type 2, known CAD s/p stent,  HTN, AFib (no AC), PAD s/p right fem-pop bypass (s/p occlusion and IR stent placement 2/2017), GERD who presented to [facility] ED 4/7 complaining of midsternal, 6/10 left sided chest pain that has been intermittent for the past few months, "I just couldn't wait any longer and my doctors appointment isn't for another month so I had to come to the hospital". Patient explains that she has SOB with associated moderate to severe chest pain with ADL's, relieved with rest. Denies dizziness, syncope, edema, orthopnea and PND.  EMS was activated and BIBA to [facility] ED. Patient R/I NSTEMI with peak high sensitivity troponin 131.8, CTA PE protocol unremarkable for PE and admitted for ACS. Started on Lovenox therapy, ASA, statin and BB.    In light of patients known CAD, symptoms and abnormal noninvasive test findings there is high suspicion for CAD. Patient is now transferred to [facility] 4/8 for a cardiac catheterization with possible PTCA/stent.   On arrival to [facility] patient chest pain free, without complaints.   MEDS at [facility]: ASA 325mg po daily, Lovenox 70mg subc BID, Admelog sliding scale, Albuterol INH PRN, Atorvastatin 40mg po daily, Diltiazem CD 180mg po daily, Hydralazine 50mg po TID, Metoprolol 12.5mg po BID, Singulair 10mg po daily, Topamax 100mg po daily, protonix 40mg po daily    OF NOTE: home med list obtained from [place] [facility] - pt has only refilled crestor, zetia and hydaralazine since february; any other medications have not been filled since December and January. I spoke with patient who admits to poor adherence to medication regimen at home for which I advised her of the increased risk she is putting herself at for another CVA, MI not limited to even death. I spoke with her sister who patient refers me to as she is her HHA and states she tries to get her sister to take her medications but she can't force her. Patient at bedside and sister over the phone () advised for need for strict daily compliance with long term dual antiplatelet therapy if coronary stent placement needed. If not risks not limited to MI/death.  Patient verbalized understanding and agrees to take if needed and sister agrees to reinforce and monitor better.      COVID PCR not detected on 4/7/2022

## 2022-04-08 NOTE — TRANSFER ACCEPTANCE NOTE - MOTOR
decreased active ROM of right upper and lower extremity with stiffness of right arm with passive JOSE    1/5 strength RUE and RLE, 3/5 strength LUE and LLE

## 2022-04-08 NOTE — TRANSFER ACCEPTANCE NOTE - SOURCE OF INFORMATION, PROFILE
reliable and medical records/patient/chart(s) fair historian with patience due to word finding issues s/p old CVA and medical records/patient/chart(s)

## 2022-04-08 NOTE — ACUTE INTERFACILITY TRANSFER NOTE - PLAN OF CARE
Patient is 66 years old Female with hx asthma/COPD (no home O2), L MCA CVA (no residual deficits), small chronic right parietal lobe infarct, seizures, T2DM, CAD s/p ZHAO (on Plavix HTN, AFib (no AC), PAD s/p right fem-pop bypass (s/p occlusion and IR stent placement 2/2017), GERD p/w midsternal -left  chest pain started this morning worse with exertion and SOB, 6/10, non radiating, comes and goes.

## 2022-04-08 NOTE — TRANSFER ACCEPTANCE NOTE - PROBLEM SELECTOR PLAN 4
[id number] score 5 - not on NOAC, takes ASA and Plavix at home   fall risk  tele  educated on strict medication compliance given moderate to high risk of recurrent CVA

## 2022-04-08 NOTE — PATIENT PROFILE ADULT - FALL HARM RISK - HARM RISK INTERVENTIONS

## 2022-04-08 NOTE — TRANSFER ACCEPTANCE NOTE - NSICDXPASTSURGICALHX_GEN_ALL_CORE_FT
PAST SURGICAL HISTORY:  Coronary stent occlusion     S/P total abdominal hysterectomy      PAST SURGICAL HISTORY:  Coronary stent occlusion     H/O peripheral artery bypass     S/P total abdominal hysterectomy

## 2022-04-08 NOTE — PROGRESS NOTE ADULT - SUBJECTIVE AND OBJECTIVE BOX
PATIENT SEEN AND EXAMINED ON :-04/08/2022  DATE OF SERVICE:   04/08/2022          Interim events noted,Labs ,Radiological studies and Cardiology tests reviewed .    History of Present Illness:   Patient is 66 years old Female with hx asthma/COPD (no home O2), L MCA CVA (no residual deficits), small chronic right parietal lobe infarct, seizures, T2DM, CAD s/p ZHAO (on Plavix HTN, AFib (no AC), PAD s/p right fem-pop bypass (s/p occlusion and IR stent placement 2/2017), GERD p/w midsternal -left  chest pain started yesterday  morning worse with exertion and SOB, 6/10, non radiating, comes and goes.    INTERVAL HPI/OVERNIGHT EVENTS:Awake alert CTPA No PE still refers to precordial pain     MEDICATIONS  (STANDING):  aspirin 325 milliGRAM(s) Oral daily  atorvastatin 40 milliGRAM(s) Oral at bedtime  diltiazem    milliGRAM(s) Oral daily  enoxaparin Injectable 70 milliGRAM(s) SubCutaneous every 12 hours  hydrALAZINE 50 milliGRAM(s) Oral three times a day  insulin lispro (ADMELOG) corrective regimen sliding scale   SubCutaneous three times a day before meals  metoprolol tartrate 12.5 milliGRAM(s) Oral two times a day  montelukast 10 milliGRAM(s) Oral at bedtime  pantoprazole    Tablet 40 milliGRAM(s) Oral before breakfast  topiramate 100 milliGRAM(s) Oral daily    MEDICATIONS  (PRN):  acetaminophen     Tablet .. 650 milliGRAM(s) Oral every 6 hours PRN Temp greater or equal to 38C (100.4F), Mild Pain (1 - 3)  ALBUTerol    90 MICROgram(s) HFA Inhaler 2 Puff(s) Inhalation every 6 hours PRN Shortness of Breath  guaiFENesin Oral Liquid (Sugar-Free) 200 milliGRAM(s) Oral every 6 hours PRN Cough      VITALS:  T(C): 36.7 (04-08-22 @ 05:21), Max: 36.8 (04-07-22 @ 12:24)  HR: 63 (04-08-22 @ 05:21) (63 - 98)  BP: 158/87 (04-08-22 @ 05:21) (137/93 - 172/104)  RR: 18 (04-08-22 @ 05:21) (18 - 18)  SpO2: 100% (04-08-22 @ 05:21) (97% -     PHYSICAL EXAM:  GENERAL: NAD, well-groomed, well-developed  HEAD:  Atraumatic, Normocephalic  EYES: EOMI, PERRLA, conjunctiva and sclera clear  ENMT: No tonsillar erythema, exudates, or enlargement; Moist mucous membranes, Good dentition, No lesions  NECK: Supple, No JVD, Normal thyroid  NERVOUS SYSTEM:  Alert & Oriented X3, Good concentration; Motor Strength 5/5 B/L upper and lower extremities; DTRs 2+ intact and symmetric  CHEST/LUNG: Clear to percussion bilaterally; No rales, rhonchi, wheezing, or rubs  HEART: Regular rate and rhythm; 2/6 Systolic  murmurs, No  rubs, or gallops  ABDOMEN: Soft, Nontender, Nondistended; Bowel sounds present  EXTREMITIES:  2+ Peripheral Pulses, No clubbing, cyanosis, or edema  LYMPH: No lymphadenopathy noted          LABS:                        14.8   7.26  )-----------( 279      ( 08 Apr 2022 06:10 )             45.9     04-08    139  |  108  |  11  ----------------------------<  164<H>  3.9   |  24  |  0.97    Ca    9.4      08 Apr 2022 06:10  Phos  3.5     04-08  Mg     2.2     04-08    TPro  6.5  /  Alb  2.9<L>  /  TBili  0.4  /  DBili  x   /  AST  16  /  ALT  19  /  AlkPhos  81  04-08    PT/INR - ( 07 Apr 2022 15:20 )   PT: 10.9 sec;   INR: 0.92 ratio       ECHO:  Study Date: 4/7/2022  CONCLUSIONS:  1. Mitral annular calcification. Trace mitral regurgitation.  2. Normal trileaflet aortic valve. Mild aortic regurgitation.  3. Aortic Root: 2.8 cm.  4. LA volume index = 13 cc/m2.  5. Mild concentric left ventricular hypertrophy.  6. Normal Left Ventricular Systolic Function,  (EF = 55 to 60%)  7. Grade I diastolic dysfunction (Impaired relaxation, mild).  8. Normal right atrium.  9. Normal right ventricularsize and systolic function (TAPSE 1.7 cm).  10. Unable to estimate RVSP.  11. Normal tricuspid valve.  12. Normal pulmonic valve.  13. Normal pericardium with no pericardial effusion.    RADIOLOGY:  CT ANGIO CHEST PULM ART WAWIC   IMPRESSION:  No pulmonary embolus.      
Patient was seen and examined  Patient is a 66y old  Female who presents with a chief complaint of ACS (07 Apr 2022 16:17)      INTERVAL HPI/OVERNIGHT EVENTS:  T(C): 36.7 (04-08-22 @ 05:21), Max: 36.8 (04-07-22 @ 12:24)  HR: 63 (04-08-22 @ 05:21) (63 - 98)  BP: 158/87 (04-08-22 @ 05:21) (137/93 - 172/104)  RR: 18 (04-08-22 @ 05:21) (18 - 18)  SpO2: 100% (04-08-22 @ 05:21) (97% - 100%)  Wt(kg): --  I&O's Summary      LABS:                        14.8   7.26  )-----------( 279      ( 08 Apr 2022 06:10 )             45.9     04-08    139  |  108  |  11  ----------------------------<  164<H>  3.9   |  24  |  0.97    Ca    9.4      08 Apr 2022 06:10  Phos  3.5     04-08  Mg     2.2     04-08    TPro  6.5  /  Alb  2.9<L>  /  TBili  0.4  /  DBili  x   /  AST  16  /  ALT  19  /  AlkPhos  81  04-08    PT/INR - ( 07 Apr 2022 15:20 )   PT: 10.9 sec;   INR: 0.92 ratio         PTT - ( 07 Apr 2022 22:24 )  PTT:98.5 sec    CAPILLARY BLOOD GLUCOSE      POCT Blood Glucose.: 251 mg/dL (07 Apr 2022 21:29)              MEDICATIONS  (STANDING):  aspirin 325 milliGRAM(s) Oral daily  atorvastatin 40 milliGRAM(s) Oral at bedtime  diltiazem    milliGRAM(s) Oral daily  enoxaparin Injectable 70 milliGRAM(s) SubCutaneous every 12 hours  hydrALAZINE 50 milliGRAM(s) Oral three times a day  insulin lispro (ADMELOG) corrective regimen sliding scale   SubCutaneous three times a day before meals  metoprolol tartrate 12.5 milliGRAM(s) Oral two times a day  montelukast 10 milliGRAM(s) Oral at bedtime  pantoprazole    Tablet 40 milliGRAM(s) Oral before breakfast  topiramate 100 milliGRAM(s) Oral daily    MEDICATIONS  (PRN):  acetaminophen     Tablet .. 650 milliGRAM(s) Oral every 6 hours PRN Temp greater or equal to 38C (100.4F), Mild Pain (1 - 3)  ALBUTerol    90 MICROgram(s) HFA Inhaler 2 Puff(s) Inhalation every 6 hours PRN Shortness of Breath  guaiFENesin Oral Liquid (Sugar-Free) 200 milliGRAM(s) Oral every 6 hours PRN Cough      RADIOLOGY & ADDITIONAL TESTS:    Imaging Personally Reviewed:  [ ] YES  [ ] NO    REVIEW OF SYSTEMS:  CONSTITUTIONAL: No fever, weight loss, or fatigue  EYES: No eye pain, visual disturbances, or discharge  ENMT:  No difficulty hearing, tinnitus, vertigo; No sinus or throat pain  NECK: No pain or stiffness  BREASTS: No pain, masses, or nipple discharge  RESPIRATORY: No cough, wheezing, chills or hemoptysis; No shortness of breath  CARDIOVASCULAR: No chest pain, palpitations, dizziness, or leg swelling  GASTROINTESTINAL: No abdominal or epigastric pain. No nausea, vomiting, or hematemesis; No diarrhea or constipation. No melena or hematochezia.  GENITOURINARY: No dysuria, frequency, hematuria, or incontinence  NEUROLOGICAL: No headaches, memory loss, loss of strength, numbness, or tremors  SKIN: No itching, burning, rashes, or lesions   LYMPH NODES: No enlarged glands  ENDOCRINE: No heat or cold intolerance; No hair loss  MUSCULOSKELETAL: No joint pain or swelling; No muscle, back, or extremity pain  PSYCHIATRIC: No depression, anxiety, mood swings, or difficulty sleeping  HEME/LYMPH: No easy bruising, or bleeding gums  ALLERY AND IMMUNOLOGIC: No hives or eczema      Consultant(s) Notes Reviewed:  [ x ] YES  [ ] NO    PHYSICAL EXAM:  GENERAL: NAD, well-groomed, well-developed  HEAD:  Atraumatic, Normocephalic  EYES: EOMI, PERRLA, conjunctiva and sclera clear  ENMT: No tonsillar erythema, exudates, or enlargement; Moist mucous membranes, Good dentition, No lesions  NECK: Supple, No JVD, Normal thyroid  NERVOUS SYSTEM:  Alert & Oriented X3, Good concentration; Motor Strength 5/5 B/L upper and lower extremities; DTRs 2+ intact and symmetric  CHEST/LUNG: Clear to percussion bilaterally; No rales, rhonchi, wheezing, or rubs  HEART: Regular rate and rhythm; No murmurs, rubs, or gallops  ABDOMEN: Soft, Nontender, Nondistended; Bowel sounds present  EXTREMITIES:  2+ Peripheral Pulses, No clubbing, cyanosis, or edema  LYMPH: No lymphadenopathy noted  SKIN: No rashes or lesions    Care Discussed with Consultants/Other Providers [ x] YES  [ ] NO

## 2022-04-08 NOTE — TRANSFER ACCEPTANCE NOTE - PROBLEM SELECTOR PLAN 1
NPO for cath  tele  will continue ASA, statin, BB  educated on strict medication compliance NPO for cath; Patient advised for need for strict daily compliance with long term dual antiplatelet therapy if coronary stent placement needed. If not risks not limited to MI/death.  Patient verbalized understanding and agrees to take if needed - discussed with Dr JACK Sterling  tele  will continue ASA, statin, BB  educated on strict medication compliance

## 2022-04-08 NOTE — TRANSFER ACCEPTANCE NOTE - ASSESSMENT
66 years old Female with asthma/COPD (no home O2), multiple CVA in the past (right sided weakness/word finding difficulty), Diabetes mellitus type 2, known CAD s/p stent,  HTN, AFib (no AC), PAD s/p right fem-pop bypass (s/p occlusion and IR stent placement 2/2017), who [facility] 4/7 complaining of midsternal, 6/10 left sided chest pain, R/I NSTEMI. transferred to [facility] 4/8 for cath

## 2022-04-08 NOTE — ACUTE INTERFACILITY TRANSFER NOTE - HOSPITAL COURSE
Patient is 66 years old Female with hx asthma/COPD (no home O2), L MCA CVA (no residual deficits), small chronic right parietal lobe infarct, seizures, T2DM, CAD s/p ZHAO (on Plavix HTN, AFib (no AC), PAD s/p right fem-pop bypass (s/p occlusion and IR stent placement 2/2017), GERD p/w midsternal -left  chest pain started this morning worse with exertion and SOB, 6/10, non radiating, comes and goes. Patient was complaining of acute chest pain, holding her chest. Nitro was ordered and heparin drip was started. Pt has not had chest pain work up in years and does not have a cardiologist. Denies fevers, chills, nausea, vomiting, palpitations, cough, abdominal pain. Patient admitted to telemetry unit and was started on cardiac monitoring, heparin drip. EKG ST with RBBB, troponin elevated x 3, cardiology consulted. CT angio negative for PE, Echo pending. Decision was made to transfer patient to Cath Lab at [facility] under dr. Ricardo vela.

## 2022-04-08 NOTE — PROGRESS NOTE ADULT - ASSESSMENT
66 years old Female with hx asthma/COPD (no home O2), L MCA CVA (no residual deficits), small chronic right parietal lobe infarct, seizures, T2DM, CAD s/p ZHAO (on Plavix HTN, AFib (no AC), PAD s/p right fem-pop bypass (s/p occlusion and IR stent placement 2/2017), GERD p/w midsternal -left  chest pain startedyesterday  morning worse with exertion and SOB, 6/10, non radiating, comes and goes. EMS gave 4 ASA and felt better after then. During Examination, Patient was complaining of acute chest pain, holding her chest. Nitro was ordered and heparin drip was started. EKG showed sinus tachycardia / incomplete right bundle branch block. Patient will be admitted for ACS workup.    Problem/Plan - 1:  ·  Problem: ACS (acute coronary syndrome).   ·  Plan: - Mid-left side chest pain non radiating   - Nitro was given in ED   - EKG showed Sinus tachy and incomplete right bundle branch block  - Will start on aspirin, statin, lopressor 12.5 BID   - Will admit to tele  - Will start heparin drip for now; given elevated troponin and active chest pain  -  CTA  No  PE   - TTE Normal LVEF  -Transfer to [facility] Dr Ligia Sterling for cardiac cath      Problem/Plan - 2:  ·  Problem: COPD without exacerbation.   ·  Plan: - Continue on home meds.    Problem/Plan - 3:  ·  Problem: Seizure.   ·  Plan: - Continue on home meds  - Fall precautions.    Problem/Plan - 4:  ·  Problem: Diabetes mellitus.   ·  Plan: - Sliding scale   - Daibetic diet   - A1c.    Problem/Plan - 5:  ·  Problem: Need for prophylactic measure.   ·  Plan: - Heparin drip discontinued
Patient is 66 years old Female with hx asthma/COPD (no home O2), L MCA CVA (no residual deficits), small chronic right parietal lobe infarct, seizures, T2DM, CAD s/p ZHAO (on Plavix HTN, AFib (no AC), PAD s/p right fem-pop bypass (s/p occlusion and IR stent placement 2/2017), GERD p/w midsternal -left  chest pain started this morning worse with exertion and SOB, 6/10, non radiating, comes and goes. EMS gave 4 ASA and felt better after then. During Examination, Patient was complaining of acute chest pain, holding her chest. Nitro was ordered and heparin drip was started. EKG showed sinus tachycardia / incomplete right bundle branch block. Patient will be admitted for ACS workup.

## 2022-04-08 NOTE — TRANSFER ACCEPTANCE NOTE - NSICDXPASTMEDICALHX_GEN_ALL_CORE_FT
PAST MEDICAL HISTORY:  Asthma never intubated    CAD (coronary artery disease)     CVA (cerebral infarction) times 3 with residual rt sided weakness    Diabetes     Gastroesophageal reflux     HTN (hypertension)     Peripheral arterial disease     S/P Cardiac Cath 3yrs ago.report unknown     PAST MEDICAL HISTORY:  Asthma never intubated    Atrial fibrillation     CAD (coronary artery disease)     CVA (cerebral infarction) times 3 with residual rt sided weakness and word finding difficulty    Diabetes     Gastroesophageal reflux     HTN (hypertension)     Hyperlipidemia     Peripheral arterial disease peripheral bypass/stents    S/P Cardiac Cath 3yrs ago.report unknown     PAST MEDICAL HISTORY:  Asthma never intubated    Atrial fibrillation     CAD (coronary artery disease)     CVA (cerebral infarction) times 3 with residual rt sided weakness and word finding difficulty    Diabetes     Gastroesophageal reflux     History of seizure "very long time ago" at time of CVA - topamax    HTN (hypertension)     Hyperlipidemia     Peripheral arterial disease peripheral bypass/stents    S/P Cardiac Cath 3yrs ago.report unknown

## 2022-04-09 ENCOUNTER — TRANSCRIPTION ENCOUNTER (OUTPATIENT)
Age: 67
End: 2022-04-09

## 2022-04-09 LAB
ANION GAP SERPL CALC-SCNC: 10 MMOL/L — SIGNIFICANT CHANGE UP (ref 7–14)
BUN SERPL-MCNC: 11 MG/DL — SIGNIFICANT CHANGE UP (ref 7–23)
CALCIUM SERPL-MCNC: 9.1 MG/DL — SIGNIFICANT CHANGE UP (ref 8.4–10.5)
CHLORIDE SERPL-SCNC: 106 MMOL/L — SIGNIFICANT CHANGE UP (ref 98–107)
CO2 SERPL-SCNC: 20 MMOL/L — LOW (ref 22–31)
CREAT SERPL-MCNC: 0.94 MG/DL — SIGNIFICANT CHANGE UP (ref 0.5–1.3)
EGFR: 67 ML/MIN/1.73M2 — SIGNIFICANT CHANGE UP
GLUCOSE SERPL-MCNC: 196 MG/DL — HIGH (ref 70–99)
HCT VFR BLD CALC: 43.5 % — SIGNIFICANT CHANGE UP (ref 34.5–45)
HGB BLD-MCNC: 14.3 G/DL — SIGNIFICANT CHANGE UP (ref 11.5–15.5)
MCHC RBC-ENTMCNC: 27.9 PG — SIGNIFICANT CHANGE UP (ref 27–34)
MCHC RBC-ENTMCNC: 32.9 GM/DL — SIGNIFICANT CHANGE UP (ref 32–36)
MCV RBC AUTO: 84.8 FL — SIGNIFICANT CHANGE UP (ref 80–100)
NRBC # BLD: 0 /100 WBCS — SIGNIFICANT CHANGE UP
NRBC # FLD: 0 K/UL — SIGNIFICANT CHANGE UP
PLATELET # BLD AUTO: 225 K/UL — SIGNIFICANT CHANGE UP (ref 150–400)
POTASSIUM SERPL-MCNC: 3.9 MMOL/L — SIGNIFICANT CHANGE UP (ref 3.5–5.3)
POTASSIUM SERPL-SCNC: 3.9 MMOL/L — SIGNIFICANT CHANGE UP (ref 3.5–5.3)
RBC # BLD: 5.13 M/UL — SIGNIFICANT CHANGE UP (ref 3.8–5.2)
RBC # FLD: 14.3 % — SIGNIFICANT CHANGE UP (ref 10.3–14.5)
SODIUM SERPL-SCNC: 136 MMOL/L — SIGNIFICANT CHANGE UP (ref 135–145)
WBC # BLD: 6.07 K/UL — SIGNIFICANT CHANGE UP (ref 3.8–10.5)
WBC # FLD AUTO: 6.07 K/UL — SIGNIFICANT CHANGE UP (ref 3.8–10.5)

## 2022-04-09 RX ORDER — ACETAMINOPHEN 500 MG
650 TABLET ORAL ONCE
Refills: 0 | Status: COMPLETED | OUTPATIENT
Start: 2022-04-09 | End: 2022-04-09

## 2022-04-09 RX ADMIN — Medication 50 MILLIGRAM(S): at 13:16

## 2022-04-09 RX ADMIN — Medication 100 MILLIGRAM(S): at 11:48

## 2022-04-09 RX ADMIN — Medication 3: at 11:49

## 2022-04-09 RX ADMIN — Medication 50 MILLIGRAM(S): at 06:01

## 2022-04-09 RX ADMIN — ATORVASTATIN CALCIUM 80 MILLIGRAM(S): 80 TABLET, FILM COATED ORAL at 22:17

## 2022-04-09 RX ADMIN — HEPARIN SODIUM 5000 UNIT(S): 5000 INJECTION INTRAVENOUS; SUBCUTANEOUS at 17:14

## 2022-04-09 RX ADMIN — Medication 2: at 17:12

## 2022-04-09 RX ADMIN — HEPARIN SODIUM 5000 UNIT(S): 5000 INJECTION INTRAVENOUS; SUBCUTANEOUS at 06:04

## 2022-04-09 RX ADMIN — SODIUM CHLORIDE 3 MILLILITER(S): 9 INJECTION INTRAMUSCULAR; INTRAVENOUS; SUBCUTANEOUS at 13:16

## 2022-04-09 RX ADMIN — MONTELUKAST 10 MILLIGRAM(S): 4 TABLET, CHEWABLE ORAL at 22:17

## 2022-04-09 RX ADMIN — Medication 1: at 07:56

## 2022-04-09 RX ADMIN — Medication 650 MILLIGRAM(S): at 10:24

## 2022-04-09 RX ADMIN — CARVEDILOL PHOSPHATE 25 MILLIGRAM(S): 80 CAPSULE, EXTENDED RELEASE ORAL at 17:15

## 2022-04-09 RX ADMIN — PANTOPRAZOLE SODIUM 40 MILLIGRAM(S): 20 TABLET, DELAYED RELEASE ORAL at 07:15

## 2022-04-09 RX ADMIN — SODIUM CHLORIDE 3 MILLILITER(S): 9 INJECTION INTRAMUSCULAR; INTRAVENOUS; SUBCUTANEOUS at 22:17

## 2022-04-09 RX ADMIN — SODIUM CHLORIDE 3 MILLILITER(S): 9 INJECTION INTRAMUSCULAR; INTRAVENOUS; SUBCUTANEOUS at 07:15

## 2022-04-09 RX ADMIN — CLOPIDOGREL BISULFATE 75 MILLIGRAM(S): 75 TABLET, FILM COATED ORAL at 11:48

## 2022-04-09 RX ADMIN — Medication 50 MILLIGRAM(S): at 22:17

## 2022-04-09 RX ADMIN — Medication 180 MILLIGRAM(S): at 06:01

## 2022-04-09 RX ADMIN — CARVEDILOL PHOSPHATE 25 MILLIGRAM(S): 80 CAPSULE, EXTENDED RELEASE ORAL at 06:01

## 2022-04-09 RX ADMIN — Medication 81 MILLIGRAM(S): at 11:48

## 2022-04-09 RX ADMIN — Medication 650 MILLIGRAM(S): at 09:24

## 2022-04-09 NOTE — PROVIDER CONTACT NOTE (OTHER) - SITUATION
Upon introducing myself to the pt, the pt complaint of a headache on a scale from 1-10 it was a 4-5.
Physical therapist Evangelina Garber came to me and told me that pt Birgit Villavicencio was complaining of chest pain.

## 2022-04-09 NOTE — PHYSICAL THERAPY INITIAL EVALUATION ADULT - ADDITIONAL COMMENTS
Pt lives in an apartment with her family; + elevator. Pt reports she was independent with mobility and ambulation via rollator. Required assistance with ADLs and self-care from her daughter. Her daughter serves as her home health aide.

## 2022-04-09 NOTE — PROGRESS NOTE ADULT - ASSESSMENT
66 years old Female with asthma/COPD (no home O2), multiple CVA in the past (right sided weakness/word finding difficulty), Diabetes mellitus type 2, known CAD s/p stent,  HTN, AFib (no AC), PAD s/p right fem-pop bypass (s/p occlusion and IR stent placement 2/2017), who presented to [facility] ED 4/7 complaining of midsternal, 6/10 left sided chest pain, R/I NSTEMI. transferred to [facility] 4/8 for cath    Problem/Plan - 1:  ·  Problem: Non-ST elevation MI (NSTEMI).   ·  Plan: Cath Non obstructive CAD      Problem/Plan - 2:  ·  Problem: HTN (hypertension).   ·  Plan: continue hydralazine, metoprolol, diltiazem  educated on strict medication compliance.    Problem/Plan - 3:  ·  Problem: Cerebrovascular accident (CVA).   ·  Plan: hx of afib not on NOAC, takes ASA and Plavix at home   fall risk  continue topamax  PT consult  educated on strict medication compliance given moderate to high risk of recurrent CVA.    Problem/Plan - 4:  ·  Problem: Atrial fibrillation.   ·  Plan: [id number] score 5 - not on NOAC, takes ASA and Plavix at home   fall risk  tele  educated on strict medication compliance given moderate to high risk of recurrent CVA.    Problem/Plan - 5:  ·  Problem: Peripheral arterial disease.   ·  Plan: smoking cessation reviewed with patient   continue statin.    Problem/Plan - 6:  ·  Problem: Type 2 diabetes mellitus.   ·  Plan: insulin sliding scale.    Problem/Plan - 7:  ·  Problem: Asthma.   ·  Plan: advair inh and albuterol INH PRN nand singulair.    Problem/Plan - 8:  ·  Problem: GERD (gastroesophageal reflux disease).   ·  Plan: continue panotoprazole.

## 2022-04-09 NOTE — PHYSICAL THERAPY INITIAL EVALUATION ADULT - PATIENT PROFILE REVIEW, REHAB EVAL
PT initial evaluation received and chart review completed. Pt agreeable to participate in PT evaluation. Pt cleared by JOY Jenkins./yes

## 2022-04-09 NOTE — PROVIDER CONTACT NOTE (OTHER) - RECOMMENDATIONS
Tylenol to help for the headache
I suggested to Dr. Toro that this chest pain was possibly caused and brought on by anxiety. With the cardiac cath results being negative and no evidence of infarct, I suggested continue to monitor.

## 2022-04-09 NOTE — PROGRESS NOTE ADULT - SUBJECTIVE AND OBJECTIVE BOX
DATE OF SERVICE:  04/09/2022  Patient was seen and examined on 04/09/2022     .Interim events noted.Consultant notes ,Labs,Telemetry reviewed by me       HOSPITAL COURSE: HPI:  66 years old Female with asthma/COPD (no home O2), L MCA CVA (right sided weakness/word finding difficulty), small chronic right parietal lobe infarct/seizure ("none in a long time"), Diabetes mellitus type 2, known CAD s/p stent,  HTN, AFib (no AC), PAD s/p right fem-pop bypass (s/p occlusion and IR stent placement 2/2017), GERD who presented to [facility] ED 4/7 complaining of midsternal, 6/10 left sided chest pain that has been intermittent for the past few months, "I just couldn't wait any longer and my doctors appointment isn't for another month so I had to come to the hospital". Patient explains that she has SOB with associated moderate to severe chest pain with ADL's, relieved with rest. Denies dizziness, syncope, edema, orthopnea and PND.  EMS was activated and BIBA to [facility] ED. Patient R/I NSTEMI with peak high sensitivity troponin 131.8, CTA PE protocol unremarkable for PE and admitted for ACS. Started on Lovenox therapy, ASA, statin and BB.    In light of patients known CAD, symptoms and abnormal noninvasive test findings there is high suspicion for CAD. Patient is now transferred to [facility] 4/8 for a cardiac catheterization with possible PTCA/stent.   On arrival to [facility] patient chest pain free, without complaints.   MEDS at [facility]: ASA 325mg po daily, Lovenox 70mg subc BID, Admelog sliding scale, Albuterol INH PRN, Atorvastatin 40mg po daily, Diltiazem CD 180mg po daily, Hydralazine 50mg po TID, Metoprolol 12.5mg po BID, Singulair 10mg po daily, Topamax 100mg po daily, protonix 40mg po daily    OF NOTE: home med list obtained from [facility] - pt has only refilled crestor, zetia and hydaralazine since february; any other medications have not been filled since December and January. I spoke with patient who admits to poor adherence to medication regimen at home for which I advised her of the increased risk she is putting herself at for another CVA, MI not limited to even death. I spoke with her sister who patient refers me to as she is her HHA and states she tries to get her sister to take her medications but she can't force her. Patient at bedside and sister over the phone () advised for need for strict daily compliance with long term dual antiplatelet therapy if coronary stent placement needed. If not risks not limited to MI/death.  Patient verbalized understanding and agrees to take if needed and sister agrees to reinforce and monitor better.      COVID PCR not detected on 4/7/2022        INTERIM EVENTS:Patient seen at bedside ,interim events noted.Awake alert had cardiac cath non obstructive CAD      PMH -reviewed admission note, no change since admission  HEART FAILURE: Acute[ ]Chronic[ ] Systolic[ ] Diastolic[ ] Combined Systolic and Diastolic[ ]  CAD[ ] CABG[ ] PCI[ ]  DEVICES[ ] PPM[ ] ICD[ ] ILR[ ]  ATRIAL FIBRILLATION[ ] Paroxysmal[ ] Permanent[ ]  ILEANA[ ] CKD1[ ] CKD2[ ] CKD3[ ] CKD4[ ] ESRD[ ]  COPD[ ] HTN[ ]   DM[ ] Type1[ ] Type 2[ ]   CVA[ ] Paresis[ ]    AMBULATION: Assisted[ ] Cane/walker[ ] Independent[x ]    MEDICATIONS  (STANDING):  aspirin  chewable 81 milliGRAM(s) Oral daily  atorvastatin 80 milliGRAM(s) Oral at bedtime  carvedilol 25 milliGRAM(s) Oral every 12 hours  clopidogrel Tablet 75 milliGRAM(s) Oral daily  diltiazem    milliGRAM(s) Oral daily  glucagon  Injectable 1 milliGRAM(s) IntraMuscular once  heparin   Injectable 5000 Unit(s) SubCutaneous every 12 hours  hydrALAZINE 50 milliGRAM(s) Oral three times a day  insulin lispro (ADMELOG) corrective regimen sliding scale   SubCutaneous three times a day before meals  insulin lispro (ADMELOG) corrective regimen sliding scale   SubCutaneous at bedtime  montelukast 10 milliGRAM(s) Oral at bedtime  pantoprazole    Tablet 40 milliGRAM(s) Oral before breakfast  sodium chloride 0.9% lock flush 3 milliLiter(s) IV Push every 8 hours  topiramate 100 milliGRAM(s) Oral daily    MEDICATIONS  (PRN):  ALBUTerol    90 MICROgram(s) HFA Inhaler 2 Puff(s) Inhalation every 6 hours PRN Shortness of Breath  dextrose Oral Gel 15 Gram(s) Oral once PRN Blood Glucose LESS THAN 70 milliGRAM(s)/deciliter            REVIEW OF SYSTEMS:  Constitutional: [ ] fever, [ ]weight loss,  [x ]fatigue  Eyes: [ ] visual changes  Respiratory: [ ]shortness of breath;  [ ] cough, [ ]wheezing, [ ]chills, [ ]hemoptysis  Cardiovascular: [ ] chest pain, [ ]palpitations, [ ]dizziness,  [ ]leg swelling[ ]orthopnea[ ]PND  Gastrointestinal: [ ] abdominal pain, [ ]nausea, [ ]vomiting,  [ ]diarrhea [ ]Constipation [ ]Melena  Genitourinary: [ ] dysuria, [ ] hematuria [ ]Doll  Neurologic: [ ] headaches [ ] tremors[ ]weakness [ ]Paralysis Right[ ] Left[ ]  Skin: [ ] itching, [ ]burning, [ ] rashes  Endocrine: [ ] heat or cold intolerance  Musculoskeletal: [ ] joint pain or swelling; [ ] muscle, back, or extremity pain  Psychiatric: [ ] depression, [ ]anxiety, [ ]mood swings, or [ ]difficulty sleeping  Hematologic: [ ] easy bruising, [ ] bleeding gums    [ ] All remaining systems negative except as per above.   [ ]Unable to obtain.  [x] No change in ROS since admission            PHYSICAL EXAM:  General: No acute distress BMI-28  HEENT: EOMI, PERRL  Neck: Supple, [ ] JVD  Lungs: Equal air entry bilaterally; [ ] rales [ ] wheezing [ ] rhonchi  Heart: Regular rate and rhythm; [x ] murmur   2/6 [ x] systolic [ ] diastolic [ ] radiation[ ] rubs [ ]  gallops  Abdomen: Nontender, bowel sounds present  Extremities: No clubbing, cyanosis, [ ] edema [ ]Pulses  equal and intact  Nervous system:  Alert & Oriented X3, no focal deficits  Psychiatric: Normal affect  Skin: No rashes or lesions    LABS:  04-09    136  |  106  |  11  ----------------------------<  196<H>  3.9   |  20<L>  |  0.94    Ca    9.1      09 Apr 2022 06:31      Creatinine Trend: 0.94<--, 0.97<--, 1.00<--                        14.3   6.07  )-----------( 225      ( 09 Apr 2022 06:31 )             43.5

## 2022-04-09 NOTE — PHYSICAL THERAPY INITIAL EVALUATION ADULT - GENERAL OBSERVATIONS, REHAB EVAL
Upon entry, pt semi-supine in bed in NAD. Pt left as received with all tubes/lines intact, bed alarm on, call bell in reach and in NAD. JOY Jenkins notified.

## 2022-04-09 NOTE — PHYSICAL THERAPY INITIAL EVALUATION ADULT - PASSIVE RANGE OF MOTION EXAMINATION, REHAB EVAL
Right shoulder flexion WNL; Right elbow flexion and extension WFL; Right wrist WFL; Left UE WNL; Right ankle DF ~0 degrees; Right ankle PF, knee, and hip WNL; Left LE WNL

## 2022-04-09 NOTE — PHYSICAL THERAPY INITIAL EVALUATION ADULT - ACTIVE RANGE OF MOTION EXAMINATION, REHAB EVAL
Significant right UE impairments secondary to stroke; Left UE WNL; Right ankle DF impaired ~0 degrees; Ankle PF, knee, and hip WNL

## 2022-04-09 NOTE — PROVIDER CONTACT NOTE (OTHER) - ACTION/TREATMENT ORDERED:
Dr. Toro agreed and I continued to monitor the pt for any further increase of pain. Pt's pain finally subsided after a few minutes.

## 2022-04-09 NOTE — PROVIDER CONTACT NOTE (OTHER) - ASSESSMENT
Pt appeared to be asleep once I entered the room. Upon waking her she told me that the pain was a sharp pain in her chest. She then began to hyperventilate. I went to the [facility] and asked to see her ECG. Pt was normal sinus the whole morning. I entered the room again, once again the pt was sleeping. I called to notify Dr. Toro.

## 2022-04-09 NOTE — PHYSICAL THERAPY INITIAL EVALUATION ADULT - PERTINENT HX OF CURRENT PROBLEM, REHAB EVAL
Pt is a 66 [facility]/o [facility]ernal, 6/10 left sided chest pain that has been intermittent for the past few months. Pt admitted for NSTEMI. Pt transfer to [facility] 4/8 for a cardiac catheterization with possible PTCA/stent.

## 2022-04-09 NOTE — PHYSICAL THERAPY INITIAL EVALUATION ADULT - MANUAL MUSCLE TESTING RESULTS, REHAB EVAL
Unable to formally assess right UE due to ROM impairments; right  strength significantly weaker compared to left. Pt able to perform knee flexion and extension bilaterally with increased difficulty observed in right LE, ankle pumps bilaterally with reduced DF ROM on right, straight leg raise, and hip abduction to adduction

## 2022-04-10 RX ORDER — PANTOPRAZOLE SODIUM 20 MG/1
1 TABLET, DELAYED RELEASE ORAL
Qty: 0 | Refills: 0 | DISCHARGE

## 2022-04-10 RX ORDER — CLOPIDOGREL BISULFATE 75 MG/1
1 TABLET, FILM COATED ORAL
Qty: 30 | Refills: 0
Start: 2022-04-10 | End: 2022-05-09

## 2022-04-10 RX ORDER — MONTELUKAST 4 MG/1
1 TABLET, CHEWABLE ORAL
Qty: 30 | Refills: 0
Start: 2022-04-10 | End: 2022-05-09

## 2022-04-10 RX ORDER — METFORMIN HYDROCHLORIDE 850 MG/1
1 TABLET ORAL
Qty: 60 | Refills: 0
Start: 2022-04-10 | End: 2022-05-09

## 2022-04-10 RX ORDER — EZETIMIBE 10 MG/1
1 TABLET ORAL
Qty: 0 | Refills: 0 | DISCHARGE

## 2022-04-10 RX ORDER — EZETIMIBE 10 MG/1
1 TABLET ORAL
Qty: 30 | Refills: 0
Start: 2022-04-10 | End: 2022-05-09

## 2022-04-10 RX ORDER — DILTIAZEM HCL 120 MG
1 CAPSULE, EXT RELEASE 24 HR ORAL
Qty: 30 | Refills: 0
Start: 2022-04-10 | End: 2022-05-09

## 2022-04-10 RX ORDER — METFORMIN HYDROCHLORIDE 850 MG/1
1 TABLET ORAL
Qty: 0 | Refills: 0 | DISCHARGE

## 2022-04-10 RX ORDER — PANTOPRAZOLE SODIUM 20 MG/1
1 TABLET, DELAYED RELEASE ORAL
Qty: 30 | Refills: 0
Start: 2022-04-10 | End: 2022-05-09

## 2022-04-10 RX ORDER — CLOPIDOGREL BISULFATE 75 MG/1
1 TABLET, FILM COATED ORAL
Qty: 0 | Refills: 0 | DISCHARGE

## 2022-04-10 RX ORDER — TOPIRAMATE 25 MG
1 TABLET ORAL
Qty: 30 | Refills: 0
Start: 2022-04-10 | End: 2022-05-09

## 2022-04-10 RX ORDER — ALBUTEROL 90 UG/1
2 AEROSOL, METERED ORAL
Qty: 1 | Refills: 0
Start: 2022-04-10 | End: 2022-05-09

## 2022-04-10 RX ORDER — ASPIRIN/CALCIUM CARB/MAGNESIUM 324 MG
1 TABLET ORAL
Qty: 30 | Refills: 0
Start: 2022-04-10 | End: 2022-05-09

## 2022-04-10 RX ADMIN — Medication 50 MILLIGRAM(S): at 05:52

## 2022-04-10 RX ADMIN — CARVEDILOL PHOSPHATE 25 MILLIGRAM(S): 80 CAPSULE, EXTENDED RELEASE ORAL at 17:16

## 2022-04-10 RX ADMIN — Medication 81 MILLIGRAM(S): at 11:22

## 2022-04-10 RX ADMIN — Medication 2: at 07:41

## 2022-04-10 RX ADMIN — CLOPIDOGREL BISULFATE 75 MILLIGRAM(S): 75 TABLET, FILM COATED ORAL at 11:22

## 2022-04-10 RX ADMIN — Medication 180 MILLIGRAM(S): at 05:52

## 2022-04-10 RX ADMIN — MONTELUKAST 10 MILLIGRAM(S): 4 TABLET, CHEWABLE ORAL at 21:34

## 2022-04-10 RX ADMIN — HEPARIN SODIUM 5000 UNIT(S): 5000 INJECTION INTRAVENOUS; SUBCUTANEOUS at 17:16

## 2022-04-10 RX ADMIN — HEPARIN SODIUM 5000 UNIT(S): 5000 INJECTION INTRAVENOUS; SUBCUTANEOUS at 05:55

## 2022-04-10 RX ADMIN — PANTOPRAZOLE SODIUM 40 MILLIGRAM(S): 20 TABLET, DELAYED RELEASE ORAL at 05:51

## 2022-04-10 RX ADMIN — ATORVASTATIN CALCIUM 80 MILLIGRAM(S): 80 TABLET, FILM COATED ORAL at 21:34

## 2022-04-10 RX ADMIN — Medication 2: at 11:52

## 2022-04-10 RX ADMIN — Medication 50 MILLIGRAM(S): at 21:34

## 2022-04-10 RX ADMIN — SODIUM CHLORIDE 3 MILLILITER(S): 9 INJECTION INTRAMUSCULAR; INTRAVENOUS; SUBCUTANEOUS at 06:12

## 2022-04-10 RX ADMIN — Medication 1: at 17:15

## 2022-04-10 RX ADMIN — SODIUM CHLORIDE 3 MILLILITER(S): 9 INJECTION INTRAMUSCULAR; INTRAVENOUS; SUBCUTANEOUS at 21:34

## 2022-04-10 RX ADMIN — SODIUM CHLORIDE 3 MILLILITER(S): 9 INJECTION INTRAMUSCULAR; INTRAVENOUS; SUBCUTANEOUS at 13:43

## 2022-04-10 RX ADMIN — Medication 100 MILLIGRAM(S): at 11:22

## 2022-04-10 RX ADMIN — Medication 50 MILLIGRAM(S): at 13:35

## 2022-04-10 RX ADMIN — CARVEDILOL PHOSPHATE 25 MILLIGRAM(S): 80 CAPSULE, EXTENDED RELEASE ORAL at 05:52

## 2022-04-10 NOTE — DISCHARGE NOTE PROVIDER - HOSPITAL COURSE
66 years old Female with asthma/COPD (no home O2), L MCA CVA (right sided weakness/word finding difficulty), small chronic right parietal lobe infarct/seizure ("none in a long time"), Diabetes mellitus type 2, known CAD s/p stent,  HTN, AFib (no AC), PAD s/p right fem-pop bypass (s/p occlusion and IR stent placement 2/2017), GERD who presented to [facility] ED 4/7 complaining of midsternal, 6/10 left sided chest pain that has been intermittent for the past few months, "I just couldn't wait any longer and my doctors appointment isn't for another month so I had to come to the hospital". Patient explains that she has SOB with associated moderate to severe chest pain with ADL's, relieved with rest. Denies dizziness, syncope, edema, orthopnea and PND.  EMS was activated and BIBA to [facility] ED. Patient R/I NSTEMI with peak high sensitivity troponin 131.8, CTA PE protocol unremarkable for PE and admitted for ACS. Started on Lovenox therapy, ASA, statin and BB. Given patient's history of CAD, symptoms and abnormal noninvasive tests, patient was transferred to [facility] 4/8 for cardiac catheterization for possible PTCA/stent. Patient underwent cardiac catheterization, an was found to have nonobstructive CAD; recommended medical management.  4/10/2022 --  Case reviewed and discussed with  _____, patient is medically stable and optimized for discharge. All medications were reviewed and prescriptions were sent to mutually agreed upon pharmacy.    **INCOMPLETE** 66 years old Female with asthma/COPD (no home O2), L MCA CVA (right sided weakness/word finding difficulty), small chronic right parietal lobe infarct/seizure ("none in a long time"), Diabetes mellitus type 2, known CAD s/p stent,  HTN, AFib (no AC), PAD s/p right fem-pop bypass (s/p occlusion and IR stent placement 2/2017), GERD who presented to [facility] ED 4/7 c/o intermittent, 6/10, midsternal/left-sided chest pain x months a/w SOB; relieved by rest. Patient R/I NSTEMI with peak high sensitivity troponin 131.8, CTA PE protocol unremarkable for PE and admitted for ACS. Started on Lovenox therapy, ASA, statin and BB. Given patient's history of CAD, symptoms and abnormal noninvasive tests, patient was transferred to [facility] 4/8 for cardiac catheterization for possible PTCA/stent. Patient underwent cardiac catheterization, an was found to have nonobstructive CAD; recommended medical management.  4/10/2022 --  Case reviewed and discussed with  _____, patient is medically stable and optimized for discharge. All medications were reviewed and prescriptions were sent to mutually agreed upon pharmacy.    **INCOMPLETE** 66 years old Female with asthma/COPD (no home O2), L MCA CVA (right sided weakness/word finding difficulty), small chronic right parietal lobe infarct/seizure ("none in a long time"), Diabetes mellitus type 2, known CAD s/p stent,  HTN, AFib (no AC), PAD s/p right fem-pop bypass (s/p occlusion and IR stent placement 2/2017), GERD who presented to [facility] ED 4/7 c/o intermittent, 6/10, midsternal/left-sided chest pain x months a/w SOB; relieved by rest. Patient R/I NSTEMI with peak high sensitivity troponin 131.8, CTA PE protocol unremarkable for PE and admitted for ACS. Started on Lovenox therapy, ASA, statin and BB. Given patient's history of CAD, symptoms and abnormal noninvasive tests, patient was transferred to [facility] 4/8 for cardiac catheterization for possible PTCA/stent. Patient underwent cardiac catheterization, an was found to have nonobstructive CAD. Please continue taking Aspirin, Plavix and Crestor as prescribed and follow up with your primary care doctor and cardiologist within 1-2 weeks of discharge for continued management    Case reviewed and discussed with Dr. Stevenson patient is medically stable and optimized for discharge. All medications were reviewed and prescriptions were sent to mutually agreed upon pharmacy.

## 2022-04-10 NOTE — PROGRESS NOTE ADULT - SUBJECTIVE AND OBJECTIVE BOX
PATIENT SEEN AND EXAMINED ON :- 4/10/2022  DATE OF SERVICE: 4/10/2022            Interim events noted,Labs ,Radiological studies and Cardiology tests reviewed .   HOSPITAL COURSE: HPI:  66 years old Female with asthma/COPD (no home O2), L MCA CVA (right sided weakness/word finding difficulty), small chronic right parietal lobe infarct/seizure ("none in a long time"), Diabetes mellitus type 2, known CAD s/p stent,  HTN, AFib (no AC), PAD s/p right fem-pop bypass (s/p occlusion and IR stent placement 2/2017), GERD who presented to [facility] ED 4/7 complaining of midsternal, 6/10 left sided chest pain that has been intermittent for the past few months, "I just couldn't wait any longer and my doctors appointment isn't for another month so I had to come to the hospital". Patient explains that she has SOB with associated moderate to severe chest pain with ADL's, relieved with rest. Denies dizziness, syncope, edema, orthopnea and PND.  EMS was activated and BIBA to [facility] ED. Patient R/I NSTEMI with peak high sensitivity troponin 131.8, CTA PE protocol unremarkable for PE and admitted for ACS. Started on Lovenox therapy, ASA, statin and BB.    In light of patients known CAD, symptoms and abnormal noninvasive test findings there is high suspicion for CAD. Patient is now transferred to [facility] 4/8 for a cardiac catheterization with possible PTCA/stent.   On arrival to [facility] patient chest pain free, without complaints.   MEDS at [facility]: ASA 325mg po daily, Lovenox 70mg subc BID, Admelog sliding scale, Albuterol INH PRN, Atorvastatin 40mg po daily, Diltiazem CD 180mg po daily, Hydralazine 50mg po TID, Metoprolol 12.5mg po BID, Singulair 10mg po daily, Topamax 100mg po daily, protonix 40mg po daily    OF NOTE: home med list obtained from [place] pharmacy - pt has only refilled crestor, zetia and hydaralazine since february; any other medications have not been filled since December and January. I spoke with patient who admits to poor adherence to medication regimen at home for which I advised her of the increased risk she is putting herself at for another CVA, MI not limited to even death. I spoke with her sister who patient refers me to as she is her HHA and states she tries to get her sister to take her medications but she can't force her. Patient at bedside and sister over the phone () advised for need for strict daily compliance with long term dual antiplatelet therapy if coronary stent placement needed. If not risks not limited to MI/death.  Patient verbalized understanding and agrees to take if needed and sister agrees to reinforce and monitor better.      COVID PCR not detected on 4/7/2022   (08 Apr 2022 15:03)      INTERIM EVENTS:Patient seen at bedside ,interim events noted.      PMH -reviewed admission note, no change since admission  HEART FAILURE: Acute[ ]Chronic[ ] Systolic[ ] Diastolic[ ] Combined Systolic and Diastolic[ ]  CAD[ ] CABG[ ] PCI[ ]  DEVICES[ ] PPM[ ] ICD[ ] ILR[ ]  ATRIAL FIBRILLATION[ ] Paroxysmal[ ] Permanent[ ]  ILEANA[ ] CKD1[ ] CKD2[ ] CKD3[ ] CKD4[ ] ESRD[ ]  COPD[ ] HTN[ ]   DM[ ] Type1[ ] Type 2[ ]   CVA[ ] Paresis[ ]    AMBULATION: Assisted[ ] Cane/walker[ ] Independent[ ]    MEDICATIONS  (STANDING):  aspirin  chewable 81 milliGRAM(s) Oral daily  atorvastatin 80 milliGRAM(s) Oral at bedtime  carvedilol 25 milliGRAM(s) Oral every 12 hours  clopidogrel Tablet 75 milliGRAM(s) Oral daily  dextrose 5%. 1000 milliLiter(s) (50 mL/Hr) IV Continuous <Continuous>  dextrose 5%. 1000 milliLiter(s) (100 mL/Hr) IV Continuous <Continuous>  dextrose 50% Injectable 25 Gram(s) IV Push once  dextrose 50% Injectable 12.5 Gram(s) IV Push once  dextrose 50% Injectable 25 Gram(s) IV Push once  diltiazem    milliGRAM(s) Oral daily  glucagon  Injectable 1 milliGRAM(s) IntraMuscular once  heparin   Injectable 5000 Unit(s) SubCutaneous every 12 hours  hydrALAZINE 50 milliGRAM(s) Oral three times a day  insulin lispro (ADMELOG) corrective regimen sliding scale   SubCutaneous three times a day before meals  insulin lispro (ADMELOG) corrective regimen sliding scale   SubCutaneous at bedtime  montelukast 10 milliGRAM(s) Oral at bedtime  pantoprazole    Tablet 40 milliGRAM(s) Oral before breakfast  sodium chloride 0.9% lock flush 3 milliLiter(s) IV Push every 8 hours  topiramate 100 milliGRAM(s) Oral daily    MEDICATIONS  (PRN):  ALBUTerol    90 MICROgram(s) HFA Inhaler 2 Puff(s) Inhalation every 6 hours PRN Shortness of Breath  dextrose Oral Gel 15 Gram(s) Oral once PRN Blood Glucose LESS THAN 70 milliGRAM(s)/deciliter            REVIEW OF SYSTEMS:  Constitutional: [ ] fever, [ ]weight loss,  [ ]fatigue  Eyes: [ ] visual changes  Respiratory: [ ]shortness of breath;  [ ] cough, [ ]wheezing, [ ]chills, [ ]hemoptysis  Cardiovascular: [ ] chest pain, [ ]palpitations, [ ]dizziness,  [ ]leg swelling[ ]orthopnea[ ]PND  Gastrointestinal: [ ] abdominal pain, [ ]nausea, [ ]vomiting,  [ ]diarrhea [ ]Constipation [ ]Melena  Genitourinary: [ ] dysuria, [ ] hematuria [ ]Doll  Neurologic: [ ] headaches [ ] tremors[ ]weakness [ ]Paralysis Right[ ] Left[ ]  Skin: [ ] itching, [ ]burning, [ ] rashes  Endocrine: [ ] heat or cold intolerance  Musculoskeletal: [ ] joint pain or swelling; [ ] muscle, back, or extremity pain  Psychiatric: [ ] depression, [ ]anxiety, [ ]mood swings, or [ ]difficulty sleeping  Hematologic: [ ] easy bruising, [ ] bleeding gums    [ ] All remaining systems negative except as per above.   [ ]Unable to obtain.  [x] No change in ROS since admission      Vital Signs Last 24 Hrs  T(C): 36.9 (10 Apr 2022 11:43), Max: 36.9 (09 Apr 2022 22:15)  T(F): 98.4 (10 Apr 2022 11:43), Max: 98.4 (09 Apr 2022 22:15)  HR: 67 (10 Apr 2022 13:30) (62 - 80)  BP: 142/64 (10 Apr 2022 13:30) (134/98 - 148/84)  BP(mean): --  RR: 18 (10 Apr 2022 11:43) (18 - 20)  SpO2: 100% (10 Apr 2022 11:43) (98% - 100%)  I&O's Summary    09 Apr 2022 07:01  -  10 Apr 2022 07:00  --------------------------------------------------------  IN: 700 mL / OUT: 600 mL / NET: 100 mL    10 Apr 2022 07:01  -  10 Apr 2022 16:15  --------------------------------------------------------  IN: 0 mL / OUT: 1000 mL / NET: -1000 mL        PHYSICAL EXAM:  General: No acute distress BMI-  HEENT: EOMI, PERRL  Neck: Supple, [ ] JVD  Lungs: Equal air entry bilaterally; [ ] rales [ ] wheezing [ ] rhonchi  Heart: Regular rate and rhythm; [x ] murmur   2/6 [ x] systolic [ ] diastolic [ ] radiation[ ] rubs [ ]  gallops  Abdomen: Nontender, bowel sounds present  Extremities: No clubbing, cyanosis, [ ] edema [ ]Pulses  equal and intact  Nervous system:  Alert & Oriented X3, no focal deficits  Psychiatric: Normal affect  Skin: No rashes or lesions    LABS:  04-09    136  |  106  |  11  ----------------------------<  196<H>  3.9   |  20<L>  |  0.94    Ca    9.1      09 Apr 2022 06:31      Creatinine Trend: 0.94<--, 0.97<--, 1.00<--                        14.3   6.07  )-----------( 225      ( 09 Apr 2022 06:31 )             43.5                PATIENT SEEN AND EXAMINED ON :- 4/10/2022  DATE OF SERVICE: 4/10/2022                Interim events noted,Labs ,Radiological studies and Cardiology tests reviewed .   HOSPITAL COURSE: HPI:  66 years old Female with asthma/COPD (no home O2), L MCA CVA (right sided weakness/word finding difficulty), small chronic right parietal lobe infarct/seizure ("none in a long time"), Diabetes mellitus type 2, known CAD s/p stent,  HTN, AFib (no AC), PAD s/p right fem-pop bypass (s/p occlusion and IR stent placement 2/2017), GERD who presented to [facility] ED 4/7 complaining of midsternal, 6/10 left sided chest pain that has been intermittent for the past few months, "I just couldn't wait any longer and my doctors appointment isn't for another month so I had to come to the hospital". Patient explains that she has SOB with associated moderate to severe chest pain with ADL's, relieved with rest. Denies dizziness, syncope, edema, orthopnea and PND.  EMS was activated and BIBA to [facility] ED. Patient R/I NSTEMI with peak high sensitivity troponin 131.8, CTA PE protocol unremarkable for PE and admitted for ACS. Started on Lovenox therapy, ASA, statin and BB.    In light of patients known CAD, symptoms and abnormal noninvasive test findings there is high suspicion for CAD. Patient is now transferred to [facility] 4/8 for a cardiac catheterization with possible PTCA/stent.   On arrival to [facility] patient chest pain free, without complaints.   MEDS at [facility]: ASA 325mg po daily, Lovenox 70mg subc BID, Admelog sliding scale, Albuterol INH PRN, Atorvastatin 40mg po daily, Diltiazem CD 180mg po daily, Hydralazine 50mg po TID, Metoprolol 12.5mg po BID, Singulair 10mg po daily, Topamax 100mg po daily, protonix 40mg po daily    OF NOTE: home med list obtained from [place] pharmacy - pt has only refilled crestor, zetia and hydaralazine since february; any other medications have not been filled since December and January. I spoke with patient who admits to poor adherence to medication regimen at home for which I advised her of the increased risk she is putting herself at for another CVA, MI not limited to even death. I spoke with her sister who patient refers me to as she is her HHA and states she tries to get her sister to take her medications but she can't force her. Patient at bedside and sister over the phone () advised for need for strict daily compliance with long term dual antiplatelet therapy if coronary stent placement needed. If not risks not limited to MI/death.  Patient verbalized understanding and agrees to take if needed and sister agrees to reinforce and monitor better.      COVID PCR not detected on 4/7/2022   (08 Apr 2022 15:03)      INTERIM EVENTS:Patient seen at bedside ,interim events noted.Awake alert no chest pain remains in sinus rhythm      PMH -reviewed admission note, no change since admission  HEART FAILURE: Acute[ ]Chronic[ ] Systolic[ ] Diastolic[ ] Combined Systolic and Diastolic[ ]  CAD[ ] CABG[ ] PCI[ ]  DEVICES[ ] PPM[ ] ICD[ ] ILR[ ]  ATRIAL FIBRILLATION[ ] Paroxysmal[ ] Permanent[ ]  ILEANA[ ] CKD1[ ] CKD2[ ] CKD3[ ] CKD4[ ] ESRD[ ]  COPD[ ] HTN[ ]   DM[ ] Type1[ ] Type 2[ ]   CVA[ ] Paresis[ ]    AMBULATION: Assisted[ ] Cane/walker[ ] Independent[ ]    MEDICATIONS  (STANDING):  aspirin  chewable 81 milliGRAM(s) Oral daily  atorvastatin 80 milliGRAM(s) Oral at bedtime  carvedilol 25 milliGRAM(s) Oral every 12 hours  clopidogrel Tablet 75 milliGRAM(s) Oral daily  diltiazem    milliGRAM(s) Oral daily  glucagon  Injectable 1 milliGRAM(s) IntraMuscular once  heparin   Injectable 5000 Unit(s) SubCutaneous every 12 hours  hydrALAZINE 50 milliGRAM(s) Oral three times a day  insulin lispro (ADMELOG) corrective regimen sliding scale   SubCutaneous three times a day before meals  insulin lispro (ADMELOG) corrective regimen sliding scale   SubCutaneous at bedtime  montelukast 10 milliGRAM(s) Oral at bedtime  pantoprazole    Tablet 40 milliGRAM(s) Oral before breakfast  sodium chloride 0.9% lock flush 3 milliLiter(s) IV Push every 8 hours  topiramate 100 milliGRAM(s) Oral daily    MEDICATIONS  (PRN):  ALBUTerol    90 MICROgram(s) HFA Inhaler 2 Puff(s) Inhalation every 6 hours PRN Shortness of Breath  dextrose Oral Gel 15 Gram(s) Oral once PRN Blood Glucose LESS THAN 70 milliGRAM(s)/deciliter            REVIEW OF SYSTEMS:  Constitutional: [ ] fever, [ ]weight loss,  [ ]fatigue  Eyes: [ ] visual changes  Respiratory: [ ]shortness of breath;  [ ] cough, [ ]wheezing, [ ]chills, [ ]hemoptysis  Cardiovascular: [ ] chest pain, [ ]palpitations, [ ]dizziness,  [ ]leg swelling[ ]orthopnea[ ]PND  Gastrointestinal: [ ] abdominal pain, [ ]nausea, [ ]vomiting,  [ ]diarrhea [ ]Constipation [ ]Melena  Genitourinary: [ ] dysuria, [ ] hematuria [ ]Doll  Neurologic: [ ] headaches [ ] tremors[ ]weakness [ ]Paralysis Right[ ] Left[ ]  Skin: [ ] itching, [ ]burning, [ ] rashes  Endocrine: [ ] heat or cold intolerance  Musculoskeletal: [ ] joint pain or swelling; [ ] muscle, back, or extremity pain  Psychiatric: [ ] depression, [ ]anxiety, [ ]mood swings, or [ ]difficulty sleeping  Hematologic: [ ] easy bruising, [ ] bleeding gums    [ ] All remaining systems negative except as per above.   [ ]Unable to obtain.  [x] No change in ROS since admission      Vital Signs Last 24 Hrs  T(C): 36.9 (10 Apr 2022 11:43), Max: 36.9 (09 Apr 2022 22:15)  T(F): 98.4 (10 Apr 2022 11:43), Max: 98.4 (09 Apr 2022 22:15)  HR: 67 (10 Apr 2022 13:30) (62 - 80)  BP: 142/64 (10 Apr 2022 13:30) (134/98 - 148/84)  BP(mean): --  RR: 18 (10 Apr 2022 11:43) (18 - 20)  SpO2: 100% (10 Apr 2022 11:43) (98% - 100%)  I&O's Summary    09 Apr 2022 07:01  -  10 Apr 2022 07:00  --------------------------------------------------------  IN: 700 mL / OUT: 600 mL / NET: 100 mL    10 Apr 2022 07:01  -  10 Apr 2022 16:15  --------------------------------------------------------  IN: 0 mL / OUT: 1000 mL / NET: -1000 mL        PHYSICAL EXAM:  General: No acute distress BMI-  HEENT: EOMI, PERRL  Neck: Supple, [ ] JVD  Lungs: Equal air entry bilaterally; [ ] rales [ ] wheezing [ ] rhonchi  Heart: Regular rate and rhythm; [x ] murmur   2/6 [ x] systolic [ ] diastolic [ ] radiation[ ] rubs [ ]  gallops  Abdomen: Nontender, bowel sounds present  Extremities: No clubbing, cyanosis, [ ] edema [ ]Pulses  equal and intact  Nervous system:  Alert & Oriented X3, no focal deficits  Psychiatric: Normal affect  Skin: No rashes or lesions    LABS:  04-09    136  |  106  |  11  ----------------------------<  196<H>  3.9   |  20<L>  |  0.94    Ca    9.1      09 Apr 2022 06:31      Creatinine Trend: 0.94<--, 0.97<--, 1.00<--                        14.3   6.07  )-----------( 225      ( 09 Apr 2022 06:31 )             43.5

## 2022-04-10 NOTE — DISCHARGE NOTE PROVIDER - NSDCMRMEDTOKEN_GEN_ALL_CORE_FT
albuterol 90 mcg/inh inhalation aerosol: 2 puff(s) inhaled every 6 hours, As needed, Shortness of Breath  aspirin 81 mg oral tablet, chewable: 1 tab(s) orally once a day  carvedilol 25 mg oral tablet: 1 tab(s) orally 2 times a day  Crestor 40 mg oral tablet: 1 tab(s) orally once a day  dilTIAZem 180 mg/24 hours oral capsule, extended release: 1 cap(s) orally once a day  hydrALAZINE 50 mg oral tablet: 1 tab(s) orally 4 times a day  metFORMIN 1000 mg oral tablet: 1 tab(s) orally 2 times a day  montelukast 10 mg oral tablet: 1 tab(s) orally once a day (at bedtime)  pantoprazole 40 mg oral delayed release tablet: 1 tab(s) orally once a day  Plavix 75 mg oral tablet: 1 tab(s) orally once a day  topiramate 100 mg oral tablet: 1 tab(s) orally once a day  Zetia 10 mg oral tablet: 1 tab(s) orally once a day   carvedilol 25 mg oral tablet: 1 tab(s) orally 2 times a day  Crestor 40 mg oral tablet: 1 tab(s) orally once a day  hydrALAZINE 50 mg oral tablet: 1 tab(s) orally 4 times a day   albuterol 90 mcg/inh inhalation aerosol: 2 puff(s) inhaled every 6 hours, As needed, Shortness of Breath  aspirin 81 mg oral tablet, chewable: 1 tab(s) orally once a day  carvedilol 25 mg oral tablet: 1 tab(s) orally 2 times a day  Crestor 40 mg oral tablet: 1 tab(s) orally once a day  dilTIAZem 180 mg/24 hours oral capsule, extended release: 1 cap(s) orally once a day  hydrALAZINE 50 mg oral tablet: 1 tab(s) orally 3 times a day  metFORMIN 1000 mg oral tablet: 1 tab(s) orally 2 times a day  montelukast 10 mg oral tablet: 1 tab(s) orally once a day (at bedtime)  pantoprazole 40 mg oral delayed release tablet: 1 tab(s) orally once a day  Plavix 75 mg oral tablet: 1 tab(s) orally once a day  topiramate 100 mg oral tablet: 1 tab(s) orally once a day  Zetia 10 mg oral tablet: 1 tab(s) orally once a day

## 2022-04-10 NOTE — DISCHARGE NOTE PROVIDER - CARE PROVIDER_API CALL
Jonel Stevenson)  Cardiology  [address] [place] [address]  Phone: [phone]  Fax: [phone]  Established Patient  Follow Up Time: 2 weeks   Jonel Stevenson)  Cardiology  [address] [place] [id number]  Phone: [phone]  Fax: [phone]  Established Patient  Follow Up Time: 2 weeks    Praveena Metz)  Medicine  [address] [place] [address], [place] [address]  Phone: [phone]  Fax: [phone]  Follow Up Time: 1 week

## 2022-04-10 NOTE — DISCHARGE NOTE PROVIDER - NSDCFUADDAPPT_GEN_ALL_CORE_FT
Please follow up with your primary care doctor within 1-2 weeks of discharge  Please follow up with your cardiologist within 1-2 weeks of discharge

## 2022-04-10 NOTE — PROGRESS NOTE ADULT - ASSESSMENT
66 years old Female with asthma/COPD (no home O2), multiple CVA in the past (right sided weakness/word finding difficulty), Diabetes mellitus type 2, known CAD s/p stent,  HTN, AFib (no AC), PAD s/p right fem-pop bypass (s/p occlusion and IR stent placement 2/2017), who presented to [facility] ED 4/7 complaining of midsternal, 6/10 left sided chest pain, R/I NSTEMI. transferred to [facility] 4/8 for cath    Problem/Plan - 1:  ·  Problem: Non-ST elevation MI (NSTEMI).   ·  Plan: Cath Non obstructive CAD      Problem/Plan - 2:  ·  Problem: HTN (hypertension).   ·  Plan: continue hydralazine, metoprolol, diltiazem  educated on strict medication compliance.    Problem/Plan - 3:  ·  Problem: Cerebrovascular accident (CVA).   ·  Plan: hx of afib not on NOAC, takes ASA and Plavix at home   fall risk  continue topamax  PT consult  educated on strict medication compliance given moderate to high risk of recurrent CVA.    Problem/Plan - 4:  ·  Problem: Atrial fibrillation.   ·  Plan: [id number] score 5 - not on NOAC, takes ASA and Plavix at home   fall risk  tele  educated on strict medication compliance given moderate to high risk of recurrent CVA.    Problem/Plan - 5:  ·  Problem: Peripheral arterial disease.   ·  Plan: smoking cessation reviewed with patient   continue statin.    Problem/Plan - 6:  ·  Problem: Type 2 diabetes mellitus.   ·  Plan: insulin sliding scale.    Problem/Plan - 7:  ·  Problem: Asthma.   ·  Plan: advair inh and albuterol INH PRN nand singulair.    Problem/Plan - 8:  ·  Problem: GERD (gastroesophageal reflux disease).   ·  Plan: continue panotoprazole.     66 years old Female with asthma/COPD (no home O2), multiple CVA in the past (right sided weakness/word finding difficulty), Diabetes mellitus type 2, known CAD s/p stent,  HTN, AFib (no AC), PAD s/p right fem-pop bypass (s/p occlusion and IR stent placement 2/2017), who presented to [facility] ED 4/7 complaining of midsternal, 6/10 left sided chest pain, R/I NSTEMI. transferred to [facility] 4/8 for cath    Problem/Plan - 1:  ·  Problem: Non-ST elevation MI (NSTEMI).   ·  Plan: Cath Non obstructive CAD      Problem/Plan - 2:  ·  Problem: HTN (hypertension).   ·  Plan: continue hydralazine, metoprolol, diltiazem  educated on strict medication compliance.    Problem/Plan - 3:  ·  Problem: Cerebrovascular accident (CVA).   ·  Plan: hx of afib not on NOAC, takes ASA and Plavix at home   fall risk  continue topamax  PT consult  educated on strict medication compliance given moderate to high risk of recurrent CVA.    Problem/Plan - 4:  ·  Problem: ? Atrial fibrillation.   ·  Plan: [id number] score 5 - not on NOAC, takes ASA and Plavix at home   fall risk  tele remais in sinus rhythm  educated on strict medication compliance given moderate to high risk of recurrent CVA.  No documented AF      Problem/Plan - 5:  ·  Problem: Peripheral arterial disease.   ·  Plan: smoking cessation reviewed with patient   continue statin.    Problem/Plan - 6:  ·  Problem: Type 2 diabetes mellitus.   ·  Plan: insulin sliding scale.    Problem/Plan - 7:  ·  Problem: Asthma.   ·  Plan: advair inh and albuterol INH PRN nand singulair.    Problem/Plan - 8:  ·  Problem: GERD (gastroesophageal reflux disease).   ·  Plan: continue panotoprazole.

## 2022-04-10 NOTE — DISCHARGE NOTE PROVIDER - NSDCCPCAREPLAN_GEN_ALL_CORE_FT
PRINCIPAL DISCHARGE DIAGNOSIS  Diagnosis: Non-ST elevation MI (NSTEMI)  Assessment and Plan of Treatment: You were having chest pain and shortness of breath so you presented to [facility] for further evaluation. Your CT of your chest was negative for any blood clots.  You were found to have an elevated troponin level (which is a cardiac enzyme we look at to see if there is any injury to your heart). You were started on medications and transfered to [facility] for a cardiac catheterization, which showed coronary artery disease. Please continue taking Aspirin, Plavix and Crestor as prescribed and follow up with your primary care doctor and cardiologist within 1-2 weeks of discharge for continued management.      SECONDARY DISCHARGE DIAGNOSES  Diagnosis: HTN (hypertension)  Assessment and Plan of Treatment: You have high blood pressure. Continue taking your blood pressure medications as prescribed. Eat a heart healthy diet with low salt; exercise regularly (if cleared by your primary care doctor or cardiologist). Maintain a heart healthy weight and include healthy ways to manage stress. If you smoke, quit. If you need assistance to help you stop smoking, please use the following resource: [facility] – ([phone]). Follow up with your primary care doctor to continue having your blood pressure checked on a continual basis.    Diagnosis: Atrial fibrillation  Assessment and Plan of Treatment: Atrial fibrillation is a condition in which your heart's upper chambers (atria) beat too quickly. This causes the heart to beat in a fast, irregular rhythm. Atrial fibrillation is a type of heart rhythm disorder (arrhythmia) caused by problems in your heart's electrical system. Without treatment, atrial fibrillation can also cause a fast pulse rate for long periods of time. This means that the ventricles are beating too fast, and the heart muscle can become weak. This can lead to heart failure and long-term disability.  Take all medications as prescribed (Carvedilol and Diltiazem). Limit your intake of coffee, tea, cola, and other beverages with caffeine. Talk with your doctor about whether you should eliminate caffeine. Avoid over-the-counter medicines that have caffeine in them.  Follow up  with your cardologist within 1-2 weeks of discharge.    Diagnosis: Type 2 diabetes mellitus  Assessment and Plan of Treatment: You have diabetes. Your HgA1c should be < 7. Continue taking your medication regimen as prescribed. Continue with a consistent carbohydrate diet, meaning eat the same amount of carbohydrates at the same time each day. Monitor your blood glucose levels throughout the day before meals and at bedtime. Record you blood sugars and bring the log to your outpatient doctor appointments in order to be reviewed for management modifications. If your blood sugars are more than 400 or less than 70, you should contact your primary care doctor immediately. Monitor for signs/symptoms of low blood glucose, such as: dizziness, altered mental status, or cool/clammy skin. In addition, monitor for signs/symptoms of high blood glucose, such as: feeling hot, dry, fatigued, or with increased thirst/urination. Make regular podiatry appointments in order to have your feet checked for wounds and uncontrolled toe nail growth to prevent infections, as well as appointments with an ophthalmologist to monitor your vision.    Diagnosis: Cerebrovascular accident (CVA)  Assessment and Plan of Treatment: You have a history of a stroke. Please continue taking aspirin, plavix and crestor as prescribed and follow up with your primary care doctor within 1-2 weeks for continued management.    Diagnosis: Asthma  Assessment and Plan of Treatment: You have a history of astmha. Continue using your home inhalers (Albuterol), and Montelukast upon discharge. Follow-up with your primary care doctor within one week of discharge from rehab for further monitoring of this condition    Diagnosis: GERD (gastroesophageal reflux disease)  Assessment and Plan of Treatment: Contiue taking Pantoprazole (Protonix) and follow up with your primary care doctor within 1-2 weeks for continued management.     PRINCIPAL DISCHARGE DIAGNOSIS  Diagnosis: Non-ST elevation MI (NSTEMI)  Assessment and Plan of Treatment: You were having chest pain and shortness of breath so you presented to [facility] for further evaluation. Your CT of your chest was negative for any blood clots.  You were found to have an elevated troponin level (which is a cardiac enzyme we look at to see if there is any injury to your heart). You were started on medications and transfered to [facility] for a cardiac catheterization, which showed coronary artery disease. Please continue taking Aspirin, Plavix and Crestor as prescribed and follow up with your primary care doctor and cardiologist within 1-2 weeks of discharge for continued management.      SECONDARY DISCHARGE DIAGNOSES  Diagnosis: HTN (hypertension)  Assessment and Plan of Treatment: You have high blood pressure. Continue taking your blood pressure medications as prescribed. Eat a heart healthy diet with low salt; exercise regularly (if cleared by your primary care doctor or cardiologist). Maintain a heart healthy weight and include healthy ways to manage stress. If you smoke, quit. If you need assistance to help you stop smoking, please use the following resource: [facility] – ([phone]). Follow up with your primary care doctor to continue having your blood pressure checked on a continual basis.    Diagnosis: Atrial fibrillation  Assessment and Plan of Treatment: Atrial fibrillation is a condition in which your heart's upper chambers (atria) beat too quickly. This causes the heart to beat in a fast, irregular rhythm. Atrial fibrillation is a type of heart rhythm disorder (arrhythmia) caused by problems in your heart's electrical system. Without treatment, atrial fibrillation can also cause a fast pulse rate for long periods of time. This means that the ventricles are beating too fast, and the heart muscle can become weak. This can lead to heart failure and long-term disability.  Take all medications as prescribed (Carvedilol and Diltiazem). Limit your intake of coffee, tea, cola, and other beverages with caffeine. Talk with your doctor about whether you should eliminate caffeine. Avoid over-the-counter medicines that have caffeine in them.  Follow up  with your cardologist within 1-2 weeks of discharge.    Diagnosis: Type 2 diabetes mellitus  Assessment and Plan of Treatment: You have diabetes. Your HgA1c should be < 7. Continue taking your medication regimen as prescribed. Continue with a consistent carbohydrate diet, meaning eat the same amount of carbohydrates at the same time each day. Monitor your blood glucose levels throughout the day before meals and at bedtime. Record you blood sugars and bring the log to your outpatient doctor appointments in order to be reviewed for management modifications. If your blood sugars are more than 400 or less than 70, you should contact your primary care doctor immediately. Monitor for signs/symptoms of low blood glucose, such as: dizziness, altered mental status, or cool/clammy skin. In addition, monitor for signs/symptoms of high blood glucose, such as: feeling hot, dry, fatigued, or with increased thirst/urination. Make regular podiatry appointments in order to have your feet checked for wounds and uncontrolled toe nail growth to prevent infections, as well as appointments with an ophthalmologist to monitor your vision.    Diagnosis: Cerebrovascular accident (CVA)  Assessment and Plan of Treatment: You have a history of a stroke. Please continue taking aspirin, plavix and crestor as prescribed and follow up with your primary care doctor within 1-2 weeks for continued management.    Diagnosis: Asthma  Assessment and Plan of Treatment: You have a history of asthma. Continue using your home inhalers (Albuterol), and Montelukast upon discharge. Follow-up with your primary care doctor within one week of discharge from rehab for further monitoring of this condition    Diagnosis: GERD (gastroesophageal reflux disease)  Assessment and Plan of Treatment: Continue taking Pantoprazole (Protonix) and follow up with your primary care doctor within 1-2 weeks for continued management.     PRINCIPAL DISCHARGE DIAGNOSIS  Diagnosis: Non-ST elevation MI (NSTEMI)  Assessment and Plan of Treatment: You were having chest pain and shortness of breath so you presented to [facility] for further evaluation. Your CT of your chest was negative for any blood clots.  You were found to have an elevated troponin level (which is a cardiac enzyme we look at to see if there is any injury to your heart). You were started on medications and transfered to [facility] for a cardiac catheterization, which showed coronary artery disease. Please continue taking Aspirin, Plavix and Crestor as prescribed and follow up with your primary care doctor and cardiologist within 1-2 weeks of discharge for continued management.      SECONDARY DISCHARGE DIAGNOSES  Diagnosis: Atrial fibrillation  Assessment and Plan of Treatment: Atrial fibrillation is a condition in which your heart's upper chambers (atria) beat too quickly. This causes the heart to beat in a fast, irregular rhythm. Atrial fibrillation is a type of heart rhythm disorder (arrhythmia) caused by problems in your heart's electrical system. Without treatment, atrial fibrillation can also cause a fast pulse rate for long periods of time. This means that the ventricles are beating too fast, and the heart muscle can become weak. This can lead to heart failure and long-term disability.  Take all medications as prescribed (Carvedilol and Diltiazem). Limit your intake of coffee, tea, cola, and other beverages with caffeine. Talk with your doctor about whether you should eliminate caffeine. Avoid over-the-counter medicines that have caffeine in them.  Follow up  with your cardologist within 1-2 weeks of discharge.    Diagnosis: Type 2 diabetes mellitus  Assessment and Plan of Treatment: You have diabetes. Your HgA1c should be < 7. Continue taking your medication regimen as prescribed. Continue with a consistent carbohydrate diet, meaning eat the same amount of carbohydrates at the same time each day. Monitor your blood glucose levels throughout the day before meals and at bedtime. Record you blood sugars and bring the log to your outpatient doctor appointments in order to be reviewed for management modifications. If your blood sugars are more than 400 or less than 70, you should contact your primary care doctor immediately. Monitor for signs/symptoms of low blood glucose, such as: dizziness, altered mental status, or cool/clammy skin. In addition, monitor for signs/symptoms of high blood glucose, such as: feeling hot, dry, fatigued, or with increased thirst/urination. Make regular podiatry appointments in order to have your feet checked for wounds and uncontrolled toe nail growth to prevent infections, as well as appointments with an ophthalmologist to monitor your vision.    Diagnosis: HTN (hypertension)  Assessment and Plan of Treatment: You have high blood pressure. Continue taking your blood pressure medications as prescribed. Eat a heart healthy diet with low salt; exercise regularly (if cleared by your primary care doctor or cardiologist). Maintain a heart healthy weight and include healthy ways to manage stress. If you smoke, quit. If you need assistance to help you stop smoking, please use the following resource: [facility] Tobacco Control – ([phone]). Follow up with your primary care doctor to continue having your blood pressure checked on a continual basis.    Diagnosis: Asthma  Assessment and Plan of Treatment: You have a history of asthma. Continue using your home inhalers (Albuterol), and Montelukast upon discharge. Follow-up with your primary care doctor within one week of discharge from rehab for further monitoring of this condition    Diagnosis: Cerebrovascular accident (CVA)  Assessment and Plan of Treatment: You have a history of a stroke. Please continue taking aspirin, plavix and crestor as prescribed and follow up with your primary care doctor within 1-2 weeks for continued management.    Diagnosis: GERD (gastroesophageal reflux disease)  Assessment and Plan of Treatment: Continue taking Pantoprazole (Protonix) and follow up with your primary care doctor within 1-2 weeks for continued management.

## 2022-04-10 NOTE — DISCHARGE NOTE PROVIDER - NSDCFUADDINST_GEN_ALL_CORE_FT
If having worsening of persistent symptoms, please return to the hospital as soon as possible or seek medical attention as soon as possible for further evaluation.

## 2022-04-10 NOTE — PROGRESS NOTE ADULT - TIME BILLING
- Review of records, telemetry, vital signs and daily labs.   - General and cardiovascular physical examination.  - Generation of cardiovascular treatment plan.  - Coordination of care.      Patient was seen and examined by me on 04/10/2022,interim events noted,labs and radiology studies reviewed.  Jonel Stevenson MD,FACC.  [address] [place], [place] [id number].  [phone]
- Review of records, telemetry, vital signs and daily labs.   - General and cardiovascular physical examination.  - Generation of cardiovascular treatment plan.  - Coordination of care.      Patient was seen and examined by me on 04/09/2022,interim events noted,labs and radiology studies reviewed.  Jonel Stevenson MD,FACC.  [address] [place], [place] [id number].  [phone]

## 2022-04-10 NOTE — DISCHARGE NOTE PROVIDER - NSDCCPTREATMENT_GEN_ALL_CORE_FT
PRINCIPAL PROCEDURE  Procedure: Left heart cardiac catheterization  Findings and Treatment: Procedures:                 1.    Diagnostic Coronary Angiography   2.    Ultrasound Guided Access   3. Arterial Access - Right Radial   4.    Arterial Access - Right Femoral   The findings of this study were as follows:    Nonobstructive CAD   Recommendations: Medical therapy

## 2022-04-10 NOTE — DISCHARGE NOTE PROVIDER - PROVIDER TOKENS
PROVIDER:[TOKEN:[[id number]],FOLLOWUP:[2 weeks],ESTABLISHEDPATIENT:[T]] PROVIDER:[TOKEN:[[id number]],FOLLOWUP:[2 weeks],ESTABLISHEDPATIENT:[T]],PROVIDER:[TOKEN:[[id number]],FOLLOWUP:[1 week]]

## 2022-04-11 ENCOUNTER — TRANSCRIPTION ENCOUNTER (OUTPATIENT)
Age: 67
End: 2022-04-11

## 2022-04-11 VITALS — HEART RATE: 73 BPM | DIASTOLIC BLOOD PRESSURE: 73 MMHG | SYSTOLIC BLOOD PRESSURE: 154 MMHG

## 2022-04-11 RX ORDER — ROSUVASTATIN CALCIUM 5 MG/1
1 TABLET ORAL
Qty: 30 | Refills: 0
Start: 2022-04-11 | End: 2022-05-10

## 2022-04-11 RX ORDER — ROSUVASTATIN CALCIUM 5 MG/1
1 TABLET ORAL
Qty: 0 | Refills: 0 | DISCHARGE

## 2022-04-11 RX ORDER — HYDRALAZINE HCL 50 MG
1 TABLET ORAL
Qty: 90 | Refills: 0
Start: 2022-04-11 | End: 2022-05-10

## 2022-04-11 RX ORDER — CARVEDILOL PHOSPHATE 80 MG/1
1 CAPSULE, EXTENDED RELEASE ORAL
Qty: 60 | Refills: 0
Start: 2022-04-11 | End: 2022-05-10

## 2022-04-11 RX ADMIN — Medication 50 MILLIGRAM(S): at 05:46

## 2022-04-11 RX ADMIN — CARVEDILOL PHOSPHATE 25 MILLIGRAM(S): 80 CAPSULE, EXTENDED RELEASE ORAL at 05:46

## 2022-04-11 RX ADMIN — CLOPIDOGREL BISULFATE 75 MILLIGRAM(S): 75 TABLET, FILM COATED ORAL at 11:10

## 2022-04-11 RX ADMIN — Medication 2: at 12:03

## 2022-04-11 RX ADMIN — Medication 81 MILLIGRAM(S): at 11:10

## 2022-04-11 RX ADMIN — Medication 180 MILLIGRAM(S): at 05:46

## 2022-04-11 RX ADMIN — Medication 100 MILLIGRAM(S): at 11:10

## 2022-04-11 RX ADMIN — SODIUM CHLORIDE 3 MILLILITER(S): 9 INJECTION INTRAMUSCULAR; INTRAVENOUS; SUBCUTANEOUS at 05:46

## 2022-04-11 RX ADMIN — HEPARIN SODIUM 5000 UNIT(S): 5000 INJECTION INTRAVENOUS; SUBCUTANEOUS at 05:46

## 2022-04-11 RX ADMIN — Medication 50 MILLIGRAM(S): at 13:37

## 2022-04-11 RX ADMIN — SODIUM CHLORIDE 3 MILLILITER(S): 9 INJECTION INTRAMUSCULAR; INTRAVENOUS; SUBCUTANEOUS at 13:37

## 2022-04-11 RX ADMIN — PANTOPRAZOLE SODIUM 40 MILLIGRAM(S): 20 TABLET, DELAYED RELEASE ORAL at 05:46

## 2022-04-11 RX ADMIN — Medication 1: at 07:46

## 2022-04-11 NOTE — DISCHARGE NOTE NURSING/CASE MANAGEMENT/SOCIAL WORK - PATIENT PORTAL LINK FT
You can access the FollowMyHealth Patient Portal offered by [facility] by registering at the following website: [email or web address]. By joining [facility]s FollowMyHealth portal, you will also be able to view your health information using other applications (apps) compatible with our system.

## 2022-04-11 NOTE — DISCHARGE NOTE NURSING/CASE MANAGEMENT/SOCIAL WORK - NSDCPEWEB_GEN_ALL_CORE
[facility] for Tobacco Control website --- [email or web address] --- [email or web address]

## 2022-04-11 NOTE — PROGRESS NOTE ADULT - SUBJECTIVE AND OBJECTIVE BOX
DATE OF SERVICE:  04/11/2022  Patient was seen and examined on 04/11/2022    .Interim events noted.Consultant notes ,Labs,Telemetry reviewed by me       HOSPITAL COURSE: HPI:  66 years old Female with asthma/COPD (no home O2), L MCA CVA (right sided weakness/word finding difficulty), small chronic right parietal lobe infarct/seizure ("none in a long time"), Diabetes mellitus type 2, known CAD s/p stent,  HTN, AFib (no AC), PAD s/p right fem-pop bypass (s/p occlusion and IR stent placement 2/2017), GERD who presented to [facility] ED 4/7 complaining of midsternal, 6/10 left sided chest pain that has been intermittent for the past few months, "I just couldn't wait any longer and my doctors appointment isn't for another month so I had to come to the hospital". Patient explains that she has SOB with associated moderate to severe chest pain with ADL's, relieved with rest. Denies dizziness, syncope, edema, orthopnea and PND.  EMS was activated and BIBA to [facility] ED. Patient R/I NSTEMI with peak high sensitivity troponin 131.8, CTA PE protocol unremarkable for PE and admitted for ACS. Started on Lovenox therapy, ASA, statin and BB.    In light of patients known CAD, symptoms and abnormal noninvasive test findings there is high suspicion for CAD. Patient is now transferred to [facility] 4/8 for a cardiac catheterization with possible PTCA/stent.   On arrival to [facility] patient chest pain free, without complaints.   MEDS at [facility]: ASA 325mg po daily, Lovenox 70mg subc BID, Admelog sliding scale, Albuterol INH PRN, Atorvastatin 40mg po daily, Diltiazem CD 180mg po daily, Hydralazine 50mg po TID, Metoprolol 12.5mg po BID, Singulair 10mg po daily, Topamax 100mg po daily, protonix 40mg po daily    OF NOTE: home med list obtained from [place] [facility] - pt has only refilled crestor, zetia and hydaralazine since february; any other medications have not been filled since December and January. I spoke with patient who admits to poor adherence to medication regimen at home for which I advised her of the increased risk she is putting herself at for another CVA, MI not limited to even death. I spoke with her sister who patient refers me to as she is her HHA and states she tries to get her sister to take her medications but she can't force her. Patient at bedside and sister over the phone () advised for need for strict daily compliance with long term dual antiplatelet therapy if coronary stent placement needed. If not risks not limited to MI/death.  Patient verbalized understanding and agrees to take if needed and sister agrees to reinforce and monitor better.      COVID PCR not detected on 4/7/2022   (08 Apr 2022 15:03)      INTERIM EVENTS:Patient seen at bedside ,interim events noted.      PMH -reviewed admission note, no change since admission  HEART FAILURE: Acute[ ]Chronic[ ] Systolic[ ] Diastolic[ ] Combined Systolic and Diastolic[ ]  CAD[ ] CABG[ ] PCI[ ]  DEVICES[ ] PPM[ ] ICD[ ] ILR[ ]  ATRIAL FIBRILLATION[ ] Paroxysmal[ ] Permanent[ ]  ILEANA[ ] CKD1[ ] CKD2[ ] CKD3[ ] CKD4[ ] ESRD[ ]  COPD[ ] HTN[ ]   DM[ ] Type1[ ] Type 2[ ]   CVA[ ] Paresis[ ]    AMBULATION: Assisted[ ] Cane/walker[ ] Independent[ ]    MEDICATIONS  (STANDING):  aspirin  chewable 81 milliGRAM(s) Oral daily  atorvastatin 80 milliGRAM(s) Oral at bedtime  carvedilol 25 milliGRAM(s) Oral every 12 hours  clopidogrel Tablet 75 milliGRAM(s) Oral daily  dextrose 5%. 1000 milliLiter(s) (100 mL/Hr) IV Continuous <Continuous>  dextrose 5%. 1000 milliLiter(s) (50 mL/Hr) IV Continuous <Continuous>  dextrose 50% Injectable 25 Gram(s) IV Push once  dextrose 50% Injectable 12.5 Gram(s) IV Push once  dextrose 50% Injectable 25 Gram(s) IV Push once  diltiazem    milliGRAM(s) Oral daily  glucagon  Injectable 1 milliGRAM(s) IntraMuscular once  heparin   Injectable 5000 Unit(s) SubCutaneous every 12 hours  hydrALAZINE 50 milliGRAM(s) Oral three times a day  insulin lispro (ADMELOG) corrective regimen sliding scale   SubCutaneous three times a day before meals  insulin lispro (ADMELOG) corrective regimen sliding scale   SubCutaneous at bedtime  montelukast 10 milliGRAM(s) Oral at bedtime  pantoprazole    Tablet 40 milliGRAM(s) Oral before breakfast  sodium chloride 0.9% lock flush 3 milliLiter(s) IV Push every 8 hours  topiramate 100 milliGRAM(s) Oral daily    MEDICATIONS  (PRN):  ALBUTerol    90 MICROgram(s) HFA Inhaler 2 Puff(s) Inhalation every 6 hours PRN Shortness of Breath  dextrose Oral Gel 15 Gram(s) Oral once PRN Blood Glucose LESS THAN 70 milliGRAM(s)/deciliter            REVIEW OF SYSTEMS:  Constitutional: [ ] fever, [ ]weight loss,  [ ]fatigue  Eyes: [ ] visual changes  Respiratory: [ ]shortness of breath;  [ ] cough, [ ]wheezing, [ ]chills, [ ]hemoptysis  Cardiovascular: [ ] chest pain, [ ]palpitations, [ ]dizziness,  [ ]leg swelling[ ]orthopnea[ ]PND  Gastrointestinal: [ ] abdominal pain, [ ]nausea, [ ]vomiting,  [ ]diarrhea [ ]Constipation [ ]Melena  Genitourinary: [ ] dysuria, [ ] hematuria [ ]Doll  Neurologic: [ ] headaches [ ] tremors[ ]weakness [ ]Paralysis Right[ ] Left[ ]  Skin: [ ] itching, [ ]burning, [ ] rashes  Endocrine: [ ] heat or cold intolerance  Musculoskeletal: [ ] joint pain or swelling; [ ] muscle, back, or extremity pain  Psychiatric: [ ] depression, [ ]anxiety, [ ]mood swings, or [ ]difficulty sleeping  Hematologic: [ ] easy bruising, [ ] bleeding gums    [ ] All remaining systems negative except as per above.   [ ]Unable to obtain.  [x] No change in ROS since admission      Vital Signs Last 24 Hrs  T(C): 36.8 (11 Apr 2022 05:19), Max: 37 (10 Apr 2022 20:35)  T(F): 98.2 (11 Apr 2022 05:19), Max: 98.6 (10 Apr 2022 20:35)  HR: 66 (11 Apr 2022 05:19) (66 - 76)  BP: 161/91 (11 Apr 2022 05:19) (108/68 - 161/91)  BP(mean): --  RR: 18 (11 Apr 2022 05:19) (17 - 18)  SpO2: 100% (11 Apr 2022 05:19) (99% - 100%)  I&O's Summary    10 Apr 2022 07:01  -  11 Apr 2022 07:00  --------------------------------------------------------  IN: 0 mL / OUT: 1200 mL / NET: -1200 mL    11 Apr 2022 07:01  -  11 Apr 2022 08:15  --------------------------------------------------------  IN: 0 mL / OUT: 350 mL / NET: -350 mL        PHYSICAL EXAM:  General: No acute distress BMI-  HEENT: EOMI, PERRL  Neck: Supple, [ ] JVD  Lungs: Equal air entry bilaterally; [ ] rales [ ] wheezing [ ] rhonchi  Heart: Regular rate and rhythm; [x ] murmur   2/6 [ x] systolic [ ] diastolic [ ] radiation[ ] rubs [ ]  gallops  Abdomen: Nontender, bowel sounds present  Extremities: No clubbing, cyanosis, [ ] edema [ ]Pulses  equal and intact  Nervous system:  Alert & Oriented X3, no focal deficits  Psychiatric: Normal affect  Skin: No rashes or lesions    LABS:        Creatinine Trend: 0.94<--, 0.97<--, 1.00<--

## 2022-04-11 NOTE — DISCHARGE NOTE NURSING/CASE MANAGEMENT/SOCIAL WORK - NSDCPEFALRISK_GEN_ALL_CORE
For information on Fall & Injury Prevention, visit: [email or web address] OR  [email or web address] OR  https://www.cdc.gov/steadi/patient.html

## 2022-04-11 NOTE — DISCHARGE NOTE NURSING/CASE MANAGEMENT/SOCIAL WORK - NSDCPEEMAIL_GEN_ALL_CORE
[facility] for Tobacco Control email [email or web address]

## 2022-04-11 NOTE — DISCHARGE NOTE NURSING/CASE MANAGEMENT/SOCIAL WORK - NSSCNAMETXT_GEN_ALL_CORE
Pt declines homecare service refferal but request Resumption of cdpas services with [facility]- Cdpas resumption Soc 4/11/22- Pt and sister made aware in agreement with d/c p

## 2022-04-11 NOTE — DISCHARGE NOTE NURSING/CASE MANAGEMENT/SOCIAL WORK - NSDCVIVACCINE_GEN_ALL_CORE_FT
influenza, injectable, quadrivalent, preservative free; 22-Sep-2015 12:20; Frankie Parks (RN); [employer]; [id number]; IntraMuscular; Deltoid Left.; 0.5 milliLiter(s); VIS (VIS Published: 07-Aug-2015, VIS Presented: 22-Sep-2015);   influenza, injectable, quadrivalent, preservative free; 25-Sep-2018 10:34; Helen Mclaughlin (RN); [employer]; [id number] (Exp. Date: 30-Jun-2019); IntraMuscular; Deltoid Right.; 0.5 milliLiter(s); VIS (VIS Published: 07-Aug-2015, VIS Presented: 25-Sep-2018);   pneumococcal polysaccharide PPV23; 25-Sep-2014 14:23; Faith Kauffman (RN); [employer] [id number]; IntraMuscular; Deltoid Right.; 0.5 milliLiter(s);

## 2022-06-23 NOTE — ED PROVIDER NOTE - PR
CHIEF COMPLAINT: Left shoulder pain/ cuff tendinopathy/ impingement syndrome. HISTORY:  Ms. Norman Langley 63 y.o. female right handed presents today for consultation request from [facility], [place] for evaluation of left shoulder pain which started 2022. She work at [employer] with heavy lifting. She is complaining of sharp pain 6/10. Pain is increase with elevation and decrease with rest. No radiation and no numbness and tingling sensation. No other complaint. No h/o trauma or gout. History reviewed. No pertinent past medical history. Past Surgical History:   Procedure Laterality Date     SECTION  2004    MYOMECTOMY         Social History     Socioeconomic History    Marital status:      Spouse name: Not on file    Number of children: Not on file    Years of education: Not on file    Highest education level: Not on file   Occupational History    Not on file   Tobacco Use    Smoking status: Never Smoker    Smokeless tobacco: Never Used   Vaping Use    Vaping Use: Never used   Substance and Sexual Activity    Alcohol use: Yes     Alcohol/week: 2.0 standard drinks     Types: 2 Cans of beer per week    Drug use: Never    Sexual activity: Yes     Partners: Male   Other Topics Concern    Not on file   Social History Narrative    Not on file     Social Determinants of Health     Financial Resource Strain: Low Risk     Difficulty of Paying Living Expenses: Not hard at all   Food Insecurity: No Food Insecurity    Worried About [address] in the Last Year: Never true    [address] in the Last Year: Never true   Transportation Needs:     Lack of Transportation (Medical): Not on file    Lack of Transportation (Non-Medical):  Not on file   Physical Activity:     Days of Exercise per Week: Not on file    Minutes of Exercise per Session: Not on file   Stress:     Feeling of Stress : Not on file   Social Connections:     Frequency of Communication with Friends and Family: Not on file    Frequency of Social Gatherings with Friends and Family: Not on file    Attends Latter day Services: Not on file    Active Member of Clubs or Organizations: Not on file    Attends Club or Organization Meetings: Not on file    Marital Status: Not on file   Intimate Partner Violence:     Fear of Current or Ex-Partner: Not on file    Emotionally Abused: Not on file    Physically Abused: Not on file    Sexually Abused: Not on file   Housing Stability:     Unable to Pay for Housing in the Last Year: Not on file    Number of [place] in the Last Year: Not on file    Unstable Housing in the Last Year: Not on file       Family History   Problem Relation Age of Onset    Heart Disease Mother     High Blood Pressure Mother     Diabetes Mother     Breast Cancer Father         lung    Diabetes Other        Current Outpatient Medications on File Prior to Visit   Medication Sig Dispense Refill    sertraline (ZOLOFT) 50 MG tablet Take 1 tablet by mouth nightly 90 tablet 3    Multiple Vitamins-Minerals (ONE-A-DAY MENOPAUSE FORMULA) TABS Take 1 tablet by mouth       omeprazole 20 MG EC tablet Take by mouth       No current facility-administered medications on file prior to visit. Pertinent items are noted in HPI  Review of systems reviewed from Patient History Form and available in the patient's chart under the Media tab. No change noted. PHYSICAL EXAMINATION:  Ms. Randy Manning is a very pleasant 63 y.o.  female who presents today in no acute distress, awake, alert, and oriented. She is well dressed, nourished and  groomed. Patient with normal affect. Height is  5' 3\" (1.6 m), weight is 172 lb (78 kg), Body mass index is 30.47 kg/m². Resting respiratory rate is 16. Examination of the gait, showed that the patient walks heel-toe with a non-antalgic gait and no limp.  On evaluation of the left shoulder, range of motion is 140 degree in flexion and 140 degree in abduction. There is positve impingement signs. Motor and sensation is intact and symmetric throughout the bilateral upper extremities in the median, ulnar and radial nerve distributions. She has 2+ radial pulses bilaterally. IMAGING: X-rays were taken in the office today, 3 views of the left shoulder, and showed no acute fracture. IMPRESSION: Left shoulder pain/ cuff tendinopathy/ impingement syndrome. PLAN: I discussed with the patient the treatment options including both surgical and non-surgical treatment. We recommended stretching exercises of the shoulder was taught to the patient today. She will take NSAIDS Mobic. I believe she may benefit from cortisone injection left shoulder, if not better. I discussed with the patient that I think that she would really benefit from a course of physical therapy for further strengthening and stretching. An Rx for physical therapy was given to the patient. F/u in 4-6 weeks, MRI if needed. She understands that this may need surgery if the pain did not to resolve. Light duty with 10 lb max lifting for 2 months. Thank you very much for the kind consultation and allowing me to participate in this patient's care. I will continue to keep you apprised of her progress.         Hi Barkley MD 212 ms

## 2022-10-07 ENCOUNTER — INPATIENT (INPATIENT)
Facility: HOSPITAL | Age: 67
LOS: 11 days | Discharge: HOME | DRG: 189 | End: 2022-10-19
Attending: STUDENT IN AN ORGANIZED HEALTH CARE EDUCATION/TRAINING PROGRAM | Admitting: STUDENT IN AN ORGANIZED HEALTH CARE EDUCATION/TRAINING PROGRAM
Payer: MEDICARE

## 2022-10-07 VITALS
SYSTOLIC BLOOD PRESSURE: 96 MMHG | OXYGEN SATURATION: 100 % | RESPIRATION RATE: 34 BRPM | HEART RATE: 161 BPM | HEIGHT: 64 IN | WEIGHT: 171.96 LBS | DIASTOLIC BLOOD PRESSURE: 53 MMHG

## 2022-10-07 DIAGNOSIS — T82.897A OTHER SPECIFIED COMPLICATION OF CARDIAC PROSTHETIC DEVICES, IMPLANTS AND GRAFTS, INITIAL ENCOUNTER: Chronic | ICD-10-CM

## 2022-10-07 DIAGNOSIS — Z98.890 OTHER SPECIFIED POSTPROCEDURAL STATES: Chronic | ICD-10-CM

## 2022-10-07 DIAGNOSIS — Z90.710 ACQUIRED ABSENCE OF BOTH CERVIX AND UTERUS: Chronic | ICD-10-CM

## 2022-10-07 DIAGNOSIS — J44.1 CHRONIC OBSTRUCTIVE PULMONARY DISEASE WITH (ACUTE) EXACERBATION: ICD-10-CM

## 2022-10-07 PROBLEM — I73.9 PERIPHERAL VASCULAR DISEASE, UNSPECIFIED: Chronic | Status: ACTIVE | Noted: 2018-09-07

## 2022-10-07 PROBLEM — I48.91 UNSPECIFIED ATRIAL FIBRILLATION: Chronic | Status: ACTIVE | Noted: 2022-04-08

## 2022-10-07 PROBLEM — Z87.898 PERSONAL HISTORY OF OTHER SPECIFIED CONDITIONS: Chronic | Status: ACTIVE | Noted: 2022-04-08

## 2022-10-07 PROBLEM — E78.5 HYPERLIPIDEMIA, UNSPECIFIED: Chronic | Status: ACTIVE | Noted: 2022-04-08

## 2022-10-07 LAB
ALBUMIN SERPL ELPH-MCNC: 3.6 G/DL — SIGNIFICANT CHANGE UP (ref 3.5–5)
ALP SERPL-CCNC: 128 U/L — HIGH (ref 40–120)
ALT FLD-CCNC: 20 U/L DA — SIGNIFICANT CHANGE UP (ref 10–60)
ANION GAP SERPL CALC-SCNC: 11 MMOL/L — SIGNIFICANT CHANGE UP (ref 5–17)
AST SERPL-CCNC: 25 U/L — SIGNIFICANT CHANGE UP (ref 10–40)
BASE EXCESS BLDA CALC-SCNC: -1.8 MMOL/L — SIGNIFICANT CHANGE UP (ref -2–3)
BASE EXCESS BLDV CALC-SCNC: 0.2 MMOL/L — SIGNIFICANT CHANGE UP
BASOPHILS # BLD AUTO: 0.12 K/UL — SIGNIFICANT CHANGE UP (ref 0–0.2)
BASOPHILS NFR BLD AUTO: 1.1 % — SIGNIFICANT CHANGE UP (ref 0–2)
BILIRUB SERPL-MCNC: 0.6 MG/DL — SIGNIFICANT CHANGE UP (ref 0.2–1.2)
BLOOD GAS COMMENTS ARTERIAL: SIGNIFICANT CHANGE UP
BUN SERPL-MCNC: 11 MG/DL — SIGNIFICANT CHANGE UP (ref 7–18)
CALCIUM SERPL-MCNC: 10 MG/DL — SIGNIFICANT CHANGE UP (ref 8.4–10.5)
CHLORIDE SERPL-SCNC: 104 MMOL/L — SIGNIFICANT CHANGE UP (ref 96–108)
CO2 SERPL-SCNC: 22 MMOL/L — SIGNIFICANT CHANGE UP (ref 22–31)
CREAT SERPL-MCNC: 1.18 MG/DL — SIGNIFICANT CHANGE UP (ref 0.5–1.3)
EGFR: 51 ML/MIN/1.73M2 — LOW
EOSINOPHIL # BLD AUTO: 0.39 K/UL — SIGNIFICANT CHANGE UP (ref 0–0.5)
EOSINOPHIL NFR BLD AUTO: 3.6 % — SIGNIFICANT CHANGE UP (ref 0–6)
GLUCOSE SERPL-MCNC: 228 MG/DL — HIGH (ref 70–99)
HCO3 BLDA-SCNC: 23 MMOL/L — SIGNIFICANT CHANGE UP (ref 21–28)
HCO3 BLDV-SCNC: 27 MMOL/L — SIGNIFICANT CHANGE UP (ref 22–29)
HCT VFR BLD CALC: 58.2 % — CRITICAL HIGH (ref 34.5–45)
HGB BLD-MCNC: 18.3 G/DL — HIGH (ref 11.5–15.5)
HOROWITZ INDEX BLDA+IHG-RTO: 40 — SIGNIFICANT CHANGE UP
HOROWITZ INDEX BLDV+IHG-RTO: 50 — SIGNIFICANT CHANGE UP
IMM GRANULOCYTES NFR BLD AUTO: 0.4 % — SIGNIFICANT CHANGE UP (ref 0–0.9)
LYMPHOCYTES # BLD AUTO: 2.9 K/UL — SIGNIFICANT CHANGE UP (ref 1–3.3)
LYMPHOCYTES # BLD AUTO: 27.1 % — SIGNIFICANT CHANGE UP (ref 13–44)
MCHC RBC-ENTMCNC: 27 PG — SIGNIFICANT CHANGE UP (ref 27–34)
MCHC RBC-ENTMCNC: 31.4 GM/DL — LOW (ref 32–36)
MCV RBC AUTO: 85.8 FL — SIGNIFICANT CHANGE UP (ref 80–100)
MONOCYTES # BLD AUTO: 1.47 K/UL — HIGH (ref 0–0.9)
MONOCYTES NFR BLD AUTO: 13.8 % — SIGNIFICANT CHANGE UP (ref 2–14)
NEUTROPHILS # BLD AUTO: 5.77 K/UL — SIGNIFICANT CHANGE UP (ref 1.8–7.4)
NEUTROPHILS NFR BLD AUTO: 54 % — SIGNIFICANT CHANGE UP (ref 43–77)
NRBC # BLD: 0 /100 WBCS — SIGNIFICANT CHANGE UP (ref 0–0)
NT-PROBNP SERPL-SCNC: 557 PG/ML — HIGH (ref 0–125)
PCO2 BLDA: 39 MMHG — HIGH (ref 32–35)
PCO2 BLDV: 51 MMHG — HIGH (ref 39–42)
PH BLDA: 7.38 — SIGNIFICANT CHANGE UP (ref 7.35–7.45)
PH BLDV: 7.33 — SIGNIFICANT CHANGE UP (ref 7.32–7.43)
PLATELET # BLD AUTO: 287 K/UL — SIGNIFICANT CHANGE UP (ref 150–400)
PO2 BLDA: 121 MMHG — HIGH (ref 83–108)
PO2 BLDV: 45 MMHG — SIGNIFICANT CHANGE UP
POTASSIUM SERPL-MCNC: 4.2 MMOL/L — SIGNIFICANT CHANGE UP (ref 3.5–5.3)
POTASSIUM SERPL-SCNC: 4.2 MMOL/L — SIGNIFICANT CHANGE UP (ref 3.5–5.3)
PROT SERPL-MCNC: 9 G/DL — HIGH (ref 6–8.3)
RBC # BLD: 6.78 M/UL — HIGH (ref 3.8–5.2)
RBC # FLD: 13.5 % — SIGNIFICANT CHANGE UP (ref 10.3–14.5)
SAO2 % BLDA: 100 % — SIGNIFICANT CHANGE UP
SAO2 % BLDV: 72.3 % — SIGNIFICANT CHANGE UP
SARS-COV-2 RNA SPEC QL NAA+PROBE: SIGNIFICANT CHANGE UP
SODIUM SERPL-SCNC: 137 MMOL/L — SIGNIFICANT CHANGE UP (ref 135–145)
TROPONIN I, HIGH SENSITIVITY RESULT: 109.4 NG/L — HIGH
WBC # BLD: 10.69 K/UL — HIGH (ref 3.8–10.5)
WBC # FLD AUTO: 10.69 K/UL — HIGH (ref 3.8–10.5)

## 2022-10-07 PROCEDURE — 99053 MED SERV 10PM-8AM 24 HR FAC: CPT

## 2022-10-07 PROCEDURE — 99285 EMERGENCY DEPT VISIT HI MDM: CPT

## 2022-10-07 PROCEDURE — 99291 CRITICAL CARE FIRST HOUR: CPT | Mod: GC

## 2022-10-07 PROCEDURE — 71045 X-RAY EXAM CHEST 1 VIEW: CPT | Mod: 26

## 2022-10-07 RX ORDER — ENOXAPARIN SODIUM 100 MG/ML
40 INJECTION SUBCUTANEOUS EVERY 24 HOURS
Refills: 0 | Status: DISCONTINUED | OUTPATIENT
Start: 2022-10-07 | End: 2022-10-07

## 2022-10-07 RX ORDER — AZITHROMYCIN 500 MG/1
500 TABLET, FILM COATED ORAL ONCE
Refills: 0 | Status: COMPLETED | OUTPATIENT
Start: 2022-10-07 | End: 2022-10-07

## 2022-10-07 RX ORDER — TOPIRAMATE 25 MG
100 TABLET ORAL DAILY
Refills: 0 | Status: DISCONTINUED | OUTPATIENT
Start: 2022-10-07 | End: 2022-10-19

## 2022-10-07 RX ORDER — CEFTRIAXONE 500 MG/1
1000 INJECTION, POWDER, FOR SOLUTION INTRAMUSCULAR; INTRAVENOUS EVERY 24 HOURS
Refills: 0 | Status: COMPLETED | OUTPATIENT
Start: 2022-10-08 | End: 2022-10-12

## 2022-10-07 RX ORDER — AZITHROMYCIN 500 MG/1
TABLET, FILM COATED ORAL
Refills: 0 | Status: DISCONTINUED | OUTPATIENT
Start: 2022-10-07 | End: 2022-10-11

## 2022-10-07 RX ORDER — PANTOPRAZOLE SODIUM 20 MG/1
40 TABLET, DELAYED RELEASE ORAL DAILY
Refills: 0 | Status: DISCONTINUED | OUTPATIENT
Start: 2022-10-07 | End: 2022-10-12

## 2022-10-07 RX ORDER — LABETALOL HCL 100 MG
5 TABLET ORAL ONCE
Refills: 0 | Status: COMPLETED | OUTPATIENT
Start: 2022-10-07 | End: 2022-10-07

## 2022-10-07 RX ORDER — AZITHROMYCIN 500 MG/1
500 TABLET, FILM COATED ORAL EVERY 24 HOURS
Refills: 0 | Status: DISCONTINUED | OUTPATIENT
Start: 2022-10-08 | End: 2022-10-11

## 2022-10-07 RX ORDER — METOPROLOL TARTRATE 50 MG
2.5 TABLET ORAL EVERY 12 HOURS
Refills: 0 | Status: DISCONTINUED | OUTPATIENT
Start: 2022-10-07 | End: 2022-10-07

## 2022-10-07 RX ORDER — METOPROLOL TARTRATE 50 MG
2.5 TABLET ORAL EVERY 6 HOURS
Refills: 0 | Status: DISCONTINUED | OUTPATIENT
Start: 2022-10-07 | End: 2022-10-07

## 2022-10-07 RX ORDER — DILTIAZEM HCL 120 MG
180 CAPSULE, EXT RELEASE 24 HR ORAL DAILY
Refills: 0 | Status: DISCONTINUED | OUTPATIENT
Start: 2022-10-07 | End: 2022-10-19

## 2022-10-07 RX ORDER — ONDANSETRON 8 MG/1
4 TABLET, FILM COATED ORAL EVERY 8 HOURS
Refills: 0 | Status: DISCONTINUED | OUTPATIENT
Start: 2022-10-07 | End: 2022-10-19

## 2022-10-07 RX ORDER — ADENOSINE 3 MG/ML
12 INJECTION INTRAVENOUS ONCE
Refills: 0 | Status: COMPLETED | OUTPATIENT
Start: 2022-10-07 | End: 2022-10-07

## 2022-10-07 RX ORDER — ADENOSINE 3 MG/ML
6 INJECTION INTRAVENOUS ONCE
Refills: 0 | Status: COMPLETED | OUTPATIENT
Start: 2022-10-07 | End: 2022-10-07

## 2022-10-07 RX ORDER — CEFTRIAXONE 500 MG/1
INJECTION, POWDER, FOR SOLUTION INTRAMUSCULAR; INTRAVENOUS
Refills: 0 | Status: COMPLETED | OUTPATIENT
Start: 2022-10-07 | End: 2022-10-13

## 2022-10-07 RX ORDER — ATORVASTATIN CALCIUM 80 MG/1
40 TABLET, FILM COATED ORAL AT BEDTIME
Refills: 0 | Status: DISCONTINUED | OUTPATIENT
Start: 2022-10-07 | End: 2022-10-19

## 2022-10-07 RX ORDER — CEFTRIAXONE 500 MG/1
1000 INJECTION, POWDER, FOR SOLUTION INTRAMUSCULAR; INTRAVENOUS ONCE
Refills: 0 | Status: COMPLETED | OUTPATIENT
Start: 2022-10-07 | End: 2022-10-07

## 2022-10-07 RX ORDER — CLOPIDOGREL BISULFATE 75 MG/1
75 TABLET, FILM COATED ORAL DAILY
Refills: 0 | Status: DISCONTINUED | OUTPATIENT
Start: 2022-10-07 | End: 2022-10-19

## 2022-10-07 RX ORDER — IPRATROPIUM/ALBUTEROL SULFATE 18-103MCG
3 AEROSOL WITH ADAPTER (GRAM) INHALATION
Refills: 0 | Status: COMPLETED | OUTPATIENT
Start: 2022-10-07 | End: 2022-10-07

## 2022-10-07 RX ORDER — CHLORHEXIDINE GLUCONATE 213 G/1000ML
1 SOLUTION TOPICAL DAILY
Refills: 0 | Status: DISCONTINUED | OUTPATIENT
Start: 2022-10-07 | End: 2022-10-08

## 2022-10-07 RX ORDER — DILTIAZEM HCL 120 MG
15 CAPSULE, EXT RELEASE 24 HR ORAL ONCE
Refills: 0 | Status: COMPLETED | OUTPATIENT
Start: 2022-10-07 | End: 2022-10-07

## 2022-10-07 RX ORDER — HYDRALAZINE HCL 50 MG
50 TABLET ORAL THREE TIMES A DAY
Refills: 0 | Status: DISCONTINUED | OUTPATIENT
Start: 2022-10-07 | End: 2022-10-19

## 2022-10-07 RX ORDER — MAGNESIUM SULFATE 500 MG/ML
2 VIAL (ML) INJECTION ONCE
Refills: 0 | Status: COMPLETED | OUTPATIENT
Start: 2022-10-07 | End: 2022-10-07

## 2022-10-07 RX ORDER — CARVEDILOL PHOSPHATE 80 MG/1
25 CAPSULE, EXTENDED RELEASE ORAL EVERY 12 HOURS
Refills: 0 | Status: DISCONTINUED | OUTPATIENT
Start: 2022-10-07 | End: 2022-10-19

## 2022-10-07 RX ORDER — ALBUTEROL 90 UG/1
2 AEROSOL, METERED ORAL EVERY 6 HOURS
Refills: 0 | Status: DISCONTINUED | OUTPATIENT
Start: 2022-10-07 | End: 2022-10-07

## 2022-10-07 RX ORDER — INSULIN LISPRO 100/ML
VIAL (ML) SUBCUTANEOUS EVERY 6 HOURS
Refills: 0 | Status: DISCONTINUED | OUTPATIENT
Start: 2022-10-07 | End: 2022-10-08

## 2022-10-07 RX ORDER — CHLORHEXIDINE GLUCONATE 213 G/1000ML
1 SOLUTION TOPICAL
Refills: 0 | Status: DISCONTINUED | OUTPATIENT
Start: 2022-10-07 | End: 2022-10-19

## 2022-10-07 RX ORDER — ASPIRIN/CALCIUM CARB/MAGNESIUM 324 MG
81 TABLET ORAL DAILY
Refills: 0 | Status: DISCONTINUED | OUTPATIENT
Start: 2022-10-07 | End: 2022-10-19

## 2022-10-07 RX ORDER — ENOXAPARIN SODIUM 100 MG/ML
75 INJECTION SUBCUTANEOUS EVERY 12 HOURS
Refills: 0 | Status: DISCONTINUED | OUTPATIENT
Start: 2022-10-07 | End: 2022-10-08

## 2022-10-07 RX ORDER — MONTELUKAST 4 MG/1
10 TABLET, CHEWABLE ORAL AT BEDTIME
Refills: 0 | Status: DISCONTINUED | OUTPATIENT
Start: 2022-10-07 | End: 2022-10-19

## 2022-10-07 RX ORDER — IPRATROPIUM/ALBUTEROL SULFATE 18-103MCG
3 AEROSOL WITH ADAPTER (GRAM) INHALATION EVERY 4 HOURS
Refills: 0 | Status: DISCONTINUED | OUTPATIENT
Start: 2022-10-07 | End: 2022-10-11

## 2022-10-07 RX ORDER — DEXAMETHASONE 0.5 MG/5ML
10 ELIXIR ORAL ONCE
Refills: 0 | Status: COMPLETED | OUTPATIENT
Start: 2022-10-07 | End: 2022-10-07

## 2022-10-07 RX ADMIN — Medication 3 MILLILITER(S): at 15:01

## 2022-10-07 RX ADMIN — Medication 40 MILLIGRAM(S): at 23:07

## 2022-10-07 RX ADMIN — Medication 2.5 MILLIGRAM(S): at 14:01

## 2022-10-07 RX ADMIN — Medication 3 MILLILITER(S): at 02:29

## 2022-10-07 RX ADMIN — Medication 180 MILLIGRAM(S): at 20:40

## 2022-10-07 RX ADMIN — Medication 3 MILLILITER(S): at 20:13

## 2022-10-07 RX ADMIN — Medication 5 MILLIGRAM(S): at 08:58

## 2022-10-07 RX ADMIN — AZITHROMYCIN 255 MILLIGRAM(S): 500 TABLET, FILM COATED ORAL at 08:14

## 2022-10-07 RX ADMIN — Medication 3 MILLILITER(S): at 06:34

## 2022-10-07 RX ADMIN — Medication 40 MILLIGRAM(S): at 14:00

## 2022-10-07 RX ADMIN — Medication 3 MILLILITER(S): at 17:33

## 2022-10-07 RX ADMIN — Medication 50 MILLIGRAM(S): at 23:08

## 2022-10-07 RX ADMIN — Medication 102 MILLIGRAM(S): at 02:46

## 2022-10-07 RX ADMIN — ADENOSINE 6 MILLIGRAM(S): 3 INJECTION INTRAVENOUS at 04:48

## 2022-10-07 RX ADMIN — Medication 2.5 MILLIGRAM(S): at 08:13

## 2022-10-07 RX ADMIN — Medication 3: at 13:52

## 2022-10-07 RX ADMIN — Medication 3 MILLILITER(S): at 23:19

## 2022-10-07 RX ADMIN — PANTOPRAZOLE SODIUM 40 MILLIGRAM(S): 20 TABLET, DELAYED RELEASE ORAL at 14:03

## 2022-10-07 RX ADMIN — Medication 81 MILLIGRAM(S): at 19:02

## 2022-10-07 RX ADMIN — Medication 100 MILLIGRAM(S): at 19:02

## 2022-10-07 RX ADMIN — Medication 3 MILLILITER(S): at 10:00

## 2022-10-07 RX ADMIN — Medication 15 MILLIGRAM(S): at 05:07

## 2022-10-07 RX ADMIN — CHLORHEXIDINE GLUCONATE 1 APPLICATION(S): 213 SOLUTION TOPICAL at 17:13

## 2022-10-07 RX ADMIN — ADENOSINE 12 MILLIGRAM(S): 3 INJECTION INTRAVENOUS at 04:49

## 2022-10-07 RX ADMIN — MONTELUKAST 10 MILLIGRAM(S): 4 TABLET, CHEWABLE ORAL at 23:07

## 2022-10-07 RX ADMIN — Medication 150 GRAM(S): at 02:28

## 2022-10-07 RX ADMIN — Medication 2.5 MILLIGRAM(S): at 17:15

## 2022-10-07 RX ADMIN — ATORVASTATIN CALCIUM 40 MILLIGRAM(S): 80 TABLET, FILM COATED ORAL at 23:08

## 2022-10-07 RX ADMIN — CARVEDILOL PHOSPHATE 25 MILLIGRAM(S): 80 CAPSULE, EXTENDED RELEASE ORAL at 20:40

## 2022-10-07 RX ADMIN — CLOPIDOGREL BISULFATE 75 MILLIGRAM(S): 75 TABLET, FILM COATED ORAL at 19:02

## 2022-10-07 RX ADMIN — Medication 2: at 17:16

## 2022-10-07 RX ADMIN — CEFTRIAXONE 100 MILLIGRAM(S): 500 INJECTION, POWDER, FOR SOLUTION INTRAMUSCULAR; INTRAVENOUS at 18:59

## 2022-10-07 RX ADMIN — ENOXAPARIN SODIUM 75 MILLIGRAM(S): 100 INJECTION SUBCUTANEOUS at 17:23

## 2022-10-07 RX ADMIN — Medication 3 MILLILITER(S): at 02:46

## 2022-10-07 NOTE — H&P ADULT - ASSESSMENT
Patient is a 68 yo Female, from home, with med hx significant for COPD, Left MCA CVA w/RLE weakness, Seizures, Type 2 DM, CAD s/p ZHAO, Afib not on AC, PAD s/p Right femoral popliteal bypass s/p occlusion + IR stent placement 2/2017, s/p cardiac cath 4/2022 w/non-obstructive CAD, presenting to the ED due to shortness of breath, placed on Bipap for work of breathing. Admitted to ICU for close monitoring of respiratory status.    ASSESSMENT  -COPD exacerbation  -Narrow complex tachycardia  -Hx of Afib  -Hx of CVA w/RLE weakness  -hx DM  -Nonobstructive CAD  -PAD s/p RLE stent    Neuro:  AAO x 3    Cardiovascular:  #Narrow complex tachycardia  precipitated by WOB/respiratory distress  c/w Bipap  s/p adenosine 6 mg, 12 mg then cardizem 15 mg IVP  Now HR ranging in the low 100s    Pulmonary:     Infectious Diseases:    Gastrointestinal:    Renal:    Endo:    Heme/onc:     Skin/ catheter:     Prophylaxis:     Goals of Care: Full code    Dispo: Accepted to ICU   Patient is a 68 yo Female, from home, with med hx significant for COPD, Left MCA CVA w/RLE weakness, Seizures, Type 2 DM, CAD s/p ZHAO, Afib not on AC, PAD s/p Right femoral popliteal bypass s/p occlusion + IR stent placement 2/2017, s/p cardiac cath 4/2022 w/non-obstructive CAD, presenting to the ED due to shortness of breath likely 2/2 COPD exacerbation. Admitted to ICU for close monitoring of respiratory status.    ASSESSMENT  - Acute hypercapnic respiratory failure 2/2 COPD exacerbation  -Narrow complex tachycardia, SVT  -Hx of Afib  -Hx of CVA w/RLE weakness  -hx DM  -CAD  -PAD s/p RLE stent    Neuro:  AAO x 3    Cardiovascular:  #Narrow complex tachycardia  precipitated by WOB/respiratory distress  c/w Bipap  s/p adenosine 6 mg, 12 mg then cardizem 15 mg IVP  Now HR ranging in the low 100s  Cardizem to be resumed when pt off bipap  c/w lopressor pushes    #CAD  Pt takes aspirin, plavix, coreg, statin  resume oral meds when pt able to be off bipap  Echo Grade I DD, EF 55-60% April 2022    #Afib  Pt takes diltiazem ER  will continue with lopressor pushes for now    #HTN   pt takes coreg, diltiazem ER, hydralazine 50 mg TID  will resume IV lopressor as of now  can resume as tolerated when pt off bipap      Pulmonary:   #COPD exacerbation  C/w Solumedrol 40 mg Q8 for COPD exacerbation  C/w Azithromycin IV for COPD exacerbation  c/w Duoneb Q4 hrs for now    Infectious Diseases:  No acute issues    Gastrointestinal:  NPO for now as pt is low threshold for intubation    Renal:  No acute issues    Endo:  NPO for now  Insulin sliding scale Q6 hours    Heme/onc:   #Hb 18, baseline 14  Likely hemoconcentrated vs chronic hypoxia induced    Skin/ catheter: Peripheral lines    Prophylaxis: Lovenox    Goals of Care: Full code    Dispo: Accepted to ICU   Patient is a 68 yo Female, from home, with med hx significant for COPD, Left MCA CVA w/RLE weakness, Seizures, Type 2 DM, CAD s/p ZHAO, Afib not on AC, PAD s/p Right femoral popliteal bypass s/p occlusion + IR stent placement 2/2017, s/p cardiac cath 4/2022 w/non-obstructive CAD, presenting to the ED due to shortness of breath likely 2/2 COPD exacerbation. Admitted to ICU for close monitoring of respiratory status.    ASSESSMENT  - Acute hypercapnic respiratory failure 2/2 COPD exacerbation  -Narrow complex tachycardia, SVT  -Hx of Afib  -Hx of CVA w/RLE weakness  -hx DM  -CAD  -PAD s/p RLE stent    Neuro:  AAO x 3    Cardiovascular:  #Narrow complex tachycardia  precipitated by WOB/respiratory distress  c/w Bipap  s/p adenosine 6 mg, 12 mg then cardizem 15 mg IVP  Now HR ranging in the low 100s  Cardizem to be resumed when pt off bipap  c/w lopressor pushes    #CAD  Pt takes aspirin, plavix, coreg, statin  resume oral meds when pt able to be off bipap  Echo Grade I DD, EF 55-60% April 2022    #Afib  Pt takes diltiazem ER  will continue with lopressor pushes for now    #HTN   pt takes coreg, diltiazem ER, hydralazine 50 mg TID  will resume IV lopressor as of now  can resume as tolerated when pt off bipap      Pulmonary:   #COPD exacerbation  C/w Solumedrol 40 mg Q8 for COPD exacerbation  C/w Azithromycin IV for COPD exacerbation  c/w Duoneb Q4 hrs for now  Bipap 16/5/40%    Infectious Diseases:  No acute issues    Gastrointestinal:  NPO for now as pt is low threshold for intubation    Renal:  No acute issues    Endo:  NPO for now  Insulin sliding scale Q6 hours    Heme/onc:   #Hb 18, baseline 14  Likely hemoconcentrated vs chronic hypoxia induced    Skin/ catheter: Peripheral lines    Prophylaxis: Lovenox    Goals of Care: Full code    Dispo: Accepted to ICU

## 2022-10-07 NOTE — H&P ADULT - NSHPPHYSICALEXAM_GEN_ALL_CORE
PHYSICAL EXAM:  GENERAL: middle aged female, using bipap, able to speak in complete sentences  HEAD:  Atraumatic, Normocephalic  EYES: EOMI, PERRLA, conjunctiva and sclera clear  NECK: Supple, No JVD  CHEST/LUNG: +wheezing bilaterally  HEART: Regular rate and rhythm; s1+ s2+  ABDOMEN: Soft, Nontender, Nondistended; Bowel sounds present  EXTREMITIES:, No clubbing, cyanosis, or edema  NEUROLOGY: AAOx3 non-focal  SKIN: No rashes or lesions, right LE scar (from stent placement)

## 2022-10-07 NOTE — ED PROVIDER NOTE - NSICDXPASTMEDICALHX_GEN_ALL_CORE_FT
PAST MEDICAL HISTORY:  Asthma never intubated    Atrial fibrillation     CAD (coronary artery disease)     CVA (cerebral infarction) times 3 with residual rt sided weakness and word finding difficulty    Diabetes     Gastroesophageal reflux     History of seizure "very long time ago" at time of CVA - topamax    HTN (hypertension)     Hyperlipidemia     Peripheral arterial disease peripheral bypass/stents    S/P Cardiac Cath 3yrs ago.report unknown

## 2022-10-07 NOTE — H&P ADULT - ATTENDING COMMENTS
Patient seen and examined upon consultation request at 6AM today. Data reviewed. Case and plan of care discussed with ICU resident.  This is 67F with PMH as listed above including COPD (not on home oxygen), Afib, CAD/stent presented to the ED with worsening SOB with associated cough and hypoxemia. In the ED she was found to be in copd exacerbation and given bronchodilator nebulizers, steroids and placed on bipap with some improvement. In the ED she was also noted to be in SVT to 150-160's and received IV adenosine and then cardizem which broke it to 100's. At time of ICU evaluation, patient appeared comfortable on bipap though still has bilateral wheeze. Patient will be accepted to ICU for further management and closer monitoring.     VS reviewed: BP stable, RR 20's on BIPAP 16/5/40%, Spo2 of 100%, Awake, alert, oriented x3, no focal deficits, heart- tachycardic, no murmurs, Lungs- bilateral wheeze, Abdomen- obese, soft, no tenderness, Ext- no edema.    Labs/Imaging reviewed: ABG on presentation showed acute respiratory acidosis, CXR did not show focal infiltrates. EKG now with junctional rhythm    Assessment  Acute hypercapnic respiratory failure from COPD exacerbation.  Acute exacerbation of COPD.  S/p SVTs in the ED.   H/o HTN  H/o COPD  H/o CAD with stents  H/o Afib    Plan  Accepted to ICU for closer monitoring and management.  Continue bi-level support with bipap therapy. NPO for now.  Low threshold for endotracheal intubation if any worsening of respiratory or mental status.  Albuterol/Atrovent nebulizer q2-4hrs for now  IV Solu-medrol 40mg q6hrs  IV Azithromycin for copd exacerbation.  Trend troponins, 12 lead EKG.   Obtain SARS-CoV-2 PCR  DVT prophylaxis with Lovenox.    Condition: Critical, Prognosis: Guarded.    30mins of critical care time spent in noninvasive ventilation management, management of acute exacerbation of copd, review of labs/imaging.

## 2022-10-07 NOTE — ED ADULT NURSE NOTE - OBJECTIVE STATEMENT
pt brought in from ems for labored breathed; use of accessory muscles noted. pt admits to hx of smoking

## 2022-10-07 NOTE — ED ADULT NURSE NOTE - ED STAT RN HANDOFF DETAILS
pt aox4, currently stable and able to make needs known . all due meds and care rendered. handoff given to alli cruz to monitor and f/u with POC.

## 2022-10-07 NOTE — H&P ADULT - NSICDXPASTSURGICALHX_GEN_ALL_CORE_FT
PAST SURGICAL HISTORY:  Coronary stent occlusion     H/O peripheral artery bypass     S/P total abdominal hysterectomy

## 2022-10-07 NOTE — PROGRESS NOTE ADULT - ATTENDING COMMENTS
Patient is a 68 yo Female, from home, with med hx significant for COPD, Left MCA CVA w/RLE weakness, Seizures, Type 2 DM, CAD s/p ZHAO, Afib not on AC, PAD s/p Right femoral popliteal bypass s/p occlusion + IR stent placement 2/2017, s/p cardiac cath 4/2022 w/non-obstructive CAD, presenting to the ED due to shortness of breath likely 2/2 COPD exacerbation. Admitted to ICU for close monitoring of respiratory status.    ASSESSMENT  - Acute hypercapnic respiratory failure 2/2 COPD exacerbation  -Narrow complex tachycardia, SVT  -Hx of Afib  -Hx of CVA w/RLE weakness  -hx DM  -CAD  -PAD s/p RLE stent      Plan   - Continue O2 supp with BiPaP at night   -Anticoag   - Solumedrol   - Duonebs   - Antibx  - F/u cultures   - Hemodynamic monitoring   - Monitor blood glucose with coverage   - Anticoag   - NPO while on vent

## 2022-10-07 NOTE — H&P ADULT - HISTORY OF PRESENT ILLNESS
Patient is a 68 yo Female, from home, with med hx significant for COPD, Left MCA CVA w/RLE weakness, Seizures, Type 2 DM, CAD s/p ZHAO, Afib not on AC, PAD s/p Right femoral popliteal bypass s/p occlusion + IR stent placement 2/2017, s/p cardiac cath 4/2022 w/non-obstructive CAD, presenting to the ED due to shortness of breath. Pt currently on Bipap, able to take in full sentences w/mild tachycardia 105-107 bpm when she coughs. Collateral history obtained from EMS charts and ER provider note.   EMS note says pt was not able to speak in complete sentences and had increased WOB, was given 12 mg dexamethasone and 0.3 mg Epi. In the ED, pt was placed on Bipap, noted to have narrow complex tachycardia to 150s, was given adenosine 6 mg then 12 mg, then cardizem 15 mg + duoneb x 3, decadron 10mg IVPB s/p HR ranging in the low 100s. +PT admits to cough and sputum production

## 2022-10-07 NOTE — PROGRESS NOTE ADULT - ASSESSMENT
Patient is a 68 yo Female, from home, with med hx significant for COPD, Left MCA CVA w/RLE weakness, Seizures, Type 2 DM, CAD s/p ZHAO, Afib not on AC, PAD s/p Right femoral popliteal bypass s/p occlusion + IR stent placement 2/2017, s/p cardiac cath 4/2022 w/non-obstructive CAD, presenting to the ED due to shortness of breath likely 2/2 COPD exacerbation. Admitted to ICU for close monitoring of respiratory status.    ASSESSMENT  - Acute hypercapnic respiratory failure 2/2 COPD exacerbation  -Narrow complex tachycardia, SVT  -Hx of Afib  -Hx of CVA w/RLE weakness  -hx DM  -CAD  -PAD s/p RLE stent    Neuro:  AAO x 3    Cardiovascular:  #Narrow complex tachycardia  precipitated by WOB/respiratory distress  c/w Bipap  s/p adenosine 6 mg, 12 mg then cardizem 15 mg IVP  Now HR ranging in the low 100s  Cardizem to be resumed when pt off bipap  c/w lopressor pushes    #CAD  Pt takes aspirin, plavix, coreg, statin  resume oral meds when pt able to be off bipap  Echo Grade I DD, EF 55-60% April 2022    #Afib  Pt takes diltiazem ER  will continue with lopressor pushes for now    #HTN   pt takes coreg, diltiazem ER, hydralazine 50 mg TID  will resume IV lopressor as of now  can resume as tolerated when pt off bipap      Pulmonary:   #COPD exacerbation  C/w Solumedrol 40 mg Q8 for COPD exacerbation  C/w Azithromycin IV for COPD exacerbation  c/w Duoneb Q4 hrs for now  Bipap 16/5/40%    Infectious Diseases:  No acute issues    Gastrointestinal:  NPO for now as pt is low threshold for intubation    Renal:  No acute issues    Endo:  NPO for now  Insulin sliding scale Q6 hours    Heme/onc:   #Hb 18, baseline 14  Likely hemoconcentrated vs chronic hypoxia induced    Skin/ catheter: Peripheral lines    Prophylaxis: Lovenox    Goals of Care: Full code    Dispo: Accepted to ICU

## 2022-10-07 NOTE — ED PROVIDER NOTE - EKG ADDITIONAL INFORMATION FREE TEXT
rapid narrow complex tachycardia concerning for SVT vs accelerated junctional rhythm, No depressions/elevations

## 2022-10-07 NOTE — H&P ADULT - NSICDXPASTMEDICALHX_GEN_ALL_CORE_FT
PAST MEDICAL HISTORY:  Asthma never intubated    Atrial fibrillation     CAD (coronary artery disease)     CVA (cerebral infarction) times 3 with residual rt sided weakness and word finding difficulty    Diabetes     Gastroesophageal reflux     History of seizure "very long time ago" at time of CVA - topamax    HTN (hypertension)     Hyperlipidemia     Peripheral arterial disease peripheral bypass/stents    S/P Cardiac Cath 4/2022 nonobstructive CAD

## 2022-10-07 NOTE — PROGRESS NOTE ADULT - SUBJECTIVE AND OBJECTIVE BOX
INTERVAL HPI/OVERNIGHT EVENTS: ***    PRESSORS: [ ] YES [ ] NO  WHICH:    ANTIBIOTICS:                      Antimicrobial:  azithromycin  IVPB        Cardiovascular:  metoprolol tartrate Injectable 2.5 milliGRAM(s) IV Push every 6 hours    Pulmonary:  albuterol/ipratropium for Nebulization 3 milliLiter(s) Nebulizer every 4 hours    Hematalogic:  enoxaparin Injectable 75 milliGRAM(s) SubCutaneous every 12 hours    Other:  chlorhexidine 2% Cloths 1 Application(s) Topical daily  chlorhexidine 2% Cloths 1 Application(s) Topical <User Schedule>  insulin lispro (ADMELOG) corrective regimen sliding scale   SubCutaneous every 6 hours  methylPREDNISolone sodium succinate Injectable 40 milliGRAM(s) IV Push every 8 hours  ondansetron Injectable 4 milliGRAM(s) IV Push every 8 hours PRN  pantoprazole  Injectable 40 milliGRAM(s) IV Push daily    albuterol/ipratropium for Nebulization 3 milliLiter(s) Nebulizer every 4 hours  azithromycin  IVPB      chlorhexidine 2% Cloths 1 Application(s) Topical daily  chlorhexidine 2% Cloths 1 Application(s) Topical <User Schedule>  enoxaparin Injectable 75 milliGRAM(s) SubCutaneous every 12 hours  insulin lispro (ADMELOG) corrective regimen sliding scale   SubCutaneous every 6 hours  methylPREDNISolone sodium succinate Injectable 40 milliGRAM(s) IV Push every 8 hours  metoprolol tartrate Injectable 2.5 milliGRAM(s) IV Push every 6 hours  ondansetron Injectable 4 milliGRAM(s) IV Push every 8 hours PRN  pantoprazole  Injectable 40 milliGRAM(s) IV Push daily    Drug Dosing Weight  Height (cm): 162.6 (07 Oct 2022 01:56)  Weight (kg): 78 (07 Oct 2022 01:56)  BMI (kg/m2): 29.5 (07 Oct 2022 01:56)  BSA (m2): 1.83 (07 Oct 2022 01:56)    CENTRAL LINE: [ ] YES [ ] NO  LOCATION:   DATE INSERTED:  REMOVE: [ ] YES [ ] NO  EXPLAIN:    SUMMERS: [ ] YES [ ] NO    DATE INSERTED:  REMOVE:  [ ] YES [ ] NO  EXPLAIN:    A-LINE:  [ ] YES [ ] NO  LOCATION:   DATE INSERTED:  REMOVE:  [ ] YES [ ] NO  EXPLAIN:    PMH -reviewed admission note, no change since admission    ICU Vital Signs Last 24 Hrs  T(C): 36.7 (07 Oct 2022 15:23), Max: 36.7 (07 Oct 2022 07:25)  T(F): 98 (07 Oct 2022 15:23), Max: 98 (07 Oct 2022 07:25)  HR: 91 (07 Oct 2022 15:59) (88 - 161)  BP: 157/108 (07 Oct 2022 15:23) (96/53 - 163/115)  BP(mean): --  ABP: --  ABP(mean): --  RR: 20 (07 Oct 2022 15:23) (20 - 34)  SpO2: 100% (07 Oct 2022 15:59) (98% - 100%)    O2 Parameters below as of 07 Oct 2022 15:23  Patient On (Oxygen Delivery Method): BiPAP/CPAP            ABG - ( 07 Oct 2022 06:53 )  pH, Arterial: 7.38  pH, Blood: x     /  pCO2: 39    /  pO2: 121   / HCO3: 23    / Base Excess: -1.8  /  SaO2: 100                         PHYSICAL EXAM:    GENERAL: NAD, well-groomed, well-developed  HEAD:  Atraumatic, Normocephalic  EYES: EOMI, PERRLA, conjunctiva and sclera clear  ENMT: No tonsillar erythema, exudates, or enlargement; Moist mucous membranes, Good dentition, No lesions  NECK: Supple, normal appearance, No JVD; Normal thyroid; Trachea midline  NERVOUS SYSTEM:  Alert & Oriented X3, Good concentration; Motor Strength 5/5 B/L upper and lower extremities; DTRs 2+ intact and symmetric  CHEST/LUNG: No chest deformity; Normal percussion bilaterally; No rales, rhonchi, wheezing   HEART: Regular rate and rhythm; No murmurs, rubs, or gallops  ABDOMEN: Soft, Nontender, Nondistended; Bowel sounds present  EXTREMITIES:  2+ Peripheral Pulses, No clubbing, cyanosis, or edema  LYMPH: No lymphadenopathy noted  SKIN: No rashes or lesions; Good capillary refill      LABS:  CBC Full  -  ( 07 Oct 2022 02:41 )  WBC Count : 10.69 K/uL  RBC Count : 6.78 M/uL  Hemoglobin : 18.3 g/dL  Hematocrit : 58.2 %  Platelet Count - Automated : 287 K/uL  Mean Cell Volume : 85.8 fl  Mean Cell Hemoglobin : 27.0 pg  Mean Cell Hemoglobin Concentration : 31.4 gm/dL  Auto Neutrophil # : 5.77 K/uL  Auto Lymphocyte # : 2.90 K/uL  Auto Monocyte # : 1.47 K/uL  Auto Eosinophil # : 0.39 K/uL  Auto Basophil # : 0.12 K/uL  Auto Neutrophil % : 54.0 %  Auto Lymphocyte % : 27.1 %  Auto Monocyte % : 13.8 %  Auto Eosinophil % : 3.6 %  Auto Basophil % : 1.1 %    10-07    137  |  104  |  11  ----------------------------<  228<H>  4.2   |  22  |  1.18    Ca    10.0      07 Oct 2022 02:41    TPro  9.0<H>  /  Alb  3.6  /  TBili  0.6  /  DBili  x   /  AST  25  /  ALT  20  /  AlkPhos  128<H>  10-07    PT/INR - ( 07 Oct 2022 04:40 )   PT: 11.3 sec;   INR: 0.95 ratio         PTT - ( 07 Oct 2022 04:40 )  PTT:34.2 sec        RADIOLOGY & ADDITIONAL STUDIES REVIEWED:  ***    GOALS OF CARE DISCUSSION WITH PATIENT/FAMILY/PROXY:    CRITICAL CARE TIME SPENT: 35 minutes INTERVAL HPI/OVERNIGHT EVENTS: Patient was seen and examined at bedside. Patient is AAOx3, on BIPAP. Patient is saturating well on current BIPAP settings. Patient reports her breathing has improved and she does not feel as SOB. Patient denies chest pain and palpitations. There are no other complains at this time.     PRESSORS: [ ] YES [ ] NO  WHICH:    ANTIBIOTICS:                      Antimicrobial:  azithromycin  IVPB        Cardiovascular:  metoprolol tartrate Injectable 2.5 milliGRAM(s) IV Push every 6 hours    Pulmonary:  albuterol/ipratropium for Nebulization 3 milliLiter(s) Nebulizer every 4 hours    Hematalogic:  enoxaparin Injectable 75 milliGRAM(s) SubCutaneous every 12 hours    Other:  chlorhexidine 2% Cloths 1 Application(s) Topical daily  chlorhexidine 2% Cloths 1 Application(s) Topical <User Schedule>  insulin lispro (ADMELOG) corrective regimen sliding scale   SubCutaneous every 6 hours  methylPREDNISolone sodium succinate Injectable 40 milliGRAM(s) IV Push every 8 hours  ondansetron Injectable 4 milliGRAM(s) IV Push every 8 hours PRN  pantoprazole  Injectable 40 milliGRAM(s) IV Push daily    albuterol/ipratropium for Nebulization 3 milliLiter(s) Nebulizer every 4 hours  azithromycin  IVPB      chlorhexidine 2% Cloths 1 Application(s) Topical daily  chlorhexidine 2% Cloths 1 Application(s) Topical <User Schedule>  enoxaparin Injectable 75 milliGRAM(s) SubCutaneous every 12 hours  insulin lispro (ADMELOG) corrective regimen sliding scale   SubCutaneous every 6 hours  methylPREDNISolone sodium succinate Injectable 40 milliGRAM(s) IV Push every 8 hours  metoprolol tartrate Injectable 2.5 milliGRAM(s) IV Push every 6 hours  ondansetron Injectable 4 milliGRAM(s) IV Push every 8 hours PRN  pantoprazole  Injectable 40 milliGRAM(s) IV Push daily    Drug Dosing Weight  Height (cm): 162.6 (07 Oct 2022 01:56)  Weight (kg): 78 (07 Oct 2022 01:56)  BMI (kg/m2): 29.5 (07 Oct 2022 01:56)  BSA (m2): 1.83 (07 Oct 2022 01:56)    CENTRAL LINE: [ ] YES [ ] NO  LOCATION:   DATE INSERTED:  REMOVE: [ ] YES [ ] NO  EXPLAIN:    SUMMERS: [ ] YES [ ] NO    DATE INSERTED:  REMOVE:  [ ] YES [ ] NO  EXPLAIN:    A-LINE:  [ ] YES [ ] NO  LOCATION:   DATE INSERTED:  REMOVE:  [ ] YES [ ] NO  EXPLAIN:    PMH -reviewed admission note, no change since admission    ICU Vital Signs Last 24 Hrs  T(C): 36.7 (07 Oct 2022 15:23), Max: 36.7 (07 Oct 2022 07:25)  T(F): 98 (07 Oct 2022 15:23), Max: 98 (07 Oct 2022 07:25)  HR: 91 (07 Oct 2022 15:59) (88 - 161)  BP: 157/108 (07 Oct 2022 15:23) (96/53 - 163/115)  BP(mean): --  ABP: --  ABP(mean): --  RR: 20 (07 Oct 2022 15:23) (20 - 34)  SpO2: 100% (07 Oct 2022 15:59) (98% - 100%)    O2 Parameters below as of 07 Oct 2022 15:23  Patient On (Oxygen Delivery Method): BiPAP/CPAP            ABG - ( 07 Oct 2022 06:53 )  pH, Arterial: 7.38  pH, Blood: x     /  pCO2: 39    /  pO2: 121   / HCO3: 23    / Base Excess: -1.8  /  SaO2: 100                         PHYSICAL EXAM:  GENERAL: middle aged female, using bipap, able to speak in complete sentences  HEAD:  Atraumatic, Normocephalic  EYES: EOMI, PERRLA, conjunctiva and sclera clear  NECK: Supple, No JVD  CHEST/LUNG: +wheezing bilaterally  HEART: Regular rate and rhythm; s1+ s2+  ABDOMEN: Soft, Nontender, Nondistended; Bowel sounds present  EXTREMITIES:, No clubbing, cyanosis, or edema  NEUROLOGY: AAOx3 non-focal  SKIN: No rashes or lesions, right LE scar (from stent placement)      LABS:  CBC Full  -  ( 07 Oct 2022 02:41 )  WBC Count : 10.69 K/uL  RBC Count : 6.78 M/uL  Hemoglobin : 18.3 g/dL  Hematocrit : 58.2 %  Platelet Count - Automated : 287 K/uL  Mean Cell Volume : 85.8 fl  Mean Cell Hemoglobin : 27.0 pg  Mean Cell Hemoglobin Concentration : 31.4 gm/dL  Auto Neutrophil # : 5.77 K/uL  Auto Lymphocyte # : 2.90 K/uL  Auto Monocyte # : 1.47 K/uL  Auto Eosinophil # : 0.39 K/uL  Auto Basophil # : 0.12 K/uL  Auto Neutrophil % : 54.0 %  Auto Lymphocyte % : 27.1 %  Auto Monocyte % : 13.8 %  Auto Eosinophil % : 3.6 %  Auto Basophil % : 1.1 %    10-07    137  |  104  |  11  ----------------------------<  228<H>  4.2   |  22  |  1.18    Ca    10.0      07 Oct 2022 02:41    TPro  9.0<H>  /  Alb  3.6  /  TBili  0.6  /  DBili  x   /  AST  25  /  ALT  20  /  AlkPhos  128<H>  10-07    PT/INR - ( 07 Oct 2022 04:40 )   PT: 11.3 sec;   INR: 0.95 ratio         PTT - ( 07 Oct 2022 04:40 )  PTT:34.2 sec        RADIOLOGY & ADDITIONAL STUDIES REVIEWED:  ***    GOALS OF CARE DISCUSSION WITH PATIENT/FAMILY/PROXY:    CRITICAL CARE TIME SPENT: 35 minutes

## 2022-10-07 NOTE — ED ADULT TRIAGE NOTE - CHIEF COMPLAINT QUOTE
biba for shortness of breath x 3 days given dexamethason 12 mg IM,epinephrine .3 im, 3 combi treatment neb

## 2022-10-07 NOTE — ED PROVIDER NOTE - PHYSICAL EXAMINATION
Vital Signs Reviewed  GEN: Speaking in single words, AAOx3  HEENT: NCAT, MMM, Neck Supple  RESP: Severe tachypnea, diffuse inspiratory and expiratory wheezing with decreased air entry  CV: Tachycardia, S1S2, No murmurs  ABD: No TTP, Soft, ND, No masses, No CVA Tenderness  Extrem/Skin: Equal pulses bilat, No cyanosis/edema/rashes  Neuro: No focal deficits

## 2022-10-07 NOTE — ED PROVIDER NOTE - OBJECTIVE STATEMENT
66 y/o female with history of asthma/COPD, DM, CAD with stent, atrial fibrillation, p/w 2 days of worsening shortness of breath and cough. Patient denies chest pain and fever. EMS gave combi-nebs, and 6mg dexamethasone en route with 1 IM epi.

## 2022-10-08 LAB
A1C WITH ESTIMATED AVERAGE GLUCOSE RESULT: 8.2 % — HIGH (ref 4–5.6)
ALBUMIN SERPL ELPH-MCNC: 3 G/DL — LOW (ref 3.5–5)
ALP SERPL-CCNC: 89 U/L — SIGNIFICANT CHANGE UP (ref 40–120)
ALT FLD-CCNC: 15 U/L DA — SIGNIFICANT CHANGE UP (ref 10–60)
ANION GAP SERPL CALC-SCNC: 8 MMOL/L — SIGNIFICANT CHANGE UP (ref 5–17)
AST SERPL-CCNC: 10 U/L — SIGNIFICANT CHANGE UP (ref 10–40)
BASE EXCESS BLDA CALC-SCNC: -0.2 MMOL/L — SIGNIFICANT CHANGE UP (ref -2–3)
BASOPHILS # BLD AUTO: 0.02 K/UL — SIGNIFICANT CHANGE UP (ref 0–0.2)
BASOPHILS NFR BLD AUTO: 0.1 % — SIGNIFICANT CHANGE UP (ref 0–2)
BILIRUB SERPL-MCNC: 0.3 MG/DL — SIGNIFICANT CHANGE UP (ref 0.2–1.2)
BLOOD GAS COMMENTS ARTERIAL: SIGNIFICANT CHANGE UP
BUN SERPL-MCNC: 37 MG/DL — HIGH (ref 7–18)
CALCIUM SERPL-MCNC: 9.5 MG/DL — SIGNIFICANT CHANGE UP (ref 8.4–10.5)
CHLORIDE SERPL-SCNC: 104 MMOL/L — SIGNIFICANT CHANGE UP (ref 96–108)
CO2 SERPL-SCNC: 23 MMOL/L — SIGNIFICANT CHANGE UP (ref 22–31)
CREAT SERPL-MCNC: 1.98 MG/DL — HIGH (ref 0.5–1.3)
EGFR: 27 ML/MIN/1.73M2 — LOW
EOSINOPHIL # BLD AUTO: 0 K/UL — SIGNIFICANT CHANGE UP (ref 0–0.5)
EOSINOPHIL NFR BLD AUTO: 0 % — SIGNIFICANT CHANGE UP (ref 0–6)
ESTIMATED AVERAGE GLUCOSE: 189 MG/DL — HIGH (ref 68–114)
GLUCOSE BLDC GLUCOMTR-MCNC: 186 MG/DL — HIGH (ref 70–99)
GLUCOSE SERPL-MCNC: 238 MG/DL — HIGH (ref 70–99)
HCO3 BLDA-SCNC: 25 MMOL/L — SIGNIFICANT CHANGE UP (ref 21–28)
HCT VFR BLD CALC: 49.6 % — HIGH (ref 34.5–45)
HGB BLD-MCNC: 16 G/DL — HIGH (ref 11.5–15.5)
HOROWITZ INDEX BLDA+IHG-RTO: 40 — SIGNIFICANT CHANGE UP
IMM GRANULOCYTES NFR BLD AUTO: 0.5 % — SIGNIFICANT CHANGE UP (ref 0–0.9)
LYMPHOCYTES # BLD AUTO: 1.26 K/UL — SIGNIFICANT CHANGE UP (ref 1–3.3)
LYMPHOCYTES # BLD AUTO: 8.1 % — LOW (ref 13–44)
MAGNESIUM SERPL-MCNC: 3.6 MG/DL — HIGH (ref 1.6–2.6)
MCHC RBC-ENTMCNC: 27.4 PG — SIGNIFICANT CHANGE UP (ref 27–34)
MCHC RBC-ENTMCNC: 32.3 GM/DL — SIGNIFICANT CHANGE UP (ref 32–36)
MCV RBC AUTO: 84.8 FL — SIGNIFICANT CHANGE UP (ref 80–100)
MONOCYTES # BLD AUTO: 0.6 K/UL — SIGNIFICANT CHANGE UP (ref 0–0.9)
MONOCYTES NFR BLD AUTO: 3.9 % — SIGNIFICANT CHANGE UP (ref 2–14)
NEUTROPHILS # BLD AUTO: 13.52 K/UL — HIGH (ref 1.8–7.4)
NEUTROPHILS NFR BLD AUTO: 87.4 % — HIGH (ref 43–77)
NRBC # BLD: 0 /100 WBCS — SIGNIFICANT CHANGE UP (ref 0–0)
PCO2 BLDA: 44 MMHG — HIGH (ref 32–35)
PH BLDA: 7.37 — SIGNIFICANT CHANGE UP (ref 7.35–7.45)
PHOSPHATE SERPL-MCNC: 3.4 MG/DL — SIGNIFICANT CHANGE UP (ref 2.5–4.5)
PLATELET # BLD AUTO: 219 K/UL — SIGNIFICANT CHANGE UP (ref 150–400)
PO2 BLDA: 154 MMHG — HIGH (ref 83–108)
POTASSIUM SERPL-MCNC: 4.7 MMOL/L — SIGNIFICANT CHANGE UP (ref 3.5–5.3)
POTASSIUM SERPL-SCNC: 4.7 MMOL/L — SIGNIFICANT CHANGE UP (ref 3.5–5.3)
PROT SERPL-MCNC: 7.4 G/DL — SIGNIFICANT CHANGE UP (ref 6–8.3)
RAPID RVP RESULT: DETECTED
RBC # BLD: 5.85 M/UL — HIGH (ref 3.8–5.2)
RBC # FLD: 13.3 % — SIGNIFICANT CHANGE UP (ref 10.3–14.5)
RV+EV RNA SPEC QL NAA+PROBE: DETECTED
SAO2 % BLDA: 100 % — SIGNIFICANT CHANGE UP
SARS-COV-2 RNA SPEC QL NAA+PROBE: SIGNIFICANT CHANGE UP
SODIUM SERPL-SCNC: 135 MMOL/L — SIGNIFICANT CHANGE UP (ref 135–145)
WBC # BLD: 15.47 K/UL — HIGH (ref 3.8–10.5)
WBC # FLD AUTO: 15.47 K/UL — HIGH (ref 3.8–10.5)

## 2022-10-08 PROCEDURE — 99233 SBSQ HOSP IP/OBS HIGH 50: CPT | Mod: GC

## 2022-10-08 RX ORDER — LABETALOL HCL 100 MG
10 TABLET ORAL ONCE
Refills: 0 | Status: COMPLETED | OUTPATIENT
Start: 2022-10-08 | End: 2022-10-08

## 2022-10-08 RX ORDER — BUDESONIDE AND FORMOTEROL FUMARATE DIHYDRATE 160; 4.5 UG/1; UG/1
2 AEROSOL RESPIRATORY (INHALATION)
Refills: 0 | Status: DISCONTINUED | OUTPATIENT
Start: 2022-10-08 | End: 2022-10-19

## 2022-10-08 RX ORDER — HEPARIN SODIUM 5000 [USP'U]/ML
INJECTION INTRAVENOUS; SUBCUTANEOUS
Qty: 25000 | Refills: 0 | Status: DISCONTINUED | OUTPATIENT
Start: 2022-10-08 | End: 2022-10-08

## 2022-10-08 RX ORDER — INSULIN LISPRO 100/ML
VIAL (ML) SUBCUTANEOUS
Refills: 0 | Status: DISCONTINUED | OUTPATIENT
Start: 2022-10-08 | End: 2022-10-19

## 2022-10-08 RX ORDER — HEPARIN SODIUM 5000 [USP'U]/ML
5000 INJECTION INTRAVENOUS; SUBCUTANEOUS EVERY 12 HOURS
Refills: 0 | Status: DISCONTINUED | OUTPATIENT
Start: 2022-10-08 | End: 2022-10-19

## 2022-10-08 RX ADMIN — Medication 81 MILLIGRAM(S): at 11:59

## 2022-10-08 RX ADMIN — Medication 50 MILLIGRAM(S): at 22:10

## 2022-10-08 RX ADMIN — PANTOPRAZOLE SODIUM 40 MILLIGRAM(S): 20 TABLET, DELAYED RELEASE ORAL at 11:56

## 2022-10-08 RX ADMIN — HEPARIN SODIUM 5000 UNIT(S): 5000 INJECTION INTRAVENOUS; SUBCUTANEOUS at 17:31

## 2022-10-08 RX ADMIN — Medication 3 MILLILITER(S): at 03:22

## 2022-10-08 RX ADMIN — Medication 2: at 06:16

## 2022-10-08 RX ADMIN — Medication 50 MILLIGRAM(S): at 13:56

## 2022-10-08 RX ADMIN — Medication 3 MILLILITER(S): at 23:49

## 2022-10-08 RX ADMIN — CLOPIDOGREL BISULFATE 75 MILLIGRAM(S): 75 TABLET, FILM COATED ORAL at 11:59

## 2022-10-08 RX ADMIN — ATORVASTATIN CALCIUM 40 MILLIGRAM(S): 80 TABLET, FILM COATED ORAL at 22:09

## 2022-10-08 RX ADMIN — Medication 3 MILLILITER(S): at 17:27

## 2022-10-08 RX ADMIN — Medication 40 MILLIGRAM(S): at 05:59

## 2022-10-08 RX ADMIN — Medication 10 MILLIGRAM(S): at 20:02

## 2022-10-08 RX ADMIN — MONTELUKAST 10 MILLIGRAM(S): 4 TABLET, CHEWABLE ORAL at 22:09

## 2022-10-08 RX ADMIN — Medication 2: at 22:09

## 2022-10-08 RX ADMIN — AZITHROMYCIN 255 MILLIGRAM(S): 500 TABLET, FILM COATED ORAL at 06:18

## 2022-10-08 RX ADMIN — Medication 3 MILLILITER(S): at 12:01

## 2022-10-08 RX ADMIN — CEFTRIAXONE 100 MILLIGRAM(S): 500 INJECTION, POWDER, FOR SOLUTION INTRAMUSCULAR; INTRAVENOUS at 17:48

## 2022-10-08 RX ADMIN — CHLORHEXIDINE GLUCONATE 1 APPLICATION(S): 213 SOLUTION TOPICAL at 05:58

## 2022-10-08 RX ADMIN — CHLORHEXIDINE GLUCONATE 1 APPLICATION(S): 213 SOLUTION TOPICAL at 11:56

## 2022-10-08 RX ADMIN — CARVEDILOL PHOSPHATE 25 MILLIGRAM(S): 80 CAPSULE, EXTENDED RELEASE ORAL at 17:48

## 2022-10-08 RX ADMIN — Medication 100 MILLIGRAM(S): at 13:56

## 2022-10-08 RX ADMIN — CARVEDILOL PHOSPHATE 25 MILLIGRAM(S): 80 CAPSULE, EXTENDED RELEASE ORAL at 05:59

## 2022-10-08 RX ADMIN — ENOXAPARIN SODIUM 75 MILLIGRAM(S): 100 INJECTION SUBCUTANEOUS at 07:22

## 2022-10-08 RX ADMIN — Medication 2: at 00:38

## 2022-10-08 RX ADMIN — Medication 40 MILLIGRAM(S): at 20:02

## 2022-10-08 RX ADMIN — Medication 50 MILLIGRAM(S): at 05:59

## 2022-10-08 RX ADMIN — BUDESONIDE AND FORMOTEROL FUMARATE DIHYDRATE 2 PUFF(S): 160; 4.5 AEROSOL RESPIRATORY (INHALATION) at 22:09

## 2022-10-08 RX ADMIN — Medication 3 MILLILITER(S): at 20:14

## 2022-10-08 RX ADMIN — Medication 3 MILLILITER(S): at 10:00

## 2022-10-08 RX ADMIN — Medication 2: at 17:29

## 2022-10-08 RX ADMIN — Medication 6: at 11:57

## 2022-10-08 NOTE — PHYSICAL THERAPY INITIAL EVALUATION ADULT - MODALITIES TREATMENT COMMENTS
currently on O2@3L/min via NC. However, patient reports a modified Alba RPE of 9 at rest; +shortness of breath while talking; noted worsening oxygen desaturation to 73% after persistent coughing. Assisted with and performed relaxation strategies, deep diaphragmatic and pursed-lip breathing exercises, as well as emphasis on energy conversation strategies. Patient was initially anxious and tachypneic but was able to return to baseline VS after performing above mentioned strategies. Covering RN Obioma made aware.

## 2022-10-08 NOTE — PHYSICAL THERAPY INITIAL EVALUATION ADULT - PATIENT/FAMILY/SIGNIFICANT OTHER GOALS STATEMENT, PT EVAL
to return home and be able to perform basic ADLs and mobility tasks without having shortness of breath

## 2022-10-08 NOTE — CHART NOTE - NSCHARTNOTEFT_GEN_A_CORE
Patient is a 68 yo Female, from home, with med hx significant for COPD, Left MCA CVA w/RLE weakness, Seizures, Type 2 DM, CAD s/p ZHAO, Afib not on AC, PAD s/p Right femoral popliteal bypass s/p occlusion + IR stent placement 2/2017, s/p cardiac cath 4/2022 w/non-obstructive CAD, presenting to the ED due to shortness of breath. Pt currently on Bipap, able to take in full sentences w/mild tachycardia 105-107 bpm when she coughs. Collateral history obtained from EMS charts and ER provider note.   EMS note says pt was not able to speak in complete sentences and had increased WOB, was given 12 mg dexamethasone and 0.3 mg Epi. In the ED, pt was placed on Bipap, noted to have narrow complex tachycardia to 150s, was given adenosine 6 mg then 12 mg, then cardizem 15 mg + duoneb x 3, decadron 10mg IVPB s/p HR ranging in the low 100s. +PT admits to cough and sputum production. Patient was admitted to ICU for BiPAP. Patient was taken off BiPAP and was able to tolerate room air. She continued to use biPAP at night. she is stable for downgrade to medicine floors.     Signout given to:  Attending:     Things to follow:     1.Switch IV steroids to PO steroids  2. f/u cardio recommendations for anticoagulation   3. c/w Abx Patient is a 68 yo Female, from home, with med hx significant for COPD, Left MCA CVA w/RLE weakness, Seizures, Type 2 DM, CAD s/p ZHAO, Afib not on AC, PAD s/p Right femoral popliteal bypass s/p occlusion + IR stent placement 2/2017, s/p cardiac cath 4/2022 w/non-obstructive CAD, presenting to the ED due to shortness of breath. Pt currently on Bipap, able to take in full sentences w/mild tachycardia 105-107 bpm when she coughs. Collateral history obtained from EMS charts and ER provider note.   EMS note says pt was not able to speak in complete sentences and had increased WOB, was given 12 mg dexamethasone and 0.3 mg Epi. In the ED, pt was placed on Bipap, noted to have narrow complex tachycardia to 150s, was given adenosine 6 mg then 12 mg, then cardizem 15 mg + duoneb x 3, decadron 10mg IVPB s/p HR ranging in the low 100s. +PT admits to cough and sputum production. Patient was admitted to ICU for BiPAP. Patient was taken off BiPAP and was able to tolerate room air. She continued to use biPAP at night. she is stable for downgrade to medicine floors.     Signout given to: PGY 1 Dr. Denton  Attending: Dr. Gardner    Things to follow:     1.Switch IV steroids to PO steroids  2. f/u cardio recommendations for anticoagulation   3. c/w Abx

## 2022-10-08 NOTE — PATIENT PROFILE ADULT - FALL HARM RISK - HARM RISK INTERVENTIONS

## 2022-10-08 NOTE — PROGRESS NOTE ADULT - ATTENDING COMMENTS
I personally reviewed patient's labs, flowsheets, pertinent imaging, and prior charts. MDM of high complexity.    Briefly, 68yo woman w/ PMH COPD, left MCA CVA (w/ residual RLE weakness), seizure disorder, DM2, CAD s/p stent, Afib (not on AC reportedly due to frequent falls?) who presented w/ dyspnea concerning for AECOPD; admitted to ICU for acute hypoxemic respiratory failure requiring non-invasive ventilatory support.    ASSESSMENT:  Acute hypoxemic respiratory failure  AECOPD  HFpEF  CAD  HTN  H/o CVA  Afib  PAD  ILEANA    Plan:  - continue supplemental O2, weaned from 5L NC to 2-3L, tolerating off BiPAP  - decr steroids to q12  - CFX/azithro for now, check RVP for possible viral trigger of exacerbation  - standing bronchodilators  - add dual controller LABA/ICS  - would benefit from adding spiriva once off standing duoneb  - PT eval early mobility   - trend CBC, BMP; check urine lytes  - strict I/O  - glycemic control, may need additional insulin while on steroids; f/u A1c  - resume home cardiac meds  - DVT PPx    Transfer to medicine

## 2022-10-08 NOTE — PROGRESS NOTE ADULT - SUBJECTIVE AND OBJECTIVE BOX
PATIENT SEEN AND EXAMINED ON :- 10/8/22  DATE OF SERVICE:     10/8/22        Interim events noted,Labs ,Radiological studies and Cardiology tests reviewed .       HOSPITAL COURSE: HPI:  Patient is a 66 yo Female, from home, with med hx significant for COPD, Left MCA CVA w/RLE weakness, Seizures, Type 2 DM, CAD s/p ZHAO, Afib not on AC, PAD s/p Right femoral popliteal bypass s/p occlusion + IR stent placement 2/2017, s/p cardiac cath 4/2022 w/non-obstructive CAD, presenting to the ED due to shortness of breath. Pt currently on Bipap, able to take in full sentences w/mild tachycardia 105-107 bpm when she coughs. Collateral history obtained from EMS charts and ER provider note.   EMS note says pt was not able to speak in complete sentences and had increased WOB, was given 12 mg dexamethasone and 0.3 mg Epi. In the ED, pt was placed on Bipap, noted to have narrow complex tachycardia to 150s, was given adenosine 6 mg then 12 mg, then cardizem 15 mg + duoneb x 3, decadron 10mg IVPB s/p HR ranging in the low 100s. +PT admits to cough and sputum production (07 Oct 2022 07:00)      INTERIM EVENTS:Patient seen at bedside ,interim events noted.      PMH -reviewed admission note, no change since admission  HEART FAILURE: Acute[ ]Chronic[ ] Systolic[ ] Diastolic[ ] Combined Systolic and Diastolic[ ]  CAD[ ] CABG[ ] PCI[ ]  DEVICES[ ] PPM[ ] ICD[ ] ILR[ ]  ATRIAL FIBRILLATION[ ] Paroxysmal[ ] Permanent[ ] CHADS2-[  ]  ILEANA[ ] CKD1[ ] CKD2[ ] CKD3[ ] CKD4[ ] ESRD[ ]  COPD[ ] HTN[ ]   DM[ ] Type1[ ] Type 2[ ]   CVA[ ] Paresis[ ]    AMBULATION: Assisted[ ] Cane/walker[ ] Independent[ ]    MEDICATIONS  (STANDING):  albuterol/ipratropium for Nebulization 3 milliLiter(s) Nebulizer every 4 hours  aspirin  chewable 81 milliGRAM(s) Oral daily  atorvastatin 40 milliGRAM(s) Oral at bedtime  azithromycin  IVPB 500 milliGRAM(s) IV Intermittent every 24 hours  azithromycin  IVPB      budesonide 160 MICROgram(s)/formoterol 4.5 MICROgram(s) Inhaler 2 Puff(s) Inhalation two times a day  carvedilol 25 milliGRAM(s) Oral every 12 hours  cefTRIAXone   IVPB 1000 milliGRAM(s) IV Intermittent every 24 hours  cefTRIAXone   IVPB      chlorhexidine 2% Cloths 1 Application(s) Topical daily  chlorhexidine 2% Cloths 1 Application(s) Topical <User Schedule>  clopidogrel Tablet 75 milliGRAM(s) Oral daily  diltiazem    milliGRAM(s) Oral daily  heparin   Injectable 5000 Unit(s) SubCutaneous every 12 hours  hydrALAZINE 50 milliGRAM(s) Oral three times a day  insulin lispro (ADMELOG) corrective regimen sliding scale   SubCutaneous Before meals and at bedtime  methylPREDNISolone sodium succinate Injectable 40 milliGRAM(s) IV Push every 12 hours  montelukast 10 milliGRAM(s) Oral at bedtime  pantoprazole  Injectable 40 milliGRAM(s) IV Push daily  topiramate 100 milliGRAM(s) Oral daily    MEDICATIONS  (PRN):  ondansetron Injectable 4 milliGRAM(s) IV Push every 8 hours PRN Nausea and/or Vomiting            REVIEW OF SYSTEMS:  Constitutional: [ ] fever, [ ]weight loss,  [ ]fatigue [ ]weight gain  Eyes: [ ] visual changes  Respiratory: [ ]shortness of breath;  [ ] cough, [ ]wheezing, [ ]chills, [ ]hemoptysis  Cardiovascular: [ ] chest pain, [ ]palpitations, [ ]dizziness,  [ ]leg swelling[ ]orthopnea[ ]PND  Gastrointestinal: [ ] abdominal pain, [ ]nausea, [ ]vomiting,  [ ]diarrhea [ ]Constipation [ ]Melena  Genitourinary: [ ] dysuria, [ ] hematuria [ ]Doll  Neurologic: [ ] headaches [ ] tremors[ ]weakness [ ]Paralysis Right[ ] Left[ ]  Skin: [ ] itching, [ ]burning, [ ] rashes  Endocrine: [ ] heat or cold intolerance  Musculoskeletal: [ ] joint pain or swelling; [ ] muscle, back, or extremity pain  Psychiatric: [ ] depression, [ ]anxiety, [ ]mood swings, or [ ]difficulty sleeping  Hematologic: [ ] easy bruising, [ ] bleeding gums    [ ] All remaining systems negative except as per above.   [ ]Unable to obtain.  [x] No change in ROS since admission      Vital Signs Last 24 Hrs  T(C): 36.6 (08 Oct 2022 08:00), Max: 36.8 (07 Oct 2022 16:00)  T(F): 97.9 (08 Oct 2022 08:00), Max: 98.3 (07 Oct 2022 16:00)  HR: 79 (08 Oct 2022 12:15) (67 - 100)  BP: 165/80 (08 Oct 2022 12:00) (105/78 - 196/105)  BP(mean): 101 (08 Oct 2022 12:00) (79 - 127)  RR: 20 (08 Oct 2022 12:00) (13 - 28)  SpO2: 98% (08 Oct 2022 12:15) (95% - 100%)    Parameters below as of 08 Oct 2022 11:18  Patient On (Oxygen Delivery Method): nasal cannula  O2 Flow (L/min): 3    I&O's Summary    07 Oct 2022 07:01  -  08 Oct 2022 07:00  --------------------------------------------------------  IN: 550 mL / OUT: 500 mL / NET: 50 mL    08 Oct 2022 07:01  -  08 Oct 2022 15:15  --------------------------------------------------------  IN: 300 mL / OUT: 0 mL / NET: 300 mL        PHYSICAL EXAM:  General: No acute distress BMI-  HEENT: EOMI, PERRL  Neck: Supple, [ ] JVD  Lungs: Equal air entry bilaterally; [ ] rales [ ] wheezing [ ] rhonchi  Heart: Regular rate and rhythm; [x ] murmur   2/6 [ x] systolic [ ] diastolic [ ] radiation[ ] rubs [ ]  gallops  Abdomen: Nontender, bowel sounds present  Extremities: No clubbing, cyanosis, [ ] edema [ ]Pulses  equal and intact  Nervous system:  Alert & Oriented X3, no focal deficits  Psychiatric: Normal affect  Skin: No rashes or lesions    LABS:  10-08    135  |  104  |  37<H>  ----------------------------<  238<H>  4.7   |  23  |  1.98<H>    Ca    9.5      08 Oct 2022 03:59  Phos  3.4     10-08  Mg     3.6     10-08    TPro  7.4  /  Alb  3.0<L>  /  TBili  0.3  /  DBili  x   /  AST  10  /  ALT  15  /  AlkPhos  89  10-08    Creatinine Trend: 1.98<--, 1.18<--                        16.0   15.47 )-----------( 219      ( 08 Oct 2022 03:59 )             49.6     PT/INR - ( 07 Oct 2022 04:40 )   PT: 11.3 sec;   INR: 0.95 ratio         PTT - ( 07 Oct 2022 04:40 )  PTT:34.2 sec    Serum Pro-Brain Natriuretic Peptide: 557 pg/mL (10-07-22 @ 02:41)

## 2022-10-08 NOTE — PROGRESS NOTE ADULT - ASSESSMENT
Patient is a 66 yo Female, from home, with med hx significant for COPD, Left MCA CVA w/RLE weakness, Seizures, Type 2 DM, CAD s/p ZHAO, Afib not on AC, PAD s/p Right femoral popliteal bypass s/p occlusion + IR stent placement 2/2017, s/p cardiac cath 4/2022 w/non-obstructive CAD, presenting to the ED due to shortness of breath likely 2/2 COPD exacerbation. Admitted to ICU for close monitoring of respiratory status.    ASSESSMENT  - Acute hypercapnic respiratory failure 2/2 COPD exacerbation  -Narrow complex tachycardia, SVT  -Hx of Afib  -Hx of CVA w/RLE weakness  -hx DM  -CAD  -PAD s/p RLE stent    Neuro:  AAO x 3    Cardiovascular:  #Narrow complex tachycardia  precipitated by WOB/respiratory distress  c/w Bipap  s/p adenosine 6 mg, 12 mg then cardizem 15 mg IVP  Now HR ranging in the low 100s  Cardizem to be resumed when pt off bipap  c/w lopressor pushes    #CAD  Pt takes aspirin, plavix, coreg, statin  resume oral meds when pt able to be off bipap  Echo Grade I DD, EF 55-60% April 2022    #Afib  Pt takes diltiazem ER  will continue with lopressor pushes for now    #HTN   pt takes coreg, diltiazem ER, hydralazine 50 mg TID  will resume IV lopressor as of now  can resume as tolerated when pt off bipap      Pulmonary:   #COPD exacerbation  C/w Solumedrol 40 mg Q8 for COPD exacerbation  C/w Azithromycin IV for COPD exacerbation  c/w Duoneb Q4 hrs for now  Bipap 16/5/40%    Infectious Diseases:  No acute issues    Gastrointestinal:  NPO for now as pt is low threshold for intubation    Renal:  No acute issues    Endo:  NPO for now  Insulin sliding scale Q6 hours    Heme/onc:   #Hb 18, baseline 14  Likely hemoconcentrated vs chronic hypoxia induced    Skin/ catheter: Peripheral lines    Prophylaxis: Lovenox    Goals of Care: Full code    Dispo: Accepted to ICU   Patient is a 66 yo Female, from home, with med hx significant for COPD, Left MCA CVA w/RLE weakness, Seizures, Type 2 DM, CAD s/p ZHAO, Afib not on AC, PAD s/p Right femoral popliteal bypass s/p occlusion + IR stent placement 2/2017, s/p cardiac cath 4/2022 w/non-obstructive CAD, presenting to the ED due to shortness of breath likely 2/2 COPD exacerbation. Admitted to ICU for close monitoring of respiratory status.    ASSESSMENT  - Acute hypercapnic respiratory failure 2/2 COPD exacerbation  -Narrow complex tachycardia, SVT  -Hx of Afib  -Hx of CVA w/RLE weakness  -hx DM  -CAD  -PAD s/p RLE stent    Neuro:  AAO x 3    Cardiovascular:  #Narrow complex tachycardia  Resolved.   likely precipitated by 0.3 epi given by EMS/respiratory distress  s/p adenosine 6 mg, 12 mg then cardizem 15 mg IVP  Now HR ranging in the low 100s  Cardizem resumed      #CAD  Pt takes aspirin, plavix, coreg, statin  resume oral meds when pt able to be off bipap  Echo Grade I DD, EF 55-60% April 2022    #Afib  Pt takes diltiazem ER  Use only prophylaxis dose of AC as per cardio - Dr Graham.     #HTN   pt takes coreg, diltiazem ER, hydralazine 50 mg TID  will resume IV lopressor as of now  can resume as tolerated when pt off bipap      Pulmonary:   #COPD exacerbation  C/w Solumedrol 40 mg Q8 for COPD exacerbation  C/w Azithromycin IV for COPD exacerbation  c/w Duoneb Q4 hrs for now  Bipap 16/5/40%    Infectious Diseases:  No acute issues    Gastrointestinal:  NPO for now as pt is low threshold for intubation    Renal:  No acute issues    Endo:  NPO for now  Insulin sliding scale Q6 hours    Heme/onc:   #Hb 18, baseline 14  Likely hemoconcentrated vs chronic hypoxia induced    Skin/ catheter: Peripheral lines    Prophylaxis: Lovenox    Goals of Care: Full code    Dispo: Accepted to ICU   Patient is a 66 yo Female, from home, with med hx significant for COPD, Left MCA CVA w/RLE weakness, Seizures, Type 2 DM, CAD s/p ZHAO, Afib not on AC, PAD s/p Right femoral popliteal bypass s/p occlusion + IR stent placement 2/2017, s/p cardiac cath 4/2022 w/non-obstructive CAD, presenting to the ED due to shortness of breath likely 2/2 COPD exacerbation. Admitted to ICU for close monitoring of respiratory status.    ASSESSMENT  - Acute hypercapnic respiratory failure 2/2 COPD exacerbation  -Narrow complex tachycardia, SVT  -Hx of Afib  -Hx of CVA w/RLE weakness  -hx DM  -CAD  -PAD s/p RLE stent    Neuro:  AAO x 3    Cardiovascular:  #Narrow complex tachycardia  Resolved.   likely precipitated by 0.3 epi given by EMS/respiratory distress  s/p adenosine 6 mg, 12 mg then cardizem 15 mg IVP  Now HR ranging in the low 100s  Cardizem resumed      #CAD  Pt takes aspirin, plavix, coreg, statin  resume oral meds when pt able to be off bipap  Echo Grade I DD, EF 55-60% April 2022    #Afib  Pt takes diltiazem ER  Use only prophylaxis dose of AC as per cardio - Dr Graham.     #HTN   pt takes coreg, diltiazem ER, hydralazine 50 mg TID  Restarted home meds      Pulmonary:   #COPD exacerbation  C/w Solumedrol 40 mg Q12 for COPD exacerbation  C/w Azithromycin IV for COPD exacerbation  c/w Duoneb Q4 hrs for now  Start Symbicort  NC 3L    Infectious Diseases:  RVP - Entero/Rhinovirus detected    Gastrointestinal:  Dash diet    Renal:  ILEANA  - likely prerenal in setting on volume restriction and NPO  - Encourage oral rehydration  - Monitor Is/Os.     Endo:  moderate sliding scale.     Heme/onc:   #Hb 18, baseline 14  Likely hemoconcentrated vs chronic hypoxia induced    Skin/ catheter: Peripheral lines    Prophylaxis: Lovenox    Goals of Care: Full code    Dispo: Accepted to ICU

## 2022-10-08 NOTE — PHYSICAL THERAPY INITIAL EVALUATION ADULT - MANUAL MUSCLE TESTING RESULTS, REHAB EVAL
MMT on left upper and lower extremities grossly graded 4-/5; right upper and lower extremities grossly graded 3+/5

## 2022-10-08 NOTE — PHYSICAL THERAPY INITIAL EVALUATION ADULT - NS ASR RISK AREAS PT EVAL
Rashida from Lab called with critical result of lactic acid of 4.75 at 0610. Critical lab result read back to Rashida.   This critical lab result is within parameters established by Dr. Nash for this patient     fall/safety awareness

## 2022-10-08 NOTE — PHYSICAL THERAPY INITIAL EVALUATION ADULT - GENERAL OBSERVATIONS, REHAB EVAL
awake, alert, NAD; currently on O2@3L/min via NC; + peripheral IV access on right forearm; appears to demonstrate with shortness of breath while making a conversation

## 2022-10-08 NOTE — PROGRESS NOTE ADULT - ASSESSMENT
Patient is a 68 yo Female, from home, with med hx significant for COPD, Left MCA CVA w/RLE weakness, Seizures, Type 2 DM, CAD s/p ZHAO, Afib not on AC, PAD s/p Right femoral popliteal bypass s/p occlusion + IR stent placement 2/2017, s/p cardiac cath 4/2022 w/non-obstructive CAD, presenting to the ED due to shortness of breath likely 2/2 COPD exacerbation. Admitted to ICU for close monitoring of respiratory status.    ASSESSMENT  - Acute hypercapnic respiratory failure 2/2 COPD exacerbation  -Narrow complex tachycardia, SVT  -Hx of Afib  -Hx of CVA w/RLE weakness  -hx DM  -CAD  -PAD s/p RLE stent    Neuro:  AAO x 3    Cardiovascular:  #Narrow complex tachycardia  Resolved.   likely precipitated by 0.3 epi given by EMS/respiratory distress  s/p adenosine 6 mg, 12 mg then cardizem 15 mg IVP  Now HR ranging in the low 100s  Cardizem resumed      #CAD  Pt takes aspirin, plavix, coreg, statin  resume oral meds when pt able to be off bipap  Echo Grade I DD, EF 55-60% April 2022    #Afib  Pt takes diltiazem ER  Use only prophylaxis dose of AC as per cardio - Dr Graham.     #HTN   pt takes coreg, diltiazem ER, hydralazine 50 mg TID  Restarted home meds      Pulmonary:   #COPD exacerbation  C/w Solumedrol 40 mg Q12 for COPD exacerbation  C/w Azithromycin IV for COPD exacerbation  c/w Duoneb Q4 hrs for now  Start Symbicort  NC 3L    Infectious Diseases:  RVP - Entero/Rhinovirus detected    Gastrointestinal:  Dash diet    Renal:  ILEANA  - likely prerenal in setting on volume restriction and NPO  - Encourage oral rehydration  - Monitor Is/Os.     Endo:  moderate sliding scale.     Heme/onc:   #Hb 18, baseline 14  Likely hemoconcentrated vs chronic hypoxia induced    Skin/ catheter: Peripheral lines    Prophylaxis: Lovenox    Goals of Care: Full code    Dispo: Accepted to ICU

## 2022-10-08 NOTE — PHYSICAL THERAPY INITIAL EVALUATION ADULT - PLANNED THERAPY INTERVENTIONS, PT EVAL
cardiopulmonary PT/balance training/bed mobility training/gait training/postural re-education/strengthening/transfer training

## 2022-10-08 NOTE — PHYSICAL THERAPY INITIAL EVALUATION ADULT - DIAGNOSIS, PT EVAL
impaired cardiopulmonary status; shortness of breath exacerbated by activity; difficulty performing bed mobility; inability to perform transfers, ambulation, and basic ADLs

## 2022-10-08 NOTE — PROGRESS NOTE ADULT - SUBJECTIVE AND OBJECTIVE BOX
INTERVAL HPI/OVERNIGHT EVENTS: ***    PRESSORS: [ ] YES [ ] NO  WHICH:    ANTIBIOTICS:                      Antimicrobial:  azithromycin  IVPB 500 milliGRAM(s) IV Intermittent every 24 hours  azithromycin  IVPB      cefTRIAXone   IVPB 1000 milliGRAM(s) IV Intermittent every 24 hours  cefTRIAXone   IVPB        Cardiovascular:  carvedilol 25 milliGRAM(s) Oral every 12 hours  diltiazem    milliGRAM(s) Oral daily  hydrALAZINE 50 milliGRAM(s) Oral three times a day    Pulmonary:  albuterol/ipratropium for Nebulization 3 milliLiter(s) Nebulizer every 4 hours  montelukast 10 milliGRAM(s) Oral at bedtime    Hematalogic:  aspirin  chewable 81 milliGRAM(s) Oral daily  clopidogrel Tablet 75 milliGRAM(s) Oral daily  enoxaparin Injectable 75 milliGRAM(s) SubCutaneous every 12 hours    Other:  atorvastatin 40 milliGRAM(s) Oral at bedtime  chlorhexidine 2% Cloths 1 Application(s) Topical daily  chlorhexidine 2% Cloths 1 Application(s) Topical <User Schedule>  insulin lispro (ADMELOG) corrective regimen sliding scale   SubCutaneous every 6 hours  methylPREDNISolone sodium succinate Injectable 40 milliGRAM(s) IV Push every 8 hours  ondansetron Injectable 4 milliGRAM(s) IV Push every 8 hours PRN  pantoprazole  Injectable 40 milliGRAM(s) IV Push daily  topiramate 100 milliGRAM(s) Oral daily    albuterol/ipratropium for Nebulization 3 milliLiter(s) Nebulizer every 4 hours  aspirin  chewable 81 milliGRAM(s) Oral daily  atorvastatin 40 milliGRAM(s) Oral at bedtime  azithromycin  IVPB 500 milliGRAM(s) IV Intermittent every 24 hours  azithromycin  IVPB      carvedilol 25 milliGRAM(s) Oral every 12 hours  cefTRIAXone   IVPB 1000 milliGRAM(s) IV Intermittent every 24 hours  cefTRIAXone   IVPB      chlorhexidine 2% Cloths 1 Application(s) Topical daily  chlorhexidine 2% Cloths 1 Application(s) Topical <User Schedule>  clopidogrel Tablet 75 milliGRAM(s) Oral daily  diltiazem    milliGRAM(s) Oral daily  enoxaparin Injectable 75 milliGRAM(s) SubCutaneous every 12 hours  hydrALAZINE 50 milliGRAM(s) Oral three times a day  insulin lispro (ADMELOG) corrective regimen sliding scale   SubCutaneous every 6 hours  methylPREDNISolone sodium succinate Injectable 40 milliGRAM(s) IV Push every 8 hours  montelukast 10 milliGRAM(s) Oral at bedtime  ondansetron Injectable 4 milliGRAM(s) IV Push every 8 hours PRN  pantoprazole  Injectable 40 milliGRAM(s) IV Push daily  topiramate 100 milliGRAM(s) Oral daily    Drug Dosing Weight  Height (cm): 162.6 (07 Oct 2022 01:56)  Weight (kg): 78 (07 Oct 2022 01:56)  BMI (kg/m2): 29.5 (07 Oct 2022 01:56)  BSA (m2): 1.83 (07 Oct 2022 01:56)    CENTRAL LINE: [ ] YES [ ] NO  LOCATION:   DATE INSERTED:  REMOVE: [ ] YES [ ] NO  EXPLAIN:    SUMMERS: [ ] YES [ ] NO    DATE INSERTED:  REMOVE:  [ ] YES [ ] NO  EXPLAIN:    A-LINE:  [ ] YES [ ] NO  LOCATION:   DATE INSERTED:  REMOVE:  [ ] YES [ ] NO  EXPLAIN:    PMH -reviewed admission note, no change since admission    ICU Vital Signs Last 24 Hrs  T(C): 36.8 (07 Oct 2022 16:00), Max: 36.8 (07 Oct 2022 16:00)  T(F): 98.3 (07 Oct 2022 16:00), Max: 98.3 (07 Oct 2022 16:00)  HR: 83 (08 Oct 2022 00:07) (79 - 161)  BP: 166/92 (08 Oct 2022 00:00) (96/53 - 196/105)  BP(mean): 110 (08 Oct 2022 00:00) (84 - 127)  ABP: --  ABP(mean): --  RR: 21 (08 Oct 2022 00:00) (17 - 34)  SpO2: 100% (08 Oct 2022 00:07) (97% - 100%)    O2 Parameters below as of 07 Oct 2022 16:00  Patient On (Oxygen Delivery Method): BiPAP/CPAP            ABG - ( 07 Oct 2022 06:53 )  pH, Arterial: 7.38  pH, Blood: x     /  pCO2: 39    /  pO2: 121   / HCO3: 23    / Base Excess: -1.8  /  SaO2: 100                         PHYSICAL EXAM:    GENERAL: NAD, well-groomed, well-developed  HEAD:  Atraumatic, Normocephalic  EYES: EOMI, PERRLA, conjunctiva and sclera clear  ENMT: No tonsillar erythema, exudates, or enlargement; Moist mucous membranes, Good dentition, No lesions  NECK: Supple, normal appearance, No JVD; Normal thyroid; Trachea midline  NERVOUS SYSTEM:  Alert & Oriented X3, Good concentration; Motor Strength 5/5 B/L upper and lower extremities; DTRs 2+ intact and symmetric  CHEST/LUNG: No chest deformity; Normal percussion bilaterally; No rales, rhonchi, wheezing   HEART: Regular rate and rhythm; No murmurs, rubs, or gallops  ABDOMEN: Soft, Nontender, Nondistended; Bowel sounds present  EXTREMITIES:  2+ Peripheral Pulses, No clubbing, cyanosis, or edema  LYMPH: No lymphadenopathy noted  SKIN: No rashes or lesions; Good capillary refill      LABS:  CBC Full  -  ( 07 Oct 2022 02:41 )  WBC Count : 10.69 K/uL  RBC Count : 6.78 M/uL  Hemoglobin : 18.3 g/dL  Hematocrit : 58.2 %  Platelet Count - Automated : 287 K/uL  Mean Cell Volume : 85.8 fl  Mean Cell Hemoglobin : 27.0 pg  Mean Cell Hemoglobin Concentration : 31.4 gm/dL  Auto Neutrophil # : 5.77 K/uL  Auto Lymphocyte # : 2.90 K/uL  Auto Monocyte # : 1.47 K/uL  Auto Eosinophil # : 0.39 K/uL  Auto Basophil # : 0.12 K/uL  Auto Neutrophil % : 54.0 %  Auto Lymphocyte % : 27.1 %  Auto Monocyte % : 13.8 %  Auto Eosinophil % : 3.6 %  Auto Basophil % : 1.1 %    10-07    137  |  104  |  11  ----------------------------<  228<H>  4.2   |  22  |  1.18    Ca    10.0      07 Oct 2022 02:41    TPro  9.0<H>  /  Alb  3.6  /  TBili  0.6  /  DBili  x   /  AST  25  /  ALT  20  /  AlkPhos  128<H>  10-07    PT/INR - ( 07 Oct 2022 04:40 )   PT: 11.3 sec;   INR: 0.95 ratio         PTT - ( 07 Oct 2022 04:40 )  PTT:34.2 sec        RADIOLOGY & ADDITIONAL STUDIES REVIEWED:  ***    GOALS OF CARE DISCUSSION WITH PATIENT/FAMILY/PROXY:    CRITICAL CARE TIME SPENT: 35 minutes INTERVAL HPI/OVERNIGHT EVENTS: Patient was examined at bedside, AAOx3, stable, NAD. She was able to tolerate BIPAP overnight. No other acute events overnight.    PRESSORS: [ ] YES [ ] NO  WHICH:    ANTIBIOTICS:                      Antimicrobial:  azithromycin  IVPB 500 milliGRAM(s) IV Intermittent every 24 hours  azithromycin  IVPB      cefTRIAXone   IVPB 1000 milliGRAM(s) IV Intermittent every 24 hours  cefTRIAXone   IVPB        Cardiovascular:  carvedilol 25 milliGRAM(s) Oral every 12 hours  diltiazem    milliGRAM(s) Oral daily  hydrALAZINE 50 milliGRAM(s) Oral three times a day    Pulmonary:  albuterol/ipratropium for Nebulization 3 milliLiter(s) Nebulizer every 4 hours  montelukast 10 milliGRAM(s) Oral at bedtime    Hematalogic:  aspirin  chewable 81 milliGRAM(s) Oral daily  clopidogrel Tablet 75 milliGRAM(s) Oral daily  enoxaparin Injectable 75 milliGRAM(s) SubCutaneous every 12 hours    Other:  atorvastatin 40 milliGRAM(s) Oral at bedtime  chlorhexidine 2% Cloths 1 Application(s) Topical daily  chlorhexidine 2% Cloths 1 Application(s) Topical <User Schedule>  insulin lispro (ADMELOG) corrective regimen sliding scale   SubCutaneous every 6 hours  methylPREDNISolone sodium succinate Injectable 40 milliGRAM(s) IV Push every 8 hours  ondansetron Injectable 4 milliGRAM(s) IV Push every 8 hours PRN  pantoprazole  Injectable 40 milliGRAM(s) IV Push daily  topiramate 100 milliGRAM(s) Oral daily    albuterol/ipratropium for Nebulization 3 milliLiter(s) Nebulizer every 4 hours  aspirin  chewable 81 milliGRAM(s) Oral daily  atorvastatin 40 milliGRAM(s) Oral at bedtime  azithromycin  IVPB 500 milliGRAM(s) IV Intermittent every 24 hours  azithromycin  IVPB      carvedilol 25 milliGRAM(s) Oral every 12 hours  cefTRIAXone   IVPB 1000 milliGRAM(s) IV Intermittent every 24 hours  cefTRIAXone   IVPB      chlorhexidine 2% Cloths 1 Application(s) Topical daily  chlorhexidine 2% Cloths 1 Application(s) Topical <User Schedule>  clopidogrel Tablet 75 milliGRAM(s) Oral daily  diltiazem    milliGRAM(s) Oral daily  enoxaparin Injectable 75 milliGRAM(s) SubCutaneous every 12 hours  hydrALAZINE 50 milliGRAM(s) Oral three times a day  insulin lispro (ADMELOG) corrective regimen sliding scale   SubCutaneous every 6 hours  methylPREDNISolone sodium succinate Injectable 40 milliGRAM(s) IV Push every 8 hours  montelukast 10 milliGRAM(s) Oral at bedtime  ondansetron Injectable 4 milliGRAM(s) IV Push every 8 hours PRN  pantoprazole  Injectable 40 milliGRAM(s) IV Push daily  topiramate 100 milliGRAM(s) Oral daily    Drug Dosing Weight  Height (cm): 162.6 (07 Oct 2022 01:56)  Weight (kg): 78 (07 Oct 2022 01:56)  BMI (kg/m2): 29.5 (07 Oct 2022 01:56)  BSA (m2): 1.83 (07 Oct 2022 01:56)    CENTRAL LINE: [ ] YES [ ] NO  LOCATION:   DATE INSERTED:  REMOVE: [ ] YES [ ] NO  EXPLAIN:    SUMMERS: [ ] YES [ ] NO    DATE INSERTED:  REMOVE:  [ ] YES [ ] NO  EXPLAIN:    A-LINE:  [ ] YES [ ] NO  LOCATION:   DATE INSERTED:  REMOVE:  [ ] YES [ ] NO  EXPLAIN:    PMH -reviewed admission note, no change since admission    ICU Vital Signs Last 24 Hrs  T(C): 36.8 (07 Oct 2022 16:00), Max: 36.8 (07 Oct 2022 16:00)  T(F): 98.3 (07 Oct 2022 16:00), Max: 98.3 (07 Oct 2022 16:00)  HR: 83 (08 Oct 2022 00:07) (79 - 161)  BP: 166/92 (08 Oct 2022 00:00) (96/53 - 196/105)  BP(mean): 110 (08 Oct 2022 00:00) (84 - 127)  ABP: --  ABP(mean): --  RR: 21 (08 Oct 2022 00:00) (17 - 34)  SpO2: 100% (08 Oct 2022 00:07) (97% - 100%)    O2 Parameters below as of 07 Oct 2022 16:00  Patient On (Oxygen Delivery Method): BiPAP/CPAP            ABG - ( 07 Oct 2022 06:53 )  pH, Arterial: 7.38  pH, Blood: x     /  pCO2: 39    /  pO2: 121   / HCO3: 23    / Base Excess: -1.8  /  SaO2: 100                         PHYSICAL EXAM:    GENERAL: NAD, well-groomed, well-developed  HEAD:  Atraumatic, Normocephalic  EYES: EOMI, PERRLA, conjunctiva and sclera clear  ENMT: No tonsillar erythema, exudates, or enlargement; Moist mucous membranes, Good dentition, No lesions  NECK: Supple, normal appearance, No JVD; Normal thyroid; Trachea midline  NERVOUS SYSTEM:  Alert & Oriented X3, Good concentration; Motor Strength 5/5 B/L upper and lower extremities; DTRs 2+ intact and symmetric  CHEST/LUNG: No chest deformity; Normal percussion bilaterally; No rales, rhonchi, wheezing   HEART: Regular rate and rhythm; No murmurs, rubs, or gallops  ABDOMEN: Soft, Nontender, Nondistended; Bowel sounds present  EXTREMITIES:  2+ Peripheral Pulses, No clubbing, cyanosis, or edema  LYMPH: No lymphadenopathy noted  SKIN: No rashes or lesions; Good capillary refill      LABS:  CBC Full  -  ( 07 Oct 2022 02:41 )  WBC Count : 10.69 K/uL  RBC Count : 6.78 M/uL  Hemoglobin : 18.3 g/dL  Hematocrit : 58.2 %  Platelet Count - Automated : 287 K/uL  Mean Cell Volume : 85.8 fl  Mean Cell Hemoglobin : 27.0 pg  Mean Cell Hemoglobin Concentration : 31.4 gm/dL  Auto Neutrophil # : 5.77 K/uL  Auto Lymphocyte # : 2.90 K/uL  Auto Monocyte # : 1.47 K/uL  Auto Eosinophil # : 0.39 K/uL  Auto Basophil # : 0.12 K/uL  Auto Neutrophil % : 54.0 %  Auto Lymphocyte % : 27.1 %  Auto Monocyte % : 13.8 %  Auto Eosinophil % : 3.6 %  Auto Basophil % : 1.1 %    10-07    137  |  104  |  11  ----------------------------<  228<H>  4.2   |  22  |  1.18    Ca    10.0      07 Oct 2022 02:41    TPro  9.0<H>  /  Alb  3.6  /  TBili  0.6  /  DBili  x   /  AST  25  /  ALT  20  /  AlkPhos  128<H>  10-07    PT/INR - ( 07 Oct 2022 04:40 )   PT: 11.3 sec;   INR: 0.95 ratio         PTT - ( 07 Oct 2022 04:40 )  PTT:34.2 sec        RADIOLOGY & ADDITIONAL STUDIES REVIEWED:  ***    GOALS OF CARE DISCUSSION WITH PATIENT/FAMILY/PROXY:    CRITICAL CARE TIME SPENT: 35 minutes INTERVAL HPI/OVERNIGHT EVENTS: Patient was examined at bedside, AAOx3, stable, NAD. She was able to tolerate BIPAP overnight. No other acute events overnight.    PRESSORS: [ ] YES [X] NO  WHICH:    ANTIBIOTICS:                      Antimicrobial:  azithromycin  IVPB 500 milliGRAM(s) IV Intermittent every 24 hours  azithromycin  IVPB      cefTRIAXone   IVPB 1000 milliGRAM(s) IV Intermittent every 24 hours  cefTRIAXone   IVPB        Cardiovascular:  carvedilol 25 milliGRAM(s) Oral every 12 hours  diltiazem    milliGRAM(s) Oral daily  hydrALAZINE 50 milliGRAM(s) Oral three times a day    Pulmonary:  albuterol/ipratropium for Nebulization 3 milliLiter(s) Nebulizer every 4 hours  montelukast 10 milliGRAM(s) Oral at bedtime    Hematalogic:  aspirin  chewable 81 milliGRAM(s) Oral daily  clopidogrel Tablet 75 milliGRAM(s) Oral daily  enoxaparin Injectable 75 milliGRAM(s) SubCutaneous every 12 hours    Other:  atorvastatin 40 milliGRAM(s) Oral at bedtime  chlorhexidine 2% Cloths 1 Application(s) Topical daily  chlorhexidine 2% Cloths 1 Application(s) Topical <User Schedule>  insulin lispro (ADMELOG) corrective regimen sliding scale   SubCutaneous every 6 hours  methylPREDNISolone sodium succinate Injectable 40 milliGRAM(s) IV Push every 8 hours  ondansetron Injectable 4 milliGRAM(s) IV Push every 8 hours PRN  pantoprazole  Injectable 40 milliGRAM(s) IV Push daily  topiramate 100 milliGRAM(s) Oral daily    albuterol/ipratropium for Nebulization 3 milliLiter(s) Nebulizer every 4 hours  aspirin  chewable 81 milliGRAM(s) Oral daily  atorvastatin 40 milliGRAM(s) Oral at bedtime  azithromycin  IVPB 500 milliGRAM(s) IV Intermittent every 24 hours  azithromycin  IVPB      carvedilol 25 milliGRAM(s) Oral every 12 hours  cefTRIAXone   IVPB 1000 milliGRAM(s) IV Intermittent every 24 hours  cefTRIAXone   IVPB      chlorhexidine 2% Cloths 1 Application(s) Topical daily  chlorhexidine 2% Cloths 1 Application(s) Topical <User Schedule>  clopidogrel Tablet 75 milliGRAM(s) Oral daily  diltiazem    milliGRAM(s) Oral daily  enoxaparin Injectable 75 milliGRAM(s) SubCutaneous every 12 hours  hydrALAZINE 50 milliGRAM(s) Oral three times a day  insulin lispro (ADMELOG) corrective regimen sliding scale   SubCutaneous every 6 hours  methylPREDNISolone sodium succinate Injectable 40 milliGRAM(s) IV Push every 8 hours  montelukast 10 milliGRAM(s) Oral at bedtime  ondansetron Injectable 4 milliGRAM(s) IV Push every 8 hours PRN  pantoprazole  Injectable 40 milliGRAM(s) IV Push daily  topiramate 100 milliGRAM(s) Oral daily    Drug Dosing Weight  Height (cm): 162.6 (07 Oct 2022 01:56)  Weight (kg): 78 (07 Oct 2022 01:56)  BMI (kg/m2): 29.5 (07 Oct 2022 01:56)  BSA (m2): 1.83 (07 Oct 2022 01:56)    CENTRAL LINE: [ ] YES [] NO  LOCATION:   DATE INSERTED:  REMOVE: [ ] YES [ ] NO  EXPLAIN:    SUMMERS: [ ] YES [ ] NO    DATE INSERTED:  REMOVE:  [ ] YES [ ] NO  EXPLAIN:    A-LINE:  [ ] YES [ ] NO  LOCATION:   DATE INSERTED:  REMOVE:  [ ] YES [ ] NO  EXPLAIN:    PMH -reviewed admission note, no change since admission    ICU Vital Signs Last 24 Hrs  T(C): 36.8 (07 Oct 2022 16:00), Max: 36.8 (07 Oct 2022 16:00)  T(F): 98.3 (07 Oct 2022 16:00), Max: 98.3 (07 Oct 2022 16:00)  HR: 83 (08 Oct 2022 00:07) (79 - 161)  BP: 166/92 (08 Oct 2022 00:00) (96/53 - 196/105)  BP(mean): 110 (08 Oct 2022 00:00) (84 - 127)  ABP: --  ABP(mean): --  RR: 21 (08 Oct 2022 00:00) (17 - 34)  SpO2: 100% (08 Oct 2022 00:07) (97% - 100%)    O2 Parameters below as of 07 Oct 2022 16:00  Patient On (Oxygen Delivery Method): BiPAP/CPAP            ABG - ( 07 Oct 2022 06:53 )  pH, Arterial: 7.38  pH, Blood: x     /  pCO2: 39    /  pO2: 121   / HCO3: 23    / Base Excess: -1.8  /  SaO2: 100                         PHYSICAL EXAM:    GENERAL: middle aged female, NC, able to speak in complete sentences  HEAD:  Atraumatic, Normocephalic  EYES: EOMI, PERRLA, conjunctiva and sclera clear  NECK: Supple, No JVD  CHEST/LUNG: +wheezing bilaterally  HEART: Regular rate and rhythm; s1+ s2+  ABDOMEN: Soft, Nontender, Nondistended; Bowel sounds present  EXTREMITIES:, No clubbing, cyanosis, or edema  NEUROLOGY: AAOx3 non-focal  SKIN: No rashes or lesions, right LE scar (from stent placement)      LABS:  CBC Full  -  ( 07 Oct 2022 02:41 )  WBC Count : 10.69 K/uL  RBC Count : 6.78 M/uL  Hemoglobin : 18.3 g/dL  Hematocrit : 58.2 %  Platelet Count - Automated : 287 K/uL  Mean Cell Volume : 85.8 fl  Mean Cell Hemoglobin : 27.0 pg  Mean Cell Hemoglobin Concentration : 31.4 gm/dL  Auto Neutrophil # : 5.77 K/uL  Auto Lymphocyte # : 2.90 K/uL  Auto Monocyte # : 1.47 K/uL  Auto Eosinophil # : 0.39 K/uL  Auto Basophil # : 0.12 K/uL  Auto Neutrophil % : 54.0 %  Auto Lymphocyte % : 27.1 %  Auto Monocyte % : 13.8 %  Auto Eosinophil % : 3.6 %  Auto Basophil % : 1.1 %    10-07    137  |  104  |  11  ----------------------------<  228<H>  4.2   |  22  |  1.18    Ca    10.0      07 Oct 2022 02:41    TPro  9.0<H>  /  Alb  3.6  /  TBili  0.6  /  DBili  x   /  AST  25  /  ALT  20  /  AlkPhos  128<H>  10-07    PT/INR - ( 07 Oct 2022 04:40 )   PT: 11.3 sec;   INR: 0.95 ratio         PTT - ( 07 Oct 2022 04:40 )  PTT:34.2 sec        RADIOLOGY & ADDITIONAL STUDIES REVIEWED:  ***    GOALS OF CARE DISCUSSION WITH PATIENT/FAMILY/PROXY:    CRITICAL CARE TIME SPENT: 35 minutes

## 2022-10-09 LAB
ALBUMIN SERPL ELPH-MCNC: 3 G/DL — LOW (ref 3.5–5)
ALP SERPL-CCNC: 83 U/L — SIGNIFICANT CHANGE UP (ref 40–120)
ALT FLD-CCNC: 15 U/L DA — SIGNIFICANT CHANGE UP (ref 10–60)
ANION GAP SERPL CALC-SCNC: 7 MMOL/L — SIGNIFICANT CHANGE UP (ref 5–17)
AST SERPL-CCNC: 12 U/L — SIGNIFICANT CHANGE UP (ref 10–40)
BILIRUB SERPL-MCNC: 0.3 MG/DL — SIGNIFICANT CHANGE UP (ref 0.2–1.2)
BUN SERPL-MCNC: 48 MG/DL — HIGH (ref 7–18)
CALCIUM SERPL-MCNC: 9.4 MG/DL — SIGNIFICANT CHANGE UP (ref 8.4–10.5)
CHLORIDE SERPL-SCNC: 104 MMOL/L — SIGNIFICANT CHANGE UP (ref 96–108)
CO2 SERPL-SCNC: 24 MMOL/L — SIGNIFICANT CHANGE UP (ref 22–31)
CREAT SERPL-MCNC: 1.53 MG/DL — HIGH (ref 0.5–1.3)
EGFR: 37 ML/MIN/1.73M2 — LOW
GLUCOSE BLDC GLUCOMTR-MCNC: 224 MG/DL — HIGH (ref 70–99)
GLUCOSE BLDC GLUCOMTR-MCNC: 246 MG/DL — HIGH (ref 70–99)
GLUCOSE BLDC GLUCOMTR-MCNC: 254 MG/DL — HIGH (ref 70–99)
GLUCOSE BLDC GLUCOMTR-MCNC: 264 MG/DL — HIGH (ref 70–99)
GLUCOSE BLDC GLUCOMTR-MCNC: 324 MG/DL — HIGH (ref 70–99)
GLUCOSE SERPL-MCNC: 211 MG/DL — HIGH (ref 70–99)
HCT VFR BLD CALC: 48.1 % — HIGH (ref 34.5–45)
HGB BLD-MCNC: 15.4 G/DL — SIGNIFICANT CHANGE UP (ref 11.5–15.5)
MAGNESIUM SERPL-MCNC: 3 MG/DL — HIGH (ref 1.6–2.6)
MCHC RBC-ENTMCNC: 27.4 PG — SIGNIFICANT CHANGE UP (ref 27–34)
MCHC RBC-ENTMCNC: 32 GM/DL — SIGNIFICANT CHANGE UP (ref 32–36)
MCV RBC AUTO: 85.4 FL — SIGNIFICANT CHANGE UP (ref 80–100)
MRSA PCR RESULT.: SIGNIFICANT CHANGE UP
NRBC # BLD: 0 /100 WBCS — SIGNIFICANT CHANGE UP (ref 0–0)
PHOSPHATE SERPL-MCNC: 4.5 MG/DL — SIGNIFICANT CHANGE UP (ref 2.5–4.5)
PLATELET # BLD AUTO: 264 K/UL — SIGNIFICANT CHANGE UP (ref 150–400)
POTASSIUM SERPL-MCNC: 4.1 MMOL/L — SIGNIFICANT CHANGE UP (ref 3.5–5.3)
POTASSIUM SERPL-SCNC: 4.1 MMOL/L — SIGNIFICANT CHANGE UP (ref 3.5–5.3)
PROT SERPL-MCNC: 7.3 G/DL — SIGNIFICANT CHANGE UP (ref 6–8.3)
RBC # BLD: 5.63 M/UL — HIGH (ref 3.8–5.2)
RBC # FLD: 13.4 % — SIGNIFICANT CHANGE UP (ref 10.3–14.5)
S AUREUS DNA NOSE QL NAA+PROBE: SIGNIFICANT CHANGE UP
SODIUM SERPL-SCNC: 135 MMOL/L — SIGNIFICANT CHANGE UP (ref 135–145)
WBC # BLD: 17.99 K/UL — HIGH (ref 3.8–10.5)
WBC # FLD AUTO: 17.99 K/UL — HIGH (ref 3.8–10.5)

## 2022-10-09 PROCEDURE — 99233 SBSQ HOSP IP/OBS HIGH 50: CPT | Mod: GC

## 2022-10-09 RX ORDER — NICOTINE POLACRILEX 2 MG
1 GUM BUCCAL DAILY
Refills: 0 | Status: DISCONTINUED | OUTPATIENT
Start: 2022-10-09 | End: 2022-10-19

## 2022-10-09 RX ORDER — INSULIN GLARGINE 100 [IU]/ML
8 INJECTION, SOLUTION SUBCUTANEOUS AT BEDTIME
Refills: 0 | Status: DISCONTINUED | OUTPATIENT
Start: 2022-10-09 | End: 2022-10-19

## 2022-10-09 RX ADMIN — Medication 81 MILLIGRAM(S): at 14:18

## 2022-10-09 RX ADMIN — Medication 3 MILLILITER(S): at 09:10

## 2022-10-09 RX ADMIN — Medication 6: at 09:38

## 2022-10-09 RX ADMIN — BUDESONIDE AND FORMOTEROL FUMARATE DIHYDRATE 2 PUFF(S): 160; 4.5 AEROSOL RESPIRATORY (INHALATION) at 21:14

## 2022-10-09 RX ADMIN — CARVEDILOL PHOSPHATE 25 MILLIGRAM(S): 80 CAPSULE, EXTENDED RELEASE ORAL at 05:46

## 2022-10-09 RX ADMIN — Medication 50 MILLIGRAM(S): at 05:46

## 2022-10-09 RX ADMIN — Medication 1 PATCH: at 20:43

## 2022-10-09 RX ADMIN — Medication 50 MILLIGRAM(S): at 21:14

## 2022-10-09 RX ADMIN — Medication 1 PATCH: at 18:43

## 2022-10-09 RX ADMIN — Medication 3 MILLILITER(S): at 20:19

## 2022-10-09 RX ADMIN — Medication 40 MILLIGRAM(S): at 18:23

## 2022-10-09 RX ADMIN — Medication 100 MILLIGRAM(S): at 14:16

## 2022-10-09 RX ADMIN — BUDESONIDE AND FORMOTEROL FUMARATE DIHYDRATE 2 PUFF(S): 160; 4.5 AEROSOL RESPIRATORY (INHALATION) at 10:54

## 2022-10-09 RX ADMIN — CEFTRIAXONE 100 MILLIGRAM(S): 500 INJECTION, POWDER, FOR SOLUTION INTRAMUSCULAR; INTRAVENOUS at 16:10

## 2022-10-09 RX ADMIN — CLOPIDOGREL BISULFATE 75 MILLIGRAM(S): 75 TABLET, FILM COATED ORAL at 14:17

## 2022-10-09 RX ADMIN — HEPARIN SODIUM 5000 UNIT(S): 5000 INJECTION INTRAVENOUS; SUBCUTANEOUS at 18:20

## 2022-10-09 RX ADMIN — AZITHROMYCIN 255 MILLIGRAM(S): 500 TABLET, FILM COATED ORAL at 06:29

## 2022-10-09 RX ADMIN — Medication 180 MILLIGRAM(S): at 05:46

## 2022-10-09 RX ADMIN — CHLORHEXIDINE GLUCONATE 1 APPLICATION(S): 213 SOLUTION TOPICAL at 05:51

## 2022-10-09 RX ADMIN — CARVEDILOL PHOSPHATE 25 MILLIGRAM(S): 80 CAPSULE, EXTENDED RELEASE ORAL at 18:20

## 2022-10-09 RX ADMIN — MONTELUKAST 10 MILLIGRAM(S): 4 TABLET, CHEWABLE ORAL at 21:14

## 2022-10-09 RX ADMIN — INSULIN GLARGINE 8 UNIT(S): 100 INJECTION, SOLUTION SUBCUTANEOUS at 22:32

## 2022-10-09 RX ADMIN — Medication 3 MILLILITER(S): at 03:32

## 2022-10-09 RX ADMIN — Medication 40 MILLIGRAM(S): at 05:46

## 2022-10-09 RX ADMIN — Medication 3 MILLILITER(S): at 23:44

## 2022-10-09 RX ADMIN — Medication 3 MILLILITER(S): at 15:00

## 2022-10-09 RX ADMIN — Medication 8: at 22:32

## 2022-10-09 RX ADMIN — Medication 50 MILLIGRAM(S): at 14:16

## 2022-10-09 RX ADMIN — ATORVASTATIN CALCIUM 40 MILLIGRAM(S): 80 TABLET, FILM COATED ORAL at 21:14

## 2022-10-09 RX ADMIN — HEPARIN SODIUM 5000 UNIT(S): 5000 INJECTION INTRAVENOUS; SUBCUTANEOUS at 05:45

## 2022-10-09 RX ADMIN — Medication 4: at 15:31

## 2022-10-09 NOTE — PROGRESS NOTE ADULT - ASSESSMENT
66yo F PMHx of COPD, CVA w/ RLE weakness, Seizures, Type 2 DM, CAD, A. Fib not on AC, PAD s/p fem-pop bypass presenting with COPD exacerbation with hypercapnic failure requiring BiPAP initially admitted to the ICU. patient downgraded to the floors on 10/9/22.    #Acute Hypoxic and Hypercapnic Respiratory Failure  #COPD Exacerbation likely due to Viral Pneumonia  #Supraventricular Tachycardia  #Chronic Atrial Fibrillation  #Coronary Artery Disease  #ILEANA  #Type 2 DM  #Nicotine Withdrawal    -continue on NC 3L with BiPAP at night  -wean O2 as tolerated  -continue on solumedrol 40mg BID, symbicort, duonebs  -RVP positive for Rhinovirus, likely will discontinue abx  -start on nicotine patch  -continue diltiazem and carvedilol  -continue on aspirin and plavix  -add glargine 8u at bedtime due to steroid induced hyperglycemia  -PT recommending VERONICA  -counseled on smoking cessation

## 2022-10-09 NOTE — PROGRESS NOTE ADULT - SUBJECTIVE AND OBJECTIVE BOX
PATIENT SEEN AND EXAMINED ON :- 10/9/22  DATE OF SERVICE:   10/9/22          Interim events noted,Labs ,Radiological studies and Cardiology tests reviewed .       HOSPITAL COURSE: HPI:  Patient is a 68 yo Female, from home, with med hx significant for COPD, Left MCA CVA w/RLE weakness, Seizures, Type 2 DM, CAD s/p ZHAO, Afib not on AC, PAD s/p Right femoral popliteal bypass s/p occlusion + IR stent placement 2/2017, s/p cardiac cath 4/2022 w/non-obstructive CAD, presenting to the ED due to shortness of breath. Pt currently on Bipap, able to take in full sentences w/mild tachycardia 105-107 bpm when she coughs. Collateral history obtained from EMS charts and ER provider note.   EMS note says pt was not able to speak in complete sentences and had increased WOB, was given 12 mg dexamethasone and 0.3 mg Epi. In the ED, pt was placed on Bipap, noted to have narrow complex tachycardia to 150s, was given adenosine 6 mg then 12 mg, then cardizem 15 mg + duoneb x 3, decadron 10mg IVPB s/p HR ranging in the low 100s. +PT admits to cough and sputum production (07 Oct 2022 07:00)      INTERIM EVENTS:Patient seen at bedside ,interim events noted.      PMH -reviewed admission note, no change since admission  HEART FAILURE: Acute[ ]Chronic[ ] Systolic[ ] Diastolic[ ] Combined Systolic and Diastolic[ ]  CAD[ ] CABG[ ] PCI[ ]  DEVICES[ ] PPM[ ] ICD[ ] ILR[ ]  ATRIAL FIBRILLATION[ ] Paroxysmal[ ] Permanent[ ] CHADS2-[  ]  ILEANA[ ] CKD1[ ] CKD2[ ] CKD3[ ] CKD4[ ] ESRD[ ]  COPD[ ] HTN[ ]   DM[ ] Type1[ ] Type 2[ ]   CVA[ ] Paresis[ ]    AMBULATION: Assisted[ ] Cane/walker[ ] Independent[ ]    MEDICATIONS  (STANDING):  albuterol/ipratropium for Nebulization 3 milliLiter(s) Nebulizer every 4 hours  aspirin  chewable 81 milliGRAM(s) Oral daily  atorvastatin 40 milliGRAM(s) Oral at bedtime  azithromycin  IVPB 500 milliGRAM(s) IV Intermittent every 24 hours  azithromycin  IVPB      budesonide 160 MICROgram(s)/formoterol 4.5 MICROgram(s) Inhaler 2 Puff(s) Inhalation two times a day  carvedilol 25 milliGRAM(s) Oral every 12 hours  cefTRIAXone   IVPB 1000 milliGRAM(s) IV Intermittent every 24 hours  cefTRIAXone   IVPB      chlorhexidine 2% Cloths 1 Application(s) Topical <User Schedule>  clopidogrel Tablet 75 milliGRAM(s) Oral daily  diltiazem    milliGRAM(s) Oral daily  heparin   Injectable 5000 Unit(s) SubCutaneous every 12 hours  hydrALAZINE 50 milliGRAM(s) Oral three times a day  insulin glargine Injectable (LANTUS) 8 Unit(s) SubCutaneous at bedtime  insulin lispro (ADMELOG) corrective regimen sliding scale   SubCutaneous Before meals and at bedtime  methylPREDNISolone sodium succinate Injectable 40 milliGRAM(s) IV Push every 12 hours  montelukast 10 milliGRAM(s) Oral at bedtime  nicotine - 21 mG/24Hr(s) Patch 1 Patch Transdermal daily  pantoprazole  Injectable 40 milliGRAM(s) IV Push daily  topiramate 100 milliGRAM(s) Oral daily    MEDICATIONS  (PRN):  ondansetron Injectable 4 milliGRAM(s) IV Push every 8 hours PRN Nausea and/or Vomiting            REVIEW OF SYSTEMS:  Constitutional: [ ] fever, [ ]weight loss,  [ ]fatigue [ ]weight gain  Eyes: [ ] visual changes  Respiratory: [ ]shortness of breath;  [ ] cough, [ ]wheezing, [ ]chills, [ ]hemoptysis  Cardiovascular: [ ] chest pain, [ ]palpitations, [ ]dizziness,  [ ]leg swelling[ ]orthopnea[ ]PND  Gastrointestinal: [ ] abdominal pain, [ ]nausea, [ ]vomiting,  [ ]diarrhea [ ]Constipation [ ]Melena  Genitourinary: [ ] dysuria, [ ] hematuria [ ]Doll  Neurologic: [ ] headaches [ ] tremors[ ]weakness [ ]Paralysis Right[ ] Left[ ]  Skin: [ ] itching, [ ]burning, [ ] rashes  Endocrine: [ ] heat or cold intolerance  Musculoskeletal: [ ] joint pain or swelling; [ ] muscle, back, or extremity pain  Psychiatric: [ ] depression, [ ]anxiety, [ ]mood swings, or [ ]difficulty sleeping  Hematologic: [ ] easy bruising, [ ] bleeding gums    [ ] All remaining systems negative except as per above.   [ ]Unable to obtain.  [x] No change in ROS since admission      Vital Signs Last 24 Hrs  T(C): 36.3 (09 Oct 2022 14:00), Max: 36.4 (09 Oct 2022 05:01)  T(F): 97.4 (09 Oct 2022 14:00), Max: 97.5 (09 Oct 2022 05:01)  HR: 83 (09 Oct 2022 19:00) (74 - 98)  BP: 178/80 (09 Oct 2022 19:00) (158/84 - 178/80)  BP(mean): --  RR: 22 (09 Oct 2022 14:00) (16 - 22)  SpO2: 96% (09 Oct 2022 16:18) (95% - 100%)    Parameters below as of 09 Oct 2022 14:00  Patient On (Oxygen Delivery Method): nasal cannula  O2 Flow (L/min): 3    I&O's Summary    08 Oct 2022 07:01  -  09 Oct 2022 07:00  --------------------------------------------------------  IN: 300 mL / OUT: 0 mL / NET: 300 mL        PHYSICAL EXAM:  General: No acute distress BMI-  HEENT: EOMI, PERRL  Neck: Supple, [ ] JVD  Lungs: Equal air entry bilaterally; [ ] rales [ ] wheezing [ ] rhonchi  Heart: Regular rate and rhythm; [x ] murmur   2/6 [ x] systolic [ ] diastolic [ ] radiation[ ] rubs [ ]  gallops  Abdomen: Nontender, bowel sounds present  Extremities: No clubbing, cyanosis, [ ] edema [ ]Pulses  equal and intact  Nervous system:  Alert & Oriented X3, no focal deficits  Psychiatric: Normal affect  Skin: No rashes or lesions    LABS:  10-09    135  |  104  |  48<H>  ----------------------------<  211<H>  4.1   |  24  |  1.53<H>    Ca    9.4      09 Oct 2022 05:41  Phos  4.5     10-09  Mg     3.0     10-09    TPro  7.3  /  Alb  3.0<L>  /  TBili  0.3  /  DBili  x   /  AST  12  /  ALT  15  /  AlkPhos  83  10-09    Creatinine Trend: 1.53<--, 1.98<--, 1.18<--                        15.4   17.99 )-----------( 264      ( 09 Oct 2022 05:41 )             48.1

## 2022-10-09 NOTE — PROGRESS NOTE ADULT - SUBJECTIVE AND OBJECTIVE BOX
* NOTE IS PENDING *    S:    O:  Vital Signs Last 24 Hrs  T(C): 36.3 (09 Oct 2022 14:00), Max: 36.4 (09 Oct 2022 05:01)  T(F): 97.4 (09 Oct 2022 14:00), Max: 97.5 (09 Oct 2022 05:01)  HR: 74 (09 Oct 2022 14:00) (74 - 98)  BP: 158/84 (09 Oct 2022 14:00) (155/102 - 201/153)  BP(mean): 98 (08 Oct 2022 20:45) (94 - 164)  RR: 22 (09 Oct 2022 14:00) (16 - 27)  SpO2: 100% (09 Oct 2022 14:00) (95% - 100%)    Parameters below as of 09 Oct 2022 14:00  Patient On (Oxygen Delivery Method): nasal cannula  O2 Flow (L/min): 3      GENERAL: NAD, well-developed  HEAD:  Atraumatic, Normocephalic  EYES: EOMI, PERRLA, conjunctiva and sclera clear  NECK: Supple, No JVD  CHEST/LUNG: Clear to auscultation bilaterally; No wheeze  HEART: Regular rate and rhythm; No murmurs, rubs, or gallops  ABDOMEN: Soft, Nontender, Nondistended; Bowel sounds present  EXTREMITIES:  2+ Peripheral Pulses, No clubbing, cyanosis, or edema  PSYCH: AAOx3  NEUROLOGY: non-focal  SKIN: No rashes or lesions    albuterol/ipratropium for Nebulization 3 milliLiter(s) Nebulizer every 4 hours  aspirin  chewable 81 milliGRAM(s) Oral daily  atorvastatin 40 milliGRAM(s) Oral at bedtime  azithromycin  IVPB      azithromycin  IVPB 500 milliGRAM(s) IV Intermittent every 24 hours  budesonide 160 MICROgram(s)/formoterol 4.5 MICROgram(s) Inhaler 2 Puff(s) Inhalation two times a day  carvedilol 25 milliGRAM(s) Oral every 12 hours  cefTRIAXone   IVPB 1000 milliGRAM(s) IV Intermittent every 24 hours  cefTRIAXone   IVPB      chlorhexidine 2% Cloths 1 Application(s) Topical <User Schedule>  clopidogrel Tablet 75 milliGRAM(s) Oral daily  diltiazem    milliGRAM(s) Oral daily  heparin   Injectable 5000 Unit(s) SubCutaneous every 12 hours  hydrALAZINE 50 milliGRAM(s) Oral three times a day  insulin lispro (ADMELOG) corrective regimen sliding scale   SubCutaneous Before meals and at bedtime  methylPREDNISolone sodium succinate Injectable 40 milliGRAM(s) IV Push every 12 hours  montelukast 10 milliGRAM(s) Oral at bedtime  ondansetron Injectable 4 milliGRAM(s) IV Push every 8 hours PRN  pantoprazole  Injectable 40 milliGRAM(s) IV Push daily  topiramate 100 milliGRAM(s) Oral daily                            15.4   17.99 )-----------( 264      ( 09 Oct 2022 05:41 )             48.1       10-09    135  |  104  |  48<H>  ----------------------------<  211<H>  4.1   |  24  |  1.53<H>    Ca    9.4      09 Oct 2022 05:41  Phos  4.5     10-09  Mg     3.0     10-09    TPro  7.3  /  Alb  3.0<L>  /  TBili  0.3  /  DBili  x   /  AST  12  /  ALT  15  /  AlkPhos  83  10-09   S: Patient downgraded from the ICU. Still reports shortness of breath. Requests Nicotine patch.    O:  Vital Signs Last 24 Hrs  T(C): 36.3 (09 Oct 2022 14:00), Max: 36.4 (09 Oct 2022 05:01)  T(F): 97.4 (09 Oct 2022 14:00), Max: 97.5 (09 Oct 2022 05:01)  HR: 74 (09 Oct 2022 14:00) (74 - 98)  BP: 158/84 (09 Oct 2022 14:00) (155/102 - 201/153)  BP(mean): 98 (08 Oct 2022 20:45) (94 - 164)  RR: 22 (09 Oct 2022 14:00) (16 - 27)  SpO2: 100% (09 Oct 2022 14:00) (95% - 100%)    Parameters below as of 09 Oct 2022 14:00  Patient On (Oxygen Delivery Method): nasal cannula  O2 Flow (L/min): 3      GENERAL: NAD, well-developed  HEAD:  Atraumatic, Normocephalic  EYES: EOMI, PERRLA, conjunctiva and sclera clear  NECK: Supple, No JVD  CHEST/LUNG: Clear to auscultation bilaterally; No wheeze  HEART: Regular rate and rhythm; No murmurs, rubs, or gallops  ABDOMEN: Soft, Nontender, Nondistended; Bowel sounds present  EXTREMITIES:  2+ Peripheral Pulses, No clubbing, cyanosis, or edema  PSYCH: AAOx3  NEUROLOGY: non-focal  SKIN: No rashes or lesions    albuterol/ipratropium for Nebulization 3 milliLiter(s) Nebulizer every 4 hours  aspirin  chewable 81 milliGRAM(s) Oral daily  atorvastatin 40 milliGRAM(s) Oral at bedtime  azithromycin  IVPB      azithromycin  IVPB 500 milliGRAM(s) IV Intermittent every 24 hours  budesonide 160 MICROgram(s)/formoterol 4.5 MICROgram(s) Inhaler 2 Puff(s) Inhalation two times a day  carvedilol 25 milliGRAM(s) Oral every 12 hours  cefTRIAXone   IVPB 1000 milliGRAM(s) IV Intermittent every 24 hours  cefTRIAXone   IVPB      chlorhexidine 2% Cloths 1 Application(s) Topical <User Schedule>  clopidogrel Tablet 75 milliGRAM(s) Oral daily  diltiazem    milliGRAM(s) Oral daily  heparin   Injectable 5000 Unit(s) SubCutaneous every 12 hours  hydrALAZINE 50 milliGRAM(s) Oral three times a day  insulin lispro (ADMELOG) corrective regimen sliding scale   SubCutaneous Before meals and at bedtime  methylPREDNISolone sodium succinate Injectable 40 milliGRAM(s) IV Push every 12 hours  montelukast 10 milliGRAM(s) Oral at bedtime  ondansetron Injectable 4 milliGRAM(s) IV Push every 8 hours PRN  pantoprazole  Injectable 40 milliGRAM(s) IV Push daily  topiramate 100 milliGRAM(s) Oral daily                            15.4   17.99 )-----------( 264      ( 09 Oct 2022 05:41 )             48.1       10-09    135  |  104  |  48<H>  ----------------------------<  211<H>  4.1   |  24  |  1.53<H>    Ca    9.4      09 Oct 2022 05:41  Phos  4.5     10-09  Mg     3.0     10-09    TPro  7.3  /  Alb  3.0<L>  /  TBili  0.3  /  DBili  x   /  AST  12  /  ALT  15  /  AlkPhos  83  10-09

## 2022-10-09 NOTE — PHARMACY COMMUNICATION NOTE - COMMENTS
Sent renmdesivir back. Pt refused to have an IV inserted after failed attempt and said she was going home tomorrow. Advised patient she should take meds and allow nurse to try to insert IV. 30 min later patient called nurse and asked for someone to come with an ultrasound machine to try an IV. Stated to patient that we are not able to accommodate her constant change in what she will and wont agree to and a nurse will try if she is amenable. She again refused.

## 2022-10-10 DIAGNOSIS — J44.1 CHRONIC OBSTRUCTIVE PULMONARY DISEASE WITH (ACUTE) EXACERBATION: ICD-10-CM

## 2022-10-10 DIAGNOSIS — I10 ESSENTIAL (PRIMARY) HYPERTENSION: ICD-10-CM

## 2022-10-10 DIAGNOSIS — Z29.9 ENCOUNTER FOR PROPHYLACTIC MEASURES, UNSPECIFIED: ICD-10-CM

## 2022-10-10 DIAGNOSIS — Z87.898 PERSONAL HISTORY OF OTHER SPECIFIED CONDITIONS: ICD-10-CM

## 2022-10-10 DIAGNOSIS — I48.91 UNSPECIFIED ATRIAL FIBRILLATION: ICD-10-CM

## 2022-10-10 DIAGNOSIS — E11.9 TYPE 2 DIABETES MELLITUS WITHOUT COMPLICATIONS: ICD-10-CM

## 2022-10-10 LAB
ALBUMIN SERPL ELPH-MCNC: 3 G/DL — LOW (ref 3.5–5)
ALP SERPL-CCNC: 87 U/L — SIGNIFICANT CHANGE UP (ref 40–120)
ALT FLD-CCNC: 20 U/L DA — SIGNIFICANT CHANGE UP (ref 10–60)
ANION GAP SERPL CALC-SCNC: 14 MMOL/L — SIGNIFICANT CHANGE UP (ref 5–17)
AST SERPL-CCNC: 22 U/L — SIGNIFICANT CHANGE UP (ref 10–40)
BILIRUB SERPL-MCNC: 0.3 MG/DL — SIGNIFICANT CHANGE UP (ref 0.2–1.2)
BUN SERPL-MCNC: 45 MG/DL — HIGH (ref 7–18)
CALCIUM SERPL-MCNC: 10 MG/DL — SIGNIFICANT CHANGE UP (ref 8.4–10.5)
CHLORIDE SERPL-SCNC: 102 MMOL/L — SIGNIFICANT CHANGE UP (ref 96–108)
CO2 SERPL-SCNC: 19 MMOL/L — LOW (ref 22–31)
CREAT SERPL-MCNC: 1.41 MG/DL — HIGH (ref 0.5–1.3)
EGFR: 41 ML/MIN/1.73M2 — LOW
GLUCOSE BLDC GLUCOMTR-MCNC: 220 MG/DL — HIGH (ref 70–99)
GLUCOSE BLDC GLUCOMTR-MCNC: 271 MG/DL — HIGH (ref 70–99)
GLUCOSE BLDC GLUCOMTR-MCNC: 284 MG/DL — HIGH (ref 70–99)
GLUCOSE BLDC GLUCOMTR-MCNC: 335 MG/DL — HIGH (ref 70–99)
GLUCOSE SERPL-MCNC: 163 MG/DL — HIGH (ref 70–99)
HCT VFR BLD CALC: 48.9 % — HIGH (ref 34.5–45)
HGB BLD-MCNC: 15.6 G/DL — HIGH (ref 11.5–15.5)
MAGNESIUM SERPL-MCNC: 3.1 MG/DL — HIGH (ref 1.6–2.6)
MCHC RBC-ENTMCNC: 27.9 PG — SIGNIFICANT CHANGE UP (ref 27–34)
MCHC RBC-ENTMCNC: 31.9 GM/DL — LOW (ref 32–36)
MCV RBC AUTO: 87.5 FL — SIGNIFICANT CHANGE UP (ref 80–100)
NRBC # BLD: 0 /100 WBCS — SIGNIFICANT CHANGE UP (ref 0–0)
PHOSPHATE SERPL-MCNC: 3.8 MG/DL — SIGNIFICANT CHANGE UP (ref 2.5–4.5)
PLATELET # BLD AUTO: 274 K/UL — SIGNIFICANT CHANGE UP (ref 150–400)
POTASSIUM SERPL-MCNC: 5 MMOL/L — SIGNIFICANT CHANGE UP (ref 3.5–5.3)
POTASSIUM SERPL-SCNC: 5 MMOL/L — SIGNIFICANT CHANGE UP (ref 3.5–5.3)
PROT SERPL-MCNC: 7.5 G/DL — SIGNIFICANT CHANGE UP (ref 6–8.3)
RBC # BLD: 5.59 M/UL — HIGH (ref 3.8–5.2)
RBC # FLD: 13.3 % — SIGNIFICANT CHANGE UP (ref 10.3–14.5)
SODIUM SERPL-SCNC: 135 MMOL/L — SIGNIFICANT CHANGE UP (ref 135–145)
WBC # BLD: 11.51 K/UL — HIGH (ref 3.8–10.5)
WBC # FLD AUTO: 11.51 K/UL — HIGH (ref 3.8–10.5)

## 2022-10-10 PROCEDURE — 99222 1ST HOSP IP/OBS MODERATE 55: CPT

## 2022-10-10 PROCEDURE — 99233 SBSQ HOSP IP/OBS HIGH 50: CPT | Mod: GC

## 2022-10-10 RX ORDER — ACETAMINOPHEN 500 MG
650 TABLET ORAL ONCE
Refills: 0 | Status: COMPLETED | OUTPATIENT
Start: 2022-10-10 | End: 2022-10-10

## 2022-10-10 RX ADMIN — Medication 40 MILLIGRAM(S): at 05:29

## 2022-10-10 RX ADMIN — ATORVASTATIN CALCIUM 40 MILLIGRAM(S): 80 TABLET, FILM COATED ORAL at 21:14

## 2022-10-10 RX ADMIN — CARVEDILOL PHOSPHATE 25 MILLIGRAM(S): 80 CAPSULE, EXTENDED RELEASE ORAL at 16:35

## 2022-10-10 RX ADMIN — Medication 6: at 17:47

## 2022-10-10 RX ADMIN — Medication 6: at 11:52

## 2022-10-10 RX ADMIN — Medication 3 MILLILITER(S): at 14:40

## 2022-10-10 RX ADMIN — HEPARIN SODIUM 5000 UNIT(S): 5000 INJECTION INTRAVENOUS; SUBCUTANEOUS at 16:35

## 2022-10-10 RX ADMIN — Medication 650 MILLIGRAM(S): at 10:54

## 2022-10-10 RX ADMIN — Medication 8: at 21:20

## 2022-10-10 RX ADMIN — AZITHROMYCIN 255 MILLIGRAM(S): 500 TABLET, FILM COATED ORAL at 06:27

## 2022-10-10 RX ADMIN — MONTELUKAST 10 MILLIGRAM(S): 4 TABLET, CHEWABLE ORAL at 21:14

## 2022-10-10 RX ADMIN — Medication 50 MILLIGRAM(S): at 05:30

## 2022-10-10 RX ADMIN — INSULIN GLARGINE 8 UNIT(S): 100 INJECTION, SOLUTION SUBCUTANEOUS at 21:19

## 2022-10-10 RX ADMIN — CARVEDILOL PHOSPHATE 25 MILLIGRAM(S): 80 CAPSULE, EXTENDED RELEASE ORAL at 05:30

## 2022-10-10 RX ADMIN — Medication 40 MILLIGRAM(S): at 16:37

## 2022-10-10 RX ADMIN — CHLORHEXIDINE GLUCONATE 1 APPLICATION(S): 213 SOLUTION TOPICAL at 05:33

## 2022-10-10 RX ADMIN — Medication 180 MILLIGRAM(S): at 05:30

## 2022-10-10 RX ADMIN — Medication 100 MILLIGRAM(S): at 09:01

## 2022-10-10 RX ADMIN — Medication 50 MILLIGRAM(S): at 21:14

## 2022-10-10 RX ADMIN — Medication 4: at 07:30

## 2022-10-10 RX ADMIN — Medication 81 MILLIGRAM(S): at 09:01

## 2022-10-10 RX ADMIN — Medication 1 PATCH: at 17:53

## 2022-10-10 RX ADMIN — Medication 3 MILLILITER(S): at 09:10

## 2022-10-10 RX ADMIN — Medication 3 MILLILITER(S): at 03:06

## 2022-10-10 RX ADMIN — Medication 650 MILLIGRAM(S): at 11:53

## 2022-10-10 RX ADMIN — CEFTRIAXONE 100 MILLIGRAM(S): 500 INJECTION, POWDER, FOR SOLUTION INTRAMUSCULAR; INTRAVENOUS at 16:34

## 2022-10-10 RX ADMIN — Medication 50 MILLIGRAM(S): at 12:00

## 2022-10-10 RX ADMIN — CLOPIDOGREL BISULFATE 75 MILLIGRAM(S): 75 TABLET, FILM COATED ORAL at 09:01

## 2022-10-10 RX ADMIN — Medication 3 MILLILITER(S): at 23:03

## 2022-10-10 RX ADMIN — Medication 1 PATCH: at 09:01

## 2022-10-10 RX ADMIN — BUDESONIDE AND FORMOTEROL FUMARATE DIHYDRATE 2 PUFF(S): 160; 4.5 AEROSOL RESPIRATORY (INHALATION) at 21:30

## 2022-10-10 RX ADMIN — PANTOPRAZOLE SODIUM 40 MILLIGRAM(S): 20 TABLET, DELAYED RELEASE ORAL at 09:02

## 2022-10-10 RX ADMIN — Medication 3 MILLILITER(S): at 18:00

## 2022-10-10 RX ADMIN — Medication 1 PATCH: at 06:14

## 2022-10-10 RX ADMIN — HEPARIN SODIUM 5000 UNIT(S): 5000 INJECTION INTRAVENOUS; SUBCUTANEOUS at 05:33

## 2022-10-10 RX ADMIN — Medication 1 PATCH: at 17:58

## 2022-10-10 NOTE — PROGRESS NOTE ADULT - ASSESSMENT
Patient is a 68 yo Female, from home, with med hx significant for COPD, Left MCA CVA w/RLE weakness, Seizures, Type 2 DM, CAD s/p ZHAO, Afib not on AC, PAD s/p Right femoral popliteal bypass s/p occlusion + IR stent placement 2/2017, s/p cardiac cath 4/2022 w/non-obstructive CAD, presenting to the ED due to shortness of breath likely 2/2 COPD exacerbation. Admitted to ICU for close monitoring of respiratory status. Pt has been stabilized and downgraded to medicine unit on [address].

## 2022-10-10 NOTE — PROGRESS NOTE ADULT - SUBJECTIVE AND OBJECTIVE BOX
Medical Student Year 3 Progress Note discussed with resident & attending, Dr. Gusman     TEAMS or PAGER #: [[phone]]  TILL 5:00 PM  PLEASE CONTACT ON CALL TEAM:  - On Call Team (Please refer to Trevon) FROM 5:00 PM - 8:30PM  - Nightfloat Team FROM 8:30 -7:30 AM      INTERVAL HPI/OVERNIGHT EVENTS: No events overnight. Pt assessed at the bedside, in NAD, denies any chest pain, sob, fever, chills, n/v/d. Will continue to monitor.         MEDICATIONS  (STANDING):  albuterol/ipratropium for Nebulization 3 milliLiter(s) Nebulizer every 4 hours  aspirin  chewable 81 milliGRAM(s) Oral daily  atorvastatin 40 milliGRAM(s) Oral at bedtime  azithromycin  IVPB 500 milliGRAM(s) IV Intermittent every 24 hours  azithromycin  IVPB      budesonide 160 MICROgram(s)/formoterol 4.5 MICROgram(s) Inhaler 2 Puff(s) Inhalation two times a day  carvedilol 25 milliGRAM(s) Oral every 12 hours  cefTRIAXone   IVPB 1000 milliGRAM(s) IV Intermittent every 24 hours  cefTRIAXone   IVPB      chlorhexidine 2% Cloths 1 Application(s) Topical <User Schedule>  clopidogrel Tablet 75 milliGRAM(s) Oral daily  diltiazem    milliGRAM(s) Oral daily  heparin   Injectable 5000 Unit(s) SubCutaneous every 12 hours  hydrALAZINE 50 milliGRAM(s) Oral three times a day  insulin glargine Injectable (LANTUS) 8 Unit(s) SubCutaneous at bedtime  insulin lispro (ADMELOG) corrective regimen sliding scale   SubCutaneous Before meals and at bedtime  methylPREDNISolone sodium succinate Injectable 40 milliGRAM(s) IV Push every 12 hours  montelukast 10 milliGRAM(s) Oral at bedtime  nicotine - 21 mG/24Hr(s) Patch 1 Patch Transdermal daily  pantoprazole  Injectable 40 milliGRAM(s) IV Push daily  topiramate 100 milliGRAM(s) Oral daily    MEDICATIONS  (PRN):  ondansetron Injectable 4 milliGRAM(s) IV Push every 8 hours PRN Nausea and/or Vomiting      REVIEW OF SYSTEMS:  CONSTITUTIONAL: No fever, weight loss, or fatigue  RESPIRATORY: + SOB, cough; no hemoptysis  CARDIOVASCULAR: No chest pain, palpitations, dizziness, or leg swelling  GASTROINTESTINAL: No abdominal pain. No nausea, vomiting, or hematemesis; No diarrhea or constipation. No melena or hematochezia.  GENITOURINARY: No dysuria or hematuria, urinary frequency  NEUROLOGICAL: No headaches, memory loss, loss of strength, numbness, or tremors  SKIN: No itching, burning, rashes, or lesions     Vital Signs Last 24 Hrs  T(C): 36.3 (10 Oct 2022 05:15), Max: 36.4 (09 Oct 2022 22:00)  T(F): 97.4 (10 Oct 2022 05:15), Max: 97.6 (09 Oct 2022 22:00)  HR: 63 (10 Oct 2022 05:15) (63 - 89)  BP: 163/86 (10 Oct 2022 05:15) (158/84 - 178/80)  BP(mean): --  RR: 16 (10 Oct 2022 05:15) (16 - 22)  SpO2: 100% (10 Oct 2022 05:15) (95% - 100%)    Parameters below as of 10 Oct 2022 05:15  Patient On (Oxygen Delivery Method): nasal cannula  O2 Flow (L/min): 3      PHYSICAL EXAMINATION:  GENERAL: NAD, well-developed, well-groomed  HEAD:  Atraumatic, Normocephalic  EYES:  conjunctiva and sclera clear  NECK: Supple, No JVD, Normal thyroid  CHEST/LUNG: + expiratory wheeze b/l, increased WOB  HEART: Regular rate and rhythm; No murmurs, rubs, or gallops  ABDOMEN: Soft, Nontender, Nondistended; Bowel sounds present  NERVOUS SYSTEM:  Alert & Oriented X3,    EXTREMITIES:  2+ Peripheral Pulses, No clubbing, cyanosis, or edema  SKIN: warm dry                          15.6   11.51 )-----------( 274      ( 10 Oct 2022 05:29 )             48.9     10-10    135  |  102  |  45<H>  ----------------------------<  163<H>  5.0   |  19<L>  |  1.41<H>    Ca    10.0      10 Oct 2022 05:29  Phos  3.8     10-10  Mg     3.1     10-10    TPro  7.5  /  Alb  3.0<L>  /  TBili  0.3  /  DBili  x   /  AST  22  /  ALT  20  /  AlkPhos  87  10-10    LIVER FUNCTIONS - ( 10 Oct 2022 05:29 )  Alb: 3.0 g/dL / Pro: 7.5 g/dL / ALK PHOS: 87 U/L / ALT: 20 U/L DA / AST: 22 U/L / GGT: x                   CAPILLARY BLOOD GLUCOSE      RADIOLOGY & ADDITIONAL TESTS:                   Medical Student Year 3 Progress Note discussed with resident & attending, Dr. Gusman     TEAMS or PAGER #: [[phone]]  TILL 5:00 PM  PLEASE CONTACT ON CALL TEAM:  - On Call Team (Please refer to Trevon) FROM 5:00 PM - 8:30PM  - Nightfloat Team FROM 8:30 -7:30 AM      INTERVAL HPI/OVERNIGHT EVENTS: No events overnight. Pt assessed at the bedside, in NAD, denies any chest pain, fever, chills, n/v/d. Pt states she is better, however has times where she has trouble breathing. Is on a bipap machine at night. Will continue to monitor.         MEDICATIONS  (STANDING):  albuterol/ipratropium for Nebulization 3 milliLiter(s) Nebulizer every 4 hours  aspirin  chewable 81 milliGRAM(s) Oral daily  atorvastatin 40 milliGRAM(s) Oral at bedtime  azithromycin  IVPB 500 milliGRAM(s) IV Intermittent every 24 hours  azithromycin  IVPB      budesonide 160 MICROgram(s)/formoterol 4.5 MICROgram(s) Inhaler 2 Puff(s) Inhalation two times a day  carvedilol 25 milliGRAM(s) Oral every 12 hours  cefTRIAXone   IVPB 1000 milliGRAM(s) IV Intermittent every 24 hours  cefTRIAXone   IVPB      chlorhexidine 2% Cloths 1 Application(s) Topical <User Schedule>  clopidogrel Tablet 75 milliGRAM(s) Oral daily  diltiazem    milliGRAM(s) Oral daily  heparin   Injectable 5000 Unit(s) SubCutaneous every 12 hours  hydrALAZINE 50 milliGRAM(s) Oral three times a day  insulin glargine Injectable (LANTUS) 8 Unit(s) SubCutaneous at bedtime  insulin lispro (ADMELOG) corrective regimen sliding scale   SubCutaneous Before meals and at bedtime  methylPREDNISolone sodium succinate Injectable 40 milliGRAM(s) IV Push every 12 hours  montelukast 10 milliGRAM(s) Oral at bedtime  nicotine - 21 mG/24Hr(s) Patch 1 Patch Transdermal daily  pantoprazole  Injectable 40 milliGRAM(s) IV Push daily  topiramate 100 milliGRAM(s) Oral daily    MEDICATIONS  (PRN):  ondansetron Injectable 4 milliGRAM(s) IV Push every 8 hours PRN Nausea and/or Vomiting      REVIEW OF SYSTEMS:  CONSTITUTIONAL: No fever, weight loss, or fatigue  RESPIRATORY: + SOB, cough; no hemoptysis  CARDIOVASCULAR: No chest pain, palpitations, dizziness, or leg swelling  GASTROINTESTINAL: No abdominal pain. No nausea, vomiting, or hematemesis; No diarrhea or constipation. No melena or hematochezia.  GENITOURINARY: No dysuria or hematuria, urinary frequency  NEUROLOGICAL: No headaches, memory loss, loss of strength, numbness, or tremors  SKIN: No itching, burning, rashes, or lesions     Vital Signs Last 24 Hrs  T(C): 36.3 (10 Oct 2022 05:15), Max: 36.4 (09 Oct 2022 22:00)  T(F): 97.4 (10 Oct 2022 05:15), Max: 97.6 (09 Oct 2022 22:00)  HR: 63 (10 Oct 2022 05:15) (63 - 89)  BP: 163/86 (10 Oct 2022 05:15) (158/84 - 178/80)  BP(mean): --  RR: 16 (10 Oct 2022 05:15) (16 - 22)  SpO2: 100% (10 Oct 2022 05:15) (95% - 100%)    Parameters below as of 10 Oct 2022 05:15  Patient On (Oxygen Delivery Method): nasal cannula  O2 Flow (L/min): 3      PHYSICAL EXAMINATION:  GENERAL: NAD, elderly, obese  HEAD:  Atraumatic, Normocephalic  EYES:  conjunctiva and sclera clear  NECK: Supple, No JVD, Normal thyroid  CHEST/LUNG: + expiratory wheeze b/l, increased WOB  HEART: Regular rate and rhythm; No murmurs, rubs, or gallops  ABDOMEN: Soft, Nontender, Nondistended; Bowel sounds present  NERVOUS SYSTEM:  Alert & Oriented X3,    EXTREMITIES:  2+ Peripheral Pulses, No clubbing, cyanosis, or edema  SKIN: warm dry                          15.6   11.51 )-----------( 274      ( 10 Oct 2022 05:29 )             48.9     10-10    135  |  102  |  45<H>  ----------------------------<  163<H>  5.0   |  19<L>  |  1.41<H>    Ca    10.0      10 Oct 2022 05:29  Phos  3.8     10-10  Mg     3.1     10-10    TPro  7.5  /  Alb  3.0<L>  /  TBili  0.3  /  DBili  x   /  AST  22  /  ALT  20  /  AlkPhos  87  10-10    LIVER FUNCTIONS - ( 10 Oct 2022 05:29 )  Alb: 3.0 g/dL / Pro: 7.5 g/dL / ALK PHOS: 87 U/L / ALT: 20 U/L DA / AST: 22 U/L / GGT: x                   CAPILLARY BLOOD GLUCOSE      RADIOLOGY & ADDITIONAL TESTS:

## 2022-10-10 NOTE — CONSULT NOTE ADULT - TIME BILLING
- Review of chart (documentation, labs/studies, VS trends)  - Personal review of chest imaging  - Medication reconciliation  - Bedside interview and physical examination  - Bedside SpO2 monitoring and supplemental O2 titration - Review of chart (documentation, labs/studies, VS trends)  - Personal review of chest imaging  - Medication reconciliation  - Bedside interview and physical examination  - Smoking cessation counseling  - Bedside SpO2 monitoring and supplemental O2 titration

## 2022-10-10 NOTE — CONSULT NOTE ADULT - ASSESSMENT
Ms. Villavicencio is a 66yo woman, active smoker, with PMH asthma/COPD, Left MCA CVA w/RLE weakness, Seizures, Type 2 DM, CAD s/p ZHAO, Afib not on AC, PAD s/p Right femoral popliteal bypass s/p occlusion + IR stent placement 2/2017, s/p cardiac cath 4/2022 w/non-obstructive CAD, who presented to the ED due to acute on chronic shortness of breath x several days associated with productive cough and wheeze. Initially admitted to the ICU and managed on BiPAP. CXR unrevealing; RVP +for entero/rhinovirus. Pt received IV steroids and ATC nebs with empiric abx, now slowly improving and on NC. Pulmonary consulted for evaluation of acute respiratory failure.    Suspect current clinical presentation is due to acute exacerbation of pt's COPD, likely related to +entero/rhinovirus. Slowly improving on IV steroids, bronchodilator nebs ATC, empiric abx, supportive care.    # Acute hypoxemic respiratory failure  # Acute exacerbation of COPD  # +Entero/rhinovirus      Recommendations:  - On day 2 of solumedrol 40mg q12h; would continue for another 24h for full 3 days and then consider slow taper as per clinical course, considering +viral infx with continued significant sx  - continue bronchodilator nebs q4h ATC  - Agree with empiric CAP Tx x 5d (on cef/azithro)  - Incentive spirometry 10x/h or as tolerated; OOB/TC  - Consider trial of 600-1200mg guaifenesin-ER standing BID for mucolytic effect   - Titrate supplemental O2 with goal SpO2 88-92% in pt with known chronic hypercarbia; titrated at bedside to 2LNC, would continue to wean as tolerated  - On BiPAP qHS for now; can continue for now and continue to monitor clinically   - Will continue to follow with you      Veronique Jimenez MD  Pulmonary & Critical Care  Available on Teams  Ms. Villavicencio is a 66yo woman, active smoker, with PMH asthma/COPD, Left MCA CVA w/RLE weakness, Seizures, Type 2 DM, CAD s/p ZHAO, Afib not on AC, PAD s/p Right femoral popliteal bypass s/p occlusion + IR stent placement 2/2017, s/p cardiac cath 4/2022 w/non-obstructive CAD, who presented to the ED due to acute on chronic shortness of breath x several days associated with productive cough and wheeze. Initially admitted to the ICU and managed on BiPAP. CXR unrevealing; RVP +for entero/rhinovirus. Pt received IV steroids and ATC nebs with empiric abx, now slowly improving and on NC. Pulmonary consulted for evaluation of acute respiratory failure.    Suspect current clinical presentation is due to acute exacerbation of pt's COPD, likely related to +entero/rhinovirus. Slowly improving on IV steroids, bronchodilator nebs ATC, empiric abx, supportive care.    # Acute hypoxemic respiratory failure  # Acute exacerbation of COPD  # +Entero/rhinovirus      Recommendations:  - On day 2 of solumedrol 40mg q12h; would continue for another 24h for full 3 days and then consider slow taper as per clinical course, considering +viral infx with continued significant sx  - continue bronchodilator nebs q4h ATC  - Agree with empiric CAP Tx x 5d (on cef/azithro)  - Incentive spirometry 10x/h or as tolerated; OOB/TC  - Consider trial of 600-1200mg guaifenesin-ER standing BID for mucolytic effect   - Titrate supplemental O2 with goal SpO2 88-92% in pt with known chronic hypercarbia; titrated at bedside to 2LNC, would continue to wean as tolerated  - On BiPAP qHS for now; can continue for now and continue to monitor clinically   - Counseled regarding COPD pt education and discussed smoking cessation importance and methods of cessation at length  - Will continue to follow with you      Veronique Jimenez MD  Pulmonary & Critical Care  Available on Teams

## 2022-10-10 NOTE — CONSULT NOTE ADULT - SUBJECTIVE AND OBJECTIVE BOX
PULMONARY SERVICE INITIAL CONSULT NOTE    HPI:  Ms. Villavicencio is a 68yo woman, active smoker, with PMH asthma/COPD, Left MCA CVA w/RLE weakness, Seizures, Type 2 DM, CAD s/p ZHAO, Afib not on AC, PAD s/p Right femoral popliteal bypass s/p occlusion + IR stent placement 2/2017, s/p cardiac cath 4/2022 w/non-obstructive CAD, presenting to the ED due to shortness of breath. Pt currently on Bipap, able to take in full sentences w/mild tachycardia 105-107 bpm when she coughs. Collateral history obtained from EMS charts and ER provider note.   EMS note says pt was not able to speak in complete sentences and had increased WOB, was given 12 mg dexamethasone and 0.3 mg Epi. In the ED, pt was placed on Bipap, noted to have narrow complex tachycardia to 150s, was given adenosine 6 mg then 12 mg, then cardizem 15 mg + duoneb x 3, decadron 10mg IVPB s/p HR ranging in the low 100s. +PT admits to cough and sputum production (07 Oct 2022 07:00)      REVIEW OF SYSTEMS:  Constitutional: No fever, weight loss or fatigue  Eyes: No eye pain, visual disturbances, or discharge  ENMT:  No difficulty hearing, tinnitus, vertigo; No sinus or throat pain  Neck: No pain, stiffness or neck swelling  Respiratory: see HPI  Cardiovascular: No chest pain, palpitations, dizziness or leg swelling  Gastrointestinal: No abdominal or epigastric pain. No nausea, vomiting or hematemesis; No diarrhea or constipation. No melena or hematochezia.  Genitourinary: No dysuria, frequency, hematuria or incontinence  Neurological: No headaches, memory loss, loss of strength, numbness or tremors  Skin: No itching, burning, rashes or lesions   Lymph Nodes: No enlarged glands  Endocrine: No heat or cold intolerance; No hair loss  Musculoskeletal: No joint pain or swelling; No muscle, back or extremity pain  Psychiatric: No depression, anxiety, mood swings or difficulty sleeping  Heme/Lymph: No easy bruising or bleeding gums  Allergy and Immunologic: No hives or eczema    PAST MEDICAL & SURGICAL HISTORY:  S/P Cardiac Cath  4/2022 nonobstructive CAD  HTN (hypertension)  Diabetes  Asthma  never intubated  Gastroesophageal reflux  CVA (cerebral infarction)  times 3 with residual rt sided weakness and word finding difficulty  CAD (coronary artery disease)  Peripheral arterial disease  peripheral bypass/stents  Hyperlipidemia  Atrial fibrillation  History of seizure  &quot;very long time ago&quot; at time of CVA - topamax  S/P total abdominal hysterectomy  Coronary stent occlusion  H/O peripheral artery bypass      FAMILY HISTORY:  Family history of diabetes mellitus (Sibling)        SOCIAL HISTORY:  Smoking Status: [x] Current, [ ] Former, [ ] Never  Pack Years:    MEDICATIONS:  Pulmonary:  albuterol/ipratropium for Nebulization 3 milliLiter(s) Nebulizer every 4 hours  budesonide 160 MICROgram(s)/formoterol 4.5 MICROgram(s) Inhaler 2 Puff(s) Inhalation two times a day  montelukast 10 milliGRAM(s) Oral at bedtime    Antimicrobials:  azithromycin  IVPB 500 milliGRAM(s) IV Intermittent every 24 hours  azithromycin  IVPB      cefTRIAXone   IVPB 1000 milliGRAM(s) IV Intermittent every 24 hours  cefTRIAXone   IVPB        Anticoagulants:  aspirin  chewable 81 milliGRAM(s) Oral daily  clopidogrel Tablet 75 milliGRAM(s) Oral daily  heparin   Injectable 5000 Unit(s) SubCutaneous every 12 hours    Onc:    GI/:  pantoprazole  Injectable 40 milliGRAM(s) IV Push daily    Endocrine:  atorvastatin 40 milliGRAM(s) Oral at bedtime  insulin glargine Injectable (LANTUS) 8 Unit(s) SubCutaneous at bedtime  insulin lispro (ADMELOG) corrective regimen sliding scale   SubCutaneous Before meals and at bedtime  methylPREDNISolone sodium succinate Injectable 40 milliGRAM(s) IV Push every 12 hours    Cardiac:  carvedilol 25 milliGRAM(s) Oral every 12 hours  diltiazem    milliGRAM(s) Oral daily  hydrALAZINE 50 milliGRAM(s) Oral three times a day    Other Medications:  chlorhexidine 2% Cloths 1 Application(s) Topical <User Schedule>  nicotine - 21 mG/24Hr(s) Patch 1 Patch Transdermal daily  ondansetron Injectable 4 milliGRAM(s) IV Push every 8 hours PRN  topiramate 100 milliGRAM(s) Oral daily      Allergies    No Known Allergies    Intolerances        Vital Signs Last 24 Hrs  T(C): 36.3 (10 Oct 2022 05:15), Max: 36.4 (09 Oct 2022 22:00)  T(F): 97.4 (10 Oct 2022 05:15), Max: 97.6 (09 Oct 2022 22:00)  HR: 63 (10 Oct 2022 05:15) (63 - 89)  BP: 163/86 (10 Oct 2022 05:15) (158/84 - 178/80)  BP(mean): --  RR: 16 (10 Oct 2022 05:15) (16 - 22)  SpO2: 100% (10 Oct 2022 05:15) (95% - 100%)    Parameters below as of 10 Oct 2022 05:15  Patient On (Oxygen Delivery Method): nasal cannula  O2 Flow (L/min): 3          PHYSICAL EXAM:  GEN: NAD, well-appearing, sitting comfortably upright/semirecumbent in bed  HEENT: NC/AT, PERRL, anicteric sclera; oropharynx clear, MMM, neck supple, no appreciable JVD  RESP: no respiratory distress, CTA B/L; no W/R/R  CV: +S1/S2, RRR  GI: soft, NT/ND, +BS  EXTREM: WWP; no edema, clubbing or cyanosis  Vascular: 2+ radial pulses B/L  NEURO: AAOx3, no focal deficits    LABS:      CBC Full  -  ( 10 Oct 2022 05:29 )  WBC Count : 11.51 K/uL  RBC Count : 5.59 M/uL  Hemoglobin : 15.6 g/dL  Hematocrit : 48.9 %  Platelet Count - Automated : 274 K/uL  Mean Cell Volume : 87.5 fl  Mean Cell Hemoglobin : 27.9 pg  Mean Cell Hemoglobin Concentration : 31.9 gm/dL  Auto Neutrophil # : x  Auto Lymphocyte # : x  Auto Monocyte # : x  Auto Eosinophil # : x  Auto Basophil # : x  Auto Neutrophil % : x  Auto Lymphocyte % : x  Auto Monocyte % : x  Auto Eosinophil % : x  Auto Basophil % : x    10-10    135  |  102  |  45<H>  ----------------------------<  163<H>  5.0   |  19<L>  |  1.41<H>    Ca    10.0      10 Oct 2022 05:29  Phos  3.8     10-10  Mg     3.1     10-10    TPro  7.5  /  Alb  3.0<L>  /  TBili  0.3  /  DBili  x   /  AST  22  /  ALT  20  /  AlkPhos  87  10-10        RADIOLOGY & ADDITIONAL STUDIES:  < from: Xray Chest 1 View-PORTABLE IMMEDIATE (Xray Chest 1 View-PORTABLE IMMEDIATE .) (10.07.22 @ 02:27) >  AP view of the chest demonstrates the lungs slim clear. There is no   pleural effusion. The pulmonary vasculature is normal. There is no   pneumothorax.  The heart is mildly enlarged. There is no mediastinal or hilar mass.  Mild thoracic degenerative changes and marked S-shaped scoliosis are  present.  IMPRESSION:  No acute infiltrate. PULMONARY SERVICE INITIAL CONSULT NOTE    HPI:  Ms. Villavicencio is a 66yo woman, active smoker (>40 years, 2ppd but most recently 1-2 cigarettes/d), with PMH asthma/COPD, Left MCA CVA w/RLE weakness, Seizures, Type 2 DM, CAD s/p ZHAO, Afib not on AC, PAD s/p Right femoral popliteal bypass s/p occlusion + IR stent placement 2/2017, s/p cardiac cath 4/2022 w/non-obstructive CAD, who presented to the ED due to acute on chronic shortness of breath x several days. Pt says at baseline she has no significant cough or wheezing but does have moderate to severe dyspnea with mild to moderate exertion e.g. walking 5-10 feet. Several days prior to presentation she noticed increased dyspnea and new onset cough with some clear sputum production as well as new onset wheezing. Given dyspnea with minimal exertion that was not improving with hourly use of albuterol via neb, she called an ambulance. En route to ED 10/7 was given 12mg dexamethasone, 0.3mg epi. In the ED with increased WOB and inability to converse, placed on BiPAP with course further c/b narrow complex tachycardia, managed with adenosine and cardizem. Admitted to the ICU and managed on BiPAP. CXR unrevealing; RVP +for entero/rhinovirus. Pt received IV steroids and ATC nebs with empiric abx, now slowly improving and on NC. Pulmonary consulted for evaluation of acute respiratory failure, suspected AECOPD.    She denies sick contacts but has small grandchildren; admits to being an active smoker (quit on arrival to hospital, says she is motivated to continue tobacco cessation). Has had several exacerbations of COPD in the past, last 2018/2019. Has previously been on maintenance bronchodilators but says that they were discontinued, unclear why. She does not follow with a pulmonologist. At this time pt reports mild dyspnea more than her baseline, only with exertion, continued cough productive of sputum (improved since the weekend), no subjective wheeze. Admits to generalized fatigue and says she has not been able to sleep well overnight. She noticed discoloration of her right 3rd fingernail x 2 weeks. Denies anginal/pleuritic CP, stridor, orthopnea, hemoptysis, LE edema, recent travel, dysphagia or overt aspiration events, f/c/n/v/night sweats, weight changes.      REVIEW OF SYSTEMS:  Constitutional: No fever, weight loss or fatigue  Eyes: No eye pain, visual disturbances, or discharge  ENMT:  No difficulty hearing, tinnitus, vertigo; No sinus or throat pain  Neck: No pain, stiffness or neck swelling  Respiratory: see HPI  Cardiovascular: No chest pain, palpitations, dizziness or leg swelling  Gastrointestinal: No abdominal or epigastric pain. No nausea, vomiting or hematemesis; No diarrhea or constipation. No melena or hematochezia.  Genitourinary: No dysuria, frequency, hematuria or incontinence  Neurological: No headaches, memory loss, loss of strength, numbness or tremors  Skin: No itching, burning, rashes or lesions   Lymph Nodes: No enlarged glands  Endocrine: No heat or cold intolerance; No hair loss  Musculoskeletal: No joint pain or swelling; No muscle, back or extremity pain  Psychiatric: No depression, anxiety, mood swings, +difficulty sleeping (in the hospital)  Heme/Lymph: No easy bruising or bleeding gums  Allergy and Immunologic: No hives or eczema    PAST MEDICAL & SURGICAL HISTORY:  S/P Cardiac Cath  4/2022 nonobstructive CAD  HTN (hypertension)  Diabetes  Asthma/COPD  never intubated  Gastroesophageal reflux  CVA (cerebral infarction)  times 3 with residual rt sided weakness and word finding difficulty  CAD (coronary artery disease)  Peripheral arterial disease  peripheral bypass/stents  Hyperlipidemia  Atrial fibrillation  History of seizure  &quot;very long time ago&quot; at time of CVA - topamax  S/P total abdominal hysterectomy  Coronary stent occlusion  H/O peripheral artery bypass      FAMILY HISTORY:  Family history of diabetes mellitus (Sibling)        SOCIAL HISTORY:  Smoking Status: [x] Current, [ ] Former, [ ] Never  Pack Years: >40 years, 2ppd but most recently 1-2 cigarettes/d    MEDICATIONS:  Pulmonary:  albuterol/ipratropium for Nebulization 3 milliLiter(s) Nebulizer every 4 hours  budesonide 160 MICROgram(s)/formoterol 4.5 MICROgram(s) Inhaler 2 Puff(s) Inhalation two times a day  montelukast 10 milliGRAM(s) Oral at bedtime    Antimicrobials:  azithromycin  IVPB 500 milliGRAM(s) IV Intermittent every 24 hours  azithromycin  IVPB      cefTRIAXone   IVPB 1000 milliGRAM(s) IV Intermittent every 24 hours  cefTRIAXone   IVPB        Anticoagulants:  aspirin  chewable 81 milliGRAM(s) Oral daily  clopidogrel Tablet 75 milliGRAM(s) Oral daily  heparin   Injectable 5000 Unit(s) SubCutaneous every 12 hours    Onc:    GI/:  pantoprazole  Injectable 40 milliGRAM(s) IV Push daily    Endocrine:  atorvastatin 40 milliGRAM(s) Oral at bedtime  insulin glargine Injectable (LANTUS) 8 Unit(s) SubCutaneous at bedtime  insulin lispro (ADMELOG) corrective regimen sliding scale   SubCutaneous Before meals and at bedtime  methylPREDNISolone sodium succinate Injectable 40 milliGRAM(s) IV Push every 12 hours    Cardiac:  carvedilol 25 milliGRAM(s) Oral every 12 hours  diltiazem    milliGRAM(s) Oral daily  hydrALAZINE 50 milliGRAM(s) Oral three times a day    Other Medications:  chlorhexidine 2% Cloths 1 Application(s) Topical <User Schedule>  nicotine - 21 mG/24Hr(s) Patch 1 Patch Transdermal daily  ondansetron Injectable 4 milliGRAM(s) IV Push every 8 hours PRN  topiramate 100 milliGRAM(s) Oral daily      Allergies    No Known Allergies    Intolerances        Vital Signs Last 24 Hrs  T(C): 36.3 (10 Oct 2022 05:15), Max: 36.4 (09 Oct 2022 22:00)  T(F): 97.4 (10 Oct 2022 05:15), Max: 97.6 (09 Oct 2022 22:00)  HR: 63 (10 Oct 2022 05:15) (63 - 89)  BP: 163/86 (10 Oct 2022 05:15) (158/84 - 178/80)  BP(mean): --  RR: 16 (10 Oct 2022 05:15) (16 - 22)  SpO2: 100% (10 Oct 2022 05:15) (95% - 100%)    Parameters below as of 10 Oct 2022 05:15  Patient On (Oxygen Delivery Method): nasal cannula  O2 Flow (L/min): 3          PHYSICAL EXAM:  GEN: NAD, well-appearing, sitting comfortably upright at edge of bed; mild conversational dyspnea  HEENT: NC/AT, PERRL, anicteric sclera; oropharynx clear, MMM, neck supple, no appreciable JVD  RESP: no respiratory distress, diffuse expiratory wheeze  CV: +S1/S2, RRR  GI: soft, NT/ND, +BS  EXTREM: WWP; no edema, clubbing or cyanosis  Vascular: 2+ radial pulses B/L  NEURO: awake and alert, chronic RLE weakness    LABS:      CBC Full  -  ( 10 Oct 2022 05:29 )  WBC Count : 11.51 K/uL  RBC Count : 5.59 M/uL  Hemoglobin : 15.6 g/dL  Hematocrit : 48.9 %  Platelet Count - Automated : 274 K/uL  Mean Cell Volume : 87.5 fl  Mean Cell Hemoglobin : 27.9 pg  Mean Cell Hemoglobin Concentration : 31.9 gm/dL  Auto Neutrophil # : x  Auto Lymphocyte # : x  Auto Monocyte # : x  Auto Eosinophil # : x  Auto Basophil # : x  Auto Neutrophil % : x  Auto Lymphocyte % : x  Auto Monocyte % : x  Auto Eosinophil % : x  Auto Basophil % : x    10-10    135  |  102  |  45<H>  ----------------------------<  163<H>  5.0   |  19<L>  |  1.41<H>    Ca    10.0      10 Oct 2022 05:29  Phos  3.8     10-10  Mg     3.1     10-10    TPro  7.5  /  Alb  3.0<L>  /  TBili  0.3  /  DBili  x   /  AST  22  /  ALT  20  /  AlkPhos  87  10-10        RADIOLOGY & ADDITIONAL STUDIES:  < from: Xray Chest 1 View-PORTABLE IMMEDIATE (Xray Chest 1 View-PORTABLE IMMEDIATE .) (10.07.22 @ 02:27) >  AP view of the chest demonstrates the lungs slim clear. There is no   pleural effusion. The pulmonary vasculature is normal. There is no   pneumothorax.  The heart is mildly enlarged. There is no mediastinal or hilar mass.  Mild thoracic degenerative changes and marked S-shaped scoliosis are  present.  IMPRESSION:  No acute infiltrate.

## 2022-10-10 NOTE — PROGRESS NOTE ADULT - PROBLEM SELECTOR PLAN 2
pt A1C is 8.2   Pt started on SSI   continue to monitor pt A1C is 8.2   Pt started on SSI   Started on glargine 8u at bedtime due to steroid induced hyperglycemia  continue to monitor

## 2022-10-10 NOTE — PROGRESS NOTE ADULT - SUBJECTIVE AND OBJECTIVE BOX
PATIENT SEEN AND EXAMINED ON :- 10/10/22  DATE OF SERVICE:  10/10/22           Interim events noted,Labs ,Radiological studies and Cardiology tests reviewed .       HOSPITAL COURSE: HPI:  Patient is a 68 yo Female, from home, with med hx significant for COPD, Left MCA CVA w/RLE weakness, Seizures, Type 2 DM, CAD s/p ZHAO, Afib not on AC, PAD s/p Right femoral popliteal bypass s/p occlusion + IR stent placement 2/2017, s/p cardiac cath 4/2022 w/non-obstructive CAD, presenting to the ED due to shortness of breath. Pt currently on Bipap, able to take in full sentences w/mild tachycardia 105-107 bpm when she coughs. Collateral history obtained from EMS charts and ER provider note.   EMS note says pt was not able to speak in complete sentences and had increased WOB, was given 12 mg dexamethasone and 0.3 mg Epi. In the ED, pt was placed on Bipap, noted to have narrow complex tachycardia to 150s, was given adenosine 6 mg then 12 mg, then cardizem 15 mg + duoneb x 3, decadron 10mg IVPB s/p HR ranging in the low 100s. +PT admits to cough and sputum production (07 Oct 2022 07:00)      INTERIM EVENTS:Patient seen at bedside ,interim events noted.      PMH -reviewed admission note, no change since admission  HEART FAILURE: Acute[ ]Chronic[ ] Systolic[ ] Diastolic[ ] Combined Systolic and Diastolic[ ]  CAD[ ] CABG[ ] PCI[ ]  DEVICES[ ] PPM[ ] ICD[ ] ILR[ ]  ATRIAL FIBRILLATION[ ] Paroxysmal[ ] Permanent[ ] CHADS2-[  ]  ILEANA[ ] CKD1[ ] CKD2[ ] CKD3[ ] CKD4[ ] ESRD[ ]  COPD[ ] HTN[ ]   DM[ ] Type1[ ] Type 2[ ]   CVA[ ] Paresis[ ]    AMBULATION: Assisted[ ] Cane/walker[ ] Independent[ ]    MEDICATIONS  (STANDING):  albuterol/ipratropium for Nebulization 3 milliLiter(s) Nebulizer every 4 hours  aspirin  chewable 81 milliGRAM(s) Oral daily  atorvastatin 40 milliGRAM(s) Oral at bedtime  azithromycin  IVPB 500 milliGRAM(s) IV Intermittent every 24 hours  azithromycin  IVPB      budesonide 160 MICROgram(s)/formoterol 4.5 MICROgram(s) Inhaler 2 Puff(s) Inhalation two times a day  carvedilol 25 milliGRAM(s) Oral every 12 hours  cefTRIAXone   IVPB 1000 milliGRAM(s) IV Intermittent every 24 hours  cefTRIAXone   IVPB      chlorhexidine 2% Cloths 1 Application(s) Topical <User Schedule>  clopidogrel Tablet 75 milliGRAM(s) Oral daily  diltiazem    milliGRAM(s) Oral daily  heparin   Injectable 5000 Unit(s) SubCutaneous every 12 hours  hydrALAZINE 50 milliGRAM(s) Oral three times a day  insulin glargine Injectable (LANTUS) 8 Unit(s) SubCutaneous at bedtime  insulin lispro (ADMELOG) corrective regimen sliding scale   SubCutaneous Before meals and at bedtime  methylPREDNISolone sodium succinate Injectable 40 milliGRAM(s) IV Push every 12 hours  montelukast 10 milliGRAM(s) Oral at bedtime  nicotine - 21 mG/24Hr(s) Patch 1 Patch Transdermal daily  pantoprazole  Injectable 40 milliGRAM(s) IV Push daily  topiramate 100 milliGRAM(s) Oral daily    MEDICATIONS  (PRN):  ondansetron Injectable 4 milliGRAM(s) IV Push every 8 hours PRN Nausea and/or Vomiting            REVIEW OF SYSTEMS:  Constitutional: [ ] fever, [ ]weight loss,  [ ]fatigue [ ]weight gain  Eyes: [ ] visual changes  Respiratory: [ ]shortness of breath;  [ ] cough, [ ]wheezing, [ ]chills, [ ]hemoptysis  Cardiovascular: [ ] chest pain, [ ]palpitations, [ ]dizziness,  [ ]leg swelling[ ]orthopnea[ ]PND  Gastrointestinal: [ ] abdominal pain, [ ]nausea, [ ]vomiting,  [ ]diarrhea [ ]Constipation [ ]Melena  Genitourinary: [ ] dysuria, [ ] hematuria [ ]Doll  Neurologic: [ ] headaches [ ] tremors[ ]weakness [ ]Paralysis Right[ ] Left[ ]  Skin: [ ] itching, [ ]burning, [ ] rashes  Endocrine: [ ] heat or cold intolerance  Musculoskeletal: [ ] joint pain or swelling; [ ] muscle, back, or extremity pain  Psychiatric: [ ] depression, [ ]anxiety, [ ]mood swings, or [ ]difficulty sleeping  Hematologic: [ ] easy bruising, [ ] bleeding gums    [ ] All remaining systems negative except as per above.   [ ]Unable to obtain.  [x] No change in ROS since admission      Vital Signs Last 24 Hrs  T(C): 36.3 (10 Oct 2022 15:00), Max: 36.4 (09 Oct 2022 22:00)  T(F): 97.3 (10 Oct 2022 15:00), Max: 97.6 (09 Oct 2022 22:00)  HR: 69 (10 Oct 2022 15:00) (63 - 75)  BP: 148/82 (10 Oct 2022 15:00) (148/82 - 171/78)  BP(mean): --  RR: 19 (10 Oct 2022 15:00) (16 - 19)  SpO2: 100% (10 Oct 2022 15:00) (98% - 100%)    Parameters below as of 10 Oct 2022 05:15  Patient On (Oxygen Delivery Method): nasal cannula  O2 Flow (L/min): 3    I&O's Summary      PHYSICAL EXAM:  General: No acute distress BMI-  HEENT: EOMI, PERRL  Neck: Supple, [ ] JVD  Lungs: Equal air entry bilaterally; [ ] rales [ ] wheezing [ ] rhonchi  Heart: Regular rate and rhythm; [x ] murmur   2/6 [ x] systolic [ ] diastolic [ ] radiation[ ] rubs [ ]  gallops  Abdomen: Nontender, bowel sounds present  Extremities: No clubbing, cyanosis, [ ] edema [ ]Pulses  equal and intact  Nervous system:  Alert & Oriented X3, no focal deficits  Psychiatric: Normal affect  Skin: No rashes or lesions    LABS:  10-10    135  |  102  |  45<H>  ----------------------------<  163<H>  5.0   |  19<L>  |  1.41<H>    Ca    10.0      10 Oct 2022 05:29  Phos  3.8     10-10  Mg     3.1     10-10    TPro  7.5  /  Alb  3.0<L>  /  TBili  0.3  /  DBili  x   /  AST  22  /  ALT  20  /  AlkPhos  87  10-10    Creatinine Trend: 1.41<--, 1.53<--, 1.98<--, 1.18<--                        15.6   11.51 )-----------( 274      ( 10 Oct 2022 05:29 )             48.9

## 2022-10-10 NOTE — PROGRESS NOTE ADULT - ASSESSMENT
Patient is a 68 yo Female, from home, with med hx significant for COPD, Left MCA CVA w/RLE weakness, Seizures, Type 2 DM, CAD s/p ZHAO, Afib not on AC, PAD s/p Right femoral popliteal bypass s/p occlusion + IR stent placement 2/2017, s/p cardiac cath 4/2022 w/non-obstructive CAD, presenting to the ED due to shortness of breath likely 2/2 COPD exacerbation. Admitted to ICU for close monitoring of respiratory status.    ASSESSMENT  - Acute hypercapnic respiratory failure 2/2 COPD exacerbation  -Narrow complex tachycardia, SVT  -Hx of Afib  -Hx of CVA w/RLE weakness  -hx DM  -CAD  -PAD s/p RLE stent    Neuro:  AAO x 3    Cardiovascular:  #Narrow complex tachycardia  Resolved.   likely precipitated by 0.3 epi given by EMS/respiratory distress  s/p adenosine 6 mg, 12 mg then cardizem 15 mg IVP  Now HR ranging in the low 100s  Cardizem resumed      #CAD  Pt takes aspirin, plavix, coreg, statin  resume oral meds when pt able to be off bipap  Echo Grade I DD, EF 55-60% April 2022    #Afib  Pt takes diltiazem ER  Use only prophylaxis dose of AC as per cardio - Dr Graham.     #HTN   pt takes coreg, diltiazem ER, hydralazine 50 mg TID  Restarted home meds      Pulmonary:   #COPD exacerbation  C/w Solumedrol 40 mg Q12 for COPD exacerbation  C/w Azithromycin IV for COPD exacerbation  c/w Duoneb Q4 hrs for now  Start Symbicort  NC 5L    Infectious Diseases:  RVP - Entero/Rhinovirus detected    Gastrointestinal:  Dash diet    Renal:  ILEANA  - likely prerenal in setting on volume restriction and NPO  - Encourage oral rehydration  - Monitor Is/Os.     Endo:  moderate sliding scale.     Heme/onc:   #Hb 15  Likely hemoconcentrated vs chronic hypoxia induced    Skin/ catheter: Peripheral lines    Prophylaxis: Lovenox         Patient is a 68 yo Female, from home, with med hx significant for COPD, Left MCA CVA w/RLE weakness, Seizures, Type 2 DM, CAD s/p ZHAO, Afib not on AC, PAD s/p Right femoral popliteal bypass s/p occlusion + IR stent placement 2/2017, s/p cardiac cath 4/2022 w/non-obstructive CAD, presenting to the ED due to shortness of breath likely 2/2 COPD exacerbation. Admitted to ICU for close monitoring of respiratory status. Pt has been stabilized and downgraded to medicine unit on 5 south.

## 2022-10-10 NOTE — PROGRESS NOTE ADULT - PROBLEM SELECTOR PLAN 2
pt A1C is 8.2   Pt started on SSI   Started on glargine 8u at bedtime due to steroid induced hyperglycemia  continue to monitor

## 2022-10-10 NOTE — PROGRESS NOTE ADULT - ATTENDING COMMENTS
No overnight events. Patient SOB appears improved, but still has conversational dyspnea. Bilateral expiratory wheezing. Continue on IV steroids today. Continue on nocturnal bipap. Appreciate pulmonary recommendations. Incentive spirometry. OOB to chair. Wean O2 to sat>90%. Continue on ceftriaxone and azithromycin given leukocytosis. PT recommending discharge to [facility].

## 2022-10-11 LAB
GLUCOSE BLDC GLUCOMTR-MCNC: 201 MG/DL — HIGH (ref 70–99)
GLUCOSE BLDC GLUCOMTR-MCNC: 212 MG/DL — HIGH (ref 70–99)
GLUCOSE BLDC GLUCOMTR-MCNC: 239 MG/DL — HIGH (ref 70–99)
GLUCOSE BLDC GLUCOMTR-MCNC: 309 MG/DL — HIGH (ref 70–99)
SARS-COV-2 RNA SPEC QL NAA+PROBE: SIGNIFICANT CHANGE UP

## 2022-10-11 PROCEDURE — 99233 SBSQ HOSP IP/OBS HIGH 50: CPT | Mod: GC

## 2022-10-11 RX ORDER — ALBUTEROL 90 UG/1
2 AEROSOL, METERED ORAL EVERY 4 HOURS
Refills: 0 | Status: DISCONTINUED | OUTPATIENT
Start: 2022-10-11 | End: 2022-10-11

## 2022-10-11 RX ORDER — AZITHROMYCIN 500 MG/1
500 TABLET, FILM COATED ORAL DAILY
Refills: 0 | Status: COMPLETED | OUTPATIENT
Start: 2022-10-11 | End: 2022-10-13

## 2022-10-11 RX ORDER — IPRATROPIUM/ALBUTEROL SULFATE 18-103MCG
3 AEROSOL WITH ADAPTER (GRAM) INHALATION EVERY 4 HOURS
Refills: 0 | Status: DISCONTINUED | OUTPATIENT
Start: 2022-10-11 | End: 2022-10-19

## 2022-10-11 RX ADMIN — Medication 600 MILLIGRAM(S): at 17:29

## 2022-10-11 RX ADMIN — Medication 1 PATCH: at 11:56

## 2022-10-11 RX ADMIN — PANTOPRAZOLE SODIUM 40 MILLIGRAM(S): 20 TABLET, DELAYED RELEASE ORAL at 11:57

## 2022-10-11 RX ADMIN — ATORVASTATIN CALCIUM 40 MILLIGRAM(S): 80 TABLET, FILM COATED ORAL at 21:27

## 2022-10-11 RX ADMIN — Medication 8: at 08:17

## 2022-10-11 RX ADMIN — Medication 3 MILLILITER(S): at 08:25

## 2022-10-11 RX ADMIN — ALBUTEROL 2 PUFF(S): 90 AEROSOL, METERED ORAL at 17:30

## 2022-10-11 RX ADMIN — Medication 40 MILLIGRAM(S): at 21:35

## 2022-10-11 RX ADMIN — Medication 180 MILLIGRAM(S): at 06:00

## 2022-10-11 RX ADMIN — Medication 4: at 21:26

## 2022-10-11 RX ADMIN — Medication 50 MILLIGRAM(S): at 13:21

## 2022-10-11 RX ADMIN — Medication 100 MILLIGRAM(S): at 11:57

## 2022-10-11 RX ADMIN — Medication 1 PATCH: at 09:30

## 2022-10-11 RX ADMIN — CEFTRIAXONE 100 MILLIGRAM(S): 500 INJECTION, POWDER, FOR SOLUTION INTRAMUSCULAR; INTRAVENOUS at 17:27

## 2022-10-11 RX ADMIN — Medication 1 PATCH: at 19:00

## 2022-10-11 RX ADMIN — CARVEDILOL PHOSPHATE 25 MILLIGRAM(S): 80 CAPSULE, EXTENDED RELEASE ORAL at 18:44

## 2022-10-11 RX ADMIN — MONTELUKAST 10 MILLIGRAM(S): 4 TABLET, CHEWABLE ORAL at 21:27

## 2022-10-11 RX ADMIN — Medication 4: at 11:57

## 2022-10-11 RX ADMIN — CLOPIDOGREL BISULFATE 75 MILLIGRAM(S): 75 TABLET, FILM COATED ORAL at 11:57

## 2022-10-11 RX ADMIN — Medication 4: at 17:26

## 2022-10-11 RX ADMIN — Medication 3 MILLILITER(S): at 21:40

## 2022-10-11 RX ADMIN — ALBUTEROL 2 PUFF(S): 90 AEROSOL, METERED ORAL at 13:21

## 2022-10-11 RX ADMIN — Medication 50 MILLIGRAM(S): at 21:27

## 2022-10-11 RX ADMIN — Medication 40 MILLIGRAM(S): at 06:00

## 2022-10-11 RX ADMIN — CHLORHEXIDINE GLUCONATE 1 APPLICATION(S): 213 SOLUTION TOPICAL at 06:02

## 2022-10-11 RX ADMIN — HEPARIN SODIUM 5000 UNIT(S): 5000 INJECTION INTRAVENOUS; SUBCUTANEOUS at 17:44

## 2022-10-11 RX ADMIN — Medication 1 PATCH: at 07:15

## 2022-10-11 RX ADMIN — Medication 81 MILLIGRAM(S): at 11:57

## 2022-10-11 RX ADMIN — CARVEDILOL PHOSPHATE 25 MILLIGRAM(S): 80 CAPSULE, EXTENDED RELEASE ORAL at 06:00

## 2022-10-11 RX ADMIN — Medication 3 MILLILITER(S): at 03:35

## 2022-10-11 RX ADMIN — BUDESONIDE AND FORMOTEROL FUMARATE DIHYDRATE 2 PUFF(S): 160; 4.5 AEROSOL RESPIRATORY (INHALATION) at 21:32

## 2022-10-11 RX ADMIN — Medication 600 MILLIGRAM(S): at 06:00

## 2022-10-11 RX ADMIN — AZITHROMYCIN 255 MILLIGRAM(S): 500 TABLET, FILM COATED ORAL at 06:21

## 2022-10-11 RX ADMIN — INSULIN GLARGINE 8 UNIT(S): 100 INJECTION, SOLUTION SUBCUTANEOUS at 21:26

## 2022-10-11 RX ADMIN — Medication 50 MILLIGRAM(S): at 06:00

## 2022-10-11 RX ADMIN — HEPARIN SODIUM 5000 UNIT(S): 5000 INJECTION INTRAVENOUS; SUBCUTANEOUS at 06:02

## 2022-10-11 RX ADMIN — BUDESONIDE AND FORMOTEROL FUMARATE DIHYDRATE 2 PUFF(S): 160; 4.5 AEROSOL RESPIRATORY (INHALATION) at 11:56

## 2022-10-11 NOTE — PROGRESS NOTE ADULT - ASSESSMENT
Patient is a 66 yo Female, from home, with med hx significant for COPD, Left MCA CVA w/RLE weakness, Seizures, Type 2 DM, CAD s/p ZHAO, Afib not on AC, PAD s/p Right femoral popliteal bypass s/p occlusion + IR stent placement 2/2017, s/p cardiac cath 4/2022 w/non-obstructive CAD, presenting to the ED due to shortness of breath likely 2/2 COPD exacerbation. Admitted to ICU for close monitoring of respiratory status. Pt has been stabilized and downgraded to medicine unit on 5 south.

## 2022-10-11 NOTE — PROGRESS NOTE ADULT - SUBJECTIVE AND OBJECTIVE BOX
PGY-3 Progress Note discussed with attending    PAGER #: [[phone]] TILL 5:00 PM  PLEASE CONTACT ON CALL TEAM:  - On Call Team (Please refer to Trevon) FROM 5:00 PM - 8:30PM  - Nightfloat Team FROM 8:30 -7:30 AM    OVERNIGHT EVENTS:   - No acute events overnight. Patient reports improved respiratory symptoms, but her sputum production seems to persist. She states her sleep has improved.     REVIEW OF SYSTEMS:  CONSTITUTIONAL: No fever, weight loss, or fatigue  RESPIRATORY: No cough, wheezing, chills or hemoptysis; No shortness of breath  CARDIOVASCULAR: No chest pain, palpitations, dizziness, or leg swelling  GASTROINTESTINAL: No abdominal pain. No nausea, vomiting, or hematemesis; No diarrhea or constipation. No melena or hematochezia.  GENITOURINARY: No dysuria or hematuria, urinary frequency  NEUROLOGICAL: No headaches, memory loss, loss of strength, numbness, or tremors  SKIN: No itching, burning, rashes, or lesions     MEDICATIONS  (STANDING):  ALBUTerol    90 MICROgram(s) HFA Inhaler 2 Puff(s) Inhalation every 4 hours  aspirin  chewable 81 milliGRAM(s) Oral daily  atorvastatin 40 milliGRAM(s) Oral at bedtime  azithromycin  IVPB 500 milliGRAM(s) IV Intermittent every 24 hours  azithromycin  IVPB      budesonide 160 MICROgram(s)/formoterol 4.5 MICROgram(s) Inhaler 2 Puff(s) Inhalation two times a day  carvedilol 25 milliGRAM(s) Oral every 12 hours  cefTRIAXone   IVPB 1000 milliGRAM(s) IV Intermittent every 24 hours  cefTRIAXone   IVPB      chlorhexidine 2% Cloths 1 Application(s) Topical <User Schedule>  clopidogrel Tablet 75 milliGRAM(s) Oral daily  diltiazem    milliGRAM(s) Oral daily  guaiFENesin  milliGRAM(s) Oral two times a day  heparin   Injectable 5000 Unit(s) SubCutaneous every 12 hours  hydrALAZINE 50 milliGRAM(s) Oral three times a day  insulin glargine Injectable (LANTUS) 8 Unit(s) SubCutaneous at bedtime  insulin lispro (ADMELOG) corrective regimen sliding scale   SubCutaneous Before meals and at bedtime  methylPREDNISolone sodium succinate Injectable 40 milliGRAM(s) IV Push every 12 hours  montelukast 10 milliGRAM(s) Oral at bedtime  nicotine - 21 mG/24Hr(s) Patch 1 Patch Transdermal daily  pantoprazole  Injectable 40 milliGRAM(s) IV Push daily  topiramate 100 milliGRAM(s) Oral daily    MEDICATIONS  (PRN):  ondansetron Injectable 4 milliGRAM(s) IV Push every 8 hours PRN Nausea and/or Vomiting      Vital Signs Last 24 Hrs  T(C): 36.3 (11 Oct 2022 13:00), Max: 36.9 (11 Oct 2022 05:38)  T(F): 97.4 (11 Oct 2022 13:00), Max: 98.5 (11 Oct 2022 05:38)  HR: 68 (11 Oct 2022 13:00) (67 - 79)  BP: 150/58 (11 Oct 2022 13:00) (148/82 - 170/92)  BP(mean): --  RR: 18 (11 Oct 2022 13:00) (18 - 19)  SpO2: 100% (11 Oct 2022 13:00) (96% - 100%)    Parameters below as of 11 Oct 2022 13:00  Patient On (Oxygen Delivery Method): nasal cannula  O2 Flow (L/min): 3      PHYSICAL EXAMINATION:  GENERAL: NAD, AAOx3, pausing in between sentences, but likely due to cognitive decline  NEURO: mild dysarthria noted  HEAD: AT/NC  EYES: conjunctiva and sclera clear  NECK: supple, No JVD noted, Normal thyroid  CHEST/LUNG: mild scatter expiratory wheezes noted; no rales, rhonchi, or rubs  HEART: regular rate and rhythm; no murmurs, rubs, or gallops  ABDOMEN: soft, nontender, nondistended; Bowel sounds present  EXTREMITIES:  2+ Peripheral Pulses, No clubbing, cyanosis, or edema  SKIN: warm dry                          15.6   11.51 )-----------( 274      ( 10 Oct 2022 05:29 )             48.9     10-10    135  |  102  |  45<H>  ----------------------------<  163<H>  5.0   |  19<L>  |  1.41<H>    Ca    10.0      10 Oct 2022 05:29  Phos  3.8     10-10  Mg     3.1     10-10    TPro  7.5  /  Alb  3.0<L>  /  TBili  0.3  /  DBili  x   /  AST  22  /  ALT  20  /  AlkPhos  87  10-10    LIVER FUNCTIONS - ( 10 Oct 2022 05:29 )  Alb: 3.0 g/dL / Pro: 7.5 g/dL / ALK PHOS: 87 U/L / ALT: 20 U/L DA / AST: 22 U/L / GGT: x                 SARS-CoV-2: NotDetec (08 Oct 2022 10:45)  COVID-19 PCR: NotDetec (07 Oct 2022 06:44)      CAPILLARY BLOOD GLUCOSE      POCT Blood Glucose.: 239 mg/dL (11 Oct 2022 11:14)  POCT Blood Glucose.: 309 mg/dL (11 Oct 2022 08:05)  POCT Blood Glucose.: 335 mg/dL (10 Oct 2022 21:17)  POCT Blood Glucose.: 271 mg/dL (10 Oct 2022 17:17)      RADIOLOGY & ADDITIONAL TESTS:                   PULMONARY SERVICE PROGRESS NOTE  PGY-3 Progress Note discussed with attending    PAGER #: [[phone]] TILL 5:00 PM    OVERNIGHT EVENTS:   - No acute events overnight. Patient reports improved respiratory symptoms, but her sputum production seems to persist. She states her sleep has improved.     REVIEW OF SYSTEMS:  CONSTITUTIONAL: No fever, weight loss, or fatigue  RESPIRATORY: No cough, wheezing, chills or hemoptysis; No shortness of breath  CARDIOVASCULAR: No chest pain, palpitations, dizziness, or leg swelling  GASTROINTESTINAL: No abdominal pain. No nausea, vomiting, or hematemesis; No diarrhea or constipation. No melena or hematochezia.  GENITOURINARY: No dysuria or hematuria, urinary frequency  NEUROLOGICAL: No headaches, memory loss, numbness, or tremors, +chronic RLE weakness, +chronic word finding difficulty  SKIN: No itching, burning, rashes, or lesions     MEDICATIONS  (STANDING):  ALBUTerol    90 MICROgram(s) HFA Inhaler 2 Puff(s) Inhalation every 4 hours  aspirin  chewable 81 milliGRAM(s) Oral daily  atorvastatin 40 milliGRAM(s) Oral at bedtime  azithromycin  IVPB 500 milliGRAM(s) IV Intermittent every 24 hours  azithromycin  IVPB      budesonide 160 MICROgram(s)/formoterol 4.5 MICROgram(s) Inhaler 2 Puff(s) Inhalation two times a day  carvedilol 25 milliGRAM(s) Oral every 12 hours  cefTRIAXone   IVPB 1000 milliGRAM(s) IV Intermittent every 24 hours  cefTRIAXone   IVPB      chlorhexidine 2% Cloths 1 Application(s) Topical <User Schedule>  clopidogrel Tablet 75 milliGRAM(s) Oral daily  diltiazem    milliGRAM(s) Oral daily  guaiFENesin  milliGRAM(s) Oral two times a day  heparin   Injectable 5000 Unit(s) SubCutaneous every 12 hours  hydrALAZINE 50 milliGRAM(s) Oral three times a day  insulin glargine Injectable (LANTUS) 8 Unit(s) SubCutaneous at bedtime  insulin lispro (ADMELOG) corrective regimen sliding scale   SubCutaneous Before meals and at bedtime  methylPREDNISolone sodium succinate Injectable 40 milliGRAM(s) IV Push every 12 hours  montelukast 10 milliGRAM(s) Oral at bedtime  nicotine - 21 mG/24Hr(s) Patch 1 Patch Transdermal daily  pantoprazole  Injectable 40 milliGRAM(s) IV Push daily  topiramate 100 milliGRAM(s) Oral daily    MEDICATIONS  (PRN):  ondansetron Injectable 4 milliGRAM(s) IV Push every 8 hours PRN Nausea and/or Vomiting      Vital Signs Last 24 Hrs  T(C): 36.3 (11 Oct 2022 13:00), Max: 36.9 (11 Oct 2022 05:38)  T(F): 97.4 (11 Oct 2022 13:00), Max: 98.5 (11 Oct 2022 05:38)  HR: 68 (11 Oct 2022 13:00) (67 - 79)  BP: 150/58 (11 Oct 2022 13:00) (148/82 - 170/92)  BP(mean): --  RR: 18 (11 Oct 2022 13:00) (18 - 19)  SpO2: 100% (11 Oct 2022 13:00) (96% - 100%)    Parameters below as of 11 Oct 2022 13:00  Patient On (Oxygen Delivery Method): nasal cannula  O2 Flow (L/min): 3      PHYSICAL EXAMINATION:  GENERAL: NAD, AAOx3, pausing in between sentences, but likely due to cognitive decline  NEURO: mild dysarthria noted  HEAD: AT/NC  EYES: conjunctiva and sclera clear  NECK: supple, No JVD noted, Normal thyroid  CHEST/LUNG: mild scatter expiratory wheezes noted; no rales, rhonchi, or rubs  HEART: regular rate and rhythm; no murmurs, rubs, or gallops  ABDOMEN: soft, nontender, nondistended; Bowel sounds present  EXTREMITIES:  2+ Peripheral Pulses, No clubbing, cyanosis, or edema  SKIN: warm dry                          15.6   11.51 )-----------( 274      ( 10 Oct 2022 05:29 )             48.9     10-10    135  |  102  |  45<H>  ----------------------------<  163<H>  5.0   |  19<L>  |  1.41<H>    Ca    10.0      10 Oct 2022 05:29  Phos  3.8     10-10  Mg     3.1     10-10    TPro  7.5  /  Alb  3.0<L>  /  TBili  0.3  /  DBili  x   /  AST  22  /  ALT  20  /  AlkPhos  87  10-10    LIVER FUNCTIONS - ( 10 Oct 2022 05:29 )  Alb: 3.0 g/dL / Pro: 7.5 g/dL / ALK PHOS: 87 U/L / ALT: 20 U/L DA / AST: 22 U/L / GGT: x                 SARS-CoV-2: NotDetec (08 Oct 2022 10:45)  COVID-19 PCR: NotDetec (07 Oct 2022 06:44)      CAPILLARY BLOOD GLUCOSE      POCT Blood Glucose.: 239 mg/dL (11 Oct 2022 11:14)  POCT Blood Glucose.: 309 mg/dL (11 Oct 2022 08:05)  POCT Blood Glucose.: 335 mg/dL (10 Oct 2022 21:17)  POCT Blood Glucose.: 271 mg/dL (10 Oct 2022 17:17)      RADIOLOGY & ADDITIONAL TESTS:                   PULMONARY CONSULT SERVICE FOLLOW-UP NOTE  PGY-3 Progress Note discussed with attending    PAGER #: [[phone]] TILL 5:00 PM    OVERNIGHT EVENTS:   - No acute events overnight. Patient reports improved respiratory symptoms, but her sputum production seems to persist. She states her sleep has improved.     REVIEW OF SYSTEMS:  CONSTITUTIONAL: No fever, weight loss, or fatigue  RESPIRATORY: No cough, wheezing, chills or hemoptysis; No shortness of breath  CARDIOVASCULAR: No chest pain, palpitations, dizziness, or leg swelling  GASTROINTESTINAL: No abdominal pain. No nausea, vomiting, or hematemesis; No diarrhea or constipation. No melena or hematochezia.  GENITOURINARY: No dysuria or hematuria, urinary frequency  NEUROLOGICAL: No headaches, memory loss, numbness, or tremors, +chronic RLE weakness, +chronic word finding difficulty  SKIN: No itching, burning, rashes, or lesions     MEDICATIONS  (STANDING):  ALBUTerol    90 MICROgram(s) HFA Inhaler 2 Puff(s) Inhalation every 4 hours  aspirin  chewable 81 milliGRAM(s) Oral daily  atorvastatin 40 milliGRAM(s) Oral at bedtime  azithromycin  IVPB 500 milliGRAM(s) IV Intermittent every 24 hours  azithromycin  IVPB      budesonide 160 MICROgram(s)/formoterol 4.5 MICROgram(s) Inhaler 2 Puff(s) Inhalation two times a day  carvedilol 25 milliGRAM(s) Oral every 12 hours  cefTRIAXone   IVPB 1000 milliGRAM(s) IV Intermittent every 24 hours  cefTRIAXone   IVPB      chlorhexidine 2% Cloths 1 Application(s) Topical <User Schedule>  clopidogrel Tablet 75 milliGRAM(s) Oral daily  diltiazem    milliGRAM(s) Oral daily  guaiFENesin  milliGRAM(s) Oral two times a day  heparin   Injectable 5000 Unit(s) SubCutaneous every 12 hours  hydrALAZINE 50 milliGRAM(s) Oral three times a day  insulin glargine Injectable (LANTUS) 8 Unit(s) SubCutaneous at bedtime  insulin lispro (ADMELOG) corrective regimen sliding scale   SubCutaneous Before meals and at bedtime  methylPREDNISolone sodium succinate Injectable 40 milliGRAM(s) IV Push every 12 hours  montelukast 10 milliGRAM(s) Oral at bedtime  nicotine - 21 mG/24Hr(s) Patch 1 Patch Transdermal daily  pantoprazole  Injectable 40 milliGRAM(s) IV Push daily  topiramate 100 milliGRAM(s) Oral daily    MEDICATIONS  (PRN):  ondansetron Injectable 4 milliGRAM(s) IV Push every 8 hours PRN Nausea and/or Vomiting      Vital Signs Last 24 Hrs  T(C): 36.3 (11 Oct 2022 13:00), Max: 36.9 (11 Oct 2022 05:38)  T(F): 97.4 (11 Oct 2022 13:00), Max: 98.5 (11 Oct 2022 05:38)  HR: 68 (11 Oct 2022 13:00) (67 - 79)  BP: 150/58 (11 Oct 2022 13:00) (148/82 - 170/92)  BP(mean): --  RR: 18 (11 Oct 2022 13:00) (18 - 19)  SpO2: 100% (11 Oct 2022 13:00) (96% - 100%)    Parameters below as of 11 Oct 2022 13:00  Patient On (Oxygen Delivery Method): nasal cannula  O2 Flow (L/min): 3      PHYSICAL EXAMINATION:  GENERAL: NAD, AAOx3, pausing in between sentences, but likely due to word finding difficulty  NEURO: mild dysarthria noted  HEAD: AT/NC  EYES: conjunctiva and sclera clear  NECK: supple, No JVD noted, Normal thyroid  CHEST/LUNG: mild scatter expiratory wheezes noted, intermittent rhonchi clearing with cough  HEART: regular rate and rhythm; no murmurs, rubs, or gallops  ABDOMEN: soft, nontender, nondistended; Bowel sounds present  EXTREMITIES:  2+ Peripheral Pulses, No clubbing, cyanosis, or edema  SKIN: warm dry      LABS:             15.6   11.51 )-----------( 274      ( 10 Oct 2022 05:29 )             48.9     10-10    135  |  102  |  45<H>  ----------------------------<  163<H>  5.0   |  19<L>  |  1.41<H>    Ca    10.0      10 Oct 2022 05:29  Phos  3.8     10-10  Mg     3.1     10-10    TPro  7.5  /  Alb  3.0<L>  /  TBili  0.3  /  DBili  x   /  AST  22  /  ALT  20  /  AlkPhos  87  10-10    LIVER FUNCTIONS - ( 10 Oct 2022 05:29 )  Alb: 3.0 g/dL / Pro: 7.5 g/dL / ALK PHOS: 87 U/L / ALT: 20 U/L DA / AST: 22 U/L / GGT: x                 SARS-CoV-2: NotDetec (08 Oct 2022 10:45)  COVID-19 PCR: NotDetec (07 Oct 2022 06:44)      CAPILLARY BLOOD GLUCOSE      POCT Blood Glucose.: 239 mg/dL (11 Oct 2022 11:14)  POCT Blood Glucose.: 309 mg/dL (11 Oct 2022 08:05)  POCT Blood Glucose.: 335 mg/dL (10 Oct 2022 21:17)  POCT Blood Glucose.: 271 mg/dL (10 Oct 2022 17:17)      RADIOLOGY & ADDITIONAL TESTS:  No interval chest imaging for review

## 2022-10-11 NOTE — PATIENT PROFILE ADULT - FUNCTIONAL ASSESSMENT - DAILY ACTIVITY 5.
Medical Necessity Information: It is in your best interest to select a reason for this procedure from the list below. All of these items fulfill various CMS LCD requirements except the new and changing color options. Spray Paint Text: The liquid nitrogen was applied to the skin utilizing a spray paint frosting technique. Render Post-Care Instructions In Note?: no Show Applicator Variable?: Yes Detail Level: Detailed Consent: The patient's consent was obtained including but not limited to risks of crusting, scabbing, blistering, scarring, darker or lighter pigmentary change, recurrence, incomplete removal and infection. Number Of Freeze-Thaw Cycles: 2 freeze-thaw cycles Medical Necessity Clause: This procedure was medically necessary because the lesions that were treated were: Post-Care Instructions: I reviewed with the patient in detail post-care instructions. Patient is to wear sunprotection, and avoid picking at any of the treated lesions. Pt may apply Vaseline to crusted or scabbing areas. Duration Of Freeze Thaw-Cycle (Seconds): 7 Application Tool (Optional): Liquid Nitrogen Sprayer 3 = A little assistance

## 2022-10-11 NOTE — PROGRESS NOTE ADULT - PROBLEM SELECTOR PLAN 3
pt takes coreg, diltiazem ER, hydralazine 50 mg TID  Will increased coreg dose to 50mg  c/w home meds

## 2022-10-11 NOTE — PROGRESS NOTE ADULT - ATTENDING COMMENTS
66yo F PMHx of COPD, CVA w/ RLE weakness, Seizures, Type 2 DM, CAD, A. Fib not on AC, PAD s/p fem-pop bypass presenting with COPD exacerbation with hypercapnic failure requiring BiPAP initially admitted to the ICU. patient downgraded to the floors on 10/9/22.    #Acute Hypoxic and Hypercapnic Respiratory Failure  #COPD Exacerbation likely due to Viral Pneumonia  #Supraventricular Tachycardia  #Chronic Atrial Fibrillation  #Coronary Artery Disease  #ILEANA  #Type 2 DM  #Nicotine Withdrawal  - Obtain ambulatory O2 for possible home O2 needs  - Start guafinesin  -continue on NC 2L - ween down as tolerated  - Monitor off BiPAP overnight and switch to prednisone 50mg qd tomorrow, per pulm recs - will need outpatient follow up  -continue on symbicort, duonebs  -RVP positive for Rhinovirus  -c/w nicotine patch  -continue diltiazem and carvedilol  -continue on aspirin and plavix  -add glargine 8u at bedtime due to steroid induced hyperglycemia  -PT recommending VERONICA  -counseled on smoking cessation

## 2022-10-11 NOTE — PROGRESS NOTE ADULT - SUBJECTIVE AND OBJECTIVE BOX
Medical Student year 3 Progress Note discussed with resident & attending    TEAMS or PAGER #: [[phone]]  TILL 5:00 PM  PLEASE CONTACT ON CALL TEAM:  - On Call Team (Please refer to Trevon) FROM 5:00 PM - 8:30PM  - Nightfloat Team FROM 8:30 -7:30 AM      INTERVAL HPI/OVERNIGHT EVENTS: No events overnight. Pt assessed at the bedside and endorses feeling much better this morning than yesterday, with less anxiety, Pt denies any chest pain, fever, chills, n/v/d. Used bipap to sleep last night. c/o SOB and increased work of breathing. Will continue to monitor.         MEDICATIONS  (STANDING):  ALBUTerol    90 MICROgram(s) HFA Inhaler 2 Puff(s) Inhalation every 4 hours  aspirin  chewable 81 milliGRAM(s) Oral daily  atorvastatin 40 milliGRAM(s) Oral at bedtime  azithromycin  IVPB 500 milliGRAM(s) IV Intermittent every 24 hours  azithromycin  IVPB      budesonide 160 MICROgram(s)/formoterol 4.5 MICROgram(s) Inhaler 2 Puff(s) Inhalation two times a day  carvedilol 25 milliGRAM(s) Oral every 12 hours  cefTRIAXone   IVPB 1000 milliGRAM(s) IV Intermittent every 24 hours  cefTRIAXone   IVPB      chlorhexidine 2% Cloths 1 Application(s) Topical <User Schedule>  clopidogrel Tablet 75 milliGRAM(s) Oral daily  diltiazem    milliGRAM(s) Oral daily  guaiFENesin  milliGRAM(s) Oral two times a day  heparin   Injectable 5000 Unit(s) SubCutaneous every 12 hours  hydrALAZINE 50 milliGRAM(s) Oral three times a day  insulin glargine Injectable (LANTUS) 8 Unit(s) SubCutaneous at bedtime  insulin lispro (ADMELOG) corrective regimen sliding scale   SubCutaneous Before meals and at bedtime  methylPREDNISolone sodium succinate Injectable 40 milliGRAM(s) IV Push every 12 hours  montelukast 10 milliGRAM(s) Oral at bedtime  nicotine - 21 mG/24Hr(s) Patch 1 Patch Transdermal daily  pantoprazole  Injectable 40 milliGRAM(s) IV Push daily  topiramate 100 milliGRAM(s) Oral daily    MEDICATIONS  (PRN):  ondansetron Injectable 4 milliGRAM(s) IV Push every 8 hours PRN Nausea and/or Vomiting      REVIEW OF SYSTEMS:  CONSTITUTIONAL: No fever, weight loss, or fatigue  RESPIRATORY: + SOB and increased WOB. No cough, chills or hemoptysis.  CARDIOVASCULAR: No chest pain, palpitations, dizziness, or leg swelling  GASTROINTESTINAL: No abdominal pain. No nausea, vomiting, or hematemesis; No diarrhea or constipation. No melena or hematochezia.  GENITOURINARY: No dysuria or hematuria, urinary frequency  NEUROLOGICAL: No headaches, memory loss, loss of strength, numbness, or tremors  SKIN: No itching, burning, rashes, or lesions     Vital Signs Last 24 Hrs  T(C): 36.3 (11 Oct 2022 13:00), Max: 36.9 (11 Oct 2022 05:38)  T(F): 97.4 (11 Oct 2022 13:00), Max: 98.5 (11 Oct 2022 05:38)  HR: 68 (11 Oct 2022 13:00) (67 - 79)  BP: 150/658 (11 Oct 2022 13:00) (148/82 - 170/92)  BP(mean): --  RR: 18 (11 Oct 2022 13:00) (18 - 19)  SpO2: 100% (11 Oct 2022 13:00) (96% - 100%)    Parameters below as of 11 Oct 2022 13:00  Patient On (Oxygen Delivery Method): nasal cannula  O2 Flow (L/min): 3      PHYSICAL EXAMINATION:  GENERAL: NAD, well-developed, well-groomed  HEAD:  Atraumatic, Normocephalic  EYES:  conjunctiva and sclera clear  NECK: Supple, No JVD, Normal thyroid  CHEST/LUNG: + expiratory wheezing b/l.; No rales, rhonchi, or rubs  HEART: Regular rate and rhythm; No murmurs, rubs, or gallops  ABDOMEN: Soft, Nontender, Nondistended; Bowel sounds present  NERVOUS SYSTEM:  Alert & Oriented X3,    EXTREMITIES:  2+ Peripheral Pulses, No clubbing, cyanosis, or edema  SKIN: warm dry                          15.6   11.51 )-----------( 274      ( 10 Oct 2022 05:29 )             48.9     10-10    135  |  102  |  45<H>  ----------------------------<  163<H>  5.0   |  19<L>  |  1.41<H>    Ca    10.0      10 Oct 2022 05:29  Phos  3.8     10-10  Mg     3.1     10-10    TPro  7.5  /  Alb  3.0<L>  /  TBili  0.3  /  DBili  x   /  AST  22  /  ALT  20  /  AlkPhos  87  10-10    LIVER FUNCTIONS - ( 10 Oct 2022 05:29 )  Alb: 3.0 g/dL / Pro: 7.5 g/dL / ALK PHOS: 87 U/L / ALT: 20 U/L DA / AST: 22 U/L / GGT: x                   CAPILLARY BLOOD GLUCOSE      RADIOLOGY & ADDITIONAL TESTS:

## 2022-10-11 NOTE — PROGRESS NOTE ADULT - ATTENDING COMMENTS
Ms. Villavicencio is a 68yo woman, active smoker, with PMH asthma/COPD, Left MCA CVA w/RLE weakness, Seizures, Type 2 DM, CAD s/p ZHAO, Afib not on AC, PAD s/p Right femoral popliteal bypass s/p occlusion + IR stent placement 2/2017, s/p cardiac cath 4/2022 w/non-obstructive CAD, who presented to the ED due to acute on chronic shortness of breath x several days associated with productive cough and wheeze. Initially admitted to the ICU and managed on BiPAP. CXR unrevealing; RVP +for entero/rhinovirus. Pt received IV steroids and ATC nebs with empiric abx, now slowly improving and on NC. Pulmonary consulted for evaluation of acute respiratory failure.    Suspect current clinical presentation is due to acute exacerbation of pt's COPD, likely related to +entero/rhinovirus. Continues to improve on IV steroids, bronchodilator nebs ATC, empiric abx, supportive care.    # Acute hypoxemic respiratory failure  # Acute exacerbation of COPD  # +Entero/rhinovirus      Recommendations:  - S/p 3d solumedrol 40mg q12h; would continue taper considering +viral infx with continued sx. Start prednisone 50mg/d today (equivalent of solumedrol 40mg/d); anticipate taper of 10mg q3d or as per clinical course  - continue bronchodilator nebs q4h ATC  - Agree with empiric CAP Tx x 5d (on cef/azithro)  - Incentive spirometry 10x/h or as tolerated; OOB/TC  - Consider trial of 600-1200mg guaifenesin-ER standing BID for mucolytic effect   - Titrate supplemental O2 with goal SpO2 88-92% in pt with known chronic hypercarbia; titrated at bedside to 2LNC, would continue to wean as tolerated. Will need evaluation for home supplemental O2 if O2 needs continue prior to d/c  - Monitor off BiPAP qHS  - Incentive spirometry 10x/h or as tolerated; OOB/TC and ambulation daily as tolerated; instructed on how to use IS at bedside  - Will continue to follow with you  - On discharge would start triple therapy (ICS/LAMA/LABA) and will require follow up with Pulmonary Dr. East within 7 days of discharge for post-AECOPD care/COPD stars program. Will need full baseline PFTs once acute issues resolve      Veronique Jimenez MD  Pulmonary & Critical Care  Available on Teams Ms. Villavicencio is a 68yo woman, active smoker, with PMH asthma/COPD, Left MCA CVA w/RLE weakness, Seizures, Type 2 DM, CAD s/p ZHAO, Afib not on AC, PAD s/p Right femoral popliteal bypass s/p occlusion + IR stent placement 2/2017, s/p cardiac cath 4/2022 w/non-obstructive CAD, who presented to the ED due to acute on chronic shortness of breath x several days associated with productive cough and wheeze. Initially admitted to the ICU and managed on BiPAP. CXR unrevealing; RVP +for entero/rhinovirus. Pt received IV steroids and ATC nebs with empiric abx, now slowly improving and on NC. Pulmonary consulted for evaluation of acute respiratory failure.    Suspect current clinical presentation is due to acute exacerbation of pt's COPD, likely related to +entero/rhinovirus. Continues to improve on IV steroids, bronchodilator nebs ATC, empiric abx, supportive care.    # Acute hypoxemic respiratory failure  # Acute exacerbation of COPD  # +Entero/rhinovirus      Recommendations:  - S/p 3d solumedrol 40mg q12h; would continue taper considering +viral infx with continued sx. Start prednisone 50mg/d today (equivalent of solumedrol 40mg/d); anticipate taper of 10mg q3d or as per clinical course  - continue bronchodilator nebs q4h ATC  - Agree with empiric CAP Tx x 5d (on cef/azithro)  - Incentive spirometry 10x/h or as tolerated; OOB/TC  - Consider trial of 600-1200mg guaifenesin-ER standing BID for mucolytic effect   - Titrate supplemental O2 with goal SpO2 88-92% in pt with known chronic hypercarbia; titrated at bedside to 2LNC, would continue to wean as tolerated. Will need evaluation for home supplemental O2 if O2 needs continue prior to d/c  - Monitor off BiPAP qHS  - Incentive spirometry 10x/h or as tolerated; OOB/TC and ambulation daily as tolerated; instructed on how to use IS at bedside  - Again counseled regarding COPD pt education and discussed smoking cessation importance and methods of cessation  - Will continue to follow with you  - On discharge would start triple therapy (ICS/LAMA/LABA) and will require follow up with Pulmonary Dr. East within 7 days of discharge for post-AECOPD care/COPD stars program. Will need full baseline PFTs once acute issues resolve      Veronique Jimenez MD  Pulmonary & Critical Care  Available on Teams

## 2022-10-11 NOTE — PROGRESS NOTE ADULT - ASSESSMENT
Ms. Villavicencio is a 66yo woman, active smoker, with PMH asthma/COPD, Left MCA CVA w/RLE weakness, Seizures, Type 2 DM, CAD s/p ZHAO, Afib not on AC, PAD s/p Right femoral popliteal bypass s/p occlusion + IR stent placement 2/2017, s/p cardiac cath 4/2022 w/non-obstructive CAD, who presented to the ED due to acute on chronic shortness of breath x several days associated with productive cough and wheeze. Initially admitted to the ICU and managed on BiPAP. CXR unrevealing; RVP +for entero/rhinovirus. Pt received IV steroids and ATC nebs with empiric abx, now slowly improving and on NC. Pulmonary consulted for evaluation of acute respiratory failure.    Suspect current clinical presentation is due to acute exacerbation of pt's COPD, likely related to +entero/rhinovirus. Slowly improving on IV steroids, bronchodilator nebs ATC, empiric abx, supportive care.    # Acute hypoxemic respiratory failure  # Acute exacerbation of COPD  # +Entero/rhinovirus      Recommendations:  - On day 3 of solumedrol 40mg q12h; would continue for another 24h for full 3 days starting today and then consider slow taper (decrease 10mg q3d) as per clinical course, considering +viral infx with continued significant sx  - continue bronchodilator nebs q4h ATC  - Agree with empiric CAP Tx x 5d (on cef/azithro)  - Incentive spirometry 10x/h or as tolerated; OOB/TC  - Consider trial of 600-1200mg guaifenesin-ER standing BID for mucolytic effect   - Titrate supplemental O2 with goal SpO2 88-92% in pt with known chronic hypercarbia; titrated at bedside to 2LNC, would continue to wean as tolerated  - On BiPAP qHS for now; can continue for now and continue to monitor clinically   - please obtain ambulatory O2 sat, as patient may need home O2 on dischage  - Will continue to follow with you Ms. Villavicencio is a 68yo woman, active smoker, with PMH asthma/COPD, Left MCA CVA w/RLE weakness, Seizures, Type 2 DM, CAD s/p ZHAO, Afib not on AC, PAD s/p Right femoral popliteal bypass s/p occlusion + IR stent placement 2/2017, s/p cardiac cath 4/2022 w/non-obstructive CAD, who presented to the ED due to acute on chronic shortness of breath x several days associated with productive cough and wheeze. Initially admitted to the ICU and managed on BiPAP. CXR unrevealing; RVP +for entero/rhinovirus. Pt received IV steroids and ATC nebs with empiric abx, now slowly improving and on NC. Pulmonary consulted for evaluation of acute respiratory failure.    Suspect current clinical presentation is due to acute exacerbation of pt's COPD, likely related to +entero/rhinovirus. Slowly improving on IV steroids, bronchodilator nebs ATC, empiric abx, supportive care.    # Acute hypoxemic respiratory failure  # Acute exacerbation of COPD  # +Entero/rhinovirus      Recommendations:  - Continue systemic corticosteroids, solumedrol 40mg received in AM; prednisone 50mg/d x 2d tomorrow then taper by 10mg q3d  - continue bronchodilator nebs q4h ATC  - Agree with empiric CAP Tx x 5d (on cef/azithro)  - Incentive spirometry 10x/h or as tolerated; OOB/TC  - Consider trial of 600-1200mg guaifenesin-ER standing BID for mucolytic effect   - Titrate supplemental O2 with goal SpO2 88-92% in pt with known chronic hypercarbia; titrated at bedside to 2LNC, would continue to wean as tolerated  - On BiPAP qHS for now; can continue for now and continue to monitor clinically   - please obtain ambulatory O2 sat, as patient may need home O2 on discharge  - Will continue to follow with you

## 2022-10-11 NOTE — PROGRESS NOTE ADULT - SUBJECTIVE AND OBJECTIVE BOX
PATIENT SEEN AND EXAMINED ON :- 10/11/2022  DATE OF SERVICE:    10/11/2022         Interim events noted,Labs ,Radiological studies and Cardiology tests reviewed .       HOSPITAL COURSE: HPI:  Patient is a 68 yo Female, from home, with med hx significant for COPD, Left MCA CVA w/RLE weakness, Seizures, Type 2 DM, CAD s/p ZHAO, Afib not on AC, PAD s/p Right femoral popliteal bypass s/p occlusion + IR stent placement 2/2017, s/p cardiac cath 4/2022 w/non-obstructive CAD, presenting to the ED due to shortness of breath. Pt currently on Bipap, able to take in full sentences w/mild tachycardia 105-107 bpm when she coughs. Collateral history obtained from EMS charts and ER provider note.   EMS note says pt was not able to speak in complete sentences and had increased WOB, was given 12 mg dexamethasone and 0.3 mg Epi. In the ED, pt was placed on Bipap, noted to have narrow complex tachycardia to 150s, was given adenosine 6 mg then 12 mg, then cardizem 15 mg + duoneb x 3, decadron 10mg IVPB s/p HR ranging in the low 100s. +PT admits to cough and sputum production (07 Oct 2022 07:00)      INTERIM EVENTS:Patient seen at bedside ,interim events noted.      PMH -reviewed admission note, no change since admission  HEART FAILURE: Acute[ ]Chronic[ ] Systolic[ ] Diastolic[ ] Combined Systolic and Diastolic[ ]  CAD[ ] CABG[ ] PCI[ ]  DEVICES[ ] PPM[ ] ICD[ ] ILR[ ]  ATRIAL FIBRILLATION[ ] Paroxysmal[ ] Permanent[ ] CHADS2-[  ]  ILEANA[ ] CKD1[ ] CKD2[ ] CKD3[ ] CKD4[ ] ESRD[ ]  COPD[ ] HTN[ ]   DM[ ] Type1[ ] Type 2[ ]   CVA[ ] Paresis[ ]    AMBULATION: Assisted[ ] Cane/walker[ ] Independent[ ]    MEDICATIONS  (STANDING):  ALBUTerol    90 MICROgram(s) HFA Inhaler 2 Puff(s) Inhalation every 4 hours  aspirin  chewable 81 milliGRAM(s) Oral daily  atorvastatin 40 milliGRAM(s) Oral at bedtime  azithromycin  IVPB 500 milliGRAM(s) IV Intermittent daily  budesonide 160 MICROgram(s)/formoterol 4.5 MICROgram(s) Inhaler 2 Puff(s) Inhalation two times a day  carvedilol 25 milliGRAM(s) Oral every 12 hours  cefTRIAXone   IVPB 1000 milliGRAM(s) IV Intermittent every 24 hours  cefTRIAXone   IVPB      chlorhexidine 2% Cloths 1 Application(s) Topical <User Schedule>  clopidogrel Tablet 75 milliGRAM(s) Oral daily  diltiazem    milliGRAM(s) Oral daily  guaiFENesin  milliGRAM(s) Oral two times a day  heparin   Injectable 5000 Unit(s) SubCutaneous every 12 hours  hydrALAZINE 50 milliGRAM(s) Oral three times a day  insulin glargine Injectable (LANTUS) 8 Unit(s) SubCutaneous at bedtime  insulin lispro (ADMELOG) corrective regimen sliding scale   SubCutaneous Before meals and at bedtime  methylPREDNISolone sodium succinate Injectable 40 milliGRAM(s) IV Push daily  montelukast 10 milliGRAM(s) Oral at bedtime  nicotine - 21 mG/24Hr(s) Patch 1 Patch Transdermal daily  pantoprazole  Injectable 40 milliGRAM(s) IV Push daily  predniSONE   Tablet   Oral   predniSONE   Tablet 50 milliGRAM(s) Oral daily  topiramate 100 milliGRAM(s) Oral daily    MEDICATIONS  (PRN):  ondansetron Injectable 4 milliGRAM(s) IV Push every 8 hours PRN Nausea and/or Vomiting            REVIEW OF SYSTEMS:  Constitutional: [ ] fever, [ ]weight loss,  [ ]fatigue [ ]weight gain  Eyes: [ ] visual changes  Respiratory: [ ]shortness of breath;  [ ] cough, [ ]wheezing, [ ]chills, [ ]hemoptysis  Cardiovascular: [ ] chest pain, [ ]palpitations, [ ]dizziness,  [ ]leg swelling[ ]orthopnea[ ]PND  Gastrointestinal: [ ] abdominal pain, [ ]nausea, [ ]vomiting,  [ ]diarrhea [ ]Constipation [ ]Melena  Genitourinary: [ ] dysuria, [ ] hematuria [ ]Doll  Neurologic: [ ] headaches [ ] tremors[ ]weakness [ ]Paralysis Right[ ] Left[ ]  Skin: [ ] itching, [ ]burning, [ ] rashes  Endocrine: [ ] heat or cold intolerance  Musculoskeletal: [ ] joint pain or swelling; [ ] muscle, back, or extremity pain  Psychiatric: [ ] depression, [ ]anxiety, [ ]mood swings, or [ ]difficulty sleeping  Hematologic: [ ] easy bruising, [ ] bleeding gums    [ ] All remaining systems negative except as per above.   [ ]Unable to obtain.  [x] No change in ROS since admission      Vital Signs Last 24 Hrs  T(C): 36.3 (11 Oct 2022 13:00), Max: 36.9 (11 Oct 2022 05:38)  T(F): 97.4 (11 Oct 2022 13:00), Max: 98.5 (11 Oct 2022 05:38)  HR: 68 (11 Oct 2022 13:00) (67 - 79)  BP: 150/58 (11 Oct 2022 13:00) (150/58 - 170/92)  BP(mean): --  RR: 18 (11 Oct 2022 13:00) (18 - 18)  SpO2: 100% (11 Oct 2022 13:00) (96% - 100%)    Parameters below as of 11 Oct 2022 13:00  Patient On (Oxygen Delivery Method): nasal cannula  O2 Flow (L/min): 3    I&O's Summary      PHYSICAL EXAM:  General: No acute distress BMI-  HEENT: EOMI, PERRL  Neck: Supple, [ ] JVD  Lungs: Equal air entry bilaterally; [ ] rales [ ] wheezing [ ] rhonchi  Heart: Regular rate and rhythm; [x ] murmur   2/6 [ x] systolic [ ] diastolic [ ] radiation[ ] rubs [ ]  gallops  Abdomen: Nontender, bowel sounds present  Extremities: No clubbing, cyanosis, [ ] edema [ ]Pulses  equal and intact  Nervous system:  Alert & Oriented X3, no focal deficits  Psychiatric: Normal affect  Skin: No rashes or lesions    LABS:  10-10    135  |  102  |  45<H>  ----------------------------<  163<H>  5.0   |  19<L>  |  1.41<H>    Ca    10.0      10 Oct 2022 05:29  Phos  3.8     10-10  Mg     3.1     10-10    TPro  7.5  /  Alb  3.0<L>  /  TBili  0.3  /  DBili  x   /  AST  22  /  ALT  20  /  AlkPhos  87  10-10    Creatinine Trend: 1.41<--, 1.53<--, 1.98<--, 1.18<--                        15.6   11.51 )-----------( 274      ( 10 Oct 2022 05:29 )             48.9

## 2022-10-11 NOTE — PROGRESS NOTE ADULT - ASSESSMENT
Patient is a 66 yo Female, from home, with med hx significant for COPD, Left MCA CVA w/RLE weakness, Seizures, Type 2 DM, CAD s/p ZHAO, Afib not on AC, PAD s/p Right femoral popliteal bypass s/p occlusion + IR stent placement 2/2017, s/p cardiac cath 4/2022 w/non-obstructive CAD, presenting to the ED due to shortness of breath likely 2/2 COPD exacerbation. Admitted to ICU for close monitoring of respiratory status. Pt has been stabilized and downgraded to medicine

## 2022-10-11 NOTE — CHART NOTE - NSCHARTNOTEFT_GEN_A_CORE
Informed by RN pt w/ SOB at rest. Assessed pt at bedside, c/o mild SOB w/o any other associated symptoms at the time of assessment. On PE HR 83, O2 Sat 95-96% on 2 L NC, RR 22, no use of accessory muscles, does have occasional wheezing sounds b/l. Restarted pt on duoneb q 4 hrs as per pulm recs.   discussed plan w/ RN Informed by RN pt w/ SOB at rest. Assessed pt at bedside, c/o mild SOB w/o any other associated symptoms at the time of assessment. On PE HR 83, O2 Sat 95-96% on 2 L NC, RR 22, no use of accessory muscles, does have occasional wheezing sounds b/l. Restarted pt on duoneb q 4 hrs as per pulm recs. Will also give 1 more dose of solumedrol 40 mg IV STAT.   Primary team to decide whether to start oral steroid taper tomorrow or postpone for 1 more day and continue with IV solumedrol for 1 more day

## 2022-10-12 LAB
ANION GAP SERPL CALC-SCNC: 7 MMOL/L — SIGNIFICANT CHANGE UP (ref 5–17)
BUN SERPL-MCNC: 43 MG/DL — HIGH (ref 7–18)
CALCIUM SERPL-MCNC: 9.4 MG/DL — SIGNIFICANT CHANGE UP (ref 8.4–10.5)
CHLORIDE SERPL-SCNC: 102 MMOL/L — SIGNIFICANT CHANGE UP (ref 96–108)
CO2 SERPL-SCNC: 26 MMOL/L — SIGNIFICANT CHANGE UP (ref 22–31)
CREAT SERPL-MCNC: 1.24 MG/DL — SIGNIFICANT CHANGE UP (ref 0.5–1.3)
EGFR: 48 ML/MIN/1.73M2 — LOW
GLUCOSE BLDC GLUCOMTR-MCNC: 181 MG/DL — HIGH (ref 70–99)
GLUCOSE BLDC GLUCOMTR-MCNC: 186 MG/DL — HIGH (ref 70–99)
GLUCOSE BLDC GLUCOMTR-MCNC: 225 MG/DL — HIGH (ref 70–99)
GLUCOSE BLDC GLUCOMTR-MCNC: 284 MG/DL — HIGH (ref 70–99)
GLUCOSE SERPL-MCNC: 283 MG/DL — HIGH (ref 70–99)
HCT VFR BLD CALC: 49.8 % — HIGH (ref 34.5–45)
HGB BLD-MCNC: 15.7 G/DL — HIGH (ref 11.5–15.5)
MAGNESIUM SERPL-MCNC: 2.5 MG/DL — SIGNIFICANT CHANGE UP (ref 1.6–2.6)
MCHC RBC-ENTMCNC: 27 PG — SIGNIFICANT CHANGE UP (ref 27–34)
MCHC RBC-ENTMCNC: 31.5 GM/DL — LOW (ref 32–36)
MCV RBC AUTO: 85.6 FL — SIGNIFICANT CHANGE UP (ref 80–100)
NRBC # BLD: 0 /100 WBCS — SIGNIFICANT CHANGE UP (ref 0–0)
PHOSPHATE SERPL-MCNC: 3 MG/DL — SIGNIFICANT CHANGE UP (ref 2.5–4.5)
PLATELET # BLD AUTO: 292 K/UL — SIGNIFICANT CHANGE UP (ref 150–400)
POTASSIUM SERPL-MCNC: 4.2 MMOL/L — SIGNIFICANT CHANGE UP (ref 3.5–5.3)
POTASSIUM SERPL-SCNC: 4.2 MMOL/L — SIGNIFICANT CHANGE UP (ref 3.5–5.3)
RBC # BLD: 5.82 M/UL — HIGH (ref 3.8–5.2)
RBC # FLD: 13.2 % — SIGNIFICANT CHANGE UP (ref 10.3–14.5)
SODIUM SERPL-SCNC: 135 MMOL/L — SIGNIFICANT CHANGE UP (ref 135–145)
WBC # BLD: 8.78 K/UL — SIGNIFICANT CHANGE UP (ref 3.8–10.5)
WBC # FLD AUTO: 8.78 K/UL — SIGNIFICANT CHANGE UP (ref 3.8–10.5)

## 2022-10-12 PROCEDURE — 99232 SBSQ HOSP IP/OBS MODERATE 35: CPT | Mod: GC

## 2022-10-12 PROCEDURE — 99233 SBSQ HOSP IP/OBS HIGH 50: CPT | Mod: GC

## 2022-10-12 RX ORDER — PANTOPRAZOLE SODIUM 20 MG/1
40 TABLET, DELAYED RELEASE ORAL
Refills: 0 | Status: DISCONTINUED | OUTPATIENT
Start: 2022-10-12 | End: 2022-10-19

## 2022-10-12 RX ADMIN — Medication 3 MILLILITER(S): at 17:09

## 2022-10-12 RX ADMIN — MONTELUKAST 10 MILLIGRAM(S): 4 TABLET, CHEWABLE ORAL at 22:01

## 2022-10-12 RX ADMIN — CARVEDILOL PHOSPHATE 25 MILLIGRAM(S): 80 CAPSULE, EXTENDED RELEASE ORAL at 17:41

## 2022-10-12 RX ADMIN — CARVEDILOL PHOSPHATE 25 MILLIGRAM(S): 80 CAPSULE, EXTENDED RELEASE ORAL at 05:27

## 2022-10-12 RX ADMIN — Medication 81 MILLIGRAM(S): at 11:56

## 2022-10-12 RX ADMIN — Medication 4: at 17:40

## 2022-10-12 RX ADMIN — ATORVASTATIN CALCIUM 40 MILLIGRAM(S): 80 TABLET, FILM COATED ORAL at 22:00

## 2022-10-12 RX ADMIN — Medication 3 MILLILITER(S): at 21:11

## 2022-10-12 RX ADMIN — INSULIN GLARGINE 8 UNIT(S): 100 INJECTION, SOLUTION SUBCUTANEOUS at 22:01

## 2022-10-12 RX ADMIN — Medication 1 PATCH: at 11:58

## 2022-10-12 RX ADMIN — AZITHROMYCIN 255 MILLIGRAM(S): 500 TABLET, FILM COATED ORAL at 11:55

## 2022-10-12 RX ADMIN — Medication 50 MILLIGRAM(S): at 22:00

## 2022-10-12 RX ADMIN — Medication 50 MILLIGRAM(S): at 15:13

## 2022-10-12 RX ADMIN — Medication 600 MILLIGRAM(S): at 05:28

## 2022-10-12 RX ADMIN — Medication 600 MILLIGRAM(S): at 17:40

## 2022-10-12 RX ADMIN — Medication 2: at 22:02

## 2022-10-12 RX ADMIN — CEFTRIAXONE 100 MILLIGRAM(S): 500 INJECTION, POWDER, FOR SOLUTION INTRAMUSCULAR; INTRAVENOUS at 17:39

## 2022-10-12 RX ADMIN — CHLORHEXIDINE GLUCONATE 1 APPLICATION(S): 213 SOLUTION TOPICAL at 05:27

## 2022-10-12 RX ADMIN — Medication 1 PATCH: at 19:00

## 2022-10-12 RX ADMIN — PANTOPRAZOLE SODIUM 40 MILLIGRAM(S): 20 TABLET, DELAYED RELEASE ORAL at 11:58

## 2022-10-12 RX ADMIN — Medication 50 MILLIGRAM(S): at 05:44

## 2022-10-12 RX ADMIN — Medication 50 MILLIGRAM(S): at 05:28

## 2022-10-12 RX ADMIN — Medication 3 MILLILITER(S): at 13:45

## 2022-10-12 RX ADMIN — Medication 6: at 11:56

## 2022-10-12 RX ADMIN — HEPARIN SODIUM 5000 UNIT(S): 5000 INJECTION INTRAVENOUS; SUBCUTANEOUS at 05:28

## 2022-10-12 RX ADMIN — BUDESONIDE AND FORMOTEROL FUMARATE DIHYDRATE 2 PUFF(S): 160; 4.5 AEROSOL RESPIRATORY (INHALATION) at 22:04

## 2022-10-12 RX ADMIN — Medication 180 MILLIGRAM(S): at 05:28

## 2022-10-12 RX ADMIN — Medication 3 MILLILITER(S): at 09:42

## 2022-10-12 RX ADMIN — HEPARIN SODIUM 5000 UNIT(S): 5000 INJECTION INTRAVENOUS; SUBCUTANEOUS at 17:41

## 2022-10-12 RX ADMIN — Medication 100 MILLIGRAM(S): at 11:57

## 2022-10-12 RX ADMIN — Medication 2: at 08:42

## 2022-10-12 RX ADMIN — BUDESONIDE AND FORMOTEROL FUMARATE DIHYDRATE 2 PUFF(S): 160; 4.5 AEROSOL RESPIRATORY (INHALATION) at 11:59

## 2022-10-12 RX ADMIN — CLOPIDOGREL BISULFATE 75 MILLIGRAM(S): 75 TABLET, FILM COATED ORAL at 11:57

## 2022-10-12 NOTE — PROGRESS NOTE ADULT - ASSESSMENT
a 68yo woman, active smoker, with PMH asthma/COPD, Left MCA CVA w/RLE weakness, Seizures, Type 2 DM, CAD s/p ZHAO, Afib not on AC, PAD s/p Right femoral popliteal bypass s/p occlusion + IR stent placement 2/2017, s/p cardiac cath 4/2022 w/non-obstructive CAD, who presented to the ED due to acute on chronic shortness of breath x several days associated with productive cough and wheeze. Initially admitted to the ICU and managed on BiPAP. CXR unrevealing; RVP +for entero/rhinovirus. Pt received IV steroids and ATC nebs with empiric abx, now slowly improving and on NC. Pulmonary consulted for evaluation of acute respiratory failure.    Suspect current clinical presentation is due to acute exacerbation of pt's COPD, likely related to +entero/rhinovirus. Slowly improving on IV steroids, bronchodilator nebs ATC, empiric abx, supportive care.    # Acute hypoxemic respiratory failure  # Acute exacerbation of COPD  # +Entero/rhinovirus      Recommendations:  - Continue systemic corticosteroids, solumedrol 40mg IV received in last night; prednisone 50mg/d x 2d today then taper by 10mg q3d  - continue bronchodilator nebs q4h ATC  - Agree with empiric CAP Tx x 5d (on cef/azithro)  - Incentive spirometry 10x/h or as tolerated; OOB/TC  - Consider trial of 600-1200mg guaifenesin-ER standing BID for mucolytic effect   - Titrate supplemental O2 with goal SpO2 88-92% in pt with known chronic hypercarbia; titrated at bedside to 2LNC, would continue to wean as tolerated  - monitor off bipap  - recommend starting triple therapy up on discharge and follow up with pulmonologist, Dr. East, w/i 7d of discharge.  - please obtain ambulatory O2 sat, as patient may need home O2 on discharge        - Will continue to follow with you

## 2022-10-12 NOTE — PROGRESS NOTE ADULT - SUBJECTIVE AND OBJECTIVE BOX
Medical Student year 3 Progress Note discussed with resident & attending    TEAMS or PAGER #: [[phone]]  TILL 5:00 PM  PLEASE CONTACT ON CALL TEAM:  - On Call Team (Please refer to Trevon) FROM 5:00 PM - 8:30PM  - Nightfloat Team FROM 8:30 -7:30 AM      INTERVAL HPI/OVERNIGHT EVENTS: Increased SOB overnight. Pt assessed at the bedside, very anxious and not speaking in full sentences. Improved slightly with use of inhaler. Pulmonary seen at bedside and initiated nebulizer therapy. Will continue to monitor.         MEDICATIONS  (STANDING):  albuterol/ipratropium for Nebulization 3 milliLiter(s) Nebulizer every 4 hours  aspirin  chewable 81 milliGRAM(s) Oral daily  atorvastatin 40 milliGRAM(s) Oral at bedtime  azithromycin  IVPB 500 milliGRAM(s) IV Intermittent daily  budesonide 160 MICROgram(s)/formoterol 4.5 MICROgram(s) Inhaler 2 Puff(s) Inhalation two times a day  carvedilol 25 milliGRAM(s) Oral every 12 hours  cefTRIAXone   IVPB      cefTRIAXone   IVPB 1000 milliGRAM(s) IV Intermittent every 24 hours  chlorhexidine 2% Cloths 1 Application(s) Topical <User Schedule>  clopidogrel Tablet 75 milliGRAM(s) Oral daily  diltiazem    milliGRAM(s) Oral daily  guaiFENesin  milliGRAM(s) Oral two times a day  heparin   Injectable 5000 Unit(s) SubCutaneous every 12 hours  hydrALAZINE 50 milliGRAM(s) Oral three times a day  insulin glargine Injectable (LANTUS) 8 Unit(s) SubCutaneous at bedtime  insulin lispro (ADMELOG) corrective regimen sliding scale   SubCutaneous Before meals and at bedtime  montelukast 10 milliGRAM(s) Oral at bedtime  nicotine - 21 mG/24Hr(s) Patch 1 Patch Transdermal daily  pantoprazole  Injectable 40 milliGRAM(s) IV Push daily  predniSONE   Tablet 50 milliGRAM(s) Oral daily  predniSONE   Tablet   Oral   topiramate 100 milliGRAM(s) Oral daily    MEDICATIONS  (PRN):  ondansetron Injectable 4 milliGRAM(s) IV Push every 8 hours PRN Nausea and/or Vomiting      REVIEW OF SYSTEMS:  CONSTITUTIONAL: No fever, weight loss, or fatigue  RESPIRATORY: + cough, wheezing, SOB; No hemoptysis  CARDIOVASCULAR: No chest pain, palpitations, dizziness.   GASTROINTESTINAL: No abdominal pain. No nausea, vomiting, or hematemesis; No diarrhea or constipation. No melena or hematochezia.  GENITOURINARY: No dysuria or hematuria, urinary frequency  NEUROLOGICAL: + weakness; No headaches, memory loss, numbness, or tremors;  SKIN: No itching, burning, rashes, or lesions     Vital Signs Last 24 Hrs  T(C): 36.2 (12 Oct 2022 05:07), Max: 36.7 (11 Oct 2022 20:59)  T(F): 97.2 (12 Oct 2022 05:07), Max: 98.1 (11 Oct 2022 20:59)  HR: 65 (12 Oct 2022 05:07) (65 - 77)  BP: 174/80 (12 Oct 2022 05:07) (150/58 - 174/80)  BP(mean): --  RR: 18 (12 Oct 2022 05:07) (18 - 20)  SpO2: 100% (12 Oct 2022 05:07) (97% - 100%)    Parameters below as of 12 Oct 2022 05:07  Patient On (Oxygen Delivery Method): BiPAP/CPAP        PHYSICAL EXAMINATION:  GENERAL: NAD, well-developed, well-groomed  HEAD: Atraumatic, Normocephalic  EYES: conjunctiva and sclera clear  NECK: Supple, No JVD, Normal thyroid  CHEST/LUNG: + wheezing b/l. No rales, rhonchi, or rubs  HEART: Regular rate and rhythm; No murmurs, rubs, or gallops  ABDOMEN: Soft, Nontender, Nondistended; Bowel sounds present  NERVOUS SYSTEM:  Alert & Oriented X3,    EXTREMITIES:  2+ Peripheral Pulses, No clubbing, cyanosis, or edema  SKIN: warm dry                      CAPILLARY BLOOD GLUCOSE      RADIOLOGY & ADDITIONAL TESTS:

## 2022-10-12 NOTE — PROGRESS NOTE ADULT - PROBLEM SELECTOR PLAN 3
pt takes coreg, diltiazem ER, hydralazine 50 mg TID  Will increase coreg dose to 50mg  c/w home meds

## 2022-10-12 NOTE — PROGRESS NOTE ADULT - ATTENDING COMMENTS
68yo F PMHx of COPD, CVA w/ RLE weakness, Seizures, Type 2 DM, CAD, A. Fib not on AC, PAD s/p fem-pop bypass presenting with COPD exacerbation with hypercapnic failure requiring BiPAP initially admitted to the ICU. patient downgraded to the floors on 10/9/22.    #Acute Hypoxic and Hypercapnic Respiratory Failure  #COPD Exacerbation likely due to Viral Pneumonia  #Supraventricular Tachycardia  #Chronic Atrial Fibrillation  #Coronary Artery Disease  #ILEANA  #Type 2 DM  #Nicotine Withdrawal  - Obtain ambulatory O2 for possible home O2 needs  -continue on NC 2L - ween down as tolerated  - Monitor off BiPAP overnight and switch to prednisone 50mg qd tomorrow, per pulm recs - will need outpatient follow up  -continue on symbicort, duonebs  - Start guafinesin  -RVP positive for Rhinovirus  -c/w nicotine patch  -continue diltiazem and carvedilol  -continue on aspirin and plavix  -c/w glargine 8u at bedtime due to steroid induced hyperglycemia  -PT recommending [facility] - pending choices  -counseled on smoking cessation

## 2022-10-12 NOTE — PROGRESS NOTE ADULT - ATTENDING COMMENTS
Ms. Villavicencio is a 68yo woman, active smoker, with multiple comorbitidies as above including poorly controlled asthma/COPD, who presented to the ED due to acute on chronic shortness of breath x several days associated with productive cough and wheeze. Initially admitted to the ICU and managed on BiPAP. CXR unrevealing; RVP +for entero/rhinovirus. Pt received IV steroids and ATC nebs with empiric abx, now slowly improving and on NC. Pulmonary consulted for evaluation of acute respiratory failure.    Suspect current clinical presentation is due to acute exacerbation of pt's COPD, likely related to +entero/rhinovirus. Overnight with episode of SOB but overall continues to improve on steroids, bronchodilator nebs ATC, empiric abx, supportive care.    # Acute hypoxemic respiratory failure  # Acute exacerbation of COPD  # +Entero/rhinovirus      Recommendations:  - S/p 4d solumedrol 40mg q12h; would continue taper considering +viral infx with continued sx. Start prednisone 50mg/d today (equivalent of solumedrol 40mg/d); anticipate taper of 10mg q3d or as per clinical course  - continue bronchodilator nebs q4h ATC  - Completing 5d course of empiric CAP Tx today (on cef/azithro)  - Incentive spirometry 10x/h or as tolerated; OOB/TC  - 600-1200mg guaifenesin-ER standing BID for mucolytic effect  - Titrate supplemental O2 with goal SpO2 88-92% in pt with known chronic hypercarbia; titrated at bedside to 2LNC, would continue to wean as tolerated. Will need evaluation for home supplemental O2 if O2 needs continue prior to d/c  - Monitor off BiPAP qHS  - Incentive spirometry 10x/h or as tolerated; OOB/TC and ambulation daily as tolerated; instructed on how to use IS at bedside  - Will continue to follow with you  - On discharge would start triple therapy (ICS/LAMA/LABA) and will require follow up with Pulmonary Dr. East within 7 days of discharge for post-AECOPD care/COPD stars program. Will need full baseline PFTs once acute issues resolve      Veronique Jimenez MD  Pulmonary & Critical Care  Available on Teams

## 2022-10-12 NOTE — PHARMACOTHERAPY INTERVENTION NOTE - COMMENTS
New ILEANA, transition lovenox to heparin continuous infusion
Recommended to change Pantoprazole IV to PO.

## 2022-10-12 NOTE — PROGRESS NOTE ADULT - SUBJECTIVE AND OBJECTIVE BOX
PGY-3 Progress Note discussed with attending    PAGER #: [[phone]] TILL 5:00 PM  PLEASE CONTACT ON CALL TEAM:  - On Call Team (Please refer to Trevon) FROM 5:00 PM - 8:30PM  - Nightfloat Team FROM 8:30 -7:30 AM    OVERNIGHT EVENTS:   - reports sudden onset of anxiety following by dyspnea last night. Patient was given stat dose of solumedrol 40 IV. Patient reports no improvement in her symptoms, and states she remains to have persistent cough, sputum production, and dyspnea.     REVIEW OF SYSTEMS:  CONSTITUTIONAL: No fever, weight loss, or fatigue  RESPIRATORY: No cough, wheezing, chills or hemoptysis; No shortness of breath  CARDIOVASCULAR: No chest pain, palpitations, dizziness, or leg swelling  GASTROINTESTINAL: No abdominal pain. No nausea, vomiting, or hematemesis; No diarrhea or constipation. No melena or hematochezia.  GENITOURINARY: No dysuria or hematuria, urinary frequency  NEUROLOGICAL: No headaches, memory loss, loss of strength, numbness, or tremors  SKIN: No itching, burning, rashes, or lesions     MEDICATIONS  (STANDING):  albuterol/ipratropium for Nebulization 3 milliLiter(s) Nebulizer every 4 hours  aspirin  chewable 81 milliGRAM(s) Oral daily  atorvastatin 40 milliGRAM(s) Oral at bedtime  azithromycin  IVPB 500 milliGRAM(s) IV Intermittent daily  budesonide 160 MICROgram(s)/formoterol 4.5 MICROgram(s) Inhaler 2 Puff(s) Inhalation two times a day  carvedilol 25 milliGRAM(s) Oral every 12 hours  cefTRIAXone   IVPB      cefTRIAXone   IVPB 1000 milliGRAM(s) IV Intermittent every 24 hours  chlorhexidine 2% Cloths 1 Application(s) Topical <User Schedule>  clopidogrel Tablet 75 milliGRAM(s) Oral daily  diltiazem    milliGRAM(s) Oral daily  guaiFENesin  milliGRAM(s) Oral two times a day  heparin   Injectable 5000 Unit(s) SubCutaneous every 12 hours  hydrALAZINE 50 milliGRAM(s) Oral three times a day  insulin glargine Injectable (LANTUS) 8 Unit(s) SubCutaneous at bedtime  insulin lispro (ADMELOG) corrective regimen sliding scale   SubCutaneous Before meals and at bedtime  montelukast 10 milliGRAM(s) Oral at bedtime  nicotine - 21 mG/24Hr(s) Patch 1 Patch Transdermal daily  predniSONE   Tablet 50 milliGRAM(s) Oral daily  predniSONE   Tablet   Oral   topiramate 100 milliGRAM(s) Oral daily    MEDICATIONS  (PRN):  ondansetron Injectable 4 milliGRAM(s) IV Push every 8 hours PRN Nausea and/or Vomiting      Vital Signs Last 24 Hrs  T(C): 36.2 (12 Oct 2022 05:07), Max: 36.7 (11 Oct 2022 20:59)  T(F): 97.2 (12 Oct 2022 05:07), Max: 98.1 (11 Oct 2022 20:59)  HR: 65 (12 Oct 2022 05:07) (65 - 77)  BP: 174/80 (12 Oct 2022 05:07) (150/58 - 174/80)  BP(mean): --  RR: 18 (12 Oct 2022 05:07) (18 - 20)  SpO2: 100% (12 Oct 2022 05:07) (97% - 100%)    Parameters below as of 12 Oct 2022 05:07  Patient On (Oxygen Delivery Method): BiPAP/CPAP        PHYSICAL EXAMINATION:  GENERAL: mild respiratory distress, AAOx3  HEAD: AT/NC  EYES: conjunctiva and sclera clear  NECK: supple, No JVD noted, Normal thyroid  CHEST/LUNG: Wheezes noted b/l; no rales, rhonchi, or rubs  HEART: regular rate and rhythm; no murmurs, rubs, or gallops  ABDOMEN: soft, nontender, nondistended; Bowel sounds present  EXTREMITIES:  2+ Peripheral Pulses, No clubbing, cyanosis, or edema  SKIN: warm dry      COVID-19 PCR: NotDetec (11 Oct 2022 16:38)  SARS-CoV-2: NotDetec (08 Oct 2022 10:45)  COVID-19 PCR: NotDetec (07 Oct 2022 06:44)      CAPILLARY BLOOD GLUCOSE      POCT Blood Glucose.: 186 mg/dL (12 Oct 2022 07:47)  POCT Blood Glucose.: 201 mg/dL (11 Oct 2022 21:21)  POCT Blood Glucose.: 212 mg/dL (11 Oct 2022 16:41)  POCT Blood Glucose.: 239 mg/dL (11 Oct 2022 11:14)      RADIOLOGY & ADDITIONAL TESTS:                   PULMONARY CONSULT SERVICE FOLLOW-UP NOTE    PGY-3 Progress Note discussed with attending    PAGER #: [phone]] TILL 5:00 PM    OVERNIGHT EVENTS:   - reports sudden onset of anxiety following by dyspnea last night. Patient was given stat dose of solumedrol 40 IV. Patient reports no improvement in her symptoms, and states she remains to have persistent cough, sputum production, and dyspnea.     REVIEW OF SYSTEMS:  CONSTITUTIONAL: No fever, weight loss, or fatigue  RESPIRATORY: No cough, wheezing, chills or hemoptysis; No shortness of breath  CARDIOVASCULAR: No chest pain, palpitations, dizziness, or leg swelling  GASTROINTESTINAL: No abdominal pain. No nausea, vomiting, or hematemesis; No diarrhea or constipation. No melena or hematochezia.  GENITOURINARY: No dysuria or hematuria, urinary frequency  NEUROLOGICAL: No headaches, memory loss, loss of strength, numbness, or tremors  SKIN: No itching, burning, rashes, or lesions     MEDICATIONS  (STANDING):  albuterol/ipratropium for Nebulization 3 milliLiter(s) Nebulizer every 4 hours  aspirin  chewable 81 milliGRAM(s) Oral daily  atorvastatin 40 milliGRAM(s) Oral at bedtime  azithromycin  IVPB 500 milliGRAM(s) IV Intermittent daily  budesonide 160 MICROgram(s)/formoterol 4.5 MICROgram(s) Inhaler 2 Puff(s) Inhalation two times a day  carvedilol 25 milliGRAM(s) Oral every 12 hours  cefTRIAXone   IVPB      cefTRIAXone   IVPB 1000 milliGRAM(s) IV Intermittent every 24 hours  chlorhexidine 2% Cloths 1 Application(s) Topical <User Schedule>  clopidogrel Tablet 75 milliGRAM(s) Oral daily  diltiazem    milliGRAM(s) Oral daily  guaiFENesin  milliGRAM(s) Oral two times a day  heparin   Injectable 5000 Unit(s) SubCutaneous every 12 hours  hydrALAZINE 50 milliGRAM(s) Oral three times a day  insulin glargine Injectable (LANTUS) 8 Unit(s) SubCutaneous at bedtime  insulin lispro (ADMELOG) corrective regimen sliding scale   SubCutaneous Before meals and at bedtime  montelukast 10 milliGRAM(s) Oral at bedtime  nicotine - 21 mG/24Hr(s) Patch 1 Patch Transdermal daily  predniSONE   Tablet 50 milliGRAM(s) Oral daily  predniSONE   Tablet   Oral   topiramate 100 milliGRAM(s) Oral daily    MEDICATIONS  (PRN):  ondansetron Injectable 4 milliGRAM(s) IV Push every 8 hours PRN Nausea and/or Vomiting      Vital Signs Last 24 Hrs  T(C): 36.2 (12 Oct 2022 05:07), Max: 36.7 (11 Oct 2022 20:59)  T(F): 97.2 (12 Oct 2022 05:07), Max: 98.1 (11 Oct 2022 20:59)  HR: 65 (12 Oct 2022 05:07) (65 - 77)  BP: 174/80 (12 Oct 2022 05:07) (150/58 - 174/80)  BP(mean): --  RR: 18 (12 Oct 2022 05:07) (18 - 20)  SpO2: 100% (12 Oct 2022 05:07) (97% - 100%)    Parameters below as of 12 Oct 2022 05:07  Patient On (Oxygen Delivery Method): BiPAP/CPAP        PHYSICAL EXAMINATION:  GENERAL: mild respiratory distress, AAOx3  HEAD: AT/NC  EYES: conjunctiva and sclera clear  NECK: supple, No JVD noted, Normal thyroid  CHEST/LUNG: Wheezes noted b/l; no rales, rhonchi, or rubs  HEART: regular rate and rhythm; no murmurs, rubs, or gallops  ABDOMEN: soft, nontender, nondistended; Bowel sounds present  EXTREMITIES:  2+ Peripheral Pulses, No clubbing, cyanosis, or edema  SKIN: warm dry      COVID-19 PCR: NotDetec (11 Oct 2022 16:38)  SARS-CoV-2: NotDetec (08 Oct 2022 10:45)  COVID-19 PCR: NotDetec (07 Oct 2022 06:44)      CAPILLARY BLOOD GLUCOSE      POCT Blood Glucose.: 186 mg/dL (12 Oct 2022 07:47)  POCT Blood Glucose.: 201 mg/dL (11 Oct 2022 21:21)  POCT Blood Glucose.: 212 mg/dL (11 Oct 2022 16:41)  POCT Blood Glucose.: 239 mg/dL (11 Oct 2022 11:14)      RADIOLOGY & ADDITIONAL TESTS:  No interval chest imaging for review

## 2022-10-12 NOTE — PROGRESS NOTE ADULT - ASSESSMENT
Patient is a 66 yo Female, from home, with med hx significant for COPD, Left MCA CVA w/RLE weakness, Seizures, Type 2 DM, CAD s/p ZHAO, Afib not on AC, PAD s/p Right femoral popliteal bypass s/p occlusion + IR stent placement 2/2017, s/p cardiac cath 4/2022 w/ non-obstructive CAD, presenting to the ED due to shortness of breath likely 2/2 COPD exacerbation. Admitted to ICU for close monitoring of respiratory status. Pt downgraded to medicine unit on 5 south. Pt endorses high levels of anxiety and SOB. Pulmonary assessed at bedside. Initiated nebulizer therapy.

## 2022-10-12 NOTE — PROGRESS NOTE ADULT - SUBJECTIVE AND OBJECTIVE BOX
PATIENT SEEN AND EXAMINED ON :- 10/12/22  DATE OF SERVICE:  10/12/22           Interim events noted,Labs ,Radiological studies and Cardiology tests reviewed .       HOSPITAL COURSE: HPI:  Patient is a 68 yo Female, from home, with med hx significant for COPD, Left MCA CVA w/RLE weakness, Seizures, Type 2 DM, CAD s/p ZHAO, Afib not on AC, PAD s/p Right femoral popliteal bypass s/p occlusion + IR stent placement 2/2017, s/p cardiac cath 4/2022 w/non-obstructive CAD, presenting to the ED due to shortness of breath. Pt currently on Bipap, able to take in full sentences w/mild tachycardia 105-107 bpm when she coughs. Collateral history obtained from EMS charts and ER provider note.   EMS note says pt was not able to speak in complete sentences and had increased WOB, was given 12 mg dexamethasone and 0.3 mg Epi. In the ED, pt was placed on Bipap, noted to have narrow complex tachycardia to 150s, was given adenosine 6 mg then 12 mg, then cardizem 15 mg + duoneb x 3, decadron 10mg IVPB s/p HR ranging in the low 100s. +PT admits to cough and sputum production (07 Oct 2022 07:00)      INTERIM EVENTS:Patient seen at bedside ,interim events noted.      PMH -reviewed admission note, no change since admission  HEART FAILURE: Acute[ ]Chronic[ ] Systolic[ ] Diastolic[ ] Combined Systolic and Diastolic[ ]  CAD[ ] CABG[ ] PCI[ ]  DEVICES[ ] PPM[ ] ICD[ ] ILR[ ]  ATRIAL FIBRILLATION[ ] Paroxysmal[ ] Permanent[ ] CHADS2-[  ]  ILEANA[ ] CKD1[ ] CKD2[ ] CKD3[ ] CKD4[ ] ESRD[ ]  COPD[ ] HTN[ ]   DM[ ] Type1[ ] Type 2[ ]   CVA[ ] Paresis[ ]    AMBULATION: Assisted[ ] Cane/walker[ ] Independent[ ]    MEDICATIONS  (STANDING):  albuterol/ipratropium for Nebulization 3 milliLiter(s) Nebulizer every 4 hours  aspirin  chewable 81 milliGRAM(s) Oral daily  atorvastatin 40 milliGRAM(s) Oral at bedtime  azithromycin  IVPB 500 milliGRAM(s) IV Intermittent daily  budesonide 160 MICROgram(s)/formoterol 4.5 MICROgram(s) Inhaler 2 Puff(s) Inhalation two times a day  carvedilol 25 milliGRAM(s) Oral every 12 hours  chlorhexidine 2% Cloths 1 Application(s) Topical <User Schedule>  clopidogrel Tablet 75 milliGRAM(s) Oral daily  diltiazem    milliGRAM(s) Oral daily  guaiFENesin  milliGRAM(s) Oral two times a day  heparin   Injectable 5000 Unit(s) SubCutaneous every 12 hours  hydrALAZINE 50 milliGRAM(s) Oral three times a day  insulin glargine Injectable (LANTUS) 8 Unit(s) SubCutaneous at bedtime  insulin lispro (ADMELOG) corrective regimen sliding scale   SubCutaneous Before meals and at bedtime  montelukast 10 milliGRAM(s) Oral at bedtime  nicotine - 21 mG/24Hr(s) Patch 1 Patch Transdermal daily  pantoprazole    Tablet 40 milliGRAM(s) Oral before breakfast  predniSONE   Tablet   Oral   predniSONE   Tablet 50 milliGRAM(s) Oral daily  topiramate 100 milliGRAM(s) Oral daily    MEDICATIONS  (PRN):  ondansetron Injectable 4 milliGRAM(s) IV Push every 8 hours PRN Nausea and/or Vomiting            REVIEW OF SYSTEMS:  Constitutional: [ ] fever, [ ]weight loss,  [ ]fatigue [ ]weight gain  Eyes: [ ] visual changes  Respiratory: [ ]shortness of breath;  [ ] cough, [ ]wheezing, [ ]chills, [ ]hemoptysis  Cardiovascular: [ ] chest pain, [ ]palpitations, [ ]dizziness,  [ ]leg swelling[ ]orthopnea[ ]PND  Gastrointestinal: [ ] abdominal pain, [ ]nausea, [ ]vomiting,  [ ]diarrhea [ ]Constipation [ ]Melena  Genitourinary: [ ] dysuria, [ ] hematuria [ ]Doll  Neurologic: [ ] headaches [ ] tremors[ ]weakness [ ]Paralysis Right[ ] Left[ ]  Skin: [ ] itching, [ ]burning, [ ] rashes  Endocrine: [ ] heat or cold intolerance  Musculoskeletal: [ ] joint pain or swelling; [ ] muscle, back, or extremity pain  Psychiatric: [ ] depression, [ ]anxiety, [ ]mood swings, or [ ]difficulty sleeping  Hematologic: [ ] easy bruising, [ ] bleeding gums    [ ] All remaining systems negative except as per above.   [ ]Unable to obtain.  [x] No change in ROS since admission      Vital Signs Last 24 Hrs  T(C): 36.3 (12 Oct 2022 14:39), Max: 36.3 (12 Oct 2022 14:39)  T(F): 97.3 (12 Oct 2022 14:39), Max: 97.3 (12 Oct 2022 14:39)  HR: 88 (12 Oct 2022 20:15) (65 - 88)  BP: 159/91 (12 Oct 2022 17:38) (159/91 - 175/94)  BP(mean): --  RR: 18 (12 Oct 2022 14:39) (18 - 18)  SpO2: 96% (12 Oct 2022 20:15) (95% - 100%)    Parameters below as of 12 Oct 2022 14:39  Patient On (Oxygen Delivery Method): nasal cannula  O2 Flow (L/min): 3    I&O's Summary    11 Oct 2022 07:01  -  12 Oct 2022 07:00  --------------------------------------------------------  IN: 0 mL / OUT: 200 mL / NET: -200 mL        PHYSICAL EXAM:  General: No acute distress BMI-  HEENT: EOMI, PERRL  Neck: Supple, [ ] JVD  Lungs: Equal air entry bilaterally; [ ] rales [ ] wheezing [ ] rhonchi  Heart: Regular rate and rhythm; [x ] murmur   2/6 [ x] systolic [ ] diastolic [ ] radiation[ ] rubs [ ]  gallops  Abdomen: Nontender, bowel sounds present  Extremities: No clubbing, cyanosis, [ ] edema [ ]Pulses  equal and intact  Nervous system:  Alert & Oriented X3, no focal deficits  Psychiatric: Normal affect  Skin: No rashes or lesions    LABS:  10-12    135  |  102  |  43<H>  ----------------------------<  283<H>  4.2   |  26  |  1.24    Ca    9.4      12 Oct 2022 10:11  Phos  3.0     10-12  Mg     2.5     10-12      Creatinine Trend: 1.24<--, 1.41<--, 1.53<--, 1.98<--, 1.18<--                        15.7   8.78  )-----------( 292      ( 12 Oct 2022 10:11 )             49.8

## 2022-10-13 LAB
ANION GAP SERPL CALC-SCNC: 9 MMOL/L — SIGNIFICANT CHANGE UP (ref 5–17)
BUN SERPL-MCNC: 39 MG/DL — HIGH (ref 7–18)
CALCIUM SERPL-MCNC: 9.7 MG/DL — SIGNIFICANT CHANGE UP (ref 8.4–10.5)
CHLORIDE SERPL-SCNC: 105 MMOL/L — SIGNIFICANT CHANGE UP (ref 96–108)
CO2 SERPL-SCNC: 25 MMOL/L — SIGNIFICANT CHANGE UP (ref 22–31)
CREAT SERPL-MCNC: 1.29 MG/DL — SIGNIFICANT CHANGE UP (ref 0.5–1.3)
EGFR: 46 ML/MIN/1.73M2 — LOW
GLUCOSE BLDC GLUCOMTR-MCNC: 133 MG/DL — HIGH (ref 70–99)
GLUCOSE BLDC GLUCOMTR-MCNC: 157 MG/DL — HIGH (ref 70–99)
GLUCOSE BLDC GLUCOMTR-MCNC: 251 MG/DL — HIGH (ref 70–99)
GLUCOSE BLDC GLUCOMTR-MCNC: 294 MG/DL — HIGH (ref 70–99)
GLUCOSE SERPL-MCNC: 157 MG/DL — HIGH (ref 70–99)
HCT VFR BLD CALC: 47.2 % — HIGH (ref 34.5–45)
HGB BLD-MCNC: 14.7 G/DL — SIGNIFICANT CHANGE UP (ref 11.5–15.5)
MAGNESIUM SERPL-MCNC: 2.6 MG/DL — SIGNIFICANT CHANGE UP (ref 1.6–2.6)
MCHC RBC-ENTMCNC: 26.5 PG — LOW (ref 27–34)
MCHC RBC-ENTMCNC: 31.1 GM/DL — LOW (ref 32–36)
MCV RBC AUTO: 85 FL — SIGNIFICANT CHANGE UP (ref 80–100)
NRBC # BLD: 0 /100 WBCS — SIGNIFICANT CHANGE UP (ref 0–0)
PHOSPHATE SERPL-MCNC: 2.1 MG/DL — LOW (ref 2.5–4.5)
PLATELET # BLD AUTO: 283 K/UL — SIGNIFICANT CHANGE UP (ref 150–400)
POTASSIUM SERPL-MCNC: 3.9 MMOL/L — SIGNIFICANT CHANGE UP (ref 3.5–5.3)
POTASSIUM SERPL-SCNC: 3.9 MMOL/L — SIGNIFICANT CHANGE UP (ref 3.5–5.3)
RBC # BLD: 5.55 M/UL — HIGH (ref 3.8–5.2)
RBC # FLD: 13.2 % — SIGNIFICANT CHANGE UP (ref 10.3–14.5)
SODIUM SERPL-SCNC: 139 MMOL/L — SIGNIFICANT CHANGE UP (ref 135–145)
WBC # BLD: 8.9 K/UL — SIGNIFICANT CHANGE UP (ref 3.8–10.5)
WBC # FLD AUTO: 8.9 K/UL — SIGNIFICANT CHANGE UP (ref 3.8–10.5)

## 2022-10-13 PROCEDURE — 99232 SBSQ HOSP IP/OBS MODERATE 35: CPT | Mod: GC

## 2022-10-13 PROCEDURE — 99233 SBSQ HOSP IP/OBS HIGH 50: CPT | Mod: GC

## 2022-10-13 RX ADMIN — Medication 600 MILLIGRAM(S): at 17:54

## 2022-10-13 RX ADMIN — CARVEDILOL PHOSPHATE 25 MILLIGRAM(S): 80 CAPSULE, EXTENDED RELEASE ORAL at 17:55

## 2022-10-13 RX ADMIN — PANTOPRAZOLE SODIUM 40 MILLIGRAM(S): 20 TABLET, DELAYED RELEASE ORAL at 05:11

## 2022-10-13 RX ADMIN — Medication 6: at 17:49

## 2022-10-13 RX ADMIN — Medication 100 MILLIGRAM(S): at 12:49

## 2022-10-13 RX ADMIN — Medication 1 PATCH: at 12:48

## 2022-10-13 RX ADMIN — Medication 2: at 08:06

## 2022-10-13 RX ADMIN — CLOPIDOGREL BISULFATE 75 MILLIGRAM(S): 75 TABLET, FILM COATED ORAL at 12:47

## 2022-10-13 RX ADMIN — HEPARIN SODIUM 5000 UNIT(S): 5000 INJECTION INTRAVENOUS; SUBCUTANEOUS at 05:08

## 2022-10-13 RX ADMIN — Medication 3 MILLILITER(S): at 13:40

## 2022-10-13 RX ADMIN — BUDESONIDE AND FORMOTEROL FUMARATE DIHYDRATE 2 PUFF(S): 160; 4.5 AEROSOL RESPIRATORY (INHALATION) at 12:45

## 2022-10-13 RX ADMIN — Medication 3 MILLILITER(S): at 09:02

## 2022-10-13 RX ADMIN — Medication 6: at 12:47

## 2022-10-13 RX ADMIN — Medication 3 MILLILITER(S): at 17:34

## 2022-10-13 RX ADMIN — Medication 3 MILLILITER(S): at 02:53

## 2022-10-13 RX ADMIN — AZITHROMYCIN 255 MILLIGRAM(S): 500 TABLET, FILM COATED ORAL at 12:50

## 2022-10-13 RX ADMIN — Medication 3 MILLILITER(S): at 21:07

## 2022-10-13 RX ADMIN — MONTELUKAST 10 MILLIGRAM(S): 4 TABLET, CHEWABLE ORAL at 21:37

## 2022-10-13 RX ADMIN — ATORVASTATIN CALCIUM 40 MILLIGRAM(S): 80 TABLET, FILM COATED ORAL at 21:38

## 2022-10-13 RX ADMIN — INSULIN GLARGINE 8 UNIT(S): 100 INJECTION, SOLUTION SUBCUTANEOUS at 21:48

## 2022-10-13 RX ADMIN — Medication 50 MILLIGRAM(S): at 05:09

## 2022-10-13 RX ADMIN — BUDESONIDE AND FORMOTEROL FUMARATE DIHYDRATE 2 PUFF(S): 160; 4.5 AEROSOL RESPIRATORY (INHALATION) at 21:37

## 2022-10-13 RX ADMIN — Medication 50 MILLIGRAM(S): at 05:08

## 2022-10-13 RX ADMIN — Medication 3 MILLILITER(S): at 06:06

## 2022-10-13 RX ADMIN — Medication 50 MILLIGRAM(S): at 21:37

## 2022-10-13 RX ADMIN — Medication 1 PATCH: at 07:01

## 2022-10-13 RX ADMIN — Medication 50 MILLIGRAM(S): at 13:14

## 2022-10-13 RX ADMIN — Medication 600 MILLIGRAM(S): at 05:09

## 2022-10-13 RX ADMIN — Medication 1 PATCH: at 20:05

## 2022-10-13 RX ADMIN — Medication 1 PATCH: at 07:25

## 2022-10-13 RX ADMIN — HEPARIN SODIUM 5000 UNIT(S): 5000 INJECTION INTRAVENOUS; SUBCUTANEOUS at 17:53

## 2022-10-13 RX ADMIN — Medication 180 MILLIGRAM(S): at 05:09

## 2022-10-13 RX ADMIN — CARVEDILOL PHOSPHATE 25 MILLIGRAM(S): 80 CAPSULE, EXTENDED RELEASE ORAL at 05:09

## 2022-10-13 RX ADMIN — Medication 81 MILLIGRAM(S): at 12:46

## 2022-10-13 RX ADMIN — CHLORHEXIDINE GLUCONATE 1 APPLICATION(S): 213 SOLUTION TOPICAL at 05:07

## 2022-10-13 NOTE — PROGRESS NOTE ADULT - ASSESSMENT
a 68yo woman, active smoker, with PMH asthma/COPD, Left MCA CVA w/RLE weakness, Seizures, Type 2 DM, CAD s/p ZHAO, Afib not on AC, PAD s/p Right femoral popliteal bypass s/p occlusion + IR stent placement 2/2017, s/p cardiac cath 4/2022 w/non-obstructive CAD, who presented to the ED due to acute on chronic shortness of breath x several days associated with productive cough and wheeze. Initially admitted to the ICU and managed on BiPAP. CXR unrevealing; RVP +for entero/rhinovirus. Pt received IV steroids and ATC nebs with empiric abx, now slowly improving and on NC. Pulmonary consulted for evaluation of acute respiratory failure.    Suspect current clinical presentation is due to acute exacerbation of pt's COPD, likely related to +entero/rhinovirus. Slowly improving on IV steroids, bronchodilator nebs ATC, empiric abx, supportive care.    # Acute hypoxemic respiratory failure  # Acute exacerbation of COPD  # +Entero/rhinovirus      Recommendations:  - Continue systemic corticosteroids, solumedrol 40mg IV received in last night; prednisone 50mg/d (day 2) then taper by 10mg q3d (starting 10/14)  - continue bronchodilator nebs q4h ATC  - Agree with empiric CAP Tx x 5d (on cef/azithro)  - Incentive spirometry 10x/h or as tolerated; OOB/TC  - Consider trial of 600-1200mg guaifenesin-ER standing BID for mucolytic effect   - Titrate supplemental O2 with goal SpO2 88-92% in pt with known chronic hypercarbia; titrated at bedside to 2LNC, would continue to wean as tolerated  - monitor off bipap  - recommend starting triple therapy up on discharge and follow up with pulmonologist, Dr. East, w/i 7d of discharge.  - please obtain ambulatory O2 sat, as patient may need home O2 on discharge a 66yo woman, active smoker, with PMH asthma/COPD, Left MCA CVA w/RLE weakness, Seizures, Type 2 DM, CAD s/p ZHAO, Afib not on AC, PAD s/p Right femoral popliteal bypass s/p occlusion + IR stent placement 2/2017, s/p cardiac cath 4/2022 w/non-obstructive CAD, who presented to the ED due to acute on chronic shortness of breath x several days associated with productive cough and wheeze. Initially admitted to the ICU and managed on BiPAP. CXR unrevealing; RVP +for entero/rhinovirus. Pt received IV steroids and ATC nebs with empiric abx, now slowly improving and on NC. Pulmonary consulted for evaluation of acute respiratory failure.    Suspect current clinical presentation is due to acute exacerbation of pt's COPD, likely related to +entero/rhinovirus. Slowly improving on IV steroids, bronchodilator nebs ATC, empiric abx, supportive care.    # Acute hypoxemic respiratory failure  # Acute exacerbation of COPD  # +Entero/rhinovirus      Recommendations:  - Continue systemic corticosteroids, solumedrol 40mg IV received in last night; prednisone 50mg/d (day 2) then taper by 10mg q2d (starting 10/14)  - continue bronchodilator nebs q6h ATC while in hospital  - Agree with empiric CAP Tx x 5d (on cef/azithro)  - Incentive spirometry 10x/h or as tolerated; OOB/TC  - Consider trial of 600-1200mg guaifenesin-ER standing BID for mucolytic effect   - Titrate supplemental O2 with goal SpO2 88-92% in pt with known chronic hypercarbia; titrated at bedside to 2LNC, would continue to wean as tolerated  - monitor off bipap  - recommend starting triple therapy up on discharge and follow up with pulmonologist, Dr. East, w/i 7d of discharge.  - please obtain ambulatory O2 sat, as patient may need home O2 on discharge a 66yo woman, active smoker, with PMH asthma/COPD, Left MCA CVA w/RLE weakness, Seizures, Type 2 DM, CAD s/p ZHAO, Afib not on AC, PAD s/p Right femoral popliteal bypass s/p occlusion + IR stent placement 2/2017, s/p cardiac cath 4/2022 w/non-obstructive CAD, who presented to the ED due to acute on chronic shortness of breath x several days associated with productive cough and wheeze. Initially admitted to the ICU and managed on BiPAP. CXR unrevealing; RVP +for entero/rhinovirus. Pt received IV steroids and ATC nebs with empiric abx, now slowly improving and on NC. Pulmonary consulted for evaluation of acute respiratory failure.    Suspect current clinical presentation is due to acute exacerbation of pt's COPD, likely related to +entero/rhinovirus. Continues to improve on IV steroids, bronchodilator nebs ATC, empiric abx, supportive care.    # Acute hypoxemic respiratory failure  # Acute exacerbation of COPD  # +Entero/rhinovirus      Recommendations:  - Continue systemic corticosteroids, solumedrol 40mg IV received in last night; prednisone 50mg/d (day 2) then taper by 10mg q2d (starting 10/14)  - continue bronchodilator nebs q6h ATC while in hospital  - Agree with empiric CAP Tx x 5d (on cef/azithro)  - Incentive spirometry 10x/h or as tolerated; OOB/TC  - Consider trial of 600-1200mg guaifenesin-ER standing BID for mucolytic effect   - Titrate supplemental O2 with goal SpO2 88-92% in pt with known chronic hypercarbia; titrated at bedside to 2LNC, would continue to wean as tolerated  - monitor off bipap  - recommend starting triple therapy up on discharge and follow up with pulmonologist, Dr. East, w/i 7d of discharge.  - please obtain ambulatory O2 sat, as patient may need home O2 on discharge

## 2022-10-13 NOTE — PROGRESS NOTE ADULT - ATTENDING COMMENTS
Ms. Villavicencio is a 66yo woman, active smoker, with multiple comorbitidies as above including poorly controlled asthma/COPD, who presented to the ED due to acute on chronic shortness of breath x several days associated with productive cough and wheeze. Initially admitted to the ICU and managed on BiPAP. CXR unrevealing; RVP +for entero/rhinovirus. Pt received IV steroids and ATC nebs with empiric abx, now slowly improving and on NC. Pulmonary consulted for evaluation of acute respiratory failure.    Suspect current clinical presentation is due to acute exacerbation of pt's COPD, likely related to +entero/rhinovirus. Pt continues to improve on steroids, bronchodilator nebs ATC, empiric abx, supportive care.    # Acute hypoxemic respiratory failure  # Acute exacerbation of COPD  # +Entero/rhinovirus      Recommendations:  - S/p 4d solumedrol 40mg q12h; would continue taper considering +viral infx with continued sx. On day 2 of prednisone 50mg/d (10/12-); continue tapering by 10mg every 2 days (40mg/d starting tomorrow)  - Can deescalate bronchodilator nebs to q6h while pt remains in the hospital  - Received 5d course of empiric CAP Tx (on cef/azithro); would discontinue at this time  - Incentive spirometry 10x/h or as tolerated; OOB/TC  - 600-1200mg guaifenesin-ER standing BID for mucolytic effect  - Titrate supplemental O2 with goal SpO2 88-92% in pt with known chronic hypercarbia; titrated at bedside to 2LNC, would continue to wean as tolerated. Will need evaluation for home supplemental O2 if O2 needs continue prior to d/c  - Incentive spirometry 10x/h or as tolerated; OOB/TC and ambulation daily as tolerated  - Would benefit from SW visit before discharge for smoking cessation resources as well as psych counseling for anxiety/mood disorder  - On discharge would start triple therapy (ICS/LAMA/LABA; e.g. symbicort + spiriva; with use of spacer) and will require follow up with Pulmonary Dr. East within 7 days of discharge for post-AECOPD care/COPD stars program. Will need full baseline PFTs once acute issues resolve      Veronique Jimenez MD  Pulmonary & Critical Care  Available on Teams

## 2022-10-13 NOTE — PROGRESS NOTE ADULT - SUBJECTIVE AND OBJECTIVE BOX
PGY-3 Progress Note discussed with attending    PAGER #: [[phone]] TILL 5:00 PM  PLEASE CONTACT ON CALL TEAM:  - On Call Team (Please refer to Trevon) FROM 5:00 PM - 8:30PM  - Nightfloat Team FROM 8:30 -7:30 AM    OVERNIGHT EVENTS:   - no acute events overnight. Patient continues to complain of persistent cough and yellowish sputum production; however, is able to ambulate and talk in complete sentences without pause.    REVIEW OF SYSTEMS:  CONSTITUTIONAL: No fever, weight loss, or fatigue  RESPIRATORY: complains of cough and sputum production; No wheezing, chills or hemoptysis; No shortness of breath  CARDIOVASCULAR: No chest pain, palpitations, dizziness, or leg swelling  GASTROINTESTINAL: No abdominal pain. No nausea, vomiting, or hematemesis; No diarrhea or constipation. No melena or hematochezia.  GENITOURINARY: No dysuria or hematuria, urinary frequency  NEUROLOGICAL: No headaches, memory loss, loss of strength, numbness, or tremors  SKIN: No itching, burning, rashes, or lesions     MEDICATIONS  (STANDING):  albuterol/ipratropium for Nebulization 3 milliLiter(s) Nebulizer every 4 hours  aspirin  chewable 81 milliGRAM(s) Oral daily  atorvastatin 40 milliGRAM(s) Oral at bedtime  azithromycin  IVPB 500 milliGRAM(s) IV Intermittent daily  budesonide 160 MICROgram(s)/formoterol 4.5 MICROgram(s) Inhaler 2 Puff(s) Inhalation two times a day  carvedilol 25 milliGRAM(s) Oral every 12 hours  chlorhexidine 2% Cloths 1 Application(s) Topical <User Schedule>  clopidogrel Tablet 75 milliGRAM(s) Oral daily  diltiazem    milliGRAM(s) Oral daily  guaiFENesin  milliGRAM(s) Oral two times a day  heparin   Injectable 5000 Unit(s) SubCutaneous every 12 hours  hydrALAZINE 50 milliGRAM(s) Oral three times a day  insulin glargine Injectable (LANTUS) 8 Unit(s) SubCutaneous at bedtime  insulin lispro (ADMELOG) corrective regimen sliding scale   SubCutaneous Before meals and at bedtime  montelukast 10 milliGRAM(s) Oral at bedtime  nicotine - 21 mG/24Hr(s) Patch 1 Patch Transdermal daily  pantoprazole    Tablet 40 milliGRAM(s) Oral before breakfast  predniSONE   Tablet   Oral   topiramate 100 milliGRAM(s) Oral daily    MEDICATIONS  (PRN):  ondansetron Injectable 4 milliGRAM(s) IV Push every 8 hours PRN Nausea and/or Vomiting      Vital Signs Last 24 Hrs  T(C): 36.6 (13 Oct 2022 05:18), Max: 36.6 (13 Oct 2022 05:18)  T(F): 97.9 (13 Oct 2022 05:18), Max: 97.9 (13 Oct 2022 05:18)  HR: 73 (13 Oct 2022 05:18) (73 - 88)  BP: 161/84 (13 Oct 2022 05:18) (159/91 - 184/86)  BP(mean): --  RR: 16 (13 Oct 2022 05:18) (16 - 18)  SpO2: 99% (13 Oct 2022 05:18) (95% - 100%)    Parameters below as of 13 Oct 2022 05:18  Patient On (Oxygen Delivery Method): room air        PHYSICAL EXAMINATION:  GENERAL: NAD, AAOx3, tearful  HEAD: AT/NC  EYES: conjunctiva and sclera clear  NECK: supple, No JVD noted, Normal thyroid  CHEST/LUNG: CTABL; no rales, rhonchi, wheezing, or rubs  HEART: regular rate and rhythm; no murmurs, rubs, or gallops  ABDOMEN: soft, nontender, nondistended; Bowel sounds present  EXTREMITIES:  2+ Peripheral Pulses, No clubbing, cyanosis, or edema  SKIN: warm dry                          14.7   8.90  )-----------( 283      ( 13 Oct 2022 06:54 )             47.2     10-13    139  |  105  |  39<H>  ----------------------------<  157<H>  3.9   |  25  |  1.29    Ca    9.7      13 Oct 2022 06:54  Phos  2.1     10-13  Mg     2.6     10-13              COVID-19 PCR: NotDetec (11 Oct 2022 16:38)  SARS-CoV-2: NotDetec (08 Oct 2022 10:45)  COVID-19 PCR: NotDetec (07 Oct 2022 06:44)      CAPILLARY BLOOD GLUCOSE      POCT Blood Glucose.: 157 mg/dL (13 Oct 2022 07:34)  POCT Blood Glucose.: 181 mg/dL (12 Oct 2022 21:56)  POCT Blood Glucose.: 225 mg/dL (12 Oct 2022 16:59)  POCT Blood Glucose.: 284 mg/dL (12 Oct 2022 11:27)      RADIOLOGY & ADDITIONAL TESTS:                   PULMONARY CONSULT SERVICE FOLLOW-UP NOTE    PGY-3 Progress Note discussed with attending    PAGER #: [[phone]] TILL 5:00 PM    OVERNIGHT AND INTERVAL EVENTS:   - no acute events overnight. Patient continues to complain of persistent cough and yellowish sputum production; however, is able to ambulate and talk in complete sentences without pause. Reports feeling very sad this AM as she is ruminating about the deaths of her siblings in the past few years, feeling very stressed out, needing to "be the strongest" in the family.    REVIEW OF SYSTEMS:  CONSTITUTIONAL: No fever, weight loss, or fatigue  RESPIRATORY: complains of cough and sputum production; No subjective wheezing, chills or hemoptysis; No shortness of breath at rest but +with exertion  CARDIOVASCULAR: No chest pain, palpitations, dizziness, or leg swelling  GASTROINTESTINAL: No abdominal pain. No nausea, vomiting, or hematemesis; No diarrhea or constipation. No melena or hematochezia.  GENITOURINARY: No dysuria or hematuria, urinary frequency  NEUROLOGICAL: No headaches, memory loss, loss of strength, numbness, or tremors, +word finding difficulty at baseline  SKIN: No itching, burning, rashes, or lesions   PSYCH: reports feeling very sad    MEDICATIONS  (STANDING):  albuterol/ipratropium for Nebulization 3 milliLiter(s) Nebulizer every 4 hours  aspirin  chewable 81 milliGRAM(s) Oral daily  atorvastatin 40 milliGRAM(s) Oral at bedtime  azithromycin  IVPB 500 milliGRAM(s) IV Intermittent daily  budesonide 160 MICROgram(s)/formoterol 4.5 MICROgram(s) Inhaler 2 Puff(s) Inhalation two times a day  carvedilol 25 milliGRAM(s) Oral every 12 hours  chlorhexidine 2% Cloths 1 Application(s) Topical <User Schedule>  clopidogrel Tablet 75 milliGRAM(s) Oral daily  diltiazem    milliGRAM(s) Oral daily  guaiFENesin  milliGRAM(s) Oral two times a day  heparin   Injectable 5000 Unit(s) SubCutaneous every 12 hours  hydrALAZINE 50 milliGRAM(s) Oral three times a day  insulin glargine Injectable (LANTUS) 8 Unit(s) SubCutaneous at bedtime  insulin lispro (ADMELOG) corrective regimen sliding scale   SubCutaneous Before meals and at bedtime  montelukast 10 milliGRAM(s) Oral at bedtime  nicotine - 21 mG/24Hr(s) Patch 1 Patch Transdermal daily  pantoprazole    Tablet 40 milliGRAM(s) Oral before breakfast  predniSONE   Tablet   Oral   topiramate 100 milliGRAM(s) Oral daily    MEDICATIONS  (PRN):  ondansetron Injectable 4 milliGRAM(s) IV Push every 8 hours PRN Nausea and/or Vomiting      Vital Signs Last 24 Hrs  T(C): 36.6 (13 Oct 2022 05:18), Max: 36.6 (13 Oct 2022 05:18)  T(F): 97.9 (13 Oct 2022 05:18), Max: 97.9 (13 Oct 2022 05:18)  HR: 73 (13 Oct 2022 05:18) (73 - 88)  BP: 161/84 (13 Oct 2022 05:18) (159/91 - 184/86)  BP(mean): --  RR: 16 (13 Oct 2022 05:18) (16 - 18)  SpO2: 99% (13 Oct 2022 05:18) (95% - 100%)    Parameters below as of 13 Oct 2022 05:18  Patient On (Oxygen Delivery Method): room air        PHYSICAL EXAMINATION:  GENERAL: NAD, AAOx3, tearful  HEAD: AT/NC  EYES: conjunctiva and sclera clear  NECK: supple, No JVD noted, Normal thyroid  CHEST/LUNG: CTABL; no rales, rhonchi, wheezing, or rubs  HEART: regular rate and rhythm; no murmurs, rubs, or gallops  ABDOMEN: soft, nontender, nondistended; Bowel sounds present  EXTREMITIES:  2+ Peripheral Pulses, No clubbing, cyanosis, or edema  SKIN: warm dry                          14.7   8.90  )-----------( 283      ( 13 Oct 2022 06:54 )             47.2     10-13    139  |  105  |  39<H>  ----------------------------<  157<H>  3.9   |  25  |  1.29    Ca    9.7      13 Oct 2022 06:54  Phos  2.1     10-13  Mg     2.6     10-13              COVID-19 PCR: NotDetec (11 Oct 2022 16:38)  SARS-CoV-2: NotDetec (08 Oct 2022 10:45)  COVID-19 PCR: NotDetec (07 Oct 2022 06:44)      CAPILLARY BLOOD GLUCOSE      POCT Blood Glucose.: 157 mg/dL (13 Oct 2022 07:34)  POCT Blood Glucose.: 181 mg/dL (12 Oct 2022 21:56)  POCT Blood Glucose.: 225 mg/dL (12 Oct 2022 16:59)  POCT Blood Glucose.: 284 mg/dL (12 Oct 2022 11:27)      RADIOLOGY & ADDITIONAL TESTS:  No interval chest imaging for review

## 2022-10-13 NOTE — PROGRESS NOTE ADULT - SUBJECTIVE AND OBJECTIVE BOX
Medical Student year 3 Progress Note discussed with resident & attending    TEAMS or PAGER #: [[phone]]  TILL 5:00 PM  PLEASE CONTACT ON CALL TEAM:  - On Call Team (Please refer to Trevon) FROM 5:00 PM - 8:30PM  - Nightfloat Team FROM 8:30 -7:30 AM      INTERVAL HPI/OVERNIGHT EVENTS: No events overnight. Pt assessed at the bedside, in NAD, denies any chest pain, fever, chills, n/v/d. Endorses SOB that comes and go. Slept well without bipap last night. Denies feelings of anxiety and nervousness. Will continue to monitor.         MEDICATIONS  (STANDING):  albuterol/ipratropium for Nebulization 3 milliLiter(s) Nebulizer every 4 hours  aspirin  chewable 81 milliGRAM(s) Oral daily  atorvastatin 40 milliGRAM(s) Oral at bedtime  azithromycin  IVPB 500 milliGRAM(s) IV Intermittent daily  budesonide 160 MICROgram(s)/formoterol 4.5 MICROgram(s) Inhaler 2 Puff(s) Inhalation two times a day  carvedilol 25 milliGRAM(s) Oral every 12 hours  chlorhexidine 2% Cloths 1 Application(s) Topical <User Schedule>  clopidogrel Tablet 75 milliGRAM(s) Oral daily  diltiazem    milliGRAM(s) Oral daily  guaiFENesin  milliGRAM(s) Oral two times a day  heparin   Injectable 5000 Unit(s) SubCutaneous every 12 hours  hydrALAZINE 50 milliGRAM(s) Oral three times a day  insulin glargine Injectable (LANTUS) 8 Unit(s) SubCutaneous at bedtime  insulin lispro (ADMELOG) corrective regimen sliding scale   SubCutaneous Before meals and at bedtime  montelukast 10 milliGRAM(s) Oral at bedtime  nicotine - 21 mG/24Hr(s) Patch 1 Patch Transdermal daily  pantoprazole    Tablet 40 milliGRAM(s) Oral before breakfast  predniSONE   Tablet   Oral   topiramate 100 milliGRAM(s) Oral daily    MEDICATIONS  (PRN):  ondansetron Injectable 4 milliGRAM(s) IV Push every 8 hours PRN Nausea and/or Vomiting      REVIEW OF SYSTEMS:  CONSTITUTIONAL: No fever, weight loss, or fatigue  RESPIRATORY: + cough and SOB; No wheezing, chills or hemoptysis  CARDIOVASCULAR: No chest pain, palpitations, dizziness, or leg swelling  GASTROINTESTINAL: No abdominal pain. No nausea, vomiting, or hematemesis; No diarrhea or constipation. No melena or hematochezia.  GENITOURINARY: No dysuria or hematuria, urinary frequency  NEUROLOGICAL: No headaches, memory loss, loss of strength, numbness, or tremors  SKIN: No itching, burning, rashes, or lesions     Vital Signs Last 24 Hrs  T(C): 36.6 (13 Oct 2022 05:18), Max: 36.6 (13 Oct 2022 05:18)  T(F): 97.9 (13 Oct 2022 05:18), Max: 97.9 (13 Oct 2022 05:18)  HR: 73 (13 Oct 2022 05:18) (73 - 88)  BP: 161/84 (13 Oct 2022 05:18) (159/91 - 184/86)  BP(mean): --  RR: 16 (13 Oct 2022 05:18) (16 - 18)  SpO2: 99% (13 Oct 2022 05:18) (95% - 100%)    Parameters below as of 13 Oct 2022 05:18  Patient On (Oxygen Delivery Method): room air        PHYSICAL EXAMINATION:  GENERAL: NAD, well-developed, well-groomed  HEAD:  Atraumatic, Normocephalic  EYES:  conjunctiva and sclera clear  NECK: Supple, No JVD, Normal thyroid  CHEST/LUNG: Clear to auscultation; No rales, rhonchi, wheezing, or rubs  HEART: Regular rate and rhythm; No murmurs, rubs, or gallops  ABDOMEN: Soft, Nontender, Nondistended; Bowel sounds present  NERVOUS SYSTEM:  Alert & Oriented X3,    EXTREMITIES:  2+ Peripheral Pulses, No clubbing, cyanosis, or edema  SKIN: warm dry                          14.7   8.90  )-----------( 283      ( 13 Oct 2022 06:54 )             47.2     10-13    139  |  105  |  39<H>  ----------------------------<  157<H>  3.9   |  25  |  1.29    Ca    9.7      13 Oct 2022 06:54  Phos  2.1     10-13  Mg     2.6     10-13                CAPILLARY BLOOD GLUCOSE      RADIOLOGY & ADDITIONAL TESTS:

## 2022-10-13 NOTE — CHART NOTE - NSCHARTNOTEFT_GEN_A_CORE
Patient with generalized muscle weakness due to her copd now is unable to safely ambulate with a walker, cane, or crutches,. She will require a wheelchair to access the bathroom for toileting and the dining facilities with in her residence. She is agreeable to the wheel chair and has a 24hr assist w/ the wheel chair. Patient with generalized muscle weakness due to her copd now is unable to safely ambulate with a walker, cane, or crutches,. She will require a wheelchair to access the bathroom for toileting and the dining facilities with in her residence. She is agreeable to the wheel chair and has a 24hr assist w/ the wheel chair.    At Rest SpO2 w/out supplemental O2: 98%   Ambulating SpO2 w/out supplemental O2: 87% Patient with generalized muscle weakness due to her copd now is unable to safely ambulate with a walker, cane, or crutches,. She will require a wheelchair to access the bathroom for toileting and the dining facilities with in her residence. She is agreeable to the wheel chair and has a 24hr assist w/ the wheel chair.    At Rest SpO2 w/out supplemental O2: 88%   At Rest SpO2 with supplemental O2: 93  Ambulating SpO2 w/out supplemental O2: 87%  Ambulating SpO2 with supplemental O2: 92%

## 2022-10-13 NOTE — PROGRESS NOTE ADULT - ASSESSMENT
Patient is a 66 yo Female, from home, with med hx significant for COPD, Left MCA CVA w/RLE weakness, Seizures, Type 2 DM, CAD s/p ZHAO, Afib not on AC, PAD s/p Right femoral popliteal bypass s/p occlusion + IR stent placement 2/2017, s/p cardiac cath 4/2022 w/ non-obstructive CAD, presenting to the ED due to shortness of breath likely 2/2 COPD exacerbation. Admitted to ICU for close monitoring of respiratory status. Pt downgraded to medicine unit on 5 south. Patient denies feelings of anxiety. Slept well without bipap last night. Endorses SOB that comes and goes. Pending d/c to [facility] today.

## 2022-10-13 NOTE — PROGRESS NOTE ADULT - ATTENDING COMMENTS
66yo F PMHx of COPD, CVA w/ RLE weakness, Seizures, Type 2 DM, CAD, A. Fib not on AC, PAD s/p fem-pop bypass presenting with COPD exacerbation with hypercapnic failure requiring BiPAP initially admitted to the ICU. patient downgraded to the floors on 10/9/22.    #Acute Hypoxic and Hypercapnic Respiratory Failure  #COPD Exacerbation likely due to Viral Pneumonia  #Supraventricular Tachycardia  #Chronic Atrial Fibrillation  #Coronary Artery Disease  #ILEANA  #Type 2 DM  #Nicotine Withdrawal  - Obtain ambulatory O2 for possible home O2 needs  -continue on NC 2L - ween down as tolerated  -Prednisone 50mg  - Off BIPAP  -continue on symbicort, duonebs  - Start guafinesin  -RVP positive for Rhinovirus  -c/w nicotine patch  -continue diltiazem and carvedilol  -continue on aspirin and plavix  -c/w glargine 8u at bedtime due to steroid induced hyperglycemia  -counseled on smoking cessation.  -PT recommending [facility] - patient and family declining and would like to go home. Patient lives with sister.   - Will try to provide Home health services info, home O2, rollator/wheelchair

## 2022-10-14 ENCOUNTER — TRANSCRIPTION ENCOUNTER (OUTPATIENT)
Age: 67
End: 2022-10-14

## 2022-10-14 LAB
ANION GAP SERPL CALC-SCNC: 7 MMOL/L — SIGNIFICANT CHANGE UP (ref 5–17)
BUN SERPL-MCNC: 39 MG/DL — HIGH (ref 7–18)
CALCIUM SERPL-MCNC: 9.2 MG/DL — SIGNIFICANT CHANGE UP (ref 8.4–10.5)
CHLORIDE SERPL-SCNC: 106 MMOL/L — SIGNIFICANT CHANGE UP (ref 96–108)
CO2 SERPL-SCNC: 25 MMOL/L — SIGNIFICANT CHANGE UP (ref 22–31)
CREAT SERPL-MCNC: 1.24 MG/DL — SIGNIFICANT CHANGE UP (ref 0.5–1.3)
EGFR: 48 ML/MIN/1.73M2 — LOW
GLUCOSE BLDC GLUCOMTR-MCNC: 103 MG/DL — HIGH (ref 70–99)
GLUCOSE BLDC GLUCOMTR-MCNC: 178 MG/DL — HIGH (ref 70–99)
GLUCOSE BLDC GLUCOMTR-MCNC: 207 MG/DL — HIGH (ref 70–99)
GLUCOSE BLDC GLUCOMTR-MCNC: 301 MG/DL — HIGH (ref 70–99)
GLUCOSE SERPL-MCNC: 117 MG/DL — HIGH (ref 70–99)
HCT VFR BLD CALC: 48.9 % — HIGH (ref 34.5–45)
HGB BLD-MCNC: 15.3 G/DL — SIGNIFICANT CHANGE UP (ref 11.5–15.5)
MAGNESIUM SERPL-MCNC: 2.4 MG/DL — SIGNIFICANT CHANGE UP (ref 1.6–2.6)
MCHC RBC-ENTMCNC: 26.9 PG — LOW (ref 27–34)
MCHC RBC-ENTMCNC: 31.3 GM/DL — LOW (ref 32–36)
MCV RBC AUTO: 86.1 FL — SIGNIFICANT CHANGE UP (ref 80–100)
NRBC # BLD: 0 /100 WBCS — SIGNIFICANT CHANGE UP (ref 0–0)
PHOSPHATE SERPL-MCNC: 2 MG/DL — LOW (ref 2.5–4.5)
PLATELET # BLD AUTO: 287 K/UL — SIGNIFICANT CHANGE UP (ref 150–400)
POTASSIUM SERPL-MCNC: 3.9 MMOL/L — SIGNIFICANT CHANGE UP (ref 3.5–5.3)
POTASSIUM SERPL-SCNC: 3.9 MMOL/L — SIGNIFICANT CHANGE UP (ref 3.5–5.3)
RBC # BLD: 5.68 M/UL — HIGH (ref 3.8–5.2)
RBC # FLD: 13.2 % — SIGNIFICANT CHANGE UP (ref 10.3–14.5)
SODIUM SERPL-SCNC: 138 MMOL/L — SIGNIFICANT CHANGE UP (ref 135–145)
WBC # BLD: 8.9 K/UL — SIGNIFICANT CHANGE UP (ref 3.8–10.5)
WBC # FLD AUTO: 8.9 K/UL — SIGNIFICANT CHANGE UP (ref 3.8–10.5)

## 2022-10-14 PROCEDURE — 99232 SBSQ HOSP IP/OBS MODERATE 35: CPT

## 2022-10-14 PROCEDURE — 99233 SBSQ HOSP IP/OBS HIGH 50: CPT | Mod: GC

## 2022-10-14 RX ORDER — ACETAMINOPHEN 500 MG
650 TABLET ORAL ONCE
Refills: 0 | Status: COMPLETED | OUTPATIENT
Start: 2022-10-14 | End: 2022-10-14

## 2022-10-14 RX ORDER — NICOTINE POLACRILEX 2 MG
1 GUM BUCCAL
Qty: 10 | Refills: 0
Start: 2022-10-14 | End: 2022-10-23

## 2022-10-14 RX ORDER — IPRATROPIUM/ALBUTEROL SULFATE 18-103MCG
3 AEROSOL WITH ADAPTER (GRAM) INHALATION
Qty: 540 | Refills: 0
Start: 2022-10-14 | End: 2022-11-12

## 2022-10-14 RX ADMIN — PANTOPRAZOLE SODIUM 40 MILLIGRAM(S): 20 TABLET, DELAYED RELEASE ORAL at 05:38

## 2022-10-14 RX ADMIN — HEPARIN SODIUM 5000 UNIT(S): 5000 INJECTION INTRAVENOUS; SUBCUTANEOUS at 18:04

## 2022-10-14 RX ADMIN — Medication 180 MILLIGRAM(S): at 05:37

## 2022-10-14 RX ADMIN — BUDESONIDE AND FORMOTEROL FUMARATE DIHYDRATE 2 PUFF(S): 160; 4.5 AEROSOL RESPIRATORY (INHALATION) at 10:39

## 2022-10-14 RX ADMIN — Medication 2: at 17:19

## 2022-10-14 RX ADMIN — Medication 3 MILLILITER(S): at 14:15

## 2022-10-14 RX ADMIN — Medication 3 MILLILITER(S): at 20:30

## 2022-10-14 RX ADMIN — CARVEDILOL PHOSPHATE 25 MILLIGRAM(S): 80 CAPSULE, EXTENDED RELEASE ORAL at 18:04

## 2022-10-14 RX ADMIN — Medication 1 PATCH: at 07:34

## 2022-10-14 RX ADMIN — Medication 4: at 22:30

## 2022-10-14 RX ADMIN — Medication 50 MILLIGRAM(S): at 22:30

## 2022-10-14 RX ADMIN — MONTELUKAST 10 MILLIGRAM(S): 4 TABLET, CHEWABLE ORAL at 22:31

## 2022-10-14 RX ADMIN — Medication 650 MILLIGRAM(S): at 14:40

## 2022-10-14 RX ADMIN — Medication 3 MILLILITER(S): at 04:06

## 2022-10-14 RX ADMIN — BUDESONIDE AND FORMOTEROL FUMARATE DIHYDRATE 2 PUFF(S): 160; 4.5 AEROSOL RESPIRATORY (INHALATION) at 22:34

## 2022-10-14 RX ADMIN — Medication 50 MILLIGRAM(S): at 15:52

## 2022-10-14 RX ADMIN — Medication 3 MILLILITER(S): at 17:02

## 2022-10-14 RX ADMIN — HEPARIN SODIUM 5000 UNIT(S): 5000 INJECTION INTRAVENOUS; SUBCUTANEOUS at 05:40

## 2022-10-14 RX ADMIN — Medication 600 MILLIGRAM(S): at 05:37

## 2022-10-14 RX ADMIN — Medication 100 MILLIGRAM(S): at 11:39

## 2022-10-14 RX ADMIN — Medication 1 PATCH: at 11:40

## 2022-10-14 RX ADMIN — Medication 600 MILLIGRAM(S): at 18:04

## 2022-10-14 RX ADMIN — CLOPIDOGREL BISULFATE 75 MILLIGRAM(S): 75 TABLET, FILM COATED ORAL at 11:39

## 2022-10-14 RX ADMIN — ATORVASTATIN CALCIUM 40 MILLIGRAM(S): 80 TABLET, FILM COATED ORAL at 22:31

## 2022-10-14 RX ADMIN — Medication 81 MILLIGRAM(S): at 11:39

## 2022-10-14 RX ADMIN — Medication 3 MILLILITER(S): at 23:32

## 2022-10-14 RX ADMIN — Medication 3 MILLILITER(S): at 09:32

## 2022-10-14 RX ADMIN — CHLORHEXIDINE GLUCONATE 1 APPLICATION(S): 213 SOLUTION TOPICAL at 05:37

## 2022-10-14 RX ADMIN — INSULIN GLARGINE 8 UNIT(S): 100 INJECTION, SOLUTION SUBCUTANEOUS at 22:29

## 2022-10-14 RX ADMIN — CARVEDILOL PHOSPHATE 25 MILLIGRAM(S): 80 CAPSULE, EXTENDED RELEASE ORAL at 05:37

## 2022-10-14 RX ADMIN — Medication 50 MILLIGRAM(S): at 05:37

## 2022-10-14 RX ADMIN — Medication 8: at 11:54

## 2022-10-14 RX ADMIN — Medication 40 MILLIGRAM(S): at 05:38

## 2022-10-14 RX ADMIN — Medication 650 MILLIGRAM(S): at 15:00

## 2022-10-14 NOTE — DISCHARGE NOTE PROVIDER - ATTENDING DISCHARGE PHYSICAL EXAMINATION:
GENERAL: NAD, well-developed  HEAD:  Atraumatic, Normocephalic  EYES: EOMI, PERRLA, conjunctiva and sclera clear  NECK: Supple, No JVD  CHEST/LUNG: Clear to auscultation bilaterally; Coarse breath sounds bilaterally  HEART: Regular rate and rhythm; No murmurs, rubs, or gallops  ABDOMEN: Soft, Nontender, Nondistended; Bowel sounds present  EXTREMITIES:  2+ Peripheral Pulses, No clubbing, cyanosis, or edema  PSYCH: AAOx3  NEUROLOGY: baseline left sided weakness  SKIN: No rashes or lesions

## 2022-10-14 NOTE — PROGRESS NOTE ADULT - SUBJECTIVE AND OBJECTIVE BOX
Medical Student year 3 Progress Note discussed with resident & attending    TEAMS or PAGER #: [[phone]]  TILL 5:00 PM  PLEASE CONTACT ON CALL TEAM:  - On Call Team (Please refer to Trevon) FROM 5:00 PM - 8:30PM  - Nightfloat Team FROM 8:30 -7:30 AM      INTERVAL HPI/OVERNIGHT EVENTS: No events overnight. Pt assessed at the bedside, in NAD, denies any chest pain, fever, chills, n/v/d. + SOB and dizziness. Will continue to monitor.         MEDICATIONS  (STANDING):  albuterol/ipratropium for Nebulization 3 milliLiter(s) Nebulizer every 4 hours  aspirin  chewable 81 milliGRAM(s) Oral daily  atorvastatin 40 milliGRAM(s) Oral at bedtime  budesonide 160 MICROgram(s)/formoterol 4.5 MICROgram(s) Inhaler 2 Puff(s) Inhalation two times a day  carvedilol 25 milliGRAM(s) Oral every 12 hours  chlorhexidine 2% Cloths 1 Application(s) Topical <User Schedule>  clopidogrel Tablet 75 milliGRAM(s) Oral daily  diltiazem    milliGRAM(s) Oral daily  guaiFENesin  milliGRAM(s) Oral two times a day  heparin   Injectable 5000 Unit(s) SubCutaneous every 12 hours  hydrALAZINE 50 milliGRAM(s) Oral three times a day  insulin glargine Injectable (LANTUS) 8 Unit(s) SubCutaneous at bedtime  insulin lispro (ADMELOG) corrective regimen sliding scale   SubCutaneous Before meals and at bedtime  montelukast 10 milliGRAM(s) Oral at bedtime  nicotine - 21 mG/24Hr(s) Patch 1 Patch Transdermal daily  pantoprazole    Tablet 40 milliGRAM(s) Oral before breakfast  predniSONE   Tablet   Oral   predniSONE   Tablet 40 milliGRAM(s) Oral daily  topiramate 100 milliGRAM(s) Oral daily    MEDICATIONS  (PRN):  ondansetron Injectable 4 milliGRAM(s) IV Push every 8 hours PRN Nausea and/or Vomiting      REVIEW OF SYSTEMS:  CONSTITUTIONAL: No fever, weight loss, or fatigue  RESPIRATORY: No cough, chills or hemoptysis; + shortness of breath  CARDIOVASCULAR: + dizziness; No chest pain, palpitations, or leg swelling  GASTROINTESTINAL: No abdominal pain. No nausea, vomiting, or hematemesis; No diarrhea or constipation. No melena or hematochezia.  GENITOURINARY: No dysuria or hematuria, urinary frequency  NEUROLOGICAL: No headaches, memory loss, loss of strength, numbness, or tremors  SKIN: No itching, burning, rashes, or lesions     Vital Signs Last 24 Hrs  T(C): 36.3 (14 Oct 2022 05:28), Max: 36.6 (13 Oct 2022 17:45)  T(F): 97.3 (14 Oct 2022 05:28), Max: 97.8 (13 Oct 2022 17:45)  HR: 66 (14 Oct 2022 05:28) (66 - 80)  BP: 136/82 (14 Oct 2022 05:28) (136/82 - 190/97)  BP(mean): --  RR: 18 (14 Oct 2022 05:28) (18 - 19)  SpO2: 99% (14 Oct 2022 05:28) (93% - 100%)    Parameters below as of 14 Oct 2022 05:28  Patient On (Oxygen Delivery Method): room air        PHYSICAL EXAMINATION:  GENERAL: NAD, well-developed, well-groomed  HEAD:  Atraumatic, Normocephalic  EYES:  conjunctiva and sclera clear  NECK: Supple, No JVD, Normal thyroid  CHEST/LUNG: Clear to auscultation; No rales, rhonchi, wheezing, or rubs  HEART: Regular rate and rhythm; No murmurs, rubs, or gallops  ABDOMEN: Soft, Nontender, Nondistended; Bowel sounds present  NERVOUS SYSTEM:  Alert & Oriented X3,    EXTREMITIES:  2+ Peripheral Pulses, No clubbing, cyanosis, or edema  SKIN: warm dry                          15.3   8.90  )-----------( 287      ( 14 Oct 2022 05:52 )             48.9     10-14    138  |  106  |  39<H>  ----------------------------<  117<H>  3.9   |  25  |  1.24    Ca    9.2      14 Oct 2022 05:52  Phos  2.0     10-14  Mg     2.4     10-14                CAPILLARY BLOOD GLUCOSE      RADIOLOGY & ADDITIONAL TESTS:                   Medical Student year 3 Progress Note discussed with resident & attending    TEAMS or PAGER #: [[phone]]  TILL 5:00 PM  PLEASE CONTACT ON CALL TEAM:  - On Call Team (Please refer to Trevon) FROM 5:00 PM - 8:30PM  - Nightfloat Team FROM 8:30 -7:30 AM      INTERVAL HPI/OVERNIGHT EVENTS: No events overnight. Pt assessed at the bedside, in NAD, denies any chest pain, fever, chills, n/v/d. + SOB and dizziness. Pt pending discharge home with home PT. Will continue to monitor.         MEDICATIONS  (STANDING):  albuterol/ipratropium for Nebulization 3 milliLiter(s) Nebulizer every 4 hours  aspirin  chewable 81 milliGRAM(s) Oral daily  atorvastatin 40 milliGRAM(s) Oral at bedtime  budesonide 160 MICROgram(s)/formoterol 4.5 MICROgram(s) Inhaler 2 Puff(s) Inhalation two times a day  carvedilol 25 milliGRAM(s) Oral every 12 hours  chlorhexidine 2% Cloths 1 Application(s) Topical <User Schedule>  clopidogrel Tablet 75 milliGRAM(s) Oral daily  diltiazem    milliGRAM(s) Oral daily  guaiFENesin  milliGRAM(s) Oral two times a day  heparin   Injectable 5000 Unit(s) SubCutaneous every 12 hours  hydrALAZINE 50 milliGRAM(s) Oral three times a day  insulin glargine Injectable (LANTUS) 8 Unit(s) SubCutaneous at bedtime  insulin lispro (ADMELOG) corrective regimen sliding scale   SubCutaneous Before meals and at bedtime  montelukast 10 milliGRAM(s) Oral at bedtime  nicotine - 21 mG/24Hr(s) Patch 1 Patch Transdermal daily  pantoprazole    Tablet 40 milliGRAM(s) Oral before breakfast  predniSONE   Tablet   Oral   predniSONE   Tablet 40 milliGRAM(s) Oral daily  topiramate 100 milliGRAM(s) Oral daily    MEDICATIONS  (PRN):  ondansetron Injectable 4 milliGRAM(s) IV Push every 8 hours PRN Nausea and/or Vomiting      REVIEW OF SYSTEMS:  CONSTITUTIONAL: No fever, weight loss, or fatigue  RESPIRATORY: No cough, chills or hemoptysis; + shortness of breath  CARDIOVASCULAR: + dizziness; No chest pain, palpitations, or leg swelling  GASTROINTESTINAL: No abdominal pain. No nausea, vomiting, or hematemesis; No diarrhea or constipation. No melena or hematochezia.  GENITOURINARY: No dysuria or hematuria, urinary frequency  NEUROLOGICAL: No headaches, memory loss, loss of strength, numbness, or tremors  SKIN: No itching, burning, rashes, or lesions     Vital Signs Last 24 Hrs  T(C): 36.3 (14 Oct 2022 05:28), Max: 36.6 (13 Oct 2022 17:45)  T(F): 97.3 (14 Oct 2022 05:28), Max: 97.8 (13 Oct 2022 17:45)  HR: 66 (14 Oct 2022 05:28) (66 - 80)  BP: 136/82 (14 Oct 2022 05:28) (136/82 - 190/97)  BP(mean): --  RR: 18 (14 Oct 2022 05:28) (18 - 19)  SpO2: 99% (14 Oct 2022 05:28) (93% - 100%)    Parameters below as of 14 Oct 2022 05:28  Patient On (Oxygen Delivery Method): room air        PHYSICAL EXAMINATION:  GENERAL: NAD, well-developed, well-groomed  HEAD:  Atraumatic, Normocephalic  EYES:  conjunctiva and sclera clear  NECK: Supple, No JVD, Normal thyroid  CHEST/LUNG: Clear to auscultation; No rales, rhonchi, wheezing, or rubs  HEART: Regular rate and rhythm; No murmurs, rubs, or gallops  ABDOMEN: Soft, Nontender, Nondistended; Bowel sounds present  NERVOUS SYSTEM:  Alert & Oriented X3,    EXTREMITIES:  2+ Peripheral Pulses, No clubbing, cyanosis, or edema  SKIN: warm dry                          15.3   8.90  )-----------( 287      ( 14 Oct 2022 05:52 )             48.9     10-14    138  |  106  |  39<H>  ----------------------------<  117<H>  3.9   |  25  |  1.24    Ca    9.2      14 Oct 2022 05:52  Phos  2.0     10-14  Mg     2.4     10-14                CAPILLARY BLOOD GLUCOSE      RADIOLOGY & ADDITIONAL TESTS:

## 2022-10-14 NOTE — DISCHARGE NOTE PROVIDER - CARE PROVIDER_API CALL
Erika East)  Critical Care Medicine; Pulmonary [place] [address]  Phone: [phone]  Fax: [phone]  Follow Up Time: 1 week

## 2022-10-14 NOTE — PROGRESS NOTE ADULT - ATTENDING COMMENTS
68yo F PMHx of COPD, CVA w/ RLE weakness, Seizures, Type 2 DM, CAD, A. Fib not on AC, PAD s/p fem-pop bypass presenting with COPD exacerbation with hypercapnic failure requiring BiPAP initially admitted to the ICU. patient downgraded to the floors on 10/9/22.    #Acute Hypoxic and Hypercapnic Respiratory Failure  #COPD Exacerbation likely due to Viral Pneumonia  #Supraventricular Tachycardia  #Chronic Atrial Fibrillation  #Coronary Artery Disease  #ILEANA  #Type 2 DM  #Nicotine Withdrawal  - Obtain ambulatory O2 for possible home O2 needs  -continue on NC 2L - ween down as tolerated  -Prednisone 50mg  - Off BIPAP  -continue on symbicort, duonebs  - Start guafinesin  -RVP positive for Rhinovirus  -c/w nicotine patch  -continue diltiazem and carvedilol  -continue on aspirin and plavix  -c/w glargine 8u at bedtime due to steroid induced hyperglycemia  -counseled on smoking cessation.  -PT recommending [facility] - patient and family declining and would like to go home. Patient lives with sister.   - Will try to provide Home health services info, home O2, rollator/wheelchair - pending O2 concentrator and wheelchair tomorrow

## 2022-10-14 NOTE — DISCHARGE NOTE PROVIDER - NSDCPNSUBOBJ_GEN_ALL_CORE
Patient presented with acute hypoxic and hypercapnic respiratory failure regarding BIPAP. Started on steroids and abx for COPD exacerbation. Respiratory status improved. Patient to be discharged home on prednisone taper. Follow-up with pulmonology as outpatient.

## 2022-10-14 NOTE — PROGRESS NOTE ADULT - SUBJECTIVE AND OBJECTIVE BOX
PATIENT SEEN AND EXAMINED ON :- 10/14/22  DATE OF SERVICE:    10/14/22         Interim events noted,Labs ,Radiological studies and Cardiology tests reviewed .       HOSPITAL COURSE: HPI:  Patient is a 66 yo Female, from home, with med hx significant for COPD, Left MCA CVA w/RLE weakness, Seizures, Type 2 DM, CAD s/p ZHAO, Afib not on AC, PAD s/p Right femoral popliteal bypass s/p occlusion + IR stent placement 2/2017, s/p cardiac cath 4/2022 w/non-obstructive CAD, presenting to the ED due to shortness of breath. Pt currently on Bipap, able to take in full sentences w/mild tachycardia 105-107 bpm when she coughs. Collateral history obtained from EMS charts and ER provider note.   EMS note says pt was not able to speak in complete sentences and had increased WOB, was given 12 mg dexamethasone and 0.3 mg Epi. In the ED, pt was placed on Bipap, noted to have narrow complex tachycardia to 150s, was given adenosine 6 mg then 12 mg, then cardizem 15 mg + duoneb x 3, decadron 10mg IVPB s/p HR ranging in the low 100s. +PT admits to cough and sputum production (07 Oct 2022 07:00)      INTERIM EVENTS:Patient seen at bedside ,interim events noted.      PMH -reviewed admission note, no change since admission  HEART FAILURE: Acute[ ]Chronic[ ] Systolic[ ] Diastolic[ ] Combined Systolic and Diastolic[ ]  CAD[ ] CABG[ ] PCI[ ]  DEVICES[ ] PPM[ ] ICD[ ] ILR[ ]  ATRIAL FIBRILLATION[ ] Paroxysmal[ ] Permanent[ ] CHADS2-[  ]  ILEANA[ ] CKD1[ ] CKD2[ ] CKD3[ ] CKD4[ ] ESRD[ ]  COPD[ ] HTN[ ]   DM[ ] Type1[ ] Type 2[ ]   CVA[ ] Paresis[ ]    AMBULATION: Assisted[ ] Cane/walker[ ] Independent[ ]    MEDICATIONS  (STANDING):  albuterol/ipratropium for Nebulization 3 milliLiter(s) Nebulizer every 4 hours  aspirin  chewable 81 milliGRAM(s) Oral daily  atorvastatin 40 milliGRAM(s) Oral at bedtime  budesonide 160 MICROgram(s)/formoterol 4.5 MICROgram(s) Inhaler 2 Puff(s) Inhalation two times a day  carvedilol 25 milliGRAM(s) Oral every 12 hours  chlorhexidine 2% Cloths 1 Application(s) Topical <User Schedule>  clopidogrel Tablet 75 milliGRAM(s) Oral daily  diltiazem    milliGRAM(s) Oral daily  guaiFENesin  milliGRAM(s) Oral two times a day  heparin   Injectable 5000 Unit(s) SubCutaneous every 12 hours  hydrALAZINE 50 milliGRAM(s) Oral three times a day  insulin glargine Injectable (LANTUS) 8 Unit(s) SubCutaneous at bedtime  insulin lispro (ADMELOG) corrective regimen sliding scale   SubCutaneous Before meals and at bedtime  montelukast 10 milliGRAM(s) Oral at bedtime  nicotine - 21 mG/24Hr(s) Patch 1 Patch Transdermal daily  pantoprazole    Tablet 40 milliGRAM(s) Oral before breakfast  predniSONE   Tablet   Oral   predniSONE   Tablet 40 milliGRAM(s) Oral daily  topiramate 100 milliGRAM(s) Oral daily    MEDICATIONS  (PRN):  ondansetron Injectable 4 milliGRAM(s) IV Push every 8 hours PRN Nausea and/or Vomiting            REVIEW OF SYSTEMS:  Constitutional: [ ] fever, [ ]weight loss,  [ ]fatigue [ ]weight gain  Eyes: [ ] visual changes  Respiratory: [ ]shortness of breath;  [ ] cough, [ ]wheezing, [ ]chills, [ ]hemoptysis  Cardiovascular: [ ] chest pain, [ ]palpitations, [ ]dizziness,  [ ]leg swelling[ ]orthopnea[ ]PND  Gastrointestinal: [ ] abdominal pain, [ ]nausea, [ ]vomiting,  [ ]diarrhea [ ]Constipation [ ]Melena  Genitourinary: [ ] dysuria, [ ] hematuria [ ]Doll  Neurologic: [ ] headaches [ ] tremors[ ]weakness [ ]Paralysis Right[ ] Left[ ]  Skin: [ ] itching, [ ]burning, [ ] rashes  Endocrine: [ ] heat or cold intolerance  Musculoskeletal: [ ] joint pain or swelling; [ ] muscle, back, or extremity pain  Psychiatric: [ ] depression, [ ]anxiety, [ ]mood swings, or [ ]difficulty sleeping  Hematologic: [ ] easy bruising, [ ] bleeding gums    [ ] All remaining systems negative except as per above.   [ ]Unable to obtain.  [x] No change in ROS since admission      Vital Signs Last 24 Hrs  T(C): 36.3 (14 Oct 2022 13:25), Max: 36.4 (13 Oct 2022 22:18)  T(F): 97.4 (14 Oct 2022 13:25), Max: 97.6 (13 Oct 2022 22:18)  HR: 70 (14 Oct 2022 15:50) (66 - 80)  BP: 158/70 (14 Oct 2022 18:30) (136/82 - 178/94)  BP(mean): --  RR: 18 (14 Oct 2022 13:25) (18 - 19)  SpO2: 93% (14 Oct 2022 13:25) (93% - 100%)    Parameters below as of 14 Oct 2022 13:25  Patient On (Oxygen Delivery Method): nasal cannula  O2 Flow (L/min): 2    I&O's Summary      PHYSICAL EXAM:  General: No acute distress BMI-  HEENT: EOMI, PERRL  Neck: Supple, [ ] JVD  Lungs: Equal air entry bilaterally; [ ] rales [ ] wheezing [ ] rhonchi  Heart: Regular rate and rhythm; [x ] murmur   2/6 [ x] systolic [ ] diastolic [ ] radiation[ ] rubs [ ]  gallops  Abdomen: Nontender, bowel sounds present  Extremities: No clubbing, cyanosis, [ ] edema [ ]Pulses  equal and intact  Nervous system:  Alert & Oriented X3, no focal deficits  Psychiatric: Normal affect  Skin: No rashes or lesions    LABS:  10-14    138  |  106  |  39<H>  ----------------------------<  117<H>  3.9   |  25  |  1.24    Ca    9.2      14 Oct 2022 05:52  Phos  2.0     10-14  Mg     2.4     10-14      Creatinine Trend: 1.24<--, 1.29<--, 1.24<--, 1.41<--, 1.53<--, 1.98<--                        15.3   8.90  )-----------( 287      ( 14 Oct 2022 05:52 )             48.9

## 2022-10-14 NOTE — DISCHARGE NOTE PROVIDER - HOSPITAL COURSE
Patient is a 68 yo Female, from home, with med hx significant for COPD, Left MCA CVA w/RLE weakness, Seizures, Type 2 DM, CAD s/p ZHAO, Afib not on AC, PAD s/p Right femoral popliteal bypass s/p occlusion + IR stent placement 2/2017, s/p cardiac cath 4/2022 w/non-obstructive CAD, presenting to the ED due to shortness of breath. Pt currently on Bipap, able to take in full sentences w/mild tachycardia 105-107 bpm when she coughs. Collateral history obtained from EMS charts and ER provider note. EMS note says pt was not able to speak in complete sentences and had increased WOB, was given 12 mg dexamethasone and 0.3 mg Epi. In the ED, pt was placed on Bipap, noted to have narrow complex tachycardia to 150s, was given adenosine 6 mg then 12 mg, then cardizem 15 mg + duoneb x 3, decadron 10mg IVPB s/p HR ranging in the low 100s. +PT admits to cough and sputum production. Pt was admitted to ICU for COPD exacerbation. Pt was stabalized Patient is a 68 yo Female, from home, with med hx significant for COPD, Left MCA CVA w/RLE weakness, Seizures, Type 2 DM, CAD s/p ZHAO, Afib not on AC, PAD s/p Right femoral popliteal bypass s/p occlusion + IR stent placement 2/2017, s/p cardiac cath 4/2022 w/non-obstructive CAD, presenting to the ED due to shortness of breath. Pt currently on Bipap, able to take in full sentences w/mild tachycardia 105-107 bpm when she coughs. Collateral history obtained from EMS charts and ER provider note. EMS note says pt was not able to speak in complete sentences and had increased WOB, was given 12 mg dexamethasone and 0.3 mg Epi. In the ED, pt was placed on Bipap, noted to have narrow complex tachycardia to 150s, was given adenosine 6 mg then 12 mg, then cardizem 15 mg + duoneb x 3, decadron 10mg IVPB s/p HR ranging in the low 100s. +PT admits to cough and sputum production. Pt was admitted to ICU for COPD exacerbation. Patient was admitted to ICU for BiPAP. Patient was taken off BiPAP and was able to tolerate room air. She continued to use biPAP at night. she is stable for downgrade to medicine floors. Pt was switched from IV steroids to PO steroids. Patient is able to ambulate and tolerate diet prior to discharge. Patient is stable for discharge per attending and is advised to follow up with PCP as outpatient. Please refer to patient's complete medical chart with documents for a full hospital course, for this is only a brief summary.

## 2022-10-14 NOTE — PROGRESS NOTE ADULT - ASSESSMENT
Patient is a 68 yo Female, from home, with med hx significant for COPD, Left MCA CVA w/RLE weakness, Seizures, Type 2 DM, CAD s/p ZHAO, Afib not on AC, PAD s/p Right femoral popliteal bypass s/p occlusion + IR stent placement 2/2017, s/p cardiac cath 4/2022 w/ non-obstructive CAD, presenting to the ED due to shortness of breath likely 2/2 COPD exacerbation. Admitted to ICU for close monitoring of respiratory status. Pt downgraded to medicine unit on 5 south. Patient denies feelings of anxiety. Slept well without bipap last night. Endorses SOB.

## 2022-10-14 NOTE — PROGRESS NOTE ADULT - ASSESSMENT
Ms. Villavicencio is a 66yo woman, active smoker, with multiple comorbitidies as above including poorly controlled asthma/COPD, who presented to the ED due to acute on chronic shortness of breath x several days associated with productive cough and wheeze. Initially admitted to the ICU and managed on BiPAP. CXR unrevealing; RVP +for entero/rhinovirus. Pt received IV steroids and ATC nebs with empiric abx, now slowly improving and on NC. Pulmonary consulted for evaluation of acute respiratory failure.    Suspect current clinical presentation is due to acute exacerbation of pt's COPD, likely related to +entero/rhinovirus. Pt continues to improve on steroids, bronchodilator nebs ATC, empiric abx, supportive care.    # Acute hypoxemic respiratory failure  # Acute exacerbation of COPD  # +Entero/rhinovirus      Recommendations:  - On day 1 of prednisone 40mg/d (10/12-); continue tapering by 10mg every 2 days  - Can deescalate bronchodilator nebs to q6h while pt remains in the hospital  - Received 5d course of empiric CAP Tx (on cef/azithro); would discontinue at this time  - Incentive spirometry 10x/h or as tolerated; OOB/TC  - 600-1200mg guaifenesin-ER standing BID for mucolytic effect  - Titrate supplemental O2 with goal SpO2 88-92% in pt with known chronic hypercarbia; titrated at bedside to 2LNC, would continue to wean as tolerated. Will need evaluation for home supplemental O2 if O2 needs continue prior to d/c  - Incentive spirometry 10x/h or as tolerated; OOB/TC and ambulation daily as tolerated  - Would benefit from SW visit before discharge for smoking cessation resources as well as psych counseling for anxiety/mood disorder  - On discharge would start triple therapy (ICS/LAMA/LABA; e.g. symbicort + spiriva; with use of spacer) and will require follow up with Pulmonary Dr. East within 7 days of discharge for post-AECOPD care/COPD stars program. Will need full baseline PFTs once acute issues resolve      Veronique Jimenez MD  Pulmonary & Critical Care  Available on Teams .  Ms. Villavicencio is a 68yo woman, active smoker, with multiple comorbitidies as above including poorly controlled asthma/COPD, who presented to the ED due to acute on chronic shortness of breath x several days associated with productive cough and wheeze. Initially admitted to the ICU and managed on BiPAP. CXR unrevealing; RVP +for entero/rhinovirus. Pt received IV steroids and ATC nebs with empiric abx, now slowly improving and on NC. Pulmonary consulted for evaluation of acute respiratory failure.    Suspect current clinical presentation is due to acute exacerbation of pt's COPD, likely related to +entero/rhinovirus. Pt continues to improve on steroids, bronchodilator nebs ATC, empiric abx, supportive care.    # Acute hypoxemic respiratory failure  # Acute exacerbation of COPD  # +Entero/rhinovirus      Recommendations:  - On day 1 of prednisone 40mg/d (10/12-); continue tapering by 10mg every 2 days  - Can deescalate bronchodilator nebs to q6h while pt remains in the hospital  - Received 5d course of empiric CAP Tx (on cef/azithro); would discontinue at this time  - Incentive spirometry 10x/h or as tolerated; OOB/TC  - 600-1200mg guaifenesin-ER standing BID for mucolytic effect  - Titrate supplemental O2 with goal SpO2 88-92% in pt with known chronic hypercarbia; titrated at bedside to 2LNC, would continue to wean as tolerated. Will likely need home supplemental O2 and suspect pt may be chronically hypoxemic at baseline, particularly with exertion  - Incentive spirometry 10x/h or as tolerated; OOB/TC and ambulation daily as tolerated  - Would benefit from SW visit before discharge for smoking cessation resources. Reiterated importance of and methods for smoking cessation  - On discharge would start triple therapy (ICS/LAMA/LABA; e.g. symbicort + spiriva; with use of spacer) and will require follow up with Pulmonary Dr. East within 7 days of discharge for post-AECOPD care/COPD stars program. Will need full baseline PFTs once acute issues resolve      Veronique Jimenez MD  Pulmonary & Critical Care  Available on Teams

## 2022-10-14 NOTE — DISCHARGE NOTE PROVIDER - NSDCMRMEDTOKEN_GEN_ALL_CORE_FT
albuterol 90 mcg/inh inhalation aerosol: 2 puff(s) inhaled every 6 hours, As needed, Shortness of Breath  aspirin 81 mg oral tablet, chewable: 1 tab(s) orally once a day  carvedilol 25 mg oral tablet: 1 tab(s) orally 2 times a day  Crestor 40 mg oral tablet: 1 tab(s) orally once a day  dilTIAZem 180 mg/24 hours oral capsule, extended release: 1 cap(s) orally once a day  hydrALAZINE 50 mg oral tablet: 1 tab(s) orally 3 times a day  metFORMIN 1000 mg oral tablet: 1 tab(s) orally 2 times a day  montelukast 10 mg oral tablet: 1 tab(s) orally once a day (at bedtime)  pantoprazole 40 mg oral delayed release tablet: 1 tab(s) orally once a day  Plavix 75 mg oral tablet: 1 tab(s) orally once a day  topiramate 100 mg oral tablet: 1 tab(s) orally once a day  Zetia 10 mg oral tablet: 1 tab(s) orally once a day   albuterol 90 mcg/inh inhalation aerosol: 2 puff(s) inhaled every 6 hours, As needed, Shortness of Breath  aspirin 81 mg oral tablet, chewable: 1 tab(s) orally once a day  carvedilol 25 mg oral tablet: 1 tab(s) orally 2 times a day  Crestor 40 mg oral tablet: 1 tab(s) orally once a day  dilTIAZem 180 mg/24 hours oral capsule, extended release: 1 cap(s) orally once a day  hydrALAZINE 50 mg oral tablet: 1 tab(s) orally 3 times a day  ipratropium-albuterol 0.5 mg-2.5 mg/3 mL inhalation solution: 3 milliliter(s) inhaled every 4 hours  metFORMIN 1000 mg oral tablet: 1 tab(s) orally 2 times a day  montelukast 10 mg oral tablet: 1 tab(s) orally once a day (at bedtime)  nicotine 21 mg/24 hr transdermal film, extended release: 1 patch transdermal once a day   pantoprazole 40 mg oral delayed release tablet: 1 tab(s) orally once a day  Plavix 75 mg oral tablet: 1 tab(s) orally once a day  predniSONE 10 mg oral tablet: 4 tab(s) orally once a day x 2 days  3 tab(s) orally once a day x 2 days  2 tab(s) orally once a day x 2 days  1 tab(s) orally once a day x 2 days  topiramate 100 mg oral tablet: 1 tab(s) orally once a day  Zetia 10 mg oral tablet: 1 tab(s) orally once a day

## 2022-10-14 NOTE — PROGRESS NOTE ADULT - ASSESSMENT
Patient is a 66 yo Female, from home, with med hx significant for COPD, Left MCA CVA w/RLE weakness, Seizures, Type 2 DM, CAD s/p ZHAO, Afib not on AC, PAD s/p Right femoral popliteal bypass s/p occlusion + IR stent placement 2/2017, s/p cardiac cath 4/2022 w/ non-obstructive CAD, presenting to the ED due to shortness of breath likely 2/2 COPD exacerbation. Admitted to ICU for close monitoring of respiratory status. Pt downgraded to medicine unit on 5 south. Patient denies feelings of anxiety. Slept well without bipap last night. Endorses SOB.

## 2022-10-14 NOTE — PROGRESS NOTE ADULT - SUBJECTIVE AND OBJECTIVE BOX
PULMONARY CONSULT SERVICE FOLLOW-UP NOTE    INTERVAL HPI:  Reviewed chart and overnight events; patient seen and examined at bedside.    MEDICATIONS:  Pulmonary:  albuterol/ipratropium for Nebulization 3 milliLiter(s) Nebulizer every 4 hours  budesonide 160 MICROgram(s)/formoterol 4.5 MICROgram(s) Inhaler 2 Puff(s) Inhalation two times a day  guaiFENesin  milliGRAM(s) Oral two times a day  montelukast 10 milliGRAM(s) Oral at bedtime    Antimicrobials:    Anticoagulants:  aspirin  chewable 81 milliGRAM(s) Oral daily  clopidogrel Tablet 75 milliGRAM(s) Oral daily  heparin   Injectable 5000 Unit(s) SubCutaneous every 12 hours    Cardiac:  carvedilol 25 milliGRAM(s) Oral every 12 hours  diltiazem    milliGRAM(s) Oral daily  hydrALAZINE 50 milliGRAM(s) Oral three times a day      Allergies    No Known Allergies    Intolerances        Vital Signs Last 24 Hrs  T(C): 36.3 (14 Oct 2022 05:28), Max: 36.6 (13 Oct 2022 17:45)  T(F): 97.3 (14 Oct 2022 05:28), Max: 97.8 (13 Oct 2022 17:45)  HR: 66 (14 Oct 2022 05:28) (66 - 80)  BP: 136/82 (14 Oct 2022 05:28) (136/82 - 190/97)  BP(mean): --  RR: 18 (14 Oct 2022 05:28) (18 - 19)  SpO2: 99% (14 Oct 2022 05:28) (93% - 100%)    Parameters below as of 14 Oct 2022 05:28  Patient On (Oxygen Delivery Method): room air            PHYSICAL EXAM:  GEN: NAD, well-appearing, sitting comfortably upright in bed  HEENT: NC/AT, PERRL, anicteric sclera; oropharynx clear, MMM, neck supple, no appreciable JVD  RESP: no respiratory distress, CTA B/L; no W/R/R  CV: +S1/S2, RRR  GI: soft, NT/ND, +BS  EXTREM: WWP; no edema, clubbing or cyanosis  Vascular: 2+ radial pulses B/L  NEURO: AAOx3, RLE weakness (chronic), mild dysarthria noted    LABS:      CBC Full  -  ( 14 Oct 2022 05:52 )  WBC Count : 8.90 K/uL  RBC Count : 5.68 M/uL  Hemoglobin : 15.3 g/dL  Hematocrit : 48.9 %  Platelet Count - Automated : 287 K/uL  Mean Cell Volume : 86.1 fl  Mean Cell Hemoglobin : 26.9 pg  Mean Cell Hemoglobin Concentration : 31.3 gm/dL  Auto Neutrophil # : x  Auto Lymphocyte # : x  Auto Monocyte # : x  Auto Eosinophil # : x  Auto Basophil # : x  Auto Neutrophil % : x  Auto Lymphocyte % : x  Auto Monocyte % : x  Auto Eosinophil % : x  Auto Basophil % : x    10-14    138  |  106  |  39<H>  ----------------------------<  117<H>  3.9   |  25  |  1.24    Ca    9.2      14 Oct 2022 05:52  Phos  2.0     10-14  Mg     2.4     10-14          RADIOLOGY & ADDITIONAL STUDIES:  No interval chest imaging for review

## 2022-10-14 NOTE — DISCHARGE NOTE PROVIDER - NSDCCPCAREPLAN_GEN_ALL_CORE_FT
PRINCIPAL DISCHARGE DIAGNOSIS  Diagnosis: Asthma with COPD with exacerbation  Assessment and Plan of Treatment: You came in with worsening shortness of breath and cough, chest X-ray was done which showed no acute findings. Infectious disease specialist and pulmonologist was consulted, recommended to get blood cultures which came negative, viral panel was positive for entero/rhinovirus. You were diagnosed to have shorthness of breath secondary to COPD exacerbation. Antibiotics, steroids and inhalers were given which helped with your symptoms. Goal is to complete antibiotics course and prevent future exacerbations. Please continue taking medications as prescribed and follow up with your primary medical doctor and pulmonologist in one week.        SECONDARY DISCHARGE DIAGNOSES  Diagnosis: Hypertension  Assessment and Plan of Treatment: Blood Pressure Control , Please continue current medication regimen, and follow up with your PCP  - You have a history of Hypertension.   - Your Blood Pressure was adequately controlled with amlodipine, hydralazine, and clonidine.   - You should continue on the current antihypertensive regimen regularly.  - You blood pressure should be within 140-120/80-90.  - You should follow-up with your PCP within 1 week of your discharge for routine blood pressure monitoring at your next visit.  - Notify your doctor if you have any of the following symptoms:   (Dizziness, Lightheadedness, Blurry vision, Headache, Chest pain, Shortness of breath.)  - You should maintain healthy lifestyle by eating healthy low salt diet, avoid fatty food, weight loss, exercise regularly as tolerated 30 mins X 3 time per week.      Diagnosis: DM (diabetes mellitus)  Assessment and Plan of Treatment: Maintaining blood glucose level within normal range.  - You have a history of diabetes  - Your HbA1c is 8.2  - You should continue to take your medication regimen regularly as prescribed  - Please follow up with your primary care provider/endocrinologist within a week of discharge.  - You need to continue monitoring your blood sugar levels closely.  - Please maintain healthy lifestyle by eating healthy diabetic regimen, weight loss and exercise regularly as tolerated.  Make sure you get your HgA1c checked every three months.  If you take oral diabetes medications, check your blood glucose two times a day.  If you take insulin, check your blood glucose before meals and at bedtime.  It's important not to skip any meals.  Keep a log of your blood glucose results and always take it with you to your doctor appointments.  Keep a list of your current medications including injectables and over the counter medications and bring this medication list with you to all your doctor appointments.  If you have not seen your ophthalmologist this year call for appointment.  Check your feet daily for redness, sores, or openings. Do not self treat. If no improvement in two days call your primary care physician for an appointment.  Low blood sugar (hypoglycemia) is a blood sugar below 70mg/dl. Check your blood sugar if you feel signs/symptoms of hypoglycemia. If your blood sugar is below 70 take 15 grams of carbohydrates (ex 4 oz of apple juice, 3-4 glucose tablets, or 4-6 oz of regular soda) wait 15 minutes and repeat blood sugar to make sure it comes up above 70.  If your blood sugar is above 70 and you are due for a meal, have a meal.  If you are not due for a meal have a snack.  This snack helps keeps your blood sugar at a safe range.       PRINCIPAL DISCHARGE DIAGNOSIS  Diagnosis: Acute respiratory failure with hypoxia and hypercapnia  Assessment and Plan of Treatment: You came in with worsening shortness of breath and cough, chest X-ray was done which showed no acute findings. Infectious disease specialist and pulmonologist was consulted, recommended to get blood cultures which came negative, viral panel was positive for entero/rhinovirus. You were diagnosed to have shorthness of breath secondary to COPD exacerbation. Antibiotics, steroids and inhalers were given which helped with your symptoms. Goal is to complete antibiotics course and prevent future exacerbations. Please continue taking medications as prescribed and follow up with your primary medical doctor and pulmonologist in one week.      SECONDARY DISCHARGE DIAGNOSES  Diagnosis: DM (diabetes mellitus)  Assessment and Plan of Treatment: Maintaining blood glucose level within normal range.  - You have a history of diabetes  - Your HbA1c is 8.2  - You should continue to take your medication regimen regularly as prescribed  - Please follow up with your primary care provider/endocrinologist within a week of discharge.  - You need to continue monitoring your blood sugar levels closely.  - Please maintain healthy lifestyle by eating healthy diabetic regimen, weight loss and exercise regularly as tolerated.  Make sure you get your HgA1c checked every three months.  If you take oral diabetes medications, check your blood glucose two times a day.  If you take insulin, check your blood glucose before meals and at bedtime.  It's important not to skip any meals.  Keep a log of your blood glucose results and always take it with you to your doctor appointments.  Keep a list of your current medications including injectables and over the counter medications and bring this medication list with you to all your doctor appointments.  If you have not seen your ophthalmologist this year call for appointment.  Check your feet daily for redness, sores, or openings. Do not self treat. If no improvement in two days call your primary care physician for an appointment.  Low blood sugar (hypoglycemia) is a blood sugar below 70mg/dl. Check your blood sugar if you feel signs/symptoms of hypoglycemia. If your blood sugar is below 70 take 15 grams of carbohydrates (ex 4 oz of apple juice, 3-4 glucose tablets, or 4-6 oz of regular soda) wait 15 minutes and repeat blood sugar to make sure it comes up above 70.  If your blood sugar is above 70 and you are due for a meal, have a meal.  If you are not due for a meal have a snack.  This snack helps keeps your blood sugar at a safe range.      Diagnosis: Hypertension  Assessment and Plan of Treatment: Blood Pressure Control , Please continue current medication regimen, and follow up with your PCP  - You have a history of Hypertension.   - Your Blood Pressure was adequately controlled with amlodipine, hydralazine, and clonidine.   - You should continue on the current antihypertensive regimen regularly.  - You blood pressure should be within 140-120/80-90.  - You should follow-up with your PCP within 1 week of your discharge for routine blood pressure monitoring at your next visit.  - Notify your doctor if you have any of the following symptoms:   (Dizziness, Lightheadedness, Blurry vision, Headache, Chest pain, Shortness of breath.)  - You should maintain healthy lifestyle by eating healthy low salt diet, avoid fatty food, weight loss, exercise regularly as tolerated 30 mins X 3 time per week.      Diagnosis: Chronic atrial fibrillation  Assessment and Plan of Treatment: Continue carvedilol, diltiazem.  No AC due to history of bleed.    Diagnosis: Nicotine withdrawal  Assessment and Plan of Treatment: Continue on nicotine patch    Diagnosis: Type 2 diabetes mellitus with peripheral neuropathy  Assessment and Plan of Treatment:

## 2022-10-15 LAB
ANION GAP SERPL CALC-SCNC: 8 MMOL/L — SIGNIFICANT CHANGE UP (ref 5–17)
BUN SERPL-MCNC: 34 MG/DL — HIGH (ref 7–18)
CALCIUM SERPL-MCNC: 9.4 MG/DL — SIGNIFICANT CHANGE UP (ref 8.4–10.5)
CHLORIDE SERPL-SCNC: 107 MMOL/L — SIGNIFICANT CHANGE UP (ref 96–108)
CO2 SERPL-SCNC: 26 MMOL/L — SIGNIFICANT CHANGE UP (ref 22–31)
CREAT SERPL-MCNC: 1.19 MG/DL — SIGNIFICANT CHANGE UP (ref 0.5–1.3)
EGFR: 50 ML/MIN/1.73M2 — LOW
GLUCOSE BLDC GLUCOMTR-MCNC: 130 MG/DL — HIGH (ref 70–99)
GLUCOSE BLDC GLUCOMTR-MCNC: 164 MG/DL — HIGH (ref 70–99)
GLUCOSE BLDC GLUCOMTR-MCNC: 212 MG/DL — HIGH (ref 70–99)
GLUCOSE BLDC GLUCOMTR-MCNC: 235 MG/DL — HIGH (ref 70–99)
GLUCOSE SERPL-MCNC: 120 MG/DL — HIGH (ref 70–99)
HCT VFR BLD CALC: 46.7 % — HIGH (ref 34.5–45)
HGB BLD-MCNC: 14.9 G/DL — SIGNIFICANT CHANGE UP (ref 11.5–15.5)
MAGNESIUM SERPL-MCNC: 2.4 MG/DL — SIGNIFICANT CHANGE UP (ref 1.6–2.6)
MCHC RBC-ENTMCNC: 27.5 PG — SIGNIFICANT CHANGE UP (ref 27–34)
MCHC RBC-ENTMCNC: 31.9 GM/DL — LOW (ref 32–36)
MCV RBC AUTO: 86.3 FL — SIGNIFICANT CHANGE UP (ref 80–100)
NRBC # BLD: 0 /100 WBCS — SIGNIFICANT CHANGE UP (ref 0–0)
PHOSPHATE SERPL-MCNC: 2.3 MG/DL — LOW (ref 2.5–4.5)
PLATELET # BLD AUTO: 318 K/UL — SIGNIFICANT CHANGE UP (ref 150–400)
POTASSIUM SERPL-MCNC: 4 MMOL/L — SIGNIFICANT CHANGE UP (ref 3.5–5.3)
POTASSIUM SERPL-SCNC: 4 MMOL/L — SIGNIFICANT CHANGE UP (ref 3.5–5.3)
RBC # BLD: 5.41 M/UL — HIGH (ref 3.8–5.2)
RBC # FLD: 13.1 % — SIGNIFICANT CHANGE UP (ref 10.3–14.5)
SODIUM SERPL-SCNC: 141 MMOL/L — SIGNIFICANT CHANGE UP (ref 135–145)
WBC # BLD: 9.65 K/UL — SIGNIFICANT CHANGE UP (ref 3.8–10.5)
WBC # FLD AUTO: 9.65 K/UL — SIGNIFICANT CHANGE UP (ref 3.8–10.5)

## 2022-10-15 PROCEDURE — 99233 SBSQ HOSP IP/OBS HIGH 50: CPT | Mod: GC

## 2022-10-15 RX ORDER — IBUPROFEN 200 MG
200 TABLET ORAL ONCE
Refills: 0 | Status: COMPLETED | OUTPATIENT
Start: 2022-10-15 | End: 2022-10-15

## 2022-10-15 RX ORDER — IBUPROFEN 200 MG
400 TABLET ORAL ONCE
Refills: 0 | Status: DISCONTINUED | OUTPATIENT
Start: 2022-10-15 | End: 2022-10-15

## 2022-10-15 RX ORDER — ACETAMINOPHEN 500 MG
650 TABLET ORAL EVERY 6 HOURS
Refills: 0 | Status: DISCONTINUED | OUTPATIENT
Start: 2022-10-15 | End: 2022-10-19

## 2022-10-15 RX ADMIN — CARVEDILOL PHOSPHATE 25 MILLIGRAM(S): 80 CAPSULE, EXTENDED RELEASE ORAL at 17:22

## 2022-10-15 RX ADMIN — Medication 2: at 22:02

## 2022-10-15 RX ADMIN — Medication 600 MILLIGRAM(S): at 05:43

## 2022-10-15 RX ADMIN — Medication 81 MILLIGRAM(S): at 12:19

## 2022-10-15 RX ADMIN — INSULIN GLARGINE 8 UNIT(S): 100 INJECTION, SOLUTION SUBCUTANEOUS at 22:02

## 2022-10-15 RX ADMIN — Medication 4: at 12:10

## 2022-10-15 RX ADMIN — ATORVASTATIN CALCIUM 40 MILLIGRAM(S): 80 TABLET, FILM COATED ORAL at 22:03

## 2022-10-15 RX ADMIN — PANTOPRAZOLE SODIUM 40 MILLIGRAM(S): 20 TABLET, DELAYED RELEASE ORAL at 05:43

## 2022-10-15 RX ADMIN — Medication 4: at 17:21

## 2022-10-15 RX ADMIN — Medication 200 MILLIGRAM(S): at 23:12

## 2022-10-15 RX ADMIN — Medication 1 PATCH: at 19:00

## 2022-10-15 RX ADMIN — Medication 3 MILLILITER(S): at 20:17

## 2022-10-15 RX ADMIN — Medication 180 MILLIGRAM(S): at 05:42

## 2022-10-15 RX ADMIN — BUDESONIDE AND FORMOTEROL FUMARATE DIHYDRATE 2 PUFF(S): 160; 4.5 AEROSOL RESPIRATORY (INHALATION) at 22:08

## 2022-10-15 RX ADMIN — Medication 3 MILLILITER(S): at 09:56

## 2022-10-15 RX ADMIN — Medication 650 MILLIGRAM(S): at 17:37

## 2022-10-15 RX ADMIN — Medication 600 MILLIGRAM(S): at 17:22

## 2022-10-15 RX ADMIN — Medication 3 MILLILITER(S): at 23:22

## 2022-10-15 RX ADMIN — Medication 1 PATCH: at 07:58

## 2022-10-15 RX ADMIN — Medication 50 MILLIGRAM(S): at 15:14

## 2022-10-15 RX ADMIN — Medication 3 MILLILITER(S): at 03:49

## 2022-10-15 RX ADMIN — Medication 100 MILLIGRAM(S): at 14:31

## 2022-10-15 RX ADMIN — HEPARIN SODIUM 5000 UNIT(S): 5000 INJECTION INTRAVENOUS; SUBCUTANEOUS at 17:21

## 2022-10-15 RX ADMIN — BUDESONIDE AND FORMOTEROL FUMARATE DIHYDRATE 2 PUFF(S): 160; 4.5 AEROSOL RESPIRATORY (INHALATION) at 12:14

## 2022-10-15 RX ADMIN — Medication 40 MILLIGRAM(S): at 05:43

## 2022-10-15 RX ADMIN — CARVEDILOL PHOSPHATE 25 MILLIGRAM(S): 80 CAPSULE, EXTENDED RELEASE ORAL at 05:42

## 2022-10-15 RX ADMIN — MONTELUKAST 10 MILLIGRAM(S): 4 TABLET, CHEWABLE ORAL at 22:03

## 2022-10-15 RX ADMIN — Medication 3 MILLILITER(S): at 14:19

## 2022-10-15 RX ADMIN — HEPARIN SODIUM 5000 UNIT(S): 5000 INJECTION INTRAVENOUS; SUBCUTANEOUS at 05:47

## 2022-10-15 RX ADMIN — Medication 1 PATCH: at 12:17

## 2022-10-15 RX ADMIN — CHLORHEXIDINE GLUCONATE 1 APPLICATION(S): 213 SOLUTION TOPICAL at 05:42

## 2022-10-15 RX ADMIN — Medication 50 MILLIGRAM(S): at 05:43

## 2022-10-15 RX ADMIN — Medication 50 MILLIGRAM(S): at 22:03

## 2022-10-15 RX ADMIN — CLOPIDOGREL BISULFATE 75 MILLIGRAM(S): 75 TABLET, FILM COATED ORAL at 12:12

## 2022-10-15 RX ADMIN — Medication 650 MILLIGRAM(S): at 16:12

## 2022-10-15 RX ADMIN — Medication 200 MILLIGRAM(S): at 22:48

## 2022-10-15 NOTE — PROGRESS NOTE ADULT - SUBJECTIVE AND OBJECTIVE BOX
[facility]  Patient is a 67y old  Female who presents with a chief complaint of Shortness of breath (14 Oct 2022 21:34)      SUBJECTIVE / OVERNIGHT EVENTS:  No acute events over night. Reports SOB continues to improve. Would like to go home once all DMEs are set up at home. Denies any fevers/chills, headache, CP, abd pain, N/V/D, constipation, or leg swelling.       MEDICATIONS  (STANDING):  albuterol/ipratropium for Nebulization 3 milliLiter(s) Nebulizer every 4 hours  aspirin  chewable 81 milliGRAM(s) Oral daily  atorvastatin 40 milliGRAM(s) Oral at bedtime  budesonide 160 MICROgram(s)/formoterol 4.5 MICROgram(s) Inhaler 2 Puff(s) Inhalation two times a day  carvedilol 25 milliGRAM(s) Oral every 12 hours  chlorhexidine 2% Cloths 1 Application(s) Topical <User Schedule>  clopidogrel Tablet 75 milliGRAM(s) Oral daily  diltiazem    milliGRAM(s) Oral daily  guaiFENesin  milliGRAM(s) Oral two times a day  heparin   Injectable 5000 Unit(s) SubCutaneous every 12 hours  hydrALAZINE 50 milliGRAM(s) Oral three times a day  insulin glargine Injectable (LANTUS) 8 Unit(s) SubCutaneous at bedtime  insulin lispro (ADMELOG) corrective regimen sliding scale   SubCutaneous Before meals and at bedtime  montelukast 10 milliGRAM(s) Oral at bedtime  nicotine - 21 mG/24Hr(s) Patch 1 Patch Transdermal daily  pantoprazole    Tablet 40 milliGRAM(s) Oral before breakfast  predniSONE   Tablet   Oral   predniSONE   Tablet 40 milliGRAM(s) Oral daily  topiramate 100 milliGRAM(s) Oral daily    MEDICATIONS  (PRN):  ondansetron Injectable 4 milliGRAM(s) IV Push every 8 hours PRN Nausea and/or Vomiting        OBJECTIVE:  Vital Signs Last 24 Hrs  T(C): 36.7 (15 Oct 2022 05:28), Max: 37.3 (14 Oct 2022 22:03)  T(F): 98 (15 Oct 2022 05:28), Max: 99.1 (14 Oct 2022 22:03)  HR: 70 (15 Oct 2022 05:28) (69 - 77)  BP: 140/89 (15 Oct 2022 05:28) (128/74 - 178/94)  BP(mean): --  RR: 16 (15 Oct 2022 05:28) (16 - 18)  SpO2: 100% (15 Oct 2022 05:28) (93% - 100%)    Parameters below as of 15 Oct 2022 05:28  Patient On (Oxygen Delivery Method): nasal cannula  O2 Flow (L/min): 2    PHYSICAL EXAMINATION:  GENERAL: NAD, well-developed, well-groomed  HEAD:  Atraumatic, Normocephalic  EYES:  conjunctiva and sclera clear  NECK: Supple, No JVD, Normal thyroid  CHEST/LUNG: Clear to auscultation; No rales, rhonchi, wheezing, or rubs  HEART: Regular rate and rhythm; No murmurs, rubs, or gallops  ABDOMEN: Soft, Nontender, Nondistended; Bowel sounds present  NERVOUS SYSTEM:  Alert & Oriented X3,    EXTREMITIES:  2+ Peripheral Pulses, No clubbing, cyanosis, or edema  SKIN: warm dry    CAPILLARY BLOOD GLUCOSE      POCT Blood Glucose.: 235 mg/dL (15 Oct 2022 12:03)  POCT Blood Glucose.: 130 mg/dL (15 Oct 2022 07:45)  POCT Blood Glucose.: 207 mg/dL (14 Oct 2022 22:17)  POCT Blood Glucose.: 178 mg/dL (14 Oct 2022 17:02)    I&O's Summary      LABS:                        14.9   9.65  )-----------( 318      ( 15 Oct 2022 05:46 )             46.7     10-15    141  |  107  |  34<H>  ----------------------------<  120<H>  4.0   |  26  |  1.19    Ca    9.4      15 Oct 2022 05:46  Phos  2.3     10-15  Mg     2.4     10-15                    RADIOLOGY & ADDITIONAL TESTS:

## 2022-10-15 NOTE — PROGRESS NOTE ADULT - ASSESSMENT
66yo F PMHx of COPD, CVA w/ RLE weakness, Seizures, Type 2 DM, CAD, A. Fib not on AC, PAD s/p fem-pop bypass presenting with COPD exacerbation with hypercapnic failure requiring BiPAP initially admitted to the ICU. patient downgraded to the floors on 10/9/22.    #Acute Hypoxic and Hypercapnic Respiratory Failure  #COPD Exacerbation likely due to Viral Pneumonia  #Supraventricular Tachycardia  #Chronic Atrial Fibrillation  #Coronary Artery Disease  #ILEANA  #Type 2 DM  #Nicotine Withdrawal  RVP positive for Rhinovirus  -continue on NC 2L - ween down as tolerated  -Prednisone 50mg  - Off BIPAP  -continue on symbicort, duoneb, guafinesin  -c/w nicotine patch  -continue diltiazem and carvedilol  -continue on aspirin and plavix  -c/w glargine 8u at bedtime due to steroid induced hyperglycemia  -counseled on smoking cessation.  -PT recommending VERONICA - patient and family declining and would like to go home. Patient lives with sister.   - Will try to provide Home health services info, home O2, rollator/wheelchair - pending O2 concentrator and wheelchair tomorrow.

## 2022-10-15 NOTE — PROGRESS NOTE ADULT - SUBJECTIVE AND OBJECTIVE BOX
PATIENT SEEN AND EXAMINED ON :- 10/15/22  DATE OF SERVICE: 10/15/22            Interim events noted,Labs ,Radiological studies and Cardiology tests reviewed .       HOSPITAL COURSE: HPI:  Patient is a 68 yo Female, from home, with med hx significant for COPD, Left MCA CVA w/RLE weakness, Seizures, Type 2 DM, CAD s/p ZHAO, Afib not on AC, PAD s/p Right femoral popliteal bypass s/p occlusion + IR stent placement 2/2017, s/p cardiac cath 4/2022 w/non-obstructive CAD, presenting to the ED due to shortness of breath. Pt currently on Bipap, able to take in full sentences w/mild tachycardia 105-107 bpm when she coughs. Collateral history obtained from EMS charts and ER provider note.   EMS note says pt was not able to speak in complete sentences and had increased WOB, was given 12 mg dexamethasone and 0.3 mg Epi. In the ED, pt was placed on Bipap, noted to have narrow complex tachycardia to 150s, was given adenosine 6 mg then 12 mg, then cardizem 15 mg + duoneb x 3, decadron 10mg IVPB s/p HR ranging in the low 100s. +PT admits to cough and sputum production (07 Oct 2022 07:00)      INTERIM EVENTS:Patient seen at bedside ,interim events noted.      PMH -reviewed admission note, no change since admission  HEART FAILURE: Acute[ ]Chronic[ ] Systolic[ ] Diastolic[ ] Combined Systolic and Diastolic[ ]  CAD[ ] CABG[ ] PCI[ ]  DEVICES[ ] PPM[ ] ICD[ ] ILR[ ]  ATRIAL FIBRILLATION[ ] Paroxysmal[ ] Permanent[ ] CHADS2-[  ]  ILEANA[ ] CKD1[ ] CKD2[ ] CKD3[ ] CKD4[ ] ESRD[ ]  COPD[ ] HTN[ ]   DM[ ] Type1[ ] Type 2[ ]   CVA[ ] Paresis[ ]    AMBULATION: Assisted[ ] Cane/walker[ ] Independent[ ]    MEDICATIONS  (STANDING):  albuterol/ipratropium for Nebulization 3 milliLiter(s) Nebulizer every 4 hours  aspirin  chewable 81 milliGRAM(s) Oral daily  atorvastatin 40 milliGRAM(s) Oral at bedtime  budesonide 160 MICROgram(s)/formoterol 4.5 MICROgram(s) Inhaler 2 Puff(s) Inhalation two times a day  carvedilol 25 milliGRAM(s) Oral every 12 hours  chlorhexidine 2% Cloths 1 Application(s) Topical <User Schedule>  clopidogrel Tablet 75 milliGRAM(s) Oral daily  diltiazem    milliGRAM(s) Oral daily  guaiFENesin  milliGRAM(s) Oral two times a day  heparin   Injectable 5000 Unit(s) SubCutaneous every 12 hours  hydrALAZINE 50 milliGRAM(s) Oral three times a day  insulin glargine Injectable (LANTUS) 8 Unit(s) SubCutaneous at bedtime  insulin lispro (ADMELOG) corrective regimen sliding scale   SubCutaneous Before meals and at bedtime  montelukast 10 milliGRAM(s) Oral at bedtime  nicotine - 21 mG/24Hr(s) Patch 1 Patch Transdermal daily  pantoprazole    Tablet 40 milliGRAM(s) Oral before breakfast  predniSONE   Tablet   Oral   predniSONE   Tablet 40 milliGRAM(s) Oral daily  topiramate 100 milliGRAM(s) Oral daily    MEDICATIONS  (PRN):  acetaminophen     Tablet .. 650 milliGRAM(s) Oral every 6 hours PRN Mild Pain (1 - 3)  ondansetron Injectable 4 milliGRAM(s) IV Push every 8 hours PRN Nausea and/or Vomiting            REVIEW OF SYSTEMS:  Constitutional: [ ] fever, [ ]weight loss,  [ ]fatigue [ ]weight gain  Eyes: [ ] visual changes  Respiratory: [ ]shortness of breath;  [ ] cough, [ ]wheezing, [ ]chills, [ ]hemoptysis  Cardiovascular: [ ] chest pain, [ ]palpitations, [ ]dizziness,  [ ]leg swelling[ ]orthopnea[ ]PND  Gastrointestinal: [ ] abdominal pain, [ ]nausea, [ ]vomiting,  [ ]diarrhea [ ]Constipation [ ]Melena  Genitourinary: [ ] dysuria, [ ] hematuria [ ]Doll  Neurologic: [ ] headaches [ ] tremors[ ]weakness [ ]Paralysis Right[ ] Left[ ]  Skin: [ ] itching, [ ]burning, [ ] rashes  Endocrine: [ ] heat or cold intolerance  Musculoskeletal: [ ] joint pain or swelling; [ ] muscle, back, or extremity pain  Psychiatric: [ ] depression, [ ]anxiety, [ ]mood swings, or [ ]difficulty sleeping  Hematologic: [ ] easy bruising, [ ] bleeding gums    [ ] All remaining systems negative except as per above.   [ ]Unable to obtain.  [x] No change in ROS since admission      Vital Signs Last 24 Hrs  T(C): 36.4 (15 Oct 2022 15:09), Max: 37.3 (14 Oct 2022 22:03)  T(F): 97.5 (15 Oct 2022 15:09), Max: 99.1 (14 Oct 2022 22:03)  HR: 75 (15 Oct 2022 17:01) (69 - 78)  BP: 158/88 (15 Oct 2022 17:01) (128/74 - 158/88)  BP(mean): --  RR: 18 (15 Oct 2022 13:12) (16 - 18)  SpO2: 99% (15 Oct 2022 15:09) (97% - 100%)    Parameters below as of 15 Oct 2022 15:09  Patient On (Oxygen Delivery Method): nasal cannula  O2 Flow (L/min): 2    I&O's Summary      PHYSICAL EXAM:  General: No acute distress BMI-  HEENT: EOMI, PERRL  Neck: Supple, [ ] JVD  Lungs: Equal air entry bilaterally; [ ] rales [ ] wheezing [ ] rhonchi  Heart: Regular rate and rhythm; [x ] murmur   2/6 [ x] systolic [ ] diastolic [ ] radiation[ ] rubs [ ]  gallops  Abdomen: Nontender, bowel sounds present  Extremities: No clubbing, cyanosis, [ ] edema [ ]Pulses  equal and intact  Nervous system:  Alert & Oriented X3, no focal deficits  Psychiatric: Normal affect  Skin: No rashes or lesions    LABS:  10-15    141  |  107  |  34<H>  ----------------------------<  120<H>  4.0   |  26  |  1.19    Ca    9.4      15 Oct 2022 05:46  Phos  2.3     10-15  Mg     2.4     10-15      Creatinine Trend: 1.19<--, 1.24<--, 1.29<--, 1.24<--, 1.41<--, 1.53<--                        14.9   9.65  )-----------( 318      ( 15 Oct 2022 05:46 )             46.7

## 2022-10-16 LAB
ANION GAP SERPL CALC-SCNC: 10 MMOL/L — SIGNIFICANT CHANGE UP (ref 5–17)
BUN SERPL-MCNC: 35 MG/DL — HIGH (ref 7–18)
CALCIUM SERPL-MCNC: 9.3 MG/DL — SIGNIFICANT CHANGE UP (ref 8.4–10.5)
CHLORIDE SERPL-SCNC: 107 MMOL/L — SIGNIFICANT CHANGE UP (ref 96–108)
CO2 SERPL-SCNC: 21 MMOL/L — LOW (ref 22–31)
CREAT SERPL-MCNC: 1.2 MG/DL — SIGNIFICANT CHANGE UP (ref 0.5–1.3)
EGFR: 50 ML/MIN/1.73M2 — LOW
GLUCOSE BLDC GLUCOMTR-MCNC: 121 MG/DL — HIGH (ref 70–99)
GLUCOSE BLDC GLUCOMTR-MCNC: 129 MG/DL — HIGH (ref 70–99)
GLUCOSE BLDC GLUCOMTR-MCNC: 201 MG/DL — HIGH (ref 70–99)
GLUCOSE BLDC GLUCOMTR-MCNC: 255 MG/DL — HIGH (ref 70–99)
GLUCOSE SERPL-MCNC: 119 MG/DL — HIGH (ref 70–99)
HCT VFR BLD CALC: 43.2 % — SIGNIFICANT CHANGE UP (ref 34.5–45)
HGB BLD-MCNC: 13.7 G/DL — SIGNIFICANT CHANGE UP (ref 11.5–15.5)
MAGNESIUM SERPL-MCNC: 2.3 MG/DL — SIGNIFICANT CHANGE UP (ref 1.6–2.6)
MCHC RBC-ENTMCNC: 27 PG — SIGNIFICANT CHANGE UP (ref 27–34)
MCHC RBC-ENTMCNC: 31.7 GM/DL — LOW (ref 32–36)
MCV RBC AUTO: 85 FL — SIGNIFICANT CHANGE UP (ref 80–100)
NRBC # BLD: 0 /100 WBCS — SIGNIFICANT CHANGE UP (ref 0–0)
PHOSPHATE SERPL-MCNC: 2.1 MG/DL — LOW (ref 2.5–4.5)
PLATELET # BLD AUTO: 307 K/UL — SIGNIFICANT CHANGE UP (ref 150–400)
POTASSIUM SERPL-MCNC: 4 MMOL/L — SIGNIFICANT CHANGE UP (ref 3.5–5.3)
POTASSIUM SERPL-SCNC: 4 MMOL/L — SIGNIFICANT CHANGE UP (ref 3.5–5.3)
RBC # BLD: 5.08 M/UL — SIGNIFICANT CHANGE UP (ref 3.8–5.2)
RBC # FLD: 13.1 % — SIGNIFICANT CHANGE UP (ref 10.3–14.5)
SODIUM SERPL-SCNC: 138 MMOL/L — SIGNIFICANT CHANGE UP (ref 135–145)
WBC # BLD: 9.33 K/UL — SIGNIFICANT CHANGE UP (ref 3.8–10.5)
WBC # FLD AUTO: 9.33 K/UL — SIGNIFICANT CHANGE UP (ref 3.8–10.5)

## 2022-10-16 PROCEDURE — 99232 SBSQ HOSP IP/OBS MODERATE 35: CPT | Mod: GC

## 2022-10-16 RX ORDER — SODIUM,POTASSIUM PHOSPHATES 278-250MG
1 POWDER IN PACKET (EA) ORAL ONCE
Refills: 0 | Status: COMPLETED | OUTPATIENT
Start: 2022-10-16 | End: 2022-10-16

## 2022-10-16 RX ADMIN — Medication 3 MILLILITER(S): at 03:33

## 2022-10-16 RX ADMIN — Medication 1 PATCH: at 07:36

## 2022-10-16 RX ADMIN — Medication 3 MILLILITER(S): at 11:50

## 2022-10-16 RX ADMIN — ATORVASTATIN CALCIUM 40 MILLIGRAM(S): 80 TABLET, FILM COATED ORAL at 21:43

## 2022-10-16 RX ADMIN — Medication 3 MILLILITER(S): at 20:22

## 2022-10-16 RX ADMIN — Medication 3 MILLILITER(S): at 15:47

## 2022-10-16 RX ADMIN — Medication 3 MILLILITER(S): at 23:50

## 2022-10-16 RX ADMIN — Medication 4: at 21:42

## 2022-10-16 RX ADMIN — Medication 600 MILLIGRAM(S): at 17:11

## 2022-10-16 RX ADMIN — Medication 100 MILLIGRAM(S): at 12:13

## 2022-10-16 RX ADMIN — Medication 6: at 17:09

## 2022-10-16 RX ADMIN — BUDESONIDE AND FORMOTEROL FUMARATE DIHYDRATE 2 PUFF(S): 160; 4.5 AEROSOL RESPIRATORY (INHALATION) at 21:50

## 2022-10-16 RX ADMIN — Medication 1 PATCH: at 12:05

## 2022-10-16 RX ADMIN — BUDESONIDE AND FORMOTEROL FUMARATE DIHYDRATE 2 PUFF(S): 160; 4.5 AEROSOL RESPIRATORY (INHALATION) at 10:30

## 2022-10-16 RX ADMIN — Medication 50 MILLIGRAM(S): at 05:32

## 2022-10-16 RX ADMIN — PANTOPRAZOLE SODIUM 40 MILLIGRAM(S): 20 TABLET, DELAYED RELEASE ORAL at 05:33

## 2022-10-16 RX ADMIN — Medication 1 PATCH: at 19:00

## 2022-10-16 RX ADMIN — Medication 50 MILLIGRAM(S): at 21:43

## 2022-10-16 RX ADMIN — HEPARIN SODIUM 5000 UNIT(S): 5000 INJECTION INTRAVENOUS; SUBCUTANEOUS at 05:32

## 2022-10-16 RX ADMIN — Medication 40 MILLIGRAM(S): at 05:32

## 2022-10-16 RX ADMIN — Medication 50 MILLIGRAM(S): at 14:58

## 2022-10-16 RX ADMIN — Medication 81 MILLIGRAM(S): at 12:20

## 2022-10-16 RX ADMIN — CARVEDILOL PHOSPHATE 25 MILLIGRAM(S): 80 CAPSULE, EXTENDED RELEASE ORAL at 17:11

## 2022-10-16 RX ADMIN — Medication 600 MILLIGRAM(S): at 05:32

## 2022-10-16 RX ADMIN — Medication 180 MILLIGRAM(S): at 05:32

## 2022-10-16 RX ADMIN — HEPARIN SODIUM 5000 UNIT(S): 5000 INJECTION INTRAVENOUS; SUBCUTANEOUS at 17:11

## 2022-10-16 RX ADMIN — Medication 1 PATCH: at 12:12

## 2022-10-16 RX ADMIN — Medication 3 MILLILITER(S): at 08:51

## 2022-10-16 RX ADMIN — CHLORHEXIDINE GLUCONATE 1 APPLICATION(S): 213 SOLUTION TOPICAL at 05:31

## 2022-10-16 RX ADMIN — INSULIN GLARGINE 8 UNIT(S): 100 INJECTION, SOLUTION SUBCUTANEOUS at 21:43

## 2022-10-16 RX ADMIN — CARVEDILOL PHOSPHATE 25 MILLIGRAM(S): 80 CAPSULE, EXTENDED RELEASE ORAL at 05:32

## 2022-10-16 RX ADMIN — MONTELUKAST 10 MILLIGRAM(S): 4 TABLET, CHEWABLE ORAL at 21:43

## 2022-10-16 RX ADMIN — Medication 1 PACKET(S): at 17:11

## 2022-10-16 RX ADMIN — CLOPIDOGREL BISULFATE 75 MILLIGRAM(S): 75 TABLET, FILM COATED ORAL at 12:13

## 2022-10-16 NOTE — DIETITIAN INITIAL EVALUATION ADULT - OTHER INFO
reports No weight loss . admitted for COPD with acute exacerbation. Has history of DM, CVA, ordered for DASH/TLC , carbohydrate consistent diet. has No food allergies.

## 2022-10-16 NOTE — PROGRESS NOTE ADULT - SUBJECTIVE AND OBJECTIVE BOX
Xray is normal PATIENT SEEN AND EXAMINED ON :- 10/16/22  DATE OF SERVICE:    10/16/22         Interim events noted,Labs ,Radiological studies and Cardiology tests reviewed .         HOSPITAL COURSE: HPI:  Patient is a 68 yo Female, from home, with med hx significant for COPD, Left MCA CVA w/RLE weakness, Seizures, Type 2 DM, CAD s/p ZHAO, Afib not on AC, PAD s/p Right femoral popliteal bypass s/p occlusion + IR stent placement 2/2017, s/p cardiac cath 4/2022 w/non-obstructive CAD, presenting to the ED due to shortness of breath. Pt currently on Bipap, able to take in full sentences w/mild tachycardia 105-107 bpm when she coughs. Collateral history obtained from EMS charts and ER provider note.   EMS note says pt was not able to speak in complete sentences and had increased WOB, was given 12 mg dexamethasone and 0.3 mg Epi. In the ED, pt was placed on Bipap, noted to have narrow complex tachycardia to 150s, was given adenosine 6 mg then 12 mg, then cardizem 15 mg + duoneb x 3, decadron 10mg IVPB s/p HR ranging in the low 100s. +PT admits to cough and sputum production (07 Oct 2022 07:00)      INTERIM EVENTS:Patient seen at bedside ,interim events noted.      PMH -reviewed admission note, no change since admission  HEART FAILURE: Acute[ ]Chronic[ ] Systolic[ ] Diastolic[ ] Combined Systolic and Diastolic[ ]  CAD[ ] CABG[ ] PCI[ ]  DEVICES[ ] PPM[ ] ICD[ ] ILR[ ]  ATRIAL FIBRILLATION[ ] Paroxysmal[ ] Permanent[ ] CHADS2-[  ]  ILEANA[ ] CKD1[ ] CKD2[ ] CKD3[ ] CKD4[ ] ESRD[ ]  COPD[ ] HTN[ ]   DM[ ] Type1[ ] Type 2[ ]   CVA[ ] Paresis[ ]    AMBULATION: Assisted[ ] Cane/walker[ ] Independent[ ]    MEDICATIONS  (STANDING):  albuterol/ipratropium for Nebulization 3 milliLiter(s) Nebulizer every 4 hours  aspirin  chewable 81 milliGRAM(s) Oral daily  atorvastatin 40 milliGRAM(s) Oral at bedtime  budesonide 160 MICROgram(s)/formoterol 4.5 MICROgram(s) Inhaler 2 Puff(s) Inhalation two times a day  carvedilol 25 milliGRAM(s) Oral every 12 hours  chlorhexidine 2% Cloths 1 Application(s) Topical <User Schedule>  clopidogrel Tablet 75 milliGRAM(s) Oral daily  diltiazem    milliGRAM(s) Oral daily  guaiFENesin  milliGRAM(s) Oral two times a day  heparin   Injectable 5000 Unit(s) SubCutaneous every 12 hours  hydrALAZINE 50 milliGRAM(s) Oral three times a day  insulin glargine Injectable (LANTUS) 8 Unit(s) SubCutaneous at bedtime  insulin lispro (ADMELOG) corrective regimen sliding scale   SubCutaneous Before meals and at bedtime  montelukast 10 milliGRAM(s) Oral at bedtime  nicotine - 21 mG/24Hr(s) Patch 1 Patch Transdermal daily  pantoprazole    Tablet 40 milliGRAM(s) Oral before breakfast  predniSONE   Tablet   Oral   topiramate 100 milliGRAM(s) Oral daily    MEDICATIONS  (PRN):  acetaminophen     Tablet .. 650 milliGRAM(s) Oral every 6 hours PRN Mild Pain (1 - 3)  ondansetron Injectable 4 milliGRAM(s) IV Push every 8 hours PRN Nausea and/or Vomiting            REVIEW OF SYSTEMS:  Constitutional: [ ] fever, [ ]weight loss,  [ ]fatigue [ ]weight gain  Eyes: [ ] visual changes  Respiratory: [ ]shortness of breath;  [ ] cough, [ ]wheezing, [ ]chills, [ ]hemoptysis  Cardiovascular: [ ] chest pain, [ ]palpitations, [ ]dizziness,  [ ]leg swelling[ ]orthopnea[ ]PND  Gastrointestinal: [ ] abdominal pain, [ ]nausea, [ ]vomiting,  [ ]diarrhea [ ]Constipation [ ]Melena  Genitourinary: [ ] dysuria, [ ] hematuria [ ]Doll  Neurologic: [ ] headaches [ ] tremors[ ]weakness [ ]Paralysis Right[ ] Left[ ]  Skin: [ ] itching, [ ]burning, [ ] rashes  Endocrine: [ ] heat or cold intolerance  Musculoskeletal: [ ] joint pain or swelling; [ ] muscle, back, or extremity pain  Psychiatric: [ ] depression, [ ]anxiety, [ ]mood swings, or [ ]difficulty sleeping  Hematologic: [ ] easy bruising, [ ] bleeding gums    [ ] All remaining systems negative except as per above.   [ ]Unable to obtain.  [x] No change in ROS since admission      Vital Signs Last 24 Hrs  T(C): 36.6 (16 Oct 2022 13:40), Max: 36.6 (15 Oct 2022 21:51)  T(F): 97.8 (16 Oct 2022 13:40), Max: 97.8 (15 Oct 2022 21:51)  HR: 98 (16 Oct 2022 13:40) (65 - 98)  BP: 179/84 (16 Oct 2022 17:00) (150/86 - 179/84)  BP(mean): --  RR: 20 (16 Oct 2022 13:40) (18 - 20)  SpO2: 95% (16 Oct 2022 13:40) (95% - 98%)    Parameters below as of 16 Oct 2022 13:40  Patient On (Oxygen Delivery Method): room air      I&O's Summary      PHYSICAL EXAM:  General: No acute distress BMI-  HEENT: EOMI, PERRL  Neck: Supple, [ ] JVD  Lungs: Equal air entry bilaterally; [ ] rales [ ] wheezing [ ] rhonchi  Heart: Regular rate and rhythm; [x ] murmur   2/6 [ x] systolic [ ] diastolic [ ] radiation[ ] rubs [ ]  gallops  Abdomen: Nontender, bowel sounds present  Extremities: No clubbing, cyanosis, [ ] edema [ ]Pulses  equal and intact  Nervous system:  Alert & Oriented X3, no focal deficits  Psychiatric: Normal affect  Skin: No rashes or lesions    LABS:  10-16    138  |  107  |  35<H>  ----------------------------<  119<H>  4.0   |  21<L>  |  1.20    Ca    9.3      16 Oct 2022 07:34  Phos  2.1     10-16  Mg     2.3     10-16      Creatinine Trend: 1.20<--, 1.19<--, 1.24<--, 1.29<--, 1.24<--, 1.41<--                        13.7   9.33  )-----------( 307      ( 16 Oct 2022 07:34 )             43.2

## 2022-10-16 NOTE — PROGRESS NOTE ADULT - SUBJECTIVE AND OBJECTIVE BOX
PGY-1 Progress Note discussed with attending    TEAMS or PAGER #: [[phone]]  TILL 5:00 PM  PLEASE CONTACT ON CALL TEAM:  - On Call Team (Please refer to Trevon) FROM 5:00 PM - 8:30PM  - Nightfloat Team FROM 8:30 -7:30 AM      INTERVAL HPI/OVERNIGHT EVENTS: Overnight pt complained of a toothache, was addressed with tylenol, pt denies any pain today. Pt assessed at the bedside, in NAD, denies any chest pain, sob, fever, chills, n/v/d. Pending dc tomorrow after confirmation of home o2 and wheelchair delivery. Will continue to monitor.         MEDICATIONS  (STANDING):  albuterol/ipratropium for Nebulization 3 milliLiter(s) Nebulizer every 4 hours  aspirin  chewable 81 milliGRAM(s) Oral daily  atorvastatin 40 milliGRAM(s) Oral at bedtime  budesonide 160 MICROgram(s)/formoterol 4.5 MICROgram(s) Inhaler 2 Puff(s) Inhalation two times a day  carvedilol 25 milliGRAM(s) Oral every 12 hours  chlorhexidine 2% Cloths 1 Application(s) Topical <User Schedule>  clopidogrel Tablet 75 milliGRAM(s) Oral daily  diltiazem    milliGRAM(s) Oral daily  guaiFENesin  milliGRAM(s) Oral two times a day  heparin   Injectable 5000 Unit(s) SubCutaneous every 12 hours  hydrALAZINE 50 milliGRAM(s) Oral three times a day  insulin glargine Injectable (LANTUS) 8 Unit(s) SubCutaneous at bedtime  insulin lispro (ADMELOG) corrective regimen sliding scale   SubCutaneous Before meals and at bedtime  montelukast 10 milliGRAM(s) Oral at bedtime  nicotine - 21 mG/24Hr(s) Patch 1 Patch Transdermal daily  pantoprazole    Tablet 40 milliGRAM(s) Oral before breakfast  predniSONE   Tablet   Oral   topiramate 100 milliGRAM(s) Oral daily    MEDICATIONS  (PRN):  acetaminophen     Tablet .. 650 milliGRAM(s) Oral every 6 hours PRN Mild Pain (1 - 3)  ondansetron Injectable 4 milliGRAM(s) IV Push every 8 hours PRN Nausea and/or Vomiting      REVIEW OF SYSTEMS:  CONSTITUTIONAL: No fever, weight loss, or fatigue  RESPIRATORY: No cough, wheezing, chills or hemoptysis; No shortness of breath  CARDIOVASCULAR: No chest pain, palpitations, dizziness, or leg swelling  GASTROINTESTINAL: No abdominal pain. No nausea, vomiting, or hematemesis; No diarrhea or constipation. No melena or hematochezia.  GENITOURINARY: No dysuria or hematuria, urinary frequency  NEUROLOGICAL: No headaches, memory loss, loss of strength, numbness, or tremors  SKIN: No itching, burning, rashes, or lesions     Vital Signs Last 24 Hrs  T(C): 36.3 (16 Oct 2022 04:24), Max: 37.1 (15 Oct 2022 13:12)  T(F): 97.3 (16 Oct 2022 04:24), Max: 98.7 (15 Oct 2022 13:12)  HR: 68 (16 Oct 2022 04:24) (65 - 78)  BP: 154/74 (16 Oct 2022 04:24) (139/76 - 158/88)  BP(mean): --  RR: 18 (16 Oct 2022 04:24) (18 - 19)  SpO2: 98% (16 Oct 2022 04:24) (97% - 99%)    Parameters below as of 16 Oct 2022 04:24  Patient On (Oxygen Delivery Method): nasal cannula  O2 Flow (L/min): 2      PHYSICAL EXAMINATION:  GENERAL: NAD, well-developed, well-groomed  HEAD:  Atraumatic, Normocephalic  EYES:  conjunctiva and sclera clear  NECK: Supple, No JVD, Normal thyroid  CHEST/LUNG: course breath sounds bilaterally; No rales, rhonchi, wheezing, or rubs  HEART: Regular rate and rhythm; No murmurs, rubs, or gallops  ABDOMEN: Soft, Nontender, Nondistended; Bowel sounds present  NERVOUS SYSTEM:  Alert & Oriented X3,    EXTREMITIES:  2+ Peripheral Pulses, No clubbing, cyanosis, or edema  SKIN: warm dry                          13.7   9.33  )-----------( 307      ( 16 Oct 2022 07:34 )             43.2     10-16    138  |  107  |  35<H>  ----------------------------<  119<H>  4.0   |  21<L>  |  1.20    Ca    9.3      16 Oct 2022 07:34  Phos  2.1     10-16  Mg     2.3     10-16                CAPILLARY BLOOD GLUCOSE      RADIOLOGY & ADDITIONAL TESTS:

## 2022-10-16 NOTE — DIETITIAN INITIAL EVALUATION ADULT - ENERGY INTAKE
reports having difficulty with gums, however, able To tolerate consistency and does not want it To be changed.

## 2022-10-16 NOTE — PROGRESS NOTE ADULT - ATTENDING COMMENTS
66yo F PMHx of COPD, CVA w/ RLE weakness, Seizures, Type 2 DM, CAD, A. Fib not on AC, PAD s/p fem-pop bypass presenting with COPD exacerbation with hypercapnic failure requiring BiPAP initially admitted to the ICU. patient downgraded to the floors on 10/9/22.    #Acute Hypoxic and Hypercapnic Respiratory Failure  #COPD Exacerbation likely due to Viral Pneumonia  #Supraventricular Tachycardia  #Chronic Atrial Fibrillation  #Coronary Artery Disease  #ILEANA  #Type 2 DM  #Nicotine Withdrawal  RVP positive for Rhinovirus  -continue on NC 2L - ween down as tolerated  -Prednisone 50mg  - Off BIPAP  -continue on symbicort, duoneb, guafinesin  -c/w nicotine patch  -continue diltiazem and carvedilol  -continue on aspirin and plavix  -c/w glargine 8u at bedtime due to steroid induced hyperglycemia  -counseled on smoking cessation.  -PT recommending VERONICA - patient and family declining and would like to go home. Patient lives with sister.   - Will try to provide Home health services info, home O2, rollator/wheelchair - pending O2 concentrator and wheelchair (pending auth) tomorrow.

## 2022-10-16 NOTE — DIETITIAN INITIAL EVALUATION ADULT - PERTINENT MEDS FT
MEDICATIONS  (STANDING):  albuterol/ipratropium for Nebulization 3 milliLiter(s) Nebulizer every 4 hours  aspirin  chewable 81 milliGRAM(s) Oral daily  atorvastatin 40 milliGRAM(s) Oral at bedtime  budesonide 160 MICROgram(s)/formoterol 4.5 MICROgram(s) Inhaler 2 Puff(s) Inhalation two times a day  carvedilol 25 milliGRAM(s) Oral every 12 hours  chlorhexidine 2% Cloths 1 Application(s) Topical <User Schedule>  clopidogrel Tablet 75 milliGRAM(s) Oral daily  diltiazem    milliGRAM(s) Oral daily  guaiFENesin  milliGRAM(s) Oral two times a day  heparin   Injectable 5000 Unit(s) SubCutaneous every 12 hours  hydrALAZINE 50 milliGRAM(s) Oral three times a day  insulin glargine Injectable (LANTUS) 8 Unit(s) SubCutaneous at bedtime  insulin lispro (ADMELOG) corrective regimen sliding scale   SubCutaneous Before meals and at bedtime  montelukast 10 milliGRAM(s) Oral at bedtime  nicotine - 21 mG/24Hr(s) Patch 1 Patch Transdermal daily  pantoprazole    Tablet 40 milliGRAM(s) Oral before breakfast  potassium phosphate / sodium phosphate Powder (PHOS-NaK) 1 Packet(s) Oral once  predniSONE   Tablet   Oral   topiramate 100 milliGRAM(s) Oral daily    MEDICATIONS  (PRN):  acetaminophen     Tablet .. 650 milliGRAM(s) Oral every 6 hours PRN Mild Pain (1 - 3)  ondansetron Injectable 4 milliGRAM(s) IV Push every 8 hours PRN Nausea and/or Vomiting

## 2022-10-16 NOTE — DIETITIAN INITIAL EVALUATION ADULT - PERTINENT LABORATORY DATA
10-16    138  |  107  |  35<H>  ----------------------------<  119<H>  4.0   |  21<L>  |  1.20    Ca    9.3      16 Oct 2022 07:34  Phos  2.1     10-16  Mg     2.3     10-16    POCT Blood Glucose.: 129 mg/dL (10-16-22 @ 12:00)  A1C with Estimated Average Glucose Result: 8.2 % (10-08-22 @ 03:59)  A1C with Estimated Average Glucose Result: 11.9 % (04-08-22 @ 09:37)

## 2022-10-17 LAB
ANION GAP SERPL CALC-SCNC: 7 MMOL/L — SIGNIFICANT CHANGE UP (ref 5–17)
BUN SERPL-MCNC: 31 MG/DL — HIGH (ref 7–18)
CALCIUM SERPL-MCNC: 9.1 MG/DL — SIGNIFICANT CHANGE UP (ref 8.4–10.5)
CHLORIDE SERPL-SCNC: 106 MMOL/L — SIGNIFICANT CHANGE UP (ref 96–108)
CO2 SERPL-SCNC: 25 MMOL/L — SIGNIFICANT CHANGE UP (ref 22–31)
CREAT SERPL-MCNC: 1.14 MG/DL — SIGNIFICANT CHANGE UP (ref 0.5–1.3)
EGFR: 53 ML/MIN/1.73M2 — LOW
GLUCOSE BLDC GLUCOMTR-MCNC: 165 MG/DL — HIGH (ref 70–99)
GLUCOSE BLDC GLUCOMTR-MCNC: 272 MG/DL — HIGH (ref 70–99)
GLUCOSE BLDC GLUCOMTR-MCNC: 276 MG/DL — HIGH (ref 70–99)
GLUCOSE BLDC GLUCOMTR-MCNC: 97 MG/DL — SIGNIFICANT CHANGE UP (ref 70–99)
GLUCOSE SERPL-MCNC: 88 MG/DL — SIGNIFICANT CHANGE UP (ref 70–99)
HCT VFR BLD CALC: 42.6 % — SIGNIFICANT CHANGE UP (ref 34.5–45)
HGB BLD-MCNC: 13.6 G/DL — SIGNIFICANT CHANGE UP (ref 11.5–15.5)
MAGNESIUM SERPL-MCNC: 2.2 MG/DL — SIGNIFICANT CHANGE UP (ref 1.6–2.6)
MCHC RBC-ENTMCNC: 26.9 PG — LOW (ref 27–34)
MCHC RBC-ENTMCNC: 31.9 GM/DL — LOW (ref 32–36)
MCV RBC AUTO: 84.4 FL — SIGNIFICANT CHANGE UP (ref 80–100)
NRBC # BLD: 0 /100 WBCS — SIGNIFICANT CHANGE UP (ref 0–0)
PHOSPHATE SERPL-MCNC: 1.9 MG/DL — LOW (ref 2.5–4.5)
PLATELET # BLD AUTO: 306 K/UL — SIGNIFICANT CHANGE UP (ref 150–400)
POTASSIUM SERPL-MCNC: 3.8 MMOL/L — SIGNIFICANT CHANGE UP (ref 3.5–5.3)
POTASSIUM SERPL-SCNC: 3.8 MMOL/L — SIGNIFICANT CHANGE UP (ref 3.5–5.3)
RBC # BLD: 5.05 M/UL — SIGNIFICANT CHANGE UP (ref 3.8–5.2)
RBC # FLD: 13.2 % — SIGNIFICANT CHANGE UP (ref 10.3–14.5)
SODIUM SERPL-SCNC: 138 MMOL/L — SIGNIFICANT CHANGE UP (ref 135–145)
WBC # BLD: 12.08 K/UL — HIGH (ref 3.8–10.5)
WBC # FLD AUTO: 12.08 K/UL — HIGH (ref 3.8–10.5)

## 2022-10-17 PROCEDURE — 99232 SBSQ HOSP IP/OBS MODERATE 35: CPT

## 2022-10-17 PROCEDURE — 99232 SBSQ HOSP IP/OBS MODERATE 35: CPT | Mod: GC

## 2022-10-17 RX ORDER — POTASSIUM PHOSPHATE, MONOBASIC POTASSIUM PHOSPHATE, DIBASIC 236; 224 MG/ML; MG/ML
30 INJECTION, SOLUTION INTRAVENOUS ONCE
Refills: 0 | Status: COMPLETED | OUTPATIENT
Start: 2022-10-17 | End: 2022-10-17

## 2022-10-17 RX ADMIN — Medication 1 PATCH: at 08:18

## 2022-10-17 RX ADMIN — Medication 1 PATCH: at 22:26

## 2022-10-17 RX ADMIN — Medication 1 PATCH: at 12:01

## 2022-10-17 RX ADMIN — Medication 3 MILLILITER(S): at 15:29

## 2022-10-17 RX ADMIN — Medication 30 MILLIGRAM(S): at 05:17

## 2022-10-17 RX ADMIN — Medication 1 PATCH: at 12:03

## 2022-10-17 RX ADMIN — CARVEDILOL PHOSPHATE 25 MILLIGRAM(S): 80 CAPSULE, EXTENDED RELEASE ORAL at 17:41

## 2022-10-17 RX ADMIN — HEPARIN SODIUM 5000 UNIT(S): 5000 INJECTION INTRAVENOUS; SUBCUTANEOUS at 17:40

## 2022-10-17 RX ADMIN — CARVEDILOL PHOSPHATE 25 MILLIGRAM(S): 80 CAPSULE, EXTENDED RELEASE ORAL at 05:17

## 2022-10-17 RX ADMIN — Medication 3 MILLILITER(S): at 08:17

## 2022-10-17 RX ADMIN — BUDESONIDE AND FORMOTEROL FUMARATE DIHYDRATE 2 PUFF(S): 160; 4.5 AEROSOL RESPIRATORY (INHALATION) at 10:59

## 2022-10-17 RX ADMIN — MONTELUKAST 10 MILLIGRAM(S): 4 TABLET, CHEWABLE ORAL at 22:26

## 2022-10-17 RX ADMIN — Medication 3 MILLILITER(S): at 20:44

## 2022-10-17 RX ADMIN — Medication 2: at 17:40

## 2022-10-17 RX ADMIN — Medication 81 MILLIGRAM(S): at 12:03

## 2022-10-17 RX ADMIN — Medication 50 MILLIGRAM(S): at 15:31

## 2022-10-17 RX ADMIN — BUDESONIDE AND FORMOTEROL FUMARATE DIHYDRATE 2 PUFF(S): 160; 4.5 AEROSOL RESPIRATORY (INHALATION) at 22:28

## 2022-10-17 RX ADMIN — PANTOPRAZOLE SODIUM 40 MILLIGRAM(S): 20 TABLET, DELAYED RELEASE ORAL at 05:23

## 2022-10-17 RX ADMIN — ATORVASTATIN CALCIUM 40 MILLIGRAM(S): 80 TABLET, FILM COATED ORAL at 22:32

## 2022-10-17 RX ADMIN — INSULIN GLARGINE 8 UNIT(S): 100 INJECTION, SOLUTION SUBCUTANEOUS at 22:27

## 2022-10-17 RX ADMIN — CLOPIDOGREL BISULFATE 75 MILLIGRAM(S): 75 TABLET, FILM COATED ORAL at 12:03

## 2022-10-17 RX ADMIN — Medication 3 MILLILITER(S): at 23:42

## 2022-10-17 RX ADMIN — Medication 3 MILLILITER(S): at 03:52

## 2022-10-17 RX ADMIN — Medication 180 MILLIGRAM(S): at 05:17

## 2022-10-17 RX ADMIN — Medication 3 MILLILITER(S): at 11:52

## 2022-10-17 RX ADMIN — CHLORHEXIDINE GLUCONATE 1 APPLICATION(S): 213 SOLUTION TOPICAL at 05:16

## 2022-10-17 RX ADMIN — POTASSIUM PHOSPHATE, MONOBASIC POTASSIUM PHOSPHATE, DIBASIC 83.33 MILLIMOLE(S): 236; 224 INJECTION, SOLUTION INTRAVENOUS at 10:59

## 2022-10-17 RX ADMIN — Medication 600 MILLIGRAM(S): at 05:17

## 2022-10-17 RX ADMIN — Medication 100 MILLIGRAM(S): at 12:03

## 2022-10-17 RX ADMIN — Medication 50 MILLIGRAM(S): at 22:26

## 2022-10-17 RX ADMIN — Medication 600 MILLIGRAM(S): at 17:41

## 2022-10-17 RX ADMIN — Medication 50 MILLIGRAM(S): at 05:17

## 2022-10-17 RX ADMIN — HEPARIN SODIUM 5000 UNIT(S): 5000 INJECTION INTRAVENOUS; SUBCUTANEOUS at 05:17

## 2022-10-17 RX ADMIN — Medication 6: at 12:02

## 2022-10-17 NOTE — PROGRESS NOTE ADULT - ASSESSMENT
Ms. Villavicencio is a 66yo woman, active smoker, with multiple comorbitidies as above including poorly controlled asthma/COPD, who presented to the ED due to acute on chronic shortness of breath x several days associated with productive cough and wheeze. Initially admitted to the ICU and managed on BiPAP. CXR unrevealing; RVP +for entero/rhinovirus. Pt received IV steroids and ATC nebs with empiric abx, now slowly improving and on NC. Pulmonary consulted for evaluation of acute respiratory failure.    Suspect current clinical presentation is due to acute exacerbation of pt's COPD, likely related to +entero/rhinovirus. Pt continues to improve on steroids, bronchodilator nebs ATC, empiric abx, supportive care.    # Acute hypoxemic respiratory failure  # Acute exacerbation of COPD  # +Entero/rhinovirus      Recommendations:  - On day 1 of prednisone 30mg/d (10/17-); given significant clinical improvement, would taper by 10mg every 2 days  - Can deescalate bronchodilator nebs to q6h while pt remains in the hospital  - S/p course of empiric CAP Tx (cef/azithro)  - Incentive spirometry 10x/h or as tolerated; OOB/TC  - 600-1200mg guaifenesin-ER standing BID for mucolytic effect  - Titrate supplemental O2 with goal SpO2 88-92% in pt with known chronic hypercarbia; titrated at bedside to 2LNC, would continue to wean as tolerated. Suspect pt may be chronically hypoxemic at baseline, particularly with exertion. Pending home O2 delivery  - Incentive spirometry 10x/h or as tolerated; OOB/TC and ambulation daily as tolerated; please document exertional SpO2  - Would benefit from SW visit before discharge for smoking cessation resources. Reiterated importance of and methods for smoking cessation  - On discharge would start triple therapy (ICS/LAMA/LABA; e.g. symbicort + spiriva; with use of spacer) and will require follow up with Pulmonary Dr. East within 7 days of discharge for post-AECOPD care/COPD stars program. Will need full baseline PFTs once acute issues resolve      Veronique Jimenez MD  Pulmonary & Critical Care  Available on Teams   Ms. Villavicencio is a 68yo woman, active smoker, with multiple comorbitidies as above including poorly controlled asthma/COPD, who presented to the ED due to acute on chronic shortness of breath x several days associated with productive cough and wheeze. Initially admitted to the ICU and managed on BiPAP. CXR unrevealing; RVP +for entero/rhinovirus. Pt received IV steroids and ATC nebs with empiric abx, now slowly improving and on NC. Pulmonary consulted for evaluation of acute respiratory failure.    Suspect current clinical presentation is due to acute exacerbation of pt's COPD, likely related to +entero/rhinovirus. Pt continues to improve on steroids, bronchodilator nebs ATC, empiric abx, supportive care.    # Acute hypoxemic respiratory failure  # Acute exacerbation of COPD  # +Entero/rhinovirus      Recommendations:  - On day 1 of prednisone 30mg/d (10/17-); given significant clinical improvement, would taper by 10mg every 2 days  - Can deescalate bronchodilator nebs to q6h while pt remains in the hospital  - S/p course of empiric CAP Tx (cef/azithro)  - Incentive spirometry 10x/h or as tolerated; OOB/TC  - 600-1200mg guaifenesin-ER standing BID for mucolytic effect  - Titrate supplemental O2 with goal SpO2 88-92% in pt with known chronic hypercarbia; titrated at bedside to 2LNC, would continue to wean as tolerated. Suspect pt may be chronically hypoxemic at baseline, particularly with exertion. Pending home O2 delivery  - Incentive spirometry 10x/h or as tolerated; OOB/TC and ambulation daily as tolerated; please document exertional SpO2  - Would benefit from SW visit before discharge for smoking cessation resources. Reiterated importance of and methods for smoking cessation  - Recommend aggressively repleting phos for severe hypophosphatemia 1.9 (e.g. PO supplementation q3h x 24h + IV)  - On discharge would start triple therapy (ICS/LAMA/LABA; e.g. symbicort + spiriva; with use of spacer) and will require follow up with Pulmonary Dr. East within 7 days of discharge for post-AECOPD care/COPD stars program. Will need full baseline PFTs once acute issues resolve      Veronique Jimenez MD  Pulmonary & Critical Care  Available on Teams

## 2022-10-17 NOTE — PROGRESS NOTE ADULT - SUBJECTIVE AND OBJECTIVE BOX
Medical Student Year 3 Progress Note discussed with resident & attending    TEAMS or PAGER #: [[phone]]  TILL 5:00 PM  PLEASE CONTACT ON CALL TEAM:  - On Call Team (Please refer to Trevon) FROM 5:00 PM - 8:30PM  - Nightfloat Team FROM 8:30 -7:30 AM      INTERVAL HPI/OVERNIGHT EVENTS: No events overnight. Pt assessed at the bedside, in NAD, denies any chest pain, fever, chills, n/v/d. + SOB, wheezing, dizziness. Denies current anxiety. Will continue to monitor.         MEDICATIONS  (STANDING):  albuterol/ipratropium for Nebulization 3 milliLiter(s) Nebulizer every 4 hours  aspirin  chewable 81 milliGRAM(s) Oral daily  atorvastatin 40 milliGRAM(s) Oral at bedtime  budesonide 160 MICROgram(s)/formoterol 4.5 MICROgram(s) Inhaler 2 Puff(s) Inhalation two times a day  carvedilol 25 milliGRAM(s) Oral every 12 hours  chlorhexidine 2% Cloths 1 Application(s) Topical <User Schedule>  clopidogrel Tablet 75 milliGRAM(s) Oral daily  diltiazem    milliGRAM(s) Oral daily  guaiFENesin  milliGRAM(s) Oral two times a day  heparin   Injectable 5000 Unit(s) SubCutaneous every 12 hours  hydrALAZINE 50 milliGRAM(s) Oral three times a day  insulin glargine Injectable (LANTUS) 8 Unit(s) SubCutaneous at bedtime  insulin lispro (ADMELOG) corrective regimen sliding scale   SubCutaneous Before meals and at bedtime  montelukast 10 milliGRAM(s) Oral at bedtime  nicotine - 21 mG/24Hr(s) Patch 1 Patch Transdermal daily  pantoprazole    Tablet 40 milliGRAM(s) Oral before breakfast  potassium phosphate IVPB 30 milliMole(s) IV Intermittent once  predniSONE   Tablet   Oral   predniSONE   Tablet 30 milliGRAM(s) Oral daily  topiramate 100 milliGRAM(s) Oral daily    MEDICATIONS  (PRN):  acetaminophen     Tablet .. 650 milliGRAM(s) Oral every 6 hours PRN Mild Pain (1 - 3)  ondansetron Injectable 4 milliGRAM(s) IV Push every 8 hours PRN Nausea and/or Vomiting      REVIEW OF SYSTEMS:  CONSTITUTIONAL: No fever, weight loss, or fatigue  RESPIRATORY: + SOB and wheezing; No cough, chills or hemoptysis  CARDIOVASCULAR: + dizziness; No chest pain, palpitations, or leg swelling  GASTROINTESTINAL: No abdominal pain. No nausea, vomiting, or hematemesis; No diarrhea or constipation. No melena or hematochezia.  GENITOURINARY: No dysuria or hematuria, urinary frequency  NEUROLOGICAL: No headaches, memory loss, loss of strength, numbness, or tremors  SKIN: No itching, burning, rashes, or lesions     Vital Signs Last 24 Hrs  T(C): 36.7 (17 Oct 2022 04:30), Max: 36.7 (16 Oct 2022 21:41)  T(F): 98 (17 Oct 2022 04:30), Max: 98.1 (16 Oct 2022 21:41)  HR: 70 (17 Oct 2022 04:30) (70 - 98)  BP: 121/79 (17 Oct 2022 04:30) (121/79 - 179/84)  BP(mean): --  RR: 18 (17 Oct 2022 04:30) (17 - 20)  SpO2: 97% (17 Oct 2022 04:30) (95% - 99%)    Parameters below as of 17 Oct 2022 04:30  Patient On (Oxygen Delivery Method): nasal cannula  O2 Flow (L/min): 2      PHYSICAL EXAMINATION:  GENERAL: NAD, well-developed, well-groomed  HEAD:  Atraumatic, Normocephalic  EYES:  conjunctiva and sclera clear  NECK: Supple, No JVD, Normal thyroid  CHEST/LUNG: b/l wheezing heard on expiration; No rales, rhonchi, or rubs  HEART: Regular rate and rhythm; No murmurs, rubs, or gallops  ABDOMEN: Soft, Nontender, Nondistended; Bowel sounds present  NERVOUS SYSTEM:  Alert & Oriented X3,    EXTREMITIES:  2+ Peripheral Pulses, No clubbing, cyanosis, or edema  SKIN: warm dry                          13.6   12.08 )-----------( 306      ( 17 Oct 2022 05:49 )             42.6     10-17    138  |  106  |  31<H>  ----------------------------<  88  3.8   |  25  |  1.14    Ca    9.1      17 Oct 2022 05:49  Phos  1.9     10-17  Mg     2.2     10-17                CAPILLARY BLOOD GLUCOSE      RADIOLOGY & ADDITIONAL TESTS:

## 2022-10-17 NOTE — PROGRESS NOTE ADULT - ASSESSMENT
Patient is a 68 yo Female, from home, with med hx significant for COPD, Left MCA CVA w/RLE weakness, Seizures, Type 2 DM, CAD s/p ZHAO, Afib not on AC, PAD s/p Right femoral popliteal bypass s/p occlusion + IR stent placement 2/2017, s/p cardiac cath 4/2022 w/ non-obstructive CAD, presenting to the ED due to shortness of breath likely 2/2 COPD exacerbation. Admitted to ICU for close monitoring of respiratory status. Pt downgraded to medicine unit on 5 south. Patient denies feelings of anxiety. Endorses SOB and wheezing. Labs today show new onset leukocytosis (12.08).

## 2022-10-17 NOTE — PROGRESS NOTE ADULT - ATTENDING COMMENTS
66yo F PMHx of COPD, CVA w/ RLE weakness, Seizures, Type 2 DM, CAD, A. Fib not on AC, PAD s/p fem-pop bypass presenting with COPD exacerbation with hypercapnic failure requiring BiPAP initially admitted to the ICU. patient downgraded to the floors on 10/9/22.    #Acute Hypoxic and Hypercapnic Respiratory Failure  #COPD Exacerbation likely due to Viral Pneumonia  #Supraventricular Tachycardia  #Chronic Atrial Fibrillation  #Coronary Artery Disease  #ILEANA  #Type 2 DM  #Nicotine Withdrawal  RVP positive for Rhinovirus  -continue on NC 2L - ween down as tolerated  -Prednisone 50mg titrate down to 40  - Off BIPAP  -continue on symbicort, duoneb, guafinesin  -c/w nicotine patch  -continue diltiazem and carvedilol  -continue on aspirin and plavix  -c/w glargine 8u at bedtime due to steroid induced hyperglycemia  -counseled on smoking cessation.  -PT recommending VERONICA - patient and family declining and would like to go home. Patient lives with sister.   - Will try to provide Home health services info, home O2, rollator/wheelchair - pending O2 concentrator and wheelchair (pending auth) tomorrow.

## 2022-10-17 NOTE — PROGRESS NOTE ADULT - SUBJECTIVE AND OBJECTIVE BOX
PATIENT SEEN AND EXAMINED ON :- 10/17/22  DATE OF SERVICE:   10/17/22          Interim events noted,Labs ,Radiological studies and Cardiology tests reviewed .       HOSPITAL COURSE: HPI:  Patient is a 66 yo Female, from home, with med hx significant for COPD, Left MCA CVA w/RLE weakness, Seizures, Type 2 DM, CAD s/p ZHAO, Afib not on AC, PAD s/p Right femoral popliteal bypass s/p occlusion + IR stent placement 2/2017, s/p cardiac cath 4/2022 w/non-obstructive CAD, presenting to the ED due to shortness of breath. Pt currently on Bipap, able to take in full sentences w/mild tachycardia 105-107 bpm when she coughs. Collateral history obtained from EMS charts and ER provider note.   EMS note says pt was not able to speak in complete sentences and had increased WOB, was given 12 mg dexamethasone and 0.3 mg Epi. In the ED, pt was placed on Bipap, noted to have narrow complex tachycardia to 150s, was given adenosine 6 mg then 12 mg, then cardizem 15 mg + duoneb x 3, decadron 10mg IVPB s/p HR ranging in the low 100s. +PT admits to cough and sputum production (07 Oct 2022 07:00)      INTERIM EVENTS:Patient seen at bedside ,interim events noted.      PMH -reviewed admission note, no change since admission  HEART FAILURE: Acute[ ]Chronic[ ] Systolic[ ] Diastolic[ ] Combined Systolic and Diastolic[ ]  CAD[ ] CABG[ ] PCI[ ]  DEVICES[ ] PPM[ ] ICD[ ] ILR[ ]  ATRIAL FIBRILLATION[ ] Paroxysmal[ ] Permanent[ ] CHADS2-[  ]  ILEANA[ ] CKD1[ ] CKD2[ ] CKD3[ ] CKD4[ ] ESRD[ ]  COPD[ ] HTN[ ]   DM[ ] Type1[ ] Type 2[ ]   CVA[ ] Paresis[ ]    AMBULATION: Assisted[ ] Cane/walker[ ] Independent[ ]    MEDICATIONS  (STANDING):  albuterol/ipratropium for Nebulization 3 milliLiter(s) Nebulizer every 4 hours  aspirin  chewable 81 milliGRAM(s) Oral daily  atorvastatin 40 milliGRAM(s) Oral at bedtime  budesonide 160 MICROgram(s)/formoterol 4.5 MICROgram(s) Inhaler 2 Puff(s) Inhalation two times a day  carvedilol 25 milliGRAM(s) Oral every 12 hours  chlorhexidine 2% Cloths 1 Application(s) Topical <User Schedule>  clopidogrel Tablet 75 milliGRAM(s) Oral daily  diltiazem    milliGRAM(s) Oral daily  guaiFENesin  milliGRAM(s) Oral two times a day  heparin   Injectable 5000 Unit(s) SubCutaneous every 12 hours  hydrALAZINE 50 milliGRAM(s) Oral three times a day  insulin glargine Injectable (LANTUS) 8 Unit(s) SubCutaneous at bedtime  insulin lispro (ADMELOG) corrective regimen sliding scale   SubCutaneous Before meals and at bedtime  montelukast 10 milliGRAM(s) Oral at bedtime  nicotine - 21 mG/24Hr(s) Patch 1 Patch Transdermal daily  pantoprazole    Tablet 40 milliGRAM(s) Oral before breakfast  predniSONE   Tablet   Oral   predniSONE   Tablet 30 milliGRAM(s) Oral daily  topiramate 100 milliGRAM(s) Oral daily    MEDICATIONS  (PRN):  acetaminophen     Tablet .. 650 milliGRAM(s) Oral every 6 hours PRN Mild Pain (1 - 3)  ondansetron Injectable 4 milliGRAM(s) IV Push every 8 hours PRN Nausea and/or Vomiting            REVIEW OF SYSTEMS:  Constitutional: [ ] fever, [ ]weight loss,  [ ]fatigue [ ]weight gain  Eyes: [ ] visual changes  Respiratory: [ ]shortness of breath;  [ ] cough, [ ]wheezing, [ ]chills, [ ]hemoptysis  Cardiovascular: [ ] chest pain, [ ]palpitations, [ ]dizziness,  [ ]leg swelling[ ]orthopnea[ ]PND  Gastrointestinal: [ ] abdominal pain, [ ]nausea, [ ]vomiting,  [ ]diarrhea [ ]Constipation [ ]Melena  Genitourinary: [ ] dysuria, [ ] hematuria [ ]Doll  Neurologic: [ ] headaches [ ] tremors[ ]weakness [ ]Paralysis Right[ ] Left[ ]  Skin: [ ] itching, [ ]burning, [ ] rashes  Endocrine: [ ] heat or cold intolerance  Musculoskeletal: [ ] joint pain or swelling; [ ] muscle, back, or extremity pain  Psychiatric: [ ] depression, [ ]anxiety, [ ]mood swings, or [ ]difficulty sleeping  Hematologic: [ ] easy bruising, [ ] bleeding gums    [ ] All remaining systems negative except as per above.   [ ]Unable to obtain.  [x] No change in ROS since admission      Vital Signs Last 24 Hrs  T(C): 36.7 (17 Oct 2022 13:18), Max: 36.7 (16 Oct 2022 21:41)  T(F): 98 (17 Oct 2022 13:18), Max: 98.1 (16 Oct 2022 21:41)  HR: 68 (17 Oct 2022 13:18) (68 - 78)  BP: 147/77 (17 Oct 2022 13:18) (121/79 - 159/73)  BP(mean): --  RR: 18 (17 Oct 2022 13:18) (17 - 18)  SpO2: 96% (17 Oct 2022 13:18) (96% - 99%)    Parameters below as of 17 Oct 2022 13:18  Patient On (Oxygen Delivery Method): nasal cannula  O2 Flow (L/min): 2    I&O's Summary      PHYSICAL EXAM:  General: No acute distress BMI-  HEENT: EOMI, PERRL  Neck: Supple, [ ] JVD  Lungs: Equal air entry bilaterally; [ ] rales [ ] wheezing [ ] rhonchi  Heart: Regular rate and rhythm; [x ] murmur   2/6 [ x] systolic [ ] diastolic [ ] radiation[ ] rubs [ ]  gallops  Abdomen: Nontender, bowel sounds present  Extremities: No clubbing, cyanosis, [ ] edema [ ]Pulses  equal and intact  Nervous system:  Alert & Oriented X3, no focal deficits  Psychiatric: Normal affect  Skin: No rashes or lesions    LABS:  10-17    138  |  106  |  31<H>  ----------------------------<  88  3.8   |  25  |  1.14    Ca    9.1      17 Oct 2022 05:49  Phos  1.9     10-17  Mg     2.2     10-17      Creatinine Trend: 1.14<--, 1.20<--, 1.19<--, 1.24<--, 1.29<--, 1.24<--                        13.6   12.08 )-----------( 306      ( 17 Oct 2022 05:49 )             42.6

## 2022-10-17 NOTE — PROGRESS NOTE ADULT - ASSESSMENT
Patient is a 66 yo Female, from home, with med hx significant for COPD, Left MCA CVA w/RLE weakness, Seizures, Type 2 DM, CAD s/p ZHAO, Afib not on AC, PAD s/p Right femoral popliteal bypass s/p occlusion + IR stent placement 2/2017, s/p cardiac cath 4/2022 w/ non-obstructive CAD, presenting to the ED due to shortness of breath likely 2/2 COPD exacerbation. Admitted to ICU for close monitoring of respiratory status. Pt downgraded to medicine unit on 5 south. Patient denies feelings of anxiety. Endorses SOB and wheezing. Labs today show new onset leukocytosis (12.08).

## 2022-10-17 NOTE — PROGRESS NOTE ADULT - SUBJECTIVE AND OBJECTIVE BOX
PULMONARY CONSULT SERVICE FOLLOW-UP NOTE    INTERVAL HPI:  Reviewed chart and overnight events; patient seen and examined at bedside.    MEDICATIONS:  Pulmonary:  albuterol/ipratropium for Nebulization 3 milliLiter(s) Nebulizer every 4 hours  budesonide 160 MICROgram(s)/formoterol 4.5 MICROgram(s) Inhaler 2 Puff(s) Inhalation two times a day  guaiFENesin  milliGRAM(s) Oral two times a day  montelukast 10 milliGRAM(s) Oral at bedtime    Antimicrobials:    Anticoagulants:  aspirin  chewable 81 milliGRAM(s) Oral daily  clopidogrel Tablet 75 milliGRAM(s) Oral daily  heparin   Injectable 5000 Unit(s) SubCutaneous every 12 hours    Cardiac:  carvedilol 25 milliGRAM(s) Oral every 12 hours  diltiazem    milliGRAM(s) Oral daily  hydrALAZINE 50 milliGRAM(s) Oral three times a day      Allergies    No Known Allergies    Intolerances        Vital Signs Last 24 Hrs  T(C): 36.7 (17 Oct 2022 04:30), Max: 36.7 (16 Oct 2022 21:41)  T(F): 98 (17 Oct 2022 04:30), Max: 98.1 (16 Oct 2022 21:41)  HR: 70 (17 Oct 2022 04:30) (70 - 98)  BP: 121/79 (17 Oct 2022 04:30) (121/79 - 179/84)  BP(mean): --  RR: 18 (17 Oct 2022 04:30) (17 - 20)  SpO2: 97% (17 Oct 2022 04:30) (95% - 99%)    Parameters below as of 17 Oct 2022 04:30  Patient On (Oxygen Delivery Method): nasal cannula  O2 Flow (L/min): 2          PHYSICAL EXAM:  GEN: NAD, well-appearing, sitting comfortably upright in bed  HEENT: NC/AT, PERRL, anicteric sclera; oropharynx clear, MMM, neck supple, no appreciable JVD  RESP: no respiratory distress, CTA B/L; no W/R/R  CV: +S1/S2, RRR  GI: soft, NT/ND, +BS  EXTREM: WWP; no edema, clubbing or cyanosis  Vascular: 2+ radial pulses B/L  NEURO: AAOx3, RLE weakness (chronic), mild dysarthria noted      LABS:      CBC Full  -  ( 17 Oct 2022 05:49 )  WBC Count : 12.08 K/uL  RBC Count : 5.05 M/uL  Hemoglobin : 13.6 g/dL  Hematocrit : 42.6 %  Platelet Count - Automated : 306 K/uL  Mean Cell Volume : 84.4 fl  Mean Cell Hemoglobin : 26.9 pg  Mean Cell Hemoglobin Concentration : 31.9 gm/dL  Auto Neutrophil # : x  Auto Lymphocyte # : x  Auto Monocyte # : x  Auto Eosinophil # : x  Auto Basophil # : x  Auto Neutrophil % : x  Auto Lymphocyte % : x  Auto Monocyte % : x  Auto Eosinophil % : x  Auto Basophil % : x    10-17    138  |  106  |  31<H>  ----------------------------<  88  3.8   |  25  |  1.14    Ca    9.1      17 Oct 2022 05:49  Phos  1.9     10-17  Mg     2.2     10-17        RADIOLOGY & ADDITIONAL STUDIES:  No interval chest imaging for review  PULMONARY CONSULT SERVICE FOLLOW-UP NOTE    INTERVAL HPI:  Reviewed chart and overnight events; patient seen and examined at bedside. Chief complaint this AM is of feeling tired from not sleeping well overnight (though at time of exam is awake and alert). She notes no difficulty breathing at rest, and continues to have dyspnea with mild exertion, though reports is at her baseline. Continues to have cough productive of yellow sputum, improved and easily cleared. At this time pt denies anginal/pleuritic CP, wheezing, nasal/chest congestion, stridor, orthopnea, hemoptysis, LE edema, f/c/n/v.     MEDICATIONS:  Pulmonary:  albuterol/ipratropium for Nebulization 3 milliLiter(s) Nebulizer every 4 hours  budesonide 160 MICROgram(s)/formoterol 4.5 MICROgram(s) Inhaler 2 Puff(s) Inhalation two times a day  guaiFENesin  milliGRAM(s) Oral two times a day  montelukast 10 milliGRAM(s) Oral at bedtime    Antimicrobials:    Anticoagulants:  aspirin  chewable 81 milliGRAM(s) Oral daily  clopidogrel Tablet 75 milliGRAM(s) Oral daily  heparin   Injectable 5000 Unit(s) SubCutaneous every 12 hours    Cardiac:  carvedilol 25 milliGRAM(s) Oral every 12 hours  diltiazem    milliGRAM(s) Oral daily  hydrALAZINE 50 milliGRAM(s) Oral three times a day      Allergies    No Known Allergies    Intolerances        Vital Signs Last 24 Hrs  T(C): 36.7 (17 Oct 2022 04:30), Max: 36.7 (16 Oct 2022 21:41)  T(F): 98 (17 Oct 2022 04:30), Max: 98.1 (16 Oct 2022 21:41)  HR: 70 (17 Oct 2022 04:30) (70 - 98)  BP: 121/79 (17 Oct 2022 04:30) (121/79 - 179/84)  BP(mean): --  RR: 18 (17 Oct 2022 04:30) (17 - 20)  SpO2: 97% (17 Oct 2022 04:30) (95% - 99%)    Parameters below as of 17 Oct 2022 04:30  Patient On (Oxygen Delivery Method): nasal cannula  O2 Flow (L/min): 2          PHYSICAL EXAM:  GEN: NAD, well-appearing, sitting comfortably upright in bed  HEENT: NC/AT, PERRL, anicteric sclera; oropharynx clear, MMM, neck supple, no appreciable JVD  RESP: no respiratory distress, CTA B/L; no W/R/R  CV: +S1/S2, RRR  GI: soft, NT/ND, +BS  EXTREM: WWP; no edema, clubbing or cyanosis, R third fingernail discoloration (unchanged)  Vascular: 2+ radial pulses B/L  NEURO: AAOx3, RLE weakness (chronic), mild dysarthria noted      LABS:      CBC Full  -  ( 17 Oct 2022 05:49 )  WBC Count : 12.08 K/uL  RBC Count : 5.05 M/uL  Hemoglobin : 13.6 g/dL  Hematocrit : 42.6 %  Platelet Count - Automated : 306 K/uL  Mean Cell Volume : 84.4 fl  Mean Cell Hemoglobin : 26.9 pg  Mean Cell Hemoglobin Concentration : 31.9 gm/dL  Auto Neutrophil # : x  Auto Lymphocyte # : x  Auto Monocyte # : x  Auto Eosinophil # : x  Auto Basophil # : x  Auto Neutrophil % : x  Auto Lymphocyte % : x  Auto Monocyte % : x  Auto Eosinophil % : x  Auto Basophil % : x    10-17    138  |  106  |  31<H>  ----------------------------<  88  3.8   |  25  |  1.14    Ca    9.1      17 Oct 2022 05:49  Phos  1.9     10-17  Mg     2.2     10-17        RADIOLOGY & ADDITIONAL STUDIES:  No interval chest imaging for review

## 2022-10-18 LAB
ANION GAP SERPL CALC-SCNC: 8 MMOL/L — SIGNIFICANT CHANGE UP (ref 5–17)
BUN SERPL-MCNC: 27 MG/DL — HIGH (ref 7–18)
CALCIUM SERPL-MCNC: 8.9 MG/DL — SIGNIFICANT CHANGE UP (ref 8.4–10.5)
CHLORIDE SERPL-SCNC: 109 MMOL/L — HIGH (ref 96–108)
CO2 SERPL-SCNC: 22 MMOL/L — SIGNIFICANT CHANGE UP (ref 22–31)
CREAT SERPL-MCNC: 1.15 MG/DL — SIGNIFICANT CHANGE UP (ref 0.5–1.3)
EGFR: 53 ML/MIN/1.73M2 — LOW
GLUCOSE BLDC GLUCOMTR-MCNC: 156 MG/DL — HIGH (ref 70–99)
GLUCOSE BLDC GLUCOMTR-MCNC: 172 MG/DL — HIGH (ref 70–99)
GLUCOSE BLDC GLUCOMTR-MCNC: 209 MG/DL — HIGH (ref 70–99)
GLUCOSE BLDC GLUCOMTR-MCNC: 257 MG/DL — HIGH (ref 70–99)
GLUCOSE SERPL-MCNC: 184 MG/DL — HIGH (ref 70–99)
HCT VFR BLD CALC: 40.8 % — SIGNIFICANT CHANGE UP (ref 34.5–45)
HGB BLD-MCNC: 13.2 G/DL — SIGNIFICANT CHANGE UP (ref 11.5–15.5)
MAGNESIUM SERPL-MCNC: 2.1 MG/DL — SIGNIFICANT CHANGE UP (ref 1.6–2.6)
MCHC RBC-ENTMCNC: 27.4 PG — SIGNIFICANT CHANGE UP (ref 27–34)
MCHC RBC-ENTMCNC: 32.4 GM/DL — SIGNIFICANT CHANGE UP (ref 32–36)
MCV RBC AUTO: 84.8 FL — SIGNIFICANT CHANGE UP (ref 80–100)
NRBC # BLD: 0 /100 WBCS — SIGNIFICANT CHANGE UP (ref 0–0)
PHOSPHATE SERPL-MCNC: 2.1 MG/DL — LOW (ref 2.5–4.5)
PLATELET # BLD AUTO: 285 K/UL — SIGNIFICANT CHANGE UP (ref 150–400)
POTASSIUM SERPL-MCNC: 3.9 MMOL/L — SIGNIFICANT CHANGE UP (ref 3.5–5.3)
POTASSIUM SERPL-SCNC: 3.9 MMOL/L — SIGNIFICANT CHANGE UP (ref 3.5–5.3)
RBC # BLD: 4.81 M/UL — SIGNIFICANT CHANGE UP (ref 3.8–5.2)
RBC # FLD: 13.4 % — SIGNIFICANT CHANGE UP (ref 10.3–14.5)
SODIUM SERPL-SCNC: 139 MMOL/L — SIGNIFICANT CHANGE UP (ref 135–145)
WBC # BLD: 13.2 K/UL — HIGH (ref 3.8–10.5)
WBC # FLD AUTO: 13.2 K/UL — HIGH (ref 3.8–10.5)

## 2022-10-18 PROCEDURE — 99232 SBSQ HOSP IP/OBS MODERATE 35: CPT | Mod: GC

## 2022-10-18 PROCEDURE — 99232 SBSQ HOSP IP/OBS MODERATE 35: CPT

## 2022-10-18 RX ORDER — POTASSIUM PHOSPHATE, MONOBASIC POTASSIUM PHOSPHATE, DIBASIC 236; 224 MG/ML; MG/ML
30 INJECTION, SOLUTION INTRAVENOUS ONCE
Refills: 0 | Status: COMPLETED | OUTPATIENT
Start: 2022-10-18 | End: 2022-10-18

## 2022-10-18 RX ADMIN — Medication 30 MILLIGRAM(S): at 05:18

## 2022-10-18 RX ADMIN — Medication 3 MILLILITER(S): at 12:10

## 2022-10-18 RX ADMIN — HEPARIN SODIUM 5000 UNIT(S): 5000 INJECTION INTRAVENOUS; SUBCUTANEOUS at 17:20

## 2022-10-18 RX ADMIN — CARVEDILOL PHOSPHATE 25 MILLIGRAM(S): 80 CAPSULE, EXTENDED RELEASE ORAL at 17:20

## 2022-10-18 RX ADMIN — Medication 1 PATCH: at 07:50

## 2022-10-18 RX ADMIN — INSULIN GLARGINE 8 UNIT(S): 100 INJECTION, SOLUTION SUBCUTANEOUS at 21:44

## 2022-10-18 RX ADMIN — CLOPIDOGREL BISULFATE 75 MILLIGRAM(S): 75 TABLET, FILM COATED ORAL at 11:22

## 2022-10-18 RX ADMIN — PANTOPRAZOLE SODIUM 40 MILLIGRAM(S): 20 TABLET, DELAYED RELEASE ORAL at 07:56

## 2022-10-18 RX ADMIN — Medication 600 MILLIGRAM(S): at 05:18

## 2022-10-18 RX ADMIN — Medication 650 MILLIGRAM(S): at 10:46

## 2022-10-18 RX ADMIN — Medication 2: at 21:43

## 2022-10-18 RX ADMIN — Medication 3 MILLILITER(S): at 16:12

## 2022-10-18 RX ADMIN — HEPARIN SODIUM 5000 UNIT(S): 5000 INJECTION INTRAVENOUS; SUBCUTANEOUS at 05:19

## 2022-10-18 RX ADMIN — Medication 81 MILLIGRAM(S): at 11:22

## 2022-10-18 RX ADMIN — Medication 2: at 07:57

## 2022-10-18 RX ADMIN — BUDESONIDE AND FORMOTEROL FUMARATE DIHYDRATE 2 PUFF(S): 160; 4.5 AEROSOL RESPIRATORY (INHALATION) at 21:43

## 2022-10-18 RX ADMIN — Medication 650 MILLIGRAM(S): at 11:41

## 2022-10-18 RX ADMIN — Medication 3 MILLILITER(S): at 20:28

## 2022-10-18 RX ADMIN — Medication 6: at 11:21

## 2022-10-18 RX ADMIN — Medication 1 PATCH: at 11:22

## 2022-10-18 RX ADMIN — MONTELUKAST 10 MILLIGRAM(S): 4 TABLET, CHEWABLE ORAL at 21:47

## 2022-10-18 RX ADMIN — Medication 3 MILLILITER(S): at 23:17

## 2022-10-18 RX ADMIN — Medication 1 PATCH: at 12:00

## 2022-10-18 RX ADMIN — Medication 600 MILLIGRAM(S): at 17:20

## 2022-10-18 RX ADMIN — Medication 1 PATCH: at 19:00

## 2022-10-18 RX ADMIN — CARVEDILOL PHOSPHATE 25 MILLIGRAM(S): 80 CAPSULE, EXTENDED RELEASE ORAL at 05:19

## 2022-10-18 RX ADMIN — Medication 3 MILLILITER(S): at 08:07

## 2022-10-18 RX ADMIN — Medication 650 MILLIGRAM(S): at 17:25

## 2022-10-18 RX ADMIN — Medication 650 MILLIGRAM(S): at 18:25

## 2022-10-18 RX ADMIN — Medication 50 MILLIGRAM(S): at 05:19

## 2022-10-18 RX ADMIN — POTASSIUM PHOSPHATE, MONOBASIC POTASSIUM PHOSPHATE, DIBASIC 83.33 MILLIMOLE(S): 236; 224 INJECTION, SOLUTION INTRAVENOUS at 11:36

## 2022-10-18 RX ADMIN — BUDESONIDE AND FORMOTEROL FUMARATE DIHYDRATE 2 PUFF(S): 160; 4.5 AEROSOL RESPIRATORY (INHALATION) at 10:46

## 2022-10-18 RX ADMIN — Medication 100 MILLIGRAM(S): at 11:22

## 2022-10-18 RX ADMIN — Medication 50 MILLIGRAM(S): at 13:12

## 2022-10-18 RX ADMIN — Medication 180 MILLIGRAM(S): at 05:19

## 2022-10-18 RX ADMIN — Medication 50 MILLIGRAM(S): at 21:47

## 2022-10-18 RX ADMIN — Medication 4: at 17:21

## 2022-10-18 RX ADMIN — ATORVASTATIN CALCIUM 40 MILLIGRAM(S): 80 TABLET, FILM COATED ORAL at 21:47

## 2022-10-18 RX ADMIN — CHLORHEXIDINE GLUCONATE 1 APPLICATION(S): 213 SOLUTION TOPICAL at 05:19

## 2022-10-18 NOTE — PROGRESS NOTE ADULT - PROBLEM SELECTOR PLAN 3
pt takes coreg, diltiazem ER, hydralazine 50 mg TID  Will increase coreg dose to 50mg  c/w home meds pt takes coreg, diltiazem ER, hydralazine 50 mg TID  Will increase coreg dose to 25mg BID  c/w home meds

## 2022-10-18 NOTE — PROGRESS NOTE ADULT - PROBLEM SELECTOR PLAN 2
pt A1C is 8.2   Pt started on SSI   Started on glargine 8u at bedtime due to steroid induced hyperglycemia  continue to monitor  c/w metformin at home

## 2022-10-18 NOTE — PROGRESS NOTE ADULT - ASSESSMENT
Patient is a 68 yo Female, from home, with med hx significant for COPD, Left MCA CVA w/RLE weakness, Seizures, Type 2 DM, CAD s/p ZHAO, Afib not on AC, PAD s/p Right femoral popliteal bypass s/p occlusion + IR stent placement 2/2017, s/p cardiac cath 4/2022 w/ non-obstructive CAD, presenting to the ED due to shortness of breath likely 2/2 COPD exacerbation. Admitted to ICU for close monitoring of respiratory status. Pt downgraded to medicine unit on 5 south. Patient endorses feelings of anxiety with no trigger that come and go. Endorses SOB, dizziness on exertion, coughing, and wheezing. Labs today show increasing leukocytosis (13.2).

## 2022-10-18 NOTE — PROGRESS NOTE ADULT - ASSESSMENT
Ms. Villavicencio is a 68yo woman, active smoker, with multiple comorbitidies as above including poorly controlled asthma/COPD, who presented to the ED due to acute on chronic shortness of breath x several days associated with productive cough and wheeze. Initially admitted to the ICU and managed on BiPAP. CXR unrevealing; RVP +for entero/rhinovirus. Pt received IV steroids and ATC nebs with empiric abx, now slowly improving and on NC. Pulmonary consulted for evaluation of acute respiratory failure.    Suspect current clinical presentation is due to acute exacerbation of pt's COPD, likely related to +entero/rhinovirus. Pt continues to improve on steroids, bronchodilator nebs ATC, empiric abx, supportive care.    # Acute hypoxemic respiratory failure  # Acute exacerbation of COPD  # +Entero/rhinovirus      Recommendations:  - On day 2 of prednisone 30mg/d; continue taper by 10mg every 2 days  - Can deescalate bronchodilator nebs to q6h while pt remains in the hospital  - S/p course of empiric CAP Tx (cef/azithro)  - Incentive spirometry 10x/h or as tolerated; OOB/TC  - 600-1200mg guaifenesin-ER standing BID for mucolytic effect  - Titrate supplemental O2 with goal SpO2 88-92% in pt with known chronic hypercarbia; titrated at bedside to 2LNC, would continue to wean as tolerated. Suspect pt may be chronically hypoxemic at baseline, particularly with exertion. Pending home O2 delivery  - Incentive spirometry 10x/h or as tolerated; OOB/TC and ambulation daily as tolerated; please document exertional SpO2  - Would benefit from SW visit before discharge for smoking cessation resources. Reiterated importance of and methods for smoking cessation  - On discharge would start triple therapy (ICS/LAMA/LABA; e.g. symbicort + spiriva; with use of spacer) and will require follow up with Pulmonary Dr. East within 7 days of discharge for post-AECOPD care/COPD stars program. Will need full baseline PFTs once acute issues resolve      Veronique Jimenez MD  Pulmonary & Critical Care  Available on Teams   Ms. Villavicencio is a 66yo woman, active smoker, with multiple comorbitidies as above including poorly controlled asthma/COPD, who presented to the ED due to acute on chronic shortness of breath x several days associated with productive cough and wheeze. Initially admitted to the ICU and managed on BiPAP. CXR unrevealing; RVP +for entero/rhinovirus. Pt received IV steroids and ATC nebs with empiric abx, now slowly improving and on NC. Pulmonary consulted for evaluation of acute respiratory failure.    Suspect current clinical presentation is due to acute exacerbation of pt's COPD, likely related to +entero/rhinovirus. Pt continues to improve on steroid taper, bronchodilators, supportive care.    # Acute hypoxemic respiratory failure  # Acute exacerbation of COPD  # +Entero/rhinovirus      Recommendations:  - On day 2 of prednisone 30mg/d; continue taper by 10mg every 2 days  - Can deescalate bronchodilator nebs to q6h while pt remains in the hospital  - S/p course of empiric CAP Tx (cef/azithro)  - Incentive spirometry 10x/h or as tolerated; OOB/TC  - 600-1200mg guaifenesin-ER standing BID for mucolytic effect  - Titrate supplemental O2 with goal SpO2 88-92% in pt with known chronic hypercarbia; at bedside normoxic on RA. Wean as tolerated. Suspect pt may be chronically hypoxemic at baseline, particularly with exertion. Pending home O2 delivery  - Incentive spirometry 10x/h or as tolerated; OOB/TC and ambulation daily as tolerated; please document exertional SpO2  - Would benefit from SW visit before discharge for smoking cessation resources. Reiterated importance of and methods for smoking cessation  - On discharge would start triple therapy (ICS/LAMA/LABA; e.g. symbicort + spiriva; with use of spacer) and will require follow up with Pulmonary Dr. East within 7 days of discharge for post-AECOPD care/COPD stars program. Will need full baseline PFTs once acute issues resolve  - Advised pt she may benefit from outpatient dermatology evaluation if R nail changes worsen/persist      Veronique Jimenez MD  Pulmonary & Critical Care  Available on Teams

## 2022-10-18 NOTE — PROGRESS NOTE ADULT - ASSESSMENT
Patient is a 66 yo Female, from home, with med hx significant for COPD, Left MCA CVA w/RLE weakness, Seizures, Type 2 DM, CAD s/p ZHAO, Afib not on AC, PAD s/p Right femoral popliteal bypass s/p occlusion + IR stent placement 2/2017, s/p cardiac cath 4/2022 w/ non-obstructive CAD, presenting to the ED due to shortness of breath likely 2/2 COPD exacerbation. Admitted to ICU for close monitoring of respiratory status. Pt downgraded to medicine unit on 5 south. Patient endorses feelings of anxiety with no trigger that come and go. Endorses SOB, dizziness on exertion, coughing, and wheezing. Labs today show increasing leukocytosis (13.2).

## 2022-10-18 NOTE — PROGRESS NOTE ADULT - ATTENDING COMMENTS
Respiratory distress resolved. Patient weaned to room air, saturating well. Continue on prednisone taper. Continue anti-hypertensives. Fingersticks elevated, but controlled likely to improve with steroid taper. Patient ready for discharge, pending delivery of wheelchair.

## 2022-10-18 NOTE — PROGRESS NOTE ADULT - SUBJECTIVE AND OBJECTIVE BOX
PULMONARY CONSULT SERVICE FOLLOW-UP NOTE    INTERVAL HPI:  Reviewed chart and overnight events; patient seen and examined at bedside.    MEDICATIONS:  Pulmonary:  albuterol/ipratropium for Nebulization 3 milliLiter(s) Nebulizer every 4 hours  budesonide 160 MICROgram(s)/formoterol 4.5 MICROgram(s) Inhaler 2 Puff(s) Inhalation two times a day  guaiFENesin  milliGRAM(s) Oral two times a day  montelukast 10 milliGRAM(s) Oral at bedtime    Antimicrobials:    Anticoagulants:  aspirin  chewable 81 milliGRAM(s) Oral daily  clopidogrel Tablet 75 milliGRAM(s) Oral daily  heparin   Injectable 5000 Unit(s) SubCutaneous every 12 hours    Cardiac:  carvedilol 25 milliGRAM(s) Oral every 12 hours  diltiazem    milliGRAM(s) Oral daily  hydrALAZINE 50 milliGRAM(s) Oral three times a day      Allergies    No Known Allergies    Intolerances        Vital Signs Last 24 Hrs  T(C): 36.8 (18 Oct 2022 04:46), Max: 36.8 (18 Oct 2022 04:46)  T(F): 98.2 (18 Oct 2022 04:46), Max: 98.2 (18 Oct 2022 04:46)  HR: 78 (18 Oct 2022 04:46) (68 - 78)  BP: 124/70 (18 Oct 2022 04:46) (93/57 - 147/77)  BP(mean): --  RR: 18 (18 Oct 2022 04:46) (18 - 18)  SpO2: 99% (18 Oct 2022 04:46) (96% - 100%)    Parameters below as of 18 Oct 2022 04:46  Patient On (Oxygen Delivery Method): nasal cannula  O2 Flow (L/min): 2          PHYSICAL EXAM:  GEN: NAD, well-appearing, sitting comfortably upright in bed  HEENT: NC/AT, PERRL, anicteric sclera; oropharynx clear, MMM, neck supple, no appreciable JVD  RESP: no respiratory distress, CTA B/L; no W/R/R  CV: +S1/S2, RRR  GI: soft, NT/ND, +BS  EXTREM: WWP; no edema, clubbing or cyanosis, R third fingernail discoloration (unchanged)  Vascular: 2+ radial pulses B/L  NEURO: AAOx3, RLE weakness (chronic), mild dysarthria noted      LABS:      CBC Full  -  ( 18 Oct 2022 05:37 )  WBC Count : 13.20 K/uL  RBC Count : 4.81 M/uL  Hemoglobin : 13.2 g/dL  Hematocrit : 40.8 %  Platelet Count - Automated : 285 K/uL  Mean Cell Volume : 84.8 fl  Mean Cell Hemoglobin : 27.4 pg  Mean Cell Hemoglobin Concentration : 32.4 gm/dL  Auto Neutrophil # : x  Auto Lymphocyte # : x  Auto Monocyte # : x  Auto Eosinophil # : x  Auto Basophil # : x  Auto Neutrophil % : x  Auto Lymphocyte % : x  Auto Monocyte % : x  Auto Eosinophil % : x  Auto Basophil % : x    10-18    139  |  109<H>  |  27<H>  ----------------------------<  184<H>  3.9   |  22  |  1.15    Ca    8.9      18 Oct 2022 05:37  Phos  2.1     10-18  Mg     2.1     10-18        RADIOLOGY & ADDITIONAL STUDIES:  No interval chest imaging for review  PULMONARY CONSULT SERVICE FOLLOW-UP NOTE    INTERVAL HPI:  Reviewed chart and overnight events; patient seen and examined at bedside. On RA SpO2 92-94%, pt breathing comfortably. Pt says that cough has improved today, is only intermittent, productive of yellow sputum easily cleared. She has no SOB at rest and is at her baseline ET with dyspnea on mild to moderate exertion. Pt expressed she is eager to go home. At this time pt denies anginal/pleuritic CP, subjective wheezing, nasal/chest congestion, stridor, orthopnea, hemoptysis, LE edema, f/c/n/v.     MEDICATIONS:  Pulmonary:  albuterol/ipratropium for Nebulization 3 milliLiter(s) Nebulizer every 4 hours  budesonide 160 MICROgram(s)/formoterol 4.5 MICROgram(s) Inhaler 2 Puff(s) Inhalation two times a day  guaiFENesin  milliGRAM(s) Oral two times a day  montelukast 10 milliGRAM(s) Oral at bedtime    Antimicrobials:    Anticoagulants:  aspirin  chewable 81 milliGRAM(s) Oral daily  clopidogrel Tablet 75 milliGRAM(s) Oral daily  heparin   Injectable 5000 Unit(s) SubCutaneous every 12 hours    Cardiac:  carvedilol 25 milliGRAM(s) Oral every 12 hours  diltiazem    milliGRAM(s) Oral daily  hydrALAZINE 50 milliGRAM(s) Oral three times a day      Allergies    No Known Allergies    Intolerances        Vital Signs Last 24 Hrs  T(C): 36.8 (18 Oct 2022 04:46), Max: 36.8 (18 Oct 2022 04:46)  T(F): 98.2 (18 Oct 2022 04:46), Max: 98.2 (18 Oct 2022 04:46)  HR: 78 (18 Oct 2022 04:46) (68 - 78)  BP: 124/70 (18 Oct 2022 04:46) (93/57 - 147/77)  BP(mean): --  RR: 18 (18 Oct 2022 04:46) (18 - 18)  SpO2: 99% (18 Oct 2022 04:46) (96% - 100%)    Parameters below as of 18 Oct 2022 04:46  Patient On (Oxygen Delivery Method): nasal cannula  O2 Flow (L/min): 2          PHYSICAL EXAM:  GEN: NAD, well-appearing, sitting comfortably upright in bed  HEENT: NC/AT, PERRL, anicteric sclera; oropharynx clear, MMM, neck supple, no appreciable JVD  RESP: no respiratory distress, CTA B/L; no W/R/R  CV: +S1/S2, RRR  GI: soft, NT/ND, +BS  EXTREM: WWP; no edema, clubbing or cyanosis, R third fingernail discoloration (unchanged)  Vascular: 2+ radial pulses B/L  NEURO: AAOx3, RLE weakness (chronic), mild dysarthria noted      LABS:      CBC Full  -  ( 18 Oct 2022 05:37 )  WBC Count : 13.20 K/uL  RBC Count : 4.81 M/uL  Hemoglobin : 13.2 g/dL  Hematocrit : 40.8 %  Platelet Count - Automated : 285 K/uL  Mean Cell Volume : 84.8 fl  Mean Cell Hemoglobin : 27.4 pg  Mean Cell Hemoglobin Concentration : 32.4 gm/dL  Auto Neutrophil # : x  Auto Lymphocyte # : x  Auto Monocyte # : x  Auto Eosinophil # : x  Auto Basophil # : x  Auto Neutrophil % : x  Auto Lymphocyte % : x  Auto Monocyte % : x  Auto Eosinophil % : x  Auto Basophil % : x    10-18    139  |  109<H>  |  27<H>  ----------------------------<  184<H>  3.9   |  22  |  1.15    Ca    8.9      18 Oct 2022 05:37  Phos  2.1     10-18  Mg     2.1     10-18        RADIOLOGY & ADDITIONAL STUDIES:  No interval chest imaging for review

## 2022-10-18 NOTE — PROGRESS NOTE ADULT - SUBJECTIVE AND OBJECTIVE BOX
Medical Student year 3 Progress Note discussed with resident & attending    TEAMS or PAGER #: [[phone]]  TILL 5:00 PM  PLEASE CONTACT ON CALL TEAM:  - On Call Team (Please refer to Trevon) FROM 5:00 PM - 8:30PM  - Nightfloat Team FROM 8:30 -7:30 AM      INTERVAL HPI/OVERNIGHT EVENTS: No events overnight. Pt assessed at the bedside, speaking in full sentences and in NAD, denies any chest pain, fever, chills, n/v/d. Endorses SOB, wheezing, dizziness, and productive cough with yellow sputum. Endorses bouts of anxiety that come and go. Will continue to monitor.         MEDICATIONS  (STANDING):  albuterol/ipratropium for Nebulization 3 milliLiter(s) Nebulizer every 4 hours  aspirin  chewable 81 milliGRAM(s) Oral daily  atorvastatin 40 milliGRAM(s) Oral at bedtime  budesonide 160 MICROgram(s)/formoterol 4.5 MICROgram(s) Inhaler 2 Puff(s) Inhalation two times a day  carvedilol 25 milliGRAM(s) Oral every 12 hours  chlorhexidine 2% Cloths 1 Application(s) Topical <User Schedule>  clopidogrel Tablet 75 milliGRAM(s) Oral daily  diltiazem    milliGRAM(s) Oral daily  guaiFENesin  milliGRAM(s) Oral two times a day  heparin   Injectable 5000 Unit(s) SubCutaneous every 12 hours  hydrALAZINE 50 milliGRAM(s) Oral three times a day  insulin glargine Injectable (LANTUS) 8 Unit(s) SubCutaneous at bedtime  insulin lispro (ADMELOG) corrective regimen sliding scale   SubCutaneous Before meals and at bedtime  montelukast 10 milliGRAM(s) Oral at bedtime  nicotine - 21 mG/24Hr(s) Patch 1 Patch Transdermal daily  pantoprazole    Tablet 40 milliGRAM(s) Oral before breakfast  topiramate 100 milliGRAM(s) Oral daily    MEDICATIONS  (PRN):  acetaminophen     Tablet .. 650 milliGRAM(s) Oral every 6 hours PRN Mild Pain (1 - 3)  ondansetron Injectable 4 milliGRAM(s) IV Push every 8 hours PRN Nausea and/or Vomiting      REVIEW OF SYSTEMS:  CONSTITUTIONAL: No fever, weight loss, or fatigue  RESPIRATORY: + cough, SOB, and wheezing; No chills or hemoptysis  CARDIOVASCULAR: + dizziness; No chest pain, palpitations, or leg swelling  GASTROINTESTINAL: No abdominal pain. No nausea, vomiting, or hematemesis; No diarrhea or constipation. No melena or hematochezia.  GENITOURINARY: No dysuria or hematuria, urinary frequency  NEUROLOGICAL: No headaches, memory loss, numbness, or tremors  SKIN: No itching, burning, rashes, or lesions     Vital Signs Last 24 Hrs  T(C): 36.8 (18 Oct 2022 04:46), Max: 36.8 (18 Oct 2022 04:46)  T(F): 98.2 (18 Oct 2022 04:46), Max: 98.2 (18 Oct 2022 04:46)  HR: 78 (18 Oct 2022 04:46) (68 - 78)  BP: 124/70 (18 Oct 2022 04:46) (93/57 - 147/77)  BP(mean): --  RR: 18 (18 Oct 2022 04:46) (18 - 18)  SpO2: 99% (18 Oct 2022 04:46) (96% - 100%)    Parameters below as of 18 Oct 2022 04:46  Patient On (Oxygen Delivery Method): nasal cannula  O2 Flow (L/min): 2      PHYSICAL EXAMINATION:  GENERAL: NAD, well-developed  HEAD:  Atraumatic, Normocephalic  EYES:  conjunctiva and sclera clear  NECK: Supple, No JVD, Normal thyroid  CHEST/LUNG: Clear to auscultation. No rales, rhonchi, wheezing, or rubs  HEART: Regular rate and rhythm; No murmurs, rubs, or gallops  ABDOMEN: Soft, Nontender, Nondistended; Bowel sounds present  NERVOUS SYSTEM:  Alert & Oriented X3,    EXTREMITIES:  2+ Peripheral Pulses, No clubbing, cyanosis, or edema  SKIN: warm dry                          13.2   13.20 )-----------( 285      ( 18 Oct 2022 05:37 )             40.8     10-18    139  |  109<H>  |  27<H>  ----------------------------<  184<H>  3.9   |  22  |  1.15    Ca    8.9      18 Oct 2022 05:37  Phos  2.1     10-18  Mg     2.1     10-18

## 2022-10-18 NOTE — PROGRESS NOTE ADULT - SUBJECTIVE AND OBJECTIVE BOX
PATIENT SEEN AND EXAMINED ON :- 10/18/22  DATE OF SERVICE:  10/18/22           Interim events noted,Labs ,Radiological studies and Cardiology tests reviewed       HOSPITAL COURSE: HPI:  Patient is a 66 yo Female, from home, with med hx significant for COPD, Left MCA CVA w/RLE weakness, Seizures, Type 2 DM, CAD s/p ZHAO, Afib not on AC, PAD s/p Right femoral popliteal bypass s/p occlusion + IR stent placement 2/2017, s/p cardiac cath 4/2022 w/non-obstructive CAD, presenting to the ED due to shortness of breath. Pt currently on Bipap, able to take in full sentences w/mild tachycardia 105-107 bpm when she coughs. Collateral history obtained from EMS charts and ER provider note.   EMS note says pt was not able to speak in complete sentences and had increased WOB, was given 12 mg dexamethasone and 0.3 mg Epi. In the ED, pt was placed on Bipap, noted to have narrow complex tachycardia to 150s, was given adenosine 6 mg then 12 mg, then cardizem 15 mg + duoneb x 3, decadron 10mg IVPB s/p HR ranging in the low 100s. +PT admits to cough and sputum production (07 Oct 2022 07:00)      INTERIM EVENTS:Patient seen at bedside ,interim events noted.      PMH -reviewed admission note, no change since admission  HEART FAILURE: Acute[ ]Chronic[ ] Systolic[ ] Diastolic[ ] Combined Systolic and Diastolic[ ]  CAD[ ] CABG[ ] PCI[ ]  DEVICES[ ] PPM[ ] ICD[ ] ILR[ ]  ATRIAL FIBRILLATION[ ] Paroxysmal[ ] Permanent[ ] CHADS2-[  ]  ILEANA[ ] CKD1[ ] CKD2[ ] CKD3[ ] CKD4[ ] ESRD[ ]  COPD[ ] HTN[ ]   DM[ ] Type1[ ] Type 2[ ]   CVA[ ] Paresis[ ]    AMBULATION: Assisted[ ] Cane/walker[ ] Independent[ ]    MEDICATIONS  (STANDING):  albuterol/ipratropium for Nebulization 3 milliLiter(s) Nebulizer every 4 hours  aspirin  chewable 81 milliGRAM(s) Oral daily  atorvastatin 40 milliGRAM(s) Oral at bedtime  budesonide 160 MICROgram(s)/formoterol 4.5 MICROgram(s) Inhaler 2 Puff(s) Inhalation two times a day  carvedilol 25 milliGRAM(s) Oral every 12 hours  chlorhexidine 2% Cloths 1 Application(s) Topical <User Schedule>  clopidogrel Tablet 75 milliGRAM(s) Oral daily  diltiazem    milliGRAM(s) Oral daily  guaiFENesin  milliGRAM(s) Oral two times a day  heparin   Injectable 5000 Unit(s) SubCutaneous every 12 hours  hydrALAZINE 50 milliGRAM(s) Oral three times a day  insulin glargine Injectable (LANTUS) 8 Unit(s) SubCutaneous at bedtime  insulin lispro (ADMELOG) corrective regimen sliding scale   SubCutaneous Before meals and at bedtime  montelukast 10 milliGRAM(s) Oral at bedtime  nicotine - 21 mG/24Hr(s) Patch 1 Patch Transdermal daily  pantoprazole    Tablet 40 milliGRAM(s) Oral before breakfast  topiramate 100 milliGRAM(s) Oral daily    MEDICATIONS  (PRN):  acetaminophen     Tablet .. 650 milliGRAM(s) Oral every 6 hours PRN Mild Pain (1 - 3)  ondansetron Injectable 4 milliGRAM(s) IV Push every 8 hours PRN Nausea and/or Vomiting            REVIEW OF SYSTEMS:  Constitutional: [ ] fever, [ ]weight loss,  [ ]fatigue [ ]weight gain  Eyes: [ ] visual changes  Respiratory: [ ]shortness of breath;  [ ] cough, [ ]wheezing, [ ]chills, [ ]hemoptysis  Cardiovascular: [ ] chest pain, [ ]palpitations, [ ]dizziness,  [ ]leg swelling[ ]orthopnea[ ]PND  Gastrointestinal: [ ] abdominal pain, [ ]nausea, [ ]vomiting,  [ ]diarrhea [ ]Constipation [ ]Melena  Genitourinary: [ ] dysuria, [ ] hematuria [ ]Doll  Neurologic: [ ] headaches [ ] tremors[ ]weakness [ ]Paralysis Right[ ] Left[ ]  Skin: [ ] itching, [ ]burning, [ ] rashes  Endocrine: [ ] heat or cold intolerance  Musculoskeletal: [ ] joint pain or swelling; [ ] muscle, back, or extremity pain  Psychiatric: [ ] depression, [ ]anxiety, [ ]mood swings, or [ ]difficulty sleeping  Hematologic: [ ] easy bruising, [ ] bleeding gums    [ ] All remaining systems negative except as per above.   [ ]Unable to obtain.  [x] No change in ROS since admission      Vital Signs Last 24 Hrs  T(C): 36.3 (18 Oct 2022 21:12), Max: 36.8 (18 Oct 2022 04:46)  T(F): 97.4 (18 Oct 2022 21:12), Max: 98.2 (18 Oct 2022 04:46)  HR: 71 (18 Oct 2022 21:12) (64 - 78)  BP: 153/83 (18 Oct 2022 21:12) (124/70 - 160/91)  BP(mean): --  RR: 16 (18 Oct 2022 21:12) (16 - 18)  SpO2: 96% (18 Oct 2022 21:12) (96% - 99%)    Parameters below as of 18 Oct 2022 21:12  Patient On (Oxygen Delivery Method): nasal cannula  O2 Flow (L/min): 2    I&O's Summary      PHYSICAL EXAM:  General: No acute distress BMI-  HEENT: EOMI, PERRL  Neck: Supple, [ ] JVD  Lungs: Equal air entry bilaterally; [ ] rales [ ] wheezing [ ] rhonchi  Heart: Regular rate and rhythm; [x ] murmur   2/6 [ x] systolic [ ] diastolic [ ] radiation[ ] rubs [ ]  gallops  Abdomen: Nontender, bowel sounds present  Extremities: No clubbing, cyanosis, [ ] edema [ ]Pulses  equal and intact  Nervous system:  Alert & Oriented X3, no focal deficits  Psychiatric: Normal affect  Skin: No rashes or lesions    LABS:  10-18    139  |  109<H>  |  27<H>  ----------------------------<  184<H>  3.9   |  22  |  1.15    Ca    8.9      18 Oct 2022 05:37  Phos  2.1     10-18  Mg     2.1     10-18      Creatinine Trend: 1.15<--, 1.14<--, 1.20<--, 1.19<--, 1.24<--, 1.29<--                        13.2   13.20 )-----------( 285      ( 18 Oct 2022 05:37 )             40.8

## 2022-10-19 ENCOUNTER — TRANSCRIPTION ENCOUNTER (OUTPATIENT)
Age: 67
End: 2022-10-19

## 2022-10-19 VITALS
HEART RATE: 65 BPM | OXYGEN SATURATION: 98 % | DIASTOLIC BLOOD PRESSURE: 88 MMHG | SYSTOLIC BLOOD PRESSURE: 174 MMHG | RESPIRATION RATE: 18 BRPM

## 2022-10-19 DIAGNOSIS — E83.39 OTHER DISORDERS OF PHOSPHORUS METABOLISM: ICD-10-CM

## 2022-10-19 LAB
ANION GAP SERPL CALC-SCNC: 8 MMOL/L — SIGNIFICANT CHANGE UP (ref 5–17)
BUN SERPL-MCNC: 25 MG/DL — HIGH (ref 7–18)
CALCIUM SERPL-MCNC: 9 MG/DL — SIGNIFICANT CHANGE UP (ref 8.4–10.5)
CHLORIDE SERPL-SCNC: 108 MMOL/L — SIGNIFICANT CHANGE UP (ref 96–108)
CO2 SERPL-SCNC: 23 MMOL/L — SIGNIFICANT CHANGE UP (ref 22–31)
CREAT SERPL-MCNC: 1.16 MG/DL — SIGNIFICANT CHANGE UP (ref 0.5–1.3)
EGFR: 52 ML/MIN/1.73M2 — LOW
GLUCOSE BLDC GLUCOMTR-MCNC: 138 MG/DL — HIGH (ref 70–99)
GLUCOSE BLDC GLUCOMTR-MCNC: 297 MG/DL — HIGH (ref 70–99)
GLUCOSE SERPL-MCNC: 152 MG/DL — HIGH (ref 70–99)
HCT VFR BLD CALC: 42.3 % — SIGNIFICANT CHANGE UP (ref 34.5–45)
HGB BLD-MCNC: 13.4 G/DL — SIGNIFICANT CHANGE UP (ref 11.5–15.5)
MAGNESIUM SERPL-MCNC: 2.1 MG/DL — SIGNIFICANT CHANGE UP (ref 1.6–2.6)
MCHC RBC-ENTMCNC: 26.7 PG — LOW (ref 27–34)
MCHC RBC-ENTMCNC: 31.7 GM/DL — LOW (ref 32–36)
MCV RBC AUTO: 84.4 FL — SIGNIFICANT CHANGE UP (ref 80–100)
NRBC # BLD: 0 /100 WBCS — SIGNIFICANT CHANGE UP (ref 0–0)
PHOSPHATE SERPL-MCNC: 1.7 MG/DL — LOW (ref 2.5–4.5)
PLATELET # BLD AUTO: 288 K/UL — SIGNIFICANT CHANGE UP (ref 150–400)
POTASSIUM SERPL-MCNC: 3.8 MMOL/L — SIGNIFICANT CHANGE UP (ref 3.5–5.3)
POTASSIUM SERPL-SCNC: 3.8 MMOL/L — SIGNIFICANT CHANGE UP (ref 3.5–5.3)
RBC # BLD: 5.01 M/UL — SIGNIFICANT CHANGE UP (ref 3.8–5.2)
RBC # FLD: 13.5 % — SIGNIFICANT CHANGE UP (ref 10.3–14.5)
SODIUM SERPL-SCNC: 139 MMOL/L — SIGNIFICANT CHANGE UP (ref 135–145)
WBC # BLD: 13.03 K/UL — HIGH (ref 3.8–10.5)
WBC # FLD AUTO: 13.03 K/UL — HIGH (ref 3.8–10.5)

## 2022-10-19 PROCEDURE — 84100 ASSAY OF PHOSPHORUS: CPT

## 2022-10-19 PROCEDURE — 82803 BLOOD GASES ANY COMBINATION: CPT

## 2022-10-19 PROCEDURE — 87641 MR-STAPH DNA AMP PROBE: CPT

## 2022-10-19 PROCEDURE — 93005 ELECTROCARDIOGRAM TRACING: CPT

## 2022-10-19 PROCEDURE — 96375 TX/PRO/DX INJ NEW DRUG ADDON: CPT

## 2022-10-19 PROCEDURE — 83036 HEMOGLOBIN GLYCOSYLATED A1C: CPT

## 2022-10-19 PROCEDURE — 85025 COMPLETE CBC W/AUTO DIFF WBC: CPT

## 2022-10-19 PROCEDURE — 94660 CPAP INITIATION&MGMT: CPT

## 2022-10-19 PROCEDURE — 85730 THROMBOPLASTIN TIME PARTIAL: CPT

## 2022-10-19 PROCEDURE — 84484 ASSAY OF TROPONIN QUANT: CPT

## 2022-10-19 PROCEDURE — 85027 COMPLETE CBC AUTOMATED: CPT

## 2022-10-19 PROCEDURE — 99232 SBSQ HOSP IP/OBS MODERATE 35: CPT

## 2022-10-19 PROCEDURE — 80048 BASIC METABOLIC PNL TOTAL CA: CPT

## 2022-10-19 PROCEDURE — 83735 ASSAY OF MAGNESIUM: CPT

## 2022-10-19 PROCEDURE — 87640 STAPH A DNA AMP PROBE: CPT

## 2022-10-19 PROCEDURE — 36415 COLL VENOUS BLD VENIPUNCTURE: CPT

## 2022-10-19 PROCEDURE — 85610 PROTHROMBIN TIME: CPT

## 2022-10-19 PROCEDURE — 94640 AIRWAY INHALATION TREATMENT: CPT

## 2022-10-19 PROCEDURE — 85379 FIBRIN DEGRADATION QUANT: CPT

## 2022-10-19 PROCEDURE — 83880 ASSAY OF NATRIURETIC PEPTIDE: CPT

## 2022-10-19 PROCEDURE — 99239 HOSP IP/OBS DSCHRG MGMT >30: CPT | Mod: GC

## 2022-10-19 PROCEDURE — 82962 GLUCOSE BLOOD TEST: CPT

## 2022-10-19 PROCEDURE — 99285 EMERGENCY DEPT VISIT HI MDM: CPT | Mod: 25

## 2022-10-19 PROCEDURE — 80053 COMPREHEN METABOLIC PANEL: CPT

## 2022-10-19 PROCEDURE — 96374 THER/PROPH/DIAG INJ IV PUSH: CPT

## 2022-10-19 PROCEDURE — 97116 GAIT TRAINING THERAPY: CPT

## 2022-10-19 PROCEDURE — 71045 X-RAY EXAM CHEST 1 VIEW: CPT

## 2022-10-19 PROCEDURE — 87635 SARS-COV-2 COVID-19 AMP PRB: CPT

## 2022-10-19 PROCEDURE — 0225U NFCT DS DNA&RNA 21 SARSCOV2: CPT

## 2022-10-19 PROCEDURE — 97110 THERAPEUTIC EXERCISES: CPT

## 2022-10-19 RX ORDER — NICOTINE POLACRILEX 2 MG
1 GUM BUCCAL DAILY
Refills: 0 | Status: DISCONTINUED | OUTPATIENT
Start: 2022-10-19 | End: 2022-10-19

## 2022-10-19 RX ORDER — SODIUM,POTASSIUM PHOSPHATES 278-250MG
1 POWDER IN PACKET (EA) ORAL ONCE
Refills: 0 | Status: COMPLETED | OUTPATIENT
Start: 2022-10-19 | End: 2022-10-19

## 2022-10-19 RX ORDER — CARVEDILOL PHOSPHATE 80 MG/1
25 CAPSULE, EXTENDED RELEASE ORAL ONCE
Refills: 0 | Status: COMPLETED | OUTPATIENT
Start: 2022-10-19 | End: 2022-10-19

## 2022-10-19 RX ORDER — NICOTINE POLACRILEX 2 MG
1 GUM BUCCAL
Qty: 10 | Refills: 0
Start: 2022-10-19 | End: 2022-10-28

## 2022-10-19 RX ORDER — POTASSIUM PHOSPHATE, MONOBASIC POTASSIUM PHOSPHATE, DIBASIC 236; 224 MG/ML; MG/ML
30 INJECTION, SOLUTION INTRAVENOUS ONCE
Refills: 0 | Status: DISCONTINUED | OUTPATIENT
Start: 2022-10-19 | End: 2022-10-19

## 2022-10-19 RX ADMIN — Medication 3 MILLILITER(S): at 03:14

## 2022-10-19 RX ADMIN — Medication 20 MILLIGRAM(S): at 05:10

## 2022-10-19 RX ADMIN — Medication 1 PATCH: at 08:11

## 2022-10-19 RX ADMIN — Medication 650 MILLIGRAM(S): at 12:04

## 2022-10-19 RX ADMIN — BUDESONIDE AND FORMOTEROL FUMARATE DIHYDRATE 2 PUFF(S): 160; 4.5 AEROSOL RESPIRATORY (INHALATION) at 09:12

## 2022-10-19 RX ADMIN — Medication 50 MILLIGRAM(S): at 14:17

## 2022-10-19 RX ADMIN — HEPARIN SODIUM 5000 UNIT(S): 5000 INJECTION INTRAVENOUS; SUBCUTANEOUS at 05:10

## 2022-10-19 RX ADMIN — Medication 600 MILLIGRAM(S): at 05:09

## 2022-10-19 RX ADMIN — Medication 650 MILLIGRAM(S): at 13:00

## 2022-10-19 RX ADMIN — Medication 180 MILLIGRAM(S): at 05:09

## 2022-10-19 RX ADMIN — Medication 1 PACKET(S): at 12:57

## 2022-10-19 RX ADMIN — Medication 100 MILLIGRAM(S): at 12:04

## 2022-10-19 RX ADMIN — Medication 1 PATCH: at 11:28

## 2022-10-19 RX ADMIN — CARVEDILOL PHOSPHATE 25 MILLIGRAM(S): 80 CAPSULE, EXTENDED RELEASE ORAL at 05:09

## 2022-10-19 RX ADMIN — Medication 50 MILLIGRAM(S): at 05:09

## 2022-10-19 RX ADMIN — Medication 3 MILLILITER(S): at 12:06

## 2022-10-19 RX ADMIN — Medication 6: at 12:03

## 2022-10-19 RX ADMIN — Medication 1 PATCH: at 12:05

## 2022-10-19 RX ADMIN — Medication 81 MILLIGRAM(S): at 12:04

## 2022-10-19 RX ADMIN — CLOPIDOGREL BISULFATE 75 MILLIGRAM(S): 75 TABLET, FILM COATED ORAL at 12:04

## 2022-10-19 RX ADMIN — PANTOPRAZOLE SODIUM 40 MILLIGRAM(S): 20 TABLET, DELAYED RELEASE ORAL at 05:10

## 2022-10-19 RX ADMIN — CARVEDILOL PHOSPHATE 25 MILLIGRAM(S): 80 CAPSULE, EXTENDED RELEASE ORAL at 16:10

## 2022-10-19 RX ADMIN — CHLORHEXIDINE GLUCONATE 1 APPLICATION(S): 213 SOLUTION TOPICAL at 05:08

## 2022-10-19 RX ADMIN — Medication 1 TABLET(S): at 09:12

## 2022-10-19 RX ADMIN — Medication 3 MILLILITER(S): at 08:11

## 2022-10-19 NOTE — PROGRESS NOTE ADULT - PROBLEM SELECTOR PROBLEM 2
DM (diabetes mellitus)

## 2022-10-19 NOTE — PROGRESS NOTE ADULT - PROBLEM SELECTOR PLAN 6
corrected on 10/17 (1.9) with potassium phosphate  on 10/18 was 2.1, did not receive any supplemental phosphorous  today (10/19) phosphorus is 1.7, will give potassium phosphate to correct

## 2022-10-19 NOTE — PROGRESS NOTE ADULT - PROBLEM/PLAN-5
Called patient and informed her of message below and she verbalized understanding.  Scheduled appointment for Monday 10/1/18 at 10:20 in [facility].  
Called patient and she reports she has been taking 75 mg of amitriptyline and this is her third week on it but it is not working.  Please advise.   
Called patient, phone was answered but caller could hear nothing on the line.  Will attempt to call back at a later time.  Need to determine current dose of amitriptyline.   
DISPLAY PLAN FREE TEXT
Make an earlier follow-up visit for the patient. For now continue amitriptyline 75 mg p.o. q.h.s.  
Patient calling stating that her amitriptyline (ELAVIL) 25 MG tablet is not working and would like to know if there was anything else that could be done.     Thank you  
DISPLAY PLAN FREE TEXT

## 2022-10-19 NOTE — PROGRESS NOTE ADULT - PROVIDER SPECIALTY LIST ADULT
Cardiology
Critical Care
Critical Care
Internal Medicine
Pulmonology
Cardiology
Internal Medicine
Pulmonology
Internal Medicine
Internal Medicine
Cardiology
Internal Medicine
Internal Medicine
Cardiology
Internal Medicine
Cardiology
Internal Medicine
Cardiology
Internal Medicine
Cardiology
Internal Medicine
Cardiology
Internal Medicine

## 2022-10-19 NOTE — DISCHARGE NOTE NURSING/CASE MANAGEMENT/SOCIAL WORK - PATIENT PORTAL LINK FT
You can access the FollowMyHealth Patient Portal offered by [facility] by registering at the following website: [email or web address]. By joining [employer]’s FollowMyHealth portal, you will also be able to view your health information using other applications (apps) compatible with our system.

## 2022-10-19 NOTE — DISCHARGE NOTE NURSING/CASE MANAGEMENT/SOCIAL WORK - NSDCVIVACCINE_GEN_ALL_CORE_FT
influenza, injectable, quadrivalent, preservative free; 22-Sep-2015 12:20; Frankie Parks (RN); [employer]; [id number]; IntraMuscular; Deltoid Left.; 0.5 milliLiter(s); VIS (VIS Published: 07-Aug-2015, VIS Presented: 22-Sep-2015);   influenza, injectable, quadrivalent, preservative free; 25-Sep-2018 10:34; Helen Mclaughlin (RN); [employer]; [id number] (Exp. Date: 30-Jun-2019); IntraMuscular; Deltoid Right.; 0.5 milliLiter(s); VIS (VIS Published: 07-Aug-2015, VIS Presented: 25-Sep-2018);   pneumococcal polysaccharide PPV23; 25-Sep-2014 14:23; Faith Kauffman (RN); [employer] [id number]; IntraMuscular; Deltoid Right.; 0.5 milliLiter(s);

## 2022-10-19 NOTE — PROGRESS NOTE ADULT - SUBJECTIVE AND OBJECTIVE BOX
Medical Student year 3 Progress Note discussed with resident & attending    TEAMS or PAGER #: [[phone]]  TILL 5:00 PM  PLEASE CONTACT ON CALL TEAM:  - On Call Team (Please refer to Trevon) FROM 5:00 PM - 8:30PM  - Nightfloat Team FROM 8:30 -7:30 AM      INTERVAL HPI/OVERNIGHT EVENTS: No events overnight. Pt assessed at the bedside, in NAD, denies any chest pain, fever, chills, n/v/d. Endorses baseline SOB and wheezing. + coughing with production of yellow sputum. Denies dizziness or anxiety. Will continue to monitor.         MEDICATIONS  (STANDING):  albuterol/ipratropium for Nebulization 3 milliLiter(s) Nebulizer every 4 hours  aspirin  chewable 81 milliGRAM(s) Oral daily  atorvastatin 40 milliGRAM(s) Oral at bedtime  budesonide 160 MICROgram(s)/formoterol 4.5 MICROgram(s) Inhaler 2 Puff(s) Inhalation two times a day  carvedilol 25 milliGRAM(s) Oral every 12 hours  chlorhexidine 2% Cloths 1 Application(s) Topical <User Schedule>  clopidogrel Tablet 75 milliGRAM(s) Oral daily  diltiazem    milliGRAM(s) Oral daily  guaiFENesin  milliGRAM(s) Oral two times a day  heparin   Injectable 5000 Unit(s) SubCutaneous every 12 hours  hydrALAZINE 50 milliGRAM(s) Oral three times a day  insulin glargine Injectable (LANTUS) 8 Unit(s) SubCutaneous at bedtime  insulin lispro (ADMELOG) corrective regimen sliding scale   SubCutaneous Before meals and at bedtime  montelukast 10 milliGRAM(s) Oral at bedtime  nicotine - 21 mG/24Hr(s) Patch 1 Patch Transdermal daily  pantoprazole    Tablet 40 milliGRAM(s) Oral before breakfast  potassium phosphate / sodium phosphate Tablet (K-PHOS No. 2) 1 Tablet(s) Oral once  predniSONE   Tablet 20 milliGRAM(s) Oral daily  topiramate 100 milliGRAM(s) Oral daily    MEDICATIONS  (PRN):  acetaminophen     Tablet .. 650 milliGRAM(s) Oral every 6 hours PRN Mild Pain (1 - 3)  ondansetron Injectable 4 milliGRAM(s) IV Push every 8 hours PRN Nausea and/or Vomiting      REVIEW OF SYSTEMS:  CONSTITUTIONAL: No fever, weight loss, or fatigue  RESPIRATORY: + SOB, productive cough, wheezing; No chills or hemoptysis  CARDIOVASCULAR: No chest pain, palpitations, dizziness, or leg swelling  GASTROINTESTINAL: No abdominal pain. No nausea, vomiting, or hematemesis; No diarrhea or constipation. No melena or hematochezia.  GENITOURINARY: No dysuria or hematuria, urinary frequency  NEUROLOGICAL: No headaches, memory loss, numbness, or tremors  SKIN: No itching, burning, rashes, or lesions     Vital Signs Last 24 Hrs  T(C): 37 (19 Oct 2022 05:04), Max: 37 (19 Oct 2022 05:04)  T(F): 98.6 (19 Oct 2022 05:04), Max: 98.6 (19 Oct 2022 05:04)  HR: 76 (19 Oct 2022 05:04) (64 - 78)  BP: 164/87 (19 Oct 2022 05:04) (151/83 - 164/87)  BP(mean): --  RR: 16 (19 Oct 2022 05:04) (16 - 18)  SpO2: 95% (19 Oct 2022 05:04) (95% - 99%)    Parameters below as of 19 Oct 2022 05:04  Patient On (Oxygen Delivery Method): room air        PHYSICAL EXAMINATION:  GENERAL: NAD, well-developed, well-groomed  HEAD:  Atraumatic, Normocephalic  EYES:  conjunctiva and sclera clear  NECK: Supple, No JVD, Normal thyroid  CHEST/LUNG: Clear to auscultation. No rales, rhonchi, wheezing, or rubs  HEART: Regular rate and rhythm; No murmurs, rubs, or gallops  ABDOMEN: Soft, Nontender, Nondistended; Bowel sounds present  NERVOUS SYSTEM:  Alert & Oriented X3,    EXTREMITIES:  2+ Peripheral Pulses, No clubbing, cyanosis, or edema  SKIN: warm dry                          13.4   13.03 )-----------( 288      ( 19 Oct 2022 05:49 )             42.3     10-19    139  |  108  |  25<H>  ----------------------------<  152<H>  3.8   |  23  |  1.16    Ca    9.0      19 Oct 2022 05:49  Phos  1.7     10-19  Mg     2.1     10-19

## 2022-10-19 NOTE — PROGRESS NOTE ADULT - PROBLEM SELECTOR PLAN 4
Pt likely to have paroximal afib  On Asa and Plavix 75mg QD   continue to monitor

## 2022-10-19 NOTE — PROGRESS NOTE ADULT - PROBLEM SELECTOR PLAN 5
Pt on ASA and Plavix   PPI IV

## 2022-10-19 NOTE — PROGRESS NOTE ADULT - ASSESSMENT
Ms. Villavicencio is a 68yo woman, active smoker, with multiple comorbitidies as above including poorly controlled asthma/COPD, who presented to the ED due to acute on chronic shortness of breath x several days associated with productive cough and wheeze. Initially admitted to the ICU and managed on BiPAP. CXR unrevealing; RVP +for entero/rhinovirus. Pt received IV steroids and ATC nebs with empiric abx, now slowly improving and on NC. Pulmonary consulted for evaluation of acute respiratory failure.    Suspect current clinical presentation is due to acute exacerbation of pt's COPD, likely related to +entero/rhinovirus. Pt continues to improve on steroid taper, bronchodilators, supportive care.    # Acute hypoxemic respiratory failure  # Acute exacerbation of COPD  # +Entero/rhinovirus      Recommendations:  - On day 1 of prednisone 20mg/d; continue taper by 10mg every 2 days  - Can deescalate bronchodilator nebs to q6h while pt remains in the hospital  - S/p course of empiric CAP Tx (cef/azithro)  - Incentive spirometry 10x/h or as tolerated; OOB/TC  - 600-1200mg guaifenesin-ER standing BID for mucolytic effect  - Titrate supplemental O2 with goal SpO2 88-92% in pt with known chronic hypercarbia; at bedside normoxic on RA. Wean as tolerated. Suspect pt may be chronically hypoxemic at baseline, particularly with exertion  - Incentive spirometry 10x/h or as tolerated; OOB/TC and ambulation daily as tolerated; please document exertional SpO2  - Would benefit from SW visit before discharge for smoking cessation resources. Reiterated importance of and methods for smoking cessation  - On discharge would start triple therapy (ICS/LAMA/LABA; e.g. symbicort + spiriva; with use of spacer) and will require follow up with Pulmonary Dr. East within 7 days of discharge for post-AECOPD care/COPD stars program. Will need full baseline PFTs once acute issues resolve  - Advised pt she may benefit from outpatient dermatology evaluation if R nail changes worsen/persist      Veronique Jimenez MD  Pulmonary & Critical Care  Available on Teams   Ms. Villavicencio is a 66yo woman, active smoker, with multiple comorbitidies as above including poorly controlled asthma/COPD, who presented to the ED due to acute on chronic shortness of breath x several days associated with productive cough and wheeze. Initially admitted to the ICU and managed on BiPAP. CXR unrevealing; RVP +for entero/rhinovirus. Pt received IV steroids and ATC nebs with empiric abx, now slowly improving and on NC. Pulmonary consulted for evaluation of acute respiratory failure.    Suspect current clinical presentation is due to acute exacerbation of pt's COPD, likely related to +entero/rhinovirus. Pt continues to improve on steroid taper, bronchodilators, supportive care.    # Acute hypoxemic respiratory failure  # Acute exacerbation of COPD  # +Entero/rhinovirus      Recommendations:  - On day 1 of prednisone 20mg/d; continue taper by 10mg every 2 days  - Continue bronchodilator nebs while inpatient  - S/p course of empiric CAP Tx (cef/azithro)  - Incentive spirometry 10x/h or as tolerated; OOB/TC  - 600-1200mg guaifenesin-ER standing BID for mucolytic effect  - Titrate supplemental O2 with goal SpO2 88-92% in pt with known chronic hypercarbia; at bedside normoxic on RA. Wean as tolerated. Suspect pt may have been chronically hypoxemic at baseline, particularly with exertion  - Incentive spirometry 10x/h or as tolerated; OOB/TC and ambulation daily as tolerated; please document exertional SpO2  - Would benefit from SW visit before discharge for smoking cessation resources. Reiterated importance of and methods for smoking cessation  - On discharge would start triple therapy (ICS/LAMA/LABA; e.g. symbicort + spiriva; with use of spacer) and will require follow up with Pulmonary Dr. East within 7 days of discharge for post-AECOPD care/COPD stars program. Will need full baseline PFTs once acute issues resolve  - Advised pt she may benefit from outpatient dermatology evaluation if R nail changes worsen/persist      Veronique Jimenez MD  Pulmonary & Critical Care  Available on Teams

## 2022-10-19 NOTE — DISCHARGE NOTE NURSING/CASE MANAGEMENT/SOCIAL WORK - NSDCPEFALRISK_GEN_ALL_CORE
For information on Fall & Injury Prevention, visit: [email or web address] OR  [email or web address] OR  https://www.cdc.gov/steadi/patient.html

## 2022-10-19 NOTE — PROGRESS NOTE ADULT - PROBLEM SELECTOR PLAN 1
Pt p/w COPD exacerbation, was in ICU  C/w Solumedrol 40 mg Q12 for COPD exacerbation  C/w Azithromycin IV for COPD exacerbation, and Rocephin   c/w Duoneb Q4 hrs for now  c/w Symbicort  Pt on bipap at night   2L NC
Pt p/w COPD exacerbation, was in ICU  C/w Solumedrol 40 mg Q12 for COPD exacerbation  C/w Azithromycin IV for COPD exacerbation, and Rocephin   c/w Duoneb Q4 hrs for now  c/w Symbicort  SpO2 99 on RA  no longer using bipap at night
Pt p/w COPD exacerbation, was in ICU downgraded to 5S  c/q prednisone taper 10mg every 2 day, currently on day 2 of 30mg  c/w Duoneb Q4 hrs for now  c/w Symbicort  no longer using bipap at night  SpO2 99% on 2L NC  waiting for wheelchair for d/c to home, home O2 already delivered
Pt p/w COPD exacerbation, was in ICU  C/w Solumedrol 40 mg Q12 for COPD exacerbation  C/w Azithromycin IV for COPD exacerbation, and Rocephin   c/w Duoneb Q4 hrs for now  c/w Symbicort  no longer using bipap at night
Pt p/w COPD exacerbation, was in ICU  C/w Solumedrol 40 mg Q12 for COPD exacerbation  C/w Azithromycin IV for COPD exacerbation, and Rocephin   c/w Duoneb Q4 hrs for now  c/w Symbicort  no longer using bipap at night  SpO2 97% on 2L NC
Pt p/w COPD exacerbation, was in ICU downgraded to 5S  c/q prednisone taper 10mg every 2 day, currently on day 2 of 30mg  c/w Duoneb Q4 hrs for now  c/w Symbicort  no longer using bipap at night  SpO2 99% on 2L NC  waiting for wheelchair for d/c to home, home O2 already delivered
Pt p/w COPD exacerbation, was in ICU  C/w Solumedrol 40 mg Q12 for COPD exacerbation  C/w Azithromycin IV for COPD exacerbation, and Rocephin   c/w Duoneb Q4 hrs for now  c/w Symbicort  no longer using bipap at night  SpO2 97% on 2L NC
Pt p/w COPD exacerbation, was in ICU  C/w Solumedrol 40 mg Q12 for COPD exacerbation  C/w Azithromycin IV for COPD exacerbation, and Rocephin   c/w Duoneb Q4 hrs for now  c/w Symbicort  Pt on bipap at night   NC 3L
Pt p/w COPD exacerbation, was in ICU  C/w Solumedrol 40 mg Q12 for COPD exacerbation  C/w Azithromycin IV for COPD exacerbation, and Rocephin   c/w Duoneb Q4 hrs for now  c/w Symbicort  Pt on bipap at night   NC 3L
Pt p/w COPD exacerbation, was in ICU  C/w Solumedrol 40 mg Q12 for COPD exacerbation  C/w Azithromycin IV for COPD exacerbation, and Rocephin   c/w Duoneb Q4 hrs for now  c/w Symbicort  Pt on bipap at night   2L NC
Pt p/w COPD exacerbation, was in ICU  C/w Solumedrol 40 mg Q12 for COPD exacerbation  C/w Azithromycin IV for COPD exacerbation, and Rocephin   c/w Duoneb Q4 hrs for now  c/w Symbicort  Pt on bipap at night   NC 3L
Pt p/w COPD exacerbation, was in ICU  C/w Solumedrol 40 mg Q12 for COPD exacerbation  C/w Azithromycin IV for COPD exacerbation, and Rocephin   c/w Duoneb Q4 hrs for now  c/w Symbicort  Pt on bipap at night   NC 2L this morning, now on RA
Pt p/w COPD exacerbation, was in ICU  C/w Solumedrol 40 mg Q12 for COPD exacerbation  C/w Azithromycin IV for COPD exacerbation, and Rocephin   c/w Duoneb Q4 hrs for now  c/w Symbicort  no longer using bipap at night
Pt p/w COPD exacerbation, was in ICU downgraded to 5S  c/q prednisone taper 10mg every 2 day, currently on day 1 of 20mg  c/w Duoneb Q4 hrs for now  c/w Symbicort  no longer using bipap at night  SpO2 95% on RA  waiting for wheelchair for d/c to home, home O2 already delivered

## 2022-10-19 NOTE — PROGRESS NOTE ADULT - ATTENDING COMMENTS
Patient to be discharged today. Continue prednisone taper. BP elevated with transport, but patient asymptomatic. Continue anti-hypertensive regimen. Follow-up as outpatient. Counseled on smoking cessation, continue with nicotine patch.

## 2022-10-19 NOTE — PROGRESS NOTE ADULT - TIME BILLING
- Review of records, telemetry, vital signs and daily labs.   - General and cardiovascular physical examination.  - Generation of cardiovascular treatment plan.  - Coordination of care.      Patient was seen and examined by me on 10/8/22,interim events noted,labs and radiology studies reviewed.  Jonel Stevenson MD,FACC.  [address] [place], [place] [id number].  [phone]
- Review of records, telemetry, vital signs and daily labs.   - General and cardiovascular physical examination.  - Generation of cardiovascular treatment plan.  - Coordination of care.      Patient was seen and examined by me on 10/9/22,interim events noted,labs and radiology studies reviewed.  Jonel Stevenson MD,FACC.  [address] [place], [place] [id number].  [phone]
- Review of chart (documentation, labs/studies, VS trends)  - Medication reconciliation  - Bedside interview and physical examination  - Bedside SpO2 monitoring and supplemental O2 titration  - Counseling regarding importance of continuing to abstain from smoking, smoking cessation methods   - Coordination of care
- Review of chart (interval documentation, labs/studies, VS trends)  - Medication reconciliation  - Bedside interview and physical examination  - Bedside SpO2 monitoring and supplemental O2 titration
- Review of chart (interval documentation, labs/studies, VS trends)  - Medication reconciliation  - Bedside interview and physical examination  - Bedside SpO2 monitoring and supplemental O2 titration  - Coordination of care with primary team
- Review of chart (interval documentation, labs/studies, VS trends)  - Medication reconciliation  - Bedside interview and physical examination  - Bedside SpO2 monitoring and supplemental O2 titration  - Bedside counseling regarding COPD dx and management, instruction regarding incentive spirometry use  - Smoking cessation counseling  - Coordination of care with primary team
- Review of chart (interval documentation, labs/studies, VS trends)  - Medication reconciliation  - Bedside interview and physical examination  - Bedside SpO2 monitoring and supplemental O2 titration  - Coordination of care with primary team
- Review of records, telemetry, vital signs and daily labs.   - General and cardiovascular physical examination.  - Generation of cardiovascular treatment plan.  - Coordination of care.      Patient was seen and examined by me on 10/14/22,interim events noted,labs and radiology studies reviewed.  Jonel Stevenson MD,FACC.  [address] [place], [place] [id number].  [phone]
- Review of records, telemetry, vital signs and daily labs.   - General and cardiovascular physical examination.  - Generation of cardiovascular treatment plan.  - Coordination of care.      Patient was seen and examined by me on 10/18/22interim events noted,labs and radiology studies reviewed.  Jonel Stevenson MD,FACC.  [address] [place], [place] [id number].  [phone]
- Review of records, telemetry, vital signs and daily labs.   - General and cardiovascular physical examination.  - Generation of cardiovascular treatment plan.  - Coordination of care.      Patient was seen and examined by me on 10/10/22,interim events noted,labs and radiology studies reviewed.  Jonel Stevenson MD,FACC.  [address] [place], [place] [id number].  [phone]
- Review of records, telemetry, vital signs and daily labs.   - General and cardiovascular physical examination.  - Generation of cardiovascular treatment plan.  - Coordination of care.      Patient was seen and examined by me on 10/11/22,interim events noted,labs and radiology studies reviewed.  Jonel Stevenson MD,FACC.  [address] [place], [place] [id number].  [phone]
- Review of records, telemetry, vital signs and daily labs.   - General and cardiovascular physical examination.  - Generation of cardiovascular treatment plan.  - Coordination of care.      Patient was seen and examined by me on 10/16/22,interim events noted,labs and radiology studies reviewed.  Jonel Stevenson MD,FACC.  [address] [place], [place] [id number].  [phone]
- Review of records, telemetry, vital signs and daily labs.   - General and cardiovascular physical examination.  - Generation of cardiovascular treatment plan.  - Coordination of care.      Patient was seen and examined by me on 10/17/22,interim events noted,labs and radiology studies reviewed.  Jonel Stevenson MD,FACC.  [address] [place], [place] [id number].  [phone]
- Review of records, telemetry, vital signs and daily labs.   - General and cardiovascular physical examination.  - Generation of cardiovascular treatment plan.  - Coordination of care.      Patient was seen and examined by me on 10/12/22,interim events noted,labs and radiology studies reviewed.  Jonel Stevenson MD,FACC.  [address] [place], [place] [id number].  [phone]
- Review of records, telemetry, vital signs and daily labs.   - General and cardiovascular physical examination.  - Generation of cardiovascular treatment plan.  - Coordination of care.      Patient was seen and examined by me on 10/15/22,interim events noted,labs and radiology studies reviewed.  Jonel Stevenson MD,FACC.  [address] [place], [place] [id number].  [phone]

## 2022-10-19 NOTE — PROGRESS NOTE ADULT - ASSESSMENT
Patient is a 66 yo Female, from home, with med hx significant for COPD, Left MCA CVA w/RLE weakness, Seizures, Type 2 DM, CAD s/p ZHAO, Afib not on AC, PAD s/p Right femoral popliteal bypass s/p occlusion + IR stent placement 2/2017, s/p cardiac cath 4/2022 w/ non-obstructive CAD, presenting to the ED due to shortness of breath likely 2/2 COPD exacerbation. Admitted to ICU for close monitoring of respiratory status. Pt downgraded to medicine unit on 5 south. Endorses SOB, coughing with production of yellow sputum, and wheezing. Denies dizziness of exertion or feeling of anxiety.

## 2022-10-19 NOTE — PROGRESS NOTE ADULT - SUBJECTIVE AND OBJECTIVE BOX
PULMONARY CONSULT SERVICE FOLLOW-UP NOTE    INTERVAL HPI:  Reviewed chart and overnight events; patient seen and examined at bedside.    MEDICATIONS:  Pulmonary:  albuterol/ipratropium for Nebulization 3 milliLiter(s) Nebulizer every 4 hours  budesonide 160 MICROgram(s)/formoterol 4.5 MICROgram(s) Inhaler 2 Puff(s) Inhalation two times a day  guaiFENesin  milliGRAM(s) Oral two times a day  montelukast 10 milliGRAM(s) Oral at bedtime    Antimicrobials:    Anticoagulants:  aspirin  chewable 81 milliGRAM(s) Oral daily  clopidogrel Tablet 75 milliGRAM(s) Oral daily  heparin   Injectable 5000 Unit(s) SubCutaneous every 12 hours    Cardiac:  carvedilol 25 milliGRAM(s) Oral every 12 hours  diltiazem    milliGRAM(s) Oral daily  hydrALAZINE 50 milliGRAM(s) Oral three times a day      Allergies    No Known Allergies    Intolerances        Vital Signs Last 24 Hrs  T(C): 37 (19 Oct 2022 05:04), Max: 37 (19 Oct 2022 05:04)  T(F): 98.6 (19 Oct 2022 05:04), Max: 98.6 (19 Oct 2022 05:04)  HR: 76 (19 Oct 2022 05:04) (64 - 78)  BP: 164/87 (19 Oct 2022 05:04) (151/83 - 164/87)  BP(mean): --  RR: 16 (19 Oct 2022 05:04) (16 - 18)  SpO2: 95% (19 Oct 2022 05:04) (95% - 99%)    Parameters below as of 19 Oct 2022 05:04  Patient On (Oxygen Delivery Method): room air            PHYSICAL EXAM:  GEN: NAD, well-appearing, sitting comfortably upright in bed  HEENT: NC/AT, PERRL, anicteric sclera; oropharynx clear, MMM, neck supple, no appreciable JVD  RESP: no respiratory distress, CTA B/L; no W/R/R  CV: +S1/S2, RRR  GI: soft, NT/ND, +BS  EXTREM: WWP; no edema, clubbing or cyanosis, R third fingernail discoloration (unchanged)  Vascular: 2+ radial pulses B/L  NEURO: AAOx3, RLE weakness (chronic), mild dysarthria noted    LABS:      CBC Full  -  ( 19 Oct 2022 05:49 )  WBC Count : 13.03 K/uL  RBC Count : 5.01 M/uL  Hemoglobin : 13.4 g/dL  Hematocrit : 42.3 %  Platelet Count - Automated : 288 K/uL  Mean Cell Volume : 84.4 fl  Mean Cell Hemoglobin : 26.7 pg  Mean Cell Hemoglobin Concentration : 31.7 gm/dL  Auto Neutrophil # : x  Auto Lymphocyte # : x  Auto Monocyte # : x  Auto Eosinophil # : x  Auto Basophil # : x  Auto Neutrophil % : x  Auto Lymphocyte % : x  Auto Monocyte % : x  Auto Eosinophil % : x  Auto Basophil % : x    10-19    139  |  108  |  25<H>  ----------------------------<  152<H>  3.8   |  23  |  1.16    Ca    9.0      19 Oct 2022 05:49  Phos  1.7     10-19  Mg     2.1     10-19          RADIOLOGY & ADDITIONAL STUDIES:  No interval chest imaging for review  PULMONARY CONSULT SERVICE FOLLOW-UP NOTE    INTERVAL HPI:  Reviewed chart and overnight events; patient seen and examined at bedside. SpO2 >92% on RA but on exertion <88%. Pt says her breathing is now at baseline. Continues to have intermittent cough but again much improved, productive of thick white to yellow sputum and easily cleared. No subjective wheezing. Notes that she feels better day by day. At this time pt denies anginal/pleuritic CP, subjective wheezing, nasal/chest congestion, stridor, orthopnea, hemoptysis, LE edema, f/c/n/v.       MEDICATIONS:  Pulmonary:  albuterol/ipratropium for Nebulization 3 milliLiter(s) Nebulizer every 4 hours  budesonide 160 MICROgram(s)/formoterol 4.5 MICROgram(s) Inhaler 2 Puff(s) Inhalation two times a day  guaiFENesin  milliGRAM(s) Oral two times a day  montelukast 10 milliGRAM(s) Oral at bedtime    Antimicrobials:    Anticoagulants:  aspirin  chewable 81 milliGRAM(s) Oral daily  clopidogrel Tablet 75 milliGRAM(s) Oral daily  heparin   Injectable 5000 Unit(s) SubCutaneous every 12 hours    Cardiac:  carvedilol 25 milliGRAM(s) Oral every 12 hours  diltiazem    milliGRAM(s) Oral daily  hydrALAZINE 50 milliGRAM(s) Oral three times a day      Allergies    No Known Allergies    Intolerances        Vital Signs Last 24 Hrs  T(C): 37 (19 Oct 2022 05:04), Max: 37 (19 Oct 2022 05:04)  T(F): 98.6 (19 Oct 2022 05:04), Max: 98.6 (19 Oct 2022 05:04)  HR: 76 (19 Oct 2022 05:04) (64 - 78)  BP: 164/87 (19 Oct 2022 05:04) (151/83 - 164/87)  BP(mean): --  RR: 16 (19 Oct 2022 05:04) (16 - 18)  SpO2: 95% (19 Oct 2022 05:04) (95% - 99%)    Parameters below as of 19 Oct 2022 05:04  Patient On (Oxygen Delivery Method): room air            PHYSICAL EXAM:  GEN: NAD, well-appearing, sitting comfortably upright in bed  HEENT: NC/AT, PERRL, anicteric sclera; oropharynx clear, MMM, neck supple, no appreciable JVD  RESP: no respiratory distress, CTA B/L; no W/R/R  CV: +S1/S2, RRR  GI: soft, NT/ND, +BS  EXTREM: WWP; no edema, clubbing or cyanosis, R third fingernail discoloration (unchanged)  Vascular: 2+ radial pulses B/L  NEURO: AAOx3, RLE weakness (chronic), mild dysarthria noted    LABS:      CBC Full  -  ( 19 Oct 2022 05:49 )  WBC Count : 13.03 K/uL  RBC Count : 5.01 M/uL  Hemoglobin : 13.4 g/dL  Hematocrit : 42.3 %  Platelet Count - Automated : 288 K/uL  Mean Cell Volume : 84.4 fl  Mean Cell Hemoglobin : 26.7 pg  Mean Cell Hemoglobin Concentration : 31.7 gm/dL  Auto Neutrophil # : x  Auto Lymphocyte # : x  Auto Monocyte # : x  Auto Eosinophil # : x  Auto Basophil # : x  Auto Neutrophil % : x  Auto Lymphocyte % : x  Auto Monocyte % : x  Auto Eosinophil % : x  Auto Basophil % : x    10-19    139  |  108  |  25<H>  ----------------------------<  152<H>  3.8   |  23  |  1.16    Ca    9.0      19 Oct 2022 05:49  Phos  1.7     10-19  Mg     2.1     10-19          RADIOLOGY & ADDITIONAL STUDIES:  No interval chest imaging for review

## 2022-11-18 NOTE — PROGRESS NOTE ADULT - ASSESSMENT
Patient is a 62y old f with chronic RLE arterial insufficiency with rest pain and claudication, with multiple previous bypasses.  RLE bypass scheduled for 9/17 but cancelled due to leukocytosis.    Plan/Recommendations:   - Care per primary; trend WBC, pain control PRN  - Outpatient follow up to reschedule RLE bypass  - No further surgical needs at this time. Please page us with any further questions or concerns.    CHRISTOPHER Oliva, PGY-2  Vascular Surgery  p. [phone] Normal gait / station

## 2023-02-07 NOTE — ED PROVIDER NOTE - GASTROINTESTINAL, MLM
on the discharge service for the patient. I have reviewed and made amendments to the documentation where necessary.
Abdomen soft, non-tender, no guarding.

## 2023-05-07 NOTE — PROGRESS NOTE ADULT - ASSESSMENT
Dyspnea on exertion.  Plan: Jessie COPD exacerbation  duonebs  cw steroids as per pulm  antibiotics as per ID   pulmonary fu    CAD (coronary artery disease).  Plan: cw home meds   cards consult appreciated  ischemia webb as per cards     Diabetes.  Plan: monitor FS  ISS.   uncontrolled  endodcine called    Severe PVD Plan doppler reviewed  cw hep gtt  CT reviewed  vascular fu  plan for bypass next week  pending cardiac clearance     Left arm iv infiltration with contrast  Vascular following  wound care consult English

## 2024-02-12 NOTE — ED PROVIDER NOTE - EYES NEGATIVE STATEMENT, MLM
[FreeTextEntry1] : Microscopic ear exam with Mastoid debridement:  Right ear:  Canal down cavity. Retained keratin and cerumen debrided with a curet and alligator forcep and suction.  Acute inflammation of the mastoid cavity without granulation.  Tympanic membrane intact and noninflamed.   Left ear: The ear canal was patent and nonobstructed.  The tympanic membrane was intact and noninflamed. [Normal] : mucosa is normal [Midline] : trachea located in midline position no discharge, no irritation, no pain, no redness, and no visual changes.

## 2024-02-25 ENCOUNTER — INPATIENT (INPATIENT)
Facility: HOSPITAL | Age: 69
LOS: 6 days | Discharge: HOME | DRG: 303 | End: 2024-03-03
Attending: INTERNAL MEDICINE | Admitting: INTERNAL MEDICINE
Payer: MEDICARE

## 2024-02-25 VITALS
HEIGHT: 61 IN | WEIGHT: 145.06 LBS | TEMPERATURE: 98 F | SYSTOLIC BLOOD PRESSURE: 116 MMHG | OXYGEN SATURATION: 97 % | RESPIRATION RATE: 16 BRPM | DIASTOLIC BLOOD PRESSURE: 75 MMHG | HEART RATE: 85 BPM

## 2024-02-25 DIAGNOSIS — R79.89 OTHER SPECIFIED ABNORMAL FINDINGS OF BLOOD CHEMISTRY: ICD-10-CM

## 2024-02-25 DIAGNOSIS — Z98.890 OTHER SPECIFIED POSTPROCEDURAL STATES: Chronic | ICD-10-CM

## 2024-02-25 DIAGNOSIS — Z86.73 PERSONAL HISTORY OF TRANSIENT ISCHEMIC ATTACK (TIA), AND CEREBRAL INFARCTION WITHOUT RESIDUAL DEFICITS: ICD-10-CM

## 2024-02-25 DIAGNOSIS — I25.10 ATHEROSCLEROTIC HEART DISEASE OF NATIVE CORONARY ARTERY WITHOUT ANGINA PECTORIS: ICD-10-CM

## 2024-02-25 DIAGNOSIS — E11.9 TYPE 2 DIABETES MELLITUS WITHOUT COMPLICATIONS: ICD-10-CM

## 2024-02-25 DIAGNOSIS — T82.897A OTHER SPECIFIED COMPLICATION OF CARDIAC PROSTHETIC DEVICES, IMPLANTS AND GRAFTS, INITIAL ENCOUNTER: Chronic | ICD-10-CM

## 2024-02-25 DIAGNOSIS — Z90.710 ACQUIRED ABSENCE OF BOTH CERVIX AND UTERUS: Chronic | ICD-10-CM

## 2024-02-25 DIAGNOSIS — Z29.9 ENCOUNTER FOR PROPHYLACTIC MEASURES, UNSPECIFIED: ICD-10-CM

## 2024-02-25 DIAGNOSIS — R07.81 PLEURODYNIA: ICD-10-CM

## 2024-02-25 DIAGNOSIS — I48.91 UNSPECIFIED ATRIAL FIBRILLATION: ICD-10-CM

## 2024-02-25 LAB
ALBUMIN SERPL ELPH-MCNC: 3.2 G/DL — LOW (ref 3.5–5)
ALP SERPL-CCNC: 94 U/L — SIGNIFICANT CHANGE UP (ref 40–120)
ALT FLD-CCNC: 12 U/L DA — SIGNIFICANT CHANGE UP (ref 10–60)
ANION GAP SERPL CALC-SCNC: 5 MMOL/L — SIGNIFICANT CHANGE UP (ref 5–17)
APTT BLD: 34.6 SEC — SIGNIFICANT CHANGE UP (ref 24.5–35.6)
AST SERPL-CCNC: 9 U/L — LOW (ref 10–40)
BASOPHILS # BLD AUTO: 0.07 K/UL — SIGNIFICANT CHANGE UP (ref 0–0.2)
BASOPHILS NFR BLD AUTO: 1 % — SIGNIFICANT CHANGE UP (ref 0–2)
BILIRUB SERPL-MCNC: 0.4 MG/DL — SIGNIFICANT CHANGE UP (ref 0.2–1.2)
BUN SERPL-MCNC: 14 MG/DL — SIGNIFICANT CHANGE UP (ref 7–18)
CALCIUM SERPL-MCNC: 9.3 MG/DL — SIGNIFICANT CHANGE UP (ref 8.4–10.5)
CHLORIDE SERPL-SCNC: 109 MMOL/L — HIGH (ref 96–108)
CK MB BLD-MCNC: <1.4 % — SIGNIFICANT CHANGE UP (ref 0–3.5)
CK MB CFR SERPL CALC: <1 NG/ML — SIGNIFICANT CHANGE UP (ref 0–3.6)
CK SERPL-CCNC: 69 U/L — SIGNIFICANT CHANGE UP (ref 21–215)
CO2 SERPL-SCNC: 22 MMOL/L — SIGNIFICANT CHANGE UP (ref 22–31)
CREAT SERPL-MCNC: 1.3 MG/DL — SIGNIFICANT CHANGE UP (ref 0.5–1.3)
EGFR: 45 ML/MIN/1.73M2 — LOW
EOSINOPHIL # BLD AUTO: 0.25 K/UL — SIGNIFICANT CHANGE UP (ref 0–0.5)
EOSINOPHIL NFR BLD AUTO: 3.5 % — SIGNIFICANT CHANGE UP (ref 0–6)
GLUCOSE BLDC GLUCOMTR-MCNC: 122 MG/DL — HIGH (ref 70–99)
GLUCOSE SERPL-MCNC: 236 MG/DL — HIGH (ref 70–99)
HCT VFR BLD CALC: 44.3 % — SIGNIFICANT CHANGE UP (ref 34.5–45)
HGB BLD-MCNC: 13.4 G/DL — SIGNIFICANT CHANGE UP (ref 11.5–15.5)
IMM GRANULOCYTES NFR BLD AUTO: 0.3 % — SIGNIFICANT CHANGE UP (ref 0–0.9)
INR BLD: 1 RATIO — SIGNIFICANT CHANGE UP (ref 0.85–1.18)
LYMPHOCYTES # BLD AUTO: 2.32 K/UL — SIGNIFICANT CHANGE UP (ref 1–3.3)
LYMPHOCYTES # BLD AUTO: 32.6 % — SIGNIFICANT CHANGE UP (ref 13–44)
MCHC RBC-ENTMCNC: 25.2 PG — LOW (ref 27–34)
MCHC RBC-ENTMCNC: 30.2 GM/DL — LOW (ref 32–36)
MCV RBC AUTO: 83.4 FL — SIGNIFICANT CHANGE UP (ref 80–100)
MONOCYTES # BLD AUTO: 0.67 K/UL — SIGNIFICANT CHANGE UP (ref 0–0.9)
MONOCYTES NFR BLD AUTO: 9.4 % — SIGNIFICANT CHANGE UP (ref 2–14)
NEUTROPHILS # BLD AUTO: 3.78 K/UL — SIGNIFICANT CHANGE UP (ref 1.8–7.4)
NEUTROPHILS NFR BLD AUTO: 53.2 % — SIGNIFICANT CHANGE UP (ref 43–77)
NRBC # BLD: 0 /100 WBCS — SIGNIFICANT CHANGE UP (ref 0–0)
PLATELET # BLD AUTO: 241 K/UL — SIGNIFICANT CHANGE UP (ref 150–400)
POTASSIUM SERPL-MCNC: 3.5 MMOL/L — SIGNIFICANT CHANGE UP (ref 3.5–5.3)
POTASSIUM SERPL-SCNC: 3.5 MMOL/L — SIGNIFICANT CHANGE UP (ref 3.5–5.3)
PROT SERPL-MCNC: 6.9 G/DL — SIGNIFICANT CHANGE UP (ref 6–8.3)
PROTHROM AB SERPL-ACNC: 11.4 SEC — SIGNIFICANT CHANGE UP (ref 9.5–13)
RAPID RVP RESULT: SIGNIFICANT CHANGE UP
RBC # BLD: 5.31 M/UL — HIGH (ref 3.8–5.2)
RBC # FLD: 13.1 % — SIGNIFICANT CHANGE UP (ref 10.3–14.5)
SARS-COV-2 RNA SPEC QL NAA+PROBE: SIGNIFICANT CHANGE UP
SODIUM SERPL-SCNC: 136 MMOL/L — SIGNIFICANT CHANGE UP (ref 135–145)
TROPONIN I, HIGH SENSITIVITY RESULT: 604 NG/L — HIGH
TROPONIN I, HIGH SENSITIVITY RESULT: 732.1 NG/L — HIGH
WBC # BLD: 7.11 K/UL — SIGNIFICANT CHANGE UP (ref 3.8–10.5)
WBC # FLD AUTO: 7.11 K/UL — SIGNIFICANT CHANGE UP (ref 3.8–10.5)

## 2024-02-25 PROCEDURE — 99285 EMERGENCY DEPT VISIT HI MDM: CPT

## 2024-02-25 PROCEDURE — 71045 X-RAY EXAM CHEST 1 VIEW: CPT | Mod: 26

## 2024-02-25 RX ORDER — PANTOPRAZOLE SODIUM 20 MG/1
40 TABLET, DELAYED RELEASE ORAL AT BEDTIME
Refills: 0 | Status: DISCONTINUED | OUTPATIENT
Start: 2024-02-25 | End: 2024-03-01

## 2024-02-25 RX ORDER — ASPIRIN/CALCIUM CARB/MAGNESIUM 324 MG
325 TABLET ORAL ONCE
Refills: 0 | Status: COMPLETED | OUTPATIENT
Start: 2024-02-25 | End: 2024-02-25

## 2024-02-25 RX ORDER — EMPAGLIFLOZIN 10 MG/1
1 TABLET, FILM COATED ORAL
Refills: 0 | DISCHARGE

## 2024-02-25 RX ORDER — SODIUM CHLORIDE 9 MG/ML
1000 INJECTION, SOLUTION INTRAVENOUS
Refills: 0 | Status: DISCONTINUED | OUTPATIENT
Start: 2024-02-25 | End: 2024-03-03

## 2024-02-25 RX ORDER — ATORVASTATIN CALCIUM 80 MG/1
1 TABLET, FILM COATED ORAL
Refills: 0 | DISCHARGE

## 2024-02-25 RX ORDER — ATORVASTATIN CALCIUM 80 MG/1
10 TABLET, FILM COATED ORAL AT BEDTIME
Refills: 0 | Status: DISCONTINUED | OUTPATIENT
Start: 2024-02-25 | End: 2024-02-27

## 2024-02-25 RX ORDER — LOSARTAN POTASSIUM 100 MG/1
50 TABLET, FILM COATED ORAL DAILY
Refills: 0 | Status: DISCONTINUED | OUTPATIENT
Start: 2024-02-26 | End: 2024-03-03

## 2024-02-25 RX ORDER — ASPIRIN/CALCIUM CARB/MAGNESIUM 324 MG
81 TABLET ORAL DAILY
Refills: 0 | Status: DISCONTINUED | OUTPATIENT
Start: 2024-02-26 | End: 2024-02-27

## 2024-02-25 RX ORDER — DEXTROSE 50 % IN WATER 50 %
25 SYRINGE (ML) INTRAVENOUS ONCE
Refills: 0 | Status: DISCONTINUED | OUTPATIENT
Start: 2024-02-25 | End: 2024-03-03

## 2024-02-25 RX ORDER — CARVEDILOL PHOSPHATE 80 MG/1
25 CAPSULE, EXTENDED RELEASE ORAL EVERY 12 HOURS
Refills: 0 | Status: DISCONTINUED | OUTPATIENT
Start: 2024-02-26 | End: 2024-03-03

## 2024-02-25 RX ORDER — TOPIRAMATE 25 MG
100 TABLET ORAL DAILY
Refills: 0 | Status: DISCONTINUED | OUTPATIENT
Start: 2024-02-26 | End: 2024-03-03

## 2024-02-25 RX ORDER — ENOXAPARIN SODIUM 100 MG/ML
40 INJECTION SUBCUTANEOUS EVERY 24 HOURS
Refills: 0 | Status: DISCONTINUED | OUTPATIENT
Start: 2024-02-25 | End: 2024-02-27

## 2024-02-25 RX ORDER — DEXTROSE 50 % IN WATER 50 %
15 SYRINGE (ML) INTRAVENOUS ONCE
Refills: 0 | Status: DISCONTINUED | OUTPATIENT
Start: 2024-02-25 | End: 2024-03-03

## 2024-02-25 RX ORDER — DEXTROSE 50 % IN WATER 50 %
12.5 SYRINGE (ML) INTRAVENOUS ONCE
Refills: 0 | Status: DISCONTINUED | OUTPATIENT
Start: 2024-02-25 | End: 2024-03-03

## 2024-02-25 RX ORDER — ALBUTEROL 90 UG/1
2 AEROSOL, METERED ORAL EVERY 6 HOURS
Refills: 0 | Status: DISCONTINUED | OUTPATIENT
Start: 2024-02-25 | End: 2024-03-03

## 2024-02-25 RX ORDER — INSULIN LISPRO 100/ML
VIAL (ML) SUBCUTANEOUS
Refills: 0 | Status: DISCONTINUED | OUTPATIENT
Start: 2024-02-25 | End: 2024-03-03

## 2024-02-25 RX ORDER — GLUCAGON INJECTION, SOLUTION 0.5 MG/.1ML
1 INJECTION, SOLUTION SUBCUTANEOUS ONCE
Refills: 0 | Status: DISCONTINUED | OUTPATIENT
Start: 2024-02-25 | End: 2024-03-03

## 2024-02-25 RX ORDER — MONTELUKAST 4 MG/1
10 TABLET, CHEWABLE ORAL AT BEDTIME
Refills: 0 | Status: DISCONTINUED | OUTPATIENT
Start: 2024-02-25 | End: 2024-03-03

## 2024-02-25 RX ORDER — CLOPIDOGREL BISULFATE 75 MG/1
75 TABLET, FILM COATED ORAL DAILY
Refills: 0 | Status: DISCONTINUED | OUTPATIENT
Start: 2024-02-26 | End: 2024-02-27

## 2024-02-25 RX ADMIN — ATORVASTATIN CALCIUM 10 MILLIGRAM(S): 80 TABLET, FILM COATED ORAL at 23:53

## 2024-02-25 RX ADMIN — MONTELUKAST 10 MILLIGRAM(S): 4 TABLET, CHEWABLE ORAL at 23:54

## 2024-02-25 RX ADMIN — Medication 325 MILLIGRAM(S): at 12:39

## 2024-02-25 RX ADMIN — PANTOPRAZOLE SODIUM 40 MILLIGRAM(S): 20 TABLET, DELAYED RELEASE ORAL at 23:53

## 2024-02-25 NOTE — ED ADULT NURSE NOTE - OBJECTIVE STATEMENT
Patient BIB EMS from home with c/o elevated blood pressure this morning Patient with PMHx CVA right sided weakness Patient BIB EMS from home with c/o elevated blood pressure this morning Patient with PMHx CVA right sided weakness.

## 2024-02-25 NOTE — ED ADULT NURSE NOTE - ED STAT RN HANDOFF DETAILS 2
Patient alert and verbally responsive, appearing not distress. endorsed to JOY Aden RN, telemetry in progress box3.

## 2024-02-25 NOTE — H&P ADULT - PROBLEM SELECTOR PLAN 2
DDx to include, but not limited to Cardiovascular such as Increased demand (stable coronary artery disease lesion), aortic dissection, Arrhythmia, Hypotension, HF, cardiac inflammation from pericarditis, myocarditis, endocarditis; LVH, Myocardial injury, PE, Infectious such as Sepsis/SIRS and/or Viral illness, GI bleeding, Stroke, CKD, Rhabdomyolosis, endurance exercise, or even Environmental exposure.  F/U Repeat troponin and EKG  F/U TTE  Remote tele  Vitals q4hr  F/U Cardio recommendations DDx to include, but not limited to Cardiovascular such as Increased demand (stable coronary artery disease lesion), aortic dissection, Arrhythmia, Hypotension, HF, cardiac inflammation from pericarditis, myocarditis, endocarditis; LVH, Myocardial injury, PE, Infectious such as Sepsis/SIRS and/or Viral illness, GI bleeding, Stroke, CKD, Rhabdomyolosis, endurance exercise, or even Environmental exposure.  EKG showed Sinus rhythm with 1st degree A-V block, Possible LA enlargement RSR' or QR pattern in V1 suggests RV conduction delay, LVH with QRS widening and repolarization abnormality  F/U Repeat troponin and EKG  F/U TTE  Remote tele  Vitals q4hr  F/U Cardio recommendations

## 2024-02-25 NOTE — H&P ADULT - PROBLEM SELECTOR PLAN 5
Hold PO anti glycemics  Will start ISS  Finger stick before meal and bedtime   F/U HA1c  Diabetic diet.

## 2024-02-25 NOTE — ED ADULT NURSE NOTE - EXTENSIONS OF SELF_ADULT
1 mL testosterone injection given. Patient aware to return to clinic in 2 weeks for next injection. Patient tolerated without incident. See MAR for documentation.    
None

## 2024-02-25 NOTE — ED PROVIDER NOTE - OBJECTIVE STATEMENT
68-year-old presenting with high blood pressure today endorses also noting chest pain with radiation to her neck denies any nausea vomiting abdominal pain headache patient baseline has history of CVA with word finding difficulty 68-year-old presenting with high blood pressure today endorses also noting chest pain with radiation to her neck denies any nausea vomiting abdominal pain headache patient baseline has history of CVA with word finding difficulty. COPD, Left MCA CVA w/RLE weakness, Seizures, Type 2 DM, CAD s/p ZHAO, Afib not on AC, PAD s/p Right femoral popliteal bypass s/p occlusion + IR stent placement 2/2017, s/p cardiac cath 4/2022 w/ non-obstructive CAD,

## 2024-02-25 NOTE — H&P ADULT - HISTORY OF PRESENT ILLNESS
A 68 year old female, from home, AAOx3 and ambulating with a walker at baseline, with PMHx of  COPD, Left MCA CVA w/ residual dysarthria and RLE weakness, Seizures, DM, CAD s/p ZHAO, s/p cardiac cath 4/2022 w/non-obstructive CAD, AFib not on AC, and PAD s/p Right femoral popliteal bypass s/p occlusion + IR stent placement 2/2017, was brought into the ED by EMS from home due to elevated BP of 190/121 mmHg. Unclear if she received any medication in route to the hospital. Patient mentioned that earlier today, she experienced pleuritic chest pain radiating to her upper back associated with lightheadedness and minimal dyspnea that lasted for about 90 minutes. Denies any palpitations, nausea, vomiting, headache, chills, numbness, or syncopal episodes. Denies orthopnea or exertional symptoms. Denies recent episodes. Denies any recent falls or chest wall trauma. Denies any recent sick contacts or travel. On further questioning, patient with intermittent acid reflux, postprandial fullness, and early satiety that respond to Famotidine. She does not have any other complaints.

## 2024-02-25 NOTE — ED ADULT NURSE NOTE - ED STAT RN HANDOFF DETAILS
Patient discharged home as per MD order, IV access removed no redness, swelling  or bleeding noted at site. All discharge instructions and F/U visits provided to mom , prescription sent to pharmacy. Patient mom verbalizes understanding leaving ambulatory in no acute distress.

## 2024-02-25 NOTE — ED PROVIDER NOTE - PROGRESS NOTE DETAILS
Patient troponin elevated EKG unchanged no ST elevation patient endorses that chest pain occurred when she had high blood pressure but resolved no active chest pain shortness of breath nausea vomiting discussed case with Dr. Metz patient admitted for cardiac workup

## 2024-02-25 NOTE — ED ADULT NURSE NOTE - NSFALLHARMRISKINTERV_ED_ALL_ED

## 2024-02-25 NOTE — H&P ADULT - PROBLEM SELECTOR PLAN 1
DDx to include, but not limited to Cardiovascular such as Increased demand (stable coronary artery disease lesion), aortic dissection, Arrhythmia, Uncontrolled Hypertension HF, cardiac inflammation from pericarditis, myocarditis, endocarditis; LVH, Myocardial injury, PE, Infectious, CKD, Rhabdomyolysis, or others.   s/p ASA in the ED  HEART Pathway score 7 points  Remote Tele  Vitals q4hr  F/U TTE  Cardio Dr Stevenson consulted  No Caffeine  TSH  BP control  ASA  Statin  BB  F/U Repeat Troponin and EKG DDx to include, but not limited to Cardiovascular such as Increased demand (stable coronary artery disease lesion), aortic dissection, Arrhythmia, Uncontrolled Hypertension HF, cardiac inflammation from pericarditis, myocarditis, endocarditis; LVH, Myocardial injury, PE, Infectious, CKD, Rhabdomyolysis, or others.   s/p ASA in the ED  HEART Pathway score 7 points  Wells' Criteria for PE 4.5 points  F/U CTA chest to rule out PE  Remote Tele  Vitals q4hr  F/U TTE  Cardio Dr Stevenson consulted  No Caffeine  TSH  BP control  ASA  Statin  BB  F/U Repeat Troponin and EKG DDx to include, but not limited to Cardiovascular such as Increased demand (stable coronary artery disease lesion), aortic dissection, Arrhythmia, Uncontrolled Hypertension HF, cardiac inflammation from pericarditis, myocarditis, endocarditis; LVH, Myocardial injury, PE, Infectious, CKD, Rhabdomyolysis, or others.   EKG showed Sinus rhythm with 1st degree A-V block, Possible LA enlargement RSR' or QR pattern in V1 suggests RV conduction delay, LVH with QRS widening and repolarization abnormality  s/p ASA in the ED  HEART Pathway score 7 points  Wells' Criteria for PE 4.5 points  F/U CTA chest to rule out PE  Remote Tele  Vitals q4hr  F/U TTE  Cardio Dr Stevenson consulted  No Caffeine  TSH  BP control  ASA  Statin  BB  F/U Repeat Troponin and EKG

## 2024-02-25 NOTE — H&P ADULT - ASSESSMENT
A 68 year old female, from home, AAOx3 and ambulating with a walker at baseline, with PMHx of  COPD, Left MCA CVA w/ residual dysarthria and RLE weakness, Seizures, DM, CAD s/p ZHAO, s/p cardiac cath 4/2022 w/non-obstructive CAD, AFib not on AC, and PAD s/p Right femoral popliteal bypass s/p occlusion + IR stent placement 2/2017, was brought into the ED by EMS from home due to elevated BP of 190/121 mmHg. Noted to be normotensive on admission. Unclear if medications given in route by EMS. Patient with pleuritic chest pain and elevated troponin w/o obvious acute ischemic changes in the EKG. Admitted to telemetry for chest pain evaluation.

## 2024-02-25 NOTE — H&P ADULT - NSHPPHYSICALEXAM_GEN_ALL_CORE
PHYSICAL EXAMINATION:  GENERAL: NAD, lying comfortable in bed   HEAD:  Atraumatic, Normocephalic  EYES:  Conjunctiva and sclera clear, pupils are equal, round, and reactive to light and accommodation.  NECK: Supple, No JVD, trachea is midline, no evidence of thyroid enlargement, no lymphadenopathy or tenderness.  CHEST/LUNG: Clear to auscultation; No rales, rhonchi, wheezing, or rubs  HEART: Regular rate and rhythm; No murmurs, rubs, or gallops  ABDOMEN: Soft, Nontender, Nondistended; Bowel sounds present  NERVOUS SYSTEM:  Alert & Oriented X3; Noted to have dysarthria and 4/5 strength on the right lower ext (Baseline), No other obvious acute focal sensory or motor deficits are noted; Recent and remote memory is fair, Appropriate mood and affect.  EXTREMITIES:  2+ Peripheral Pulses, No clubbing, cyanosis, or edema  SKIN: Warm, dry, and well perfused; Good turgor; No lesions, nodules or rashes are noted.     Vital Signs Last 24 Hrs  T(C): 36.7 (25 Feb 2024 10:43), Max: 36.7 (25 Feb 2024 10:43)  T(F): 98 (25 Feb 2024 10:43), Max: 98 (25 Feb 2024 10:43)  HR: 85 (25 Feb 2024 10:43) (85 - 85)  BP: 116/75 (25 Feb 2024 10:43) (116/75 - 116/75)  BP(mean): --  RR: 16 (25 Feb 2024 10:43) (16 - 16)  SpO2: 97% (25 Feb 2024 10:43) (97% - 97%)    Parameters below as of 25 Feb 2024 10:43  Patient On (Oxygen Delivery Method): room air

## 2024-02-25 NOTE — H&P ADULT - PROBLEM SELECTOR PLAN 6
Unclear why not on AC  ***MORNING TEAM TO OBTAIN COLLATERAL INFORMATION FROM OUTPATIENT PROVIDER***  Will continue BB  Remote tele  Vitals q4hr  F/U Cardio recommendations

## 2024-02-25 NOTE — ED PROVIDER NOTE - CLINICAL SUMMARY MEDICAL DECISION MAKING FREE TEXT BOX
Patient presenting for high blood pressure blood pressure currently stable noting chest pain will obtain lab x-ray EKG rule out ACS rule out endorgan damage ED observation and reassess

## 2024-02-26 ENCOUNTER — RESULT REVIEW (OUTPATIENT)
Age: 69
End: 2024-02-26

## 2024-02-26 LAB
A1C WITH ESTIMATED AVERAGE GLUCOSE RESULT: 7.6 % — HIGH (ref 4–5.6)
ALBUMIN SERPL ELPH-MCNC: 3.3 G/DL — LOW (ref 3.5–5)
ALP SERPL-CCNC: 100 U/L — SIGNIFICANT CHANGE UP (ref 40–120)
ALT FLD-CCNC: 12 U/L DA — SIGNIFICANT CHANGE UP (ref 10–60)
ANION GAP SERPL CALC-SCNC: 6 MMOL/L — SIGNIFICANT CHANGE UP (ref 5–17)
APTT BLD: 24.8 SEC — SIGNIFICANT CHANGE UP (ref 24.5–35.6)
AST SERPL-CCNC: 6 U/L — LOW (ref 10–40)
BASOPHILS # BLD AUTO: 0.08 K/UL — SIGNIFICANT CHANGE UP (ref 0–0.2)
BASOPHILS NFR BLD AUTO: 1.1 % — SIGNIFICANT CHANGE UP (ref 0–2)
BILIRUB SERPL-MCNC: 0.4 MG/DL — SIGNIFICANT CHANGE UP (ref 0.2–1.2)
BUN SERPL-MCNC: 18 MG/DL — SIGNIFICANT CHANGE UP (ref 7–18)
CALCIUM SERPL-MCNC: 10.2 MG/DL — SIGNIFICANT CHANGE UP (ref 8.4–10.5)
CHLORIDE SERPL-SCNC: 107 MMOL/L — SIGNIFICANT CHANGE UP (ref 96–108)
CHOLEST SERPL-MCNC: 186 MG/DL — SIGNIFICANT CHANGE UP
CO2 SERPL-SCNC: 27 MMOL/L — SIGNIFICANT CHANGE UP (ref 22–31)
CREAT SERPL-MCNC: 1.38 MG/DL — HIGH (ref 0.5–1.3)
EGFR: 42 ML/MIN/1.73M2 — LOW
EOSINOPHIL # BLD AUTO: 0.25 K/UL — SIGNIFICANT CHANGE UP (ref 0–0.5)
EOSINOPHIL NFR BLD AUTO: 3.3 % — SIGNIFICANT CHANGE UP (ref 0–6)
ESTIMATED AVERAGE GLUCOSE: 171 MG/DL — HIGH (ref 68–114)
GLUCOSE BLDC GLUCOMTR-MCNC: 116 MG/DL — HIGH (ref 70–99)
GLUCOSE BLDC GLUCOMTR-MCNC: 134 MG/DL — HIGH (ref 70–99)
GLUCOSE BLDC GLUCOMTR-MCNC: 138 MG/DL — HIGH (ref 70–99)
GLUCOSE BLDC GLUCOMTR-MCNC: 169 MG/DL — HIGH (ref 70–99)
GLUCOSE BLDC GLUCOMTR-MCNC: 95 MG/DL — SIGNIFICANT CHANGE UP (ref 70–99)
GLUCOSE SERPL-MCNC: 128 MG/DL — HIGH (ref 70–99)
HCT VFR BLD CALC: 45.5 % — HIGH (ref 34.5–45)
HDLC SERPL-MCNC: 45 MG/DL — LOW
HGB BLD-MCNC: 13.8 G/DL — SIGNIFICANT CHANGE UP (ref 11.5–15.5)
IMM GRANULOCYTES NFR BLD AUTO: 0.4 % — SIGNIFICANT CHANGE UP (ref 0–0.9)
INR BLD: 0.97 RATIO — SIGNIFICANT CHANGE UP (ref 0.85–1.18)
LIPID PNL WITH DIRECT LDL SERPL: 127 MG/DL — HIGH
LYMPHOCYTES # BLD AUTO: 2.65 K/UL — SIGNIFICANT CHANGE UP (ref 1–3.3)
LYMPHOCYTES # BLD AUTO: 34.8 % — SIGNIFICANT CHANGE UP (ref 13–44)
MAGNESIUM SERPL-MCNC: 2.5 MG/DL — SIGNIFICANT CHANGE UP (ref 1.6–2.6)
MCHC RBC-ENTMCNC: 25.3 PG — LOW (ref 27–34)
MCHC RBC-ENTMCNC: 30.3 GM/DL — LOW (ref 32–36)
MCV RBC AUTO: 83.5 FL — SIGNIFICANT CHANGE UP (ref 80–100)
MONOCYTES # BLD AUTO: 0.73 K/UL — SIGNIFICANT CHANGE UP (ref 0–0.9)
MONOCYTES NFR BLD AUTO: 9.6 % — SIGNIFICANT CHANGE UP (ref 2–14)
NEUTROPHILS # BLD AUTO: 3.87 K/UL — SIGNIFICANT CHANGE UP (ref 1.8–7.4)
NEUTROPHILS NFR BLD AUTO: 50.8 % — SIGNIFICANT CHANGE UP (ref 43–77)
NON HDL CHOLESTEROL: 141 MG/DL — HIGH
NRBC # BLD: 0 /100 WBCS — SIGNIFICANT CHANGE UP (ref 0–0)
PHOSPHATE SERPL-MCNC: 3.7 MG/DL — SIGNIFICANT CHANGE UP (ref 2.5–4.5)
PLATELET # BLD AUTO: 264 K/UL — SIGNIFICANT CHANGE UP (ref 150–400)
POTASSIUM SERPL-MCNC: 3.6 MMOL/L — SIGNIFICANT CHANGE UP (ref 3.5–5.3)
POTASSIUM SERPL-SCNC: 3.6 MMOL/L — SIGNIFICANT CHANGE UP (ref 3.5–5.3)
PROT SERPL-MCNC: 7.1 G/DL — SIGNIFICANT CHANGE UP (ref 6–8.3)
PROTHROM AB SERPL-ACNC: 11.1 SEC — SIGNIFICANT CHANGE UP (ref 9.5–13)
RBC # BLD: 5.45 M/UL — HIGH (ref 3.8–5.2)
RBC # FLD: 13.3 % — SIGNIFICANT CHANGE UP (ref 10.3–14.5)
SODIUM SERPL-SCNC: 140 MMOL/L — SIGNIFICANT CHANGE UP (ref 135–145)
TRIGL SERPL-MCNC: 71 MG/DL — SIGNIFICANT CHANGE UP
TSH SERPL-MCNC: 1.18 UU/ML — SIGNIFICANT CHANGE UP (ref 0.34–4.82)
WBC # BLD: 7.61 K/UL — SIGNIFICANT CHANGE UP (ref 3.8–10.5)
WBC # FLD AUTO: 7.61 K/UL — SIGNIFICANT CHANGE UP (ref 3.8–10.5)

## 2024-02-26 RX ADMIN — CLOPIDOGREL BISULFATE 75 MILLIGRAM(S): 75 TABLET, FILM COATED ORAL at 12:15

## 2024-02-26 RX ADMIN — PANTOPRAZOLE SODIUM 40 MILLIGRAM(S): 20 TABLET, DELAYED RELEASE ORAL at 21:39

## 2024-02-26 RX ADMIN — ATORVASTATIN CALCIUM 10 MILLIGRAM(S): 80 TABLET, FILM COATED ORAL at 21:39

## 2024-02-26 RX ADMIN — LOSARTAN POTASSIUM 50 MILLIGRAM(S): 100 TABLET, FILM COATED ORAL at 05:26

## 2024-02-26 RX ADMIN — ENOXAPARIN SODIUM 40 MILLIGRAM(S): 100 INJECTION SUBCUTANEOUS at 05:26

## 2024-02-26 RX ADMIN — CARVEDILOL PHOSPHATE 25 MILLIGRAM(S): 80 CAPSULE, EXTENDED RELEASE ORAL at 17:03

## 2024-02-26 RX ADMIN — CARVEDILOL PHOSPHATE 25 MILLIGRAM(S): 80 CAPSULE, EXTENDED RELEASE ORAL at 05:26

## 2024-02-26 RX ADMIN — Medication 81 MILLIGRAM(S): at 12:16

## 2024-02-26 RX ADMIN — MONTELUKAST 10 MILLIGRAM(S): 4 TABLET, CHEWABLE ORAL at 21:39

## 2024-02-26 RX ADMIN — Medication 1: at 12:16

## 2024-02-26 RX ADMIN — Medication 100 MILLIGRAM(S): at 12:16

## 2024-02-26 NOTE — PROGRESS NOTE ADULT - SUBJECTIVE AND OBJECTIVE BOX
Patient was seen and examined  Patient is a 68y old  Female who presents with a chief complaint of Chest pain (25 Feb 2024 13:25)      INTERVAL HPI/OVERNIGHT EVENTS:  T(C): 36.6 (02-26-24 @ 07:43), Max: 36.7 (02-25-24 @ 10:43)  HR: 71 (02-26-24 @ 07:43) (69 - 85)  BP: 134/88 (02-26-24 @ 07:43) (116/75 - 166/117)  RR: 18 (02-26-24 @ 07:43) (16 - 19)  SpO2: 99% (02-26-24 @ 07:43) (97% - 99%)  Wt(kg): --  I&O's Summary      LABS:                        13.8   7.61  )-----------( 264      ( 26 Feb 2024 05:25 )             45.5     02-26    140  |  107  |  18  ----------------------------<  128<H>  3.6   |  27  |  1.38<H>    Ca    10.2      26 Feb 2024 05:25  Phos  3.7     02-26  Mg     2.5     02-26    TPro  7.1  /  Alb  3.3<L>  /  TBili  0.4  /  DBili  x   /  AST  6<L>  /  ALT  12  /  AlkPhos  100  02-26    PT/INR - ( 26 Feb 2024 05:25 )   PT: 11.1 sec;   INR: 0.97 ratio         PTT - ( 26 Feb 2024 05:25 )  PTT:24.8 sec  Urinalysis Basic - ( 26 Feb 2024 05:25 )    Color: x / Appearance: x / SG: x / pH: x  Gluc: 128 mg/dL / Ketone: x  / Bili: x / Urobili: x   Blood: x / Protein: x / Nitrite: x   Leuk Esterase: x / RBC: x / WBC x   Sq Epi: x / Non Sq Epi: x / Bacteria: x      CAPILLARY BLOOD GLUCOSE      POCT Blood Glucose.: 134 mg/dL (26 Feb 2024 08:32)  POCT Blood Glucose.: 138 mg/dL (26 Feb 2024 01:46)  POCT Blood Glucose.: 122 mg/dL (25 Feb 2024 15:20)    LIPID PANEL  Cholesterol 186  LDL --  HDL 45  RATIO HDL/Total Cholesterol --  Triglyceride 71        Urinalysis Basic - ( 26 Feb 2024 05:25 )    Color: x / Appearance: x / SG: x / pH: x  Gluc: 128 mg/dL / Ketone: x  / Bili: x / Urobili: x   Blood: x / Protein: x / Nitrite: x   Leuk Esterase: x / RBC: x / WBC x   Sq Epi: x / Non Sq Epi: x / Bacteria: x        MEDICATIONS  (STANDING):  aspirin  chewable 81 milliGRAM(s) Oral daily  atorvastatin 10 milliGRAM(s) Oral at bedtime  carvedilol 25 milliGRAM(s) Oral every 12 hours  clopidogrel Tablet 75 milliGRAM(s) Oral daily  dextrose 5%. 1000 milliLiter(s) (100 mL/Hr) IV Continuous <Continuous>  dextrose 5%. 1000 milliLiter(s) (50 mL/Hr) IV Continuous <Continuous>  dextrose 50% Injectable 25 Gram(s) IV Push once  dextrose 50% Injectable 12.5 Gram(s) IV Push once  dextrose 50% Injectable 25 Gram(s) IV Push once  enoxaparin Injectable 40 milliGRAM(s) SubCutaneous every 24 hours  glucagon  Injectable 1 milliGRAM(s) IntraMuscular once  insulin lispro (ADMELOG) corrective regimen sliding scale   SubCutaneous Before meals and at bedtime  losartan 50 milliGRAM(s) Oral daily  montelukast 10 milliGRAM(s) Oral at bedtime  pantoprazole    Tablet 40 milliGRAM(s) Oral at bedtime  topiramate 100 milliGRAM(s) Oral daily    MEDICATIONS  (PRN):  albuterol    90 MICROgram(s) HFA Inhaler 2 Puff(s) Inhalation every 6 hours PRN Shortness of Breath  dextrose Oral Gel 15 Gram(s) Oral once PRN Blood Glucose LESS THAN 70 milliGRAM(s)/deciliter      RADIOLOGY & ADDITIONAL TESTS:    Imaging Personally Reviewed:  [ ] YES  [ ] NO    REVIEW OF SYSTEMS:  CONSTITUTIONAL: No fever, weight loss, or fatigue  EYES: No eye pain, visual disturbances, or discharge  ENMT:  No difficulty hearing, tinnitus, vertigo; No sinus or throat pain  NECK: No pain or stiffness  BREASTS: No pain, masses, or nipple discharge  RESPIRATORY: No cough, wheezing, chills or hemoptysis; No shortness of breath  CARDIOVASCULAR: No chest pain, palpitations, dizziness, or leg swelling  GASTROINTESTINAL: No abdominal or epigastric pain. No nausea, vomiting, or hematemesis; No diarrhea or constipation. No melena or hematochezia.  GENITOURINARY: No dysuria, frequency, hematuria, or incontinence  NEUROLOGICAL: No headaches, memory loss, loss of strength, numbness, or tremors  SKIN: No itching, burning, rashes, or lesions   LYMPH NODES: No enlarged glands  ENDOCRINE: No heat or cold intolerance; No hair loss  MUSCULOSKELETAL: No joint pain or swelling; No muscle, back, or extremity pain  PSYCHIATRIC: No depression, anxiety, mood swings, or difficulty sleeping  HEME/LYMPH: No easy bruising, or bleeding gums  ALLERY AND IMMUNOLOGIC: No hives or eczema      Consultant(s) Notes Reviewed:  [ x ] YES  [ ] NO    PHYSICAL EXAM:  GENERAL: NAD, well-groomed, well-developed  HEAD:  Atraumatic, Normocephalic  EYES: EOMI, PERRLA, conjunctiva and sclera clear  ENMT: No tonsillar erythema, exudates, or enlargement; Moist mucous membranes, Good dentition, No lesions  NECK: Supple, No JVD, Normal thyroid  NERVOUS SYSTEM:  Alert & Oriented X3, Good concentration; Motor Strength 5/5 B/L upper and lower extremities; DTRs 2+ intact and symmetric  CHEST/LUNG: Clear to percussion bilaterally; No rales, rhonchi, wheezing, or rubs  HEART: Regular rate and rhythm; No murmurs, rubs, or gallops  ABDOMEN: Soft, Nontender, Nondistended; Bowel sounds present  EXTREMITIES:  2+ Peripheral Pulses, No clubbing, cyanosis, or edema  LYMPH: No lymphadenopathy noted  SKIN: No rashes or lesions    Care Discussed with Consultants/Other Providers [ x] YES  [ ] NO

## 2024-02-26 NOTE — PROGRESS NOTE ADULT - PROBLEM SELECTOR PLAN 1
DDx to include, but not limited to Cardiovascular such as Increased demand (stable coronary artery disease lesion), aortic dissection, Arrhythmia, Uncontrolled Hypertension HF, cardiac inflammation from pericarditis, myocarditis, endocarditis; LVH, Myocardial injury, PE, Infectious, CKD, Rhabdomyolysis, or others.   EKG showed Sinus rhythm with 1st degree A-V block, Possible LA enlargement RSR' or QR pattern in V1 suggests RV conduction delay, LVH with QRS widening and repolarization abnormality  s/p ASA in the ED  HEART Pathway score 7 points  Wells' Criteria for PE 4.5 points  F/U CTA chest to rule out PE  Remote Tele  Vitals q4hr  F/U TTE  Cardio Dr Stevenson consulted  No Caffeine  TSH  BP control  ASA  Statin  BB  F/U Repeat Troponin and EKG

## 2024-02-26 NOTE — PATIENT PROFILE ADULT - FALL HARM RISK - HARM RISK INTERVENTIONS
Assistance with ambulation/Communicate Risk of Fall with Harm to all staff/Monitor for mental status changes/Monitor gait and stability/Reinforce activity limits and safety measures with patient and family/Sit up slowly, dangle for a short time, stand at bedside before walking/Tailored Fall Risk Interventions/Use of alarms - bed, chair and/or voice tab/Visual Cue: Yellow wristband and red socks/Bed in lowest position, wheels locked, appropriate side rails in place/Call bell, personal items and telephone in reach/Instruct patient to call for assistance before getting out of bed or chair/Non-slip footwear when patient is out of bed/[place] to call system/Physically safe environment - no spills, clutter or unnecessary equipment/Purposeful Proactive Rounding/Room/bathroom lighting operational, light cord in reach

## 2024-02-26 NOTE — CONSULT NOTE ADULT - ASSESSMENT
A 68 year old female, from home, AAOx3 and ambulating with a walker at baseline, with PMHx of  COPD, Left MCA CVA w/ residual dysarthria and RLE weakness, Seizures, DM, CAD s/p ZHAO, s/p cardiac cath 4/2022 w/non-obstructive CAD, AFib not on AC, and PAD s/p Right femoral popliteal bypass s/p occlusion + IR stent placement 2/2017, was brought into the ED by EMS from home due to elevated BP of 190/121 mmHg. Noted to be normotensive on admission. Unclear if medications given in route by EMS. Patient with pleuritic chest pain and elevated troponin w/o obvious acute ischemic changes in the EKG. Admitted to telemetry for chest pain evaluation.    A 68 year old female, from home, AAOx3 and ambulating with a walker at baseline, with PMHx of  COPD, Left MCA CVA w/ residual dysarthria and RLE weakness, Seizures, DM, CAD s/p ZHAO, s/p cardiac cath 4/2022 w/non-obstructive CAD, AFib not on AC, and PAD s/p Right femoral popliteal bypass s/p occlusion + IR stent placement 2/2017, was brought into the ED by EMS from home due to elevated BP of 190/121 mmHg. Noted to be normotensive on admission. Patient with pleuritic chest pain and elevated troponin w/o obvious acute ischemic changes in the EKG. Admitted to telemetry for chest pain evaluation.     Recommendation:     1) Likely non-obstructive CAD: Continue with Aspirin and Plavix  2) Paroxysmal Afib: Recommend to start Chronic Anticoagulation if no risks of bleed.   A 68 year old female, from home, AAOx3 and ambulating with a walker at baseline, with PMHx of  COPD, Left MCA CVA w/ residual dysarthria and RLE weakness, Seizures, DM, CAD s/p ZHAO, s/p cardiac cath 4/2022 w/non-obstructive CAD, AFib not on AC, and PAD s/p Right femoral popliteal bypass s/p occlusion + IR stent placement 2/2017, was brought into the ED by EMS from home due to elevated BP of 190/121 mmHg. Noted to be normotensive on admission. Patient with pleuritic chest pain and elevated troponin w/o obvious acute ischemic changes in the EKG. Admitted to telemetry for chest pain evaluation.     Recommendation:     1) Likely non-obstructive CAD: Continue with Aspirin and Plavix- can dc tele  2) Paroxysmal Afib: Recommend to start Chronic Anticoagulation if no risks of bleed.

## 2024-02-26 NOTE — PROGRESS NOTE ADULT - PROBLEM SELECTOR PLAN 2
DDx to include, but not limited to Cardiovascular such as Increased demand (stable coronary artery disease lesion), aortic dissection, Arrhythmia, Hypotension, HF, cardiac inflammation from pericarditis, myocarditis, endocarditis; LVH, Myocardial injury, PE, Infectious such as Sepsis/SIRS and/or Viral illness, GI bleeding, Stroke, CKD, Rhabdomyolosis, endurance exercise, or even Environmental exposure.  EKG showed Sinus rhythm with 1st degree A-V block, Possible LA enlargement RSR' or QR pattern in V1 suggests RV conduction delay, LVH with QRS widening and repolarization abnormality  F/U Repeat troponin and EKG  F/U TTE  Remote tele  Vitals q4hr  F/U Cardio recommendations

## 2024-02-26 NOTE — PATIENT PROFILE ADULT - FUNCTIONAL ASSESSMENT - BASIC MOBILITY SECTION LABEL
From Dr. Ruelas:  It is my privilege to participate in your post-transplant care!    KEEP UP THE GREAT WORK!    -Download [employer] meena and set it up with your username and password - You can try to do a video visit in the future!    Please be sure to let us know if you have any questions or concerns about your health care - we cannot help you if we do not know.  Don't forget we are on call 24/7 for any emergencies.   Best Wishes,  Dr. Jessenia Denny    
.

## 2024-02-26 NOTE — CONSULT NOTE ADULT - SUBJECTIVE AND OBJECTIVE BOX
MR:- [id number] :  NAME:-DOUG APPLE:-    DATE OF SERVICE:02-26-24 @ 11:55    Patient was seen,examined and evaluated  by Jonel Stevenson MD [id number] @ 11:55 .  ER evaluation, Labs and Hospital course was reviewed,    CHIEF COMPLAINT:    HPI:HPI:  A 68 year old female, from home, AAOx3 and ambulating with a walker at baseline, with PMHx of  COPD, Left MCA CVA w/ residual dysarthria and RLE weakness, Seizures, DM, CAD s/p ZHAO, s/p cardiac cath 4/2022 w/non-obstructive CAD, AFib not on AC, and PAD s/p Right femoral popliteal bypass s/p occlusion + IR stent placement 2/2017, was brought into the ED by EMS from home due to elevated BP of 190/121 mmHg. Unclear if she received any medication in route to the hospital. Patient mentioned that earlier today, she experienced pleuritic chest pain radiating to her upper back associated with lightheadedness and minimal dyspnea that lasted for about 90 minutes. Denies any palpitations, nausea, vomiting, headache, chills, numbness, or syncopal episodes. Denies orthopnea or exertional symptoms. Denies recent episodes. Denies any recent falls or chest wall trauma. Denies any recent sick contacts or travel. On further questioning, patient with intermittent acid reflux, postprandial fullness, and early satiety that respond to Famotidine. She does not have any other complaints. (25 Feb 2024 13:25)        CARDIAC HISTORY:  [X] CAD [ [PCI [ ] CABG [X] Prior Cath  [X] Atrial Fibrillation  Devices[ ] PPM [ ] ICD [ ]ILR  Heart Failure [ ] HFrEF [ ] HFpEF    PAST MEDICAL & SURGICAL HISTORY:  S/P Cardiac Cath  4/2022 nonobstructive CAD      HTN (hypertension)      Diabetes      Asthma  never intubated      Gastroesophageal reflux      CVA (cerebral infarction)  times 3 with residual rt sided weakness and word finding difficulty      CAD (coronary artery disease)      Peripheral arterial disease  peripheral bypass/stents      Hyperlipidemia      Atrial fibrillation      History of seizure  "very long time ago" at time of CVA - topamax      S/P total abdominal hysterectomy      Coronary stent occlusion      H/O peripheral artery bypass          MEDICATIONS  (STANDING):  aspirin  chewable 81 milliGRAM(s) Oral daily  atorvastatin 10 milliGRAM(s) Oral at bedtime  carvedilol 25 milliGRAM(s) Oral every 12 hours  clopidogrel Tablet 75 milliGRAM(s) Oral daily  dextrose 5%. 1000 milliLiter(s) (50 mL/Hr) IV Continuous <Continuous>  dextrose 5%. 1000 milliLiter(s) (100 mL/Hr) IV Continuous <Continuous>  dextrose 50% Injectable 25 Gram(s) IV Push once  dextrose 50% Injectable 12.5 Gram(s) IV Push once  dextrose 50% Injectable 25 Gram(s) IV Push once  enoxaparin Injectable 40 milliGRAM(s) SubCutaneous every 24 hours  glucagon  Injectable 1 milliGRAM(s) IntraMuscular once  insulin lispro (ADMELOG) corrective regimen sliding scale   SubCutaneous Before meals and at bedtime  losartan 50 milliGRAM(s) Oral daily  montelukast 10 milliGRAM(s) Oral at bedtime  pantoprazole    Tablet 40 milliGRAM(s) Oral at bedtime  topiramate 100 milliGRAM(s) Oral daily    MEDICATIONS  (PRN):  albuterol    90 MICROgram(s) HFA Inhaler 2 Puff(s) Inhalation every 6 hours PRN Shortness of Breath  dextrose Oral Gel 15 Gram(s) Oral once PRN Blood Glucose LESS THAN 70 milliGRAM(s)/deciliter      FAMILY HISTORY:  Family history of diabetes mellitus (Sibling)      No family history of premature coronary artery disease or sudden cardiac death    SOCIAL HISTORY:  Smoking-[ ] Active  [ ] Former [ ] Non Smoker  Alcohol-[ ] Denies [ ] Social [ ] Daily  Ilicit Drug use-[ ] Denies [ ] Active user    REVIEW OF SYSTEMS:  Constitutional: [ ] fever, [ ]weight loss, [ ]fatigue   Activity [ ] Bedbound,[ ] Ambulates [ ] Unassisted[ ] Cane/Walker [ ] Assistence.  Effort tolerance:[ ] Excellent [ ] Good [ ] Fair [ ] Poor [ ]  Eyes: [ ] visual changes  Respiratory: [x ]shortness of breath;  [ ] cough, [ ]wheezing, [ ]chills, [ ]hemoptysis  Cardiovascular: [X ] chest pain, [ ]palpitations, [ ]dizziness,  [ ]leg swelling[ ]orthopnea [ ]PND  Gastrointestinal: [ ] abdominal pain, [ ]nausea, [ ]vomiting,  [ ]diarrhea,[ ]constipation  Genitourinary: [ ] dysuria, [ ] hematuria  Neurologic: [ ] headaches [ ] tremors[ ] weakness  Skin: [ ] itching, [ ]burning, [ ] rashes  Endocrine: [ ] heat or cold intolerance  Musculoskeletal: [ ] joint pain or swelling; [ ] muscle, back, or extremity pain  Psychiatric: [ ] depression, [ ]anxiety, [ ]mood swings, or [ ]difficulty sleeping  Hematologic: [ ] easy bruising, [ ] bleeding gums       [ x] All others negative	  [ ] Unable to obtain    Vital Signs Last 24 Hrs  T(C): 36.7 (26 Feb 2024 11:31), Max: 36.7 (26 Feb 2024 11:31)  T(F): 98 (26 Feb 2024 11:31), Max: 98 (26 Feb 2024 11:31)  HR: 80 (26 Feb 2024 11:31) (69 - 80)  BP: 120/79 (26 Feb 2024 11:31) (120/79 - 166/117)  BP(mean): 115 (26 Feb 2024 05:11) (115 - 115)  RR: 18 (26 Feb 2024 11:31) (18 - 19)  SpO2: 97% (26 Feb 2024 11:31) (97% - 99%)    Parameters below as of 26 Feb 2024 11:31  Patient On (Oxygen Delivery Method): room air      I&O's Summary      PHYSICAL EXAM:  General: No acute distress BMI- 27.4  HEENT: EOMI, PERRL, No Icteric  Neck: Supple, No JVD  Lungs: Equal air entry bilaterally; No Rales, Rhonchi, Wheezing  Heart: Regular rate and rhythm; No rubs, or gallops  Abdomen: Nontender, bowel sounds present  Extremities: No clubbing, cyanosis, edema, Calf tenderness  Nervous system:  Alert & Oriented X3, Residual weakness on RLE and dysarthria  Psychiatric: Normal affect  Skin: No rashes or lesions      LABS:  02-26    140  |  107  |  18  ----------------------------<  128<H>  3.6   |  27  |  1.38<H>    Ca    10.2      26 Feb 2024 05:25  Phos  3.7     02-26  Mg     2.5     02-26    TPro  7.1  /  Alb  3.3<L>  /  TBili  0.4  /  DBili  x   /  AST  6<L>  /  ALT  12  /  AlkPhos  100  02-26    Creatinine Trend: 1.38<--, 1.30<--                        13.8   7.61  )-----------( 264      ( 26 Feb 2024 05:25 )             45.5     PT/INR - ( 26 Feb 2024 05:25 )   PT: 11.1 sec;   INR: 0.97 ratio         PTT - ( 26 Feb 2024 05:25 )  PTT:24.8 sec    Lipid Panel: Cholesterol, Serum 186  Direct LDL --  HDL Cholesterol, Serum 45  Triglycerides, Serum 71    Cardiac Enzymes: CARDIAC MARKERS ( 25 Feb 2024 15:47 )  x     / x     / 69 U/L / x     / <1.0 ng/mL

## 2024-02-27 DIAGNOSIS — I10 ESSENTIAL (PRIMARY) HYPERTENSION: ICD-10-CM

## 2024-02-27 DIAGNOSIS — N17.9 ACUTE KIDNEY FAILURE, UNSPECIFIED: ICD-10-CM

## 2024-02-27 DIAGNOSIS — I16.1 HYPERTENSIVE EMERGENCY: ICD-10-CM

## 2024-02-27 LAB
ANION GAP SERPL CALC-SCNC: 7 MMOL/L — SIGNIFICANT CHANGE UP (ref 5–17)
BUN SERPL-MCNC: 27 MG/DL — HIGH (ref 7–18)
CALCIUM SERPL-MCNC: 9.6 MG/DL — SIGNIFICANT CHANGE UP (ref 8.4–10.5)
CHLORIDE SERPL-SCNC: 107 MMOL/L — SIGNIFICANT CHANGE UP (ref 96–108)
CO2 SERPL-SCNC: 26 MMOL/L — SIGNIFICANT CHANGE UP (ref 22–31)
CREAT SERPL-MCNC: 1.56 MG/DL — HIGH (ref 0.5–1.3)
EGFR: 36 ML/MIN/1.73M2 — LOW
GLUCOSE BLDC GLUCOMTR-MCNC: 127 MG/DL — HIGH (ref 70–99)
GLUCOSE BLDC GLUCOMTR-MCNC: 133 MG/DL — HIGH (ref 70–99)
GLUCOSE BLDC GLUCOMTR-MCNC: 156 MG/DL — HIGH (ref 70–99)
GLUCOSE BLDC GLUCOMTR-MCNC: 157 MG/DL — HIGH (ref 70–99)
GLUCOSE SERPL-MCNC: 123 MG/DL — HIGH (ref 70–99)
HCT VFR BLD CALC: 44.4 % — SIGNIFICANT CHANGE UP (ref 34.5–45)
HGB BLD-MCNC: 13.5 G/DL — SIGNIFICANT CHANGE UP (ref 11.5–15.5)
MCHC RBC-ENTMCNC: 25.2 PG — LOW (ref 27–34)
MCHC RBC-ENTMCNC: 30.4 GM/DL — LOW (ref 32–36)
MCV RBC AUTO: 83 FL — SIGNIFICANT CHANGE UP (ref 80–100)
NRBC # BLD: 0 /100 WBCS — SIGNIFICANT CHANGE UP (ref 0–0)
PLATELET # BLD AUTO: 250 K/UL — SIGNIFICANT CHANGE UP (ref 150–400)
POTASSIUM SERPL-MCNC: 3.6 MMOL/L — SIGNIFICANT CHANGE UP (ref 3.5–5.3)
POTASSIUM SERPL-SCNC: 3.6 MMOL/L — SIGNIFICANT CHANGE UP (ref 3.5–5.3)
RBC # BLD: 5.35 M/UL — HIGH (ref 3.8–5.2)
RBC # FLD: 13.4 % — SIGNIFICANT CHANGE UP (ref 10.3–14.5)
SODIUM SERPL-SCNC: 140 MMOL/L — SIGNIFICANT CHANGE UP (ref 135–145)
WBC # BLD: 7.44 K/UL — SIGNIFICANT CHANGE UP (ref 3.8–10.5)
WBC # FLD AUTO: 7.44 K/UL — SIGNIFICANT CHANGE UP (ref 3.8–10.5)

## 2024-02-27 PROCEDURE — 71045 X-RAY EXAM CHEST 1 VIEW: CPT | Mod: 26

## 2024-02-27 RX ORDER — SODIUM CHLORIDE 9 MG/ML
1000 INJECTION, SOLUTION INTRAVENOUS
Refills: 0 | Status: DISCONTINUED | OUTPATIENT
Start: 2024-02-27 | End: 2024-03-01

## 2024-02-27 RX ORDER — APIXABAN 2.5 MG/1
1 TABLET, FILM COATED ORAL
Qty: 60 | Refills: 0
Start: 2024-02-27 | End: 2024-03-27

## 2024-02-27 RX ORDER — ATORVASTATIN CALCIUM 80 MG/1
40 TABLET, FILM COATED ORAL AT BEDTIME
Refills: 0 | Status: DISCONTINUED | OUTPATIENT
Start: 2024-02-27 | End: 2024-03-03

## 2024-02-27 RX ORDER — APIXABAN 2.5 MG/1
5 TABLET, FILM COATED ORAL EVERY 12 HOURS
Refills: 0 | Status: DISCONTINUED | OUTPATIENT
Start: 2024-02-27 | End: 2024-02-27

## 2024-02-27 RX ORDER — APIXABAN 2.5 MG/1
5 TABLET, FILM COATED ORAL EVERY 12 HOURS
Refills: 0 | Status: DISCONTINUED | OUTPATIENT
Start: 2024-02-27 | End: 2024-03-03

## 2024-02-27 RX ADMIN — CARVEDILOL PHOSPHATE 25 MILLIGRAM(S): 80 CAPSULE, EXTENDED RELEASE ORAL at 05:25

## 2024-02-27 RX ADMIN — APIXABAN 5 MILLIGRAM(S): 2.5 TABLET, FILM COATED ORAL at 18:06

## 2024-02-27 RX ADMIN — SODIUM CHLORIDE 60 MILLILITER(S): 9 INJECTION, SOLUTION INTRAVENOUS at 20:18

## 2024-02-27 RX ADMIN — Medication 1: at 17:32

## 2024-02-27 RX ADMIN — SODIUM CHLORIDE 60 MILLILITER(S): 9 INJECTION, SOLUTION INTRAVENOUS at 13:33

## 2024-02-27 RX ADMIN — CLOPIDOGREL BISULFATE 75 MILLIGRAM(S): 75 TABLET, FILM COATED ORAL at 11:42

## 2024-02-27 RX ADMIN — Medication 1: at 11:43

## 2024-02-27 RX ADMIN — Medication 81 MILLIGRAM(S): at 11:39

## 2024-02-27 RX ADMIN — ATORVASTATIN CALCIUM 40 MILLIGRAM(S): 80 TABLET, FILM COATED ORAL at 21:36

## 2024-02-27 RX ADMIN — PANTOPRAZOLE SODIUM 40 MILLIGRAM(S): 20 TABLET, DELAYED RELEASE ORAL at 21:36

## 2024-02-27 RX ADMIN — MONTELUKAST 10 MILLIGRAM(S): 4 TABLET, CHEWABLE ORAL at 21:36

## 2024-02-27 RX ADMIN — ENOXAPARIN SODIUM 40 MILLIGRAM(S): 100 INJECTION SUBCUTANEOUS at 05:25

## 2024-02-27 RX ADMIN — CARVEDILOL PHOSPHATE 25 MILLIGRAM(S): 80 CAPSULE, EXTENDED RELEASE ORAL at 18:06

## 2024-02-27 RX ADMIN — Medication 100 MILLIGRAM(S): at 11:39

## 2024-02-27 RX ADMIN — LOSARTAN POTASSIUM 50 MILLIGRAM(S): 100 TABLET, FILM COATED ORAL at 05:25

## 2024-02-27 NOTE — PROGRESS NOTE ADULT - PROBLEM SELECTOR PLAN 4
pt p/w /120, elevated trop, and joel  c/w coreg 25 mg bid  c/w losartan 50 mg daily  BP goal <140/90, improving

## 2024-02-27 NOTE — PROGRESS NOTE ADULT - PROBLEM SELECTOR PLAN 4
Will continue statin   Will continue BB therapy  dc aspirin and plavix  start eliquis   DASH/TLC diet LDL noted 127  Will continue BB therapy  increase atorvastatin to 40 mg daily  dc aspirin and plavix  start eliquis   DASH/TLC diet pt p/w /120, elevated trop, and joel  c/w coreg 25 mg bid  c/w losartan 50 mg daily  BP goal <140/90, improving

## 2024-02-27 NOTE — PROGRESS NOTE ADULT - PROBLEM SELECTOR PLAN 8
dvt - Lovenox 40 mg SQ q24hr  gi - protonix 40 mg daily a1c 7.6%  c/w ISS qid  Finger stick before meal and bedtime   Diabetic diet.

## 2024-02-27 NOTE — PROGRESS NOTE ADULT - PROBLEM SELECTOR PLAN 5
LDL noted 127  Will continue BB therapy  increase atorvastatin to 40 mg daily  dc aspirin and plavix  start eliquis   DASH/TLC diet

## 2024-02-27 NOTE — PROGRESS NOTE ADULT - ASSESSMENT
A 68 year old female, from home, AAOx3 and ambulating with a walker at baseline, with PMHx of  COPD, Left MCA CVA w/ residual dysarthria and RLE weakness, Seizures, DM, CAD s/p ZHAO, s/p cardiac cath 4/2022 w/non-obstructive CAD, AFib not on AC, and PAD s/p Right femoral popliteal bypass s/p occlusion + IR stent placement 2/2017, was brought into the ED by EMS from home due to elevated BP of 190/121 mmHg. Noted to be normotensive on admission. Unclear if medications given in route by EMS. Patient with pleuritic chest pain and elevated troponin w/o obvious acute ischemic changes in the EKG. Admitted to telemetry for chest pain evaluation. Cardiology Dr. Stevenson following. Pt awaiting echocardiogram results. Also will plan for VQ scan due to joel to r/o pulmonary embolism.  A 68 year old female, from home, AAOx3 and ambulating with a walker at baseline, with PMHx of  COPD, Left MCA CVA w/ residual dysarthria and RLE weakness, Seizures, DM, CAD s/p ZHAO, s/p cardiac cath 4/2022 w/non-obstructive CAD, AFib not on AC, and PAD s/p Right femoral popliteal bypass s/p occlusion + IR stent placement 2/2017, was brought into the ED by EMS from home due to elevated BP of 190/121 mmHg. Noted to be normotensive on admission. Unclear if medications given in route by EMS. Patient with pleuritic chest pain and elevated troponin w/o obvious acute ischemic changes in the EKG. Admitted to telemetry for chest pain evaluation. Cardiology Dr. Stevenson following. Pt awaiting echocardiogram results. Also will plan for VQ scan due to joel to r/o pulmonary embolism.     Updated Sister Connie Cheney [phone] at bedside, she is patient's HHA 49hrs/week.  Pt follows with home health MD Inez Garcia at [facility].

## 2024-02-27 NOTE — PROGRESS NOTE ADULT - SUBJECTIVE AND OBJECTIVE BOX
MR#[id number]  PATIENT NAME:ANA APPLE    DATE OF SERVICE: 02-27-24  Patient was seen and examined by Jonel Stevenson MD on    02-27-24   Interim events noted.Consultant notes ,Labs,Telemetry reviewed by me       HOSPITAL COURSE: HPI:  A 68 year old female, from home, AAOx3 and ambulating with a walker at baseline, with PMHx of  COPD, Left MCA CVA w/ residual dysarthria and RLE weakness, Seizures, DM, CAD s/p ZHAO, s/p cardiac cath 4/2022 w/non-obstructive CAD, AFib not on AC, and PAD s/p Right femoral popliteal bypass s/p occlusion + IR stent placement 2/2017, was brought into the ED by EMS from home due to elevated BP of 190/121 mmHg. Unclear if she received any medication in route to the hospital. Patient mentioned that earlier today, she experienced pleuritic chest pain radiating to her upper back associated with lightheadedness and minimal dyspnea that lasted for about 90 minutes. Denies any palpitations, nausea, vomiting, headache, chills, numbness, or syncopal episodes. Denies orthopnea or exertional symptoms. Denies recent episodes. Denies any recent falls or chest wall trauma. Denies any recent sick contacts or travel. On further questioning, patient with intermittent acid reflux, postprandial fullness, and early satiety that respond to Famotidine. She does not have any other complaints. (25 Feb 2024 13:25)      INTERIM EVENTS:Patient seen at bedside ,interim events noted.      PMH -reviewed admission note, no change since admission  HEART FAILURE: Acute[ ]Chronic[ ] Systolic[ ] Diastolic[ ] Combined Systolic and Diastolic[ ]  CAD[ ] CABG[ ] PCI[ ]  DEVICES[ ] PPM[ ] ICD[ ] ILR[ ]  ATRIAL FIBRILLATION[ ] Paroxysmal[ ] Permanent[ ] CHADS2-[  ]  ILEANA[ ] CKD1[ ] CKD2[ ] CKD3[ ] CKD4[ ] ESRD[ ]  COPD[ ] HTN[ ]   DM[ ] Type1[ ] Type 2[ ]   CVA[ ] Paresis[ ]    AMBULATION: Assisted[ ] Cane/walker[ ] Independent[ ]    MEDICATIONS  (STANDING):  apixaban 5 milliGRAM(s) Oral every 12 hours  atorvastatin 40 milliGRAM(s) Oral at bedtime  carvedilol 25 milliGRAM(s) Oral every 12 hours  dextrose 5%. 1000 milliLiter(s) (100 mL/Hr) IV Continuous <Continuous>  dextrose 5%. 1000 milliLiter(s) (50 mL/Hr) IV Continuous <Continuous>  dextrose 50% Injectable 25 Gram(s) IV Push once  dextrose 50% Injectable 25 Gram(s) IV Push once  dextrose 50% Injectable 12.5 Gram(s) IV Push once  glucagon  Injectable 1 milliGRAM(s) IntraMuscular once  insulin lispro (ADMELOG) corrective regimen sliding scale   SubCutaneous Before meals and at bedtime  lactated ringers. 1000 milliLiter(s) (60 mL/Hr) IV Continuous <Continuous>  losartan 50 milliGRAM(s) Oral daily  montelukast 10 milliGRAM(s) Oral at bedtime  pantoprazole    Tablet 40 milliGRAM(s) Oral at bedtime  topiramate 100 milliGRAM(s) Oral daily    MEDICATIONS  (PRN):  albuterol    90 MICROgram(s) HFA Inhaler 2 Puff(s) Inhalation every 6 hours PRN Shortness of Breath  dextrose Oral Gel 15 Gram(s) Oral once PRN Blood Glucose LESS THAN 70 milliGRAM(s)/deciliter            REVIEW OF SYSTEMS:  Constitutional: [ ] fever, [ ]weight loss,  [ ]fatigue [ ]weight gain  Eyes: [ ] visual changes  Respiratory: [ ]shortness of breath;  [ ] cough, [ ]wheezing, [ ]chills, [ ]hemoptysis  Cardiovascular: [ ] chest pain, [ ]palpitations, [ ]dizziness,  [ ]leg swelling[ ]orthopnea[ ]PND  Gastrointestinal: [ ] abdominal pain, [ ]nausea, [ ]vomiting,  [ ]diarrhea [ ]Constipation [ ]Melena  Genitourinary: [ ] dysuria, [ ] hematuria [ ]Doll  Neurologic: [ ] headaches [ ] tremors[ ]weakness [ ]Paralysis Right[ ] Left[ ]  Skin: [ ] itching, [ ]burning, [ ] rashes  Endocrine: [ ] heat or cold intolerance  Musculoskeletal: [ ] joint pain or swelling; [ ] muscle, back, or extremity pain  Psychiatric: [ ] depression, [ ]anxiety, [ ]mood swings, or [ ]difficulty sleeping  Hematologic: [ ] easy bruising, [ ] bleeding gums    [ ] All remaining systems negative except as per above.   [ ]Unable to obtain.  [x] No change in ROS since admission      Vital Signs Last 24 Hrs  T(C): 36.8 (27 Feb 2024 19:43), Max: 36.8 (27 Feb 2024 19:43)  T(F): 98.2 (27 Feb 2024 19:43), Max: 98.2 (27 Feb 2024 19:43)  HR: 73 (27 Feb 2024 19:43) (65 - 73)  BP: 122/72 (27 Feb 2024 19:43) (111/78 - 145/84)  BP(mean): 90 (27 Feb 2024 11:40) (81 - 90)  RR: 18 (27 Feb 2024 19:43) (18 - 18)  SpO2: 99% (27 Feb 2024 19:43) (93% - 100%)    Parameters below as of 27 Feb 2024 19:43  Patient On (Oxygen Delivery Method): room air      I&O's Summary    26 Feb 2024 07:01  -  27 Feb 2024 07:00  --------------------------------------------------------  IN: 0 mL / OUT: 1300 mL / NET: -1300 mL        PHYSICAL EXAM:  General: No acute distress BMI-  HEENT: EOMI, PERRL  Neck: Supple, [ ] JVD  Lungs: Equal air entry bilaterally; [ ] rales [ ] wheezing [ ] rhonchi  Heart: Regular rate and rhythm; [x ] murmur   2/6 [ x] systolic [ ] diastolic [ ] radiation[ ] rubs [ ]  gallops  Abdomen: Nontender, bowel sounds present  Extremities: No clubbing, cyanosis, [ ] edema [ ]Pulses  equal and intact  Nervous system:  Alert & Oriented X3, no focal deficits  Psychiatric: Normal affect  Skin: No rashes or lesions    LABS:  02-27    140  |  107  |  27<H>  ----------------------------<  123<H>  3.6   |  26  |  1.56<H>    Ca    9.6      27 Feb 2024 05:54  Phos  3.7     02-26  Mg     2.5     02-26    TPro  7.1  /  Alb  3.3<L>  /  TBili  0.4  /  DBili  x   /  AST  6<L>  /  ALT  12  /  AlkPhos  100  02-26    Creatinine Trend: 1.56<--, 1.38<--, 1.30<--                        13.5   7.44  )-----------( 250      ( 27 Feb 2024 05:54 )             44.4     PT/INR - ( 26 Feb 2024 05:25 )   PT: 11.1 sec;   INR: 0.97 ratio         PTT - ( 26 Feb 2024 05:25 )  PTT:24.8 sec

## 2024-02-27 NOTE — PROGRESS NOTE ADULT - PROBLEM SELECTOR PLAN 5
Will continue atorvastatin 10 mg daily  dc aspirin and plavix  start eliquis   DASH/TLC diet increase atorvastatin to 40 mg daily  dc aspirin and plavix  start eliquis   DASH/TLC diet pt p/w creatinine 1.5  on admission SCr 1.3   unclear baseline  will start LR 60 cc x12 hrs  monitor BMP    Avoid Nephrotoxic Meds/ Agents such as (NSAIDs, IV contrast, Aminoglycosides such as gentamicin, Gadolinium contrast, Phosphate containing enemas, etc..) Advised motrin, use of briefs. Return to ED in event of significantly worsening pain. Follow up with urology at [facility] as scheduled.

## 2024-02-27 NOTE — PROGRESS NOTE ADULT - PROBLEM SELECTOR PLAN 7
No c/w home med losartan 50 mg daily  BP goal <140/90 increase atorvastatin to 40 mg daily  dc aspirin and plavix  start eliquis   DASH/TLC diet

## 2024-02-27 NOTE — PROGRESS NOTE ADULT - SUBJECTIVE AND OBJECTIVE BOX
Patient is a 68y old  Female who presents with a chief complaint of Chest pain (27 Feb 2024 12:35)      OVERNIGHT EVENTS: no acute changes.     Pt is awake, alert, comfortable appearing, tolerating PO, remains in bed due to weakness.     REVIEW OF SYSTEMS:  limited exam due to aphasia  pt denies any pain    T(C): 36.4 (02-27-24 @ 11:40), Max: 36.5 (02-26-24 @ 15:59)  HR: 73 (02-27-24 @ 11:40) (65 - 78)  BP: 111/78 (02-27-24 @ 11:40) (111/78 - 145/84)  RR: 18 (02-27-24 @ 11:40) (18 - 18)  SpO2: 96% (02-27-24 @ 11:40) (93% - 100%)  Wt(kg): --Vital Signs Last 24 Hrs  T(C): 36.4 (27 Feb 2024 11:40), Max: 36.5 (26 Feb 2024 15:59)  T(F): 97.5 (27 Feb 2024 11:40), Max: 97.7 (26 Feb 2024 15:59)  HR: 73 (27 Feb 2024 11:40) (65 - 78)  BP: 111/78 (27 Feb 2024 11:40) (111/78 - 145/84)  BP(mean): 90 (27 Feb 2024 11:40) (81 - 95)  RR: 18 (27 Feb 2024 11:40) (18 - 18)  SpO2: 96% (27 Feb 2024 11:40) (93% - 100%)    Parameters below as of 27 Feb 2024 11:40  Patient On (Oxygen Delivery Method): room air        MEDICATIONS  (STANDING):  aspirin  chewable 81 milliGRAM(s) Oral daily  atorvastatin 10 milliGRAM(s) Oral at bedtime  carvedilol 25 milliGRAM(s) Oral every 12 hours  clopidogrel Tablet 75 milliGRAM(s) Oral daily  dextrose 5%. 1000 milliLiter(s) (100 mL/Hr) IV Continuous <Continuous>  dextrose 5%. 1000 milliLiter(s) (50 mL/Hr) IV Continuous <Continuous>  dextrose 50% Injectable 25 Gram(s) IV Push once  dextrose 50% Injectable 12.5 Gram(s) IV Push once  dextrose 50% Injectable 25 Gram(s) IV Push once  enoxaparin Injectable 40 milliGRAM(s) SubCutaneous every 24 hours  glucagon  Injectable 1 milliGRAM(s) IntraMuscular once  insulin lispro (ADMELOG) corrective regimen sliding scale   SubCutaneous Before meals and at bedtime  lactated ringers. 1000 milliLiter(s) (60 mL/Hr) IV Continuous <Continuous>  losartan 50 milliGRAM(s) Oral daily  montelukast 10 milliGRAM(s) Oral at bedtime  pantoprazole    Tablet 40 milliGRAM(s) Oral at bedtime  topiramate 100 milliGRAM(s) Oral daily    MEDICATIONS  (PRN):  albuterol    90 MICROgram(s) HFA Inhaler 2 Puff(s) Inhalation every 6 hours PRN Shortness of Breath  dextrose Oral Gel 15 Gram(s) Oral once PRN Blood Glucose LESS THAN 70 milliGRAM(s)/deciliter      PHYSICAL EXAM:  GENERAL: NAD  EYES: clear conjunctiva  ENMT: Moist mucous membranes  NECK: Supple, No JVD, Normal thyroid  CHEST/LUNG: Clear to auscultation bilaterally; No rales, rhonchi, wheezing, or rubs  HEART: S1, S2, Regular rate and rhythm  ABDOMEN: +old surgical scar. Soft, Nontender, Nondistended; Bowel sounds present  NEURO: Awake and alert, +aphasia, +right side hemiparesis  EXTREMITIES: No LE edema, no calf tenderness  LYMPH: No lymphadenopathy noted  SKIN: No rashes or lesions    Consultant(s) Notes Reviewed:  [x ] YES  [ ] NO  Care Discussed with Consultants/Other Providers [ x] YES  [ ] NO    LABS:                        13.5   7.44  )-----------( 250      ( 27 Feb 2024 05:54 )             44.4     02-27    140  |  107  |  27<H>  ----------------------------<  123<H>  3.6   |  26  |  1.56<H>    Ca    9.6      27 Feb 2024 05:54  Phos  3.7     02-26  Mg     2.5     02-26    TPro  7.1  /  Alb  3.3<L>  /  TBili  0.4  /  DBili  x   /  AST  6<L>  /  ALT  12  /  AlkPhos  100  02-26    PT/INR - ( 26 Feb 2024 05:25 )   PT: 11.1 sec;   INR: 0.97 ratio         PTT - ( 26 Feb 2024 05:25 )  PTT:24.8 sec  CAPILLARY BLOOD GLUCOSE      POCT Blood Glucose.: 156 mg/dL (27 Feb 2024 11:19)  POCT Blood Glucose.: 133 mg/dL (27 Feb 2024 08:17)  POCT Blood Glucose.: 116 mg/dL (26 Feb 2024 20:58)  POCT Blood Glucose.: 95 mg/dL (26 Feb 2024 16:49)        Urinalysis Basic - ( 27 Feb 2024 05:54 )    Color: x / Appearance: x / SG: x / pH: x  Gluc: 123 mg/dL / Ketone: x  / Bili: x / Urobili: x   Blood: x / Protein: x / Nitrite: x   Leuk Esterase: x / RBC: x / WBC x   Sq Epi: x / Non Sq Epi: x / Bacteria: x        RADIOLOGY & ADDITIONAL TESTS:    < from: Xray Chest 1 View- PORTABLE-Urgent (Xray Chest 1 View- PORTABLE-Urgent .) (02.25.24 @ 13:16) >    ACC: [id number] EXAM:  XR CHEST PORTABLE URGENT 1V   ORDERED BY: CELINA DAVIS     PROCEDURE DATE:  02/25/2024          INTERPRETATION:  Clinical history chest pain    Comparison 10/7/2022    A single frontal view of the chest again demonstrates cardiomegaly and   scoliosis but there is no evidence of acute consolidation or effusion or   pneumothorax.    IMPRESSION:    No acute pulmonary disease.    --- End of Report ---            SHIRLENE GUILLEN MD; Attending Radiologist  This document has been electronically signed. Feb 25 2024  6:07PM    < end of copied text >  Imaging Personally Reviewed:  [ ] YES  [ ] NO

## 2024-02-27 NOTE — PROGRESS NOTE ADULT - PROBLEM SELECTOR PLAN 3
not on any anticoagulation at home  c/w coreg 25 mg bid  Discussed with Cardiology, will start eliquis  Prescription sent to [facility] - eliquis is fully covered

## 2024-02-27 NOTE — PROGRESS NOTE ADULT - PROBLEM SELECTOR PLAN 1
DDx to include, but not limited to Cardiovascular such as Increased demand (stable coronary artery disease lesion), aortic dissection, Arrhythmia, Uncontrolled Hypertension HF, cardiac inflammation from pericarditis, myocarditis, endocarditis; LVH, Myocardial injury, PE, Infectious, CKD, Rhabdomyolysis, or others.   EKG showed Sinus rhythm with 1st degree A-V block, Possible LA enlargement RSR' or QR pattern in V1 suggests RV conduction delay, LVH with QRS widening and repolarization abnormality  s/p ASA in the ED  HEART Pathway score 7 points  Wells' Criteria for PE 4.5 points  TSH wnl  f/u VQ scan to r/o PE due to joel (will need chest xray 24 hrs prior per Nuclear Med)  f/u CXR  F/U TTE

## 2024-02-27 NOTE — PROGRESS NOTE ADULT - PROBLEM SELECTOR PLAN 6
a1c 7.6%  c/w ISS qid  Finger stick before meal and bedtime   Diabetic diet. LDL noted 127  Will continue BB therapy  increase atorvastatin to 40 mg daily  dc aspirin and plavix  start eliquis   DASH/TLC diet

## 2024-02-27 NOTE — PROGRESS NOTE ADULT - PROBLEM SELECTOR PLAN 1
DDx to include, but not limited to Cardiovascular such as Increased demand (stable coronary artery disease lesion), aortic dissection, Arrhythmia, Uncontrolled Hypertension HF, cardiac inflammation from pericarditis, myocarditis, endocarditis; LVH, Myocardial injury, PE, Infectious, CKD, Rhabdomyolysis, or others.   EKG showed Sinus rhythm with 1st degree A-V block, Possible LA enlargement RSR' or QR pattern in V1 suggests RV conduction delay, LVH with QRS widening and repolarization abnormality  s/p ASA in the ED  HEART Pathway score 7 points  Wells' Criteria for PE 4.5 points  TSH wnl  f/u VQ scan to r/o PE due to joel   F/U TTE  Cardio Dr Stevenson following DDx to include, but not limited to Cardiovascular such as Increased demand (stable coronary artery disease lesion), aortic dissection, Arrhythmia, Uncontrolled Hypertension HF, cardiac inflammation from pericarditis, myocarditis, endocarditis; LVH, Myocardial injury, PE, Infectious, CKD, Rhabdomyolysis, or others.   EKG showed Sinus rhythm with 1st degree A-V block, Possible LA enlargement RSR' or QR pattern in V1 suggests RV conduction delay, LVH with QRS widening and repolarization abnormality  s/p ASA in the ED  HEART Pathway score 7 points  Wells' Criteria for PE 4.5 points  TSH wnl  f/u VQ scan to r/o PE due to joel (will need chest xray 24 hrs prior per Nuclear Med)  f/u CXR  F/U TTE  Cardio Dr Stevenson following

## 2024-02-27 NOTE — PROGRESS NOTE ADULT - SUBJECTIVE AND OBJECTIVE BOX
Patient was seen and examined  Patient is a 68y old  Female who presents with a chief complaint of Chest pain (26 Feb 2024 11:55)      INTERVAL HPI/OVERNIGHT EVENTS:  T(C): 36.3 (02-27-24 @ 05:02), Max: 36.7 (02-26-24 @ 11:31)  HR: 66 (02-27-24 @ 05:02) (66 - 80)  BP: 145/84 (02-27-24 @ 05:02) (120/79 - 145/84)  RR: 18 (02-27-24 @ 05:02) (18 - 18)  SpO2: 99% (02-27-24 @ 05:02) (97% - 100%)  Wt(kg): --  I&O's Summary    26 Feb 2024 07:01  -  27 Feb 2024 07:00  --------------------------------------------------------  IN: 0 mL / OUT: 1300 mL / NET: -1300 mL        LABS:                        13.5   7.44  )-----------( 250      ( 27 Feb 2024 05:54 )             44.4     02-27    140  |  107  |  27<H>  ----------------------------<  123<H>  3.6   |  26  |  1.56<H>    Ca    9.6      27 Feb 2024 05:54  Phos  3.7     02-26  Mg     2.5     02-26    TPro  7.1  /  Alb  3.3<L>  /  TBili  0.4  /  DBili  x   /  AST  6<L>  /  ALT  12  /  AlkPhos  100  02-26    PT/INR - ( 26 Feb 2024 05:25 )   PT: 11.1 sec;   INR: 0.97 ratio         PTT - ( 26 Feb 2024 05:25 )  PTT:24.8 sec  Urinalysis Basic - ( 27 Feb 2024 05:54 )    Color: x / Appearance: x / SG: x / pH: x  Gluc: 123 mg/dL / Ketone: x  / Bili: x / Urobili: x   Blood: x / Protein: x / Nitrite: x   Leuk Esterase: x / RBC: x / WBC x   Sq Epi: x / Non Sq Epi: x / Bacteria: x      CAPILLARY BLOOD GLUCOSE      POCT Blood Glucose.: 133 mg/dL (27 Feb 2024 08:17)  POCT Blood Glucose.: 116 mg/dL (26 Feb 2024 20:58)  POCT Blood Glucose.: 95 mg/dL (26 Feb 2024 16:49)  POCT Blood Glucose.: 169 mg/dL (26 Feb 2024 12:10)    LIPID PANEL  Cholesterol 186  LDL --  HDL 45  RATIO HDL/Total Cholesterol --  Triglyceride 71        Urinalysis Basic - ( 27 Feb 2024 05:54 )    Color: x / Appearance: x / SG: x / pH: x  Gluc: 123 mg/dL / Ketone: x  / Bili: x / Urobili: x   Blood: x / Protein: x / Nitrite: x   Leuk Esterase: x / RBC: x / WBC x   Sq Epi: x / Non Sq Epi: x / Bacteria: x        MEDICATIONS  (STANDING):  aspirin  chewable 81 milliGRAM(s) Oral daily  atorvastatin 10 milliGRAM(s) Oral at bedtime  carvedilol 25 milliGRAM(s) Oral every 12 hours  clopidogrel Tablet 75 milliGRAM(s) Oral daily  dextrose 5%. 1000 milliLiter(s) (100 mL/Hr) IV Continuous <Continuous>  dextrose 5%. 1000 milliLiter(s) (50 mL/Hr) IV Continuous <Continuous>  dextrose 50% Injectable 25 Gram(s) IV Push once  dextrose 50% Injectable 12.5 Gram(s) IV Push once  dextrose 50% Injectable 25 Gram(s) IV Push once  enoxaparin Injectable 40 milliGRAM(s) SubCutaneous every 24 hours  glucagon  Injectable 1 milliGRAM(s) IntraMuscular once  insulin lispro (ADMELOG) corrective regimen sliding scale   SubCutaneous Before meals and at bedtime  losartan 50 milliGRAM(s) Oral daily  montelukast 10 milliGRAM(s) Oral at bedtime  pantoprazole    Tablet 40 milliGRAM(s) Oral at bedtime  topiramate 100 milliGRAM(s) Oral daily    MEDICATIONS  (PRN):  albuterol    90 MICROgram(s) HFA Inhaler 2 Puff(s) Inhalation every 6 hours PRN Shortness of Breath  dextrose Oral Gel 15 Gram(s) Oral once PRN Blood Glucose LESS THAN 70 milliGRAM(s)/deciliter      RADIOLOGY & ADDITIONAL TESTS:    Imaging Personally Reviewed:  [ ] YES  [ ] NO    REVIEW OF SYSTEMS:  CONSTITUTIONAL: No fever, weight loss, or fatigue  EYES: No eye pain, visual disturbances, or discharge  ENMT:  No difficulty hearing, tinnitus, vertigo; No sinus or throat pain  NECK: No pain or stiffness  BREASTS: No pain, masses, or nipple discharge  RESPIRATORY: No cough, wheezing, chills or hemoptysis; No shortness of breath  CARDIOVASCULAR: No chest pain, palpitations, dizziness, or leg swelling  GASTROINTESTINAL: No abdominal or epigastric pain. No nausea, vomiting, or hematemesis; No diarrhea or constipation. No melena or hematochezia.  GENITOURINARY: No dysuria, frequency, hematuria, or incontinence  NEUROLOGICAL: No headaches, memory loss, loss of strength, numbness, or tremors  SKIN: No itching, burning, rashes, or lesions   LYMPH NODES: No enlarged glands  ENDOCRINE: No heat or cold intolerance; No hair loss  MUSCULOSKELETAL: No joint pain or swelling; No muscle, back, or extremity pain  PSYCHIATRIC: No depression, anxiety, mood swings, or difficulty sleeping  HEME/LYMPH: No easy bruising, or bleeding gums  ALLERY AND IMMUNOLOGIC: No hives or eczema      Consultant(s) Notes Reviewed:  [ x ] YES  [ ] NO    PHYSICAL EXAM:  GENERAL: NAD, well-groomed, well-developed  HEAD:  Atraumatic, Normocephalic  EYES: EOMI, PERRLA, conjunctiva and sclera clear  ENMT: No tonsillar erythema, exudates, or enlargement; Moist mucous membranes, Good dentition, No lesions  NECK: Supple, No JVD, Normal thyroid  NERVOUS SYSTEM:  Alert & Oriented X3, Good concentration; Motor Strength 5/5 B/L upper and lower extremities; DTRs 2+ intact and symmetric  CHEST/LUNG: Clear to percussion bilaterally; No rales, rhonchi, wheezing, or rubs  HEART: Regular rate and rhythm; No murmurs, rubs, or gallops  ABDOMEN: Soft, Nontender, Nondistended; Bowel sounds present  EXTREMITIES:  2+ Peripheral Pulses, No clubbing, cyanosis, or edema  LYMPH: No lymphadenopathy noted  SKIN: No rashes or lesions    Care Discussed with Consultants/Other Providers [ x] YES  [ ] NO

## 2024-02-27 NOTE — PROGRESS NOTE ADULT - PROBLEM SELECTOR PLAN 2
DDx to include, but not limited to Cardiovascular such as Increased demand (stable coronary artery disease lesion), aortic dissection, Arrhythmia, Hypotension, HF, cardiac inflammation from pericarditis, myocarditis, endocarditis; LVH, Myocardial injury, PE, Infectious such as Sepsis/SIRS and/or Viral illness, GI bleeding, Stroke, CKD, Rhabdomyolosis, endurance exercise, or even Environmental exposure.  EKG showed Sinus rhythm with 1st degree A-V block, Possible LA enlargement RSR' or QR pattern in V1 suggests RV conduction delay, LVH with QRS widening and repolarization abnormality  troponin downtrended 700 to 600  F/U TTE  f/u VQ scan  dc tele

## 2024-02-27 NOTE — PROGRESS NOTE ADULT - PROBLEM SELECTOR PLAN 3
not on any anticoagulation at home  c/w coreg 25 mg bid  Discussed with Cardiology, will start eliquis not on any anticoagulation at home  c/w coreg 25 mg bid  Discussed with Cardiology, will start eliquis  Prescription sent to [facility] - eliquis is fully covered

## 2024-02-28 ENCOUNTER — TRANSCRIPTION ENCOUNTER (OUTPATIENT)
Age: 69
End: 2024-02-28

## 2024-02-28 LAB
ANION GAP SERPL CALC-SCNC: 6 MMOL/L — SIGNIFICANT CHANGE UP (ref 5–17)
BUN SERPL-MCNC: 31 MG/DL — HIGH (ref 7–18)
CALCIUM SERPL-MCNC: 10.3 MG/DL — SIGNIFICANT CHANGE UP (ref 8.4–10.5)
CHLORIDE SERPL-SCNC: 110 MMOL/L — HIGH (ref 96–108)
CO2 SERPL-SCNC: 24 MMOL/L — SIGNIFICANT CHANGE UP (ref 22–31)
CREAT SERPL-MCNC: 1.51 MG/DL — HIGH (ref 0.5–1.3)
EGFR: 38 ML/MIN/1.73M2 — LOW
GLUCOSE BLDC GLUCOMTR-MCNC: 113 MG/DL — HIGH (ref 70–99)
GLUCOSE BLDC GLUCOMTR-MCNC: 119 MG/DL — HIGH (ref 70–99)
GLUCOSE BLDC GLUCOMTR-MCNC: 131 MG/DL — HIGH (ref 70–99)
GLUCOSE BLDC GLUCOMTR-MCNC: 173 MG/DL — HIGH (ref 70–99)
GLUCOSE SERPL-MCNC: 117 MG/DL — HIGH (ref 70–99)
MAGNESIUM SERPL-MCNC: 2.3 MG/DL — SIGNIFICANT CHANGE UP (ref 1.6–2.6)
PHOSPHATE SERPL-MCNC: 2.8 MG/DL — SIGNIFICANT CHANGE UP (ref 2.5–4.5)
POTASSIUM SERPL-MCNC: 3.6 MMOL/L — SIGNIFICANT CHANGE UP (ref 3.5–5.3)
POTASSIUM SERPL-SCNC: 3.6 MMOL/L — SIGNIFICANT CHANGE UP (ref 3.5–5.3)
SODIUM SERPL-SCNC: 140 MMOL/L — SIGNIFICANT CHANGE UP (ref 135–145)

## 2024-02-28 PROCEDURE — 78582 LUNG VENTILAT&PERFUS IMAGING: CPT | Mod: 26

## 2024-02-28 RX ADMIN — APIXABAN 5 MILLIGRAM(S): 2.5 TABLET, FILM COATED ORAL at 05:55

## 2024-02-28 RX ADMIN — APIXABAN 5 MILLIGRAM(S): 2.5 TABLET, FILM COATED ORAL at 17:27

## 2024-02-28 RX ADMIN — Medication 1: at 12:17

## 2024-02-28 RX ADMIN — Medication 100 MILLIGRAM(S): at 12:17

## 2024-02-28 RX ADMIN — PANTOPRAZOLE SODIUM 40 MILLIGRAM(S): 20 TABLET, DELAYED RELEASE ORAL at 22:01

## 2024-02-28 RX ADMIN — ATORVASTATIN CALCIUM 40 MILLIGRAM(S): 80 TABLET, FILM COATED ORAL at 22:01

## 2024-02-28 RX ADMIN — LOSARTAN POTASSIUM 50 MILLIGRAM(S): 100 TABLET, FILM COATED ORAL at 05:55

## 2024-02-28 RX ADMIN — CARVEDILOL PHOSPHATE 25 MILLIGRAM(S): 80 CAPSULE, EXTENDED RELEASE ORAL at 17:26

## 2024-02-28 RX ADMIN — MONTELUKAST 10 MILLIGRAM(S): 4 TABLET, CHEWABLE ORAL at 22:01

## 2024-02-28 RX ADMIN — CARVEDILOL PHOSPHATE 25 MILLIGRAM(S): 80 CAPSULE, EXTENDED RELEASE ORAL at 05:55

## 2024-02-28 NOTE — DISCHARGE NOTE PROVIDER - CARE PROVIDERS DIRECT ADDRESSES
,DirectAddress_Unknown,DirectAddress_Unknown,[email or web address] ,DirectAddress_Unknown,DirectAddress_Unknown,[email or web address]

## 2024-02-28 NOTE — PROGRESS NOTE ADULT - PROBLEM SELECTOR PLAN 3
not on any anticoagulation at home  c/w coreg 25 mg bid  Discussed with Cardiology, will start eliquis  Prescription sent to [facility] - eliquis is fully covered

## 2024-02-28 NOTE — PROGRESS NOTE ADULT - ASSESSMENT
A 68 year old female, from home, AAOx3 and ambulating with a walker at baseline, with PMHx of  COPD, Left MCA CVA w/ residual dysarthria and RLE weakness, Seizures, DM, CAD s/p ZHAO, s/p cardiac cath 4/2022 w/non-obstructive CAD, AFib not on AC, and PAD s/p Right femoral popliteal bypass s/p occlusion + IR stent placement 2/2017, was brought into the ED by EMS from home due to elevated BP of 190/121 mmHg. Noted to be normotensive on admission. Unclear if medications given in route by EMS. Patient with pleuritic chest pain and elevated troponin w/o obvious acute ischemic changes in the EKG. Admitted to telemetry for chest pain evaluation. Cardiology Dr. Stevenson following. Pt awaiting echocardiogram results. Also will plan for VQ scan due to joel to r/o pulmonary embolism.     INCOMPLETE::::::::2/28 A 68 year old female, from home, AAOx3 and ambulating with a walker at baseline, with PMHx of  COPD, Left MCA CVA w/ residual dysarthria and RLE weakness, Seizures, DM, CAD s/p ZHAO, s/p cardiac cath 4/2022 w/non-obstructive CAD, AFib not on AC, and PAD s/p Right femoral popliteal bypass s/p occlusion + IR stent placement 2/2017, was brought into the ED by EMS from home due to elevated BP of 190/121 mmHg. Noted to be normotensive on admission. Unclear if medications given in route by EMS. Patient with pleuritic chest pain and elevated troponin w/o obvious acute ischemic changes in the EKG. Admitted to telemetry for chest pain evaluation. Cardiology Dr. Stevenson following. Pt unable to have CT scan w/ contrast 2/2 ILEANA.  Hence, VQ scan performed with low probability for PE.      Pending pulm consultation .

## 2024-02-28 NOTE — PROGRESS NOTE ADULT - PROBLEM SELECTOR PLAN 1
DDx to include, but not limited to Cardiovascular such as Increased demand (stable coronary artery disease lesion), aortic dissection, Arrhythmia, Uncontrolled Hypertension HF, cardiac inflammation from pericarditis, myocarditis, endocarditis; LVH, Myocardial injury, PE, Infectious, CKD, Rhabdomyolysis, or others.   EKG showed Sinus rhythm with 1st degree A-V block, Possible LA enlargement RSR' or QR pattern in V1 suggests RV conduction delay, LVH with QRS widening and repolarization abnormality  s/p ASA in the ED  HEART Pathway score 7 points  Wells' Criteria for PE 4.5 points  TSH wnl  f/u VQ scan to r/o PE due to joel (will need chest xray 24 hrs prior per Nuclear Med)  f/u CXR  F/U TTE  Cardio Dr Stevesnon following DDx to include, but not limited to Cardiovascular such as Increased demand (stable coronary artery disease lesion), aortic dissection, Arrhythmia, Uncontrolled Hypertension HF, cardiac inflammation from pericarditis, myocarditis, endocarditis; LVH, Myocardial injury, PE, Infectious, CKD, Rhabdomyolysis, or others.   EKG showed Sinus rhythm with 1st degree A-V block, Possible LA enlargement RSR' or QR pattern in V1 suggests RV conduction delay, LVH with QRS widening and repolarization abnormality  s/p ASA in the ED  HEART Pathway score 7 points  Wells' Criteria for PE 4.5 points  TSH wnl  VQ scan to r/o PE with low probability   TTE report noted   Cardio Dr Stevenson following

## 2024-02-28 NOTE — PROGRESS NOTE ADULT - PROBLEM SELECTOR PLAN 5
pt p/w creatinine 1.5  on admission SCr 1.3   unclear baseline  will start LR 60 cc x12 hrs  monitor BMP    Avoid Nephrotoxic Meds/ Agents such as (NSAIDs, IV contrast, Aminoglycosides such as gentamicin, Gadolinium contrast, Phosphate containing enemas, etc..) pt p/w creatinine 1.5  on admission SCr 1.3   unclear baseline  monitor BMP daily    Avoid Nephrotoxic Meds/ Agents such as (NSAIDs, IV contrast, Aminoglycosides such as gentamicin, Gadolinium contrast, Phosphate containing enemas, etc..)

## 2024-02-28 NOTE — PROGRESS NOTE ADULT - PROBLEM SELECTOR PLAN 2
DDx to include, but not limited to Cardiovascular such as Increased demand (stable coronary artery disease lesion), aortic dissection, Arrhythmia, Hypotension, HF, cardiac inflammation from pericarditis, myocarditis, endocarditis; LVH, Myocardial injury, PE, Infectious such as Sepsis/SIRS and/or Viral illness, GI bleeding, Stroke, CKD, Rhabdomyolosis, endurance exercise, or even Environmental exposure.  EKG showed Sinus rhythm with 1st degree A-V block, Possible LA enlargement RSR' or QR pattern in V1 suggests RV conduction delay, LVH with QRS widening and repolarization abnormality  troponin downtrended 700 to 600  F/U TTE  f/u VQ scan  dc tele DDx to include, but not limited to Cardiovascular such as Increased demand (stable coronary artery disease lesion), aortic dissection, Arrhythmia, Hypotension, HF, cardiac inflammation from pericarditis, myocarditis, endocarditis; LVH, Myocardial injury, PE, Infectious such as Sepsis/SIRS and/or Viral illness, GI bleeding, Stroke, CKD, Rhabdomyolosis, endurance exercise, or even Environmental exposure.  EKG showed Sinus rhythm with 1st degree A-V block, Possible LA enlargement RSR' or QR pattern in V1 suggests RV conduction delay, LVH with QRS widening and repolarization abnormality  troponin downtrended 700 to 600  TTE result noted    VQ scan neg for PE  dc tele

## 2024-02-28 NOTE — CONSULT NOTE ADULT - SUBJECTIVE AND OBJECTIVE BOX
Time of visit:    CHIEF COMPLAINT: Patient is a 68y old  Female who presents with a chief complaint of Chest pain (28 Feb 2024 18:41)      HPI:  A 68 year old female, from home, AAOx3 and ambulating with a walker at baseline, with PMHx of  COPD, Left MCA CVA w/ residual dysarthria and RLE weakness, Seizures, DM, CAD s/p ZHAO, s/p cardiac cath 4/2022 w/non-obstructive CAD, AFib not on AC, and PAD s/p Right femoral popliteal bypass s/p occlusion + IR stent placement 2/2017, was brought into the ED by EMS from home due to elevated BP of 190/121 mmHg. Unclear if she received any medication in route to the hospital. Patient mentioned that earlier today, she experienced pleuritic chest pain radiating to her upper back associated with lightheadedness and minimal dyspnea that lasted for about 90 minutes. Denies any palpitations, nausea, vomiting, headache, chills, numbness, or syncopal episodes. Denies orthopnea or exertional symptoms. Denies recent episodes. Denies any recent falls or chest wall trauma. Denies any recent sick contacts or travel. On further questioning, patient with intermittent acid reflux, postprandial fullness, and early satiety that respond to Famotidine. She does not have any other complaints. (25 Feb 2024 13:25)   Patient seen and examined.     PAST MEDICAL & SURGICAL HISTORY:  S/P Cardiac Cath  4/2022 nonobstructive CAD      HTN (hypertension)      Diabetes      Asthma  never intubated      Gastroesophageal reflux      CVA (cerebral infarction)  times 3 with residual rt sided weakness and word finding difficulty      CAD (coronary artery disease)      Peripheral arterial disease  peripheral bypass/stents      Hyperlipidemia      Atrial fibrillation      History of seizure  "very long time ago" at time of CVA - topamax      S/P total abdominal hysterectomy      Coronary stent occlusion      H/O peripheral artery bypass          Allergies    No Known Allergies    Intolerances        MEDICATIONS  (STANDING):  apixaban 5 milliGRAM(s) Oral every 12 hours  atorvastatin 40 milliGRAM(s) Oral at bedtime  carvedilol 25 milliGRAM(s) Oral every 12 hours  dextrose 5%. 1000 milliLiter(s) (100 mL/Hr) IV Continuous <Continuous>  dextrose 5%. 1000 milliLiter(s) (50 mL/Hr) IV Continuous <Continuous>  dextrose 50% Injectable 25 Gram(s) IV Push once  dextrose 50% Injectable 12.5 Gram(s) IV Push once  dextrose 50% Injectable 25 Gram(s) IV Push once  glucagon  Injectable 1 milliGRAM(s) IntraMuscular once  insulin lispro (ADMELOG) corrective regimen sliding scale   SubCutaneous Before meals and at bedtime  lactated ringers. 1000 milliLiter(s) (60 mL/Hr) IV Continuous <Continuous>  losartan 50 milliGRAM(s) Oral daily  montelukast 10 milliGRAM(s) Oral at bedtime  pantoprazole    Tablet 40 milliGRAM(s) Oral at bedtime  topiramate 100 milliGRAM(s) Oral daily      MEDICATIONS  (PRN):  albuterol    90 MICROgram(s) HFA Inhaler 2 Puff(s) Inhalation every 6 hours PRN Shortness of Breath  dextrose Oral Gel 15 Gram(s) Oral once PRN Blood Glucose LESS THAN 70 milliGRAM(s)/deciliter   Medications up to date at time of exam.    Medications up to date at time of exam.    FAMILY HISTORY:  Family history of diabetes mellitus (Sibling)        SOCIAL HISTORY  Smoking History: [   ] smoking/smoke exposure, [   ] former smoker  Living Condition: [   ] apartment, [   ] private house  Work History:   Travel History: denies recent travel  Illicit Substance Use: denies  Alcohol Use: denies    REVIEW OF SYSTEMS:    CONSTITUTIONAL:  denies fevers, chills, sweats, weight loss    HEENT:  denies diplopia or blurred vision, sore throat or runny nose.    CARDIOVASCULAR:  denies pressure, squeezing, tightness, or heaviness about the chest; no palpitations.    RESPIRATORY:  denies SOB, cough, HIGH, wheezing.    GASTROINTESTINAL:  denies abdominal pain, nausea, vomiting or diarrhea.    GENITOURINARY: denies dysuria, frequency or urgency.    NEUROLOGIC:  denies numbness, tingling, seizures or weakness.    PSYCHIATRIC:  denies disorder of thought or mood.    MSK: denies swelling, redness      PHYSICAL EXAMINATION:    GENERAL: The patient is a well-developed, well-nourished, in no apparent distress.     Vital Signs Last 24 Hrs  T(C): 36.4 (28 Feb 2024 15:52), Max: 36.8 (28 Feb 2024 04:35)  T(F): 97.5 (28 Feb 2024 15:52), Max: 98.2 (28 Feb 2024 04:35)  HR: 66 (28 Feb 2024 15:52) (66 - 87)  BP: 124/82 (28 Feb 2024 15:52) (116/71 - 162/75)  BP(mean): --  RR: 18 (28 Feb 2024 15:52) (18 - 19)  SpO2: 100% (28 Feb 2024 15:52) (97% - 100%)    Parameters below as of 28 Feb 2024 15:52  Patient On (Oxygen Delivery Method): room air       (if applicable)    Chest Tube (if applicable)    HEENT: Head is normocephalic and atraumatic. Extraocular muscles are intact. Mucous membranes are moist.     NECK: Supple, no palpable adenopathy.    LUNGS: Clear to auscultation, no wheezing, rales, or rhonchi.    HEART: Regular rate and rhythm without murmur.    ABDOMEN: Soft, nontender, and nondistended.  No hepatosplenomegaly is noted.    RENAL: No difficulty voiding, no pelvic pain    EXTREMITIES: Without any cyanosis, clubbing, rash, lesions or edema.    NEUROLOGIC: Awake, alert, oriented, grossly intact    SKIN: Warm, dry, good turgor.      LABS:                        13.5   7.44  )-----------( 250      ( 27 Feb 2024 05:54 )             44.4     02-28    140  |  110<H>  |  31<H>  ----------------------------<  117<H>  3.6   |  24  |  1.51<H>    Ca    10.3      28 Feb 2024 06:00  Phos  2.8     02-28  Mg     2.3     02-28        Urinalysis Basic - ( 28 Feb 2024 06:00 )    Color: x / Appearance: x / SG: x / pH: x  Gluc: 117 mg/dL / Ketone: x  / Bili: x / Urobili: x   Blood: x / Protein: x / Nitrite: x   Leuk Esterase: x / RBC: x / WBC x   Sq Epi: x / Non Sq Epi: x / Bacteria: x                      MICROBIOLOGY: (if applicable)    RADIOLOGY & ADDITIONAL STUDIES:  EKG:   CXR:  ECHO:    IMPRESSION: 68y Female PAST MEDICAL & SURGICAL HISTORY:  S/P Cardiac Cath  4/2022 nonobstructive CAD      HTN (hypertension)      Diabetes      Asthma  never intubated      Gastroesophageal reflux      CVA (cerebral infarction)  times 3 with residual rt sided weakness and word finding difficulty      CAD (coronary artery disease)      Peripheral arterial disease  peripheral bypass/stents      Hyperlipidemia      Atrial fibrillation      History of seizure  "very long time ago" at time of CVA - topamax      S/P total abdominal hysterectomy      Coronary stent occlusion      H/O peripheral artery bypass       p/w                   RECOMMENDATIONS:   Time of visit:    CHIEF COMPLAINT: Patient is a 68y old  Female who presents with a chief complaint of Chest pain (28 Feb 2024 18:41)      HPI:  A 68 year old female, from home, AAOx3 and ambulating with a walker at baseline, with PMHx of  COPD, Left MCA CVA w/ residual dysarthria and RLE weakness, Seizures, DM, CAD s/p ZHAO, s/p cardiac cath 4/2022 w/non-obstructive CAD, AFib not on AC, and PAD s/p Right femoral popliteal bypass s/p occlusion + IR stent placement 2/2017, was brought into the ED by EMS from home due to elevated BP of 190/121 mmHg. Unclear if she received any medication in route to the hospital. Patient mentioned that earlier today, she experienced pleuritic chest pain radiating to her upper back associated with lightheadedness and minimal dyspnea that lasted for about 90 minutes. Denies any palpitations, nausea, vomiting, headache, chills, numbness, or syncopal episodes. Denies orthopnea or exertional symptoms. Denies recent episodes. Denies any recent falls or chest wall trauma. Denies any recent sick contacts or travel. On further questioning, patient with intermittent acid reflux, postprandial fullness, and early satiety that respond to Famotidine. She does not have any other complaints. (25 Feb 2024 13:25)   Patient seen and examined.     PAST MEDICAL & SURGICAL HISTORY:  S/P Cardiac Cath  4/2022 nonobstructive CAD      HTN (hypertension)      Diabetes      Asthma  never intubated      Gastroesophageal reflux      CVA (cerebral infarction)  times 3 with residual rt sided weakness and word finding difficulty      CAD (coronary artery disease)      Peripheral arterial disease  peripheral bypass/stents      Hyperlipidemia      Atrial fibrillation      History of seizure  "very long time ago" at time of CVA - topamax      S/P total abdominal hysterectomy      Coronary stent occlusion      H/O peripheral artery bypass          Allergies    No Known Allergies    Intolerances        MEDICATIONS  (STANDING):  apixaban 5 milliGRAM(s) Oral every 12 hours  atorvastatin 40 milliGRAM(s) Oral at bedtime  carvedilol 25 milliGRAM(s) Oral every 12 hours  dextrose 5%. 1000 milliLiter(s) (100 mL/Hr) IV Continuous <Continuous>  dextrose 5%. 1000 milliLiter(s) (50 mL/Hr) IV Continuous <Continuous>  dextrose 50% Injectable 25 Gram(s) IV Push once  dextrose 50% Injectable 12.5 Gram(s) IV Push once  dextrose 50% Injectable 25 Gram(s) IV Push once  glucagon  Injectable 1 milliGRAM(s) IntraMuscular once  insulin lispro (ADMELOG) corrective regimen sliding scale   SubCutaneous Before meals and at bedtime  lactated ringers. 1000 milliLiter(s) (60 mL/Hr) IV Continuous <Continuous>  losartan 50 milliGRAM(s) Oral daily  montelukast 10 milliGRAM(s) Oral at bedtime  pantoprazole    Tablet 40 milliGRAM(s) Oral at bedtime  topiramate 100 milliGRAM(s) Oral daily      MEDICATIONS  (PRN):  albuterol    90 MICROgram(s) HFA Inhaler 2 Puff(s) Inhalation every 6 hours PRN Shortness of Breath  dextrose Oral Gel 15 Gram(s) Oral once PRN Blood Glucose LESS THAN 70 milliGRAM(s)/deciliter   Medications up to date at time of exam.    Medications up to date at time of exam.    FAMILY HISTORY:  Family history of diabetes mellitus (Sibling)        SOCIAL HISTORY  Smoking History: [   ] smoking/smoke exposure, [ x  ] former smoker  Living Condition: [   ] apartment, [   ] private house  Work History:   Travel History: denies recent travel  Illicit Substance Use: denies  Alcohol Use: denies    REVIEW OF SYSTEMS: as per EMR     CONSTITUTIONAL:  denies fevers, chills, sweats, weight loss    HEENT:  denies diplopia or blurred vision, sore throat or runny nose.    CARDIOVASCULAR:  denies pressure, squeezing, tightness, or heaviness about the chest; no palpitations.    RESPIRATORY:  denies SOB, cough, HIGH, wheezing.    GASTROINTESTINAL:  denies abdominal pain, nausea, vomiting or diarrhea.    GENITOURINARY: denies dysuria, frequency or urgency.    NEUROLOGIC:  denies numbness, tingling, seizures or weakness.    PSYCHIATRIC:  denies disorder of thought or mood.    MSK: denies swelling, redness      PHYSICAL EXAMINATION:    GENERAL: The patient is a well-developed, well-nourished, in no apparent distress.     Vital Signs Last 24 Hrs  T(C): 36.4 (28 Feb 2024 15:52), Max: 36.8 (28 Feb 2024 04:35)  T(F): 97.5 (28 Feb 2024 15:52), Max: 98.2 (28 Feb 2024 04:35)  HR: 66 (28 Feb 2024 15:52) (66 - 87)  BP: 124/82 (28 Feb 2024 15:52) (116/71 - 162/75)  BP(mean): --  RR: 18 (28 Feb 2024 15:52) (18 - 19)  SpO2: 100% (28 Feb 2024 15:52) (97% - 100%)    Parameters below as of 28 Feb 2024 15:52  Patient On (Oxygen Delivery Method): room air       (if applicable)    Chest Tube (if applicable)    HEENT: Head is normocephalic and atraumatic. Extraocular muscles are intact. Mucous membranes are moist.     NECK: Supple, no palpable adenopathy.    LUNGS: Clear to auscultation, no wheezing, rales, or rhonchi.    HEART: Regular rate and rhythm without murmur.    ABDOMEN: Soft, nontender, and nondistended.  No hepatosplenomegaly is noted.    RENAL: No difficulty voiding, no pelvic pain    EXTREMITIES: Without any cyanosis, clubbing, rash, lesions or edema.    NEUROLOGIC: Awake, + dysphagia     SKIN: Warm, dry, good turgor.      LABS:                        13.5   7.44  )-----------( 250      ( 27 Feb 2024 05:54 )             44.4     02-28    140  |  110<H>  |  31<H>  ----------------------------<  117<H>  3.6   |  24  |  1.51<H>    Ca    10.3      28 Feb 2024 06:00  Phos  2.8     02-28  Mg     2.3     02-28        Urinalysis Basic - ( 28 Feb 2024 06:00 )    Color: x / Appearance: x / SG: x / pH: x  Gluc: 117 mg/dL / Ketone: x  / Bili: x / Urobili: x   Blood: x / Protein: x / Nitrite: x   Leuk Esterase: x / RBC: x / WBC x   Sq Epi: x / Non Sq Epi: x / Bacteria: x    MICROBIOLOGY: (if applicable)    RADIOLOGY & ADDITIONAL STUDIES:  EKG:   CXR:< from: NM Pulmonary Ventilation/Perfusion Scan (02.28.24 @ 09:24) >    ACC: [id number] EXAM:  NM PULM VENTILATION PERFUS IMG   ORDERED BY: VICENTE CASTILLO     PROCEDURE DATE:  02/28/2024          INTERPRETATION:  CLINICAL INFORMATION: Chest pain. Atrial fibrillation   Evaluate for pulmonary embolus.    RADIOPHARMACEUTICAL:1 mCi Tc-99m-DTPA via aerosol;  6.5 mCi Tc-99m-MAA,   I.V.    TECHNIQUE:  Ventilation and perfusion images of the lungs were obtained   following administration of Tc-99m-DTPA and Tc-99m-MAA. Images were   obtained in the anterior, posterior, both lateral, and all 4 oblique   views. The study was interpreted in conjunction with chest radiograph of   2/27/2024.    COMPARISON: None    OTHER STUDIES USED FOR CORRELATION: None    FINDINGS: There is heterogeneous distribution of radiopharmaceuticals in   both lungs on the ventilation and perfusion images. There is no segmental   mismatched perfusion defect.    IMPRESSION:    Very low probability of pulmonary embolus.        --- End of Report ---             MARY KENDALL MD; Attending Nuclear Medicine  This document has been electronically signed. Feb 28 2024  9:24AM    < end of copied text >  < from: Xray Chest 1 View- PORTABLE-Urgent (Xray Chest 1 View- PORTABLE-Urgent .) (02.27.24 @ 17:08) >    ACC: [id number] EXAM:  XR CHEST PORTABLE URGENT 1V   ORDERED BY: VICENTE CASTILLO     PROCEDURE DATE:  02/27/2024          INTERPRETATION:  EXAM:XR CHEST URGENT.     CLINICAL INDICATION: Chest Pain .     TECHNIQUE: Portable AP view.     PRIOR EXAM: 02/25/2024.     FINDINGS:     Magnified cardiac and mediastinal silhouette is stable. There may have   been pulmonary congestion on the prior study which appears to have   resolved. Thoracic scoliosis and a loop recorder are again noted.      IMPRESSION:    No imaging explanation for chest pain at this time.    --- End of Report ---            JULIO CÉSAR CROSS MD; Attending Radiologist  This document has been electronically signed. Feb 28 2024 12:38PM    < end of copied text >    ECHO:< from: TTE W or WO Ultrasound Enhancing Agent (02.26.24 @ 11:21) >    TRANSTHORACIC ECHOCARDIOGRAM REPORT  ________________________________________________________________________________                                      _______       Pt. Name:       DOUG APPLE Study Date:    2/26/2024  MRN:            [id number]         YOB: 1955  Accession #:    [id number]        Age:           68 years  Account#:       [id number]       Gender:        F  Heart Rate:                      Height:        61.02 in (155.00 cm)  Rhythm:                          Weight:        145.00 lb (65.77 kg)  Blood Pressure: 158/93 mmHg      BSA/BMI:       1.65 m² / 27.38 kg/m²  ________________________________________________________________________________________  Referring Physician:    [id number] Praveena Metz  Interpreting Physician: José Miguel Clarke MD  Primary Sonographer:    Bell Giles CS    CPT:               ECHO TTE WO CON COMP W DOPP - 29833.m  Indication(s):     Chest pain, unspecified - R07.9  Procedure:         Transthoracic echocardiogram with 2-D, M-mode and complete                     spectral and color flow Doppler.  Ordering Location: [facility]  Admission Status:  Inpatient    _______________________________________________________________________________________     CONCLUSIONS:      1. Left ventricular endocardium is not well visualized; however, the left ventricular systolic function appears grossly normal.   2. There is normal LV mass and concentric hypertrophy.   3. There is mild (grade 1) left ventricular diastolic dysfunction.   4. Normal right ventricular cavity size, with wall thickness, and normal systolic function.   5. Trace pericardial effusion.    ________________________________________________________________________________________  FINDINGS:     Left Ventricle:  There ismild (grade 1) left ventricular diastolic dysfunction. There is normal LV mass and concentric hypertrophy. Left ventricular endocardium is not well visualized; however, the left ventricular systolic function appears grossly normal.     Right Ventricle:  The right ventricular cavity is normal in size, with normal wall thickness and normal systolic function.     Left Atrium:  The left atrium is normal with an indexed volume of 24.45 ml/m².     Right Atrium:  The right atrium is normal in size.     Aortic Valve:  The aortic valve anatomy cannot be determined with normal systolic excursion. There is trace aortic regurgitation.     Mitral Valve:  There is moderate calcification of the mitral valve annulus. There is mild mitral regurgitation.     Tricuspid Valve:  There is trace tricuspid regurgitation.     Pulmonic Valve:  There is trace pulmonic regurgitation.     Aorta:  The aortic root appears normal in size.     Pericardium:  There is a trace pericardial effusion.  ____________________________________________________________________  QUANTITATIVE DATA:  Left Ventricle Measurements: (Indexed to BSA)     IVSd (2D):   1.2 cm  LVPWd (2D):  1.2 cm  LVIDd (2D):  3.5 cm  LVIDs (2D):  2.6 cm  LV Mass:     131 g  79.7 g/m²     MV E Vmax: 0.60 m/s  MV A Vmax: 1.02 m/s  MV E/A:    0.59  MV DT:     167 msec    Aorta Measurements: (Normal range) (Indexed to BSA)     Ao Root 3.0 cm       Left Atrium Measurements: (Indexed to BSA)  LA Diam 2D: 2.76 cm       LVOT / RVOT/ Qp/Qs Data: (Indexed to BSA)  LVOT Diameter: 1.70 cm  LVOT Vmax:     1.14 m/s  LVOT VTI:      19.93 cm  LVOT SV:       45.1 ml  27.39 ml/m²    Aortic Valve Measurements:  AV Vmax:                1.4 m/s  AV Peak Gradient:       8.3 mmHg  AV Mean Gradient:       5.0 mmHg  AV VTI:                 26.7 cm  AV VTI Ratio:           0.75  AoV EOA, Contin:        1.69 cm²  AoV EOA, Contin i:      1.03 cm²/m²  AoV Dimensionless Index 0.75  AR Vmax                 4.44 m/s  AR PHT                  371 msec  AR Billings                3.47 m/s²  AR Decel Time           1279 msec    Mitral Valve Measurements:     MV E Vmax: 0.6 m/s  MV A Vmax: 1.0 m/s  MV E/A:    0.6       Tricuspid Valve Measurements:     TR Vmax:          0.1 m/s  TR Peak Gradient: 0.1 mmHg    ________________________________________________________________________________________  Electronically signed on 2/27/2024 at 10:22:14 AM by José Miguel Clarke MD         *** Final ***    < end of copied text >      IMPRESSION: 68y Female PAST MEDICAL & SURGICAL HISTORY:  S/P Cardiac Cath  4/2022 nonobstructive CAD      HTN (hypertension)      Diabetes      Asthma  never intubated      Gastroesophageal reflux      CVA (cerebral infarction)  times 3 with residual rt sided weakness and word finding difficulty      CAD (coronary artery disease)      Peripheral arterial disease  peripheral bypass/stents      Hyperlipidemia      Atrial fibrillation      History of seizure  "very long time ago" at time of CVA - topamax      S/P total abdominal hysterectomy      Coronary stent occlusion      H/O peripheral artery bypass       p/w       IM{: This is a  68 year old woman , from home, AAOx3 and ambulating with a walker at baseline, with  COPD, Left MCA CVA w/ residual dysarthria and RLE weakness, Seizures, DM, CAD s/p ZHAO, s/p cardiac cath 4/2022 w/non-obstructive CAD, AFib not on AC, and PAD s/p Right femoral popliteal bypass s/p occlusion + IR stent placement 2/2017, was brought into the ED by EMS from home due to elevated BP of 190/121 mmHg. Unclear if she received any medication in route to the hospital. Patient mentioned that earlier today, she experienced pleuritic chest pain radiating to her upper back associated with lightheadedness and minimal dyspnea that lasted for about 90 minutes. Pat stated that she gets heart burn and pain is similar .  Cardiac work up neg . PE neg . Pat stated that symptoms are similar to her GERD symptoms     Sugg  - GI eval   - Continue Protonix   - Add Symbicort 160/ 4.5 q12h   - Continue Eliquis therapeutic dose   - Stool PCR for H Pylori      disussed with Anderson WHITE and Dr Metz

## 2024-02-28 NOTE — CHART NOTE - NSCHARTNOTEFT_GEN_A_CORE
Attempted to contact pt's relative Connie Cheney at  to update her regarding pt's current condition and plan of care, phone is not in service.

## 2024-02-28 NOTE — PROGRESS NOTE ADULT - SUBJECTIVE AND OBJECTIVE BOX
NP Note discussed with  Primary Attending    Patient is a 68y old  Female who presents with a chief complaint of hypertensive emergency (27 Feb 2024 12:35)      INTERVAL HPI/OVERNIGHT EVENTS: no new complaints    MEDICATIONS  (STANDING):  apixaban 5 milliGRAM(s) Oral every 12 hours  atorvastatin 40 milliGRAM(s) Oral at bedtime  carvedilol 25 milliGRAM(s) Oral every 12 hours  dextrose 5%. 1000 milliLiter(s) (100 mL/Hr) IV Continuous <Continuous>  dextrose 5%. 1000 milliLiter(s) (50 mL/Hr) IV Continuous <Continuous>  dextrose 50% Injectable 25 Gram(s) IV Push once  dextrose 50% Injectable 25 Gram(s) IV Push once  dextrose 50% Injectable 12.5 Gram(s) IV Push once  glucagon  Injectable 1 milliGRAM(s) IntraMuscular once  insulin lispro (ADMELOG) corrective regimen sliding scale   SubCutaneous Before meals and at bedtime  lactated ringers. 1000 milliLiter(s) (60 mL/Hr) IV Continuous <Continuous>  losartan 50 milliGRAM(s) Oral daily  montelukast 10 milliGRAM(s) Oral at bedtime  pantoprazole    Tablet 40 milliGRAM(s) Oral at bedtime  topiramate 100 milliGRAM(s) Oral daily    MEDICATIONS  (PRN):  albuterol    90 MICROgram(s) HFA Inhaler 2 Puff(s) Inhalation every 6 hours PRN Shortness of Breath  dextrose Oral Gel 15 Gram(s) Oral once PRN Blood Glucose LESS THAN 70 milliGRAM(s)/deciliter      __________________________________________________  REVIEW OF SYSTEMS:    CONSTITUTIONAL: No fever,   EYES: no acute visual disturbances  NECK: No pain or stiffness  RESPIRATORY: No cough; No shortness of breath  CARDIOVASCULAR: No chest pain, no palpitations  GASTROINTESTINAL: No pain. No nausea or vomiting; No diarrhea   NEUROLOGICAL: No headache or numbness, no tremors  MUSCULOSKELETAL: No joint pain, no muscle pain  GENITOURINARY: no dysuria, no frequency, no hesitancy  PSYCHIATRY: no depression , no anxiety  ALL OTHER  ROS negative        Vital Signs Last 24 Hrs  T(C): 36.8 (28 Feb 2024 04:35), Max: 36.8 (27 Feb 2024 19:43)  T(F): 98.2 (28 Feb 2024 04:35), Max: 98.2 (27 Feb 2024 19:43)  HR: 77 (28 Feb 2024 05:47) (65 - 78)  BP: 134/92 (28 Feb 2024 05:47) (111/78 - 162/75)  BP(mean): 90 (27 Feb 2024 11:40) (81 - 90)  RR: 18 (28 Feb 2024 04:35) (18 - 18)  SpO2: 97% (28 Feb 2024 04:35) (93% - 99%)    Parameters below as of 28 Feb 2024 04:35  Patient On (Oxygen Delivery Method): room air        ________________________________________________  PHYSICAL EXAM:  GENERAL: NAD  HEENT: Normocephalic;  conjunctivae and sclerae clear; moist mucous membranes;   NECK : supple  CHEST/LUNG: Clear to auscultation bilaterally with good air entry   HEART: S1 S2  regular; no murmurs, gallops or rubs  ABDOMEN: Soft, Nontender, Nondistended; Bowel sounds present  EXTREMITIES: no cyanosis; no edema; no calf tenderness  SKIN: warm and dry; no rash  NERVOUS SYSTEM:  Awake and alert; Oriented  to place, person and time ; no new deficits    _________________________________________________  LABS:                        13.5   7.44  )-----------( 250      ( 27 Feb 2024 05:54 )             44.4     02-28    140  |  110<H>  |  31<H>  ----------------------------<  117<H>  3.6   |  24  |  1.51<H>    Ca    10.3      28 Feb 2024 06:00  Phos  2.8     02-28  Mg     2.3     02-28        Urinalysis Basic - ( 28 Feb 2024 06:00 )    Color: x / Appearance: x / SG: x / pH: x  Gluc: 117 mg/dL / Ketone: x  / Bili: x / Urobili: x   Blood: x / Protein: x / Nitrite: x   Leuk Esterase: x / RBC: x / WBC x   Sq Epi: x / Non Sq Epi: x / Bacteria: x      CAPILLARY BLOOD GLUCOSE      POCT Blood Glucose.: 113 mg/dL (28 Feb 2024 07:53)  POCT Blood Glucose.: 127 mg/dL (27 Feb 2024 21:02)  POCT Blood Glucose.: 157 mg/dL (27 Feb 2024 16:52)  POCT Blood Glucose.: 156 mg/dL (27 Feb 2024 11:19)        RADIOLOGY & ADDITIONAL TESTS:    Imaging  Reviewed:  YES/NO    Consultant(s) Notes Reviewed:   YES/ No      Plan of care was discussed with patient and /or primary care giver; all questions and concerns were addressed  NP Note discussed with  Primary Attending    Patient is a 68y old  Female who presents with a chief complaint of hypertensive emergency (28 Feb 2024 10:35)      INTERVAL HPI/OVERNIGHT EVENTS: no new complaints at time of eval.    MEDICATIONS  (STANDING):  apixaban 5 milliGRAM(s) Oral every 12 hours  atorvastatin 40 milliGRAM(s) Oral at bedtime  carvedilol 25 milliGRAM(s) Oral every 12 hours  dextrose 5%. 1000 milliLiter(s) (100 mL/Hr) IV Continuous <Continuous>  dextrose 5%. 1000 milliLiter(s) (50 mL/Hr) IV Continuous <Continuous>  dextrose 50% Injectable 25 Gram(s) IV Push once  dextrose 50% Injectable 25 Gram(s) IV Push once  dextrose 50% Injectable 12.5 Gram(s) IV Push once  glucagon  Injectable 1 milliGRAM(s) IntraMuscular once  insulin lispro (ADMELOG) corrective regimen sliding scale   SubCutaneous Before meals and at bedtime  lactated ringers. 1000 milliLiter(s) (60 mL/Hr) IV Continuous <Continuous>  losartan 50 milliGRAM(s) Oral daily  montelukast 10 milliGRAM(s) Oral at bedtime  pantoprazole    Tablet 40 milliGRAM(s) Oral at bedtime  topiramate 100 milliGRAM(s) Oral daily    MEDICATIONS  (PRN):  albuterol    90 MICROgram(s) HFA Inhaler 2 Puff(s) Inhalation every 6 hours PRN Shortness of Breath  dextrose Oral Gel 15 Gram(s) Oral once PRN Blood Glucose LESS THAN 70 milliGRAM(s)/deciliter      __________________________________________________  REVIEW OF SYSTEMS:    CONSTITUTIONAL: No fever,   EYES: no acute visual disturbances  NECK: No pain or stiffness  RESPIRATORY: No cough; No shortness of breath  CARDIOVASCULAR: No chest pain, no palpitations  GASTROINTESTINAL: No pain. No nausea or vomiting; No diarrhea   NEUROLOGICAL: No headache or numbness, no tremors  MUSCULOSKELETAL: No joint pain, no muscle pain  GENITOURINARY: no dysuria, no frequency, no hesitancy  PSYCHIATRY: no depression , no anxiety  ALL OTHER  ROS negative        Vital Signs Last 24 Hrs  T(C): 36.8 (28 Feb 2024 04:35), Max: 36.8 (27 Feb 2024 19:43)  T(F): 98.2 (28 Feb 2024 04:35), Max: 98.2 (27 Feb 2024 19:43)  HR: 77 (28 Feb 2024 05:47) (65 - 78)  BP: 134/92 (28 Feb 2024 05:47) (111/78 - 162/75)  BP(mean): 90 (27 Feb 2024 11:40) (81 - 90)  RR: 18 (28 Feb 2024 04:35) (18 - 18)  SpO2: 97% (28 Feb 2024 04:35) (93% - 99%)    Parameters below as of 28 Feb 2024 04:35  Patient On (Oxygen Delivery Method): room air        ________________________________________________  PHYSICAL EXAM:  GENERAL: NAD  HEENT: Normocephalic; conjunctivae and sclerae clear; moist mucous membranes;   NECK : supple  CHEST/LUNG: Clear to auscultation bilaterally with good air entry   HEART: S1 S2  regular; no murmurs, gallops or rubs  ABDOMEN: Soft, Nontender, Nondistended; Bowel sounds present  EXTREMITIES: no cyanosis; no edema; no calf tenderness  SKIN: warm and dry; no rash  NERVOUS SYSTEM:  Awake and alert; Oriented to self, weak lower extremities, no new deficits    _________________________________________________  LABS:                        13.5   7.44  )-----------( 250      ( 27 Feb 2024 05:54 )             44.4     02-28    140  |  110<H>  |  31<H>  ----------------------------<  117<H>  3.6   |  24  |  1.51<H>    Ca    10.3      28 Feb 2024 06:00  Phos  2.8     02-28  Mg     2.3     02-28        Urinalysis Basic - ( 28 Feb 2024 06:00 )    Color: x / Appearance: x / SG: x / pH: x  Gluc: 117 mg/dL / Ketone: x  / Bili: x / Urobili: x   Blood: x / Protein: x / Nitrite: x   Leuk Esterase: x / RBC: x / WBC x   Sq Epi: x / Non Sq Epi: x / Bacteria: x      CAPILLARY BLOOD GLUCOSE      POCT Blood Glucose.: 113 mg/dL (28 Feb 2024 07:53)  POCT Blood Glucose.: 127 mg/dL (27 Feb 2024 21:02)  POCT Blood Glucose.: 157 mg/dL (27 Feb 2024 16:52)  POCT Blood Glucose.: 156 mg/dL (27 Feb 2024 11:19)        RADIOLOGY & ADDITIONAL TESTS:    Imaging  Reviewed:  YES/NO    Consultant(s) Notes Reviewed:   YES/ No      Plan of care was discussed with patient and /or primary care giver; all questions and concerns were addressed

## 2024-02-28 NOTE — DISCHARGE NOTE PROVIDER - NSDCCPCAREPLAN_GEN_ALL_CORE_FT
PRINCIPAL DISCHARGE DIAGNOSIS  Diagnosis: Elevated troponin  Assessment and Plan of Treatment: Your cardiac enzymes were elevated on this admission suggesting a strain on your heart.  Continue to take your medications as prescribed and follow up with your cardiologist as outpatient.      SECONDARY DISCHARGE DIAGNOSES  Diagnosis: Pleuritic chest pain  Assessment and Plan of Treatment: Your pain may have been due to the strain on your heart.  Report any new symptoms of chest pain or shortness of breath.    Diagnosis: DM (diabetes mellitus)  Assessment and Plan of Treatment: Make sure you get your HgA1c checked every three months.  If you take oral diabetes medications, check your blood glucose two times a day.  If you take insulin, check your blood glucose before meals and at bedtime.  It's important not to skip any meals.  Keep a log of your blood glucose results and always take it with you to your doctor appointments.  Keep a list of your current medications including injectables and over the counter medications and bring this medication list with you to all your doctor appointments.  If you have not seen your ophthalmologist this year call for appointment.  Check your feet daily for redness, sores, or openings. Do not self treat. If no improvement in two days call your primary care physician for an appointment.  Low blood sugar (hypoglycemia) is a blood sugar below 70mg/dl. Check your blood sugar if you feel signs/symptoms of hypoglycemia. If your blood sugar is below 70 take 15 grams of carbohydrates (ex 4 oz of apple juice, 3-4 glucose tablets, or 4-6 oz of regular soda) wait 15 minutes and repeat blood sugar to make sure it comes up above 70.  If your blood sugar is above 70 and you are due for a meal, have a meal.  If you are not due for a meal have a snack.  This snack helps keeps your blood sugar at a safe range.      Diagnosis: Hypertensive emergency  Assessment and Plan of Treatment: You are being treated for high blood pressure.  Continue taking your medication as prescribed to help prevent or minimize your risk of end organ damage.  Follow up with your doctor for routine blood pressure evaluation and medication regimen.  Report any symptoms of headaces with nausea or vomiting, visual changes, heart palpitation, chest pain or shortness.      Diagnosis: Atrial fibrillation  Assessment and Plan of Treatment: Please continue with your medication as instructed in the medication reconciliation and your primary care physician.  Report any symptoms of increasing heart rate, palpiation, chest pain or shortness of breath.      Diagnosis: CAD (coronary artery disease)  Assessment and Plan of Treatment: CAD is narrowing of the arteries to your heart caused by a buildup of plaque (cholesterol and other substances). The narrowing in your arteries decreases the amount of blood that can flow to your heart. This causes your heart to get less oxygen, which may be life-threatening.  What increases my risk for CAD?  Age 45 years or older  Obesity or lack of exercise  Poor nutrition  A family history of CAD  Smoking or regular exposure to secondhand smoke  A medical condition, such as high blood pressure, high cholesterol, or diabetes  Alcohol use  Stress  Eat heart-healthy foods. Include fresh fruits and vegetables in your meal plan. Choose low-fat foods, such as skim or 1% fat milk, low-fat cheese and yogurt, fish, chicken (without skin), and lean meats. Eat two 4-ounce servings of fish high in omega-3 fats each week, such as salmon, fresh tuna, and herring.       PRINCIPAL DISCHARGE DIAGNOSIS  Diagnosis: Elevated troponin  Assessment and Plan of Treatment: Your cardiac enzymes were elevated on this admission suggesting a strain on your heart.  Continue to take your medications as prescribed and follow up with your cardiologist as outpatient.      SECONDARY DISCHARGE DIAGNOSES  Diagnosis: Hypertensive emergency  Assessment and Plan of Treatment: You are being treated for high blood pressure.  Continue taking your medication as prescribed to help prevent or minimize your risk of end organ damage.  Follow up with your doctor for routine blood pressure evaluation and medication regimen.  Report any symptoms of headaces with nausea or vomiting, visual changes, heart palpitation, chest pain or shortness.      Diagnosis: Pleuritic chest pain  Assessment and Plan of Treatment: Your pain may have been due to the strain on your heart.  Report any new symptoms of chest pain or shortness of breath.    Diagnosis: Oropharyngeal dysphagia  Assessment and Plan of Treatment: You were followed by Gastroenterology for dysphagia. underwent Esophagram 3/1 resulted- small hiatal hernia, no esophagitis or stricture or stenosis or mass. Recommend advancing diet as tolerated, high dose PPI, and outpatient GI follow up for endoscopic evaluation.   continue PPI twcie per day. Follow up Outpatient GI  for endoscopic evaluation       Diagnosis: CAD (coronary artery disease)  Assessment and Plan of Treatment: CAD is narrowing of the arteries to your heart caused by a buildup of plaque (cholesterol and other substances). The narrowing in your arteries decreases the amount of blood that can flow to your heart. This causes your heart to get less oxygen, which may be life-threatening.  What increases my risk for CAD?  Age 45 years or older  Obesity or lack of exercise  Poor nutrition  A family history of CAD  Smoking or regular exposure to secondhand smoke  A medical condition, such as high blood pressure, high cholesterol, or diabetes  Alcohol use  Stress  Eat heart-healthy foods. Include fresh fruits and vegetables in your meal plan. Choose low-fat foods, such as skim or 1% fat milk, low-fat cheese and yogurt, fish, chicken (without skin), and lean meats. Eat two 4-ounce servings of fish high in omega-3 fats each week, such as salmon, fresh tuna, and herring.      Diagnosis: DM (diabetes mellitus)  Assessment and Plan of Treatment: Make sure you get your HgA1c checked every three months.  If you take oral diabetes medications, check your blood glucose two times a day.  If you take insulin, check your blood glucose before meals and at bedtime.  It's important not to skip any meals.  Keep a log of your blood glucose results and always take it with you to your doctor appointments.  Keep a list of your current medications including injectables and over the counter medications and bring this medication list with you to all your doctor appointments.  If you have not seen your ophthalmologist this year call for appointment.  Check your feet daily for redness, sores, or openings. Do not self treat. If no improvement in two days call your primary care physician for an appointment.  Low blood sugar (hypoglycemia) is a blood sugar below 70mg/dl. Check your blood sugar if you feel signs/symptoms of hypoglycemia. If your blood sugar is below 70 take 15 grams of carbohydrates (ex 4 oz of apple juice, 3-4 glucose tablets, or 4-6 oz of regular soda) wait 15 minutes and repeat blood sugar to make sure it comes up above 70.  If your blood sugar is above 70 and you are due for a meal, have a meal.  If you are not due for a meal have a snack.  This snack helps keeps your blood sugar at a safe range.      Diagnosis: Atrial fibrillation  Assessment and Plan of Treatment: Please continue with your medication as instructed in the medication reconciliation and your primary care physician.  Report any symptoms of increasing heart rate, palpiation, chest pain or shortness of breath.       PRINCIPAL DISCHARGE DIAGNOSIS  Diagnosis: Elevated troponin  Assessment and Plan of Treatment: Your cardiac enzymes were elevated on this admission suggesting a strain on your heart.  Continue to take your medications as prescribed and follow up with your cardiologist as outpatient.      SECONDARY DISCHARGE DIAGNOSES  Diagnosis: Stage 3 chronic kidney disease  Assessment and Plan of Treatment: When you arrived your blood kidney test was abnormal.  You were given intravenous fluids and it stayed the same.  You were seen by a Nephrologist and you likely have chronic kidney disease.  It is recommended you keep your blood pressure under control, and your blood sugars under control, and stay adequately hydrated to prevent worsening of kidney function.  Your blood pressure medications were adjusted.  STOP your Losartan/hctz.  Start Losartan 50mg once daily.  STOP your diltiazem.  Continue your coreg 25mg twice daily.  You can follow-up with a Nephrologist for further management.    Diagnosis: Pleuritic chest pain  Assessment and Plan of Treatment: Your pain may have been due to the strain on your heart.  Report any new symptoms of chest pain or shortness of breath.    Diagnosis: CAD (coronary artery disease)  Assessment and Plan of Treatment: CAD is narrowing of the arteries to your heart caused by a buildup of plaque (cholesterol and other substances). The narrowing in your arteries decreases the amount of blood that can flow to your heart. This causes your heart to get less oxygen, which may be life-threatening.  What increases my risk for CAD?  Age 45 years or older  Obesity or lack of exercise  Poor nutrition  A family history of CAD  Smoking or regular exposure to secondhand smoke  A medical condition, such as high blood pressure, high cholesterol, or diabetes  Alcohol use  Stress  Eat heart-healthy foods. Include fresh fruits and vegetables in your meal plan. Choose low-fat foods, such as skim or 1% fat milk, low-fat cheese and yogurt, fish, chicken (without skin), and lean meats. Eat two 4-ounce servings of fish high in omega-3 fats each week, such as salmon, fresh tuna, and herring.      Diagnosis: DM (diabetes mellitus)  Assessment and Plan of Treatment: STOP your Metformin as if your kidney function worsens, taking metformin could be dangerous.  Make sure you get your HgA1c checked every three months.  If you take oral diabetes medications, check your blood glucose two times a day.  If you take insulin, check your blood glucose before meals and at bedtime.  It's important not to skip any meals.  Keep a log of your blood glucose results and always take it with you to your doctor appointments.  Keep a list of your current medications including injectables and over the counter medications and bring this medication list with you to all your doctor appointments.  If you have not seen your ophthalmologist this year call for appointment.  Check your feet daily for redness, sores, or openings. Do not self treat. If no improvement in two days call your primary care physician for an appointment.  Low blood sugar (hypoglycemia) is a blood sugar below 70mg/dl. Check your blood sugar if you feel signs/symptoms of hypoglycemia. If your blood sugar is below 70 take 15 grams of carbohydrates (ex 4 oz of apple juice, 3-4 glucose tablets, or 4-6 oz of regular soda) wait 15 minutes and repeat blood sugar to make sure it comes up above 70.  If your blood sugar is above 70 and you are due for a meal, have a meal.  If you are not due for a meal have a snack.  This snack helps keeps your blood sugar at a safe range.      Diagnosis: Atrial fibrillation  Assessment and Plan of Treatment: Please continue with your medication as instructed in the medication reconciliation and your primary care physician.  Report any symptoms of increasing heart rate, palpiation, chest pain or shortness of breath.      Diagnosis: Hypertensive emergency  Assessment and Plan of Treatment: You are being treated for high blood pressure.  Continue taking your medication as prescribed to help prevent or minimize your risk of end organ damage.  Follow up with your doctor for routine blood pressure evaluation and medication regimen.  Report any symptoms of headaces with nausea or vomiting, visual changes, heart palpitation, chest pain or shortness.      Diagnosis: Oropharyngeal dysphagia  Assessment and Plan of Treatment: You were followed by Gastroenterology for dysphagia. underwent Esophagram 3/1 resulted- small hiatal hernia, no esophagitis or stricture or stenosis or mass. Recommend advancing diet as tolerated, high dose PPI, and outpatient GI follow up for endoscopic evaluation.   continue PPI twcie per day. Follow up Outpatient GI  for endoscopic evaluation        PRINCIPAL DISCHARGE DIAGNOSIS  Diagnosis: Elevated troponin  Assessment and Plan of Treatment: Your cardiac enzymes were elevated on this admission suggesting a strain on your heart.  Continue to take your medications as prescribed and follow up with your cardiologist as outpatient.      SECONDARY DISCHARGE DIAGNOSES  Diagnosis: Stage 3 chronic kidney disease  Assessment and Plan of Treatment: When you arrived your blood kidney test was abnormal.  You were given intravenous fluids and it stayed the same.  You were seen by a Nephrologist and you likely have chronic kidney disease.  It is recommended you keep your blood pressure under control, and your blood sugars under control, and stay adequately hydrated to prevent worsening of kidney function.  Your blood pressure medications were adjusted.  STOP your Losartan/hctz.  Start Losartan 50mg once daily.  STOP your diltiazem.  Continue your coreg 25mg twice daily.  You can follow-up with a Nephrologist for further management.    Diagnosis: Pleuritic chest pain  Assessment and Plan of Treatment: Your pain may have been due to the strain on your heart.  Report any new symptoms of chest pain or shortness of breath.    Diagnosis: CAD (coronary artery disease)  Assessment and Plan of Treatment: CAD is narrowing of the arteries to your heart caused by a buildup of plaque (cholesterol and other substances). The narrowing in your arteries decreases the amount of blood that can flow to your heart. This causes your heart to get less oxygen, which may be life-threatening.  What increases my risk for CAD?  Age 45 years or older  Obesity or lack of exercise  Poor nutrition  A family history of CAD  Smoking or regular exposure to secondhand smoke  A medical condition, such as high blood pressure, high cholesterol, or diabetes  Alcohol use  Stress  Eat heart-healthy foods. Include fresh fruits and vegetables in your meal plan. Choose low-fat foods, such as skim or 1% fat milk, low-fat cheese and yogurt, fish, chicken (without skin), and lean meats. Eat two 4-ounce servings of fish high in omega-3 fats each week, such as salmon, fresh tuna, and herring.      Diagnosis: DM (diabetes mellitus)  Assessment and Plan of Treatment: STOP your Metformin as if your kidney function worsens, taking metformin could be dangerous.  Make sure you get your HgA1c checked every three months.  If you take oral diabetes medications, check your blood glucose two times a day.  If you take insulin, check your blood glucose before meals and at bedtime.  It's important not to skip any meals.  Keep a log of your blood glucose results and always take it with you to your doctor appointments.  Keep a list of your current medications including injectables and over the counter medications and bring this medication list with you to all your doctor appointments.  If you have not seen your ophthalmologist this year call for appointment.  Check your feet daily for redness, sores, or openings. Do not self treat. If no improvement in two days call your primary care physician for an appointment.  Low blood sugar (hypoglycemia) is a blood sugar below 70mg/dl. Check your blood sugar if you feel signs/symptoms of hypoglycemia. If your blood sugar is below 70 take 15 grams of carbohydrates (ex 4 oz of apple juice, 3-4 glucose tablets, or 4-6 oz of regular soda) wait 15 minutes and repeat blood sugar to make sure it comes up above 70.  If your blood sugar is above 70 and you are due for a meal, have a meal.  If you are not due for a meal have a snack.  This snack helps keeps your blood sugar at a safe range.      Diagnosis: Atrial fibrillation  Assessment and Plan of Treatment: Atrial fibrillation is an abnormal heart rhythm that can come and go.  It can cause palpitations, irregular pulse, fatigue, shortness of breath, or can cause no symptoms at all.  It can increase your risk of a blood clot causing a stroke.  You are recommended to STOP takin your Aspirin and plavix, and instead take ELIQUIS as a blood thinner.  This will reduce your risk of stroke that can occur with atrial fibrillation.  Please continue with your medication as instructed in the medication reconciliation and your primary care physician.  Report any symptoms of increasing heart rate, palpiation, chest pain or shortness of breath.      Diagnosis: Hypertensive emergency  Assessment and Plan of Treatment: You are being treated for high blood pressure.  Continue taking your medication as prescribed to help prevent or minimize your risk of end organ damage.  Follow up with your doctor for routine blood pressure evaluation and medication regimen.  Report any symptoms of headaces with nausea or vomiting, visual changes, heart palpitation, chest pain or shortness.      Diagnosis: Oropharyngeal dysphagia  Assessment and Plan of Treatment: You were followed by Gastroenterology for dysphagia. underwent Esophagram 3/1 resulted- small hiatal hernia, no esophagitis or stricture or stenosis or mass. Recommend advancing diet as tolerated, high dose PPI, and outpatient GI follow up for endoscopic evaluation.   continue PPI twcie per day. Follow up Outpatient GI  for endoscopic evaluation

## 2024-02-28 NOTE — DISCHARGE NOTE PROVIDER - HOSPITAL COURSE
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 Patient mentioned that earlier today, she experienced pleuritic chest pain radiating to her upper back associated with lightheadedness and minimal dyspnea that lasted for about 90 minutes.     Pt was noted to be normotensive on admission. Unclear if medications given in route by EMS. Patient with pleuritic chest pain and elevated troponin w/o obvious acute ischemic changes in the EKG. Admitted to telemetry for chest pain evaluation.     Pt was followed by cardiology and her cardiac work up was unrevealing.  Pt had a VQ scan which showed low probability for PE.       Pt is medically cleared for d/c.      Please note that this a brief summary of hospital course, please refer to daily progress notes and consult notes for full course and events.   Pt is a 68 year old female, from home, AAOx3 and ambulating with a walker at baseline, with PMHx of  COPD, Left MCA CVA w/ residual dysarthria and RLE weakness, Seizures, DM, CAD s/p ZHAO, s/p cardiac cath 4/2022 w/non-obstructive CAD, AFib not on AC, and PAD s/p Right femoral popliteal bypass s/p occlusion + IR stent placement 2/2017, was brought into the ED by EMS from home due to elevated BP of 190/121 mmHg. Patient mentioned that earlier today, she experienced pleuritic chest pain radiating to her upper back associated with lightheadedness and minimal dyspnea that lasted for about 90 minutes.     Pt was noted to be normotensive on admission. Unclear if medications given in route by EMS. Patient with pleuritic chest pain and elevated troponin w/o obvious acute ischemic changes in the EKG. Admitted to telemetry for chest pain evaluation.     Pt was followed by cardiology and her cardiac work up was unrevealing.  Pt had a VQ scan which showed low probability for PE.  s/p telemetry.   Pt with oropharyngeal dysphagia. GI consulted, s/p  Esophagram  3/1 resulted- small hiatal hernia, no esophagitis or stricture or stenosis or mass. Recommend advancing diet as tolerated, high dose PPI, and outpatient GI follow up for endoscopic evaluation.   Advanced diet and pt well tolerated.   Pt with ILEANA Scr 1.42 from 1.38. started IVF. ------??  PT evaluated and reccs VERONICA.     Pt is medically optimized for discharge home. CM following for reinstate home care service.  Discharge discussed with attending.     Please note that this a brief summary of hospital course, please refer to daily progress notes and consult notes for full course and events.   Pt is a 68 year old female, from home, AAOx3 and ambulating with a walker at baseline, with PMHx of  COPD, Left MCA CVA w/ residual dysarthria and RLE weakness, Seizures, DM, CAD s/p ZHAO, s/p cardiac cath 4/2022 w/non-obstructive CAD, AFib not on AC, and PAD s/p Right femoral popliteal bypass s/p occlusion + IR stent placement 2/2017, was brought into the ED by EMS from home due to elevated BP of 190/121 mmHg. Patient mentioned that earlier today, she experienced pleuritic chest pain radiating to her upper back associated with lightheadedness and minimal dyspnea that lasted for about 90 minutes.     Pt was noted to be normotensive on admission. Unclear if medications given in route by EMS. Patient with pleuritic chest pain and elevated troponin w/o obvious acute ischemic changes in the EKG. Admitted to telemetry for chest pain evaluation.     Pt was followed by cardiology and her cardiac work up was unrevealing.  Pt had a VQ scan which showed low probability for PE.  s/p telemetry.   Pt with oropharyngeal dysphagia. GI consulted, s/p  Esophagram  3/1 resulted- small hiatal hernia, no esophagitis or stricture or stenosis or mass. Recommend advancing diet as tolerated, high dose PPI, and outpatient GI follow up for endoscopic evaluation.   Advanced diet and pt well tolerated.   Pt with ILEANA Scr 1.42 from 1.38. started IVF with no improvement in Cr, remained stable in 1.4's.  Nephrology was consulted, Fena calculated at 1.4%, likely has CKD per renal, can f/u as outpatient.   PT evaluated and recs home PT.  Pt's blood pressure was stable on coreg 25mg twice daily and losartan 50mg once daily alone.  Diltiazem was held on discharge, pt was recommended to stop losartan 100/hctz, and start losartan 50mg once daily.  Pt was continued on coreg 25mg twice daily.  Metformin was held due to renal function.      Pt is medically optimized for discharge home. CM following for reinstate home care service.  Discharge discussed with attending.     Please note that this a brief summary of hospital course, please refer to daily progress notes and consult notes for full course and events.   Pt is a 68 year old female, from home, AAOx3 and ambulating with a walker at baseline, with PMHx of  COPD, Left MCA CVA w/ residual dysarthria and RLE weakness, Seizures, DM, CAD s/p ZHAO, s/p cardiac cath 4/2022 w/non-obstructive CAD, AFib not on AC, and PAD s/p Right femoral popliteal bypass s/p occlusion + IR stent placement 2/2017, was brought into the ED by EMS from home due to elevated BP of 190/121 mmHg. Patient mentioned that earlier today, she experienced pleuritic chest pain radiating to her upper back associated with lightheadedness and minimal dyspnea that lasted for about 90 minutes.     Pt was noted to be normotensive on admission. Unclear if medications given in route by EMS. Patient with pleuritic chest pain and elevated troponin w/o obvious acute ischemic changes in the EKG. Admitted to telemetry for chest pain evaluation.     Pt was followed by cardiology and her cardiac work up was unrevealing.  Pt had a VQ scan which showed low probability for PE.  s/p telemetry.  Her aspirin and plavix were stopped and instead she was started on eliquis for full anticoagulation due to history of atrial fibrillation.  Pt with oropharyngeal dysphagia. GI consulted, s/p  Esophagram  3/1 resulted- small hiatal hernia, no esophagitis or stricture or stenosis or mass. Recommend advancing diet as tolerated, high dose PPI, and outpatient GI follow up for endoscopic evaluation.   Advanced diet and pt well tolerated.   Pt with ILEANA Scr 1.42 from 1.38. started IVF with no improvement in Cr, remained stable in 1.4's.  Nephrology was consulted, Fena calculated at 1.4%, likely has CKD per renal, can f/u as outpatient.   PT evaluated and recs home PT.  Pt's blood pressure was stable on coreg 25mg twice daily and losartan 50mg once daily alone.  Diltiazem was held on discharge, pt was recommended to stop losartan 100/hctz, and start losartan 50mg once daily.  Pt was continued on coreg 25mg twice daily.  Metformin was held due to renal function.      Pt is medically optimized for discharge home. CM following for reinstate home care service.  Discharge discussed with attending.     Please note that this a brief summary of hospital course, please refer to daily progress notes and consult notes for full course and events.

## 2024-02-28 NOTE — DISCHARGE NOTE PROVIDER - NSDCMRMEDTOKEN_GEN_ALL_CORE_FT
albuterol 90 mcg/inh inhalation aerosol: 2 puff(s) inhaled every 6 hours, As needed, Shortness of Breath  aspirin 81 mg oral tablet, chewable: 1 tab(s) orally once a day  atorvastatin 10 mg oral tablet: 1 tab(s) orally once a day (at bedtime)  carvedilol 25 mg oral tablet: 1 tab(s) orally 2 times a day  dilTIAZem 180 mg/24 hours oral capsule, extended release: 1 cap(s) orally once a day  Eliquis 5 mg oral tablet: 1 tab(s) orally every 12 hours for atrial fibrillation  famotidine 20 mg oral tablet: 1 tab(s) orally once a day  Jardiance 10 mg oral tablet: 1 tab(s) orally once a day  losartan-hydroCHLOROthiazide 100 mg-25 mg oral tablet: 1 tab(s) orally once a day  metFORMIN 1000 mg oral tablet: 1 tab(s) orally 2 times a day  montelukast 10 mg oral tablet: 1 tab(s) orally once a day (at bedtime)  Plavix 75 mg oral tablet: 1 tab(s) orally once a day  topiramate 100 mg oral tablet: 1 tab(s) orally once a day  Zetia 10 mg oral tablet: 1 tab(s) orally once a day   albuterol 90 mcg/inh inhalation aerosol: 2 puff(s) inhaled every 6 hours, As needed, Shortness of Breath  aspirin 81 mg oral tablet, chewable: 1 tab(s) orally once a day  atorvastatin 10 mg oral tablet: 1 tab(s) orally once a day (at bedtime)  carvedilol 25 mg oral tablet: 1 tab(s) orally 2 times a day  dilTIAZem 180 mg/24 hours oral capsule, extended release: 1 cap(s) orally once a day  Eliquis 5 mg oral tablet: 1 tab(s) orally every 12 hours for atrial fibrillation  Jardiance 10 mg oral tablet: 1 tab(s) orally once a day  losartan-hydroCHLOROthiazide 100 mg-25 mg oral tablet: 1 tab(s) orally once a day  metFORMIN 1000 mg oral tablet: 1 tab(s) orally 2 times a day  montelukast 10 mg oral tablet: 1 tab(s) orally once a day (at bedtime)  Plavix 75 mg oral tablet: 1 tab(s) orally once a day  topiramate 100 mg oral tablet: 1 tab(s) orally once a day  Zetia 10 mg oral tablet: 1 tab(s) orally once a day   albuterol 90 mcg/inh inhalation aerosol: 2 puff(s) inhaled every 6 hours, As needed, Shortness of Breath  atorvastatin 10 mg oral tablet: 1 tab(s) orally once a day (at bedtime)  budesonide-formoterol 160 mcg-4.5 mcg/inh inhalation aerosol: 2 inhaled 2 times a day  carvedilol 25 mg oral tablet: 1 tab(s) orally 2 times a day  Eliquis 5 mg oral tablet: 1 tab(s) orally every 12 hours for atrial fibrillation  Jardiance 10 mg oral tablet: 1 tab(s) orally once a day  losartan 50 mg oral tablet: 1 tab(s) orally once a day  montelukast 10 mg oral tablet: 1 tab(s) orally once a day (at bedtime)  topiramate 100 mg oral tablet: 1 tab(s) orally once a day  Zetia 10 mg oral tablet: 1 tab(s) orally once a day

## 2024-02-28 NOTE — DISCHARGE NOTE PROVIDER - CARE PROVIDER_API CALL
Praveena Metz  Internal Medicine  [address] [place] [address], [address]  [place] [address]  Phone: [phone]  Fax: [phone]  Follow Up Time: 2 weeks    Jonel Stevenson  Cardiology  [address] [place] [address]  Phone: [phone]  Fax: [phone]  Follow Up Time: 2 weeks    Americo Cortes  Gastroenterology  [address] [facility], [address] [place] [address]  Phone: [phone]  Fax: [phone]  Follow Up Time: 2 weeks   Praveena Metz  Internal Medicine  [address] [place] [address], [address]  [place] [address]  Phone: [phone]  Fax: [phone]  Follow Up Time: 2 weeks    Jonel Stevenson  Cardiology  [address] [place] [address]  Phone: [phone]  Fax: [phone]  Follow Up Time: 2 weeks    Americo Cortes  Gastroenterology  [address] [facility], [address] [place] [address]  Phone: [phone]  Fax: [phone]  Follow Up Time: 2 weeks    Eliazar Malcolm  Nephrology  [address] [place] [address]  Phone: [phone]  Fax: [phone]  Follow Up Time: 1 month

## 2024-02-28 NOTE — DISCHARGE NOTE PROVIDER - PROVIDER TOKENS
PROVIDER:[TOKEN:[[id number]],FOLLOWUP:[2 weeks]],PROVIDER:[TOKEN:[[id number]],FOLLOWUP:[2 weeks]],PROVIDER:[TOKEN:[[id number]],FOLLOWUP:[2 weeks]] PROVIDER:[TOKEN:[[id number]],FOLLOWUP:[2 weeks]],PROVIDER:[TOKEN:[[id number]],FOLLOWUP:[2 weeks]],PROVIDER:[TOKEN:[[id number]],FOLLOWUP:[2 weeks]],PROVIDER:[TOKEN:[[id number]],FOLLOWUP:[1 month]]

## 2024-02-29 LAB
ANION GAP SERPL CALC-SCNC: 6 MMOL/L — SIGNIFICANT CHANGE UP (ref 5–17)
BUN SERPL-MCNC: 32 MG/DL — HIGH (ref 7–18)
CALCIUM SERPL-MCNC: 10.1 MG/DL — SIGNIFICANT CHANGE UP (ref 8.4–10.5)
CHLORIDE SERPL-SCNC: 113 MMOL/L — HIGH (ref 96–108)
CO2 SERPL-SCNC: 22 MMOL/L — SIGNIFICANT CHANGE UP (ref 22–31)
CREAT SERPL-MCNC: 1.38 MG/DL — HIGH (ref 0.5–1.3)
EGFR: 42 ML/MIN/1.73M2 — LOW
GLUCOSE BLDC GLUCOMTR-MCNC: 108 MG/DL — HIGH (ref 70–99)
GLUCOSE BLDC GLUCOMTR-MCNC: 125 MG/DL — HIGH (ref 70–99)
GLUCOSE BLDC GLUCOMTR-MCNC: 236 MG/DL — HIGH (ref 70–99)
GLUCOSE BLDC GLUCOMTR-MCNC: 94 MG/DL — SIGNIFICANT CHANGE UP (ref 70–99)
GLUCOSE SERPL-MCNC: 140 MG/DL — HIGH (ref 70–99)
POTASSIUM SERPL-MCNC: 3.6 MMOL/L — SIGNIFICANT CHANGE UP (ref 3.5–5.3)
POTASSIUM SERPL-SCNC: 3.6 MMOL/L — SIGNIFICANT CHANGE UP (ref 3.5–5.3)
SODIUM SERPL-SCNC: 141 MMOL/L — SIGNIFICANT CHANGE UP (ref 135–145)

## 2024-02-29 PROCEDURE — 99222 1ST HOSP IP/OBS MODERATE 55: CPT

## 2024-02-29 RX ORDER — BUDESONIDE AND FORMOTEROL FUMARATE DIHYDRATE 160; 4.5 UG/1; UG/1
2 AEROSOL RESPIRATORY (INHALATION)
Refills: 0 | Status: DISCONTINUED | OUTPATIENT
Start: 2024-02-29 | End: 2024-03-03

## 2024-02-29 RX ADMIN — APIXABAN 5 MILLIGRAM(S): 2.5 TABLET, FILM COATED ORAL at 05:28

## 2024-02-29 RX ADMIN — ATORVASTATIN CALCIUM 40 MILLIGRAM(S): 80 TABLET, FILM COATED ORAL at 22:27

## 2024-02-29 RX ADMIN — Medication 100 MILLIGRAM(S): at 12:08

## 2024-02-29 RX ADMIN — BUDESONIDE AND FORMOTEROL FUMARATE DIHYDRATE 2 PUFF(S): 160; 4.5 AEROSOL RESPIRATORY (INHALATION) at 10:24

## 2024-02-29 RX ADMIN — MONTELUKAST 10 MILLIGRAM(S): 4 TABLET, CHEWABLE ORAL at 22:29

## 2024-02-29 RX ADMIN — PANTOPRAZOLE SODIUM 40 MILLIGRAM(S): 20 TABLET, DELAYED RELEASE ORAL at 22:29

## 2024-02-29 RX ADMIN — APIXABAN 5 MILLIGRAM(S): 2.5 TABLET, FILM COATED ORAL at 17:43

## 2024-02-29 RX ADMIN — BUDESONIDE AND FORMOTEROL FUMARATE DIHYDRATE 2 PUFF(S): 160; 4.5 AEROSOL RESPIRATORY (INHALATION) at 22:30

## 2024-02-29 RX ADMIN — Medication 2: at 12:08

## 2024-02-29 RX ADMIN — CARVEDILOL PHOSPHATE 25 MILLIGRAM(S): 80 CAPSULE, EXTENDED RELEASE ORAL at 05:29

## 2024-02-29 RX ADMIN — CARVEDILOL PHOSPHATE 25 MILLIGRAM(S): 80 CAPSULE, EXTENDED RELEASE ORAL at 17:43

## 2024-02-29 RX ADMIN — LOSARTAN POTASSIUM 50 MILLIGRAM(S): 100 TABLET, FILM COATED ORAL at 05:28

## 2024-02-29 NOTE — CONSULT NOTE ADULT - ASSESSMENT
Patient is a 68F with a PMHx of asthma, COPD (not on home O2), T2DM, GERD, HTN, L MCA CVA (3x, last one 2007, residual R hemiparesis and dysarthria), small chronic R parietal lobe infarct, seizures, , AFib (not on AC), CAD s/p coronary stent (7/22/2010), PVD/PAD (s/p R fem-pop bypass 4/14/2015, Dr. Olvera, prev on coumadin however discontinued due to bleed risk, c/b occlusion s/p reoperative left to right fem-fem bypass, R profunda fem artery endarterectomy, thrombectomy of previously placed bypass graft 2/9/2017 Dr. Cook, further c/b fem artery occlusion treated with heparin gtt 09/2018) who presented to the ED with chest pain. GI was consulted for odynophagia.     #Odynophagia  #GERD  Currently tolerating easy to chew diet, symptoms inconsistent with oropharyngeal dysphagia as patient is able to initiate swallowing reflex. Endorses n/v with PO intake however observed to tolerate breakfast this morning on bedside eval. Differentials include esophageal dysphagia (esophagitis/EoE, Schatzki ring) vs structural dysphagia (Zenker's diverticulum, hiatal hernia, stricture/stenosis) vs dysmotility (achalasia, esophageal spasms, hypertensive LES) vs malignancy vs other. Given chronicity, low suspicion for food impaction at this time.     	- Esophagram  	- Diet as tolerated  	- Aspiration precautions  	- PPI BID  	- Pending esophagram, re-eval for endoscopic evaluation    This note and its recommendations herein are preliminary until such time as cosigned by an attending.    GI will continue to follow.  Thank you for this consult! Patient is a 68F with a PMHx of asthma, COPD (not on home O2), T2DM, GERD, HTN, L MCA CVA (3x, last one 2007, residual R hemiparesis and dysarthria), small chronic R parietal lobe infarct, seizures, , AFib (not on AC), CAD s/p coronary stent (7/22/2010), PVD/PAD (s/p R fem-pop bypass 4/14/2015, Dr. Olvera, prev on coumadin however discontinued due to bleed risk, c/b occlusion s/p reoperative left to right fem-fem bypass, R profunda fem artery endarterectomy, thrombectomy of previously placed bypass graft 2/9/2017 Dr. Cook, further c/b fem artery occlusion treated with heparin gtt 09/2018) who presented to the ED with chest pain. GI was consulted for odynophagia.     #Odynophagia  #GERD  Currently tolerating easy to chew diet, symptoms inconsistent with oropharyngeal dysphagia as patient is able to initiate swallowing reflex. Endorses n/v with PO intake however observed to tolerate breakfast this morning on bedside eval. Differentials include esophageal dysphagia (esophagitis/EoE, Schatzki ring) vs structural dysphagia (Zenker's diverticulum, hiatal hernia, stricture/stenosis) vs dysmotility (achalasia, esophageal spasms, hypertensive LES) vs malignancy vs other. Given chronicity, low suspicion for food impaction at this time. Of note, apixaban given on 2/29, last dose of clopidogrel 2/27.     	- Esophagram  	- Diet as tolerated  	- Aspiration precautions  	- PPI BID  	- Pending esophagram, re-eval for endoscopic evaluation    This note and its recommendations herein are preliminary until such time as cosigned by an attending.    GI will continue to follow.  Thank you for this consult!

## 2024-02-29 NOTE — PROGRESS NOTE ADULT - PROBLEM SELECTOR PLAN 2
DDx to include, but not limited to Cardiovascular such as Increased demand (stable coronary artery disease lesion), aortic dissection, Arrhythmia, Hypotension, HF, cardiac inflammation from pericarditis, myocarditis, endocarditis; LVH, Myocardial injury, PE, Infectious such as Sepsis/SIRS and/or Viral illness, GI bleeding, Stroke, CKD, Rhabdomyolosis, endurance exercise, or even Environmental exposure.  EKG showed Sinus rhythm with 1st degree A-V block, Possible LA enlargement RSR' or QR pattern in V1 suggests RV conduction delay, LVH with QRS widening and repolarization abnormality  troponin downtrended 700 to 600  TTE result noted    VQ scan neg for PE  dc tele

## 2024-02-29 NOTE — PROGRESS NOTE ADULT - PROBLEM SELECTOR PLAN 5
pt p/w creatinine 1.5  on admission SCr 1.3   unclear baseline  monitor BMP daily    Avoid Nephrotoxic Meds/ Agents such as (NSAIDs, IV contrast, Aminoglycosides such as gentamicin, Gadolinium contrast, Phosphate containing enemas, etc..)

## 2024-02-29 NOTE — PROGRESS NOTE ADULT - PROBLEM SELECTOR PLAN 1
DDx to include, but not limited to Cardiovascular such as Increased demand (stable coronary artery disease lesion), aortic dissection, Arrhythmia, Uncontrolled Hypertension HF, cardiac inflammation from pericarditis, myocarditis, endocarditis; LVH, Myocardial injury, PE, Infectious, CKD, Rhabdomyolysis, or others.   EKG showed Sinus rhythm with 1st degree A-V block, Possible LA enlargement RSR' or QR pattern in V1 suggests RV conduction delay, LVH with QRS widening and repolarization abnormality  s/p ASA in the ED  HEART Pathway score 7 points  Wells' Criteria for PE 4.5 points  TSH wnl  VQ scan to r/o PE with low probability   TTE report noted   Cardio Dr Stevenson following DDx to include, but not limited to Cardiovascular such as Increased demand (stable coronary artery disease lesion), aortic dissection, Arrhythmia, Uncontrolled Hypertension HF, cardiac inflammation from pericarditis, myocarditis, endocarditis; LVH, Myocardial injury, PE, Infectious, CKD, Rhabdomyolysis, or others.   EKG showed Sinus rhythm with 1st degree A-V block, Possible LA enlargement RSR' or QR pattern in V1 suggests RV conduction delay, LVH with QRS widening and repolarization abnormality  s/p ASA in the ED  HEART Pathway score 7 points  Wells' Criteria for PE 4.5 points  TSH wnl  VQ scan to r/o PE with low probability   TTE report noted   Cardio Dr Stevenson following  Pulm recs appreciated, GI consulted and planning for Esophagram

## 2024-02-29 NOTE — PROGRESS NOTE ADULT - SUBJECTIVE AND OBJECTIVE BOX
NP Note discussed with  Primary Attending    Patient is a 68y old  Female who presents with a chief complaint of Chest pain (28 Feb 2024 20:16)      INTERVAL HPI/OVERNIGHT EVENTS: no new complaints    MEDICATIONS  (STANDING):  apixaban 5 milliGRAM(s) Oral every 12 hours  atorvastatin 40 milliGRAM(s) Oral at bedtime  budesonide 160 MICROgram(s)/formoterol 4.5 MICROgram(s) Inhaler 2 Puff(s) Inhalation two times a day  carvedilol 25 milliGRAM(s) Oral every 12 hours  dextrose 5%. 1000 milliLiter(s) (50 mL/Hr) IV Continuous <Continuous>  dextrose 5%. 1000 milliLiter(s) (100 mL/Hr) IV Continuous <Continuous>  dextrose 50% Injectable 25 Gram(s) IV Push once  dextrose 50% Injectable 12.5 Gram(s) IV Push once  dextrose 50% Injectable 25 Gram(s) IV Push once  glucagon  Injectable 1 milliGRAM(s) IntraMuscular once  insulin lispro (ADMELOG) corrective regimen sliding scale   SubCutaneous Before meals and at bedtime  lactated ringers. 1000 milliLiter(s) (60 mL/Hr) IV Continuous <Continuous>  losartan 50 milliGRAM(s) Oral daily  montelukast 10 milliGRAM(s) Oral at bedtime  pantoprazole    Tablet 40 milliGRAM(s) Oral at bedtime  topiramate 100 milliGRAM(s) Oral daily    MEDICATIONS  (PRN):  albuterol    90 MICROgram(s) HFA Inhaler 2 Puff(s) Inhalation every 6 hours PRN Shortness of Breath  dextrose Oral Gel 15 Gram(s) Oral once PRN Blood Glucose LESS THAN 70 milliGRAM(s)/deciliter      __________________________________________________  REVIEW OF SYSTEMS:    CONSTITUTIONAL: No fever,   EYES: no acute visual disturbances  NECK: No pain or stiffness  RESPIRATORY: No cough; No shortness of breath  CARDIOVASCULAR: No chest pain, no palpitations  GASTROINTESTINAL: No pain. No nausea or vomiting; No diarrhea   NEUROLOGICAL: No headache or numbness, no tremors  MUSCULOSKELETAL: No joint pain, no muscle pain  GENITOURINARY: no dysuria, no frequency, no hesitancy  PSYCHIATRY: no depression , no anxiety  ALL OTHER  ROS negative        Vital Signs Last 24 Hrs  T(C): 36.9 (29 Feb 2024 04:50), Max: 37 (28 Feb 2024 23:43)  T(F): 98.4 (29 Feb 2024 04:50), Max: 98.6 (28 Feb 2024 23:43)  HR: 74 (29 Feb 2024 04:50) (65 - 79)  BP: 127/79 (29 Feb 2024 04:50) (116/71 - 143/85)  BP(mean): --  RR: 18 (29 Feb 2024 04:50) (18 - 19)  SpO2: 98% (29 Feb 2024 04:50) (98% - 100%)    Parameters below as of 29 Feb 2024 04:50  Patient On (Oxygen Delivery Method): room air        ________________________________________________  PHYSICAL EXAM:  GENERAL: NAD  HEENT: Normocephalic;  conjunctivae and sclerae clear; moist mucous membranes;   NECK : supple  CHEST/LUNG: Clear to auscultation bilaterally with good air entry   HEART: S1 S2  regular; no murmurs, gallops or rubs  ABDOMEN: Soft, Nontender, Nondistended; Bowel sounds present  EXTREMITIES: no cyanosis; no edema; no calf tenderness  SKIN: warm and dry; no rash  NERVOUS SYSTEM:  Awake and alert; Oriented  to place, person and time ; no new deficits    _________________________________________________  LABS:    02-29    141  |  113<H>  |  32<H>  ----------------------------<  140<H>  3.6   |  22  |  1.38<H>    Ca    10.1      29 Feb 2024 05:28  Phos  2.8     02-28  Mg     2.3     02-28        Urinalysis Basic - ( 29 Feb 2024 05:28 )    Color: x / Appearance: x / SG: x / pH: x  Gluc: 140 mg/dL / Ketone: x  / Bili: x / Urobili: x   Blood: x / Protein: x / Nitrite: x   Leuk Esterase: x / RBC: x / WBC x   Sq Epi: x / Non Sq Epi: x / Bacteria: x      CAPILLARY BLOOD GLUCOSE      POCT Blood Glucose.: 131 mg/dL (28 Feb 2024 21:21)  POCT Blood Glucose.: 119 mg/dL (28 Feb 2024 16:55)  POCT Blood Glucose.: 173 mg/dL (28 Feb 2024 11:41)  POCT Blood Glucose.: 113 mg/dL (28 Feb 2024 07:53)        RADIOLOGY & ADDITIONAL TESTS:    Imaging  Reviewed:  YES/NO    Consultant(s) Notes Reviewed:   YES/ No      Plan of care was discussed with patient and /or primary care giver; all questions and concerns were addressed  NP Note discussed with  Primary Attending    Patient is a 68y old  Female who presents with a chief complaint of Chest pain (29 Feb 2024 0920)      INTERVAL HPI/OVERNIGHT EVENTS: no new complaints    MEDICATIONS  (STANDING):  apixaban 5 milliGRAM(s) Oral every 12 hours  atorvastatin 40 milliGRAM(s) Oral at bedtime  budesonide 160 MICROgram(s)/formoterol 4.5 MICROgram(s) Inhaler 2 Puff(s) Inhalation two times a day  carvedilol 25 milliGRAM(s) Oral every 12 hours  dextrose 5%. 1000 milliLiter(s) (50 mL/Hr) IV Continuous <Continuous>  dextrose 5%. 1000 milliLiter(s) (100 mL/Hr) IV Continuous <Continuous>  dextrose 50% Injectable 25 Gram(s) IV Push once  dextrose 50% Injectable 12.5 Gram(s) IV Push once  dextrose 50% Injectable 25 Gram(s) IV Push once  glucagon  Injectable 1 milliGRAM(s) IntraMuscular once  insulin lispro (ADMELOG) corrective regimen sliding scale   SubCutaneous Before meals and at bedtime  lactated ringers. 1000 milliLiter(s) (60 mL/Hr) IV Continuous <Continuous>  losartan 50 milliGRAM(s) Oral daily  montelukast 10 milliGRAM(s) Oral at bedtime  pantoprazole    Tablet 40 milliGRAM(s) Oral at bedtime  topiramate 100 milliGRAM(s) Oral daily    MEDICATIONS  (PRN):  albuterol    90 MICROgram(s) HFA Inhaler 2 Puff(s) Inhalation every 6 hours PRN Shortness of Breath  dextrose Oral Gel 15 Gram(s) Oral once PRN Blood Glucose LESS THAN 70 milliGRAM(s)/deciliter      __________________________________________________  REVIEW OF SYSTEMS:    CONSTITUTIONAL: No fever,   EYES: no acute visual disturbances  NECK: No pain or stiffness  RESPIRATORY: No cough; No shortness of breath  CARDIOVASCULAR: No chest pain, no palpitations  GASTROINTESTINAL: No pain. No nausea or vomiting; No diarrhea   NEUROLOGICAL: No headache or numbness, no tremors  MUSCULOSKELETAL: No joint pain, no muscle pain  GENITOURINARY: no dysuria, no frequency, no hesitancy  PSYCHIATRY: no depression , no anxiety  ALL OTHER  ROS negative        Vital Signs Last 24 Hrs  T(C): 36.9 (29 Feb 2024 04:50), Max: 37 (28 Feb 2024 23:43)  T(F): 98.4 (29 Feb 2024 04:50), Max: 98.6 (28 Feb 2024 23:43)  HR: 74 (29 Feb 2024 04:50) (65 - 79)  BP: 127/79 (29 Feb 2024 04:50) (116/71 - 143/85)  BP(mean): --  RR: 18 (29 Feb 2024 04:50) (18 - 19)  SpO2: 98% (29 Feb 2024 04:50) (98% - 100%)    Parameters below as of 29 Feb 2024 04:50  Patient On (Oxygen Delivery Method): room air        ________________________________________________  PHYSICAL EXAM:  GENERAL: NAD  HEENT: Normocephalic;  conjunctivae and sclerae clear; moist mucous membranes;   NECK : supple  CHEST/LUNG: Clear to auscultation bilaterally with good air entry   HEART: S1 S2  regular; no murmurs, gallops or rubs  ABDOMEN: Soft, Nontender, Nondistended; Bowel sounds present  EXTREMITIES: no cyanosis; no edema; no calf tenderness  SKIN: warm and dry; no rash  NERVOUS SYSTEM:  Awake and alert; Oriented  to place, person and time ; no new deficits    _________________________________________________  LABS:    02-29    141  |  113<H>  |  32<H>  ----------------------------<  140<H>  3.6   |  22  |  1.38<H>    Ca    10.1      29 Feb 2024 05:28  Phos  2.8     02-28  Mg     2.3     02-28        Urinalysis Basic - ( 29 Feb 2024 05:28 )    Color: x / Appearance: x / SG: x / pH: x  Gluc: 140 mg/dL / Ketone: x  / Bili: x / Urobili: x   Blood: x / Protein: x / Nitrite: x   Leuk Esterase: x / RBC: x / WBC x   Sq Epi: x / Non Sq Epi: x / Bacteria: x      CAPILLARY BLOOD GLUCOSE      POCT Blood Glucose.: 131 mg/dL (28 Feb 2024 21:21)  POCT Blood Glucose.: 119 mg/dL (28 Feb 2024 16:55)  POCT Blood Glucose.: 173 mg/dL (28 Feb 2024 11:41)  POCT Blood Glucose.: 113 mg/dL (28 Feb 2024 07:53)        RADIOLOGY & ADDITIONAL TESTS:    Imaging  Reviewed:  YES/NO    Consultant(s) Notes Reviewed:   YES/ No      Plan of care was discussed with patient and /or primary care giver; all questions and concerns were addressed

## 2024-02-29 NOTE — CONSULT NOTE ADULT - SUBJECTIVE AND OBJECTIVE BOX
INITIAL GI CONSULTATION    Patient is a 68y old  Female who presents with a chief complaint of Chest pain (29 Feb 2024 07:28)    GI HPI:  Patient is a 68F with a PMHx of asthma, COPD (not on home O2), T2DM, GERD, HTN, L MCA CVA (3x, last one 2007, residual R hemiparesis and dysarthria), small chronic R parietal lobe infarct, seizures, , AFib (not on AC), CAD s/p coronary stent (7/22/2010), PVD/PAD (s/p R fem-pop bypass 4/14/2015, Dr. Olvera, prev on coumadin however discontinued due to bleed risk, c/b occlusion s/p reoperative left to right fem-fem bypass, R profunda fem artery endarterectomy, thrombectomy of previously placed bypass graft 2/9/2017 Dr. Cook, further c/b fem artery occlusion treated with heparin gtt 09/2018) who presented to the ED with chest pain. GI was consulted for odynophagia.     Per chart review -   *4/8-4/11/2022: chest pain, found to have NSTEMI with elevated troponin, neg PE, transferred to [facility], underwent cardiac cath 04/2022- nonobstructive CAD, rec ASA, plavix, and crestor.     Patient reports ongoing odynophagia > 6 months, unable to identify trigger, aphasic with word finding difficulty at baseline after multiple CVAs in the past, associated with globus sensation in throat with thin liquids and solids, burning sensation to the chest, early satiety, intermittent nausea with subsequent vomiting primarily after PO intake, last episode yesterday, and fatigue. She denies sob, urinary symptoms hematochezia, melena, and hematemesis. No prior EGD/colonoscopy. No etoh/drug use. No family hx of esophageal cancer, liver disease, gastric disease, and colorectal cancers. Former cigarette smoker 1ppd > 10 yrs, quit > 5 yrs ago.     In the ED, labs notable for trop 732.1, INR 1, EKG non-ischemic, LFTs unremarkable. s/p ASA. Admitted to medicine for telemetry monitoring and chest pain evaluation.  Cards consulted, VQ low probability for PE, rec Eliquis for afib and d/c ASA and plavix.       PMH/PSH:  PAST MEDICAL & SURGICAL HISTORY:  S/P Cardiac Cath  4/2022 nonobstructive CAD      HTN (hypertension)      Diabetes      Asthma  never intubated      Gastroesophageal reflux      CVA (cerebral infarction)  times 3 with residual rt sided weakness and word finding difficulty      CAD (coronary artery disease)      Peripheral arterial disease  peripheral bypass/stents      Hyperlipidemia      Atrial fibrillation      History of seizure  "very long time ago" at time of CVA - topamax      S/P total abdominal hysterectomy      Coronary stent occlusion      H/O peripheral artery bypass            FH:  FAMILY HISTORY:  Family history of diabetes mellitus (Sibling)          MEDS:  MEDICATIONS  (STANDING):  apixaban 5 milliGRAM(s) Oral every 12 hours  atorvastatin 40 milliGRAM(s) Oral at bedtime  budesonide 160 MICROgram(s)/formoterol 4.5 MICROgram(s) Inhaler 2 Puff(s) Inhalation two times a day  carvedilol 25 milliGRAM(s) Oral every 12 hours  dextrose 5%. 1000 milliLiter(s) (100 mL/Hr) IV Continuous <Continuous>  dextrose 5%. 1000 milliLiter(s) (50 mL/Hr) IV Continuous <Continuous>  dextrose 50% Injectable 25 Gram(s) IV Push once  dextrose 50% Injectable 12.5 Gram(s) IV Push once  dextrose 50% Injectable 25 Gram(s) IV Push once  glucagon  Injectable 1 milliGRAM(s) IntraMuscular once  insulin lispro (ADMELOG) corrective regimen sliding scale   SubCutaneous Before meals and at bedtime  lactated ringers. 1000 milliLiter(s) (60 mL/Hr) IV Continuous <Continuous>  losartan 50 milliGRAM(s) Oral daily  montelukast 10 milliGRAM(s) Oral at bedtime  pantoprazole    Tablet 40 milliGRAM(s) Oral at bedtime  topiramate 100 milliGRAM(s) Oral daily    MEDICATIONS  (PRN):  albuterol    90 MICROgram(s) HFA Inhaler 2 Puff(s) Inhalation every 6 hours PRN Shortness of Breath  dextrose Oral Gel 15 Gram(s) Oral once PRN Blood Glucose LESS THAN 70 milliGRAM(s)/deciliter    Allergies    No Known Allergies    Intolerances          ROS: A detailed set of ROS were asked and negative except those outlined in GI HPI.  ______________________________________________________________________  PHYSICAL EXAM:  T(C): 36.9 (02-29-24 @ 07:42), Max: 37 (02-28-24 @ 23:43)  HR: 65 (02-29-24 @ 07:42)  BP: 146/84 (02-29-24 @ 07:42)  RR: 18 (02-29-24 @ 07:42)  SpO2: 98% (02-29-24 @ 07:42)  Wt(kg): --    02-28 - 02-29  --------------------------------------------------------  IN:  Total IN: 0 mL    OUT:    Voided (mL): 600 mL  Total OUT: 600 mL    Total NET: -600 mL      02-29 - 02-29  --------------------------------------------------------  IN:    Oral Fluid: 200 mL  Total IN: 200 mL    OUT:  Total OUT: 0 mL    Total NET: 200 mL          GEN: NAD  HEENT: EOMI, conjunctivae anicteric, neck supple, moist mucous membranes  PULM: LSCTAB, no wheezing, rales, or rhonchi  CV: RRR, no m/r/b  GI: Soft, NT, ND; +BS in all four quadrants, no ascites, no Blum's sign  MSK: SALEH, no edema  NEURO: A&O x 3, no gross deficits  ______________________________________________________________________  LABS:    02-29    141  |  113<H>  |  32<H>  ----------------------------<  140<H>  3.6   |  22  |  1.38<H>    Ca    10.1      29 Feb 2024 05:28  Phos  2.8     02-28  Mg     2.3     02-28          ____________________________________________    IMAGING:    < from: NM Pulmonary Ventilation/Perfusion Scan (02.28.24 @ 09:24) >  NM PULM VENTILATION PERFUS IMG   ORDERED BY: VICENTE CASTILLO     PROCEDURE DATE:  02/28/2024          INTERPRETATION:  CLINICAL INFORMATION: Chest pain. Atrial fibrillation   Evaluate for pulmonary embolus.    RADIOPHARMACEUTICAL:1 mCi Tc-99m-DTPA via aerosol;  6.5 mCi Tc-99m-MAA,   I.V.    TECHNIQUE:  Ventilation and perfusion images of the lungs were obtained   following administration of Tc-99m-DTPA and Tc-99m-MAA. Images were   obtained in the anterior, posterior, both lateral, and all 4 oblique   views. The study was interpreted in conjunction with chest radiograph of   2/27/2024.    COMPARISON: None    OTHER STUDIES USED FOR CORRELATION: None    FINDINGS: There is heterogeneous distribution of radiopharmaceuticals in   both lungs on the ventilation and perfusion images. There is no segmental   mismatched perfusion defect.    IMPRESSION:    Very low probability of pulmonary embolus.    < end of copied text >

## 2024-02-29 NOTE — PROGRESS NOTE ADULT - ASSESSMENT
A 68 year old female, from home, AAOx3 and ambulating with a walker at baseline, with PMHx of  COPD, Left MCA CVA w/ residual dysarthria and RLE weakness, Seizures, DM, CAD s/p ZHAO, s/p cardiac cath 4/2022 w/non-obstructive CAD, AFib not on AC, and PAD s/p Right femoral popliteal bypass s/p occlusion + IR stent placement 2/2017, was brought into the ED by EMS from home due to elevated BP of 190/121 mmHg. Noted to be normotensive on admission. Unclear if medications given in route by EMS. Patient with pleuritic chest pain and elevated troponin w/o obvious acute ischemic changes in the EKG. Admitted to telemetry for chest pain evaluation. Cardiology Dr. Stevenson following. Pt unable to have CT scan w/ contrast 2/2 ILEANA.  Hence, VQ scan performed with low probability for PE.      INCOMPLETE:::: 2/29  A 68 year old female, from home, AAOx3 and ambulating with a walker at baseline, with PMHx of  COPD, Left MCA CVA w/ residual dysarthria and RLE weakness, Seizures, DM, CAD s/p ZHAO, s/p cardiac cath 4/2022 w/non-obstructive CAD, AFib not on AC, and PAD s/p Right femoral popliteal bypass s/p occlusion + IR stent placement 2/2017, was brought into the ED by EMS from home due to elevated BP of 190/121 mmHg. Noted to be normotensive on admission. Unclear if medications given in route by EMS. Patient with pleuritic chest pain and elevated troponin w/o obvious acute ischemic changes in the EKG. Admitted to telemetry for chest pain evaluation. Cardiology Dr. Stevenson following. Pt unable to have CT scan w/ contrast 2/2 ILEANA.  Hence, VQ scan performed with low probability for PE.

## 2024-02-29 NOTE — CHART NOTE - NSCHARTNOTEFT_GEN_A_CORE
Case discussed with pt's sister Connie Cheney at bedside and provided her update regarding pt's current medical condition.  Plan of care discussed including pending Esophagram and all questions answered.

## 2024-02-29 NOTE — PROGRESS NOTE ADULT - SUBJECTIVE AND OBJECTIVE BOX
Time of Visit:  Patient seen and examined.     MEDICATIONS  (STANDING):  apixaban 5 milliGRAM(s) Oral every 12 hours  atorvastatin 40 milliGRAM(s) Oral at bedtime  budesonide 160 MICROgram(s)/formoterol 4.5 MICROgram(s) Inhaler 2 Puff(s) Inhalation two times a day  carvedilol 25 milliGRAM(s) Oral every 12 hours  dextrose 5%. 1000 milliLiter(s) (50 mL/Hr) IV Continuous <Continuous>  dextrose 5%. 1000 milliLiter(s) (100 mL/Hr) IV Continuous <Continuous>  dextrose 50% Injectable 25 Gram(s) IV Push once  dextrose 50% Injectable 12.5 Gram(s) IV Push once  dextrose 50% Injectable 25 Gram(s) IV Push once  glucagon  Injectable 1 milliGRAM(s) IntraMuscular once  insulin lispro (ADMELOG) corrective regimen sliding scale   SubCutaneous Before meals and at bedtime  lactated ringers. 1000 milliLiter(s) (60 mL/Hr) IV Continuous <Continuous>  losartan 50 milliGRAM(s) Oral daily  montelukast 10 milliGRAM(s) Oral at bedtime  pantoprazole    Tablet 40 milliGRAM(s) Oral at bedtime  topiramate 100 milliGRAM(s) Oral daily      MEDICATIONS  (PRN):  albuterol    90 MICROgram(s) HFA Inhaler 2 Puff(s) Inhalation every 6 hours PRN Shortness of Breath  dextrose Oral Gel 15 Gram(s) Oral once PRN Blood Glucose LESS THAN 70 milliGRAM(s)/deciliter       Medications up to date at time of exam.    ROS; No fever, chills, cough, congestion on exam.    PHYSICAL EXAMINATION:    Vital Signs Last 24 Hrs  T(C): 36.3 (29 Feb 2024 10:47), Max: 37 (28 Feb 2024 23:43)  T(F): 97.4 (29 Feb 2024 10:47), Max: 98.6 (28 Feb 2024 23:43)  HR: 66 (29 Feb 2024 10:47) (65 - 74)  BP: 124/79 (29 Feb 2024 10:47) (124/79 - 146/84)  BP(mean): --  RR: 18 (29 Feb 2024 10:47) (18 - 18)  SpO2: 95% (29 Feb 2024 10:47) (95% - 100%)    Parameters below as of 29 Feb 2024 10:47  Patient On (Oxygen Delivery Method): room air       (if applicable)    General: Alert and oriented. Able to answer question with no SOB. No acute distress .     HEENT: Head is normocephalic and atraumatic. Extraocular muscles are intact. Mucous membranes are moist.     NECK: Supple, no palpable adenopathy.    LUNGS: Clear to auscultation bilaterally with  no wheezing, rales, or rhonchi. No use of accessory muscle.     HEART: S1 S2 irregular rate and no click / rub.     ABDOMEN: Soft, nontender, and nondistended. Active bowel sounds.     EXTREMITIES: Without any cyanosis, clubbing, rash, lesions or edema.    NEUROLOGIC: Awake, alert, oriented. Right side weakness .     SKIN: Warm and moist . Non diaphoretic.       LABS:    02-29    141  |  113<H>  |  32<H>  ----------------------------<  140<H>  3.6   |  22  |  1.38<H>    Ca    10.1      29 Feb 2024 05:28  Phos  2.8     02-28  Mg     2.3     02-28        Urinalysis Basic - ( 29 Feb 2024 05:28 )    Color: x / Appearance: x / SG: x / pH: x  Gluc: 140 mg/dL / Ketone: x  / Bili: x / Urobili: x   Blood: x / Protein: x / Nitrite: x   Leuk Esterase: x / RBC: x / WBC x   Sq Epi: x / Non Sq Epi: x / Bacteria: x                      MICROBIOLOGY: (if applicable)    RADIOLOGY & ADDITIONAL STUDIES:  < from: NM Pulmonary Ventilation/Perfusion Scan (02.28.24 @ 09:24) >    ACC: [id number] EXAM:  NM PULM VENTILATION PERFUS IMG   ORDERED BY: VICENTE CASTILLO     PROCEDURE DATE:  02/28/2024          INTERPRETATION:  CLINICAL INFORMATION: Chest pain. Atrial fibrillation   Evaluate for pulmonary embolus.    RADIOPHARMACEUTICAL:1 mCi Tc-99m-DTPA via aerosol;  6.5 mCi Tc-99m-MAA,   I.V.    TECHNIQUE:  Ventilation and perfusion images of the lungs were obtained   following administration of Tc-99m-DTPA and Tc-99m-MAA. Images were   obtained in the anterior, posterior, both lateral, and all 4 oblique   views. The study was interpreted in conjunction with chest radiograph of   2/27/2024.    COMPARISON: None    OTHER STUDIES USED FOR CORRELATION: None    FINDINGS: There is heterogeneous distribution of radiopharmaceuticals in   both lungs on the ventilation and perfusion images. There is no segmental   mismatched perfusion defect.    IMPRESSION:    Very low probability of pulmonary embolus.        --- End of Report ---             MARY KENDALL MD; Attending Nuclear Medicine  This document has been electronically signed. Feb 28 2024  9:24AM    < end of copied text >     CXR: < from: Xray Chest 1 View- PORTABLE-Urgent (Xray Chest 1 View- PORTABLE-Urgent .) (02.27.24 @ 17:08) >    ACC: [id number] EXAM:  XR CHEST PORTABLE URGENT 1V   ORDERED BY: VICENTE ACSTILLO     PROCEDURE DATE:  02/27/2024          INTERPRETATION:  EXAM:XR CHEST URGENT.     CLINICAL INDICATION: Chest Pain .     TECHNIQUE: Portable AP view.     PRIOR EXAM: 02/25/2024.     FINDINGS:     Magnified cardiac and mediastinal silhouette is stable. There may have   been pulmonary congestion on the prior study which appears to have   resolved. Thoracic scoliosis and a loop recorder are again noted.      IMPRESSION:    No imaging explanation for chest pain at this time.    --- End of Report ---            JULIO CÉSAR CROSS MD; Attending Radiologist  This document has been electronically signed. Feb 28 2024 12:38PM    < end of copied text >    ECHO:    IMPRESSION: 68y Female PAST MEDICAL & SURGICAL HISTORY:  S/P Cardiac Cath  4/2022 nonobstructive CAD      HTN (hypertension)      Diabetes      Asthma  never intubated      Gastroesophageal reflux      CVA (cerebral infarction)  times 3 with residual rt sided weakness and word finding difficulty      CAD (coronary artery disease)      Peripheral arterial disease  peripheral bypass/stents      Hyperlipidemia      Atrial fibrillation      History of seizure  "very long time ago" at time of CVA - topamax      S/P total abdominal hysterectomy      Coronary stent occlusion      H/O peripheral artery bypass       Impression: This is a 67 Y/O Female was brought into the ED by EMS from home due to elevated BP of 190/121 mmHg. Unclear if she received any medication in route to the hospital. Patient mentioned that earlier today, she experienced pleuritic chest pain radiating to her upper back associated with lightheadedness and minimal dyspnea that lasted for about 90 minutes. cardiac work up Negative. VQ Scan low probability for PE.  For pulmonary follow up for  Asthma, COPD.    Suggestion:  O2 saturation 96% room air. So far saturating good room air.   Continue Symbicort 160-4.5 mcg 2 puffs Twice Daily.   On Apixaban 5 mg Oral Q 12 Hours.  Continue PRN Albuterol 90 mcg 2 puffs Q 6 Hours.  Continue Singulair 10 mg Daily.

## 2024-02-29 NOTE — PROGRESS NOTE ADULT - PROBLEM SELECTOR PLAN 3
not on any anticoagulation at home  c/w coreg 25 mg bid  Discussed with Cardiology, will start eliquis  Prescription sent to [facility] - eliquis is fully covered

## 2024-02-29 NOTE — CONSULT NOTE ADULT - NS ATTEND AMEND GEN_ALL_CORE FT
GI consulted for chronic odynophagia. Tolerating soft diet well without n/v. Currently on eliquis and plavix. Esophagram to further evaluate. Trial of PPI in case acid related.     Total time spent to complete patient's bedside assessment, physical examination, review medical chart including labs & imaging, discuss medical plan of care with housestaff was more than 50 minutes.

## 2024-03-01 DIAGNOSIS — R13.12 DYSPHAGIA, OROPHARYNGEAL PHASE: ICD-10-CM

## 2024-03-01 DIAGNOSIS — Z02.9 ENCOUNTER FOR ADMINISTRATIVE EXAMINATIONS, UNSPECIFIED: ICD-10-CM

## 2024-03-01 LAB
ANION GAP SERPL CALC-SCNC: 3 MMOL/L — LOW (ref 5–17)
BUN SERPL-MCNC: 29 MG/DL — HIGH (ref 7–18)
CALCIUM SERPL-MCNC: 9.3 MG/DL — SIGNIFICANT CHANGE UP (ref 8.4–10.5)
CHLORIDE SERPL-SCNC: 115 MMOL/L — HIGH (ref 96–108)
CO2 SERPL-SCNC: 23 MMOL/L — SIGNIFICANT CHANGE UP (ref 22–31)
CREAT SERPL-MCNC: 1.42 MG/DL — HIGH (ref 0.5–1.3)
EGFR: 41 ML/MIN/1.73M2 — LOW
GLUCOSE BLDC GLUCOMTR-MCNC: 125 MG/DL — HIGH (ref 70–99)
GLUCOSE BLDC GLUCOMTR-MCNC: 133 MG/DL — HIGH (ref 70–99)
GLUCOSE BLDC GLUCOMTR-MCNC: 149 MG/DL — HIGH (ref 70–99)
GLUCOSE BLDC GLUCOMTR-MCNC: 167 MG/DL — HIGH (ref 70–99)
GLUCOSE SERPL-MCNC: 135 MG/DL — HIGH (ref 70–99)
POTASSIUM SERPL-MCNC: 3.9 MMOL/L — SIGNIFICANT CHANGE UP (ref 3.5–5.3)
POTASSIUM SERPL-SCNC: 3.9 MMOL/L — SIGNIFICANT CHANGE UP (ref 3.5–5.3)
SODIUM SERPL-SCNC: 141 MMOL/L — SIGNIFICANT CHANGE UP (ref 135–145)

## 2024-03-01 PROCEDURE — 74220 X-RAY XM ESOPHAGUS 1CNTRST: CPT | Mod: 26

## 2024-03-01 PROCEDURE — 99232 SBSQ HOSP IP/OBS MODERATE 35: CPT

## 2024-03-01 RX ORDER — SODIUM CHLORIDE 9 MG/ML
1000 INJECTION, SOLUTION INTRAVENOUS
Refills: 0 | Status: DISCONTINUED | OUTPATIENT
Start: 2024-03-01 | End: 2024-03-03

## 2024-03-01 RX ORDER — PANTOPRAZOLE SODIUM 20 MG/1
1 TABLET, DELAYED RELEASE ORAL
Qty: 60 | Refills: 0
Start: 2024-03-01 | End: 2024-03-30

## 2024-03-01 RX ORDER — PANTOPRAZOLE SODIUM 20 MG/1
40 TABLET, DELAYED RELEASE ORAL
Refills: 0 | Status: DISCONTINUED | OUTPATIENT
Start: 2024-03-01 | End: 2024-03-03

## 2024-03-01 RX ORDER — FAMOTIDINE 10 MG/ML
1 INJECTION INTRAVENOUS
Refills: 0 | DISCHARGE

## 2024-03-01 RX ADMIN — BUDESONIDE AND FORMOTEROL FUMARATE DIHYDRATE 2 PUFF(S): 160; 4.5 AEROSOL RESPIRATORY (INHALATION) at 13:02

## 2024-03-01 RX ADMIN — Medication 100 MILLIGRAM(S): at 13:02

## 2024-03-01 RX ADMIN — LOSARTAN POTASSIUM 50 MILLIGRAM(S): 100 TABLET, FILM COATED ORAL at 05:22

## 2024-03-01 RX ADMIN — MONTELUKAST 10 MILLIGRAM(S): 4 TABLET, CHEWABLE ORAL at 21:57

## 2024-03-01 RX ADMIN — APIXABAN 5 MILLIGRAM(S): 2.5 TABLET, FILM COATED ORAL at 05:20

## 2024-03-01 RX ADMIN — BUDESONIDE AND FORMOTEROL FUMARATE DIHYDRATE 2 PUFF(S): 160; 4.5 AEROSOL RESPIRATORY (INHALATION) at 21:57

## 2024-03-01 RX ADMIN — CARVEDILOL PHOSPHATE 25 MILLIGRAM(S): 80 CAPSULE, EXTENDED RELEASE ORAL at 18:50

## 2024-03-01 RX ADMIN — APIXABAN 5 MILLIGRAM(S): 2.5 TABLET, FILM COATED ORAL at 18:49

## 2024-03-01 RX ADMIN — CARVEDILOL PHOSPHATE 25 MILLIGRAM(S): 80 CAPSULE, EXTENDED RELEASE ORAL at 05:18

## 2024-03-01 RX ADMIN — ATORVASTATIN CALCIUM 40 MILLIGRAM(S): 80 TABLET, FILM COATED ORAL at 21:57

## 2024-03-01 RX ADMIN — Medication 1: at 16:21

## 2024-03-01 NOTE — PROGRESS NOTE ADULT - NS ATTEND AMEND GEN_ALL_CORE FT
Impression: This is a 69 Y/O Female was brought into the ED by EMS from home due to elevated BP of 190/121 mmHg. Unclear if she received any medication in route to the hospital. Patient mentioned that earlier today, she experienced pleuritic chest pain radiating to her upper back associated with lightheadedness and minimal dyspnea that lasted for about 90 minutes. cardiac work up Negative. VQ Scan low probability for PE.  For pulmonary follow up for  Asthma, COPD.    Suggestion:  O2 saturation 96% room air. So far saturating good room air.   Continue Symbicort 160-4.5 mcg 2 puffs Twice Daily.   On Apixaban 5 mg Oral Q 12 Hours.  Continue PRN Albuterol 90 mcg 2 puffs Q 6 Hours.  Continue Singulair 10 mg Daily.    Protonix 40 mg bid
Impression: This is a 69 Y/O Female was brought into the ED by EMS from home due to elevated BP of 190/121 mmHg. Unclear if she received any medication in route to the hospital. Patient mentioned that earlier today, she experienced pleuritic chest pain radiating to her upper back associated with lightheadedness and minimal dyspnea that lasted for about 90 minutes. cardiac work up Negative. VQ Scan low probability for PE.  For pulmonary follow up for  Asthma, COPD.    Suggestion:  O2 saturation 98% room air. So far been saturating good room air.   Continue Symbicort 160-4.5 mcg 2 puffs Twice Daily.   On Apixaban 5 mg Oral Q 12 Hours.  Continue PRN Albuterol 90 mcg 2 puffs Q 6 Hours.  Continue Singulair 10 mg Daily.
Esophagram neg for stricture or mass. Given AC and antiplts, defer EGD. Tolerating diet well. PPI BID. Outpatient EGD. Reconsult PRN.    Total time spent to complete patient's bedside assessment, physical examination, review medical chart including labs & imaging, discuss medical plan of care with housestaff was more than 25 minutes
pt was seen and examined agree with above
pt was seen and examined agree with above

## 2024-03-01 NOTE — PHYSICAL THERAPY INITIAL EVALUATION ADULT - ACTIVE RANGE OF MOTION EXAMINATION, REHAB EVAL
limitation of motion on right upper and lower extremities due to residual weakness on right side/bilateral upper extremity Active ROM was WFL (within functional limits)/bilateral  lower extremity Active ROM was WFL (within functional limits)

## 2024-03-01 NOTE — PROGRESS NOTE ADULT - ASSESSMENT
Patient is a 68F with a PMHx of asthma, COPD (not on home O2), T2DM, GERD, HTN, L MCA CVA (3x, last one 2007, residual R hemiparesis and dysarthria), small chronic R parietal lobe infarct, seizures, , AFib (not on AC), CAD s/p coronary stent (7/22/2010), PVD/PAD (s/p R fem-pop bypass 4/14/2015, Dr. Olvera, prev on coumadin however discontinued due to bleed risk, c/b occlusion s/p reoperative left to right fem-fem bypass, R profunda fem artery endarterectomy, thrombectomy of previously placed bypass graft 2/9/2017 Dr. Cook, further c/b fem artery occlusion treated with heparin gtt 09/2018) who presented to the ED with chest pain. GI was consulted for odynophagia.     #Odynophagia  #GERD  Currently tolerating easy to chew diet, symptoms inconsistent with oropharyngeal dysphagia as patient is able to initiate swallowing reflex. Endorses n/v with PO intake however observed to tolerate breakfast this morning on bedside eval. Differentials include esophageal dysphagia (esophagitis/EoE, Schatzki ring) vs structural dysphagia (Zenker's diverticulum, hiatal hernia, stricture/stenosis) vs dysmotility (achalasia, esophageal spasms, hypertensive LES) vs malignancy vs other. Given chronicity, low suspicion for food impaction at this time. Of note, apixaban given on 2/29, last dose of clopidogrel 2/27.   3/1: Esophagram - small hiatal hernia, no esophagitis or stricture or stenosis or mass. Recommend advancing diet as tolerated, high dose PPI, and outpatient GI follow up for endoscopic evaluation.     	- Diet as tolerated  	- PO PPI BID  	- Outpatient GI follow up for endoscopic evaluation    This note and its recommendations herein are preliminary until such time as cosigned by an attending.    GI will sign off at this time.  Thank you for involving us in the care of Ms. Birgit Villavicencio.  Please re-consult GI PRN.

## 2024-03-01 NOTE — PROGRESS NOTE ADULT - ASSESSMENT
A 68 year old female, from home, AAOx3 and ambulating with a walker at baseline, with PMHx of  COPD, Left MCA CVA w/ residual dysarthria and RLE weakness, Seizures, DM, CAD s/p ZHAO, s/p cardiac cath 4/2022 w/non-obstructive CAD, AFib not on AC, and PAD s/p Right femoral popliteal bypass s/p occlusion + IR stent placement 2/2017, was brought into the ED by EMS from home due to elevated BP of 190/121 mmHg. Noted to be normotensive on admission. Unclear if medications given in route by EMS. Patient with pleuritic chest pain and elevated troponin w/o obvious acute ischemic changes in the EKG.   Admitted to telemetry for chest pain evaluation. Cardiology Dr. Stevenson following. s/p telemetry. Pt unable to have CT scan w/ contrast 2/2 ILEANA.  Hence, VQ scan performed with low probability for PE.    ILEANA worsening, placed IVF. GI following for oropharyngeal dysphagia. scheduled for Esophagram 3/1. PT consulted.

## 2024-03-01 NOTE — PHYSICAL THERAPY INITIAL EVALUATION ADULT - GAIT DEVIATIONS NOTED, PT EVAL
+ foot drag, decreased ability to perform swing phase of gait on right lower extremity/decreased juventino/decreased step length/decreased stride length

## 2024-03-01 NOTE — PHYSICAL THERAPY INITIAL EVALUATION ADULT - IMPAIRMENTS CONTRIBUTING IMPAIRED BED MOBILITY, REHAB EVAL
impaired balance/impaired motor control/impaired postural control/decreased strength

## 2024-03-01 NOTE — PROGRESS NOTE ADULT - PROBLEM SELECTOR PLAN 1
DDx to include, but not limited to Cardiovascular such as Increased demand (stable coronary artery disease lesion), aortic dissection, Arrhythmia, Uncontrolled Hypertension HF, cardiac inflammation from pericarditis, myocarditis, endocarditis; LVH, Myocardial injury, PE, Infectious, CKD, Rhabdomyolysis, or others.   EKG showed Sinus rhythm with 1st degree A-V block, Possible LA enlargement RSR' or QR pattern in V1 suggests RV conduction delay, LVH with QRS widening and repolarization abnormality  s/p ASA in the ED  HEART Pathway score 7 points  Wells' Criteria for PE 4.5 points  TSH wnl  VQ scan to r/o PE with low probability   TTE report noted   Cardio Dr Stevenson following  Pulm recs appreciated, GI consulted and planning for Esophagram

## 2024-03-01 NOTE — PROGRESS NOTE ADULT - PROBLEM SELECTOR PLAN 5
not on any anticoagulation at home  c/w coreg 25 mg bid  Discussed with Cardiology, will start eliquis  Prescription sent to [facility] - eliquis is fully covered

## 2024-03-01 NOTE — PROGRESS NOTE ADULT - SUBJECTIVE AND OBJECTIVE BOX
NP Note discussed with  Primary Attending    INTERVAL HPI/OVERNIGHT EVENTS: no new complaints    MEDICATIONS  (STANDING):  apixaban 5 milliGRAM(s) Oral every 12 hours  atorvastatin 40 milliGRAM(s) Oral at bedtime  budesonide 160 MICROgram(s)/formoterol 4.5 MICROgram(s) Inhaler 2 Puff(s) Inhalation two times a day  carvedilol 25 milliGRAM(s) Oral every 12 hours  dextrose 5%. 1000 milliLiter(s) (100 mL/Hr) IV Continuous <Continuous>  dextrose 5%. 1000 milliLiter(s) (50 mL/Hr) IV Continuous <Continuous>  dextrose 50% Injectable 25 Gram(s) IV Push once  dextrose 50% Injectable 12.5 Gram(s) IV Push once  dextrose 50% Injectable 25 Gram(s) IV Push once  glucagon  Injectable 1 milliGRAM(s) IntraMuscular once  insulin lispro (ADMELOG) corrective regimen sliding scale   SubCutaneous Before meals and at bedtime  lactated ringers. 1000 milliLiter(s) (75 mL/Hr) IV Continuous <Continuous>  losartan 50 milliGRAM(s) Oral daily  montelukast 10 milliGRAM(s) Oral at bedtime  pantoprazole    Tablet 40 milliGRAM(s) Oral at bedtime  topiramate 100 milliGRAM(s) Oral daily    MEDICATIONS  (PRN):  albuterol    90 MICROgram(s) HFA Inhaler 2 Puff(s) Inhalation every 6 hours PRN Shortness of Breath  dextrose Oral Gel 15 Gram(s) Oral once PRN Blood Glucose LESS THAN 70 milliGRAM(s)/deciliter      __________________________________________________  REVIEW OF SYSTEMS:    CONSTITUTIONAL: No fever,   EYES: no acute visual disturbances  NECK: No pain or stiffness  RESPIRATORY: No cough; No shortness of breath  CARDIOVASCULAR: No chest pain, no palpitations  GASTROINTESTINAL: No pain. No nausea or vomiting; No diarrhea   NEUROLOGICAL: No headache or numbness, no tremors  MUSCULOSKELETAL: No joint pain, no muscle pain  GENITOURINARY: no dysuria, no frequency, no hesitancy  PSYCHIATRY: no depression , no anxiety  ALL OTHER  ROS negative        Vital Signs Last 24 Hrs  T(C): 36.6 (01 Mar 2024 06:00), Max: 36.7 (29 Feb 2024 14:13)  T(F): 97.8 (01 Mar 2024 06:00), Max: 98 (29 Feb 2024 14:13)  HR: 68 (01 Mar 2024 06:00) (60 - 75)  BP: 144/83 (01 Mar 2024 06:00) (124/79 - 156/85)  BP(mean): 104 (01 Mar 2024 06:00) (104 - 104)  RR: 16 (01 Mar 2024 06:00) (16 - 18)  SpO2: 98% (01 Mar 2024 06:00) (95% - 98%)    Parameters below as of 01 Mar 2024 06:00  Patient On (Oxygen Delivery Method): room air        ________________________________________________  PHYSICAL EXAM:  GENERAL: NAD  HEENT: Normocephalic;  conjunctivae and sclerae clear; moist mucous membranes;   NECK : supple  CHEST/LUNG: Clear to auscultation bilaterally with good air entry   HEART: S1 S2  regular; no murmurs, gallops or rubs  ABDOMEN: Soft, Nontender, Nondistended; Bowel sounds present  EXTREMITIES: no cyanosis; no edema; no calf tenderness  SKIN: warm and dry; no rash  NERVOUS SYSTEM:  Awake and alert; Oriented  to place, person and time ; no new deficits    _________________________________________________  LABS:    03-01    141  |  115<H>  |  29<H>  ----------------------------<  135<H>  3.9   |  23  |  1.42<H>    Ca    9.3      01 Mar 2024 05:01        Urinalysis Basic - ( 01 Mar 2024 05:01 )    Color: x / Appearance: x / SG: x / pH: x  Gluc: 135 mg/dL / Ketone: x  / Bili: x / Urobili: x   Blood: x / Protein: x / Nitrite: x   Leuk Esterase: x / RBC: x / WBC x   Sq Epi: x / Non Sq Epi: x / Bacteria: x      CAPILLARY BLOOD GLUCOSE      POCT Blood Glucose.: 125 mg/dL (01 Mar 2024 07:23)  POCT Blood Glucose.: 125 mg/dL (29 Feb 2024 21:58)  POCT Blood Glucose.: 94 mg/dL (29 Feb 2024 16:24)  POCT Blood Glucose.: 236 mg/dL (29 Feb 2024 11:55)        RADIOLOGY & ADDITIONAL TESTS:    Imaging  Reviewed:  YES    Consultant(s) Notes Reviewed:   YES      Plan of care was discussed with patient and /or primary care giver; all questions and concerns were addressed  NP Note discussed with  Primary Attending    INTERVAL HPI/OVERNIGHT EVENTS: Seen at bedside, pt denies any pain at this time, NPO for schedule esophagram.     MEDICATIONS  (STANDING):  apixaban 5 milliGRAM(s) Oral every 12 hours  atorvastatin 40 milliGRAM(s) Oral at bedtime  budesonide 160 MICROgram(s)/formoterol 4.5 MICROgram(s) Inhaler 2 Puff(s) Inhalation two times a day  carvedilol 25 milliGRAM(s) Oral every 12 hours  dextrose 5%. 1000 milliLiter(s) (100 mL/Hr) IV Continuous <Continuous>  dextrose 5%. 1000 milliLiter(s) (50 mL/Hr) IV Continuous <Continuous>  dextrose 50% Injectable 25 Gram(s) IV Push once  dextrose 50% Injectable 12.5 Gram(s) IV Push once  dextrose 50% Injectable 25 Gram(s) IV Push once  glucagon  Injectable 1 milliGRAM(s) IntraMuscular once  insulin lispro (ADMELOG) corrective regimen sliding scale   SubCutaneous Before meals and at bedtime  lactated ringers. 1000 milliLiter(s) (75 mL/Hr) IV Continuous <Continuous>  losartan 50 milliGRAM(s) Oral daily  montelukast 10 milliGRAM(s) Oral at bedtime  pantoprazole    Tablet 40 milliGRAM(s) Oral at bedtime  topiramate 100 milliGRAM(s) Oral daily    MEDICATIONS  (PRN):  albuterol    90 MICROgram(s) HFA Inhaler 2 Puff(s) Inhalation every 6 hours PRN Shortness of Breath  dextrose Oral Gel 15 Gram(s) Oral once PRN Blood Glucose LESS THAN 70 milliGRAM(s)/deciliter      __________________________________________________  REVIEW OF SYSTEMS:    CONSTITUTIONAL: No fever,   EYES: no acute visual disturbances  NECK: No pain or stiffness  RESPIRATORY: No cough; No shortness of breath  CARDIOVASCULAR: No chest pain, no palpitations  GASTROINTESTINAL: No pain. No nausea or vomiting; No diarrhea   NEUROLOGICAL: No headache or numbness, no tremors  MUSCULOSKELETAL: No joint pain, no muscle pain  GENITOURINARY: no dysuria, no frequency, no hesitancy  PSYCHIATRY: no depression , no anxiety  ALL OTHER  ROS negative        Vital Signs Last 24 Hrs  T(C): 36.6 (01 Mar 2024 06:00), Max: 36.7 (29 Feb 2024 14:13)  T(F): 97.8 (01 Mar 2024 06:00), Max: 98 (29 Feb 2024 14:13)  HR: 68 (01 Mar 2024 06:00) (60 - 75)  BP: 144/83 (01 Mar 2024 06:00) (124/79 - 156/85)  BP(mean): 104 (01 Mar 2024 06:00) (104 - 104)  RR: 16 (01 Mar 2024 06:00) (16 - 18)  SpO2: 98% (01 Mar 2024 06:00) (95% - 98%)    Parameters below as of 01 Mar 2024 06:00  Patient On (Oxygen Delivery Method): room air        ________________________________________________  PHYSICAL EXAM:  GENERAL: NAD  HEENT: Normocephalic;  conjunctivae and sclerae clear; moist mucous membranes;   NECK : supple  CHEST/LUNG: Clear to auscultation bilaterally with good air entry   HEART: S1 S2  regular; no murmurs, gallops or rubs  ABDOMEN: Soft, Nontender, Nondistended; Bowel sounds present  EXTREMITIES: no cyanosis; no edema; no calf tenderness  SKIN: warm and dry; no rash  NERVOUS SYSTEM:  Awake and alert; Oriented  to place, person dysarthria ; no new deficits    _________________________________________________  LABS:    03-01    141  |  115<H>  |  29<H>  ----------------------------<  135<H>  3.9   |  23  |  1.42<H>    Ca    9.3      01 Mar 2024 05:01        Urinalysis Basic - ( 01 Mar 2024 05:01 )    Color: x / Appearance: x / SG: x / pH: x  Gluc: 135 mg/dL / Ketone: x  / Bili: x / Urobili: x   Blood: x / Protein: x / Nitrite: x   Leuk Esterase: x / RBC: x / WBC x   Sq Epi: x / Non Sq Epi: x / Bacteria: x      CAPILLARY BLOOD GLUCOSE      POCT Blood Glucose.: 125 mg/dL (01 Mar 2024 07:23)  POCT Blood Glucose.: 125 mg/dL (29 Feb 2024 21:58)  POCT Blood Glucose.: 94 mg/dL (29 Feb 2024 16:24)  POCT Blood Glucose.: 236 mg/dL (29 Feb 2024 11:55)        RADIOLOGY & ADDITIONAL TESTS:    Imaging  Reviewed:  YES    < from: Xray Chest 1 View- PORTABLE-Urgent (Xray Chest 1 View- PORTABLE-Urgent .) (02.27.24 @ 17:08) >    ACC: [id number] EXAM:  XR CHEST PORTABLE URGENT 1V   ORDERED BY: VICENTE CASTILLO     PROCEDURE DATE:  02/27/2024          INTERPRETATION:  EXAM:XR CHEST URGENT.     CLINICAL INDICATION: Chest Pain .     TECHNIQUE: Portable AP view.     PRIOR EXAM: 02/25/2024.     FINDINGS:     Magnified cardiac and mediastinal silhouette is stable. There may have   been pulmonary congestion on the prior study which appears to have   resolved. Thoracic scoliosis and a loop recorder are again noted.      IMPRESSION:    No imaging explanation for chest pain at this time.    --- End of Report ---            JULIO CÉSAR CROSS MD; Attending Radiologist  This document has been electronically signed. Feb 28 2024 12:38PM    < end of copied text >  Consultant(s) Notes Reviewed:   YES      Plan of care was discussed with patient and /or primary care giver; all questions and concerns were addressed

## 2024-03-01 NOTE — PROGRESS NOTE ADULT - PROBLEM SELECTOR PLAN 1
DDx to include, but not limited to Cardiovascular such as Increased demand (stable coronary artery disease lesion), aortic dissection, Arrhythmia, Uncontrolled Hypertension HF, cardiac inflammation from pericarditis, myocarditis, endocarditis; LVH, Myocardial injury, PE, Infectious, CKD, Rhabdomyolysis, or others.   EKG showed Sinus rhythm with 1st degree A-V block, Possible LA enlargement RSR' or QR pattern in V1 suggests RV conduction delay, LVH with QRS widening and repolarization abnormality  s/p ASA in the ED  HEART Pathway score 7 points  Wells' Criteria for PE 4.5 points  TSH wnl  VQ scan to r/o PE with low probability   TTE report noted   Cardio Dr Stevenson following  Pain resolved, DC tele DDx to include, but not limited to Cardiovascular such as Increased demand (stable coronary artery disease lesion), aortic dissection, Arrhythmia, Uncontrolled Hypertension HF, cardiac inflammation from pericarditis, myocarditis, endocarditis; LVH, Myocardial injury, PE, Infectious, CKD, Rhabdomyolysis, or others.   EKG showed Sinus rhythm with 1st degree A-V block, Possible LA enlargement RSR' or QR pattern in V1 suggests RV conduction delay, LVH with QRS widening and repolarization abnormality  s/p ASA in the ED  HEART Pathway score 7 points  Wells' Criteria for PE 4.5 points  TSH wnl  VQ scan to r/o PE with low probability   TTE report noted   Cardio Dr Stevenson following  Chest Pain resolved, DC tele

## 2024-03-01 NOTE — PROGRESS NOTE ADULT - PROBLEM SELECTOR PLAN 7
LDL noted 127  Will continue BB therapy  increase atorvastatin to 40 mg daily  dc aspirin and plavix  start eliquis   DASH/TLC diet LDL noted 127  Will continue BB therapy  increase atorvastatin to 40 mg daily  dc aspirin and plavix  started eliquis   DASH/TLC diet

## 2024-03-01 NOTE — PHYSICAL THERAPY INITIAL EVALUATION ADULT - GENERAL OBSERVATIONS, REHAB EVAL
awake, alert, NAD; + peripheral IV access on left forearm; residual weakness on right upper and lower extremities; + prima fit to cannister, on wall suction

## 2024-03-01 NOTE — PROGRESS NOTE ADULT - SUBJECTIVE AND OBJECTIVE BOX
GI Progress Note    Patient is a 68y old  Female who presents with a chief complaint of Chest pain (01 Mar 2024 10:41)    GI was consulted for odynophagia.    24-HOUR INTERVAL EVENTS: Patient sitting up in bed, offers no acute complaints. Esophagram notable for small HH otherwise no stricture/stenosis/mass noted.     MEDICATIONS  (STANDING):  apixaban 5 milliGRAM(s) Oral every 12 hours  atorvastatin 40 milliGRAM(s) Oral at bedtime  budesonide 160 MICROgram(s)/formoterol 4.5 MICROgram(s) Inhaler 2 Puff(s) Inhalation two times a day  carvedilol 25 milliGRAM(s) Oral every 12 hours  dextrose 5%. 1000 milliLiter(s) (50 mL/Hr) IV Continuous <Continuous>  dextrose 5%. 1000 milliLiter(s) (100 mL/Hr) IV Continuous <Continuous>  dextrose 50% Injectable 12.5 Gram(s) IV Push once  dextrose 50% Injectable 25 Gram(s) IV Push once  dextrose 50% Injectable 25 Gram(s) IV Push once  glucagon  Injectable 1 milliGRAM(s) IntraMuscular once  insulin lispro (ADMELOG) corrective regimen sliding scale   SubCutaneous Before meals and at bedtime  lactated ringers. 1000 milliLiter(s) (75 mL/Hr) IV Continuous <Continuous>  losartan 50 milliGRAM(s) Oral daily  montelukast 10 milliGRAM(s) Oral at bedtime  pantoprazole    Tablet 40 milliGRAM(s) Oral at bedtime  topiramate 100 milliGRAM(s) Oral daily    MEDICATIONS  (PRN):  albuterol    90 MICROgram(s) HFA Inhaler 2 Puff(s) Inhalation every 6 hours PRN Shortness of Breath  dextrose Oral Gel 15 Gram(s) Oral once PRN Blood Glucose LESS THAN 70 milliGRAM(s)/deciliter    __________________________________________________  REVIEW OF SYSTEMS:  A detailed set of ROS were asked and negative except those outlined in GI HPI above/below.   ________________________________________________  PHYSICAL EXAM    Vital Signs Last 24 Hrs  T(C): 36.6 (01 Mar 2024 06:00), Max: 36.7 (29 Feb 2024 14:13)  T(F): 97.8 (01 Mar 2024 06:00), Max: 98 (29 Feb 2024 14:13)  HR: 68 (01 Mar 2024 06:00) (60 - 75)  BP: 144/83 (01 Mar 2024 06:00) (142/87 - 156/85)  BP(mean): 104 (01 Mar 2024 06:00) (104 - 104)  RR: 16 (01 Mar 2024 06:00) (16 - 18)  SpO2: 98% (01 Mar 2024 06:00) (97% - 98%)    Parameters below as of 01 Mar 2024 06:00  Patient On (Oxygen Delivery Method): room air        GEN: NAD  HEENT: EOMI, conjunctivae anicteric, neck supple, moist mucous membranes  PULM: LCTAB, no wheezing, rales, or rhonchi  CV: RRR, no m/r/g  GI: soft, NT, ND; +BS in all four quadrants, no ascites, no Blum's sign  MSK: SALEH, no edema  NEURO: A&O x 3, no gross deficits  _________________________________________________  LABS:    03-01    141  |  115<H>  |  29<H>  ----------------------------<  135<H>  3.9   |  23  |  1.42<H>    Ca    9.3      01 Mar 2024 05:01        Urinalysis Basic - ( 01 Mar 2024 05:01 )    Color: x / Appearance: x / SG: x / pH: x  Gluc: 135 mg/dL / Ketone: x  / Bili: x / Urobili: x   Blood: x / Protein: x / Nitrite: x   Leuk Esterase: x / RBC: x / WBC x   Sq Epi: x / Non Sq Epi: x / Bacteria: x      CAPILLARY BLOOD GLUCOSE      POCT Blood Glucose.: 133 mg/dL (01 Mar 2024 11:07)  POCT Blood Glucose.: 125 mg/dL (01 Mar 2024 07:23)  POCT Blood Glucose.: 125 mg/dL (29 Feb 2024 21:58)  POCT Blood Glucose.: 94 mg/dL (29 Feb 2024 16:24)    SARS-CoV-2: NotDetec (25 Feb 2024 18:00)      RADIOLOGY & ADDITIONAL TESTS:    < from: Xray Esophagram Single Contrast (03.01.24 @ 08:34) >  XR ESOPH SNGL CON STUDY   ORDERED BY: AZUCENA CHAMPAGNE     PROCEDURE DATE:  03/01/2024          INTERPRETATION:  Esophagram    HISTORY: Epigastric pain    A single contrast esophagram was performed with the patient lying   semiupright in LPO and RPO positioning.    Interpretation: The upper esophagus shows tortuosity along the aortic   knob. The esophagus is normal in caliber, mucosal pattern and   peristalsis. Transient tertiary contractions are seen in the distal   esophagus.The GE junction is unremarkable. Small hiatal hernia is   present.    IMPRESSION: Small hiatal hernia. No radiographic evidence of esophagitis.    < end of copied text >

## 2024-03-01 NOTE — PHYSICAL THERAPY INITIAL EVALUATION ADULT - MANUAL MUSCLE TESTING RESULTS, REHAB EVAL
MMT on right upper extremity grossly graded 2/5; right lower extremity MMT, grossly graded 3/5; left upper and lower extremities grossly graded 4-/5

## 2024-03-01 NOTE — PHYSICAL THERAPY INITIAL EVALUATION ADULT - PERTINENT HX OF CURRENT PROBLEM, REHAB EVAL
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

## 2024-03-01 NOTE — PROGRESS NOTE ADULT - PROBLEM SELECTOR PLAN 3
not on any anticoagulation at home  c/w coreg 25 mg bid  Discussed with Cardiology, will start eliquis  Prescription sent to [facility] - eliquis is fully covered

## 2024-03-01 NOTE — PHYSICAL THERAPY INITIAL EVALUATION ADULT - FOLLOWS COMMANDS/ANSWERS QUESTIONS, REHAB EVAL
100% of the time/able to follow multistep instructions/aphasia dysarthria; poor movement of tongue to the right/100% of the time/able to follow multistep instructions

## 2024-03-01 NOTE — PROGRESS NOTE ADULT - PROBLEM SELECTOR PROBLEM 8
SUBJECTIVE:  Patient has been complaining of recurrent right lower extremity pain at the site of the wound    Current Medications:  Current Facility-Administered Medications   Medication Dose Route Frequency Provider Last Rate Last Admin   • HYDROcodone-acetaminophen (NORCO) 5-325 MG per tablet 1 tablet  1 tablet Oral Q6H PRN Anatoliy Chavez MD   1 tablet at 11/16/21 0749   • anastrozole (ARIMIDEX) tablet 1 mg  1 mg Oral Daily Anatoliy Chavez MD   1 mg at 11/16/21 0751   • atorvastatin (LIPITOR) tablet 80 mg  80 mg Oral Nightly Anatoliy Chavez MD   80 mg at 11/15/21 2024   • digoxin (LANOXIN) tablet 125 mcg  125 mcg Oral Daily Anatoliy Chavez MD   125 mcg at 11/16/21 0749   • metoPROLOL succinate (TOPROL-XL) ER tablet 100 mg  100 mg Oral Daily Anatoliy Chavez MD       • cholecalciferol (VITAMIN D) tablet 25 mcg  25 mcg Oral Daily Anatoliy Chavez MD   25 mcg at 11/16/21 0751   • lisinopril (ZESTRIL) tablet 40 mg  40 mg Oral Daily Anatoliy Chavez MD   40 mg at 11/16/21 0751   • dilTIAZem (CARDIZEM) tablet 60 mg  60 mg Oral TID Anatoliy Chavez MD   60 mg at 11/16/21 0550   • magnesium oxide (MAG-OX) tablet 400 mg  400 mg Oral Daily Anatoliy Chavez MD   400 mg at 11/16/21 0748   • apixaBAN (ELIQUIS) tablet 2.5 mg  2.5 mg Oral 2 times per day Anatoliy Chavez MD   2.5 mg at 11/16/21 0749   • furosemide (LASIX) tablet 40 mg  40 mg Oral BID Anatoliy Chavez MD   40 mg at 11/16/21 0751   • donepezil (ARICEPT) tablet 5 mg  5 mg Oral Nightly Anatoliy Chavez MD   5 mg at 11/15/21 2024   • pantoprazole (PROTONIX) EC tablet 40 mg  40 mg Oral Nightly Anatoliy Chavez MD   40 mg at 11/15/21 2024   • dextrose 5 % / sodium chloride 0.45% with KCl 20 mEq infusion   Intravenous Continuous Anatoliy Chavez  mL/hr at 11/16/21 0549 New Bag at 11/16/21 0549   • acetaminophen (TYLENOL) tablet 1,000 mg  1,000 mg Oral TID PRN Anatoliy Chavez MD   1,000 mg at 11/16/21 0550   • NON FORMULARY 175  DM (diabetes mellitus) mcg  175 mcg Mouth/Throat Daily Anatoliy Chavez MD       • formoterol (PERFOROMIST) inhalation solution 20 mcg  20 mcg Nebulization BID Resp Anatoliy Chavez MD           OBJECTIVE:  Last Recorded Vitals  Visit Vitals  /70   Pulse 96   Temp 97 °F (36.1 °C)   Resp 16   Ht 5' 2\" (1.575 m)   Wt 52 kg (114 lb 10.2 oz)   SpO2 96%   BMI 20.97 kg/m²     Body mass index is 20.97 kg/m².    I/O's    Intake/Output Summary (Last 24 hours) at 11/16/2021 0852  Last data filed at 11/16/2021 0500  Gross per 24 hour   Intake 938.03 ml   Output 500 ml   Net 438.03 ml         Physical Exam  Constitutional:       Appearance: Normal appearance.   HENT:      Head: Normocephalic and atraumatic.      Mouth/Throat:      Mouth: Mucous membranes are moist.      Pharynx: Oropharynx is clear.   Eyes:      Extraocular Movements: Extraocular movements intact.      Conjunctiva/sclera: Conjunctivae normal.      Pupils: Pupils are equal, round, and reactive to light.   Cardiovascular:      Rate and Rhythm: Normal rate and regular rhythm.      Pulses: Normal pulses.      Heart sounds: Normal heart sounds.   Pulmonary:      Effort: Pulmonary effort is normal.      Breath sounds: Normal breath sounds.   Abdominal:      General: Bowel sounds are normal.      Palpations: Abdomen is soft.   Musculoskeletal:         General: Normal range of motion.      Cervical back: Normal range of motion and neck supple.   Skin:     General: Skin is warm and dry.      Comments: 1 cm abrasion of the right anterior shin without bleeding   Neurological:      General: No focal deficit present.      Mental Status: She is alert and oriented to person, place, and time.   Psychiatric:         Mood and Affect: Mood normal.         Behavior: Behavior normal.         Labs   Recent Results (from the past 24 hour(s))   Comprehensive Metabolic Panel    Collection Time: 11/15/21 11:53 AM   Result Value Ref Range    Fasting Status      Sodium 141 135 - 145 mmol/L     Potassium 4.7 3.4 - 5.1 mmol/L    Chloride 105 98 - 107 mmol/L    Carbon Dioxide 28 21 - 32 mmol/L    Anion Gap 13 10 - 20 mmol/L    Glucose 116 (H) 70 - 99 mg/dL    BUN 42 (H) 6 - 20 mg/dL    Creatinine 1.48 (H) 0.51 - 0.95 mg/dL    Glomerular Filtration Rate 31 (L) >=60    BUN/ Creatinine Ratio 28 (H) 7 - 25    Calcium 8.9 8.4 - 10.2 mg/dL    Bilirubin, Total 0.6 0.2 - 1.0 mg/dL    GOT/AST 30 <=37 Units/L    GPT/ALT 21 <64 Units/L    Alkaline Phosphatase 79 45 - 117 Units/L    Albumin 3.1 (L) 3.6 - 5.1 g/dL    Protein, Total 6.9 6.4 - 8.2 g/dL    Globulin 3.8 2.0 - 4.0 g/dL    A/G Ratio 0.8 (L) 1.0 - 2.4   Prothrombin Time    Collection Time: 11/15/21 11:53 AM   Result Value Ref Range    Prothrombin Time 12.4 (H) 9.7 - 11.8 sec    INR 1.2     Partial Thromboplastin Time    Collection Time: 11/15/21 11:53 AM   Result Value Ref Range    PTT 24 22 - 30 sec   Lactic Acid, Venous    Collection Time: 11/15/21 11:53 AM   Result Value Ref Range    Lactate, Venous 2.7 (HH) 0.0 - 2.0 mmol/L   Blood Culture    Collection Time: 11/15/21 11:53 AM    Specimen: Blood   Result Value Ref Range    Culture, Blood or Bone Marrow No Growth <24 hours    Blood Culture    Collection Time: 11/15/21 11:53 AM    Specimen: Blood   Result Value Ref Range    Culture, Blood or Bone Marrow No Growth <24 hours    CBC with Automated Differential (performable only)    Collection Time: 11/15/21 11:53 AM   Result Value Ref Range    WBC 12.0 (H) 4.2 - 11.0 K/mcL    RBC 2.89 (L) 4.00 - 5.20 mil/mcL    HGB 8.5 (L) 12.0 - 15.5 g/dL    HCT 26.8 (L) 36.0 - 46.5 %    MCV 92.7 78.0 - 100.0 fl    MCH 29.4 26.0 - 34.0 pg    MCHC 31.7 (L) 32.0 - 36.5 g/dL    RDW-CV 15.0 11.0 - 15.0 %    RDW-SD 51.5 (H) 39.0 - 50.0 fL     140 - 450 K/mcL    NRBC 0 <=0 /100 WBC    Neutrophil, Percent 72 %    Lymphocytes, Percent 21 %    Mono, Percent 5 %    Eosinophils, Percent 0 %    Basophils, Percent 1 %    Immature Granulocytes 1 %    Absolute Neutrophils 8.6 (H) 1.8 -  7.7 K/mcL    Absolute Lymphocytes 2.6 1.0 - 4.0 K/mcL    Absolute Monocytes 0.6 0.3 - 0.9 K/mcL    Absolute Eosinophils  0.0 0.0 - 0.5 K/mcL    Absolute Basophils 0.1 0.0 - 0.3 K/mcL    Absolute Immmature Granulocytes 0.1 0.0 - 0.2 K/mcL   TYPE/SCREEN    Collection Time: 11/15/21  2:29 PM   Result Value Ref Range    ABO/RH(D) O Rh Negative     ANTIBODY SCREEN Negative     TYPE AND SCREEN EXPIRATION DATE 11/18/2021 23:59    Lactic Acid, Venous    Collection Time: 11/15/21  3:27 PM   Result Value Ref Range    Lactate, Venous 0.9 0.0 - 2.0 mmol/L   Comprehensive Metabolic Panel    Collection Time: 11/16/21  6:47 AM   Result Value Ref Range    Fasting Status      Sodium 141 135 - 145 mmol/L    Potassium 4.6 3.4 - 5.1 mmol/L    Chloride 107 98 - 107 mmol/L    Carbon Dioxide 27 21 - 32 mmol/L    Anion Gap 12 10 - 20 mmol/L    Glucose 116 (H) 70 - 99 mg/dL    BUN 34 (H) 6 - 20 mg/dL    Creatinine 1.01 (H) 0.51 - 0.95 mg/dL    Glomerular Filtration Rate 48 (L) >=60    BUN/ Creatinine Ratio 34 (H) 7 - 25    Calcium 8.0 (L) 8.4 - 10.2 mg/dL    Bilirubin, Total 0.5 0.2 - 1.0 mg/dL    GOT/AST 28 <=37 Units/L    GPT/ALT 21 <64 Units/L    Alkaline Phosphatase 64 45 - 117 Units/L    Albumin 2.6 (L) 3.6 - 5.1 g/dL    Protein, Total 6.0 (L) 6.4 - 8.2 g/dL    Globulin 3.4 2.0 - 4.0 g/dL    A/G Ratio 0.8 (L) 1.0 - 2.4   CBC with Automated Differential (performable only)    Collection Time: 11/16/21  6:47 AM   Result Value Ref Range    WBC 9.3 4.2 - 11.0 K/mcL    RBC 2.47 (L) 4.00 - 5.20 mil/mcL    HGB 7.6 (L) 12.0 - 15.5 g/dL    HCT 23.3 (L) 36.0 - 46.5 %    MCV 94.3 78.0 - 100.0 fl    MCH 30.8 26.0 - 34.0 pg    MCHC 32.6 32.0 - 36.5 g/dL    RDW-CV 15.4 (H) 11.0 - 15.0 %    RDW-SD 52.9 (H) 39.0 - 50.0 fL     140 - 450 K/mcL    NRBC 0 <=0 /100 WBC    Neutrophil, Percent 61 %    Lymphocytes, Percent 30 %    Mono, Percent 7 %    Eosinophils, Percent 1 %    Basophils, Percent 1 %    Immature Granulocytes 0 %    Absolute Neutrophils  5.7 1.8 - 7.7 K/mcL    Absolute Lymphocytes 2.8 1.0 - 4.0 K/mcL    Absolute Monocytes 0.6 0.3 - 0.9 K/mcL    Absolute Eosinophils  0.1 0.0 - 0.5 K/mcL    Absolute Basophils 0.1 0.0 - 0.3 K/mcL    Absolute Immmature Granulocytes 0.0 0.0 - 0.2 K/mcL        Imaging  NM THYROID IMAGING W UPTAKE MULTI, NM TUMOR SPECT-CT SINGLE AREA SINGLE DAY  Narrative: EXAM: NM THYROID IMAGING W UPTAKE MULTI, NM TUMOR SPECT-CT SINGLE AREA  SINGLE DAY    CLINICAL INDICATION: Elevated TSH, history of partial thyroidectomy    COMPARISON: Ultrasound thyroid 03/27/2021.    TECHNIQUE: Radiopharmaceutical used: 333 uCi of I-123 orally ingested.   Planar images of the thyroid gland were acquired.  Low-dose CT images of  the neck and chest were acquired. In addition, SPECT-CT was performed of  the neck.    FINDINGS:    Planar images demonstrate markedly enlarged left thyroid lobe with  heterogeneous uptake, with possible hot nodule at the lower pole. Modest  uptake in the right lobe is relatively uniform. No cold nodules are seen.    The 6 hour I-123 thyroid uptake is 9.8% (normal range is 10 to 30%).    The 24 hour uptake is 10.2% (normal range is 10 to 40%).    On low-dose CT accompanying SPECT, incidental note is made of a 1 cm lung  nodule in the right middle lobe (series 11 image 138). Few additional  scattered small areas of groundglass opacity are nonspecific. Severe  atherosclerotic changes.  Impression: 1.   Enlarged left thyroid lobe with possible hot nodule at the lower pole.  Overall findings compatible with multinodular goiter.  2.   Normal radioiodine uptake.  3.   Incidental note of right middle lobe lung nodule measuring 1 cm.  Recommend dedicated CT chest for further assessment.    I, JERSON CONTRERAS M.D., have reviewed the images and report and concur  with these findings interpreted by Dennis Castanon.    Electronically Signed by: JERSON CONTRERAS M.D.   Signed on: 10/29/2021 10:43 AM         ASSESSMENT/PLAN:  Varicose  vein of leg, right with bleeding  Compressive dressings  Continue Eliquis unless bleeding cannot be controlled    Atrial fibrillation (CMS/HCC)  Continue Eliquis 2.5mg BID  Digoxin 0.125 mg qd  Toprol  mg qd    Essential hypertension  Lisinopril 40mg qd  Cardizem 60 mg TID  Toprol  mg qd    Hyperlipidemia  Lipitor 80 mg qd    COPD (chronic obstructive pulmonary disease) (CMS/HCC)  Yulperi 1 neb qd  Brovana 1 neb BID      Thyroid nodule  Hot left thyroid nodule  Will consult ENT for eval    Solitary pulmonary nodule, RML  CT of chest    Anemia  Dropping H&H  Ferritin, iron panel, B12, folate, and Hemoccult of stool  CBC in a.m.        DVT: Eliquis 2.5 mg by mouth two times daily (BID)    GI Ppx: Pantoprazole PO      Smoking Cessation  Counseling given: Not Answered  Comment: quit at age 58         Anatoliy Chavez MD

## 2024-03-01 NOTE — PROGRESS NOTE ADULT - PROBLEM SELECTOR PLAN 6
pt p/w creatinine 1.5  on admission SCr 1.3   unclear baseline  monitor BMP daily    Avoid Nephrotoxic Meds/ Agents such as (NSAIDs, IV contrast, Aminoglycosides such as gentamicin, Gadolinium contrast, Phosphate containing enemas, etc..) pt p/w creatinine 1.5  on admission SCr 1.3   unclear baseline  monitor BMP daily  Cr 1.42 trending up from 1.38, will give gentle IV hydration    Avoid Nephrotoxic Meds/ Agents such as (NSAIDs, IV contrast, Aminoglycosides such as gentamicin, Gadolinium contrast, Phosphate containing enemas, etc..)

## 2024-03-01 NOTE — PROGRESS NOTE ADULT - SUBJECTIVE AND OBJECTIVE BOX
Time of Visit:  Patient seen and examined.     MEDICATIONS  (STANDING):  apixaban 5 milliGRAM(s) Oral every 12 hours  atorvastatin 40 milliGRAM(s) Oral at bedtime  budesonide 160 MICROgram(s)/formoterol 4.5 MICROgram(s) Inhaler 2 Puff(s) Inhalation two times a day  carvedilol 25 milliGRAM(s) Oral every 12 hours  dextrose 5%. 1000 milliLiter(s) (100 mL/Hr) IV Continuous <Continuous>  dextrose 5%. 1000 milliLiter(s) (50 mL/Hr) IV Continuous <Continuous>  dextrose 50% Injectable 25 Gram(s) IV Push once  dextrose 50% Injectable 25 Gram(s) IV Push once  dextrose 50% Injectable 12.5 Gram(s) IV Push once  glucagon  Injectable 1 milliGRAM(s) IntraMuscular once  insulin lispro (ADMELOG) corrective regimen sliding scale   SubCutaneous Before meals and at bedtime  lactated ringers. 1000 milliLiter(s) (75 mL/Hr) IV Continuous <Continuous>  losartan 50 milliGRAM(s) Oral daily  montelukast 10 milliGRAM(s) Oral at bedtime  pantoprazole    Tablet 40 milliGRAM(s) Oral at bedtime  topiramate 100 milliGRAM(s) Oral daily      MEDICATIONS  (PRN):  albuterol    90 MICROgram(s) HFA Inhaler 2 Puff(s) Inhalation every 6 hours PRN Shortness of Breath  dextrose Oral Gel 15 Gram(s) Oral once PRN Blood Glucose LESS THAN 70 milliGRAM(s)/deciliter       Medications up to date at time of exam.    ROS; No fever, chills, cough, nasal congestion on exam.    PHYSICAL EXAMINATION:    Vital Signs Last 24 Hrs  T(C): 36.6 (01 Mar 2024 06:00), Max: 36.7 (29 Feb 2024 14:13)  T(F): 97.8 (01 Mar 2024 06:00), Max: 98 (29 Feb 2024 14:13)  HR: 68 (01 Mar 2024 06:00) (60 - 75)  BP: 144/83 (01 Mar 2024 06:00) (142/87 - 156/85)  BP(mean): 104 (01 Mar 2024 06:00) (104 - 104)  RR: 16 (01 Mar 2024 06:00) (16 - 18)  SpO2: 98% (01 Mar 2024 06:00) (97% - 98%)    Parameters below as of 01 Mar 2024 06:00  Patient On (Oxygen Delivery Method): room air       General: Alert and oriented. Able to answer question with no SOB. No acute distress .     HEENT: Head is normocephalic and atraumatic. No nasal tenderness . Extraocular muscles are intact. Mucous membranes are moist.     NECK: Supple, no palpable adenopathy.    LUNGS: Clear to auscultation bilaterally with  no wheezing, rales, or rhonchi. Non labored. No use of accessory muscle.     HEART: S1 S2 irregular rate and no click / rub.     ABDOMEN: Soft, nontender, and nondistended. Active bowel sounds.     EXTREMITIES: Without any cyanosis, clubbing, rash, lesions or edema.    NEUROLOGIC: Awake, alert, oriented. Right side weakness .     SKIN: Warm and moist . Non diaphoretic.       LABS:    03-01    141  |  115<H>  |  29<H>  ----------------------------<  135<H>  3.9   |  23  |  1.42<H>    Ca    9.3      01 Mar 2024 05:01        Urinalysis Basic - ( 01 Mar 2024 05:01 )    Color: x / Appearance: x / SG: x / pH: x  Gluc: 135 mg/dL / Ketone: x  / Bili: x / Urobili: x   Blood: x / Protein: x / Nitrite: x   Leuk Esterase: x / RBC: x / WBC x   Sq Epi: x / Non Sq Epi: x / Bacteria: x                      MICROBIOLOGY: (if applicable)    RADIOLOGY & ADDITIONAL STUDIES:  EKG:   CXR:  ECHO:    IMPRESSION: 68y Female PAST MEDICAL & SURGICAL HISTORY:  S/P Cardiac Cath  4/2022 nonobstructive CAD      HTN (hypertension)      Diabetes      Asthma  never intubated      Gastroesophageal reflux      CVA (cerebral infarction)  times 3 with residual rt sided weakness and word finding difficulty      CAD (coronary artery disease)      Peripheral arterial disease  peripheral bypass/stents      Hyperlipidemia      Atrial fibrillation      History of seizure  "very long time ago" at time of CVA - topamax      S/P total abdominal hysterectomy      Coronary stent occlusion      H/O peripheral artery bypass      Impression: This is a 67 Y/O Female was brought into the ED by EMS from home due to elevated BP of 190/121 mmHg. Unclear if she received any medication in route to the hospital. Patient mentioned that earlier today, she experienced pleuritic chest pain radiating to her upper back associated with lightheadedness and minimal dyspnea that lasted for about 90 minutes. cardiac work up Negative. VQ Scan low probability for PE.  For pulmonary follow up for  Asthma, COPD.    Suggestion:  O2 saturation 98% room air. So far been saturating good room air.   Continue Symbicort 160-4.5 mcg 2 puffs Twice Daily.   On Apixaban 5 mg Oral Q 12 Hours.  Continue PRN Albuterol 90 mcg 2 puffs Q 6 Hours.  Continue Singulair 10 mg Daily.

## 2024-03-01 NOTE — PHYSICAL THERAPY INITIAL EVALUATION ADULT - DIAGNOSIS, PT EVAL
admitted due to chest pain, elevated BP; generalized weakness; residual right sided weakness with gait disturbance and difficulty performing transfers and ambulation

## 2024-03-01 NOTE — PROGRESS NOTE ADULT - ASSESSMENT
A 68 year old female, from home, AAOx3 and ambulating with a walker at baseline, with PMHx of  COPD, Left MCA CVA w/ residual dysarthria and RLE weakness, Seizures, DM, CAD s/p ZHAO, s/p cardiac cath 4/2022 w/non-obstructive CAD, AFib not on AC, and PAD s/p Right femoral popliteal bypass s/p occlusion + IR stent placement 2/2017, was brought into the ED by EMS from home due to elevated BP of 190/121 mmHg. Noted to be normotensive on admission. Unclear if medications given in route by EMS. Patient with pleuritic chest pain and elevated troponin w/o obvious acute ischemic changes in the EKG. Admitted to telemetry for chest pain evaluation. Cardiology Dr. Stevenson following. Pt unable to have CT scan w/ contrast 2/2 ILEANA.  Hence, VQ scan performed with low probability for PE.

## 2024-03-01 NOTE — PROGRESS NOTE ADULT - SUBJECTIVE AND OBJECTIVE BOX
Patient was seen and examined  Patient is a 68y old  Female who presents with a chief complaint of Chest pain (29 Feb 2024 12:25)      INTERVAL HPI/OVERNIGHT EVENTS:  T(C): 36.6 (03-01-24 @ 06:00), Max: 36.7 (02-29-24 @ 14:13)  HR: 68 (03-01-24 @ 06:00) (60 - 75)  BP: 144/83 (03-01-24 @ 06:00) (124/79 - 156/85)  RR: 16 (03-01-24 @ 06:00) (16 - 18)  SpO2: 98% (03-01-24 @ 06:00) (95% - 98%)  Wt(kg): --  I&O's Summary    29 Feb 2024 07:01  -  01 Mar 2024 07:00  --------------------------------------------------------  IN: 200 mL / OUT: 400 mL / NET: -200 mL        LABS:    03-01    141  |  115<H>  |  29<H>  ----------------------------<  135<H>  3.9   |  23  |  1.42<H>    Ca    9.3      01 Mar 2024 05:01        Urinalysis Basic - ( 01 Mar 2024 05:01 )    Color: x / Appearance: x / SG: x / pH: x  Gluc: 135 mg/dL / Ketone: x  / Bili: x / Urobili: x   Blood: x / Protein: x / Nitrite: x   Leuk Esterase: x / RBC: x / WBC x   Sq Epi: x / Non Sq Epi: x / Bacteria: x      CAPILLARY BLOOD GLUCOSE      POCT Blood Glucose.: 125 mg/dL (01 Mar 2024 07:23)  POCT Blood Glucose.: 125 mg/dL (29 Feb 2024 21:58)  POCT Blood Glucose.: 94 mg/dL (29 Feb 2024 16:24)  POCT Blood Glucose.: 236 mg/dL (29 Feb 2024 11:55)          Urinalysis Basic - ( 01 Mar 2024 05:01 )    Color: x / Appearance: x / SG: x / pH: x  Gluc: 135 mg/dL / Ketone: x  / Bili: x / Urobili: x   Blood: x / Protein: x / Nitrite: x   Leuk Esterase: x / RBC: x / WBC x   Sq Epi: x / Non Sq Epi: x / Bacteria: x        MEDICATIONS  (STANDING):  apixaban 5 milliGRAM(s) Oral every 12 hours  atorvastatin 40 milliGRAM(s) Oral at bedtime  budesonide 160 MICROgram(s)/formoterol 4.5 MICROgram(s) Inhaler 2 Puff(s) Inhalation two times a day  carvedilol 25 milliGRAM(s) Oral every 12 hours  dextrose 5%. 1000 milliLiter(s) (50 mL/Hr) IV Continuous <Continuous>  dextrose 5%. 1000 milliLiter(s) (100 mL/Hr) IV Continuous <Continuous>  dextrose 50% Injectable 25 Gram(s) IV Push once  dextrose 50% Injectable 25 Gram(s) IV Push once  dextrose 50% Injectable 12.5 Gram(s) IV Push once  glucagon  Injectable 1 milliGRAM(s) IntraMuscular once  insulin lispro (ADMELOG) corrective regimen sliding scale   SubCutaneous Before meals and at bedtime  lactated ringers. 1000 milliLiter(s) (75 mL/Hr) IV Continuous <Continuous>  losartan 50 milliGRAM(s) Oral daily  montelukast 10 milliGRAM(s) Oral at bedtime  pantoprazole    Tablet 40 milliGRAM(s) Oral at bedtime  topiramate 100 milliGRAM(s) Oral daily    MEDICATIONS  (PRN):  albuterol    90 MICROgram(s) HFA Inhaler 2 Puff(s) Inhalation every 6 hours PRN Shortness of Breath  dextrose Oral Gel 15 Gram(s) Oral once PRN Blood Glucose LESS THAN 70 milliGRAM(s)/deciliter      RADIOLOGY & ADDITIONAL TESTS:    Imaging Personally Reviewed:  [ ] YES  [ ] NO    REVIEW OF SYSTEMS:  CONSTITUTIONAL: No fever, weight loss, or fatigue  EYES: No eye pain, visual disturbances, or discharge  ENMT:  No difficulty hearing, tinnitus, vertigo; No sinus or throat pain  NECK: No pain or stiffness  BREASTS: No pain, masses, or nipple discharge  RESPIRATORY: No cough, wheezing, chills or hemoptysis; No shortness of breath  CARDIOVASCULAR: No chest pain, palpitations, dizziness, or leg swelling  GASTROINTESTINAL: No abdominal or epigastric pain. No nausea, vomiting, or hematemesis; No diarrhea or constipation. No melena or hematochezia.  GENITOURINARY: No dysuria, frequency, hematuria, or incontinence  NEUROLOGICAL: No headaches, memory loss, loss of strength, numbness, or tremors  SKIN: No itching, burning, rashes, or lesions   LYMPH NODES: No enlarged glands  ENDOCRINE: No heat or cold intolerance; No hair loss  MUSCULOSKELETAL: No joint pain or swelling; No muscle, back, or extremity pain  PSYCHIATRIC: No depression, anxiety, mood swings, or difficulty sleeping  HEME/LYMPH: No easy bruising, or bleeding gums  ALLERY AND IMMUNOLOGIC: No hives or eczema      Consultant(s) Notes Reviewed:  [ x ] YES  [ ] NO    PHYSICAL EXAM:  GENERAL: NAD, well-groomed, well-developed  HEAD:  Atraumatic, Normocephalic  EYES: EOMI, PERRLA, conjunctiva and sclera clear  ENMT: No tonsillar erythema, exudates, or enlargement; Moist mucous membranes, Good dentition, No lesions  NECK: Supple, No JVD, Normal thyroid  NERVOUS SYSTEM:  Alert & Oriented X3, Good concentration; Motor Strength 5/5 B/L upper and lower extremities; DTRs 2+ intact and symmetric  CHEST/LUNG: Clear to percussion bilaterally; No rales, rhonchi, wheezing, or rubs  HEART: Regular rate and rhythm; No murmurs, rubs, or gallops  ABDOMEN: Soft, Nontender, Nondistended; Bowel sounds present  EXTREMITIES:  2+ Peripheral Pulses, No clubbing, cyanosis, or edema  LYMPH: No lymphadenopathy noted  SKIN: No rashes or lesions    Care Discussed with Consultants/Other Providers [ x] YES  [ ] NO

## 2024-03-02 LAB
ANION GAP SERPL CALC-SCNC: 3 MMOL/L — LOW (ref 5–17)
BUN SERPL-MCNC: 26 MG/DL — HIGH (ref 7–18)
CALCIUM SERPL-MCNC: 9.3 MG/DL — SIGNIFICANT CHANGE UP (ref 8.4–10.5)
CHLORIDE SERPL-SCNC: 115 MMOL/L — HIGH (ref 96–108)
CO2 SERPL-SCNC: 22 MMOL/L — SIGNIFICANT CHANGE UP (ref 22–31)
CREAT ?TM UR-MCNC: 158 MG/DL — SIGNIFICANT CHANGE UP
CREAT SERPL-MCNC: 1.42 MG/DL — HIGH (ref 0.5–1.3)
EGFR: 41 ML/MIN/1.73M2 — LOW
GLUCOSE BLDC GLUCOMTR-MCNC: 121 MG/DL — HIGH (ref 70–99)
GLUCOSE BLDC GLUCOMTR-MCNC: 123 MG/DL — HIGH (ref 70–99)
GLUCOSE BLDC GLUCOMTR-MCNC: 127 MG/DL — HIGH (ref 70–99)
GLUCOSE BLDC GLUCOMTR-MCNC: 179 MG/DL — HIGH (ref 70–99)
GLUCOSE SERPL-MCNC: 136 MG/DL — HIGH (ref 70–99)
POTASSIUM SERPL-MCNC: 3.7 MMOL/L — SIGNIFICANT CHANGE UP (ref 3.5–5.3)
POTASSIUM SERPL-SCNC: 3.7 MMOL/L — SIGNIFICANT CHANGE UP (ref 3.5–5.3)
SODIUM SERPL-SCNC: 140 MMOL/L — SIGNIFICANT CHANGE UP (ref 135–145)
SODIUM UR-SCNC: 68 MMOL/L — SIGNIFICANT CHANGE UP

## 2024-03-02 RX ADMIN — APIXABAN 5 MILLIGRAM(S): 2.5 TABLET, FILM COATED ORAL at 05:47

## 2024-03-02 RX ADMIN — APIXABAN 5 MILLIGRAM(S): 2.5 TABLET, FILM COATED ORAL at 17:30

## 2024-03-02 RX ADMIN — CARVEDILOL PHOSPHATE 25 MILLIGRAM(S): 80 CAPSULE, EXTENDED RELEASE ORAL at 17:30

## 2024-03-02 RX ADMIN — ATORVASTATIN CALCIUM 40 MILLIGRAM(S): 80 TABLET, FILM COATED ORAL at 21:31

## 2024-03-02 RX ADMIN — BUDESONIDE AND FORMOTEROL FUMARATE DIHYDRATE 2 PUFF(S): 160; 4.5 AEROSOL RESPIRATORY (INHALATION) at 21:31

## 2024-03-02 RX ADMIN — PANTOPRAZOLE SODIUM 40 MILLIGRAM(S): 20 TABLET, DELAYED RELEASE ORAL at 06:21

## 2024-03-02 RX ADMIN — MONTELUKAST 10 MILLIGRAM(S): 4 TABLET, CHEWABLE ORAL at 21:31

## 2024-03-02 RX ADMIN — BUDESONIDE AND FORMOTEROL FUMARATE DIHYDRATE 2 PUFF(S): 160; 4.5 AEROSOL RESPIRATORY (INHALATION) at 10:49

## 2024-03-02 RX ADMIN — Medication 100 MILLIGRAM(S): at 12:18

## 2024-03-02 RX ADMIN — CARVEDILOL PHOSPHATE 25 MILLIGRAM(S): 80 CAPSULE, EXTENDED RELEASE ORAL at 05:48

## 2024-03-02 RX ADMIN — LOSARTAN POTASSIUM 50 MILLIGRAM(S): 100 TABLET, FILM COATED ORAL at 05:48

## 2024-03-02 RX ADMIN — Medication 1: at 12:18

## 2024-03-02 NOTE — CONSULT NOTE ADULT - ASSESSMENT
68 year-old woman with CKD 3a in a patient with DM2, HTN.  Mild azotemia in the setting of BP medication adjustment (including changing ARB and diuretic dosing) and with variable PO intake during hospitalization improved with IVF.    1. Azotemia- rise in creatinine does not quite meet ILEANA criteria.  Likely rise in creatinine is hemodynamically-mediated in the setting of BP medication adjustment (including changing ARB and diuretic dosing) and with variable PO intake during hospitalization improved with IVF.  Pt is now eating/drinking, and IVF has completed.  Pt will require further f/u, but this can be done as an outpatient.    2. CKD 3a- likely due to HTN, though DM2 may also be playing a role.  Further w/u can be done as an outpatient, and would include a urinalysis, urine protein/creatinine or albumin/creatinine ratio, and possibly other labs depending on results.  Would consider whether to restart SGLT-2i as an outpatient.  Will order urine prot/creat ratio for the morning, but NO OBJECTION TO D/C HOME TOMORROW as long as renal fxn is essentially stable.      3. HTN- BP is controlled. Continue with current anti-hypertensive medications. Monitor BP.    4. DM2- HbA1c 7.6, fairly well-controlled.  May worsen of SGLT-2i.  Would monitor further as outpatient.    [facility] NEPHROLOGY  Eliazar Malcolm M.D.  Ranjith Mccauley D.O.  Shanice Vidal M.D.  MD Susana Ruiz, MSN, ANP-C    Telephone: [phone]  Facsimile: [phone] [place] [address], [id number]  [place] [address]

## 2024-03-02 NOTE — PROGRESS NOTE ADULT - SUBJECTIVE AND OBJECTIVE BOX
Patient was seen and examined  Patient is a 68y old  Female who presents with a chief complaint of Chest pain (01 Mar 2024 12:29)      INTERVAL HPI/OVERNIGHT EVENTS:  T(C): 36.4 (03-02-24 @ 05:17), Max: 36.7 (03-01-24 @ 20:20)  HR: 68 (03-02-24 @ 05:17) (68 - 79)  BP: 143/84 (03-02-24 @ 05:17) (102/65 - 145/81)  RR: 18 (03-02-24 @ 05:17) (18 - 18)  SpO2: 96% (03-02-24 @ 05:17) (96% - 99%)  Wt(kg): --  I&O's Summary      LABS:    03-02    140  |  115<H>  |  26<H>  ----------------------------<  136<H>  3.7   |  22  |  1.42<H>    Ca    9.3      02 Mar 2024 05:24        Urinalysis Basic - ( 02 Mar 2024 05:24 )    Color: x / Appearance: x / SG: x / pH: x  Gluc: 136 mg/dL / Ketone: x  / Bili: x / Urobili: x   Blood: x / Protein: x / Nitrite: x   Leuk Esterase: x / RBC: x / WBC x   Sq Epi: x / Non Sq Epi: x / Bacteria: x      CAPILLARY BLOOD GLUCOSE      POCT Blood Glucose.: 179 mg/dL (02 Mar 2024 11:33)  POCT Blood Glucose.: 127 mg/dL (02 Mar 2024 07:46)  POCT Blood Glucose.: 149 mg/dL (01 Mar 2024 20:58)  POCT Blood Glucose.: 167 mg/dL (01 Mar 2024 16:15)          Urinalysis Basic - ( 02 Mar 2024 05:24 )    Color: x / Appearance: x / SG: x / pH: x  Gluc: 136 mg/dL / Ketone: x  / Bili: x / Urobili: x   Blood: x / Protein: x / Nitrite: x   Leuk Esterase: x / RBC: x / WBC x   Sq Epi: x / Non Sq Epi: x / Bacteria: x        MEDICATIONS  (STANDING):  apixaban 5 milliGRAM(s) Oral every 12 hours  atorvastatin 40 milliGRAM(s) Oral at bedtime  budesonide 160 MICROgram(s)/formoterol 4.5 MICROgram(s) Inhaler 2 Puff(s) Inhalation two times a day  carvedilol 25 milliGRAM(s) Oral every 12 hours  dextrose 5%. 1000 milliLiter(s) (50 mL/Hr) IV Continuous <Continuous>  dextrose 5%. 1000 milliLiter(s) (100 mL/Hr) IV Continuous <Continuous>  dextrose 50% Injectable 12.5 Gram(s) IV Push once  dextrose 50% Injectable 25 Gram(s) IV Push once  dextrose 50% Injectable 25 Gram(s) IV Push once  glucagon  Injectable 1 milliGRAM(s) IntraMuscular once  insulin lispro (ADMELOG) corrective regimen sliding scale   SubCutaneous Before meals and at bedtime  lactated ringers. 1000 milliLiter(s) (75 mL/Hr) IV Continuous <Continuous>  losartan 50 milliGRAM(s) Oral daily  montelukast 10 milliGRAM(s) Oral at bedtime  pantoprazole    Tablet 40 milliGRAM(s) Oral two times a day  topiramate 100 milliGRAM(s) Oral daily    MEDICATIONS  (PRN):  albuterol    90 MICROgram(s) HFA Inhaler 2 Puff(s) Inhalation every 6 hours PRN Shortness of Breath  dextrose Oral Gel 15 Gram(s) Oral once PRN Blood Glucose LESS THAN 70 milliGRAM(s)/deciliter      RADIOLOGY & ADDITIONAL TESTS:    Imaging Personally Reviewed:  [ ] YES  [ ] NO    REVIEW OF SYSTEMS:  CONSTITUTIONAL: No fever, weight loss, or fatigue  EYES: No eye pain, visual disturbances, or discharge  ENMT:  No difficulty hearing, tinnitus, vertigo; No sinus or throat pain  NECK: No pain or stiffness  BREASTS: No pain, masses, or nipple discharge  RESPIRATORY: No cough, wheezing, chills or hemoptysis; No shortness of breath  CARDIOVASCULAR: No chest pain, palpitations, dizziness, or leg swelling  GASTROINTESTINAL: No abdominal or epigastric pain. No nausea, vomiting, or hematemesis; No diarrhea or constipation. No melena or hematochezia.  GENITOURINARY: No dysuria, frequency, hematuria, or incontinence  NEUROLOGICAL: No headaches, memory loss, loss of strength, numbness, or tremors  SKIN: No itching, burning, rashes, or lesions   LYMPH NODES: No enlarged glands  ENDOCRINE: No heat or cold intolerance; No hair loss  MUSCULOSKELETAL: No joint pain or swelling; No muscle, back, or extremity pain  PSYCHIATRIC: No depression, anxiety, mood swings, or difficulty sleeping  HEME/LYMPH: No easy bruising, or bleeding gums  ALLERY AND IMMUNOLOGIC: No hives or eczema      Consultant(s) Notes Reviewed:  [ x ] YES  [ ] NO    PHYSICAL EXAM:  GENERAL: NAD, well-groomed, well-developed  HEAD:  Atraumatic, Normocephalic  EYES: EOMI, PERRLA, conjunctiva and sclera clear  ENMT: No tonsillar erythema, exudates, or enlargement; Moist mucous membranes, Good dentition, No lesions  NECK: Supple, No JVD, Normal thyroid  NERVOUS SYSTEM:  Alert & Oriented X3, Good concentration; Motor Strength 5/5 B/L upper and lower extremities; DTRs 2+ intact and symmetric  CHEST/LUNG: Clear to percussion bilaterally; No rales, rhonchi, wheezing, or rubs  HEART: Regular rate and rhythm; No murmurs, rubs, or gallops  ABDOMEN: Soft, Nontender, Nondistended; Bowel sounds present  EXTREMITIES:  2+ Peripheral Pulses, No clubbing, cyanosis, or edema  LYMPH: No lymphadenopathy noted  SKIN: No rashes or lesions    Care Discussed with Consultants/Other Providers [ x] YES  [ ] NO

## 2024-03-02 NOTE — PROGRESS NOTE ADULT - SUBJECTIVE AND OBJECTIVE BOX
Time of Visit:  Patient seen and examined. no chest pain    MEDICATIONS  (STANDING):  apixaban 5 milliGRAM(s) Oral every 12 hours  atorvastatin 40 milliGRAM(s) Oral at bedtime  budesonide 160 MICROgram(s)/formoterol 4.5 MICROgram(s) Inhaler 2 Puff(s) Inhalation two times a day  carvedilol 25 milliGRAM(s) Oral every 12 hours  dextrose 5%. 1000 milliLiter(s) (50 mL/Hr) IV Continuous <Continuous>  dextrose 5%. 1000 milliLiter(s) (100 mL/Hr) IV Continuous <Continuous>  dextrose 50% Injectable 25 Gram(s) IV Push once  dextrose 50% Injectable 12.5 Gram(s) IV Push once  dextrose 50% Injectable 25 Gram(s) IV Push once  glucagon  Injectable 1 milliGRAM(s) IntraMuscular once  insulin lispro (ADMELOG) corrective regimen sliding scale   SubCutaneous Before meals and at bedtime  lactated ringers. 1000 milliLiter(s) (75 mL/Hr) IV Continuous <Continuous>  losartan 50 milliGRAM(s) Oral daily  montelukast 10 milliGRAM(s) Oral at bedtime  pantoprazole    Tablet 40 milliGRAM(s) Oral two times a day  topiramate 100 milliGRAM(s) Oral daily      MEDICATIONS  (PRN):  albuterol    90 MICROgram(s) HFA Inhaler 2 Puff(s) Inhalation every 6 hours PRN Shortness of Breath  dextrose Oral Gel 15 Gram(s) Oral once PRN Blood Glucose LESS THAN 70 milliGRAM(s)/deciliter       Medications up to date at time of exam.      PHYSICAL EXAMINATION:  Patient has no new complaints.  GENERAL: The patient is a well-developed, well-nourished, in no apparent distress.     Vital Signs Last 24 Hrs  T(C): 36.6 (02 Mar 2024 13:53), Max: 36.7 (01 Mar 2024 20:20)  T(F): 97.8 (02 Mar 2024 13:53), Max: 98.1 (01 Mar 2024 20:20)  HR: 81 (02 Mar 2024 13:53) (68 - 81)  BP: 127/76 (02 Mar 2024 13:53) (127/76 - 143/84)  BP(mean): 104 (02 Mar 2024 05:17) (104 - 104)  RR: 18 (02 Mar 2024 13:53) (18 - 18)  SpO2: 96% (02 Mar 2024 13:53) (96% - 97%)    Parameters below as of 02 Mar 2024 13:53  Patient On (Oxygen Delivery Method): room air       (if applicable)    Chest Tube (if applicable)    HEENT: Head is normocephalic and atraumatic. Extraocular muscles are intact. Mucous membranes are moist.     NECK: Supple, no palpable adenopathy.    LUNGS: Clear to auscultation, no wheezing, rales, or rhonchi.    HEART: Regular rate and rhythm without murmur.    ABDOMEN: Soft, nontender, and nondistended.  No hepatosplenomegaly is noted.    : No painful voiding, no pelvic pain    EXTREMITIES: Without any cyanosis, clubbing, rash, lesions or edema.    NEUROLOGIC: Awake, alert, oriented, grossly intact    SKIN: Warm, dry, good turgor.      LABS:    03-02    140  |  115<H>  |  26<H>  ----------------------------<  136<H>  3.7   |  22  |  1.42<H>    Ca    9.3      02 Mar 2024 05:24        Urinalysis Basic - ( 02 Mar 2024 05:24 )    Color: x / Appearance: x / SG: x / pH: x  Gluc: 136 mg/dL / Ketone: x  / Bili: x / Urobili: x   Blood: x / Protein: x / Nitrite: x   Leuk Esterase: x / RBC: x / WBC x   Sq Epi: x / Non Sq Epi: x / Bacteria: x                      MICROBIOLOGY: (if applicable)    RADIOLOGY & ADDITIONAL STUDIES:  EKG:   CXR:  ECHO:    IMPRESSION: 68y Female PAST MEDICAL & SURGICAL HISTORY:  S/P Cardiac Cath  4/2022 nonobstructive CAD      HTN (hypertension)      Diabetes      Asthma  never intubated      Gastroesophageal reflux      CVA (cerebral infarction)  times 3 with residual rt sided weakness and word finding difficulty      CAD (coronary artery disease)      Peripheral arterial disease  peripheral bypass/stents      Hyperlipidemia      Atrial fibrillation      History of seizure  "very long time ago" at time of CVA - topamax      S/P total abdominal hysterectomy      Coronary stent occlusion      H/O peripheral artery bypass       p/w         Impression: This is a 67 Y/O Female was brought into the ED by EMS from home due to elevated BP of 190/121 mmHg. Unclear if she received any medication in route to the hospital. Patient mentioned that earlier today, she experienced pleuritic chest pain radiating to her upper back associated with lightheadedness and minimal dyspnea that lasted for about 90 minutes. cardiac work up Negative. VQ Scan low probability for PE.  For pulmonary follow up for  Asthma, COPD.    Suggestion:  O2 saturation 98% room air. So far been saturating good room air.   Continue Symbicort 160-4.5 mcg 2 puffs Twice Daily.   On Apixaban 5 mg Oral Q 12 Hours.  Continue PRN Albuterol 90 mcg 2 puffs Q 6 Hours.  Continue Singulair 10 mg Daily.  Protonix 40 bid

## 2024-03-02 NOTE — PROGRESS NOTE ADULT - PROBLEM SELECTOR PLAN 6
pt p/w creatinine 1.42 renal eval dr gan   on admission SCr 1.3   unclear baseline  monitor BMP daily  Cr 1.42 trending up from 1.38, will give gentle IV hydration    Avoid Nephrotoxic Meds/ Agents such as (NSAIDs, IV contrast, Aminoglycosides such as gentamicin, Gadolinium contrast, Phosphate containing enemas, etc..)

## 2024-03-02 NOTE — PROGRESS NOTE ADULT - PROBLEM SELECTOR PLAN 1
DDx to include, but not limited to Cardiovascular such as Increased demand (stable coronary artery disease lesion), aortic dissection, Arrhythmia, Uncontrolled Hypertension HF, cardiac inflammation from pericarditis, myocarditis,   F/U TTE

## 2024-03-02 NOTE — PROGRESS NOTE ADULT - PROBLEM SELECTOR PLAN 5
not on any anticoagulation at home  c/w coreg 25 mg bid  Discussed with Cardiology, will start eliquis  Prescription sent to [facility] - eliquis is fully covered

## 2024-03-02 NOTE — PROGRESS NOTE ADULT - PROBLEM SELECTOR PLAN 7
LDL noted 127  Will continue BB therapy  increase atorvastatin to 40 mg daily  dc aspirin and plavix  started eliquis   DASH/TLC diet

## 2024-03-02 NOTE — PROGRESS NOTE ADULT - ASSESSMENT
A 68 year old female, from home, AAOx3 and ambulating with a walker at baseline, with PMHx of  COPD, Left MCA CVA w/ residual dysarthria and RLE weakness, Seizures, DM, CAD s/p ZHAO, s/p cardiac cath 4/2022 w/non-obstructive CAD, AFib not on AC, and PAD s/p Right femoral popliteal bypass s/p occlusion + IR stent placement 2/2017, was brought into the ED by EMS from home due to elevated BP of 190/121 mmHg. Noted to be normotensive on admission. Unclear if medications given in route by EMS. Patient with pleuritic chest pain and elevated troponin w/o obvious acute ischemic changes in the EKG.   Admitted to telemetry for chest pain evaluation. Cardiology Dr. Stevenson following. s/p telemetry. Pt unable to have CT scan w/ contrast 2/2 ILEANA.  Hence, VQ scan performed with low probability for PE.    ILEANA worsening, placed IVF. GI following for oropharyngeal dysphagia.. PT consulted.   RENAL EVAL

## 2024-03-02 NOTE — CONSULT NOTE ADULT - SUBJECTIVE AND OBJECTIVE BOX
[facility] NEPHROLOGY- CONSULTATION NOTE    Patient is a 68y Female with Baseline creatinine is ~1.2-1.3 with history of COPD, Left MCA CVA w/ residual dysarthria (?expressive aphasia) and RLE weakness, Seizures, DM, CAD s/p ZHAO, s/p cardiac cath 4/2022 w/non-obstructive CAD, AFib not on AC, and PAD s/p Right femoral popliteal bypass s/p occlusion + IR stent placement 2/2017 who presented to the hospital with CP, reportedly severe HTN by EMS, though BP normal in ED (unclear if meds were given).  Pt is on Losartan-HCTZ 100-25mg and Jardiance as an outpatient. No MRAs/NSAIDs/IV Contrast.  Nephrology consulted for elevated creatinine.       Pt reports CP has improved though she does have some SOB.       PAST MEDICAL & SURGICAL HISTORY:  S/P Cardiac Cath  4/2022 nonobstructive CAD      HTN (hypertension)      Diabetes      Asthma  never intubated      Gastroesophageal reflux      CVA (cerebral infarction)  times 3 with residual rt sided weakness and word finding difficulty      CAD (coronary artery disease)      Peripheral arterial disease  peripheral bypass/stents      Hyperlipidemia      Atrial fibrillation      History of seizure  "very long time ago" at time of CVA - topamax      S/P total abdominal hysterectomy      Coronary stent occlusion      H/O peripheral artery bypass        No Known Allergies    Home Medications Reviewed  Hospital Medications:   MEDICATIONS  (STANDING):  apixaban 5 milliGRAM(s) Oral every 12 hours  atorvastatin 40 milliGRAM(s) Oral at bedtime  budesonide 160 MICROgram(s)/formoterol 4.5 MICROgram(s) Inhaler 2 Puff(s) Inhalation two times a day  carvedilol 25 milliGRAM(s) Oral every 12 hours  dextrose 5%. 1000 milliLiter(s) (50 mL/Hr) IV Continuous <Continuous>  dextrose 5%. 1000 milliLiter(s) (100 mL/Hr) IV Continuous <Continuous>  dextrose 50% Injectable 25 Gram(s) IV Push once  dextrose 50% Injectable 12.5 Gram(s) IV Push once  dextrose 50% Injectable 25 Gram(s) IV Push once  glucagon  Injectable 1 milliGRAM(s) IntraMuscular once  insulin lispro (ADMELOG) corrective regimen sliding scale   SubCutaneous Before meals and at bedtime  lactated ringers. 1000 milliLiter(s) (75 mL/Hr) IV Continuous <Continuous>  losartan 50 milliGRAM(s) Oral daily  montelukast 10 milliGRAM(s) Oral at bedtime  pantoprazole    Tablet 40 milliGRAM(s) Oral two times a day  topiramate 100 milliGRAM(s) Oral daily    SOCIAL HISTORY:  Denies ETOh,Smoking,   FAMILY HISTORY:  Family history of diabetes mellitus (Sibling)      REVIEW OF SYSTEMS: unable to obtain full due to aphasia, but pt denies CP, abd pain, dysuria.  +SOB      VITALS:  T(F): 97.8 (03-02-24 @ 21:05), Max: 97.8 (03-02-24 @ 13:53)  HR: 74 (03-02-24 @ 21:05)  BP: 155/85 (03-02-24 @ 21:05)  RR: 18 (03-02-24 @ 21:05)  SpO2: 98% (03-02-24 @ 21:05)  Wt(kg): --    03-02 @ 07:01  -  03-02 @ 22:32  --------------------------------------------------------  IN: 0 mL / OUT: 500 mL / NET: -500 mL        PHYSICAL EXAM:  Constitutional: NAD  HEENT: anicteric sclera, oropharynx clear, MMM  Neck: No JVD  Respiratory: coarse B BS with somewhat decreased air entry.  Cardiovascular: S1, S2, RRR  Gastrointestinal: BS+, soft, NT/ND  Extremities: No cyanosis or clubbing. No peripheral edema  Neurological: awake and alert, expressive aphasia makes communication difficult.   Psychiatric: Normal mood, normal affect  : No CVA tenderness. No willams.   Skin: No rashes      LABS:  03-02    140  |  115<H>  |  26<H>  ----------------------------<  136<H>  3.7   |  22  |  1.42<H>    Ca    9.3      02 Mar 2024 05:24      Creatinine Trend: 1.42 <--, 1.42 <--, 1.38 <--, 1.51 <--, 1.56 <--, 1.38 <--, 1.30 <--    Urine Studies:  Urinalysis Basic - ( 02 Mar 2024 05:24 )    Color:  / Appearance:  / SG:  / pH:   Gluc: 136 mg/dL / Ketone:   / Bili:  / Urobili:    Blood:  / Protein:  / Nitrite:    Leuk Esterase:  / RBC:  / WBC    Sq Epi:  / Non Sq Epi:  / Bacteria:       Creatinine, Random Urine: 158 mg/dL (03-02 @ 17:55)  Sodium, Random Urine: 68 mmol/L (03-02 @ 17:55)    RADIOLOGY & ADDITIONAL STUDIES:    < from: Xray Chest 1 View- PORTABLE-Urgent (Xray Chest 1 View- PORTABLE-Urgent .) (02.27.24 @ 17:08) >    ACC: [id number] EXAM:  XR CHEST PORTABLE URGENT 1V   ORDERED BY: VICENTE CASTILLO     PROCEDURE DATE:  02/27/2024          INTERPRETATION:  EXAM:XR CHEST URGENT.     CLINICAL INDICATION: Chest Pain .     TECHNIQUE: Portable AP view.     PRIOR EXAM: 02/25/2024.     FINDINGS:     Magnified cardiac and mediastinal silhouette is stable. There may have   been pulmonary congestion on the prior study which appears to have   resolved. Thoracic scoliosis and a loop recorder are again noted.      IMPRESSION:    No imaging explanation for chest pain at this time.    --- End of Report ---            JULIO CÉSAR CROSS MD; Attending Radiologist  This document has been electronically signed. Feb 28 2024 12:38PM    < end of copied text >

## 2024-03-02 NOTE — PROGRESS NOTE ADULT - TIME BILLING
- Review of records, telemetry, vital signs and daily labs.   - General and cardiovascular physical examination.  - Generation of cardiovascular treatment plan.  - Coordination of care.      Patient was seen and examined by me on 2/27/24interim events noted,labs and radiology studies reviewed.  Jonel Stevenson MD,FACC.  [address] [place], [place] [id number].  [phone]
- Review of records, telemetry, vital signs and daily labs.   - General and cardiovascular physical examination.  - Generation of cardiovascular treatment plan.  - Coordination of care.      Patient was seen and examined by me on 3/2/24interim events noted,labs and radiology studies reviewed.  Jonel Stevenson MD,FACC.  [address] [place], [place] [id number].  [phone]

## 2024-03-02 NOTE — PROGRESS NOTE ADULT - PROBLEM SELECTOR PLAN 1
DDx to include, but not limited to Cardiovascular such as Increased demand (stable coronary artery disease lesion), aortic dissection, Arrhythmia, Uncontrolled Hypertension HF, cardiac inflammation from pericarditis, myocarditis, endocarditis; LVH, Myocardial injury, PE, Infectious, CKD, Rhabdomyolysis, or others.   EKG showed Sinus rhythm with 1st degree A-V block, Possible LA enlargement RSR' or QR pattern in V1 suggests RV conduction delay, LVH with QRS widening and repolarization abnormality  s/p ASA in the ED  HEART Pathway score 7 points  Wells' Criteria for PE 4.5 points  TSH wnl  VQ scan to r/o PE with low probability   TTE report noted   Cardio Dr Stevenson following  Chest Pain resolved, DC tele Health Care Proxy (HCP)/Living Will

## 2024-03-02 NOTE — PROGRESS NOTE ADULT - SUBJECTIVE AND OBJECTIVE BOX
Patient is a 68y old  Female who presents with a chief complaint of Chest pain (02 Mar 2024 18:35)      HPI:  A 68 year old female, from home, AAOx3 and ambulating with a walker at baseline, with PMHx of  COPD, Left MCA CVA w/ residual dysarthria and RLE weakness, Seizures, DM, CAD s/p ZHAO, s/p cardiac cath 4/2022 w/non-obstructive CAD, AFib not on AC, and PAD s/p Right femoral popliteal bypass s/p occlusion + IR stent placement 2/2017, was brought into the ED by EMS from home due to elevated BP of 190/121 mmHg. Unclear if she received any medication in route to the hospital. Patient mentioned that earlier today, she experienced pleuritic chest pain radiating to her upper back associated with lightheadedness and minimal dyspnea that lasted for about 90 minutes. Denies any palpitations, nausea, vomiting, headache, chills, numbness, or syncopal episodes. Denies orthopnea or exertional symptoms. Denies recent episodes. Denies any recent falls or chest wall trauma. Denies any recent sick contacts or travel. On further questioning, patient with intermittent acid reflux, postprandial fullness, and early satiety that respond to Famotidine. She does not have any other complaints. (25 Feb 2024 13:25)      PAST MEDICAL & SURGICAL HISTORY:  S/P Cardiac Cath  4/2022 nonobstructive CAD      HTN (hypertension)      Diabetes      Asthma  never intubated      Gastroesophageal reflux      CVA (cerebral infarction)  times 3 with residual rt sided weakness and word finding difficulty      CAD (coronary artery disease)      Peripheral arterial disease  peripheral bypass/stents      Hyperlipidemia      Atrial fibrillation      History of seizure  "very long time ago" at time of CVA - topamax      S/P total abdominal hysterectomy      Coronary stent occlusion      H/O peripheral artery bypass          PREVIOUS DIAGNOSTIC TESTING:      ECHO  FINDINGS:    STRESS  FINDINGS:    CATHETERIZATION  FINDINGS:    MEDICATIONS  (STANDING):  apixaban 5 milliGRAM(s) Oral every 12 hours  atorvastatin 40 milliGRAM(s) Oral at bedtime  budesonide 160 MICROgram(s)/formoterol 4.5 MICROgram(s) Inhaler 2 Puff(s) Inhalation two times a day  carvedilol 25 milliGRAM(s) Oral every 12 hours  dextrose 5%. 1000 milliLiter(s) (50 mL/Hr) IV Continuous <Continuous>  dextrose 5%. 1000 milliLiter(s) (100 mL/Hr) IV Continuous <Continuous>  dextrose 50% Injectable 12.5 Gram(s) IV Push once  dextrose 50% Injectable 25 Gram(s) IV Push once  dextrose 50% Injectable 25 Gram(s) IV Push once  glucagon  Injectable 1 milliGRAM(s) IntraMuscular once  insulin lispro (ADMELOG) corrective regimen sliding scale   SubCutaneous Before meals and at bedtime  lactated ringers. 1000 milliLiter(s) (75 mL/Hr) IV Continuous <Continuous>  losartan 50 milliGRAM(s) Oral daily  montelukast 10 milliGRAM(s) Oral at bedtime  pantoprazole    Tablet 40 milliGRAM(s) Oral two times a day  topiramate 100 milliGRAM(s) Oral daily    MEDICATIONS  (PRN):  albuterol    90 MICROgram(s) HFA Inhaler 2 Puff(s) Inhalation every 6 hours PRN Shortness of Breath  dextrose Oral Gel 15 Gram(s) Oral once PRN Blood Glucose LESS THAN 70 milliGRAM(s)/deciliter      FAMILY HISTORY:  Family history of diabetes mellitus (Sibling)        SOCIAL HISTORY:    CIGARETTES:    ALCOHOL:    REVIEW OF SYSTEMS:  CONSTITUTIONAL: No fever, weight loss, or fatigue  EYES: No eye pain, visual disturbances, or discharge  ENMT:  No difficulty hearing, tinnitus, vertigo; No sinus or throat pain  NECK: No pain or stiffness  RESPIRATORY: No cough, wheezing, chills or hemoptysis; No shortness of breath  CARDIOVASCULAR: No chest pain, palpitations, dizziness, or leg swelling  GASTROINTESTINAL: No abdominal or epigastric pain. No nausea, vomiting, or hematemesis; No diarrhea or constipation. No melena or hematochezia.  GENITOURINARY: No dysuria, frequency, hematuria, or incontinence  NEUROLOGICAL: No headaches, memory loss, loss of strength, numbness, or tremors  SKIN: No itching, burning, rashes, or lesions   LYMPH NODES: No enlarged glands  ENDOCRINE: No heat or cold intolerance; No hair loss  MUSCULOSKELETAL: No joint pain or swelling; No muscle, back, or extremity pain  PSYCHIATRIC: No depression, anxiety, mood swings, or difficulty sleeping  HEME/LYMPH: No easy bruising, or bleeding gums  ALLERY AND IMMUNOLOGIC: No hives or eczema    Vital Signs Last 24 Hrs  T(C): 36.6 (02 Mar 2024 21:05), Max: 36.6 (02 Mar 2024 13:53)  T(F): 97.8 (02 Mar 2024 21:05), Max: 97.8 (02 Mar 2024 13:53)  HR: 74 (02 Mar 2024 21:05) (68 - 81)  BP: 155/85 (02 Mar 2024 21:05) (127/76 - 155/85)  BP(mean): 104 (02 Mar 2024 05:17) (104 - 104)  RR: 18 (02 Mar 2024 21:05) (18 - 18)  SpO2: 98% (02 Mar 2024 21:05) (96% - 98%)    Parameters below as of 02 Mar 2024 21:05  Patient On (Oxygen Delivery Method): room air          PHYSICAL EXAM:  GENERAL: NAD, well-groomed, well-developed  HEAD:  Atraumatic, Normocephalic  EYES: EOMI, PERRLA, conjunctiva and sclera clear  ENMT: No tonsillar erythema, exudates, or enlargement; Moist mucous membranes, Good dentition, No lesions  NECK: Supple, No JVD, Normal thyroid  NERVOUS SYSTEM:  Alert & Oriented X3, Good concentration; Motor Strength 5/5 B/L upper and lower extremities; DTRs 2+ intact and symmetric  CHEST/LUNG: Clear to percussion bilaterally; No rales, rhonchi, wheezing, or rubs  HEART: Regular rate and rhythm; No murmurs, rubs, or gallops  ABDOMEN: Soft, Nontender, Nondistended; Bowel sounds present  EXTREMITIES:  2+ Peripheral Pulses, No clubbing, cyanosis, or edema  LYMPH: No lymphadenopathy noted  SKIN: No rashes or lesions      INTERPRETATION OF TELEMETRY:    [facility]    [facility]     [facility]:    03-02    140  |  115<H>  |  26<H>  ----------------------------<  136<H>  3.7   |  22  |  1.42<H>    Ca    9.3      02 Mar 2024 05:24            Urinalysis Basic - ( 02 Mar 2024 05:24 )    Color: x / Appearance: x / SG: x / pH: x  Gluc: 136 mg/dL / Ketone: x  / Bili: x / Urobili: x   Blood: x / Protein: x / Nitrite: x   Leuk Esterase: x / RBC: x / WBC x   Sq Epi: x / Non Sq Epi: x / Bacteria: x      Lipid Panel:   I&O's Summary    02 Mar 2024 07:01  -  02 Mar 2024 22:36  --------------------------------------------------------  IN: 0 mL / OUT: 500 mL / NET: -500 mL        RADIOLOGY & ADDITIONAL STUDIES:

## 2024-03-02 NOTE — PROGRESS NOTE ADULT - PROBLEM SELECTOR PLAN 3
not on any anticoagulation at home  c/w coreg 25 mg bid  start eliquis  Prescription sent to [facility] - eliquis is fully covered

## 2024-03-03 ENCOUNTER — TRANSCRIPTION ENCOUNTER (OUTPATIENT)
Age: 69
End: 2024-03-03

## 2024-03-03 VITALS
RESPIRATION RATE: 18 BRPM | SYSTOLIC BLOOD PRESSURE: 155 MMHG | HEART RATE: 69 BPM | DIASTOLIC BLOOD PRESSURE: 90 MMHG | OXYGEN SATURATION: 97 % | TEMPERATURE: 98 F

## 2024-03-03 LAB
ANION GAP SERPL CALC-SCNC: 5 MMOL/L — SIGNIFICANT CHANGE UP (ref 5–17)
BUN SERPL-MCNC: 24 MG/DL — HIGH (ref 7–18)
CALCIUM SERPL-MCNC: 9.3 MG/DL — SIGNIFICANT CHANGE UP (ref 8.4–10.5)
CHLORIDE SERPL-SCNC: 116 MMOL/L — HIGH (ref 96–108)
CO2 SERPL-SCNC: 24 MMOL/L — SIGNIFICANT CHANGE UP (ref 22–31)
CREAT SERPL-MCNC: 1.44 MG/DL — HIGH (ref 0.5–1.3)
EGFR: 40 ML/MIN/1.73M2 — LOW
GLUCOSE BLDC GLUCOMTR-MCNC: 116 MG/DL — HIGH (ref 70–99)
GLUCOSE BLDC GLUCOMTR-MCNC: 167 MG/DL — HIGH (ref 70–99)
GLUCOSE SERPL-MCNC: 214 MG/DL — HIGH (ref 70–99)
HCT VFR BLD CALC: 40.5 % — SIGNIFICANT CHANGE UP (ref 34.5–45)
HGB BLD-MCNC: 11.8 G/DL — SIGNIFICANT CHANGE UP (ref 11.5–15.5)
MCHC RBC-ENTMCNC: 25.4 PG — LOW (ref 27–34)
MCHC RBC-ENTMCNC: 29.1 GM/DL — LOW (ref 32–36)
MCV RBC AUTO: 87.1 FL — SIGNIFICANT CHANGE UP (ref 80–100)
NRBC # BLD: 0 /100 WBCS — SIGNIFICANT CHANGE UP (ref 0–0)
PLATELET # BLD AUTO: 229 K/UL — SIGNIFICANT CHANGE UP (ref 150–400)
POTASSIUM SERPL-MCNC: 3.7 MMOL/L — SIGNIFICANT CHANGE UP (ref 3.5–5.3)
POTASSIUM SERPL-SCNC: 3.7 MMOL/L — SIGNIFICANT CHANGE UP (ref 3.5–5.3)
RBC # BLD: 4.65 M/UL — SIGNIFICANT CHANGE UP (ref 3.8–5.2)
RBC # FLD: 13.5 % — SIGNIFICANT CHANGE UP (ref 10.3–14.5)
SODIUM SERPL-SCNC: 145 MMOL/L — SIGNIFICANT CHANGE UP (ref 135–145)
WBC # BLD: 7.72 K/UL — SIGNIFICANT CHANGE UP (ref 3.8–10.5)
WBC # FLD AUTO: 7.72 K/UL — SIGNIFICANT CHANGE UP (ref 3.8–10.5)

## 2024-03-03 PROCEDURE — 97162 PT EVAL MOD COMPLEX 30 MIN: CPT

## 2024-03-03 PROCEDURE — 82550 ASSAY OF CK (CPK): CPT

## 2024-03-03 PROCEDURE — 78582 LUNG VENTILAT&PERFUS IMAGING: CPT | Mod: MC

## 2024-03-03 PROCEDURE — 85027 COMPLETE CBC AUTOMATED: CPT

## 2024-03-03 PROCEDURE — 82962 GLUCOSE BLOOD TEST: CPT

## 2024-03-03 PROCEDURE — 82553 CREATINE MB FRACTION: CPT

## 2024-03-03 PROCEDURE — 93005 ELECTROCARDIOGRAM TRACING: CPT

## 2024-03-03 PROCEDURE — 36415 COLL VENOUS BLD VENIPUNCTURE: CPT

## 2024-03-03 PROCEDURE — 94640 AIRWAY INHALATION TREATMENT: CPT

## 2024-03-03 PROCEDURE — 80053 COMPREHEN METABOLIC PANEL: CPT

## 2024-03-03 PROCEDURE — 74220 X-RAY XM ESOPHAGUS 1CNTRST: CPT

## 2024-03-03 PROCEDURE — 85610 PROTHROMBIN TIME: CPT

## 2024-03-03 PROCEDURE — 84100 ASSAY OF PHOSPHORUS: CPT

## 2024-03-03 PROCEDURE — 71045 X-RAY EXAM CHEST 1 VIEW: CPT

## 2024-03-03 PROCEDURE — 93306 TTE W/DOPPLER COMPLETE: CPT

## 2024-03-03 PROCEDURE — 84300 ASSAY OF URINE SODIUM: CPT

## 2024-03-03 PROCEDURE — 85025 COMPLETE CBC W/AUTO DIFF WBC: CPT

## 2024-03-03 PROCEDURE — 85730 THROMBOPLASTIN TIME PARTIAL: CPT

## 2024-03-03 PROCEDURE — 99285 EMERGENCY DEPT VISIT HI MDM: CPT | Mod: 25

## 2024-03-03 PROCEDURE — 80048 BASIC METABOLIC PNL TOTAL CA: CPT

## 2024-03-03 PROCEDURE — 83036 HEMOGLOBIN GLYCOSYLATED A1C: CPT

## 2024-03-03 PROCEDURE — 82570 ASSAY OF URINE CREATININE: CPT

## 2024-03-03 PROCEDURE — 83735 ASSAY OF MAGNESIUM: CPT

## 2024-03-03 PROCEDURE — 84443 ASSAY THYROID STIM HORMONE: CPT

## 2024-03-03 PROCEDURE — 0225U NFCT DS DNA&RNA 21 SARSCOV2: CPT

## 2024-03-03 PROCEDURE — 84484 ASSAY OF TROPONIN QUANT: CPT

## 2024-03-03 PROCEDURE — 80061 LIPID PANEL: CPT

## 2024-03-03 RX ORDER — LOSARTAN POTASSIUM 100 MG/1
1 TABLET, FILM COATED ORAL
Qty: 30 | Refills: 0
Start: 2024-03-03 | End: 2024-04-01

## 2024-03-03 RX ORDER — BUDESONIDE AND FORMOTEROL FUMARATE DIHYDRATE 160; 4.5 UG/1; UG/1
2 AEROSOL RESPIRATORY (INHALATION)
Qty: 1 | Refills: 0
Start: 2024-03-03 | End: 2024-04-01

## 2024-03-03 RX ORDER — LOSARTAN/HYDROCHLOROTHIAZIDE 100MG-25MG
1 TABLET ORAL
Refills: 0 | DISCHARGE

## 2024-03-03 RX ADMIN — CARVEDILOL PHOSPHATE 25 MILLIGRAM(S): 80 CAPSULE, EXTENDED RELEASE ORAL at 05:26

## 2024-03-03 RX ADMIN — BUDESONIDE AND FORMOTEROL FUMARATE DIHYDRATE 2 PUFF(S): 160; 4.5 AEROSOL RESPIRATORY (INHALATION) at 10:16

## 2024-03-03 RX ADMIN — LOSARTAN POTASSIUM 50 MILLIGRAM(S): 100 TABLET, FILM COATED ORAL at 05:26

## 2024-03-03 RX ADMIN — Medication 100 MILLIGRAM(S): at 11:39

## 2024-03-03 RX ADMIN — APIXABAN 5 MILLIGRAM(S): 2.5 TABLET, FILM COATED ORAL at 05:25

## 2024-03-03 RX ADMIN — PANTOPRAZOLE SODIUM 40 MILLIGRAM(S): 20 TABLET, DELAYED RELEASE ORAL at 05:26

## 2024-03-03 RX ADMIN — Medication 1: at 11:40

## 2024-03-03 NOTE — PROGRESS NOTE ADULT - PROBLEM SELECTOR PLAN 5
not on any anticoagulation at home  c/w coreg 25 mg bid  Discussed with Cardiology, will start eliquis  Prescription sent to [facility] - eliquis is fully covered

## 2024-03-03 NOTE — PROGRESS NOTE ADULT - PROBLEM SELECTOR PROBLEM 3
Atrial fibrillation
CAD (coronary artery disease)
Atrial fibrillation
CAD (coronary artery disease)
Atrial fibrillation
Hypertensive emergency

## 2024-03-03 NOTE — PROGRESS NOTE ADULT - PROBLEM SELECTOR PROBLEM 1
Pleuritic chest pain

## 2024-03-03 NOTE — DISCHARGE NOTE NURSING/CASE MANAGEMENT/SOCIAL WORK - NSTRANSFERBELONGINGSRESP_GEN_A_NUR
Patient evaluated by criticalcare due to acute worsening of respiratory status with respiratory rate up to 44 breaths/min. Patient admitted with acute CHF complicated what pleural effusion status post thoracocentesis. Patient was also evaluated by cardiology for A. fib with RVR. On my assessment patient in respiratory distress with respiratory rate of 44. Saturating 96% on BiPAP. ABG with CO2 of 76.1 with bicarb of 36.2. On exam no crackles, no leg swelling, patient does not seem to be fluid overloaded. She does have bilateral wheezing. Plan  RT aerosol protocol, MDI albuterol. Continue steroids. Continue BiPAP and repeat ABG in 2 hours. Call critical care if any worsening of respiratory status. Anticipate improvement in breathing on BiPAP. CXR with right mid to lower lobe opacity, concern for pneumonia, might need ? Daphnie.       Liang Rogel MD  PGY-3, Internal medicine resident/Critical care  [address] [place] [address], [place], [place] yes

## 2024-03-03 NOTE — PROGRESS NOTE ADULT - PROBLEM SELECTOR PLAN 1
DDx to include, but not limited to Cardiovascular such as Increased demand (stable coronary artery disease lesion), aortic dissection, Arrhythmia, Uncontrolled Hypertension HF, cardiac inflammation from pericarditis, myocarditis, endocarditis; LVH, Myocardial injury, PE, Infectious, CKD, Rhabdomyolysis, or others.   EKG showed Sinus rhythm with 1st degree A-V block, Possible LA enlargement RSR' or QR pattern in V1 suggests RV conduction delay, LVH with QRS widening and repolarization abnormality  s/p ASA in the ED  HEART Pathway score 7 points  Wells' Criteria for PE 4.5 points  TSH wnl  VQ scan to r/o PE with low probability   TTE report noted   Cardio Dr Stevenson following  Chest Pain resolved, DC tele

## 2024-03-03 NOTE — PROGRESS NOTE ADULT - PROBLEM SELECTOR PROBLEM 9
DM (diabetes mellitus)
Prophylactic measure
Prophylactic measure
DM (diabetes mellitus)
DM (diabetes mellitus)
Prophylactic measure
Prophylactic measure

## 2024-03-03 NOTE — PROGRESS NOTE ADULT - PROBLEM SELECTOR PLAN 10
dvt - Lovenox 40 mg SQ q24hr  gi - protonix 40 mg daily

## 2024-03-03 NOTE — PROGRESS NOTE ADULT - PROVIDER SPECIALTY LIST ADULT
Internal Medicine
Gastroenterology
Internal Medicine
Pulmonology
Cardiology
Internal Medicine
Cardiology
Internal Medicine

## 2024-03-03 NOTE — DISCHARGE NOTE NURSING/CASE MANAGEMENT/SOCIAL WORK - PATIENT PORTAL LINK FT
You can access the FollowMyHealth Patient Portal offered by [facility] by registering at the following website: [email or web address]. By joining [facility]’s FollowMyHealth portal, you will also be able to view your health information using other applications (apps) compatible with our system.

## 2024-03-03 NOTE — PROGRESS NOTE ADULT - PROBLEM SELECTOR PROBLEM 2
Elevated troponin level
Oropharyngeal dysphagia
Elevated troponin level
Oropharyngeal dysphagia
Elevated troponin level
Oropharyngeal dysphagia

## 2024-03-03 NOTE — PROGRESS NOTE ADULT - PROBLEM SELECTOR PLAN 9
dvt - Lovenox 40 mg SQ q24hr  gi - protonix 40 mg daily
a1c 7.6%  c/w ISS qid  Finger stick before meal and bedtime   Diabetic diet.
dvt - Lovenox 40 mg SQ q24hr  gi - protonix 40 mg daily

## 2024-03-03 NOTE — PROGRESS NOTE ADULT - SUBJECTIVE AND OBJECTIVE BOX
Patient was seen and examined  Patient is a 68y old  Female who presents with a chief complaint of Chest pain (02 Mar 2024 22:32)      INTERVAL HPI/OVERNIGHT EVENTS:  T(C): 36.8 (03-03-24 @ 05:00), Max: 36.8 (03-03-24 @ 05:00)  HR: 69 (03-03-24 @ 05:00) (69 - 81)  BP: 147/84 (03-03-24 @ 05:00) (127/76 - 155/85)  RR: 18 (03-03-24 @ 05:00) (18 - 18)  SpO2: 97% (03-03-24 @ 05:00) (96% - 98%)  Wt(kg): --  I&O's Summary    02 Mar 2024 07:01  -  03 Mar 2024 07:00  --------------------------------------------------------  IN: 0 mL / OUT: 500 mL / NET: -500 mL        LABS:    03-02    140  |  115<H>  |  26<H>  ----------------------------<  136<H>  3.7   |  22  |  1.42<H>    Ca    9.3      02 Mar 2024 05:24        Urinalysis Basic - ( 02 Mar 2024 05:24 )    Color: x / Appearance: x / SG: x / pH: x  Gluc: 136 mg/dL / Ketone: x  / Bili: x / Urobili: x   Blood: x / Protein: x / Nitrite: x   Leuk Esterase: x / RBC: x / WBC x   Sq Epi: x / Non Sq Epi: x / Bacteria: x      CAPILLARY BLOOD GLUCOSE      POCT Blood Glucose.: 116 mg/dL (03 Mar 2024 07:50)  POCT Blood Glucose.: 121 mg/dL (02 Mar 2024 21:44)  POCT Blood Glucose.: 123 mg/dL (02 Mar 2024 16:29)  POCT Blood Glucose.: 179 mg/dL (02 Mar 2024 11:33)          Urinalysis Basic - ( 02 Mar 2024 05:24 )    Color: x / Appearance: x / SG: x / pH: x  Gluc: 136 mg/dL / Ketone: x  / Bili: x / Urobili: x   Blood: x / Protein: x / Nitrite: x   Leuk Esterase: x / RBC: x / WBC x   Sq Epi: x / Non Sq Epi: x / Bacteria: x        MEDICATIONS  (STANDING):  apixaban 5 milliGRAM(s) Oral every 12 hours  atorvastatin 40 milliGRAM(s) Oral at bedtime  budesonide 160 MICROgram(s)/formoterol 4.5 MICROgram(s) Inhaler 2 Puff(s) Inhalation two times a day  carvedilol 25 milliGRAM(s) Oral every 12 hours  dextrose 5%. 1000 milliLiter(s) (50 mL/Hr) IV Continuous <Continuous>  dextrose 5%. 1000 milliLiter(s) (100 mL/Hr) IV Continuous <Continuous>  dextrose 50% Injectable 12.5 Gram(s) IV Push once  dextrose 50% Injectable 25 Gram(s) IV Push once  dextrose 50% Injectable 25 Gram(s) IV Push once  glucagon  Injectable 1 milliGRAM(s) IntraMuscular once  insulin lispro (ADMELOG) corrective regimen sliding scale   SubCutaneous Before meals and at bedtime  lactated ringers. 1000 milliLiter(s) (75 mL/Hr) IV Continuous <Continuous>  losartan 50 milliGRAM(s) Oral daily  montelukast 10 milliGRAM(s) Oral at bedtime  pantoprazole    Tablet 40 milliGRAM(s) Oral two times a day  topiramate 100 milliGRAM(s) Oral daily    MEDICATIONS  (PRN):  albuterol    90 MICROgram(s) HFA Inhaler 2 Puff(s) Inhalation every 6 hours PRN Shortness of Breath  dextrose Oral Gel 15 Gram(s) Oral once PRN Blood Glucose LESS THAN 70 milliGRAM(s)/deciliter      RADIOLOGY & ADDITIONAL TESTS:    Imaging Personally Reviewed:  [ ] YES  [ ] NO    REVIEW OF SYSTEMS:  CONSTITUTIONAL: No fever, weight loss, or fatigue  EYES: No eye pain, visual disturbances, or discharge  ENMT:  No difficulty hearing, tinnitus, vertigo; No sinus or throat pain  NECK: No pain or stiffness  BREASTS: No pain, masses, or nipple discharge  RESPIRATORY: No cough, wheezing, chills or hemoptysis; No shortness of breath  CARDIOVASCULAR: No chest pain, palpitations, dizziness, or leg swelling  GASTROINTESTINAL: No abdominal or epigastric pain. No nausea, vomiting, or hematemesis; No diarrhea or constipation. No melena or hematochezia.  GENITOURINARY: No dysuria, frequency, hematuria, or incontinence  NEUROLOGICAL: No headaches, memory loss, loss of strength, numbness, or tremors  SKIN: No itching, burning, rashes, or lesions   LYMPH NODES: No enlarged glands  ENDOCRINE: No heat or cold intolerance; No hair loss  MUSCULOSKELETAL: No joint pain or swelling; No muscle, back, or extremity pain  PSYCHIATRIC: No depression, anxiety, mood swings, or difficulty sleeping  HEME/LYMPH: No easy bruising, or bleeding gums  ALLERY AND IMMUNOLOGIC: No hives or eczema      Consultant(s) Notes Reviewed:  [ ] YES  [ ] NO    PHYSICAL EXAM:  GENERAL: NAD, well-groomed, well-developed  HEAD:  Atraumatic, Normocephalic  EYES: EOMI, PERRLA, conjunctiva and sclera clear  ENMT: No tonsillar erythema, exudates, or enlargement; Moist mucous membranes, Good dentition, No lesions  NECK: Supple, No JVD, Normal thyroid  NERVOUS SYSTEM:  Alert & Oriented X3, Good concentration; Motor Strength 5/5 B/L upper and lower extremities; DTRs 2+ intact and symmetric  CHEST/LUNG: Clear to percussion bilaterally; No rales, rhonchi, wheezing, or rubs  HEART: Regular rate and rhythm; No murmurs, rubs, or gallops  ABDOMEN: Soft, Nontender, Nondistended; Bowel sounds present  EXTREMITIES:  2+ Peripheral Pulses, No clubbing, cyanosis, or edema  LYMPH: No lymphadenopathy noted  SKIN: No rashes or lesions    Care Discussed with Consultants/Other Providers [ x] YES  [ ] NO

## 2024-03-03 NOTE — DISCHARGE NOTE NURSING/CASE MANAGEMENT/SOCIAL WORK - NSDCVIVACCINE_GEN_ALL_CORE_FT
influenza, injectable, quadrivalent, preservative free; 22-Sep-2015 12:20; Frankie Parks (RN); [employer]; [id number]; IntraMuscular; Deltoid Left.; 0.5 milliLiter(s); VIS (VIS Published: 07-Aug-2015, VIS Presented: 22-Sep-2015);   influenza, injectable, quadrivalent, preservative free; 25-Sep-2018 10:34; Helen Mclaughlin (RN); [employer]; [id number] (Exp. Date: 30-Jun-2019); IntraMuscular; Deltoid Right.; 0.5 milliLiter(s); VIS (VIS Published: 07-Aug-2015, VIS Presented: 25-Sep-2018);   pneumococcal polysaccharide PPV23; 25-Sep-2014 14:23; Faith Kauffman (RN); [employer] [id number]; IntraMuscular; Deltoid Right.; 0.5 milliLiter(s);

## 2024-05-02 NOTE — PROVIDER CONTACT NOTE (CRITICAL VALUE NOTIFICATION) - SITUATION
Assessment & Plan     Viral URI with cough  - benzonatate (TESSALON) 100 MG capsule  Dispense: 21 capsule; Refill: 1     Low suspicion for pneumonia. Tessalon provided to help with cough discomfort. COVID negative on rapid. Influenza rates low at this time. Vitals stable    Follow-up as needed if symptoms worsen    See AVS summary for additional recommendations reviewed with patient during this visit.     Kamari Mcbride MD   [place] [facility]    Subjective     Radha is a 26 year old female who presents to clinic today for the following health issues:  Chief Complaint   Patient presents with    Urgent Care     Sick x 6 days, coughing getting worse. Headache and fatigue     HPI  Recurrent history of sinus infections.  She still has her tonsils.  Initially had sore throat for a few days this has much improved.  The cough is keeping her up at night despite using DayQuil and NyQuil.  Has a headache from this.  No known exposures to flu.  Her home rapid COVID test was negative.  No vomiting or diarrhea.  No history of lung disease.  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    Patient Active Problem List    Diagnosis Date Noted    Disorder of bursae and tendons in shoulder region 04/17/2012     Priority: Medium     Problem list name updated by automated process. Provider to review         Current Outpatient Medications   Medication Sig Dispense Refill    benzonatate (TESSALON) 100 MG capsule Take 1 capsule (100 mg) by mouth 3 times daily as needed for cough 21 capsule 1    spironolactone (ALDACTONE) 25 MG tablet Take 25 mg by mouth daily      Ibuprofen (ADVIL) 200 MG capsule Take 200 mg by mouth every 4 hours as needed.      triamcinolone (KENALOG) 0.1 % external cream Apply topically 2 times daily 30 g 0     No current facility-administered medications for this visit.         Objective    /68   Pulse 57   Temp 97.9  F  "(36.6  C) (Temporal)   Resp 16   Ht 1.6 m (5' 3\")   Wt 61.2 kg (135 lb)   SpO2 97%   BMI 23.91 kg/m    Physical Exam       Lung exam unremarkable without any wheezes, crackles or rhonchi.  CV: RRR no m/r/g  Throat: 2+ tonsils no trismus  Ears: mild congestion R side, normal TMs /canals otherwise  GEN: NAD, well hydrated    No results found for any visits on 05/02/24.                  The use of Dragon/[employer] dictation services may have been used to construct the content in this note; any grammatical or spelling errors are non-intentional. Please contact the author of this note directly if you are in need of any clarification.   " Calcium 5.3  NP, Dino notified

## 2024-06-14 NOTE — H&P ADULT. - NEUROLOGICAL DETAILS
Left message about Diabetic  eye cam appointment on 06/17/24 @ 3:00 pm  vs diabetic    alert and oriented x 3/responds to verbal commands/strength decreased

## 2024-07-11 NOTE — PROGRESS NOTE ADULT - TIME BILLING
- Review of records, telemetry, vital signs and daily labs.   - General and cardiovascular physical examination.  - Generation of cardiovascular treatment plan.  - Coordination of care.      Patient was seen and examined by me on 04/08/2022,interim events noted,labs and radiology studies reviewed.  Jonel Stevenson MD,FACC.  [address] [place], [place] [id number].  [phone]
Detail Level: Zone

## 2024-11-04 ENCOUNTER — INPATIENT (INPATIENT)
Facility: HOSPITAL | Age: 69
LOS: 4 days | Discharge: HOME | DRG: 203 | End: 2024-11-09
Attending: HOSPITALIST | Admitting: HOSPITALIST
Payer: MEDICARE

## 2024-11-04 VITALS
OXYGEN SATURATION: 92 % | SYSTOLIC BLOOD PRESSURE: 153 MMHG | TEMPERATURE: 99 F | HEIGHT: 64 IN | DIASTOLIC BLOOD PRESSURE: 85 MMHG | WEIGHT: 153 LBS | RESPIRATION RATE: 19 BRPM | HEART RATE: 104 BPM

## 2024-11-04 DIAGNOSIS — Z98.890 OTHER SPECIFIED POSTPROCEDURAL STATES: Chronic | ICD-10-CM

## 2024-11-04 DIAGNOSIS — J45.901 UNSPECIFIED ASTHMA WITH (ACUTE) EXACERBATION: ICD-10-CM

## 2024-11-04 DIAGNOSIS — Z90.710 ACQUIRED ABSENCE OF BOTH CERVIX AND UTERUS: Chronic | ICD-10-CM

## 2024-11-04 DIAGNOSIS — T82.897A OTHER SPECIFIED COMPLICATION OF CARDIAC PROSTHETIC DEVICES, IMPLANTS AND GRAFTS, INITIAL ENCOUNTER: Chronic | ICD-10-CM

## 2024-11-04 LAB
ALBUMIN SERPL ELPH-MCNC: 3.2 G/DL — LOW (ref 3.5–5)
ALBUMIN SERPL ELPH-MCNC: 3.3 G/DL — LOW (ref 3.5–5)
ALP SERPL-CCNC: 107 U/L — SIGNIFICANT CHANGE UP (ref 40–120)
ALP SERPL-CCNC: 114 U/L — SIGNIFICANT CHANGE UP (ref 40–120)
ALT FLD-CCNC: 15 U/L DA — SIGNIFICANT CHANGE UP (ref 10–60)
ALT FLD-CCNC: 9 U/L DA — LOW (ref 10–60)
ANION GAP SERPL CALC-SCNC: 10 MMOL/L — SIGNIFICANT CHANGE UP (ref 5–17)
ANION GAP SERPL CALC-SCNC: 6 MMOL/L — SIGNIFICANT CHANGE UP (ref 5–17)
APPEARANCE UR: ABNORMAL
APTT BLD: 30.2 SEC — SIGNIFICANT CHANGE UP (ref 24.5–35.6)
AST SERPL-CCNC: 10 U/L — SIGNIFICANT CHANGE UP (ref 10–40)
AST SERPL-CCNC: 12 U/L — SIGNIFICANT CHANGE UP (ref 10–40)
BACTERIA # UR AUTO: ABNORMAL /HPF
BASE EXCESS BLDA CALC-SCNC: -4.8 MMOL/L — LOW (ref -2–3)
BASOPHILS # BLD AUTO: 0 K/UL — SIGNIFICANT CHANGE UP (ref 0–0.2)
BASOPHILS # BLD AUTO: 0.05 K/UL — SIGNIFICANT CHANGE UP (ref 0–0.2)
BASOPHILS NFR BLD AUTO: 0 % — SIGNIFICANT CHANGE UP (ref 0–2)
BASOPHILS NFR BLD AUTO: 0.5 % — SIGNIFICANT CHANGE UP (ref 0–2)
BILIRUB SERPL-MCNC: 0.5 MG/DL — SIGNIFICANT CHANGE UP (ref 0.2–1.2)
BILIRUB SERPL-MCNC: 0.6 MG/DL — SIGNIFICANT CHANGE UP (ref 0.2–1.2)
BILIRUB UR-MCNC: NEGATIVE — SIGNIFICANT CHANGE UP
BLOOD GAS COMMENTS ARTERIAL: SIGNIFICANT CHANGE UP
BUN SERPL-MCNC: 16 MG/DL — SIGNIFICANT CHANGE UP (ref 7–18)
BUN SERPL-MCNC: 17 MG/DL — SIGNIFICANT CHANGE UP (ref 7–18)
CALCIUM SERPL-MCNC: 9 MG/DL — SIGNIFICANT CHANGE UP (ref 8.4–10.5)
CALCIUM SERPL-MCNC: 9.5 MG/DL — SIGNIFICANT CHANGE UP (ref 8.4–10.5)
CHLORIDE SERPL-SCNC: 111 MMOL/L — HIGH (ref 96–108)
CHLORIDE SERPL-SCNC: 112 MMOL/L — HIGH (ref 96–108)
CK SERPL-CCNC: 100 U/L — SIGNIFICANT CHANGE UP (ref 21–215)
CO2 SERPL-SCNC: 18 MMOL/L — LOW (ref 22–31)
CO2 SERPL-SCNC: 23 MMOL/L — SIGNIFICANT CHANGE UP (ref 22–31)
COLOR SPEC: YELLOW — SIGNIFICANT CHANGE UP
COMMENT - URINE: SIGNIFICANT CHANGE UP
CREAT SERPL-MCNC: 1.39 MG/DL — HIGH (ref 0.5–1.3)
CREAT SERPL-MCNC: 1.53 MG/DL — HIGH (ref 0.5–1.3)
DIFF PNL FLD: ABNORMAL
EGFR: 37 ML/MIN/1.73M2 — LOW
EGFR: 37 ML/MIN/1.73M2 — LOW
EGFR: 41 ML/MIN/1.73M2 — LOW
EGFR: 41 ML/MIN/1.73M2 — LOW
EOSINOPHIL # BLD AUTO: 0 K/UL — SIGNIFICANT CHANGE UP (ref 0–0.5)
EOSINOPHIL # BLD AUTO: 0.07 K/UL — SIGNIFICANT CHANGE UP (ref 0–0.5)
EOSINOPHIL NFR BLD AUTO: 0 % — SIGNIFICANT CHANGE UP (ref 0–6)
EOSINOPHIL NFR BLD AUTO: 0.7 % — SIGNIFICANT CHANGE UP (ref 0–6)
EPI CELLS # UR: PRESENT
GLUCOSE BLDC GLUCOMTR-MCNC: 138 MG/DL — HIGH (ref 70–99)
GLUCOSE BLDC GLUCOMTR-MCNC: 139 MG/DL — HIGH (ref 70–99)
GLUCOSE BLDC GLUCOMTR-MCNC: 155 MG/DL — HIGH (ref 70–99)
GLUCOSE SERPL-MCNC: 108 MG/DL — HIGH (ref 70–99)
GLUCOSE SERPL-MCNC: 159 MG/DL — HIGH (ref 70–99)
GLUCOSE UR QL: 500 MG/DL
HCO3 BLDA-SCNC: 20 MMOL/L — LOW (ref 21–28)
HCT VFR BLD CALC: 42.3 % — SIGNIFICANT CHANGE UP (ref 34.5–45)
HCT VFR BLD CALC: 42.6 % — SIGNIFICANT CHANGE UP (ref 34.5–45)
HGB BLD-MCNC: 13.1 G/DL — SIGNIFICANT CHANGE UP (ref 11.5–15.5)
HGB BLD-MCNC: 13.2 G/DL — SIGNIFICANT CHANGE UP (ref 11.5–15.5)
HOROWITZ INDEX BLDA+IHG-RTO: 21 — SIGNIFICANT CHANGE UP
HYPOSEGMENTATION: PRESENT — SIGNIFICANT CHANGE UP
IMM GRANULOCYTES NFR BLD AUTO: 0.3 % — SIGNIFICANT CHANGE UP (ref 0–0.9)
INR BLD: 1.26 RATIO — HIGH (ref 0.85–1.16)
KETONES UR-MCNC: NEGATIVE MG/DL — SIGNIFICANT CHANGE UP
LEUKOCYTE ESTERASE UR-ACNC: ABNORMAL
LYMPHOCYTES # BLD AUTO: 0.5 K/UL — LOW (ref 1–3.3)
LYMPHOCYTES # BLD AUTO: 1.11 K/UL — SIGNIFICANT CHANGE UP (ref 1–3.3)
LYMPHOCYTES # BLD AUTO: 4 % — LOW (ref 13–44)
LYMPHOCYTES # BLD AUTO: 5.2 % — LOW (ref 13–44)
MAGNESIUM SERPL-MCNC: 1.9 MG/DL — SIGNIFICANT CHANGE UP (ref 1.6–2.6)
MANUAL SMEAR VERIFICATION: SIGNIFICANT CHANGE UP
MCHC RBC-ENTMCNC: 25.6 PG — LOW (ref 27–34)
MCHC RBC-ENTMCNC: 26.1 PG — LOW (ref 27–34)
MCHC RBC-ENTMCNC: 30.8 G/DL — LOW (ref 32–36)
MCHC RBC-ENTMCNC: 31.2 G/DL — LOW (ref 32–36)
MCV RBC AUTO: 83.4 FL — SIGNIFICANT CHANGE UP (ref 80–100)
MCV RBC AUTO: 83.8 FL — SIGNIFICANT CHANGE UP (ref 80–100)
METAMYELOCYTES # FLD: 4 % — HIGH (ref 0–0)
METAMYELOCYTES NFR BLD: 4 % — HIGH (ref 0–0)
MONOCYTES # BLD AUTO: 0.08 K/UL — SIGNIFICANT CHANGE UP (ref 0–0.9)
MONOCYTES # BLD AUTO: 1.39 K/UL — HIGH (ref 0–0.9)
MONOCYTES NFR BLD AUTO: 0.8 % — LOW (ref 2–14)
MONOCYTES NFR BLD AUTO: 5 % — SIGNIFICANT CHANGE UP (ref 2–14)
MYELOCYTES NFR BLD: 6 % — HIGH (ref 0–0)
NEUTROPHILS # BLD AUTO: 22.47 K/UL — HIGH (ref 1.8–7.4)
NEUTROPHILS # BLD AUTO: 8.82 K/UL — HIGH (ref 1.8–7.4)
NEUTROPHILS NFR BLD AUTO: 66 % — SIGNIFICANT CHANGE UP (ref 43–77)
NEUTROPHILS NFR BLD AUTO: 92.5 % — HIGH (ref 43–77)
NEUTS BAND # BLD: 15 % — HIGH (ref 0–8)
NEUTS BAND NFR BLD: 15 % — HIGH (ref 0–8)
NITRITE UR-MCNC: NEGATIVE — SIGNIFICANT CHANGE UP
NRBC # BLD: 0 /100 WBCS — SIGNIFICANT CHANGE UP (ref 0–0)
NRBC # BLD: 0 /100 WBCS — SIGNIFICANT CHANGE UP (ref 0–0)
NRBC BLD-RTO: 0 /100 WBCS — SIGNIFICANT CHANGE UP (ref 0–0)
NRBC BLD-RTO: 0 /100 WBCS — SIGNIFICANT CHANGE UP (ref 0–0)
PCO2 BLDA: 34 MMHG — SIGNIFICANT CHANGE UP (ref 32–35)
PH BLDA: 7.37 — SIGNIFICANT CHANGE UP (ref 7.35–7.45)
PH UR: 5 — SIGNIFICANT CHANGE UP (ref 5–8)
PHOSPHATE SERPL-MCNC: 2.2 MG/DL — LOW (ref 2.5–4.5)
PLAT MORPH BLD: NORMAL — SIGNIFICANT CHANGE UP
PLATELET # BLD AUTO: 221 K/UL — SIGNIFICANT CHANGE UP (ref 150–400)
PLATELET # BLD AUTO: 222 K/UL — SIGNIFICANT CHANGE UP (ref 150–400)
PLATELET COUNT - ESTIMATE: NORMAL — SIGNIFICANT CHANGE UP
PO2 BLDA: 66 MMHG — LOW (ref 83–108)
POTASSIUM SERPL-MCNC: 3.5 MMOL/L — SIGNIFICANT CHANGE UP (ref 3.5–5.3)
POTASSIUM SERPL-MCNC: 3.7 MMOL/L — SIGNIFICANT CHANGE UP (ref 3.5–5.3)
POTASSIUM SERPL-SCNC: 3.5 MMOL/L — SIGNIFICANT CHANGE UP (ref 3.5–5.3)
POTASSIUM SERPL-SCNC: 3.7 MMOL/L — SIGNIFICANT CHANGE UP (ref 3.5–5.3)
PROT SERPL-MCNC: 6.8 G/DL — SIGNIFICANT CHANGE UP (ref 6–8.3)
PROT SERPL-MCNC: 7.1 G/DL — SIGNIFICANT CHANGE UP (ref 6–8.3)
PROT UR-MCNC: 30 MG/DL
PROTHROM AB SERPL-ACNC: 14.6 SEC — HIGH (ref 9.9–13.4)
RBC # BLD: 5.05 M/UL — SIGNIFICANT CHANGE UP (ref 3.8–5.2)
RBC # BLD: 5.11 M/UL — SIGNIFICANT CHANGE UP (ref 3.8–5.2)
RBC # FLD: 14 % — SIGNIFICANT CHANGE UP (ref 10.3–14.5)
RBC # FLD: 14.3 % — SIGNIFICANT CHANGE UP (ref 10.3–14.5)
RBC BLD AUTO: NORMAL — SIGNIFICANT CHANGE UP
RBC CASTS # UR COMP ASSIST: 10 /HPF — HIGH (ref 0–4)
SAO2 % BLDA: 94 % — SIGNIFICANT CHANGE UP
SODIUM SERPL-SCNC: 139 MMOL/L — SIGNIFICANT CHANGE UP (ref 135–145)
SODIUM SERPL-SCNC: 141 MMOL/L — SIGNIFICANT CHANGE UP (ref 135–145)
SP GR SPEC: 1.06 — HIGH (ref 1–1.03)
TROPONIN I, HIGH SENSITIVITY RESULT: 140.9 NG/L — HIGH
TROPONIN I, HIGH SENSITIVITY RESULT: 2749.9 NG/L — HIGH
TROPONIN I, HIGH SENSITIVITY RESULT: 5674.2 NG/L — HIGH
TROPONIN I, HIGH SENSITIVITY RESULT: 86.1 NG/L — HIGH
UROBILINOGEN FLD QL: 0.2 MG/DL — SIGNIFICANT CHANGE UP (ref 0.2–1)
WBC # BLD: 27.74 K/UL — HIGH (ref 3.8–10.5)
WBC # BLD: 9.55 K/UL — SIGNIFICANT CHANGE UP (ref 3.8–10.5)
WBC # FLD AUTO: 27.74 K/UL — HIGH (ref 3.8–10.5)
WBC # FLD AUTO: 9.55 K/UL — SIGNIFICANT CHANGE UP (ref 3.8–10.5)
WBC UR QL: ABNORMAL /HPF (ref 0–5)

## 2024-11-04 PROCEDURE — 99053 MED SERV 10PM-8AM 24 HR FAC: CPT

## 2024-11-04 PROCEDURE — 0042T: CPT | Mod: MC

## 2024-11-04 PROCEDURE — 70450 CT HEAD/BRAIN W/O DYE: CPT | Mod: 26,XU,MC

## 2024-11-04 PROCEDURE — 99223 1ST HOSP IP/OBS HIGH 75: CPT

## 2024-11-04 PROCEDURE — 70498 CT ANGIOGRAPHY NECK: CPT | Mod: 26,MC

## 2024-11-04 PROCEDURE — 95720 EEG PHY/QHP EA INCR W/VEEG: CPT

## 2024-11-04 PROCEDURE — 99291 CRITICAL CARE FIRST HOUR: CPT

## 2024-11-04 PROCEDURE — 71045 X-RAY EXAM CHEST 1 VIEW: CPT | Mod: 26

## 2024-11-04 PROCEDURE — 71250 CT THORAX DX C-: CPT | Mod: 26

## 2024-11-04 PROCEDURE — 70496 CT ANGIOGRAPHY HEAD: CPT | Mod: 26,MC

## 2024-11-04 RX ORDER — INSULIN LISPRO 100 U/ML
INJECTION, SOLUTION INTRAVENOUS; SUBCUTANEOUS EVERY 6 HOURS
Refills: 0 | Status: DISCONTINUED | OUTPATIENT
Start: 2024-11-04 | End: 2024-11-06

## 2024-11-04 RX ORDER — APIXABAN 2.5 MG/1
5 TABLET, FILM COATED ORAL EVERY 12 HOURS
Refills: 0 | Status: DISCONTINUED | OUTPATIENT
Start: 2024-11-04 | End: 2024-11-04

## 2024-11-04 RX ORDER — ACETAMINOPHEN 500 MG/5ML
1000 LIQUID (ML) ORAL ONCE
Refills: 0 | Status: COMPLETED | OUTPATIENT
Start: 2024-11-04 | End: 2024-11-04

## 2024-11-04 RX ORDER — SODIUM CHLORIDE 9 G/1000ML
1000 INJECTION, SOLUTION INTRAVENOUS
Refills: 0 | Status: DISCONTINUED | OUTPATIENT
Start: 2024-11-04 | End: 2024-11-05

## 2024-11-04 RX ORDER — INSULIN LISPRO 100 U/ML
INJECTION, SOLUTION INTRAVENOUS; SUBCUTANEOUS AT BEDTIME
Refills: 0 | Status: DISCONTINUED | OUTPATIENT
Start: 2024-11-04 | End: 2024-11-04

## 2024-11-04 RX ORDER — POTASSIUM PHOSPHATE, MONOBASIC POTASSIUM PHOSPHATE, DIBASIC INJECTION, 236; 224 MG/ML; MG/ML
15 SOLUTION, CONCENTRATE INTRAVENOUS ONCE
Refills: 0 | Status: COMPLETED | OUTPATIENT
Start: 2024-11-04 | End: 2024-11-04

## 2024-11-04 RX ORDER — ENOXAPARIN SODIUM 100 MG/ML
70 INJECTION SUBCUTANEOUS EVERY 12 HOURS
Refills: 0 | Status: DISCONTINUED | OUTPATIENT
Start: 2024-11-04 | End: 2024-11-05

## 2024-11-04 RX ORDER — CEFTRIAXONE 500 MG/1
1000 INJECTION, POWDER, FOR SOLUTION INTRAMUSCULAR; INTRAVENOUS EVERY 24 HOURS
Refills: 0 | Status: DISCONTINUED | OUTPATIENT
Start: 2024-11-04 | End: 2024-11-04

## 2024-11-04 RX ORDER — METHYLPREDNISOLONE ACETATE 80 MG/ML
125 INJECTION, SUSPENSION INTRA-ARTICULAR; INTRALESIONAL; INTRAMUSCULAR; SOFT TISSUE ONCE
Refills: 0 | Status: COMPLETED | OUTPATIENT
Start: 2024-11-04 | End: 2024-11-04

## 2024-11-04 RX ORDER — IPRATROPIUM BROMIDE AND ALBUTEROL SULFATE .5; 2.5 MG/3ML; MG/3ML
3 SOLUTION RESPIRATORY (INHALATION) EVERY 6 HOURS
Refills: 0 | Status: DISCONTINUED | OUTPATIENT
Start: 2024-11-04 | End: 2024-11-06

## 2024-11-04 RX ORDER — APIXABAN 2.5 MG/1
5 TABLET, FILM COATED ORAL ONCE
Refills: 0 | Status: COMPLETED | OUTPATIENT
Start: 2024-11-04 | End: 2024-11-04

## 2024-11-04 RX ORDER — CEFTRIAXONE 500 MG/1
INJECTION, POWDER, FOR SOLUTION INTRAMUSCULAR; INTRAVENOUS
Refills: 0 | Status: DISCONTINUED | OUTPATIENT
Start: 2024-11-04 | End: 2024-11-04

## 2024-11-04 RX ORDER — LEVETIRACETAM 10 MG/ML
1500 INJECTION, SOLUTION INTRAVENOUS ONCE
Refills: 0 | Status: COMPLETED | OUTPATIENT
Start: 2024-11-04 | End: 2024-11-04

## 2024-11-04 RX ORDER — EZETIMIBE 10 MG/1
1 TABLET ORAL
Refills: 0 | DISCHARGE

## 2024-11-04 RX ORDER — MONTELUKAST SODIUM 10 MG/1
10 TABLET ORAL AT BEDTIME
Refills: 0 | Status: DISCONTINUED | OUTPATIENT
Start: 2024-11-04 | End: 2024-11-05

## 2024-11-04 RX ORDER — CEFTRIAXONE 500 MG/1
1000 INJECTION, POWDER, FOR SOLUTION INTRAMUSCULAR; INTRAVENOUS EVERY 24 HOURS
Refills: 0 | Status: DISCONTINUED | OUTPATIENT
Start: 2024-11-05 | End: 2024-11-09

## 2024-11-04 RX ORDER — IPRATROPIUM BROMIDE AND ALBUTEROL SULFATE .5; 2.5 MG/3ML; MG/3ML
3 SOLUTION RESPIRATORY (INHALATION) ONCE
Refills: 0 | Status: COMPLETED | OUTPATIENT
Start: 2024-11-04 | End: 2024-11-04

## 2024-11-04 RX ORDER — EZETIMIBE 10 MG/1
10 TABLET ORAL DAILY
Refills: 0 | Status: DISCONTINUED | OUTPATIENT
Start: 2024-11-04 | End: 2024-11-05

## 2024-11-04 RX ORDER — ATORVASTATIN CALCIUM 80 MG/1
10 TABLET, FILM COATED ORAL AT BEDTIME
Refills: 0 | Status: DISCONTINUED | OUTPATIENT
Start: 2024-11-04 | End: 2024-11-04

## 2024-11-04 RX ORDER — INSULIN LISPRO 100 U/ML
INJECTION, SOLUTION INTRAVENOUS; SUBCUTANEOUS
Refills: 0 | Status: DISCONTINUED | OUTPATIENT
Start: 2024-11-04 | End: 2024-11-04

## 2024-11-04 RX ORDER — ATORVASTATIN CALCIUM 80 MG/1
80 TABLET, FILM COATED ORAL AT BEDTIME
Refills: 0 | Status: DISCONTINUED | OUTPATIENT
Start: 2024-11-04 | End: 2024-11-05

## 2024-11-04 RX ORDER — HEPARIN SODIUM 1000 [USP'U]/ML
5000 INJECTION INTRAVENOUS; SUBCUTANEOUS EVERY 8 HOURS
Refills: 0 | Status: DISCONTINUED | OUTPATIENT
Start: 2024-11-04 | End: 2024-11-04

## 2024-11-04 RX ORDER — LEVETIRACETAM 10 MG/ML
500 INJECTION, SOLUTION INTRAVENOUS EVERY 12 HOURS
Refills: 0 | Status: DISCONTINUED | OUTPATIENT
Start: 2024-11-04 | End: 2024-11-06

## 2024-11-04 RX ORDER — CEFTRIAXONE 500 MG/1
1000 INJECTION, POWDER, FOR SOLUTION INTRAMUSCULAR; INTRAVENOUS ONCE
Refills: 0 | Status: DISCONTINUED | OUTPATIENT
Start: 2024-11-04 | End: 2024-11-04

## 2024-11-04 RX ORDER — TOPIRAMATE 25 MG/1
100 TABLET, FILM COATED ORAL DAILY
Refills: 0 | Status: DISCONTINUED | OUTPATIENT
Start: 2024-11-04 | End: 2024-11-05

## 2024-11-04 RX ADMIN — IPRATROPIUM BROMIDE AND ALBUTEROL SULFATE 3 MILLILITER(S): .5; 2.5 SOLUTION RESPIRATORY (INHALATION) at 05:49

## 2024-11-04 RX ADMIN — SODIUM CHLORIDE 75 MILLILITER(S): 9 INJECTION, SOLUTION INTRAVENOUS at 10:17

## 2024-11-04 RX ADMIN — SODIUM CHLORIDE 75 MILLILITER(S): 9 INJECTION, SOLUTION INTRAVENOUS at 10:54

## 2024-11-04 RX ADMIN — IPRATROPIUM BROMIDE AND ALBUTEROL SULFATE 3 MILLILITER(S): .5; 2.5 SOLUTION RESPIRATORY (INHALATION) at 15:05

## 2024-11-04 RX ADMIN — CEFTRIAXONE 100 MILLIGRAM(S): 500 INJECTION, POWDER, FOR SOLUTION INTRAMUSCULAR; INTRAVENOUS at 12:48

## 2024-11-04 RX ADMIN — Medication 1000 MILLIGRAM(S): at 14:15

## 2024-11-04 RX ADMIN — Medication 400 MILLIGRAM(S): at 13:53

## 2024-11-04 RX ADMIN — INSULIN LISPRO 1: 100 INJECTION, SOLUTION INTRAVENOUS; SUBCUTANEOUS at 11:14

## 2024-11-04 RX ADMIN — Medication 1 APPLICATION(S): at 10:54

## 2024-11-04 RX ADMIN — IPRATROPIUM BROMIDE AND ALBUTEROL SULFATE 3 MILLILITER(S): .5; 2.5 SOLUTION RESPIRATORY (INHALATION) at 21:14

## 2024-11-04 RX ADMIN — APIXABAN 5 MILLIGRAM(S): 2.5 TABLET, FILM COATED ORAL at 06:59

## 2024-11-04 RX ADMIN — ATORVASTATIN CALCIUM 80 MILLIGRAM(S): 80 TABLET, FILM COATED ORAL at 22:24

## 2024-11-04 RX ADMIN — ENOXAPARIN SODIUM 70 MILLIGRAM(S): 100 INJECTION SUBCUTANEOUS at 17:41

## 2024-11-04 RX ADMIN — MONTELUKAST SODIUM 10 MILLIGRAM(S): 10 TABLET ORAL at 22:24

## 2024-11-04 RX ADMIN — LEVETIRACETAM 400 MILLIGRAM(S): 10 INJECTION, SOLUTION INTRAVENOUS at 10:54

## 2024-11-04 RX ADMIN — POTASSIUM PHOSPHATE, MONOBASIC POTASSIUM PHOSPHATE, DIBASIC INJECTION, 62.5 MILLIMOLE(S): 236; 224 SOLUTION, CONCENTRATE INTRAVENOUS at 13:53

## 2024-11-04 RX ADMIN — METHYLPREDNISOLONE ACETATE 125 MILLIGRAM(S): 80 INJECTION, SUSPENSION INTRA-ARTICULAR; INTRALESIONAL; INTRAMUSCULAR; SOFT TISSUE at 05:48

## 2024-11-04 RX ADMIN — LEVETIRACETAM 420 MILLIGRAM(S): 10 INJECTION, SOLUTION INTRAVENOUS at 17:41

## 2024-11-04 RX ADMIN — IPRATROPIUM BROMIDE AND ALBUTEROL SULFATE 3 MILLILITER(S): .5; 2.5 SOLUTION RESPIRATORY (INHALATION) at 09:18

## 2024-11-05 ENCOUNTER — RESULT REVIEW (OUTPATIENT)
Age: 69
End: 2024-11-05

## 2024-11-05 LAB
A1C WITH ESTIMATED AVERAGE GLUCOSE RESULT: 7.1 % — HIGH (ref 4–5.6)
ALBUMIN SERPL ELPH-MCNC: 2.8 G/DL — LOW (ref 3.5–5)
ALBUMIN SERPL ELPH-MCNC: 2.9 G/DL — LOW (ref 3.5–5)
ALP SERPL-CCNC: 109 U/L — SIGNIFICANT CHANGE UP (ref 40–120)
ALP SERPL-CCNC: 157 U/L — HIGH (ref 40–120)
ALT FLD-CCNC: 12 U/L DA — SIGNIFICANT CHANGE UP (ref 10–60)
ALT FLD-CCNC: 13 U/L DA — SIGNIFICANT CHANGE UP (ref 10–60)
ANION GAP SERPL CALC-SCNC: 7 MMOL/L — SIGNIFICANT CHANGE UP (ref 5–17)
ANION GAP SERPL CALC-SCNC: 8 MMOL/L — SIGNIFICANT CHANGE UP (ref 5–17)
ANISOCYTOSIS BLD QL: SLIGHT — SIGNIFICANT CHANGE UP
APTT BLD: 113.2 SEC — HIGH (ref 24.5–35.6)
APTT BLD: 43.6 SEC — HIGH (ref 24.5–35.6)
APTT BLD: 54.7 SEC — HIGH (ref 24.5–35.6)
AST SERPL-CCNC: 38 U/L — SIGNIFICANT CHANGE UP (ref 10–40)
AST SERPL-CCNC: 41 U/L — HIGH (ref 10–40)
BASE EXCESS BLDV CALC-SCNC: -3.9 MMOL/L — SIGNIFICANT CHANGE UP
BASOPHILS # BLD AUTO: 0 K/UL — SIGNIFICANT CHANGE UP (ref 0–0.2)
BASOPHILS NFR BLD AUTO: 0 % — SIGNIFICANT CHANGE UP (ref 0–2)
BILIRUB SERPL-MCNC: 0.3 MG/DL — SIGNIFICANT CHANGE UP (ref 0.2–1.2)
BILIRUB SERPL-MCNC: 0.4 MG/DL — SIGNIFICANT CHANGE UP (ref 0.2–1.2)
BUN SERPL-MCNC: 22 MG/DL — HIGH (ref 7–18)
BUN SERPL-MCNC: 23 MG/DL — HIGH (ref 7–18)
CALCIUM SERPL-MCNC: 9.1 MG/DL — SIGNIFICANT CHANGE UP (ref 8.4–10.5)
CALCIUM SERPL-MCNC: SIGNIFICANT CHANGE UP (ref 8.4–10.5)
CHLORIDE SERPL-SCNC: 114 MMOL/L — HIGH (ref 96–108)
CHLORIDE SERPL-SCNC: 116 MMOL/L — HIGH (ref 96–108)
CHOLEST SERPL-MCNC: 141 MG/DL — SIGNIFICANT CHANGE UP
CK MB BLD-MCNC: 4.6 % — HIGH (ref 0–3.5)
CK MB BLD-MCNC: 5.4 % — HIGH (ref 0–3.5)
CK MB CFR SERPL CALC: 15 NG/ML — HIGH (ref 0–3.6)
CK MB CFR SERPL CALC: 9.5 NG/ML — HIGH (ref 0–3.6)
CK SERPL-CCNC: 208 U/L — SIGNIFICANT CHANGE UP (ref 21–215)
CK SERPL-CCNC: 276 U/L — HIGH (ref 21–215)
CO2 SERPL-SCNC: 16 MMOL/L — LOW (ref 22–31)
CO2 SERPL-SCNC: 20 MMOL/L — LOW (ref 22–31)
CREAT SERPL-MCNC: 1.58 MG/DL — HIGH (ref 0.5–1.3)
CREAT SERPL-MCNC: 1.63 MG/DL — HIGH (ref 0.5–1.3)
EGFR: 34 ML/MIN/1.73M2 — LOW
EGFR: 34 ML/MIN/1.73M2 — LOW
EGFR: 35 ML/MIN/1.73M2 — LOW
EGFR: 35 ML/MIN/1.73M2 — LOW
EOSINOPHIL # BLD AUTO: 0 K/UL — SIGNIFICANT CHANGE UP (ref 0–0.5)
EOSINOPHIL NFR BLD AUTO: 0 % — SIGNIFICANT CHANGE UP (ref 0–6)
ESTIMATED AVERAGE GLUCOSE: 157 MG/DL — HIGH (ref 68–114)
GLUCOSE BLDC GLUCOMTR-MCNC: 132 MG/DL — HIGH (ref 70–99)
GLUCOSE BLDC GLUCOMTR-MCNC: 133 MG/DL — HIGH (ref 70–99)
GLUCOSE BLDC GLUCOMTR-MCNC: 141 MG/DL — HIGH (ref 70–99)
GLUCOSE SERPL-MCNC: 132 MG/DL — HIGH (ref 70–99)
GLUCOSE SERPL-MCNC: 145 MG/DL — HIGH (ref 70–99)
HCO3 BLDV-SCNC: 23 MMOL/L — SIGNIFICANT CHANGE UP (ref 22–29)
HCT VFR BLD CALC: 41.1 % — SIGNIFICANT CHANGE UP (ref 34.5–45)
HCT VFR BLD CALC: 41.9 % — SIGNIFICANT CHANGE UP (ref 34.5–45)
HDLC SERPL-MCNC: 52 MG/DL — SIGNIFICANT CHANGE UP
HGB BLD-MCNC: 13 G/DL — SIGNIFICANT CHANGE UP (ref 11.5–15.5)
HGB BLD-MCNC: 13.1 G/DL — SIGNIFICANT CHANGE UP (ref 11.5–15.5)
HYPOSEGMENTATION: PRESENT — SIGNIFICANT CHANGE UP
INR BLD: 1.66 RATIO — HIGH (ref 0.85–1.16)
LDLC SERPL-MCNC: 78 MG/DL — SIGNIFICANT CHANGE UP
LIPID PNL WITH DIRECT LDL SERPL: 78 MG/DL — SIGNIFICANT CHANGE UP
LYMPHOCYTES # BLD AUTO: 2.44 K/UL — SIGNIFICANT CHANGE UP (ref 1–3.3)
LYMPHOCYTES # BLD AUTO: 5 % — LOW (ref 13–44)
MAGNESIUM SERPL-MCNC: 2.1 MG/DL — SIGNIFICANT CHANGE UP (ref 1.6–2.6)
MAGNESIUM SERPL-MCNC: 2.2 MG/DL — SIGNIFICANT CHANGE UP (ref 1.6–2.6)
MANUAL SMEAR VERIFICATION: SIGNIFICANT CHANGE UP
MCHC RBC-ENTMCNC: 26.5 PG — LOW (ref 27–34)
MCHC RBC-ENTMCNC: 27 PG — SIGNIFICANT CHANGE UP (ref 27–34)
MCHC RBC-ENTMCNC: 31 G/DL — LOW (ref 32–36)
MCHC RBC-ENTMCNC: 31.9 G/DL — LOW (ref 32–36)
MCV RBC AUTO: 84.6 FL — SIGNIFICANT CHANGE UP (ref 80–100)
MCV RBC AUTO: 85.3 FL — SIGNIFICANT CHANGE UP (ref 80–100)
METAMYELOCYTES # FLD: 2 % — HIGH (ref 0–0)
METAMYELOCYTES NFR BLD: 2 % — HIGH (ref 0–0)
MONOCYTES # BLD AUTO: 1.95 K/UL — HIGH (ref 0–0.9)
MONOCYTES NFR BLD AUTO: 4 % — SIGNIFICANT CHANGE UP (ref 2–14)
MYELOCYTES NFR BLD: 1 % — HIGH (ref 0–0)
NEUTROPHILS # BLD AUTO: 42.43 K/UL — HIGH (ref 1.8–7.4)
NEUTROPHILS NFR BLD AUTO: 83 % — HIGH (ref 43–77)
NEUTS BAND # BLD: 4 % — SIGNIFICANT CHANGE UP (ref 0–8)
NEUTS BAND NFR BLD: 4 % — SIGNIFICANT CHANGE UP (ref 0–8)
NEUTS HYPERSEG # BLD: PRESENT — SIGNIFICANT CHANGE UP
NONHDLC SERPL-MCNC: 89 MG/DL — SIGNIFICANT CHANGE UP
NRBC # BLD: 0 /100 WBCS — SIGNIFICANT CHANGE UP (ref 0–0)
NRBC # BLD: 0 /100 WBCS — SIGNIFICANT CHANGE UP (ref 0–0)
NRBC BLD-RTO: 0 /100 WBCS — SIGNIFICANT CHANGE UP (ref 0–0)
NRBC BLD-RTO: 0 /100 WBCS — SIGNIFICANT CHANGE UP (ref 0–0)
OVALOCYTES BLD QL SMEAR: SLIGHT — SIGNIFICANT CHANGE UP
PCO2 BLDV: 46 MMHG — HIGH (ref 39–42)
PH BLDV: 7.3 — LOW (ref 7.32–7.43)
PHOSPHATE SERPL-MCNC: 3.6 MG/DL — SIGNIFICANT CHANGE UP (ref 2.5–4.5)
PLAT MORPH BLD: NORMAL — SIGNIFICANT CHANGE UP
PLATELET # BLD AUTO: 207 K/UL — SIGNIFICANT CHANGE UP (ref 150–400)
PLATELET # BLD AUTO: 223 K/UL — SIGNIFICANT CHANGE UP (ref 150–400)
PLATELET COUNT - ESTIMATE: NORMAL — SIGNIFICANT CHANGE UP
PO2 BLDV: 33 MMHG — SIGNIFICANT CHANGE UP
POIKILOCYTOSIS BLD QL AUTO: SIGNIFICANT CHANGE UP
POTASSIUM SERPL-MCNC: 4.4 MMOL/L — SIGNIFICANT CHANGE UP (ref 3.5–5.3)
POTASSIUM SERPL-MCNC: 4.4 MMOL/L — SIGNIFICANT CHANGE UP (ref 3.5–5.3)
POTASSIUM SERPL-SCNC: 4.4 MMOL/L — SIGNIFICANT CHANGE UP (ref 3.5–5.3)
POTASSIUM SERPL-SCNC: 4.4 MMOL/L — SIGNIFICANT CHANGE UP (ref 3.5–5.3)
PROT SERPL-MCNC: 6.9 G/DL — SIGNIFICANT CHANGE UP (ref 6–8.3)
PROT SERPL-MCNC: 7.3 G/DL — SIGNIFICANT CHANGE UP (ref 6–8.3)
PROTHROM AB SERPL-ACNC: 19.3 SEC — HIGH (ref 9.9–13.4)
RBC # BLD: 4.86 M/UL — SIGNIFICANT CHANGE UP (ref 3.8–5.2)
RBC # BLD: 4.91 M/UL — SIGNIFICANT CHANGE UP (ref 3.8–5.2)
RBC # FLD: 14.5 % — SIGNIFICANT CHANGE UP (ref 10.3–14.5)
RBC # FLD: 14.6 % — HIGH (ref 10.3–14.5)
RBC BLD AUTO: ABNORMAL
SAO2 % BLDV: 50.6 % — SIGNIFICANT CHANGE UP
SODIUM SERPL-SCNC: 140 MMOL/L — SIGNIFICANT CHANGE UP (ref 135–145)
SODIUM SERPL-SCNC: 141 MMOL/L — SIGNIFICANT CHANGE UP (ref 135–145)
TRIGL SERPL-MCNC: 57 MG/DL — SIGNIFICANT CHANGE UP
TROPONIN I, HIGH SENSITIVITY RESULT: 9043.3 NG/L — HIGH
TROPONIN I, HIGH SENSITIVITY RESULT: HIGH NG/L
VARIANT LYMPHS # BLD: 1 % — SIGNIFICANT CHANGE UP (ref 0–6)
VARIANT LYMPHS NFR BLD MANUAL: 1 % — SIGNIFICANT CHANGE UP (ref 0–6)
WBC # BLD: 45.93 K/UL — CRITICAL HIGH (ref 3.8–10.5)
WBC # BLD: 48.77 K/UL — CRITICAL HIGH (ref 3.8–10.5)
WBC # FLD AUTO: 45.93 K/UL — CRITICAL HIGH (ref 3.8–10.5)
WBC # FLD AUTO: 48.77 K/UL — CRITICAL HIGH (ref 3.8–10.5)

## 2024-11-05 PROCEDURE — 95720 EEG PHY/QHP EA INCR W/VEEG: CPT

## 2024-11-05 RX ORDER — HEPARIN SODIUM 1000 [USP'U]/ML
600 INJECTION INTRAVENOUS; SUBCUTANEOUS
Qty: 25000 | Refills: 0 | Status: DISCONTINUED | OUTPATIENT
Start: 2024-11-05 | End: 2024-11-06

## 2024-11-05 RX ORDER — HEPARIN SODIUM 1000 [USP'U]/ML
4100 INJECTION INTRAVENOUS; SUBCUTANEOUS EVERY 6 HOURS
Refills: 0 | Status: DISCONTINUED | OUTPATIENT
Start: 2024-11-05 | End: 2024-11-05

## 2024-11-05 RX ORDER — METOPROLOL SUCCINATE 50 MG/1
25 TABLET, EXTENDED RELEASE ORAL
Refills: 0 | Status: DISCONTINUED | OUTPATIENT
Start: 2024-11-05 | End: 2024-11-05

## 2024-11-05 RX ORDER — SODIUM CHLORIDE 9 G/1000ML
1000 INJECTION, SOLUTION INTRAVENOUS
Refills: 0 | Status: DISCONTINUED | OUTPATIENT
Start: 2024-11-05 | End: 2024-11-06

## 2024-11-05 RX ORDER — HEPARIN SODIUM 1000 [USP'U]/ML
4100 INJECTION INTRAVENOUS; SUBCUTANEOUS ONCE
Refills: 0 | Status: COMPLETED | OUTPATIENT
Start: 2024-11-05 | End: 2024-11-05

## 2024-11-05 RX ORDER — ASPIRIN 325 MG
81 TABLET ORAL DAILY
Refills: 0 | Status: DISCONTINUED | OUTPATIENT
Start: 2024-11-05 | End: 2024-11-09

## 2024-11-05 RX ORDER — ATORVASTATIN CALCIUM 80 MG/1
80 TABLET, FILM COATED ORAL AT BEDTIME
Refills: 0 | Status: DISCONTINUED | OUTPATIENT
Start: 2024-11-05 | End: 2024-11-09

## 2024-11-05 RX ORDER — HEPARIN SODIUM 1000 [USP'U]/ML
600 INJECTION INTRAVENOUS; SUBCUTANEOUS
Qty: 25000 | Refills: 0 | Status: DISCONTINUED | OUTPATIENT
Start: 2024-11-05 | End: 2024-11-05

## 2024-11-05 RX ORDER — HEPARIN SODIUM 1000 [USP'U]/ML
INJECTION INTRAVENOUS; SUBCUTANEOUS
Qty: 25000 | Refills: 0 | Status: DISCONTINUED | OUTPATIENT
Start: 2024-11-05 | End: 2024-11-05

## 2024-11-05 RX ADMIN — LEVETIRACETAM 420 MILLIGRAM(S): 10 INJECTION, SOLUTION INTRAVENOUS at 17:03

## 2024-11-05 RX ADMIN — Medication 1 DOSE(S): at 22:18

## 2024-11-05 RX ADMIN — IPRATROPIUM BROMIDE AND ALBUTEROL SULFATE 3 MILLILITER(S): .5; 2.5 SOLUTION RESPIRATORY (INHALATION) at 03:24

## 2024-11-05 RX ADMIN — HEPARIN SODIUM 0 UNIT(S)/HR: 1000 INJECTION INTRAVENOUS; SUBCUTANEOUS at 13:12

## 2024-11-05 RX ADMIN — IPRATROPIUM BROMIDE AND ALBUTEROL SULFATE 3 MILLILITER(S): .5; 2.5 SOLUTION RESPIRATORY (INHALATION) at 20:12

## 2024-11-05 RX ADMIN — Medication 1 APPLICATION(S): at 05:22

## 2024-11-05 RX ADMIN — HEPARIN SODIUM 4100 UNIT(S): 1000 INJECTION INTRAVENOUS; SUBCUTANEOUS at 05:18

## 2024-11-05 RX ADMIN — LEVETIRACETAM 420 MILLIGRAM(S): 10 INJECTION, SOLUTION INTRAVENOUS at 05:21

## 2024-11-05 RX ADMIN — HEPARIN SODIUM 600 UNIT(S)/HR: 1000 INJECTION INTRAVENOUS; SUBCUTANEOUS at 14:18

## 2024-11-05 RX ADMIN — HEPARIN SODIUM 6 UNIT(S)/HR: 1000 INJECTION INTRAVENOUS; SUBCUTANEOUS at 16:00

## 2024-11-05 RX ADMIN — ATORVASTATIN CALCIUM 80 MILLIGRAM(S): 80 TABLET, FILM COATED ORAL at 22:18

## 2024-11-05 RX ADMIN — HEPARIN SODIUM 6 UNIT(S)/HR: 1000 INJECTION INTRAVENOUS; SUBCUTANEOUS at 19:32

## 2024-11-05 RX ADMIN — IPRATROPIUM BROMIDE AND ALBUTEROL SULFATE 3 MILLILITER(S): .5; 2.5 SOLUTION RESPIRATORY (INHALATION) at 15:46

## 2024-11-05 RX ADMIN — HEPARIN SODIUM 800 UNIT(S)/HR: 1000 INJECTION INTRAVENOUS; SUBCUTANEOUS at 05:10

## 2024-11-05 RX ADMIN — SODIUM CHLORIDE 75 MILLILITER(S): 9 INJECTION, SOLUTION INTRAVENOUS at 10:16

## 2024-11-05 RX ADMIN — HEPARIN SODIUM 800 UNIT(S)/HR: 1000 INJECTION INTRAVENOUS; SUBCUTANEOUS at 07:19

## 2024-11-05 RX ADMIN — HEPARIN SODIUM 6 UNIT(S)/HR: 1000 INJECTION INTRAVENOUS; SUBCUTANEOUS at 21:19

## 2024-11-05 RX ADMIN — CEFTRIAXONE 100 MILLIGRAM(S): 500 INJECTION, POWDER, FOR SOLUTION INTRAMUSCULAR; INTRAVENOUS at 12:51

## 2024-11-05 RX ADMIN — Medication 1 DOSE(S): at 12:22

## 2024-11-06 LAB
ALBUMIN SERPL ELPH-MCNC: 2.6 G/DL — LOW (ref 3.5–5)
ALP SERPL-CCNC: 106 U/L — SIGNIFICANT CHANGE UP (ref 40–120)
ALT FLD-CCNC: 12 U/L DA — SIGNIFICANT CHANGE UP (ref 10–60)
ANION GAP SERPL CALC-SCNC: 6 MMOL/L — SIGNIFICANT CHANGE UP (ref 5–17)
APTT BLD: 48.4 SEC — HIGH (ref 24.5–35.6)
AST SERPL-CCNC: 25 U/L — SIGNIFICANT CHANGE UP (ref 10–40)
BASOPHILS # BLD AUTO: 0.1 K/UL — SIGNIFICANT CHANGE UP (ref 0–0.2)
BASOPHILS NFR BLD AUTO: 0.3 % — SIGNIFICANT CHANGE UP (ref 0–2)
BILIRUB SERPL-MCNC: 0.2 MG/DL — SIGNIFICANT CHANGE UP (ref 0.2–1.2)
BUN SERPL-MCNC: 22 MG/DL — HIGH (ref 7–18)
CALCIUM SERPL-MCNC: 9.2 MG/DL — SIGNIFICANT CHANGE UP (ref 8.4–10.5)
CHLORIDE SERPL-SCNC: 115 MMOL/L — HIGH (ref 96–108)
CO2 SERPL-SCNC: 23 MMOL/L — SIGNIFICANT CHANGE UP (ref 22–31)
CREAT SERPL-MCNC: 1.3 MG/DL — SIGNIFICANT CHANGE UP (ref 0.5–1.3)
CULTURE RESULTS: ABNORMAL
EGFR: 45 ML/MIN/1.73M2 — LOW
EGFR: 45 ML/MIN/1.73M2 — LOW
EOSINOPHIL # BLD AUTO: 0.02 K/UL — SIGNIFICANT CHANGE UP (ref 0–0.5)
EOSINOPHIL NFR BLD AUTO: 0.1 % — SIGNIFICANT CHANGE UP (ref 0–6)
GLUCOSE BLDC GLUCOMTR-MCNC: 102 MG/DL — HIGH (ref 70–99)
GLUCOSE BLDC GLUCOMTR-MCNC: 112 MG/DL — HIGH (ref 70–99)
GLUCOSE BLDC GLUCOMTR-MCNC: 114 MG/DL — HIGH (ref 70–99)
GLUCOSE BLDC GLUCOMTR-MCNC: 115 MG/DL — HIGH (ref 70–99)
GLUCOSE BLDC GLUCOMTR-MCNC: 132 MG/DL — HIGH (ref 70–99)
GLUCOSE SERPL-MCNC: 110 MG/DL — HIGH (ref 70–99)
HCT VFR BLD CALC: 36.5 % — SIGNIFICANT CHANGE UP (ref 34.5–45)
HGB BLD-MCNC: 11.5 G/DL — SIGNIFICANT CHANGE UP (ref 11.5–15.5)
IMM GRANULOCYTES NFR BLD AUTO: 9.3 % — HIGH (ref 0–0.9)
LYMPHOCYTES # BLD AUTO: 2.72 K/UL — SIGNIFICANT CHANGE UP (ref 1–3.3)
LYMPHOCYTES # BLD AUTO: 7.5 % — LOW (ref 13–44)
MAGNESIUM SERPL-MCNC: 2.2 MG/DL — SIGNIFICANT CHANGE UP (ref 1.6–2.6)
MCHC RBC-ENTMCNC: 26.6 PG — LOW (ref 27–34)
MCHC RBC-ENTMCNC: 31.5 G/DL — LOW (ref 32–36)
MCV RBC AUTO: 84.3 FL — SIGNIFICANT CHANGE UP (ref 80–100)
MONOCYTES # BLD AUTO: 2.09 K/UL — HIGH (ref 0–0.9)
MONOCYTES NFR BLD AUTO: 5.8 % — SIGNIFICANT CHANGE UP (ref 2–14)
NEUTROPHILS # BLD AUTO: 27.96 K/UL — HIGH (ref 1.8–7.4)
NEUTROPHILS NFR BLD AUTO: 77 % — SIGNIFICANT CHANGE UP (ref 43–77)
NRBC # BLD: 0 /100 WBCS — SIGNIFICANT CHANGE UP (ref 0–0)
NRBC BLD-RTO: 0 /100 WBCS — SIGNIFICANT CHANGE UP (ref 0–0)
PHOSPHATE SERPL-MCNC: 1.7 MG/DL — LOW (ref 2.5–4.5)
PLATELET # BLD AUTO: 205 K/UL — SIGNIFICANT CHANGE UP (ref 150–400)
POTASSIUM SERPL-MCNC: 3.4 MMOL/L — LOW (ref 3.5–5.3)
POTASSIUM SERPL-SCNC: 3.4 MMOL/L — LOW (ref 3.5–5.3)
PROT SERPL-MCNC: 6.2 G/DL — SIGNIFICANT CHANGE UP (ref 6–8.3)
RBC # BLD: 4.33 M/UL — SIGNIFICANT CHANGE UP (ref 3.8–5.2)
RBC # FLD: 14.6 % — HIGH (ref 10.3–14.5)
SODIUM SERPL-SCNC: 144 MMOL/L — SIGNIFICANT CHANGE UP (ref 135–145)
SPECIMEN SOURCE: SIGNIFICANT CHANGE UP
WBC # BLD: 36.28 K/UL — HIGH (ref 3.8–10.5)
WBC # FLD AUTO: 36.28 K/UL — HIGH (ref 3.8–10.5)

## 2024-11-06 RX ORDER — IPRATROPIUM BROMIDE AND ALBUTEROL SULFATE .5; 2.5 MG/3ML; MG/3ML
3 SOLUTION RESPIRATORY (INHALATION) EVERY 6 HOURS
Refills: 0 | Status: DISCONTINUED | OUTPATIENT
Start: 2024-11-06 | End: 2024-11-09

## 2024-11-06 RX ORDER — HEPARIN SODIUM 1000 [USP'U]/ML
600 INJECTION INTRAVENOUS; SUBCUTANEOUS
Qty: 25000 | Refills: 0 | Status: DISCONTINUED | OUTPATIENT
Start: 2024-11-06 | End: 2024-11-06

## 2024-11-06 RX ORDER — CARVEDILOL 3.12 MG/1
12.5 TABLET, FILM COATED ORAL EVERY 12 HOURS
Refills: 0 | Status: DISCONTINUED | OUTPATIENT
Start: 2024-11-06 | End: 2024-11-07

## 2024-11-06 RX ORDER — INSULIN LISPRO 100 U/ML
INJECTION, SOLUTION INTRAVENOUS; SUBCUTANEOUS AT BEDTIME
Refills: 0 | Status: DISCONTINUED | OUTPATIENT
Start: 2024-11-06 | End: 2024-11-09

## 2024-11-06 RX ORDER — APIXABAN 2.5 MG/1
5 TABLET, FILM COATED ORAL EVERY 12 HOURS
Refills: 0 | Status: DISCONTINUED | OUTPATIENT
Start: 2024-11-06 | End: 2024-11-09

## 2024-11-06 RX ORDER — POTASSIUM PHOSPHATE, MONOBASIC POTASSIUM PHOSPHATE, DIBASIC INJECTION, 236; 224 MG/ML; MG/ML
30 SOLUTION, CONCENTRATE INTRAVENOUS ONCE
Refills: 0 | Status: COMPLETED | OUTPATIENT
Start: 2024-11-06 | End: 2024-11-06

## 2024-11-06 RX ORDER — INSULIN LISPRO 100 U/ML
INJECTION, SOLUTION INTRAVENOUS; SUBCUTANEOUS
Refills: 0 | Status: DISCONTINUED | OUTPATIENT
Start: 2024-11-06 | End: 2024-11-09

## 2024-11-06 RX ORDER — LEVETIRACETAM 10 MG/ML
500 INJECTION, SOLUTION INTRAVENOUS
Refills: 0 | Status: DISCONTINUED | OUTPATIENT
Start: 2024-11-06 | End: 2024-11-09

## 2024-11-06 RX ADMIN — Medication 1 APPLICATION(S): at 06:20

## 2024-11-06 RX ADMIN — ATORVASTATIN CALCIUM 80 MILLIGRAM(S): 80 TABLET, FILM COATED ORAL at 21:44

## 2024-11-06 RX ADMIN — CEFTRIAXONE 100 MILLIGRAM(S): 500 INJECTION, POWDER, FOR SOLUTION INTRAMUSCULAR; INTRAVENOUS at 11:46

## 2024-11-06 RX ADMIN — HEPARIN SODIUM 6 UNIT(S)/HR: 1000 INJECTION INTRAVENOUS; SUBCUTANEOUS at 05:41

## 2024-11-06 RX ADMIN — HEPARIN SODIUM 6 UNIT(S)/HR: 1000 INJECTION INTRAVENOUS; SUBCUTANEOUS at 08:19

## 2024-11-06 RX ADMIN — LEVETIRACETAM 420 MILLIGRAM(S): 10 INJECTION, SOLUTION INTRAVENOUS at 06:20

## 2024-11-06 RX ADMIN — IPRATROPIUM BROMIDE AND ALBUTEROL SULFATE 3 MILLILITER(S): .5; 2.5 SOLUTION RESPIRATORY (INHALATION) at 09:24

## 2024-11-06 RX ADMIN — LEVETIRACETAM 500 MILLIGRAM(S): 10 INJECTION, SOLUTION INTRAVENOUS at 17:40

## 2024-11-06 RX ADMIN — POTASSIUM PHOSPHATE, MONOBASIC POTASSIUM PHOSPHATE, DIBASIC INJECTION, 83.33 MILLIMOLE(S): 236; 224 SOLUTION, CONCENTRATE INTRAVENOUS at 11:46

## 2024-11-06 RX ADMIN — SODIUM CHLORIDE 75 MILLILITER(S): 9 INJECTION, SOLUTION INTRAVENOUS at 02:16

## 2024-11-06 RX ADMIN — Medication 1 DOSE(S): at 11:48

## 2024-11-06 RX ADMIN — APIXABAN 5 MILLIGRAM(S): 2.5 TABLET, FILM COATED ORAL at 17:39

## 2024-11-06 RX ADMIN — Medication 40 MILLIEQUIVALENT(S): at 06:20

## 2024-11-06 RX ADMIN — Medication 1 DOSE(S): at 21:46

## 2024-11-06 RX ADMIN — Medication 81 MILLIGRAM(S): at 12:11

## 2024-11-06 RX ADMIN — CARVEDILOL 12.5 MILLIGRAM(S): 3.12 TABLET, FILM COATED ORAL at 17:39

## 2024-11-07 LAB
ANION GAP SERPL CALC-SCNC: 5 MMOL/L — SIGNIFICANT CHANGE UP (ref 5–17)
BASOPHILS # BLD AUTO: 0.07 K/UL — SIGNIFICANT CHANGE UP (ref 0–0.2)
BASOPHILS NFR BLD AUTO: 0.3 % — SIGNIFICANT CHANGE UP (ref 0–2)
BUN SERPL-MCNC: 15 MG/DL — SIGNIFICANT CHANGE UP (ref 7–18)
CALCIUM SERPL-MCNC: 9.1 MG/DL — SIGNIFICANT CHANGE UP (ref 8.4–10.5)
CHLORIDE SERPL-SCNC: 116 MMOL/L — HIGH (ref 96–108)
CO2 SERPL-SCNC: 24 MMOL/L — SIGNIFICANT CHANGE UP (ref 22–31)
CREAT SERPL-MCNC: 1.19 MG/DL — SIGNIFICANT CHANGE UP (ref 0.5–1.3)
EGFR: 50 ML/MIN/1.73M2 — LOW
EGFR: 50 ML/MIN/1.73M2 — LOW
EOSINOPHIL # BLD AUTO: 0.09 K/UL — SIGNIFICANT CHANGE UP (ref 0–0.5)
EOSINOPHIL NFR BLD AUTO: 0.4 % — SIGNIFICANT CHANGE UP (ref 0–6)
GLUCOSE BLDC GLUCOMTR-MCNC: 105 MG/DL — HIGH (ref 70–99)
GLUCOSE BLDC GLUCOMTR-MCNC: 136 MG/DL — HIGH (ref 70–99)
GLUCOSE BLDC GLUCOMTR-MCNC: 95 MG/DL — SIGNIFICANT CHANGE UP (ref 70–99)
GLUCOSE BLDC GLUCOMTR-MCNC: 95 MG/DL — SIGNIFICANT CHANGE UP (ref 70–99)
GLUCOSE SERPL-MCNC: 119 MG/DL — HIGH (ref 70–99)
HCT VFR BLD CALC: 39 % — SIGNIFICANT CHANGE UP (ref 34.5–45)
HGB BLD-MCNC: 12.3 G/DL — SIGNIFICANT CHANGE UP (ref 11.5–15.5)
IMM GRANULOCYTES NFR BLD AUTO: 3.7 % — HIGH (ref 0–0.9)
LYMPHOCYTES # BLD AUTO: 11.4 % — LOW (ref 13–44)
LYMPHOCYTES # BLD AUTO: 2.68 K/UL — SIGNIFICANT CHANGE UP (ref 1–3.3)
MAGNESIUM SERPL-MCNC: 2.4 MG/DL — SIGNIFICANT CHANGE UP (ref 1.6–2.6)
MCHC RBC-ENTMCNC: 26.5 PG — LOW (ref 27–34)
MCHC RBC-ENTMCNC: 31.5 G/DL — LOW (ref 32–36)
MCV RBC AUTO: 84.1 FL — SIGNIFICANT CHANGE UP (ref 80–100)
MONOCYTES # BLD AUTO: 1.45 K/UL — HIGH (ref 0–0.9)
MONOCYTES NFR BLD AUTO: 6.2 % — SIGNIFICANT CHANGE UP (ref 2–14)
NEUTROPHILS # BLD AUTO: 18.3 K/UL — HIGH (ref 1.8–7.4)
NEUTROPHILS NFR BLD AUTO: 78 % — HIGH (ref 43–77)
NRBC # BLD: 0 /100 WBCS — SIGNIFICANT CHANGE UP (ref 0–0)
NRBC BLD-RTO: 0 /100 WBCS — SIGNIFICANT CHANGE UP (ref 0–0)
PHOSPHATE SERPL-MCNC: 2.8 MG/DL — SIGNIFICANT CHANGE UP (ref 2.5–4.5)
PLATELET # BLD AUTO: 202 K/UL — SIGNIFICANT CHANGE UP (ref 150–400)
POTASSIUM SERPL-MCNC: 4 MMOL/L — SIGNIFICANT CHANGE UP (ref 3.5–5.3)
POTASSIUM SERPL-SCNC: 4 MMOL/L — SIGNIFICANT CHANGE UP (ref 3.5–5.3)
RBC # BLD: 4.64 M/UL — SIGNIFICANT CHANGE UP (ref 3.8–5.2)
RBC # FLD: 14.6 % — HIGH (ref 10.3–14.5)
SODIUM SERPL-SCNC: 145 MMOL/L — SIGNIFICANT CHANGE UP (ref 135–145)
WBC # BLD: 23.45 K/UL — HIGH (ref 3.8–10.5)
WBC # FLD AUTO: 23.45 K/UL — HIGH (ref 3.8–10.5)

## 2024-11-07 PROCEDURE — 99233 SBSQ HOSP IP/OBS HIGH 50: CPT

## 2024-11-07 RX ORDER — CARVEDILOL 3.12 MG/1
12.5 TABLET, FILM COATED ORAL ONCE
Refills: 0 | Status: COMPLETED | OUTPATIENT
Start: 2024-11-07 | End: 2024-11-07

## 2024-11-07 RX ORDER — NIFEDIPINE 30 MG
90 TABLET, EXTENDED RELEASE 24 HR ORAL DAILY
Refills: 0 | Status: DISCONTINUED | OUTPATIENT
Start: 2024-11-07 | End: 2024-11-09

## 2024-11-07 RX ORDER — CARVEDILOL 3.12 MG/1
25 TABLET, FILM COATED ORAL EVERY 12 HOURS
Refills: 0 | Status: DISCONTINUED | OUTPATIENT
Start: 2024-11-07 | End: 2024-11-09

## 2024-11-07 RX ADMIN — Medication 81 MILLIGRAM(S): at 11:51

## 2024-11-07 RX ADMIN — ATORVASTATIN CALCIUM 80 MILLIGRAM(S): 80 TABLET, FILM COATED ORAL at 21:39

## 2024-11-07 RX ADMIN — LEVETIRACETAM 500 MILLIGRAM(S): 10 INJECTION, SOLUTION INTRAVENOUS at 06:00

## 2024-11-07 RX ADMIN — APIXABAN 5 MILLIGRAM(S): 2.5 TABLET, FILM COATED ORAL at 06:00

## 2024-11-07 RX ADMIN — Medication 1 DOSE(S): at 11:52

## 2024-11-07 RX ADMIN — CEFTRIAXONE 100 MILLIGRAM(S): 500 INJECTION, POWDER, FOR SOLUTION INTRAMUSCULAR; INTRAVENOUS at 11:51

## 2024-11-07 RX ADMIN — CARVEDILOL 12.5 MILLIGRAM(S): 3.12 TABLET, FILM COATED ORAL at 13:36

## 2024-11-07 RX ADMIN — APIXABAN 5 MILLIGRAM(S): 2.5 TABLET, FILM COATED ORAL at 17:16

## 2024-11-07 RX ADMIN — LEVETIRACETAM 500 MILLIGRAM(S): 10 INJECTION, SOLUTION INTRAVENOUS at 17:42

## 2024-11-07 RX ADMIN — Medication 1 DOSE(S): at 21:40

## 2024-11-07 RX ADMIN — Medication 90 MILLIGRAM(S): at 17:42

## 2024-11-07 RX ADMIN — CARVEDILOL 25 MILLIGRAM(S): 3.12 TABLET, FILM COATED ORAL at 17:16

## 2024-11-07 RX ADMIN — Medication 1 APPLICATION(S): at 06:07

## 2024-11-07 RX ADMIN — Medication 5 MILLIGRAM(S): at 13:36

## 2024-11-07 RX ADMIN — CARVEDILOL 12.5 MILLIGRAM(S): 3.12 TABLET, FILM COATED ORAL at 06:00

## 2024-11-08 DIAGNOSIS — J96.21 ACUTE AND CHRONIC RESPIRATORY FAILURE WITH HYPOXIA: ICD-10-CM

## 2024-11-08 DIAGNOSIS — J44.9 CHRONIC OBSTRUCTIVE PULMONARY DISEASE, UNSPECIFIED: ICD-10-CM

## 2024-11-08 DIAGNOSIS — Z86.73 PERSONAL HISTORY OF TRANSIENT ISCHEMIC ATTACK (TIA), AND CEREBRAL INFARCTION WITHOUT RESIDUAL DEFICITS: ICD-10-CM

## 2024-11-08 DIAGNOSIS — E11.9 TYPE 2 DIABETES MELLITUS WITHOUT COMPLICATIONS: ICD-10-CM

## 2024-11-08 DIAGNOSIS — N39.0 URINARY TRACT INFECTION, SITE NOT SPECIFIED: ICD-10-CM

## 2024-11-08 DIAGNOSIS — R56.9 UNSPECIFIED CONVULSIONS: ICD-10-CM

## 2024-11-08 DIAGNOSIS — I48.20 CHRONIC ATRIAL FIBRILLATION, UNSPECIFIED: ICD-10-CM

## 2024-11-08 DIAGNOSIS — Z71.89 OTHER SPECIFIED COUNSELING: ICD-10-CM

## 2024-11-08 LAB
ALBUMIN SERPL ELPH-MCNC: 2.8 G/DL — LOW (ref 3.5–5)
ALP SERPL-CCNC: 91 U/L — SIGNIFICANT CHANGE UP (ref 40–120)
ALT FLD-CCNC: 13 U/L DA — SIGNIFICANT CHANGE UP (ref 10–60)
ANION GAP SERPL CALC-SCNC: 7 MMOL/L — SIGNIFICANT CHANGE UP (ref 5–17)
AST SERPL-CCNC: 14 U/L — SIGNIFICANT CHANGE UP (ref 10–40)
BASOPHILS # BLD AUTO: 0.07 K/UL — SIGNIFICANT CHANGE UP (ref 0–0.2)
BASOPHILS NFR BLD AUTO: 0.6 % — SIGNIFICANT CHANGE UP (ref 0–2)
BILIRUB SERPL-MCNC: 0.4 MG/DL — SIGNIFICANT CHANGE UP (ref 0.2–1.2)
BUN SERPL-MCNC: 17 MG/DL — SIGNIFICANT CHANGE UP (ref 7–18)
CALCIUM SERPL-MCNC: 9.6 MG/DL — SIGNIFICANT CHANGE UP (ref 8.4–10.5)
CHLORIDE SERPL-SCNC: 114 MMOL/L — HIGH (ref 96–108)
CO2 SERPL-SCNC: 22 MMOL/L — SIGNIFICANT CHANGE UP (ref 22–31)
CREAT SERPL-MCNC: 1.08 MG/DL — SIGNIFICANT CHANGE UP (ref 0.5–1.3)
EGFR: 56 ML/MIN/1.73M2 — LOW
EGFR: 56 ML/MIN/1.73M2 — LOW
EOSINOPHIL # BLD AUTO: 0.18 K/UL — SIGNIFICANT CHANGE UP (ref 0–0.5)
EOSINOPHIL NFR BLD AUTO: 1.5 % — SIGNIFICANT CHANGE UP (ref 0–6)
GLUCOSE BLDC GLUCOMTR-MCNC: 104 MG/DL — HIGH (ref 70–99)
GLUCOSE BLDC GLUCOMTR-MCNC: 110 MG/DL — HIGH (ref 70–99)
GLUCOSE BLDC GLUCOMTR-MCNC: 135 MG/DL — HIGH (ref 70–99)
GLUCOSE BLDC GLUCOMTR-MCNC: 148 MG/DL — HIGH (ref 70–99)
GLUCOSE SERPL-MCNC: 120 MG/DL — HIGH (ref 70–99)
HCT VFR BLD CALC: 40.4 % — SIGNIFICANT CHANGE UP (ref 34.5–45)
HGB BLD-MCNC: 13 G/DL — SIGNIFICANT CHANGE UP (ref 11.5–15.5)
IMM GRANULOCYTES NFR BLD AUTO: 1.1 % — HIGH (ref 0–0.9)
LYMPHOCYTES # BLD AUTO: 2.81 K/UL — SIGNIFICANT CHANGE UP (ref 1–3.3)
LYMPHOCYTES # BLD AUTO: 23.9 % — SIGNIFICANT CHANGE UP (ref 13–44)
MAGNESIUM SERPL-MCNC: 2.5 MG/DL — SIGNIFICANT CHANGE UP (ref 1.6–2.6)
MCHC RBC-ENTMCNC: 25.9 PG — LOW (ref 27–34)
MCHC RBC-ENTMCNC: 32.2 G/DL — SIGNIFICANT CHANGE UP (ref 32–36)
MCV RBC AUTO: 80.6 FL — SIGNIFICANT CHANGE UP (ref 80–100)
MONOCYTES # BLD AUTO: 1.15 K/UL — HIGH (ref 0–0.9)
MONOCYTES NFR BLD AUTO: 9.8 % — SIGNIFICANT CHANGE UP (ref 2–14)
MRSA PCR RESULT.: SIGNIFICANT CHANGE UP
NEUTROPHILS # BLD AUTO: 7.4 K/UL — SIGNIFICANT CHANGE UP (ref 1.8–7.4)
NEUTROPHILS NFR BLD AUTO: 63.1 % — SIGNIFICANT CHANGE UP (ref 43–77)
NRBC # BLD: 0 /100 WBCS — SIGNIFICANT CHANGE UP (ref 0–0)
NRBC BLD-RTO: 0 /100 WBCS — SIGNIFICANT CHANGE UP (ref 0–0)
PHOSPHATE SERPL-MCNC: 2.5 MG/DL — SIGNIFICANT CHANGE UP (ref 2.5–4.5)
PLATELET # BLD AUTO: 220 K/UL — SIGNIFICANT CHANGE UP (ref 150–400)
POTASSIUM SERPL-MCNC: 3.7 MMOL/L — SIGNIFICANT CHANGE UP (ref 3.5–5.3)
POTASSIUM SERPL-SCNC: 3.7 MMOL/L — SIGNIFICANT CHANGE UP (ref 3.5–5.3)
PROT SERPL-MCNC: 6.8 G/DL — SIGNIFICANT CHANGE UP (ref 6–8.3)
RBC # BLD: 5.01 M/UL — SIGNIFICANT CHANGE UP (ref 3.8–5.2)
RBC # FLD: 14.6 % — HIGH (ref 10.3–14.5)
S AUREUS DNA NOSE QL NAA+PROBE: SIGNIFICANT CHANGE UP
SODIUM SERPL-SCNC: 143 MMOL/L — SIGNIFICANT CHANGE UP (ref 135–145)
WBC # BLD: 11.74 K/UL — HIGH (ref 3.8–10.5)
WBC # FLD AUTO: 11.74 K/UL — HIGH (ref 3.8–10.5)

## 2024-11-08 PROCEDURE — 99231 SBSQ HOSP IP/OBS SF/LOW 25: CPT

## 2024-11-08 RX ADMIN — Medication 1 APPLICATION(S): at 05:21

## 2024-11-08 RX ADMIN — LEVETIRACETAM 500 MILLIGRAM(S): 10 INJECTION, SOLUTION INTRAVENOUS at 05:21

## 2024-11-08 RX ADMIN — ATORVASTATIN CALCIUM 80 MILLIGRAM(S): 80 TABLET, FILM COATED ORAL at 21:52

## 2024-11-08 RX ADMIN — CARVEDILOL 25 MILLIGRAM(S): 3.12 TABLET, FILM COATED ORAL at 05:21

## 2024-11-08 RX ADMIN — LEVETIRACETAM 500 MILLIGRAM(S): 10 INJECTION, SOLUTION INTRAVENOUS at 17:53

## 2024-11-08 RX ADMIN — Medication 81 MILLIGRAM(S): at 12:20

## 2024-11-08 RX ADMIN — APIXABAN 5 MILLIGRAM(S): 2.5 TABLET, FILM COATED ORAL at 17:52

## 2024-11-08 RX ADMIN — Medication 1 DOSE(S): at 21:55

## 2024-11-08 RX ADMIN — APIXABAN 5 MILLIGRAM(S): 2.5 TABLET, FILM COATED ORAL at 05:21

## 2024-11-08 RX ADMIN — Medication 1 DOSE(S): at 11:24

## 2024-11-08 RX ADMIN — Medication 90 MILLIGRAM(S): at 05:21

## 2024-11-08 RX ADMIN — CEFTRIAXONE 100 MILLIGRAM(S): 500 INJECTION, POWDER, FOR SOLUTION INTRAMUSCULAR; INTRAVENOUS at 12:21

## 2024-11-08 RX ADMIN — CARVEDILOL 25 MILLIGRAM(S): 3.12 TABLET, FILM COATED ORAL at 17:52

## 2024-11-09 ENCOUNTER — TRANSCRIPTION ENCOUNTER (OUTPATIENT)
Age: 69
End: 2024-11-09

## 2024-11-09 VITALS
RESPIRATION RATE: 18 BRPM | OXYGEN SATURATION: 95 % | DIASTOLIC BLOOD PRESSURE: 77 MMHG | TEMPERATURE: 98 F | SYSTOLIC BLOOD PRESSURE: 127 MMHG | HEART RATE: 77 BPM

## 2024-11-09 LAB
CULTURE RESULTS: SIGNIFICANT CHANGE UP
CULTURE RESULTS: SIGNIFICANT CHANGE UP
GLUCOSE BLDC GLUCOMTR-MCNC: 154 MG/DL — HIGH (ref 70–99)
GLUCOSE BLDC GLUCOMTR-MCNC: 212 MG/DL — HIGH (ref 70–99)
SPECIMEN SOURCE: SIGNIFICANT CHANGE UP
SPECIMEN SOURCE: SIGNIFICANT CHANGE UP

## 2024-11-09 RX ORDER — ATORVASTATIN CALCIUM 80 MG/1
1 TABLET, FILM COATED ORAL
Qty: 30 | Refills: 0
Start: 2024-11-09 | End: 2024-12-08

## 2024-11-09 RX ORDER — CEFTRIAXONE 500 MG/1
1000 INJECTION, POWDER, FOR SOLUTION INTRAMUSCULAR; INTRAVENOUS ONCE
Refills: 0 | Status: COMPLETED | OUTPATIENT
Start: 2024-11-09 | End: 2024-11-09

## 2024-11-09 RX ORDER — LEVETIRACETAM 10 MG/ML
1 INJECTION, SOLUTION INTRAVENOUS
Qty: 60 | Refills: 0
Start: 2024-11-09 | End: 2024-12-08

## 2024-11-09 RX ORDER — NIFEDIPINE 30 MG
1 TABLET, EXTENDED RELEASE 24 HR ORAL
Refills: 0 | DISCHARGE

## 2024-11-09 RX ORDER — ASPIRIN 325 MG
1 TABLET ORAL
Qty: 30 | Refills: 0
Start: 2024-11-09 | End: 2024-12-08

## 2024-11-09 RX ORDER — NIFEDIPINE 30 MG
1 TABLET, EXTENDED RELEASE 24 HR ORAL
Qty: 30 | Refills: 0
Start: 2024-11-09 | End: 2024-12-08

## 2024-11-09 RX ORDER — ASPIRIN 325 MG
1 TABLET ORAL
Refills: 0
Start: 2024-11-09

## 2024-11-09 RX ADMIN — CARVEDILOL 25 MILLIGRAM(S): 3.12 TABLET, FILM COATED ORAL at 05:53

## 2024-11-09 RX ADMIN — INSULIN LISPRO 1: 100 INJECTION, SOLUTION INTRAVENOUS; SUBCUTANEOUS at 08:18

## 2024-11-09 RX ADMIN — Medication 1 DOSE(S): at 11:02

## 2024-11-09 RX ADMIN — CEFTRIAXONE 100 MILLIGRAM(S): 500 INJECTION, POWDER, FOR SOLUTION INTRAMUSCULAR; INTRAVENOUS at 08:20

## 2024-11-09 RX ADMIN — Medication 1 APPLICATION(S): at 05:59

## 2024-11-09 RX ADMIN — INSULIN LISPRO 2: 100 INJECTION, SOLUTION INTRAVENOUS; SUBCUTANEOUS at 11:51

## 2024-11-09 RX ADMIN — LEVETIRACETAM 500 MILLIGRAM(S): 10 INJECTION, SOLUTION INTRAVENOUS at 05:53

## 2024-11-09 RX ADMIN — Medication 90 MILLIGRAM(S): at 05:53

## 2024-11-09 RX ADMIN — Medication 81 MILLIGRAM(S): at 11:03

## 2024-11-09 RX ADMIN — APIXABAN 5 MILLIGRAM(S): 2.5 TABLET, FILM COATED ORAL at 05:53

## 2024-11-26 PROCEDURE — 80061 LIPID PANEL: CPT

## 2024-11-26 PROCEDURE — 85730 THROMBOPLASTIN TIME PARTIAL: CPT

## 2024-11-26 PROCEDURE — 93306 TTE W/DOPPLER COMPLETE: CPT

## 2024-11-26 PROCEDURE — 84100 ASSAY OF PHOSPHORUS: CPT

## 2024-11-26 PROCEDURE — 83036 HEMOGLOBIN GLYCOSYLATED A1C: CPT

## 2024-11-26 PROCEDURE — 94640 AIRWAY INHALATION TREATMENT: CPT

## 2024-11-26 PROCEDURE — 95816 EEG AWAKE AND DROWSY: CPT

## 2024-11-26 PROCEDURE — 70496 CT ANGIOGRAPHY HEAD: CPT | Mod: MC

## 2024-11-26 PROCEDURE — 80048 BASIC METABOLIC PNL TOTAL CA: CPT

## 2024-11-26 PROCEDURE — 71045 X-RAY EXAM CHEST 1 VIEW: CPT

## 2024-11-26 PROCEDURE — 87077 CULTURE AEROBIC IDENTIFY: CPT

## 2024-11-26 PROCEDURE — 0042T: CPT | Mod: MC

## 2024-11-26 PROCEDURE — 93005 ELECTROCARDIOGRAM TRACING: CPT

## 2024-11-26 PROCEDURE — 81001 URINALYSIS AUTO W/SCOPE: CPT

## 2024-11-26 PROCEDURE — 99285 EMERGENCY DEPT VISIT HI MDM: CPT

## 2024-11-26 PROCEDURE — 70498 CT ANGIOGRAPHY NECK: CPT | Mod: MC

## 2024-11-26 PROCEDURE — 82962 GLUCOSE BLOOD TEST: CPT

## 2024-11-26 PROCEDURE — 82803 BLOOD GASES ANY COMBINATION: CPT

## 2024-11-26 PROCEDURE — 92610 EVALUATE SWALLOWING FUNCTION: CPT

## 2024-11-26 PROCEDURE — 84484 ASSAY OF TROPONIN QUANT: CPT

## 2024-11-26 PROCEDURE — 85610 PROTHROMBIN TIME: CPT

## 2024-11-26 PROCEDURE — 83735 ASSAY OF MAGNESIUM: CPT

## 2024-11-26 PROCEDURE — 82553 CREATINE MB FRACTION: CPT

## 2024-11-26 PROCEDURE — 82550 ASSAY OF CK (CPK): CPT

## 2024-11-26 PROCEDURE — 87641 MR-STAPH DNA AMP PROBE: CPT

## 2024-11-26 PROCEDURE — 87086 URINE CULTURE/COLONY COUNT: CPT

## 2024-11-26 PROCEDURE — 87640 STAPH A DNA AMP PROBE: CPT

## 2024-11-26 PROCEDURE — 80053 COMPREHEN METABOLIC PANEL: CPT

## 2024-11-26 PROCEDURE — 87040 BLOOD CULTURE FOR BACTERIA: CPT

## 2024-11-26 PROCEDURE — 95710 EEG W/O VID EA 12-26HR CONT: CPT

## 2024-11-26 PROCEDURE — 36415 COLL VENOUS BLD VENIPUNCTURE: CPT

## 2024-11-26 PROCEDURE — 70450 CT HEAD/BRAIN W/O DYE: CPT | Mod: MC

## 2024-11-26 PROCEDURE — 71250 CT THORAX DX C-: CPT | Mod: MC

## 2024-11-26 PROCEDURE — 85027 COMPLETE CBC AUTOMATED: CPT

## 2024-11-26 PROCEDURE — 85025 COMPLETE CBC W/AUTO DIFF WBC: CPT

## 2025-01-18 NOTE — DISCHARGE NOTE ADULT - NSTOBACCONEVERSMOKERY/N_GEN_A
Immediate Post- Operative Note    PostOp Diagnosis:  OA    Procedure(s): Right hip injection    Estimated Blood Loss: Less than 2 ml    Complications: None    8/20/2018     6:32 PM     Brian Murray     Yes, Core measure site... No

## 2025-03-06 ENCOUNTER — INPATIENT (INPATIENT)
Facility: HOSPITAL | Age: 70
LOS: 1 days | Discharge: HOME | DRG: 948 | End: 2025-03-08
Attending: STUDENT IN AN ORGANIZED HEALTH CARE EDUCATION/TRAINING PROGRAM | Admitting: STUDENT IN AN ORGANIZED HEALTH CARE EDUCATION/TRAINING PROGRAM
Payer: MEDICARE

## 2025-03-06 VITALS
DIASTOLIC BLOOD PRESSURE: 64 MMHG | HEART RATE: 72 BPM | RESPIRATION RATE: 17 BRPM | OXYGEN SATURATION: 100 % | TEMPERATURE: 97 F | SYSTOLIC BLOOD PRESSURE: 89 MMHG | WEIGHT: 164.91 LBS

## 2025-03-06 DIAGNOSIS — G93.40 ENCEPHALOPATHY, UNSPECIFIED: ICD-10-CM

## 2025-03-06 DIAGNOSIS — J44.9 CHRONIC OBSTRUCTIVE PULMONARY DISEASE, UNSPECIFIED: ICD-10-CM

## 2025-03-06 DIAGNOSIS — N39.0 URINARY TRACT INFECTION, SITE NOT SPECIFIED: ICD-10-CM

## 2025-03-06 DIAGNOSIS — R56.9 UNSPECIFIED CONVULSIONS: ICD-10-CM

## 2025-03-06 DIAGNOSIS — I95.9 HYPOTENSION, UNSPECIFIED: ICD-10-CM

## 2025-03-06 DIAGNOSIS — E11.9 TYPE 2 DIABETES MELLITUS WITHOUT COMPLICATIONS: ICD-10-CM

## 2025-03-06 DIAGNOSIS — Z29.9 ENCOUNTER FOR PROPHYLACTIC MEASURES, UNSPECIFIED: ICD-10-CM

## 2025-03-06 DIAGNOSIS — Z98.890 OTHER SPECIFIED POSTPROCEDURAL STATES: Chronic | ICD-10-CM

## 2025-03-06 DIAGNOSIS — R41.82 ALTERED MENTAL STATUS, UNSPECIFIED: ICD-10-CM

## 2025-03-06 DIAGNOSIS — T82.897A OTHER SPECIFIED COMPLICATION OF CARDIAC PROSTHETIC DEVICES, IMPLANTS AND GRAFTS, INITIAL ENCOUNTER: Chronic | ICD-10-CM

## 2025-03-06 DIAGNOSIS — I25.10 ATHEROSCLEROTIC HEART DISEASE OF NATIVE CORONARY ARTERY WITHOUT ANGINA PECTORIS: ICD-10-CM

## 2025-03-06 DIAGNOSIS — Z90.710 ACQUIRED ABSENCE OF BOTH CERVIX AND UTERUS: Chronic | ICD-10-CM

## 2025-03-06 DIAGNOSIS — I48.91 UNSPECIFIED ATRIAL FIBRILLATION: ICD-10-CM

## 2025-03-06 LAB
ALBUMIN SERPL ELPH-MCNC: 3 G/DL — LOW (ref 3.5–5)
ALP SERPL-CCNC: 100 U/L — SIGNIFICANT CHANGE UP (ref 40–120)
ALT FLD-CCNC: 13 U/L DA — SIGNIFICANT CHANGE UP (ref 10–60)
ANION GAP SERPL CALC-SCNC: 2 MMOL/L — LOW (ref 5–17)
ANION GAP SERPL CALC-SCNC: 8 MMOL/L — SIGNIFICANT CHANGE UP (ref 5–17)
APPEARANCE UR: CLEAR — SIGNIFICANT CHANGE UP
AST SERPL-CCNC: 35 U/L — SIGNIFICANT CHANGE UP (ref 10–40)
BASOPHILS # BLD AUTO: 0.09 K/UL — SIGNIFICANT CHANGE UP (ref 0–0.2)
BASOPHILS NFR BLD AUTO: 0.9 % — SIGNIFICANT CHANGE UP (ref 0–2)
BILIRUB SERPL-MCNC: 0.7 MG/DL — SIGNIFICANT CHANGE UP (ref 0.2–1.2)
BILIRUB UR-MCNC: NEGATIVE — SIGNIFICANT CHANGE UP
BUN SERPL-MCNC: 12 MG/DL — SIGNIFICANT CHANGE UP (ref 7–18)
BUN SERPL-MCNC: 12 MG/DL — SIGNIFICANT CHANGE UP (ref 7–18)
CALCIUM SERPL-MCNC: 8.3 MG/DL — LOW (ref 8.4–10.5)
CALCIUM SERPL-MCNC: 9.6 MG/DL — SIGNIFICANT CHANGE UP (ref 8.4–10.5)
CHLORIDE SERPL-SCNC: 108 MMOL/L — SIGNIFICANT CHANGE UP (ref 96–108)
CHLORIDE SERPL-SCNC: 113 MMOL/L — HIGH (ref 96–108)
CO2 SERPL-SCNC: 18 MMOL/L — LOW (ref 22–31)
CO2 SERPL-SCNC: 26 MMOL/L — SIGNIFICANT CHANGE UP (ref 22–31)
COLOR SPEC: YELLOW — SIGNIFICANT CHANGE UP
CREAT SERPL-MCNC: 0.91 MG/DL — SIGNIFICANT CHANGE UP (ref 0.5–1.3)
CREAT SERPL-MCNC: 1.18 MG/DL — SIGNIFICANT CHANGE UP (ref 0.5–1.3)
DIFF PNL FLD: NEGATIVE — SIGNIFICANT CHANGE UP
EGFR: 50 ML/MIN/1.73M2 — LOW
EGFR: 50 ML/MIN/1.73M2 — LOW
EGFR: 69 ML/MIN/1.73M2 — SIGNIFICANT CHANGE UP
EGFR: 69 ML/MIN/1.73M2 — SIGNIFICANT CHANGE UP
EOSINOPHIL # BLD AUTO: 0.2 K/UL — SIGNIFICANT CHANGE UP (ref 0–0.5)
EOSINOPHIL NFR BLD AUTO: 2 % — SIGNIFICANT CHANGE UP (ref 0–6)
FLUAV AG NPH QL: SIGNIFICANT CHANGE UP
FLUBV AG NPH QL: SIGNIFICANT CHANGE UP
GLUCOSE SERPL-MCNC: 121 MG/DL — HIGH (ref 70–99)
GLUCOSE SERPL-MCNC: 194 MG/DL — HIGH (ref 70–99)
GLUCOSE UR QL: >=1000 MG/DL
HCT VFR BLD CALC: 44.8 % — SIGNIFICANT CHANGE UP (ref 34.5–45)
HGB BLD-MCNC: 13.7 G/DL — SIGNIFICANT CHANGE UP (ref 11.5–15.5)
IMM GRANULOCYTES NFR BLD AUTO: 0.7 % — SIGNIFICANT CHANGE UP (ref 0–0.9)
KETONES UR-MCNC: NEGATIVE MG/DL — SIGNIFICANT CHANGE UP
LACTATE SERPL-SCNC: 1.9 MMOL/L — SIGNIFICANT CHANGE UP (ref 0.7–2)
LACTATE SERPL-SCNC: 2.1 MMOL/L — HIGH (ref 0.7–2)
LEUKOCYTE ESTERASE UR-ACNC: ABNORMAL
LYMPHOCYTES # BLD AUTO: 2.48 K/UL — SIGNIFICANT CHANGE UP (ref 1–3.3)
LYMPHOCYTES # BLD AUTO: 25.1 % — SIGNIFICANT CHANGE UP (ref 13–44)
MCHC RBC-ENTMCNC: 26.2 PG — LOW (ref 27–34)
MCHC RBC-ENTMCNC: 30.6 G/DL — LOW (ref 32–36)
MCV RBC AUTO: 85.8 FL — SIGNIFICANT CHANGE UP (ref 80–100)
MONOCYTES # BLD AUTO: 0.91 K/UL — HIGH (ref 0–0.9)
MONOCYTES NFR BLD AUTO: 9.2 % — SIGNIFICANT CHANGE UP (ref 2–14)
NEUTROPHILS # BLD AUTO: 6.15 K/UL — SIGNIFICANT CHANGE UP (ref 1.8–7.4)
NEUTROPHILS NFR BLD AUTO: 62.1 % — SIGNIFICANT CHANGE UP (ref 43–77)
NITRITE UR-MCNC: NEGATIVE — SIGNIFICANT CHANGE UP
NRBC BLD AUTO-RTO: 0 /100 WBCS — SIGNIFICANT CHANGE UP (ref 0–0)
PH UR: 5 — SIGNIFICANT CHANGE UP (ref 5–8)
PLATELET # BLD AUTO: 253 K/UL — SIGNIFICANT CHANGE UP (ref 150–400)
POTASSIUM SERPL-MCNC: 4.5 MMOL/L — SIGNIFICANT CHANGE UP (ref 3.5–5.3)
POTASSIUM SERPL-MCNC: 5.9 MMOL/L — HIGH (ref 3.5–5.3)
POTASSIUM SERPL-SCNC: 4.5 MMOL/L — SIGNIFICANT CHANGE UP (ref 3.5–5.3)
POTASSIUM SERPL-SCNC: 5.9 MMOL/L — HIGH (ref 3.5–5.3)
PROT SERPL-MCNC: 7 G/DL — SIGNIFICANT CHANGE UP (ref 6–8.3)
PROT UR-MCNC: NEGATIVE MG/DL — SIGNIFICANT CHANGE UP
RBC # BLD: 5.22 M/UL — HIGH (ref 3.8–5.2)
RBC # FLD: 13.7 % — SIGNIFICANT CHANGE UP (ref 10.3–14.5)
RSV RNA NPH QL NAA+NON-PROBE: SIGNIFICANT CHANGE UP
SARS-COV-2 RNA SPEC QL NAA+PROBE: SIGNIFICANT CHANGE UP
SODIUM SERPL-SCNC: 136 MMOL/L — SIGNIFICANT CHANGE UP (ref 135–145)
SODIUM SERPL-SCNC: 139 MMOL/L — SIGNIFICANT CHANGE UP (ref 135–145)
SP GR SPEC: 1.03 — SIGNIFICANT CHANGE UP (ref 1–1.03)
TROPONIN I, HIGH SENSITIVITY RESULT: 484.8 NG/L — HIGH
TROPONIN I, HIGH SENSITIVITY RESULT: 603.9 NG/L — HIGH
TROPONIN I, HIGH SENSITIVITY RESULT: 618.9 NG/L — HIGH
UROBILINOGEN FLD QL: 0.2 MG/DL — SIGNIFICANT CHANGE UP (ref 0.2–1)
WBC # BLD: 9.9 K/UL — SIGNIFICANT CHANGE UP (ref 3.8–10.5)
WBC # FLD AUTO: 9.9 K/UL — SIGNIFICANT CHANGE UP (ref 3.8–10.5)

## 2025-03-06 PROCEDURE — 70450 CT HEAD/BRAIN W/O DYE: CPT | Mod: 26

## 2025-03-06 PROCEDURE — 99285 EMERGENCY DEPT VISIT HI MDM: CPT

## 2025-03-06 PROCEDURE — 93010 ELECTROCARDIOGRAM REPORT: CPT

## 2025-03-06 PROCEDURE — 99223 1ST HOSP IP/OBS HIGH 75: CPT | Mod: GC

## 2025-03-06 RX ORDER — ONDANSETRON HCL/PF 4 MG/2 ML
4 VIAL (ML) INJECTION EVERY 8 HOURS
Refills: 0 | Status: DISCONTINUED | OUTPATIENT
Start: 2025-03-06 | End: 2025-03-08

## 2025-03-06 RX ORDER — MAGNESIUM, ALUMINUM HYDROXIDE 200-200 MG
30 TABLET,CHEWABLE ORAL EVERY 4 HOURS
Refills: 0 | Status: DISCONTINUED | OUTPATIENT
Start: 2025-03-06 | End: 2025-03-08

## 2025-03-06 RX ORDER — ASPIRIN 325 MG
81 TABLET ORAL DAILY
Refills: 0 | Status: DISCONTINUED | OUTPATIENT
Start: 2025-03-06 | End: 2025-03-08

## 2025-03-06 RX ORDER — CEFEPIME 2 G/20ML
2000 INJECTION, POWDER, FOR SOLUTION INTRAVENOUS EVERY 8 HOURS
Refills: 0 | Status: DISCONTINUED | OUTPATIENT
Start: 2025-03-06 | End: 2025-03-06

## 2025-03-06 RX ORDER — CEFTRIAXONE 500 MG/1
1000 INJECTION, POWDER, FOR SOLUTION INTRAMUSCULAR; INTRAVENOUS EVERY 24 HOURS
Refills: 0 | Status: DISCONTINUED | OUTPATIENT
Start: 2025-03-06 | End: 2025-03-08

## 2025-03-06 RX ORDER — INSULIN LISPRO 100 U/ML
INJECTION, SOLUTION INTRAVENOUS; SUBCUTANEOUS AT BEDTIME
Refills: 0 | Status: DISCONTINUED | OUTPATIENT
Start: 2025-03-06 | End: 2025-03-08

## 2025-03-06 RX ORDER — CEFEPIME 2 G/20ML
2000 INJECTION, POWDER, FOR SOLUTION INTRAVENOUS ONCE
Refills: 0 | Status: COMPLETED | OUTPATIENT
Start: 2025-03-06 | End: 2025-03-06

## 2025-03-06 RX ORDER — MONTELUKAST SODIUM 10 MG/1
10 TABLET ORAL AT BEDTIME
Refills: 0 | Status: DISCONTINUED | OUTPATIENT
Start: 2025-03-06 | End: 2025-03-08

## 2025-03-06 RX ORDER — APIXABAN 2.5 MG/1
5 TABLET, FILM COATED ORAL EVERY 12 HOURS
Refills: 0 | Status: DISCONTINUED | OUTPATIENT
Start: 2025-03-06 | End: 2025-03-08

## 2025-03-06 RX ORDER — ALBUTEROL SULFATE 2.5 MG/3ML
2 VIAL, NEBULIZER (ML) INHALATION EVERY 6 HOURS
Refills: 0 | Status: DISCONTINUED | OUTPATIENT
Start: 2025-03-06 | End: 2025-03-08

## 2025-03-06 RX ORDER — ATORVASTATIN CALCIUM 80 MG/1
80 TABLET, FILM COATED ORAL AT BEDTIME
Refills: 0 | Status: DISCONTINUED | OUTPATIENT
Start: 2025-03-06 | End: 2025-03-08

## 2025-03-06 RX ORDER — CEFEPIME 2 G/20ML
INJECTION, POWDER, FOR SOLUTION INTRAVENOUS
Refills: 0 | Status: DISCONTINUED | OUTPATIENT
Start: 2025-03-06 | End: 2025-03-06

## 2025-03-06 RX ORDER — NIFEDIPINE 30 MG
90 TABLET, EXTENDED RELEASE 24 HR ORAL DAILY
Refills: 0 | Status: DISCONTINUED | OUTPATIENT
Start: 2025-03-06 | End: 2025-03-08

## 2025-03-06 RX ORDER — MELATONIN 5 MG
3 TABLET ORAL AT BEDTIME
Refills: 0 | Status: DISCONTINUED | OUTPATIENT
Start: 2025-03-06 | End: 2025-03-08

## 2025-03-06 RX ORDER — CARVEDILOL 3.12 MG/1
25 TABLET, FILM COATED ORAL EVERY 12 HOURS
Refills: 0 | Status: DISCONTINUED | OUTPATIENT
Start: 2025-03-06 | End: 2025-03-08

## 2025-03-06 RX ORDER — LEVETIRACETAM 10 MG/ML
500 INJECTION, SOLUTION INTRAVENOUS
Refills: 0 | Status: DISCONTINUED | OUTPATIENT
Start: 2025-03-06 | End: 2025-03-08

## 2025-03-06 RX ORDER — ACETAMINOPHEN 500 MG/5ML
650 LIQUID (ML) ORAL EVERY 6 HOURS
Refills: 0 | Status: DISCONTINUED | OUTPATIENT
Start: 2025-03-06 | End: 2025-03-08

## 2025-03-06 RX ORDER — INSULIN LISPRO 100 U/ML
INJECTION, SOLUTION INTRAVENOUS; SUBCUTANEOUS
Refills: 0 | Status: DISCONTINUED | OUTPATIENT
Start: 2025-03-06 | End: 2025-03-08

## 2025-03-06 RX ADMIN — CEFEPIME 100 MILLIGRAM(S): 2 INJECTION, POWDER, FOR SOLUTION INTRAVENOUS at 10:48

## 2025-03-06 RX ADMIN — MONTELUKAST SODIUM 10 MILLIGRAM(S): 10 TABLET ORAL at 23:32

## 2025-03-06 RX ADMIN — Medication 2300 MILLILITER(S): at 10:47

## 2025-03-06 RX ADMIN — Medication 1000 MILLILITER(S): at 14:49

## 2025-03-06 RX ADMIN — ATORVASTATIN CALCIUM 80 MILLIGRAM(S): 80 TABLET, FILM COATED ORAL at 23:01

## 2025-03-06 RX ADMIN — Medication 1 DOSE(S): at 23:32

## 2025-03-06 RX ADMIN — CEFTRIAXONE 100 MILLIGRAM(S): 500 INJECTION, POWDER, FOR SOLUTION INTRAMUSCULAR; INTRAVENOUS at 23:42

## 2025-03-07 LAB
-  STAPHYLOCOCCUS EPIDERMIDIS: SIGNIFICANT CHANGE UP
A1C WITH ESTIMATED AVERAGE GLUCOSE RESULT: 6.7 % — HIGH (ref 4–5.6)
ALBUMIN SERPL ELPH-MCNC: 2.8 G/DL — LOW (ref 3.5–5)
ALP SERPL-CCNC: 95 U/L — SIGNIFICANT CHANGE UP (ref 40–120)
ALT FLD-CCNC: 12 U/L DA — SIGNIFICANT CHANGE UP (ref 10–60)
ANION GAP SERPL CALC-SCNC: 8 MMOL/L — SIGNIFICANT CHANGE UP (ref 5–17)
ANION GAP SERPL CALC-SCNC: 9 MMOL/L — SIGNIFICANT CHANGE UP (ref 5–17)
AST SERPL-CCNC: 10 U/L — SIGNIFICANT CHANGE UP (ref 10–40)
BASOPHILS # BLD AUTO: 0.06 K/UL — SIGNIFICANT CHANGE UP (ref 0–0.2)
BASOPHILS NFR BLD AUTO: 0.6 % — SIGNIFICANT CHANGE UP (ref 0–2)
BILIRUB SERPL-MCNC: 0.5 MG/DL — SIGNIFICANT CHANGE UP (ref 0.2–1.2)
BUN SERPL-MCNC: 10 MG/DL — SIGNIFICANT CHANGE UP (ref 7–18)
BUN SERPL-MCNC: 11 MG/DL — SIGNIFICANT CHANGE UP (ref 7–18)
CALCIUM SERPL-MCNC: 8.9 MG/DL — SIGNIFICANT CHANGE UP (ref 8.4–10.5)
CALCIUM SERPL-MCNC: 9.1 MG/DL — SIGNIFICANT CHANGE UP (ref 8.4–10.5)
CHLORIDE SERPL-SCNC: 110 MMOL/L — HIGH (ref 96–108)
CHLORIDE SERPL-SCNC: 110 MMOL/L — HIGH (ref 96–108)
CO2 SERPL-SCNC: 22 MMOL/L — SIGNIFICANT CHANGE UP (ref 22–31)
CO2 SERPL-SCNC: 24 MMOL/L — SIGNIFICANT CHANGE UP (ref 22–31)
CORTIS AM PEAK SERPL-MCNC: 16.6 UG/DL — SIGNIFICANT CHANGE UP (ref 6–18.4)
CREAT SERPL-MCNC: 0.81 MG/DL — SIGNIFICANT CHANGE UP (ref 0.5–1.3)
CREAT SERPL-MCNC: 1.01 MG/DL — SIGNIFICANT CHANGE UP (ref 0.5–1.3)
EGFR: 61 ML/MIN/1.73M2 — SIGNIFICANT CHANGE UP
EGFR: 61 ML/MIN/1.73M2 — SIGNIFICANT CHANGE UP
EGFR: 79 ML/MIN/1.73M2 — SIGNIFICANT CHANGE UP
EGFR: 79 ML/MIN/1.73M2 — SIGNIFICANT CHANGE UP
EOSINOPHIL # BLD AUTO: 0.12 K/UL — SIGNIFICANT CHANGE UP (ref 0–0.5)
EOSINOPHIL NFR BLD AUTO: 1.2 % — SIGNIFICANT CHANGE UP (ref 0–6)
ESTIMATED AVERAGE GLUCOSE: 146 MG/DL — HIGH (ref 68–114)
GLUCOSE BLDC GLUCOMTR-MCNC: 113 MG/DL — HIGH (ref 70–99)
GLUCOSE BLDC GLUCOMTR-MCNC: 118 MG/DL — HIGH (ref 70–99)
GLUCOSE BLDC GLUCOMTR-MCNC: 123 MG/DL — HIGH (ref 70–99)
GLUCOSE BLDC GLUCOMTR-MCNC: 163 MG/DL — HIGH (ref 70–99)
GLUCOSE SERPL-MCNC: 148 MG/DL — HIGH (ref 70–99)
GLUCOSE SERPL-MCNC: 182 MG/DL — HIGH (ref 70–99)
GRAM STN FLD: ABNORMAL
HCT VFR BLD CALC: 39.6 % — SIGNIFICANT CHANGE UP (ref 34.5–45)
HCT VFR BLD CALC: 40.6 % — SIGNIFICANT CHANGE UP (ref 34.5–45)
HGB BLD-MCNC: 12.1 G/DL — SIGNIFICANT CHANGE UP (ref 11.5–15.5)
HGB BLD-MCNC: 12.5 G/DL — SIGNIFICANT CHANGE UP (ref 11.5–15.5)
IMM GRANULOCYTES NFR BLD AUTO: 0.3 % — SIGNIFICANT CHANGE UP (ref 0–0.9)
LYMPHOCYTES # BLD AUTO: 1.73 K/UL — SIGNIFICANT CHANGE UP (ref 1–3.3)
LYMPHOCYTES # BLD AUTO: 17.7 % — SIGNIFICANT CHANGE UP (ref 13–44)
MAGNESIUM SERPL-MCNC: 1.7 MG/DL — SIGNIFICANT CHANGE UP (ref 1.6–2.6)
MAGNESIUM SERPL-MCNC: 1.7 MG/DL — SIGNIFICANT CHANGE UP (ref 1.6–2.6)
MCHC RBC-ENTMCNC: 26.8 PG — LOW (ref 27–34)
MCHC RBC-ENTMCNC: 27.1 PG — SIGNIFICANT CHANGE UP (ref 27–34)
MCHC RBC-ENTMCNC: 30.6 G/DL — LOW (ref 32–36)
MCHC RBC-ENTMCNC: 30.8 G/DL — LOW (ref 32–36)
MCV RBC AUTO: 87.6 FL — SIGNIFICANT CHANGE UP (ref 80–100)
MCV RBC AUTO: 87.9 FL — SIGNIFICANT CHANGE UP (ref 80–100)
METHOD TYPE: SIGNIFICANT CHANGE UP
MONOCYTES # BLD AUTO: 0.78 K/UL — SIGNIFICANT CHANGE UP (ref 0–0.9)
MONOCYTES NFR BLD AUTO: 8 % — SIGNIFICANT CHANGE UP (ref 2–14)
NEUTROPHILS # BLD AUTO: 7.03 K/UL — SIGNIFICANT CHANGE UP (ref 1.8–7.4)
NEUTROPHILS NFR BLD AUTO: 72.2 % — SIGNIFICANT CHANGE UP (ref 43–77)
NRBC BLD AUTO-RTO: 0 /100 WBCS — SIGNIFICANT CHANGE UP (ref 0–0)
NRBC BLD AUTO-RTO: 0 /100 WBCS — SIGNIFICANT CHANGE UP (ref 0–0)
PHOSPHATE SERPL-MCNC: 2.6 MG/DL — SIGNIFICANT CHANGE UP (ref 2.5–4.5)
PHOSPHATE SERPL-MCNC: 2.6 MG/DL — SIGNIFICANT CHANGE UP (ref 2.5–4.5)
PLATELET # BLD AUTO: 235 K/UL — SIGNIFICANT CHANGE UP (ref 150–400)
PLATELET # BLD AUTO: 247 K/UL — SIGNIFICANT CHANGE UP (ref 150–400)
POTASSIUM SERPL-MCNC: 3.7 MMOL/L — SIGNIFICANT CHANGE UP (ref 3.5–5.3)
POTASSIUM SERPL-MCNC: 3.8 MMOL/L — SIGNIFICANT CHANGE UP (ref 3.5–5.3)
POTASSIUM SERPL-SCNC: 3.7 MMOL/L — SIGNIFICANT CHANGE UP (ref 3.5–5.3)
POTASSIUM SERPL-SCNC: 3.8 MMOL/L — SIGNIFICANT CHANGE UP (ref 3.5–5.3)
PROT SERPL-MCNC: 6 G/DL — SIGNIFICANT CHANGE UP (ref 6–8.3)
RBC # BLD: 4.52 M/UL — SIGNIFICANT CHANGE UP (ref 3.8–5.2)
RBC # BLD: 4.62 M/UL — SIGNIFICANT CHANGE UP (ref 3.8–5.2)
RBC # FLD: 13.4 % — SIGNIFICANT CHANGE UP (ref 10.3–14.5)
RBC # FLD: 13.5 % — SIGNIFICANT CHANGE UP (ref 10.3–14.5)
SODIUM SERPL-SCNC: 141 MMOL/L — SIGNIFICANT CHANGE UP (ref 135–145)
SODIUM SERPL-SCNC: 142 MMOL/L — SIGNIFICANT CHANGE UP (ref 135–145)
TSH SERPL-MCNC: 0.49 UU/ML — SIGNIFICANT CHANGE UP (ref 0.34–4.82)
WBC # BLD: 9.06 K/UL — SIGNIFICANT CHANGE UP (ref 3.8–10.5)
WBC # BLD: 9.75 K/UL — SIGNIFICANT CHANGE UP (ref 3.8–10.5)
WBC # FLD AUTO: 9.06 K/UL — SIGNIFICANT CHANGE UP (ref 3.8–10.5)
WBC # FLD AUTO: 9.75 K/UL — SIGNIFICANT CHANGE UP (ref 3.8–10.5)

## 2025-03-07 PROCEDURE — 71045 X-RAY EXAM CHEST 1 VIEW: CPT | Mod: 26

## 2025-03-07 PROCEDURE — 99232 SBSQ HOSP IP/OBS MODERATE 35: CPT | Mod: GC

## 2025-03-07 RX ORDER — METFORMIN HYDROCHLORIDE 850 MG/1
1 TABLET ORAL
Refills: 0 | DISCHARGE

## 2025-03-07 RX ADMIN — LEVETIRACETAM 500 MILLIGRAM(S): 10 INJECTION, SOLUTION INTRAVENOUS at 17:54

## 2025-03-07 RX ADMIN — Medication 90 MILLIGRAM(S): at 05:04

## 2025-03-07 RX ADMIN — APIXABAN 5 MILLIGRAM(S): 2.5 TABLET, FILM COATED ORAL at 05:04

## 2025-03-07 RX ADMIN — Medication 81 MILLIGRAM(S): at 11:36

## 2025-03-07 RX ADMIN — INSULIN LISPRO 1: 100 INJECTION, SOLUTION INTRAVENOUS; SUBCUTANEOUS at 11:39

## 2025-03-07 RX ADMIN — Medication 40 MILLIGRAM(S): at 05:04

## 2025-03-07 RX ADMIN — Medication 3 MILLIGRAM(S): at 21:35

## 2025-03-07 RX ADMIN — ATORVASTATIN CALCIUM 80 MILLIGRAM(S): 80 TABLET, FILM COATED ORAL at 21:34

## 2025-03-07 RX ADMIN — CARVEDILOL 25 MILLIGRAM(S): 3.12 TABLET, FILM COATED ORAL at 05:04

## 2025-03-07 RX ADMIN — MONTELUKAST SODIUM 10 MILLIGRAM(S): 10 TABLET ORAL at 21:35

## 2025-03-07 RX ADMIN — Medication 1 DOSE(S): at 11:16

## 2025-03-07 RX ADMIN — Medication 1 DOSE(S): at 21:36

## 2025-03-07 RX ADMIN — APIXABAN 5 MILLIGRAM(S): 2.5 TABLET, FILM COATED ORAL at 17:55

## 2025-03-07 RX ADMIN — CARVEDILOL 25 MILLIGRAM(S): 3.12 TABLET, FILM COATED ORAL at 17:55

## 2025-03-07 RX ADMIN — LEVETIRACETAM 500 MILLIGRAM(S): 10 INJECTION, SOLUTION INTRAVENOUS at 05:04

## 2025-03-07 RX ADMIN — CEFTRIAXONE 100 MILLIGRAM(S): 500 INJECTION, POWDER, FOR SOLUTION INTRAMUSCULAR; INTRAVENOUS at 23:27

## 2025-03-08 VITALS
DIASTOLIC BLOOD PRESSURE: 74 MMHG | OXYGEN SATURATION: 98 % | SYSTOLIC BLOOD PRESSURE: 109 MMHG | TEMPERATURE: 98 F | HEART RATE: 76 BPM | RESPIRATION RATE: 18 BRPM

## 2025-03-08 LAB
-  CORYNEBACTERIUM SPECIES: SIGNIFICANT CHANGE UP
ANION GAP SERPL CALC-SCNC: 5 MMOL/L — SIGNIFICANT CHANGE UP (ref 5–17)
BUN SERPL-MCNC: 11 MG/DL — SIGNIFICANT CHANGE UP (ref 7–18)
CALCIUM SERPL-MCNC: 9 MG/DL — SIGNIFICANT CHANGE UP (ref 8.4–10.5)
CHLORIDE SERPL-SCNC: 111 MMOL/L — HIGH (ref 96–108)
CO2 SERPL-SCNC: 26 MMOL/L — SIGNIFICANT CHANGE UP (ref 22–31)
CREAT SERPL-MCNC: 0.96 MG/DL — SIGNIFICANT CHANGE UP (ref 0.5–1.3)
CULTURE RESULTS: ABNORMAL
CULTURE RESULTS: SIGNIFICANT CHANGE UP
EGFR: 64 ML/MIN/1.73M2 — SIGNIFICANT CHANGE UP
EGFR: 64 ML/MIN/1.73M2 — SIGNIFICANT CHANGE UP
GLUCOSE BLDC GLUCOMTR-MCNC: 123 MG/DL — HIGH (ref 70–99)
GLUCOSE BLDC GLUCOMTR-MCNC: 160 MG/DL — HIGH (ref 70–99)
GLUCOSE SERPL-MCNC: 125 MG/DL — HIGH (ref 70–99)
GRAM STN FLD: ABNORMAL
GRAM STN FLD: ABNORMAL
HCT VFR BLD CALC: 39.7 % — SIGNIFICANT CHANGE UP (ref 34.5–45)
HGB BLD-MCNC: 12.1 G/DL — SIGNIFICANT CHANGE UP (ref 11.5–15.5)
MAGNESIUM SERPL-MCNC: 1.8 MG/DL — SIGNIFICANT CHANGE UP (ref 1.6–2.6)
MCHC RBC-ENTMCNC: 26.7 PG — LOW (ref 27–34)
MCHC RBC-ENTMCNC: 30.5 G/DL — LOW (ref 32–36)
MCV RBC AUTO: 87.6 FL — SIGNIFICANT CHANGE UP (ref 80–100)
METHOD TYPE: SIGNIFICANT CHANGE UP
NRBC BLD AUTO-RTO: 0 /100 WBCS — SIGNIFICANT CHANGE UP (ref 0–0)
ORGANISM # SPEC MICROSCOPIC CNT: ABNORMAL
PHOSPHATE SERPL-MCNC: 2.6 MG/DL — SIGNIFICANT CHANGE UP (ref 2.5–4.5)
PLATELET # BLD AUTO: 221 K/UL — SIGNIFICANT CHANGE UP (ref 150–400)
POTASSIUM SERPL-MCNC: 3.5 MMOL/L — SIGNIFICANT CHANGE UP (ref 3.5–5.3)
POTASSIUM SERPL-SCNC: 3.5 MMOL/L — SIGNIFICANT CHANGE UP (ref 3.5–5.3)
RBC # BLD: 4.53 M/UL — SIGNIFICANT CHANGE UP (ref 3.8–5.2)
RBC # FLD: 13.5 % — SIGNIFICANT CHANGE UP (ref 10.3–14.5)
SODIUM SERPL-SCNC: 142 MMOL/L — SIGNIFICANT CHANGE UP (ref 135–145)
SPECIMEN SOURCE: SIGNIFICANT CHANGE UP
SPECIMEN SOURCE: SIGNIFICANT CHANGE UP
WBC # BLD: 6.71 K/UL — SIGNIFICANT CHANGE UP (ref 3.8–10.5)
WBC # FLD AUTO: 6.71 K/UL — SIGNIFICANT CHANGE UP (ref 3.8–10.5)

## 2025-03-08 PROCEDURE — 84484 ASSAY OF TROPONIN QUANT: CPT

## 2025-03-08 PROCEDURE — 87150 DNA/RNA AMPLIFIED PROBE: CPT

## 2025-03-08 PROCEDURE — 84443 ASSAY THYROID STIM HORMONE: CPT

## 2025-03-08 PROCEDURE — 99285 EMERGENCY DEPT VISIT HI MDM: CPT | Mod: 25

## 2025-03-08 PROCEDURE — 83735 ASSAY OF MAGNESIUM: CPT

## 2025-03-08 PROCEDURE — 85025 COMPLETE CBC W/AUTO DIFF WBC: CPT

## 2025-03-08 PROCEDURE — 81001 URINALYSIS AUTO W/SCOPE: CPT

## 2025-03-08 PROCEDURE — 87637 SARSCOV2&INF A&B&RSV AMP PRB: CPT

## 2025-03-08 PROCEDURE — 99239 HOSP IP/OBS DSCHRG MGMT >30: CPT | Mod: GC

## 2025-03-08 PROCEDURE — 87077 CULTURE AEROBIC IDENTIFY: CPT

## 2025-03-08 PROCEDURE — 93005 ELECTROCARDIOGRAM TRACING: CPT

## 2025-03-08 PROCEDURE — 36415 COLL VENOUS BLD VENIPUNCTURE: CPT

## 2025-03-08 PROCEDURE — 83605 ASSAY OF LACTIC ACID: CPT

## 2025-03-08 PROCEDURE — 87040 BLOOD CULTURE FOR BACTERIA: CPT

## 2025-03-08 PROCEDURE — 82533 TOTAL CORTISOL: CPT

## 2025-03-08 PROCEDURE — 96374 THER/PROPH/DIAG INJ IV PUSH: CPT

## 2025-03-08 PROCEDURE — 80048 BASIC METABOLIC PNL TOTAL CA: CPT

## 2025-03-08 PROCEDURE — 85027 COMPLETE CBC AUTOMATED: CPT

## 2025-03-08 PROCEDURE — 71045 X-RAY EXAM CHEST 1 VIEW: CPT

## 2025-03-08 PROCEDURE — 83036 HEMOGLOBIN GLYCOSYLATED A1C: CPT

## 2025-03-08 PROCEDURE — 94640 AIRWAY INHALATION TREATMENT: CPT

## 2025-03-08 PROCEDURE — 80053 COMPREHEN METABOLIC PANEL: CPT

## 2025-03-08 PROCEDURE — 84100 ASSAY OF PHOSPHORUS: CPT

## 2025-03-08 PROCEDURE — 70450 CT HEAD/BRAIN W/O DYE: CPT | Mod: MC

## 2025-03-08 PROCEDURE — 82962 GLUCOSE BLOOD TEST: CPT

## 2025-03-08 PROCEDURE — 87086 URINE CULTURE/COLONY COUNT: CPT

## 2025-03-08 RX ADMIN — INSULIN LISPRO 1: 100 INJECTION, SOLUTION INTRAVENOUS; SUBCUTANEOUS at 11:55

## 2025-03-08 RX ADMIN — CARVEDILOL 25 MILLIGRAM(S): 3.12 TABLET, FILM COATED ORAL at 06:00

## 2025-03-08 RX ADMIN — Medication 40 MILLIGRAM(S): at 06:01

## 2025-03-08 RX ADMIN — LEVETIRACETAM 500 MILLIGRAM(S): 10 INJECTION, SOLUTION INTRAVENOUS at 05:59

## 2025-03-08 RX ADMIN — Medication 1 DOSE(S): at 10:01

## 2025-03-08 RX ADMIN — Medication 81 MILLIGRAM(S): at 11:55

## 2025-03-08 RX ADMIN — APIXABAN 5 MILLIGRAM(S): 2.5 TABLET, FILM COATED ORAL at 06:00

## 2025-03-08 RX ADMIN — Medication 90 MILLIGRAM(S): at 05:59

## 2025-03-09 LAB
CULTURE RESULTS: ABNORMAL
SPECIMEN SOURCE: SIGNIFICANT CHANGE UP

## 2025-03-13 LAB
CULTURE RESULTS: SIGNIFICANT CHANGE UP
CULTURE RESULTS: SIGNIFICANT CHANGE UP
SPECIMEN SOURCE: SIGNIFICANT CHANGE UP
SPECIMEN SOURCE: SIGNIFICANT CHANGE UP

## 2025-03-20 NOTE — DISCHARGE NOTE ADULT - MEDICATION SUMMARY - MEDICATIONS TO STOP TAKING
"Daily Note     Today's date: 3/20/2025  Patient name: Rosalba Chacon  : 1975  MRN: [id number]  Referring provider: Kentrell Brunner MD  Dx:   Encounter Diagnosis     ICD-10-CM    1. Acute pain of right knee  M25.561                        Subjective: Patient reports sore since last PT session.          Objective: See treatment diary below      Assessment: Patient tolerated treatment well. Pt will continue to benefit from skilled PT to address remaining impairments to return to PLOF.        Plan: Progress treatment as tolerated.       Precautions: SEE SUBJECTIVE FOR EXTENSIVE KNEE INJURY HISTORY/CURRENT INJURY      Manuals 2/25 3/4 3/6 3/11 3/13 3/18 3/20      L knee PROM  MK MK MK KK MP MK      Patellar mobs   MK OP and CP MK OP  KK MP MK CP      B knee mx work As needed MK  MK hamstring KK MP HS MK quad                   Neuro Re-Ed             Swing/stance             Sassy hip   5x 10\" holds 8x 10\" holds   3x 10\" holds -                                                                       Ther Ex             L heel slides r            L knee extension stretch seated r            SLR r 2x10 3x10 2x12 2 lbs 2x12 2 lbs 2x12 2 lbs 2x10 3 lbs                   bike  Nustep 5 min 5 min  5 min 5 min 5 min 5 min      Leg press   2x10 45 lbs  2x12 45 lbs 2x12 55 lbs 2x12 55 lbs 2x12 55 lbs 2x12 55 lbs      Supine hip flexor/quad stretch  3x 20\" holds  3x30\" holds 3x30\" holds 3x30\" holds 3x30\" holds L 3x30\" holds      prostretch  3x20\" holds 3x30\" holds 3x30\" holds 3x30\" holds 3x30\" holds L 3x30\" holds L      LAQ  5x 6\" holds  6x 8\" holds  2x10 5 lbs B 2x10 5 lbs B 2x10 5 lbs B 2x10 5 lbs B      Standing hs curl      2x10 2#  -      Ther Activity             Step up/down  15x 4\" each 20x 6\" up and down 10x 8\" each leg up, 10x 8\" down L LE  2x10 ea.  8\" 2x10 ea.  8\" 2x10 B 8\"                   Gait Training                                       Modalities                                            " I will STOP taking the medications listed below when I get home from the hospital:    Flagyl 500 mg oral tablet  -- 1 tab(s) by mouth every 8 hours till 1/21/16

## 2025-05-21 ENCOUNTER — INPATIENT (INPATIENT)
Facility: HOSPITAL | Age: 70
LOS: 1 days | Discharge: HOME | DRG: 204 | End: 2025-05-23
Attending: HOSPITALIST | Admitting: HOSPITALIST
Payer: MEDICARE

## 2025-05-21 VITALS
SYSTOLIC BLOOD PRESSURE: 100 MMHG | HEART RATE: 71 BPM | RESPIRATION RATE: 17 BRPM | DIASTOLIC BLOOD PRESSURE: 70 MMHG | WEIGHT: 154.98 LBS | TEMPERATURE: 97 F | OXYGEN SATURATION: 98 %

## 2025-05-21 DIAGNOSIS — R06.02 SHORTNESS OF BREATH: ICD-10-CM

## 2025-05-21 DIAGNOSIS — Z90.710 ACQUIRED ABSENCE OF BOTH CERVIX AND UTERUS: Chronic | ICD-10-CM

## 2025-05-21 DIAGNOSIS — T82.897A OTHER SPECIFIED COMPLICATION OF CARDIAC PROSTHETIC DEVICES, IMPLANTS AND GRAFTS, INITIAL ENCOUNTER: Chronic | ICD-10-CM

## 2025-05-21 DIAGNOSIS — Z98.890 OTHER SPECIFIED POSTPROCEDURAL STATES: Chronic | ICD-10-CM

## 2025-05-21 LAB
ALBUMIN SERPL ELPH-MCNC: 3.4 G/DL — LOW (ref 3.5–5)
ALP SERPL-CCNC: 121 U/L — HIGH (ref 40–120)
ALT FLD-CCNC: 16 U/L DA — SIGNIFICANT CHANGE UP (ref 10–60)
ANION GAP SERPL CALC-SCNC: 4 MMOL/L — LOW (ref 5–17)
ANION GAP SERPL CALC-SCNC: 7 MMOL/L — SIGNIFICANT CHANGE UP (ref 5–17)
APTT BLD: 42.6 SEC — HIGH (ref 26.1–36.8)
AST SERPL-CCNC: 35 U/L — SIGNIFICANT CHANGE UP (ref 10–40)
BASOPHILS # BLD AUTO: 0.1 K/UL — SIGNIFICANT CHANGE UP (ref 0–0.2)
BASOPHILS NFR BLD AUTO: 1 % — SIGNIFICANT CHANGE UP (ref 0–2)
BILIRUB SERPL-MCNC: 0.6 MG/DL — SIGNIFICANT CHANGE UP (ref 0.2–1.2)
BUN SERPL-MCNC: 19 MG/DL — HIGH (ref 7–18)
BUN SERPL-MCNC: 20 MG/DL — HIGH (ref 7–18)
CALCIUM SERPL-MCNC: 10 MG/DL — SIGNIFICANT CHANGE UP (ref 8.4–10.5)
CALCIUM SERPL-MCNC: 9.6 MG/DL — SIGNIFICANT CHANGE UP (ref 8.4–10.5)
CHLORIDE SERPL-SCNC: 104 MMOL/L — SIGNIFICANT CHANGE UP (ref 96–108)
CHLORIDE SERPL-SCNC: 106 MMOL/L — SIGNIFICANT CHANGE UP (ref 96–108)
CO2 SERPL-SCNC: 23 MMOL/L — SIGNIFICANT CHANGE UP (ref 22–31)
CO2 SERPL-SCNC: 27 MMOL/L — SIGNIFICANT CHANGE UP (ref 22–31)
CREAT SERPL-MCNC: 1.3 MG/DL — SIGNIFICANT CHANGE UP (ref 0.5–1.3)
CREAT SERPL-MCNC: 1.33 MG/DL — HIGH (ref 0.5–1.3)
EGFR: 44 ML/MIN/1.73M2 — LOW
EGFR: 44 ML/MIN/1.73M2 — LOW
EGFR: 45 ML/MIN/1.73M2 — LOW
EGFR: 45 ML/MIN/1.73M2 — LOW
EOSINOPHIL # BLD AUTO: 0.15 K/UL — SIGNIFICANT CHANGE UP (ref 0–0.5)
EOSINOPHIL NFR BLD AUTO: 1.5 % — SIGNIFICANT CHANGE UP (ref 0–6)
GLUCOSE SERPL-MCNC: 134 MG/DL — HIGH (ref 70–99)
GLUCOSE SERPL-MCNC: 147 MG/DL — HIGH (ref 70–99)
HCT VFR BLD CALC: 49.7 % — HIGH (ref 34.5–45)
HGB BLD-MCNC: 15.4 G/DL — SIGNIFICANT CHANGE UP (ref 11.5–15.5)
IMM GRANULOCYTES NFR BLD AUTO: 0.5 % — SIGNIFICANT CHANGE UP (ref 0–0.9)
INR BLD: 1.13 RATIO — SIGNIFICANT CHANGE UP (ref 0.85–1.16)
LYMPHOCYTES # BLD AUTO: 19.9 % — SIGNIFICANT CHANGE UP (ref 13–44)
LYMPHOCYTES # BLD AUTO: 2 K/UL — SIGNIFICANT CHANGE UP (ref 1–3.3)
MCHC RBC-ENTMCNC: 26.6 PG — LOW (ref 27–34)
MCHC RBC-ENTMCNC: 31 G/DL — LOW (ref 32–36)
MCV RBC AUTO: 86 FL — SIGNIFICANT CHANGE UP (ref 80–100)
MONOCYTES # BLD AUTO: 0.62 K/UL — SIGNIFICANT CHANGE UP (ref 0–0.9)
MONOCYTES NFR BLD AUTO: 6.2 % — SIGNIFICANT CHANGE UP (ref 2–14)
NEUTROPHILS # BLD AUTO: 7.15 K/UL — SIGNIFICANT CHANGE UP (ref 1.8–7.4)
NEUTROPHILS NFR BLD AUTO: 70.9 % — SIGNIFICANT CHANGE UP (ref 43–77)
NRBC BLD AUTO-RTO: 0 /100 WBCS — SIGNIFICANT CHANGE UP (ref 0–0)
NT-PROBNP SERPL-SCNC: 824 PG/ML — HIGH (ref 0–125)
PLATELET # BLD AUTO: 260 K/UL — SIGNIFICANT CHANGE UP (ref 150–400)
POTASSIUM SERPL-MCNC: 4.3 MMOL/L — SIGNIFICANT CHANGE UP (ref 3.5–5.3)
POTASSIUM SERPL-MCNC: 6.3 MMOL/L — CRITICAL HIGH (ref 3.5–5.3)
POTASSIUM SERPL-SCNC: 4.3 MMOL/L — SIGNIFICANT CHANGE UP (ref 3.5–5.3)
POTASSIUM SERPL-SCNC: 6.3 MMOL/L — CRITICAL HIGH (ref 3.5–5.3)
PROT SERPL-MCNC: 7.7 G/DL — SIGNIFICANT CHANGE UP (ref 6–8.3)
PROTHROM AB SERPL-ACNC: 13.1 SEC — SIGNIFICANT CHANGE UP (ref 9.9–13.4)
RBC # BLD: 5.78 M/UL — HIGH (ref 3.8–5.2)
RBC # FLD: 13.1 % — SIGNIFICANT CHANGE UP (ref 10.3–14.5)
SODIUM SERPL-SCNC: 135 MMOL/L — SIGNIFICANT CHANGE UP (ref 135–145)
SODIUM SERPL-SCNC: 136 MMOL/L — SIGNIFICANT CHANGE UP (ref 135–145)
TROPONIN I, HIGH SENSITIVITY RESULT: 484.2 NG/L — HIGH
TROPONIN I, HIGH SENSITIVITY RESULT: 536.1 NG/L — HIGH
WBC # BLD: 10.07 K/UL — SIGNIFICANT CHANGE UP (ref 3.8–10.5)
WBC # FLD AUTO: 10.07 K/UL — SIGNIFICANT CHANGE UP (ref 3.8–10.5)

## 2025-05-21 PROCEDURE — 99285 EMERGENCY DEPT VISIT HI MDM: CPT

## 2025-05-21 PROCEDURE — 71045 X-RAY EXAM CHEST 1 VIEW: CPT | Mod: 26

## 2025-05-21 RX ORDER — IPRATROPIUM BROMIDE AND ALBUTEROL SULFATE .5; 2.5 MG/3ML; MG/3ML
3 SOLUTION RESPIRATORY (INHALATION)
Refills: 0 | Status: COMPLETED | OUTPATIENT
Start: 2025-05-21 | End: 2025-05-21

## 2025-05-21 RX ORDER — METHYLPREDNISOLONE ACETATE 80 MG/ML
125 INJECTION, SUSPENSION INTRA-ARTICULAR; INTRALESIONAL; INTRAMUSCULAR; SOFT TISSUE ONCE
Refills: 0 | Status: COMPLETED | OUTPATIENT
Start: 2025-05-21 | End: 2025-05-21

## 2025-05-21 RX ORDER — ASPIRIN 325 MG
324 TABLET ORAL ONCE
Refills: 0 | Status: COMPLETED | OUTPATIENT
Start: 2025-05-21 | End: 2025-05-21

## 2025-05-21 RX ADMIN — METHYLPREDNISOLONE ACETATE 125 MILLIGRAM(S): 80 INJECTION, SUSPENSION INTRA-ARTICULAR; INTRALESIONAL; INTRAMUSCULAR; SOFT TISSUE at 19:13

## 2025-05-21 RX ADMIN — IPRATROPIUM BROMIDE AND ALBUTEROL SULFATE 3 MILLILITER(S): .5; 2.5 SOLUTION RESPIRATORY (INHALATION) at 19:22

## 2025-05-21 RX ADMIN — Medication 324 MILLIGRAM(S): at 20:20

## 2025-05-21 RX ADMIN — IPRATROPIUM BROMIDE AND ALBUTEROL SULFATE 3 MILLILITER(S): .5; 2.5 SOLUTION RESPIRATORY (INHALATION) at 19:21

## 2025-05-21 RX ADMIN — IPRATROPIUM BROMIDE AND ALBUTEROL SULFATE 3 MILLILITER(S): .5; 2.5 SOLUTION RESPIRATORY (INHALATION) at 19:23

## 2025-05-21 NOTE — ED PROVIDER NOTE - CLINICAL SUMMARY MEDICAL DECISION MAKING FREE TEXT BOX
69-year-old female past medical history of asthma/COPD, left MCA CVA with dysarthria, right-sided weakness, seizures, coronary artery disease with stent, A-fib presents emergency department with 4 hours of shortness of breath.  History is limited due to patient's conductive aphasia.  Medical history is obtained as per chart review.  Patient is in no acute respiratory distress, she is not hypoxic.  Lung sounds are distant.  She is moderately uncomfortable appearing.  Regular rate and rhythm no murmurs rubs or gallops.  Abdomen soft.  Given distant breath sounds albeit no wheezing will treat empirically for COPD.  Will obtain screening chest x-ray and labs including troponin and proBNP obtain EKG.  Anticipate admission.  Will reassess patient after treatment interventions. 69-year-old female past medical history of asthma/COPD, left MCA CVA with dysarthria, right-sided weakness, seizures, coronary artery disease with stent, A-fib presents emergency department with 4 hours of shortness of breath.  History is limited due to patient's conductive aphasia.  Medical history is obtained as per chart review.  Patient is in no acute respiratory distress, she is not hypoxic.  Lung sounds are distant.  She is moderately uncomfortable appearing.  Regular rate and rhythm no murmurs rubs or gallops.  Abdomen soft.  Given distant breath sounds albeit no wheezing will treat empirically for COPD.  Will obtain screening chest x-ray and labs including troponin and proBNP obtain EKG.  Anticipate admission.  Will reassess patient after treatment interventions.    ===================================  update (Festus Barrientos MD; attending emergency medicine and medical toxicology)    Patient had normal x-ray, troponin elevated but was previously elevated.  Will admit for shortness of breath workup, of note EKG showed right bundle which is unchanged from previous.  Will continue treatment for asthma exacerbation.  At time of admission patient still not have increased work of breathing.  The patient's condition was not amen did able to outpatient treatment due to either the lack of feasibility of outpatient care coordination, possibility for further decompensation with adverse outcome if discharge, or treatments and diagnostic  modalities only available during an inpatient hospitalization.

## 2025-05-22 ENCOUNTER — RESULT REVIEW (OUTPATIENT)
Age: 70
End: 2025-05-22

## 2025-05-22 DIAGNOSIS — Z29.9 ENCOUNTER FOR PROPHYLACTIC MEASURES, UNSPECIFIED: ICD-10-CM

## 2025-05-22 DIAGNOSIS — N17.9 ACUTE KIDNEY FAILURE, UNSPECIFIED: ICD-10-CM

## 2025-05-22 DIAGNOSIS — E11.9 TYPE 2 DIABETES MELLITUS WITHOUT COMPLICATIONS: ICD-10-CM

## 2025-05-22 DIAGNOSIS — I48.20 CHRONIC ATRIAL FIBRILLATION, UNSPECIFIED: ICD-10-CM

## 2025-05-22 DIAGNOSIS — J45.909 UNSPECIFIED ASTHMA, UNCOMPLICATED: ICD-10-CM

## 2025-05-22 DIAGNOSIS — I25.10 ATHEROSCLEROTIC HEART DISEASE OF NATIVE CORONARY ARTERY WITHOUT ANGINA PECTORIS: ICD-10-CM

## 2025-05-22 DIAGNOSIS — I24.9 ACUTE ISCHEMIC HEART DISEASE, UNSPECIFIED: ICD-10-CM

## 2025-05-22 DIAGNOSIS — I63.9 CEREBRAL INFARCTION, UNSPECIFIED: ICD-10-CM

## 2025-05-22 DIAGNOSIS — R56.9 UNSPECIFIED CONVULSIONS: ICD-10-CM

## 2025-05-22 LAB
A1C WITH ESTIMATED AVERAGE GLUCOSE RESULT: 6.5 % — HIGH (ref 4–5.6)
ANION GAP SERPL CALC-SCNC: 8 MMOL/L — SIGNIFICANT CHANGE UP (ref 5–17)
BUN SERPL-MCNC: 26 MG/DL — HIGH (ref 7–18)
CALCIUM SERPL-MCNC: 10 MG/DL — SIGNIFICANT CHANGE UP (ref 8.4–10.5)
CHLORIDE SERPL-SCNC: 104 MMOL/L — SIGNIFICANT CHANGE UP (ref 96–108)
CHOLEST SERPL-MCNC: 223 MG/DL — HIGH
CK MB CFR SERPL CALC: 1.1 NG/ML — SIGNIFICANT CHANGE UP (ref 0–3.6)
CO2 SERPL-SCNC: 24 MMOL/L — SIGNIFICANT CHANGE UP (ref 22–31)
CREAT SERPL-MCNC: 1.32 MG/DL — HIGH (ref 0.5–1.3)
EGFR: 44 ML/MIN/1.73M2 — LOW
EGFR: 44 ML/MIN/1.73M2 — LOW
ESTIMATED AVERAGE GLUCOSE: 140 MG/DL — HIGH (ref 68–114)
GLUCOSE BLDC GLUCOMTR-MCNC: 123 MG/DL — HIGH (ref 70–99)
GLUCOSE BLDC GLUCOMTR-MCNC: 152 MG/DL — HIGH (ref 70–99)
GLUCOSE BLDC GLUCOMTR-MCNC: 183 MG/DL — HIGH (ref 70–99)
GLUCOSE BLDC GLUCOMTR-MCNC: 200 MG/DL — HIGH (ref 70–99)
GLUCOSE SERPL-MCNC: 171 MG/DL — HIGH (ref 70–99)
HCT VFR BLD CALC: 46.7 % — HIGH (ref 34.5–45)
HDLC SERPL-MCNC: 50 MG/DL — LOW
HGB BLD-MCNC: 14.7 G/DL — SIGNIFICANT CHANGE UP (ref 11.5–15.5)
LDLC SERPL-MCNC: 163 MG/DL — HIGH
LIPID PNL WITH DIRECT LDL SERPL: 163 MG/DL — HIGH
MAGNESIUM SERPL-MCNC: 2.2 MG/DL — SIGNIFICANT CHANGE UP (ref 1.6–2.6)
MCHC RBC-ENTMCNC: 26.6 PG — LOW (ref 27–34)
MCHC RBC-ENTMCNC: 31.5 G/DL — LOW (ref 32–36)
MCV RBC AUTO: 84.6 FL — SIGNIFICANT CHANGE UP (ref 80–100)
NONHDLC SERPL-MCNC: 173 MG/DL — HIGH
NRBC BLD AUTO-RTO: 0 /100 WBCS — SIGNIFICANT CHANGE UP (ref 0–0)
PHOSPHATE SERPL-MCNC: 4.2 MG/DL — SIGNIFICANT CHANGE UP (ref 2.5–4.5)
PLATELET # BLD AUTO: 251 K/UL — SIGNIFICANT CHANGE UP (ref 150–400)
POTASSIUM SERPL-MCNC: 3.8 MMOL/L — SIGNIFICANT CHANGE UP (ref 3.5–5.3)
POTASSIUM SERPL-SCNC: 3.8 MMOL/L — SIGNIFICANT CHANGE UP (ref 3.5–5.3)
RBC # BLD: 5.52 M/UL — HIGH (ref 3.8–5.2)
RBC # FLD: 13.1 % — SIGNIFICANT CHANGE UP (ref 10.3–14.5)
SODIUM SERPL-SCNC: 136 MMOL/L — SIGNIFICANT CHANGE UP (ref 135–145)
TRIGL SERPL-MCNC: 56 MG/DL — SIGNIFICANT CHANGE UP
TROPONIN I, HIGH SENSITIVITY RESULT: 440.1 NG/L — HIGH
WBC # BLD: 8.81 K/UL — SIGNIFICANT CHANGE UP (ref 3.8–10.5)
WBC # FLD AUTO: 8.81 K/UL — SIGNIFICANT CHANGE UP (ref 3.8–10.5)

## 2025-05-22 PROCEDURE — 93306 TTE W/DOPPLER COMPLETE: CPT | Mod: 26

## 2025-05-22 PROCEDURE — 99223 1ST HOSP IP/OBS HIGH 75: CPT

## 2025-05-22 RX ORDER — GLUCAGON 3 MG/1
1 POWDER NASAL ONCE
Refills: 0 | Status: DISCONTINUED | OUTPATIENT
Start: 2025-05-22 | End: 2025-05-22

## 2025-05-22 RX ORDER — INSULIN LISPRO 100 U/ML
INJECTION, SOLUTION INTRAVENOUS; SUBCUTANEOUS
Refills: 0 | Status: DISCONTINUED | OUTPATIENT
Start: 2025-05-22 | End: 2025-05-23

## 2025-05-22 RX ORDER — HEPARIN SODIUM 1000 [USP'U]/ML
5000 INJECTION INTRAVENOUS; SUBCUTANEOUS EVERY 12 HOURS
Refills: 0 | Status: DISCONTINUED | OUTPATIENT
Start: 2025-05-22 | End: 2025-05-22

## 2025-05-22 RX ORDER — ATORVASTATIN CALCIUM 80 MG/1
40 TABLET, FILM COATED ORAL AT BEDTIME
Refills: 0 | Status: DISCONTINUED | OUTPATIENT
Start: 2025-05-22 | End: 2025-05-22

## 2025-05-22 RX ORDER — SODIUM CHLORIDE 9 G/1000ML
1000 INJECTION, SOLUTION INTRAVENOUS
Refills: 0 | Status: DISCONTINUED | OUTPATIENT
Start: 2025-05-22 | End: 2025-05-22

## 2025-05-22 RX ORDER — ATORVASTATIN CALCIUM 80 MG/1
80 TABLET, FILM COATED ORAL AT BEDTIME
Refills: 0 | Status: DISCONTINUED | OUTPATIENT
Start: 2025-05-22 | End: 2025-05-23

## 2025-05-22 RX ORDER — ONDANSETRON HCL/PF 4 MG/2 ML
4 VIAL (ML) INJECTION EVERY 8 HOURS
Refills: 0 | Status: DISCONTINUED | OUTPATIENT
Start: 2025-05-22 | End: 2025-05-22

## 2025-05-22 RX ORDER — NIFEDIPINE 30 MG
90 TABLET, EXTENDED RELEASE 24 HR ORAL DAILY
Refills: 0 | Status: DISCONTINUED | OUTPATIENT
Start: 2025-05-22 | End: 2025-05-23

## 2025-05-22 RX ORDER — DEXTROSE 50 % IN WATER 50 %
25 SYRINGE (ML) INTRAVENOUS ONCE
Refills: 0 | Status: DISCONTINUED | OUTPATIENT
Start: 2025-05-22 | End: 2025-05-22

## 2025-05-22 RX ORDER — INSULIN LISPRO 100 U/ML
INJECTION, SOLUTION INTRAVENOUS; SUBCUTANEOUS AT BEDTIME
Refills: 0 | Status: DISCONTINUED | OUTPATIENT
Start: 2025-05-22 | End: 2025-05-23

## 2025-05-22 RX ORDER — DEXTROSE 50 % IN WATER 50 %
12.5 SYRINGE (ML) INTRAVENOUS ONCE
Refills: 0 | Status: DISCONTINUED | OUTPATIENT
Start: 2025-05-22 | End: 2025-05-22

## 2025-05-22 RX ORDER — LEVETIRACETAM 10 MG/ML
500 INJECTION, SOLUTION INTRAVENOUS
Refills: 0 | Status: DISCONTINUED | OUTPATIENT
Start: 2025-05-22 | End: 2025-05-23

## 2025-05-22 RX ORDER — ASPIRIN 325 MG
81 TABLET ORAL DAILY
Refills: 0 | Status: DISCONTINUED | OUTPATIENT
Start: 2025-05-22 | End: 2025-05-23

## 2025-05-22 RX ORDER — MELATONIN 5 MG
3 TABLET ORAL AT BEDTIME
Refills: 0 | Status: DISCONTINUED | OUTPATIENT
Start: 2025-05-22 | End: 2025-05-23

## 2025-05-22 RX ORDER — APIXABAN 2.5 MG/1
5 TABLET, FILM COATED ORAL EVERY 12 HOURS
Refills: 0 | Status: DISCONTINUED | OUTPATIENT
Start: 2025-05-22 | End: 2025-05-23

## 2025-05-22 RX ORDER — SODIUM CHLORIDE 9 G/1000ML
1000 INJECTION, SOLUTION INTRAVENOUS
Refills: 0 | Status: DISCONTINUED | OUTPATIENT
Start: 2025-05-22 | End: 2025-05-23

## 2025-05-22 RX ORDER — MONTELUKAST SODIUM 10 MG/1
10 TABLET ORAL AT BEDTIME
Refills: 0 | Status: DISCONTINUED | OUTPATIENT
Start: 2025-05-22 | End: 2025-05-23

## 2025-05-22 RX ORDER — ACETAMINOPHEN 500 MG/5ML
650 LIQUID (ML) ORAL EVERY 6 HOURS
Refills: 0 | Status: DISCONTINUED | OUTPATIENT
Start: 2025-05-22 | End: 2025-05-22

## 2025-05-22 RX ORDER — DEXTROSE 50 % IN WATER 50 %
15 SYRINGE (ML) INTRAVENOUS ONCE
Refills: 0 | Status: DISCONTINUED | OUTPATIENT
Start: 2025-05-22 | End: 2025-05-22

## 2025-05-22 RX ORDER — MAGNESIUM, ALUMINUM HYDROXIDE 200-200 MG
30 TABLET,CHEWABLE ORAL EVERY 4 HOURS
Refills: 0 | Status: DISCONTINUED | OUTPATIENT
Start: 2025-05-22 | End: 2025-05-22

## 2025-05-22 RX ORDER — CARVEDILOL 3.12 MG/1
25 TABLET, FILM COATED ORAL EVERY 12 HOURS
Refills: 0 | Status: DISCONTINUED | OUTPATIENT
Start: 2025-05-22 | End: 2025-05-23

## 2025-05-22 RX ADMIN — LEVETIRACETAM 500 MILLIGRAM(S): 10 INJECTION, SOLUTION INTRAVENOUS at 05:22

## 2025-05-22 RX ADMIN — INSULIN LISPRO 1: 100 INJECTION, SOLUTION INTRAVENOUS; SUBCUTANEOUS at 11:52

## 2025-05-22 RX ADMIN — APIXABAN 5 MILLIGRAM(S): 2.5 TABLET, FILM COATED ORAL at 05:22

## 2025-05-22 RX ADMIN — SODIUM CHLORIDE 65 MILLILITER(S): 9 INJECTION, SOLUTION INTRAVENOUS at 11:53

## 2025-05-22 RX ADMIN — MONTELUKAST SODIUM 10 MILLIGRAM(S): 10 TABLET ORAL at 22:02

## 2025-05-22 RX ADMIN — INSULIN LISPRO 1: 100 INJECTION, SOLUTION INTRAVENOUS; SUBCUTANEOUS at 08:05

## 2025-05-22 RX ADMIN — APIXABAN 5 MILLIGRAM(S): 2.5 TABLET, FILM COATED ORAL at 17:24

## 2025-05-22 RX ADMIN — ATORVASTATIN CALCIUM 80 MILLIGRAM(S): 80 TABLET, FILM COATED ORAL at 22:02

## 2025-05-22 RX ADMIN — CARVEDILOL 25 MILLIGRAM(S): 3.12 TABLET, FILM COATED ORAL at 05:22

## 2025-05-22 RX ADMIN — Medication 81 MILLIGRAM(S): at 11:51

## 2025-05-22 RX ADMIN — Medication 90 MILLIGRAM(S): at 05:22

## 2025-05-22 RX ADMIN — LEVETIRACETAM 500 MILLIGRAM(S): 10 INJECTION, SOLUTION INTRAVENOUS at 17:24

## 2025-05-22 RX ADMIN — CARVEDILOL 25 MILLIGRAM(S): 3.12 TABLET, FILM COATED ORAL at 17:24

## 2025-05-22 NOTE — CHART NOTE - NSCHARTNOTEFT_GEN_A_CORE
ASSESSMENT  70 y/o F, PMHx of L MCA w/ residual right sided UE+LE weakness & dysarthria, seizure, asthma/COPD, CAD s/p stent, Afib p/w chest pain with radiation to B/L arms. Trops peaked at 536. EKG no ST changes, RBBB. Admitted to tele for ACS r/o.    Patient examined at bedside this AM. Denies acute complaints. Patient noted to have baseline dysarthric speech and right side weakness.     PLAN:   - obtain echo  - will plan for stress test tmmrw  - tele-monitoring  - IV fluids for ILEANA   - cards Dr. Clarke consulted  - c/w home meds: keppra, singular, ASA, statin, coreg  - DVT ppx: Eliquis

## 2025-05-22 NOTE — H&P ADULT - PROBLEM SELECTOR PLAN 1
p/w b/l chest pain radiating to bilateral arms which resolved. associated w/ nausea and vomited today.   with resolution of symptoms.  ACS protocol - asa, statin, bb (continued her home meds)  EKG shows no stt changes. RBBB  Trop peaked at 536  Tele Monitoring  f/u Echo  f/u A1c, lipid panel  CHEMO score 4. 20% all cause mortality   HEART score 6. moderate score. Risk of MACE 12-16.6%  Cardio consult in AM  diet: DASH/ CC caffeine free p/w b/l chest pain radiating to bilateral arms which resolved. associated w/ nausea and vomited today.   with resolution of symptoms.  ACS protocol - asa, statin, bb (continued her home meds)  EKG shows no stt changes. RBBB  Trop peaked at 536 iso ILEANA. (long hx of significantly elevated trops over the years)  Tele Monitoring  f/u Echo  f/u A1c, lipid panel  CHEMO score 4. 20% all cause mortality   HEART score 6. moderate score. Risk of MACE 12-16.6%  Cardio consult in AM  diet: DASH/ CC caffeine free

## 2025-05-22 NOTE — CONSULT NOTE ADULT - SUBJECTIVE AND OBJECTIVE BOX
C A R D I O L O G Y  *********************    DATE OF SERVICE: 05-22-25    HISTORY OF PRESENT ILLNESS:   69F ,pmhx of L MCA w/ dysarthria and right sided weakness, seizure, asthma/copd, CAD s/p remote stent, cath 2022 with nonobstructive CAD, Afib on Elquis  admitted with CP. History limited by patient's dysarthria, patient cant write on paper given right sided weakness. Could understand some sentence and remained of history was yes/no questions or had patient sign a thumbs up for yes and thumbs down for no. She currently reports epigastric pain, worse with palpation not clearly exertional and not worse with deep breathing. She endorsed nausea and vomiting. Denies  sob, palpitations, sweating, back pain, headaches.       Patient has prior admission with trop elevation in this range or higher, on this admission trop elevated in the setting of ILEANA (22 May 2025 00:08)      PAST MEDICAL & SURGICAL HISTORY:  S/P Cardiac Cath  4/2022 nonobstructive CAD  HTN (hypertension)  Diabetes  Asthma  never intubated  Gastroesophageal reflux  CVA (cerebral infarction)  times 3 with residual rt sided weakness and word finding difficulty  CAD (coronary artery disease)  Peripheral arterial disease  peripheral bypass/stents  Hyperlipidemia  Atrial fibrillation  History of seizure  "very long time ago" at time of CVA - topamax  S/P total abdominal hysterectomy  Coronary stent occlusion  H/O peripheral artery bypass        MEDICATIONS:  MEDICATIONS  (STANDING):  apixaban 5 milliGRAM(s) Oral every 12 hours  aspirin  chewable 81 milliGRAM(s) Oral daily  atorvastatin 80 milliGRAM(s) Oral at bedtime  carvedilol 25 milliGRAM(s) Oral every 12 hours  insulin lispro (ADMELOG) corrective regimen sliding scale   SubCutaneous three times a day before meals  insulin lispro (ADMELOG) corrective regimen sliding scale   SubCutaneous at bedtime  lactated ringers. 1000 milliLiter(s) (65 mL/Hr) IV Continuous <Continuous>  levETIRAcetam 500 milliGRAM(s) Oral two times a day  montelukast 10 milliGRAM(s) Oral at bedtime  NIFEdipine XL 90 milliGRAM(s) Oral daily      Allergies    No Known Allergies    Intolerances        FAMILY HISTORY:  Family history of diabetes mellitus (Sibling)      Non-contributary for premature coronary disease or sudden cardiac death    SOCIAL HISTORY:    [X ] Non-smoker  [ ] Smoker  [ ] Alcohol        REVIEW OF SYSTEMS:  [ ]chest pain  [  ]shortness of breath  [  ]palpitations  [  ]syncope  [ ]near syncope [ ]upper extremity weakness   [ ] lower extremity weakness  [  ]diplopia  [  ]altered mental status   [  ]fevers  [ ]chills [ ]nausea  [ ]vomitting  [  ]dysphagia    [ ]abdominal pain  [ ]melena  [ ]BRBPR    [  ]epistaxis  [  ]rash    [ ]lower extremity edema        [X] All others negative	  [ ] Unable to obtain      LABS:	 	    CARDIAC MARKERS:  CARDIAC MARKERS ( 22 May 2025 05:37 )  x     / x     / x     / x     / 1.1 ng/mL        Troponin I, High Sensitivity Result: 440.1 ng/L (05-22-25 @ 01:47)  Troponin I, High Sensitivity Result: 536.1 ng/L (05-21-25 @ 20:33)  Troponin I, High Sensitivity Result: 484.2 ng/L (05-21-25 @ 19:00)                            14.7   8.81  )-----------( 251      ( 22 May 2025 05:37 )             46.7     Hb Trend: 14.7<--, 15.4<--    05-22    136  |  104  |  26[H]  ----------------------------<  171[H]  3.8   |  24  |  1.32[H]    Ca    10.0      22 May 2025 05:37  Phos  4.2     05-22  Mg     2.2     05-22    TPro  7.7  /  Alb  3.4[L]  /  TBili  0.6  /  DBili  x   /  AST  35  /  ALT  16  /  AlkPhos  121[H]  05-21    Creatinine Trend: 1.32<--, 1.33<--, 1.30<--    Coags:  PT/INR - ( 21 May 2025 20:33 )   PT: 13.1 sec;   INR: 1.13 ratio         PTT - ( 21 May 2025 20:33 )  PTT:42.6 sec        PHYSICAL EXAM:  T(C): 36.7 (05-22-25 @ 07:42), Max: 36.7 (05-22-25 @ 02:16)  HR: 75 (05-22-25 @ 07:42) (71 - 86)  BP: 116/81 (05-22-25 @ 07:42) (100/70 - 122/78)  RR: 18 (05-22-25 @ 07:42) (17 - 18)  SpO2: 98% (05-22-25 @ 07:42) (96% - 98%)  Wt(kg): --     I&O's Summary    22 May 2025 07:01  -  22 May 2025 10:58  --------------------------------------------------------  IN: 200 mL / OUT: 0 mL / NET: 200 mL        HEENT:  (-)icterus (-)pallor  CV: N S1 S2 1/6 TAQUERIA (+)2 Pulses B/l  Resp:  Clear to ausculatation B/L, normal effort  GI: (+) BS Soft, NT, ND  Lymph:  (-)Edema, (-)obvious lymphadenopathy  Skin: Warm to touch, Normal turgor  Psych: Appropriate mood and affect       ECG:    Sinus 66 BPM 1st degree AV, Block, IRBBB nonspecifc T wave abnormality 	    RADIOLOGY:         CXR:     There is improvement in left base effusion.        ASSESSMENT/PLAN: 	69y Female pmhx of L MCA w/ dysarthria and right sided weakness, seizure, asthma/copd, CAD s/p remote stent, cath 2022 with nonobstructive CAD, Afib on Elquis  admitted with CP mild elevation in trop.    # CP  - does not appear anginal in nature  - it is epigastric in location and she has tenderness, consider abdominal imaging  - d/w Team    # Elevated troponin  -  She has been noted with elevated troponin in the past even higher at times  - check echo  - will attempt pharm nuc stress in AM    I once again thank you for allowing me to participate in the care of your patient.  If you have any questions or concerns please do not hesitate to contact me.    José Miguel Clarke MD, [facility]  BEEPER [phone]

## 2025-05-22 NOTE — H&P ADULT - PROBLEM SELECTOR PLAN 4
home med: jardiance. held home med  started ISS  f/u A1c Pt w/ SCr 1.33 on admission  Baseline SCr -0.9  F/U Urine Lytes, calculate FeNa  IVF for now, follow BMP daily

## 2025-05-22 NOTE — H&P ADULT - NSHPREVIEWOFSYSTEMS_GEN_ALL_CORE
T(C): 36.7 (05-22-25 @ 07:42), Max: 36.7 (05-22-25 @ 02:16)  HR: 75 (05-22-25 @ 07:42) (71 - 86)  BP: 116/81 (05-22-25 @ 07:42) (100/70 - 122/78)  RR: 18 (05-22-25 @ 07:42) (17 - 18)  SpO2: 98% (05-22-25 @ 07:42) (96% - 98%)    REVIEW OF SYSTEMS: please refer to HP

## 2025-05-22 NOTE — H&P ADULT - ATTENDING COMMENTS
Vital Signs Last 24 Hrs  T(C): 36.6 (21 May 2025 23:16), Max: 36.6 (21 May 2025 23:16)  T(F): 97.8 (21 May 2025 23:16), Max: 97.8 (21 May 2025 23:16)  HR: 86 (21 May 2025 23:16) (71 - 86)  BP: 122/78 (21 May 2025 23:16) (100/70 - 122/78)  BP(mean): 93 (21 May 2025 23:16) (93 - 93)  RR: 18 (21 May 2025 23:16) (17 - 18)  SpO2: 96% (21 May 2025 23:16) (96% - 98%)  Parameters below as of 21 May 2025 23:16  Patient On (Oxygen Delivery Method): room air    Labs reviewed  hgb higher than baseline  Trop 484 --> 536   bun/cr - 20/1.33 (elevated from baseline)    CXR  Heart likely enlarged. Left loop recorder again noted. Right lower   thoracic curve again seen.  There is a persistent mild left base effusion but it has improved from   March 7.    Impression   69 year old woman with medical hx including asthma/copd, A-fib, CAD s/p PCI, remote hx of left MCA stroke with residual right sided motor deficit and dysarthria here with acute SOB. Vital Signs Last 24 Hrs  T(C): 36.6 (21 May 2025 23:16), Max: 36.6 (21 May 2025 23:16)  T(F): 97.8 (21 May 2025 23:16), Max: 97.8 (21 May 2025 23:16)  HR: 86 (21 May 2025 23:16) (71 - 86)  BP: 122/78 (21 May 2025 23:16) (100/70 - 122/78)  BP(mean): 93 (21 May 2025 23:16) (93 - 93)  RR: 18 (21 May 2025 23:16) (17 - 18)  SpO2: 96% (21 May 2025 23:16) (96% - 98%)  Parameters below as of 21 May 2025 23:16  Patient On (Oxygen Delivery Method): room air    Labs reviewed  hgb higher than baseline  Trop 484 --> 536  - chronic troponin elevation  bun/cr - 20/1.33 (elevated from baseline)    CXR  Heart likely enlarged. Left loop recorder again noted. Right lower   thoracic curve again seen.  There is a persistent mild left base effusion but it has improved from   March 7.    Impression   69 year old woman with medical hx including asthma/copd, A-fib, CAD s/p PCI, remote hx of left MCA stroke with residual right sided motor deficit and dysarthria here with acute SOB, chest pain per report.   Limited communication due to significant dysarthria and focal weakness - unable to write.   Unable to confirm reported history as above  Review of patient's history and chart shows multiple presentation with elevated troponin and appears this is close to her baseline.   She was seen by DR Clarke on one of her prior admission for NSTEMI with recommendations to continue medical management .    Will admit to telemetry   trop already downtrending   ECHO   Will consult cardiologist   Other plans as above

## 2025-05-22 NOTE — PATIENT PROFILE ADULT - FALL HARM RISK - HARM RISK INTERVENTIONS
Assistance OOB with selected safe patient handling equipment/Communicate Risk of Fall with Harm to all staff/Monitor for mental status changes/Move patient closer to nurses' station/Reinforce activity limits and safety measures with patient and family/Reorient to person, place and time as needed/Tailored Fall Risk Interventions/Toileting schedule using arm’s reach rule for commode and bathroom/Use of alarms - bed, chair and/or voice tab/Visual Cue: Yellow wristband and red socks/Bed in lowest position, wheels locked, appropriate side rails in place/Call bell, personal items and telephone in reach/Instruct patient to call for assistance before getting out of bed or chair/Non-slip footwear when patient is out of bed/[place] to call system/Physically safe environment - no spills, clutter or unnecessary equipment/Purposeful Proactive Rounding/Room/bathroom lighting operational, light cord in reach

## 2025-05-22 NOTE — H&P ADULT - HISTORY OF PRESENT ILLNESS
69F ,pmhx of L MCA w/ dysarthria and right sided weakness, seizure, asthma/copd, CAD s/p stent, Afib. History limited by patient's dysarthria, patient cant write on paper given right sided weakness. Could understand some sentence and remained of history was yes/no questions or had patient sign a thumbs up for yes and thumbs down for no. She had bilateral chest pain which radiated to the arms currently denies. She endorsed nausea and vomiting. Denies stomach pain, sob, palpitations, sweating, back pain, headaches.       Patient has prior admission with trop elevation in this range or higher, on this admission trop elevated in the setting of ILEANA

## 2025-05-22 NOTE — H&P ADULT - ASSESSMENT
69F,pmhx of L MCA w/ dysarthria and right sided weakness, seizure, asthma/copd, CAD s/p stent, Afib. Present to the ED with bilateral chest pain with radiation to b/l arms- resolution of pain;  associated with nausea and vomiting. EKG no stt changes, RBBB. trops peaked at 536. Patient is being admitted for ACS work up.

## 2025-05-23 ENCOUNTER — TRANSCRIPTION ENCOUNTER (OUTPATIENT)
Age: 70
End: 2025-05-23

## 2025-05-23 ENCOUNTER — RESULT REVIEW (OUTPATIENT)
Age: 70
End: 2025-05-23

## 2025-05-23 VITALS
RESPIRATION RATE: 18 BRPM | DIASTOLIC BLOOD PRESSURE: 90 MMHG | SYSTOLIC BLOOD PRESSURE: 136 MMHG | OXYGEN SATURATION: 95 % | TEMPERATURE: 98 F | HEART RATE: 86 BPM

## 2025-05-23 LAB
ALBUMIN SERPL ELPH-MCNC: 3 G/DL — LOW (ref 3.5–5)
ALP SERPL-CCNC: 101 U/L — SIGNIFICANT CHANGE UP (ref 40–120)
ALT FLD-CCNC: 13 U/L DA — SIGNIFICANT CHANGE UP (ref 10–60)
ANION GAP SERPL CALC-SCNC: 7 MMOL/L — SIGNIFICANT CHANGE UP (ref 5–17)
AST SERPL-CCNC: 7 U/L — LOW (ref 10–40)
BILIRUB SERPL-MCNC: 0.3 MG/DL — SIGNIFICANT CHANGE UP (ref 0.2–1.2)
BUN SERPL-MCNC: 26 MG/DL — HIGH (ref 7–18)
CALCIUM SERPL-MCNC: 9.6 MG/DL — SIGNIFICANT CHANGE UP (ref 8.4–10.5)
CHLORIDE SERPL-SCNC: 109 MMOL/L — HIGH (ref 96–108)
CO2 SERPL-SCNC: 24 MMOL/L — SIGNIFICANT CHANGE UP (ref 22–31)
CREAT SERPL-MCNC: 1.16 MG/DL — SIGNIFICANT CHANGE UP (ref 0.5–1.3)
EGFR: 51 ML/MIN/1.73M2 — LOW
EGFR: 51 ML/MIN/1.73M2 — LOW
GLUCOSE BLDC GLUCOMTR-MCNC: 165 MG/DL — HIGH (ref 70–99)
GLUCOSE BLDC GLUCOMTR-MCNC: 174 MG/DL — HIGH (ref 70–99)
GLUCOSE SERPL-MCNC: 115 MG/DL — HIGH (ref 70–99)
HCT VFR BLD CALC: 41.2 % — SIGNIFICANT CHANGE UP (ref 34.5–45)
HCT VFR BLD CALC: 42 % — SIGNIFICANT CHANGE UP (ref 34.5–45)
HGB BLD-MCNC: 13.1 G/DL — SIGNIFICANT CHANGE UP (ref 11.5–15.5)
HGB BLD-MCNC: 13.2 G/DL — SIGNIFICANT CHANGE UP (ref 11.5–15.5)
MAGNESIUM SERPL-MCNC: 2.3 MG/DL — SIGNIFICANT CHANGE UP (ref 1.6–2.6)
MCHC RBC-ENTMCNC: 26.8 PG — LOW (ref 27–34)
MCHC RBC-ENTMCNC: 27.1 PG — SIGNIFICANT CHANGE UP (ref 27–34)
MCHC RBC-ENTMCNC: 31.4 G/DL — LOW (ref 32–36)
MCHC RBC-ENTMCNC: 31.8 G/DL — LOW (ref 32–36)
MCV RBC AUTO: 85.1 FL — SIGNIFICANT CHANGE UP (ref 80–100)
MCV RBC AUTO: 85.2 FL — SIGNIFICANT CHANGE UP (ref 80–100)
NRBC BLD AUTO-RTO: 0 /100 WBCS — SIGNIFICANT CHANGE UP (ref 0–0)
NRBC BLD AUTO-RTO: 0 /100 WBCS — SIGNIFICANT CHANGE UP (ref 0–0)
PHOSPHATE SERPL-MCNC: 3.2 MG/DL — SIGNIFICANT CHANGE UP (ref 2.5–4.5)
PLATELET # BLD AUTO: 244 K/UL — SIGNIFICANT CHANGE UP (ref 150–400)
PLATELET # BLD AUTO: 263 K/UL — SIGNIFICANT CHANGE UP (ref 150–400)
POTASSIUM SERPL-MCNC: 3.7 MMOL/L — SIGNIFICANT CHANGE UP (ref 3.5–5.3)
POTASSIUM SERPL-SCNC: 3.7 MMOL/L — SIGNIFICANT CHANGE UP (ref 3.5–5.3)
PROT SERPL-MCNC: 6.6 G/DL — SIGNIFICANT CHANGE UP (ref 6–8.3)
RBC # BLD: 4.84 M/UL — SIGNIFICANT CHANGE UP (ref 3.8–5.2)
RBC # BLD: 4.93 M/UL — SIGNIFICANT CHANGE UP (ref 3.8–5.2)
RBC # FLD: 13.4 % — SIGNIFICANT CHANGE UP (ref 10.3–14.5)
RBC # FLD: 13.6 % — SIGNIFICANT CHANGE UP (ref 10.3–14.5)
SODIUM SERPL-SCNC: 140 MMOL/L — SIGNIFICANT CHANGE UP (ref 135–145)
WBC # BLD: 11.03 K/UL — HIGH (ref 3.8–10.5)
WBC # BLD: 11.97 K/UL — HIGH (ref 3.8–10.5)
WBC # FLD AUTO: 11.03 K/UL — HIGH (ref 3.8–10.5)
WBC # FLD AUTO: 11.97 K/UL — HIGH (ref 3.8–10.5)

## 2025-05-23 PROCEDURE — 93306 TTE W/DOPPLER COMPLETE: CPT

## 2025-05-23 PROCEDURE — 71045 X-RAY EXAM CHEST 1 VIEW: CPT

## 2025-05-23 PROCEDURE — 94640 AIRWAY INHALATION TREATMENT: CPT

## 2025-05-23 PROCEDURE — 80048 BASIC METABOLIC PNL TOTAL CA: CPT

## 2025-05-23 PROCEDURE — 99239 HOSP IP/OBS DSCHRG MGMT >30: CPT | Mod: GC

## 2025-05-23 PROCEDURE — 85025 COMPLETE CBC W/AUTO DIFF WBC: CPT

## 2025-05-23 PROCEDURE — 80061 LIPID PANEL: CPT

## 2025-05-23 PROCEDURE — 85730 THROMBOPLASTIN TIME PARTIAL: CPT

## 2025-05-23 PROCEDURE — 83735 ASSAY OF MAGNESIUM: CPT

## 2025-05-23 PROCEDURE — 99285 EMERGENCY DEPT VISIT HI MDM: CPT

## 2025-05-23 PROCEDURE — 83880 ASSAY OF NATRIURETIC PEPTIDE: CPT

## 2025-05-23 PROCEDURE — 83036 HEMOGLOBIN GLYCOSYLATED A1C: CPT

## 2025-05-23 PROCEDURE — 85610 PROTHROMBIN TIME: CPT

## 2025-05-23 PROCEDURE — 82553 CREATINE MB FRACTION: CPT

## 2025-05-23 PROCEDURE — 78452 HT MUSCLE IMAGE SPECT MULT: CPT

## 2025-05-23 PROCEDURE — 80053 COMPREHEN METABOLIC PANEL: CPT

## 2025-05-23 PROCEDURE — 96374 THER/PROPH/DIAG INJ IV PUSH: CPT

## 2025-05-23 PROCEDURE — 85027 COMPLETE CBC AUTOMATED: CPT

## 2025-05-23 PROCEDURE — 82962 GLUCOSE BLOOD TEST: CPT

## 2025-05-23 PROCEDURE — A9502: CPT

## 2025-05-23 PROCEDURE — 36415 COLL VENOUS BLD VENIPUNCTURE: CPT

## 2025-05-23 PROCEDURE — 93017 CV STRESS TEST TRACING ONLY: CPT

## 2025-05-23 PROCEDURE — 84484 ASSAY OF TROPONIN QUANT: CPT

## 2025-05-23 PROCEDURE — 84100 ASSAY OF PHOSPHORUS: CPT

## 2025-05-23 PROCEDURE — 93005 ELECTROCARDIOGRAM TRACING: CPT

## 2025-05-23 RX ORDER — NICOTINE POLACRILEX 4 MG/1
7 GUM, CHEWING ORAL ONCE
Refills: 0 | Status: DISCONTINUED | OUTPATIENT
Start: 2025-05-23 | End: 2025-05-23

## 2025-05-23 RX ADMIN — LEVETIRACETAM 500 MILLIGRAM(S): 10 INJECTION, SOLUTION INTRAVENOUS at 05:56

## 2025-05-23 RX ADMIN — Medication 81 MILLIGRAM(S): at 11:55

## 2025-05-23 RX ADMIN — INSULIN LISPRO 1: 100 INJECTION, SOLUTION INTRAVENOUS; SUBCUTANEOUS at 07:57

## 2025-05-23 RX ADMIN — APIXABAN 5 MILLIGRAM(S): 2.5 TABLET, FILM COATED ORAL at 05:55

## 2025-05-23 RX ADMIN — CARVEDILOL 25 MILLIGRAM(S): 3.12 TABLET, FILM COATED ORAL at 05:55

## 2025-05-23 RX ADMIN — Medication 90 MILLIGRAM(S): at 05:55

## 2025-05-23 RX ADMIN — INSULIN LISPRO 1: 100 INJECTION, SOLUTION INTRAVENOUS; SUBCUTANEOUS at 11:55

## 2025-05-23 NOTE — DISCHARGE NOTE PROVIDER - NSDCPNSUBOBJ_GEN_ALL_CORE
stress test negative . no new complains. feels back to baseline. no s.s of infection. stable to [place] home

## 2025-05-23 NOTE — DISCHARGE NOTE NURSING/CASE MANAGEMENT/SOCIAL WORK - NSDCFUADDAPPT_GEN_ALL_CORE_FT
APPTS ARE READY TO BE MADE: [X] YES    Best Family or Patient Contact (if needed):    Additional Information about above appointments (if needed):    1: please consider follow up with primary care Dr. Mayorga  2: please consider follow up with cardiology Dr. Licea  3:     Other comments or requests:

## 2025-05-23 NOTE — CHART NOTE - NSCHARTNOTEFT_GEN_A_CORE
Pt seen and examined at bedside this am. Pt complaining of MSK pain in her sternum, reproducible on touch. Pain has been going on for a while, chronic in nature. Denied chest pain or epigastric/abdominal pain. Pt underwent stress test which was negative. CXR: There is a persistent mild left base effusion but it has improved from March 7. Ausculation revealed clear lungs, no crackles wheezing or rhonchi. Pt is in no respiratory distress, breathing and saturating well on room air. Pt seen and examined at bedside this am. Pt complaining of MSK pain in her sternum, reproducible on touch. Pain has been going on for a while, chronic. Does not appear anginal in nature. Denied chest pain or epigastric/abdominal pain. Pt underwent stress test which was negative. CXR: There is a persistent mild left base effusion but it has improved from March 7. Ausculation revealed clear lungs, no crackles wheezing or rhonchi. Pt is in no respiratory distress, breathing and saturating well on room air.

## 2025-05-23 NOTE — DISCHARGE NOTE NURSING/CASE MANAGEMENT/SOCIAL WORK - NSDCVIVACCINE_GEN_ALL_CORE_FT
influenza, injectable, quadrivalent, preservative free; 22-Sep-2015 12:20; Frankie Parks (RN); [employer]; 2T3JZ; IntraMuscular; Deltoid Left.; 0.5 milliLiter(s); VIS (VIS Published: 07-Aug-2015, VIS Presented: 22-Sep-2015);   influenza, injectable, quadrivalent, preservative free; 25-Sep-2018 10:34; Helen Mclaughlin (RN); [employer]; [id number] (Exp. Date: 30-Jun-2019); IntraMuscular; Deltoid Right.; 0.5 milliLiter(s); VIS (VIS Published: 07-Aug-2015, VIS Presented: 25-Sep-2018);   pneumococcal polysaccharide PPV23; 25-Sep-2014 14:23; Faith Kauffman (RN); [employer] [id number]; IntraMuscular; Deltoid Right.; 0.5 milliLiter(s);

## 2025-05-23 NOTE — DISCHARGE NOTE NURSING/CASE MANAGEMENT/SOCIAL WORK - FINANCIAL ASSISTANCE
[facility] provides services at a reduced cost to those who are determined to be eligible through [facility]’s financial assistance program. Information regarding [facility]’s financial assistance program can be found by going to [email or web address] or by calling [phone].

## 2025-05-23 NOTE — PROGRESS NOTE ADULT - SUBJECTIVE AND OBJECTIVE BOX
C A R D I O L O G Y  **********************************     DATE OF SERVICE: 05-23-25    Patient denies chest pain or shortness of breath.   Review of systems otherwise (-)  	  MEDICATIONS:  MEDICATIONS  (STANDING):  apixaban 5 milliGRAM(s) Oral every 12 hours  aspirin  chewable 81 milliGRAM(s) Oral daily  atorvastatin 80 milliGRAM(s) Oral at bedtime  carvedilol 25 milliGRAM(s) Oral every 12 hours  insulin lispro (ADMELOG) corrective regimen sliding scale   SubCutaneous three times a day before meals  insulin lispro (ADMELOG) corrective regimen sliding scale   SubCutaneous at bedtime  lactated ringers. 1000 milliLiter(s) (65 mL/Hr) IV Continuous <Continuous>  levETIRAcetam 500 milliGRAM(s) Oral two times a day  montelukast 10 milliGRAM(s) Oral at bedtime  NIFEdipine XL 90 milliGRAM(s) Oral daily      LABS:	 	    CARDIAC MARKERS:  CARDIAC MARKERS ( 22 May 2025 05:37 )  x     / x     / x     / x     / 1.1 ng/mL        Troponin I, High Sensitivity Result: 440.1 ng/L (05-22-25 @ 01:47)  Troponin I, High Sensitivity Result: 536.1 ng/L (05-21-25 @ 20:33)  Troponin I, High Sensitivity Result: 484.2 ng/L (05-21-25 @ 19:00)                              13.2   11.97 )-----------( 263      ( 23 May 2025 06:03 )             42.0     Hemoglobin: 13.2 g/dL (05-23 @ 06:03)  Hemoglobin: 14.7 g/dL (05-22 @ 05:37)  Hemoglobin: 15.4 g/dL (05-21 @ 19:00)      05-23    140  |  109[H]  |  26[H]  ----------------------------<  115[H]  3.7   |  24  |  1.16    Ca    9.6      23 May 2025 06:03  Phos  3.2     05-23  Mg     2.3     05-23    TPro  6.6  /  Alb  3.0[L]  /  TBili  0.3  /  DBili  x   /  AST  7[L]  /  ALT  13  /  AlkPhos  101  05-23    Creatinine Trend: 1.16<--, 1.32<--, 1.33<--, 1.30<--        PHYSICAL EXAM:  T(C): 36.8 (05-23-25 @ 07:52), Max: 37 (05-22-25 @ 20:41)  HR: 85 (05-23-25 @ 08:30) (73 - 95)  BP: 102/78 (05-23-25 @ 08:30) (91/65 - 138/99)  RR: 19 (05-23-25 @ 07:52) (18 - 19)  SpO2: 94% (05-23-25 @ 07:52) (93% - 100%)  Wt(kg): --  I&O's Summary    22 May 2025 07:01  -  23 May 2025 07:00  --------------------------------------------------------  IN: 780 mL / OUT: 1000 mL / NET: -220 mL        HEENT:  (-)icterus (-)pallor  CV: N S1 S2 1/6 TAQUERIA (+)2 Pulses B/l  Resp:  Clear to ausculatation B/L, normal effort  GI: (+) BS Soft, NT, ND  Lymph:  (-)Edema, (-)obvious lymphadenopathy  Skin: Warm to touch, Normal turgor  Psych: Appropriate mood and affect      TELEMETRY: 	  sinus        ASSESSMENT/PLAN: 	69y  Female pmhx of L MCA w/ dysarthria and right sided weakness, seizure, asthma/copd, CAD s/p remote stent, cath 2022 with nonobstructive CAD, Afib on Elquis  admitted with CP mild elevation in trop.    # CP  - does not appear anginal in nature  - it is epigastric in location and she has tenderness, consider abdominal imaging  - d/w Team    # Elevated troponin  -  She has been noted with elevated troponin in the past even higher at times  - Echo with LVH, reasonable to obtain cardiac MRI as an oupt   - Stress with no ischemia, I suspect she may have chronic sub-endocardial ischemia due to LVH leading to elevated high sensitivity troponin      José Miguel Clarke MD, [facility]  BEEPER [phone]

## 2025-05-23 NOTE — DISCHARGE NOTE PROVIDER - PROVIDER TOKENS
PROVIDER:[TOKEN:[[id number]]],PROVIDER:[TOKEN:[[id number]]] PROVIDER:[TOKEN:[[id number]]],PROVIDER:[TOKEN:[[id number]]]

## 2025-05-23 NOTE — DISCHARGE NOTE NURSING/CASE MANAGEMENT/SOCIAL WORK - PATIENT PORTAL LINK FT
You can access the FollowMyHealth Patient Portal offered by [facility] by registering at the following website: [email or web address]. By joining [employer]’s FollowMyHealth portal, you will also be able to view your health information using other applications (apps) compatible with our system.

## 2025-05-23 NOTE — DISCHARGE NOTE PROVIDER - CARE PROVIDERS DIRECT ADDRESSES
[email or web address]

## 2025-05-23 NOTE — DISCHARGE NOTE PROVIDER - NSDCCPCAREPLAN_GEN_ALL_CORE_FT
PRINCIPAL DISCHARGE DIAGNOSIS  Diagnosis: Chest pain  Assessment and Plan of Treatment: You presented with chest pain that was radiating to your arms. An EKG did not reveal any ST elevations. Your Troponins level (often a marker of cardiac injury) were elevated and peaked at 556.     PRINCIPAL DISCHARGE DIAGNOSIS  Diagnosis: Chest pain  Assessment and Plan of Treatment: You presented with chest pain that was radiating to your arms. An EKG did not reveal any ST elevations. Your Troponins level (often a marker of cardiac injury) were elevated and peaked at 556. You were seen and evaluated by cardiology team. A nuclear stress test, where your heart is monitored while you ambulate, was negative. An ultrasound of your did reveal left ventricular hypertrophy. Please consider undergoing an cardiac MRI outpatient. Please FOLLOW UP with your primary care physician and/or cardiologist regarding this recent hospitalization and for further management.        SECONDARY DISCHARGE DIAGNOSES  Diagnosis: ILEANA (acute kidney injury)  Assessment and Plan of Treatment: You presented with an acute kidney injury. Your serum creatinine level was 1.33. It returned back to within normal limits after fluid administeration. This was likely in the setting of dehydration. Please CONTINUE to stay hydrated at home by drinking plenty of fluids and eating a balanced diet.    Diagnosis: CAD (coronary artery disease)  Assessment and Plan of Treatment: You have a history of CAD. Please CONTINUE your home medication regimen of aspirin, atorvastatin, and carvedilol.    Diagnosis: Chronic atrial fibrillation  Assessment and Plan of Treatment: You have a history of atrial fibrillation. Please CONTINUE your home medication regimen.    Diagnosis: Seizures  Assessment and Plan of Treatment: You have a history of seizures. Please CONTINUE your home medication regimen.    Diagnosis: CVA (cerebrovascular accident)  Assessment and Plan of Treatment: You have a history of asthma. Please CONTINUE your home medication regimen.    Diagnosis: Asthma  Assessment and Plan of Treatment: You have a history of asthma. Please CONTINUE your home medication regimen.     PRINCIPAL DISCHARGE DIAGNOSIS  Diagnosis: Myocardial injury  Assessment and Plan of Treatment: You presented with chest pain that was radiating to your arms. An EKG did not reveal any ST elevations. Your Troponins level (often a marker of cardiac injury) were elevated and peaked at 556. You were seen and evaluated by cardiology team. A nuclear stress test, where your heart is monitored while you ambulate, was negative. An ultrasound of your did reveal left ventricular hypertrophy. Please consider undergoing an cardiac MRI outpatient. Please FOLLOW UP with your primary care physician and/or cardiologist regarding this recent hospitalization and for further management.        SECONDARY DISCHARGE DIAGNOSES  Diagnosis: Asthma  Assessment and Plan of Treatment: You have a history of asthma. Please CONTINUE your home medication regimen. Please follow up with your primary care physician in one week to inform them of your recent hospitalization and further management of your medical conditions.      Diagnosis: CAD (coronary artery disease)  Assessment and Plan of Treatment: You have a history of CAD. Please CONTINUE your home medication regimen of aspirin, atorvastatin, and carvedilol. Please follow up with your primary care physician in one week to inform them of your recent hospitalization and further management of your medical conditions.      Diagnosis: Chronic atrial fibrillation  Assessment and Plan of Treatment: You have a history of atrial fibrillation. Please CONTINUE your home medication regimen. Please follow up with your primary care physician in one week to inform them of your recent hospitalization and further management of your medical conditions.      Diagnosis: Seizures  Assessment and Plan of Treatment: You have a history of seizures. Please CONTINUE your home medication regimen. Please follow up with your primary care physician in one week to inform them of your recent hospitalization and further management of your medical conditions.      Diagnosis: ILEANA (acute kidney injury)  Assessment and Plan of Treatment: You presented with an acute kidney injury. Your serum creatinine level was 1.33. It returned back to within normal limits after fluid administeration. This was likely in the setting of dehydration. Please CONTINUE to stay hydrated at home by drinking plenty of fluids and eating a balanced diet.    Diagnosis: CVA (cerebrovascular accident)  Assessment and Plan of Treatment: You have a history of asthma. Please CONTINUE your home medication regimen. Please follow up with your primary care physician in one week to inform them of your recent hospitalization and further management of your medical conditions.

## 2025-05-23 NOTE — DISCHARGE NOTE PROVIDER - NSDCMRMEDTOKEN_GEN_ALL_CORE_FT
aspirin 81 mg oral delayed release tablet: 1 tab(s) orally once a day  atorvastatin 80 mg oral tablet: 1 tab(s) orally once a day (at bedtime)  carvedilol 25 mg oral tablet: 1 tab(s) orally 2 times a day  Eliquis 5 mg oral tablet: 1 tab(s) orally every 12 hours for atrial fibrillation  Jardiance 10 mg oral tablet: 1 tab(s) orally once a day  levETIRAcetam 500 mg oral tablet: 1 tab(s) orally 2 times a day  montelukast 10 mg oral tablet: 1 tab(s) orally once a day (at bedtime)  NIFEdipine 90 mg oral tablet, extended release: 1 tab(s) orally once a day   aspirin 81 mg oral delayed release tablet: 1 tab(s) orally once a day  atorvastatin 80 mg oral tablet: 1 tab(s) orally once a day (at bedtime)  carvedilol 25 mg oral tablet: 1 tab(s) orally 2 times a day  Eliquis 5 mg oral tablet: 1 tab(s) orally every 12 hours for atrial fibrillation  Jardiance 10 mg oral tablet: 1 tab(s) orally once a day  levETIRAcetam 500 mg oral tablet: 1 tab(s) orally 2 times a day  montelukast 10 mg oral tablet: 1 tab(s) orally once a day (at bedtime)  Nicotine patch: Please use nicotine once a day as indicated  NIFEdipine 90 mg oral tablet, extended release: 1 tab(s) orally once a day

## 2025-05-23 NOTE — DISCHARGE NOTE PROVIDER - HOSPITAL COURSE
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 Trops peaked at 536. EKG no ST changes, RBBB. Admitted to [facility] for ACS r/o. Pt seen and examined by cardiology. TTE: Left ventricular wall thickness is moderately increased. Left ventricular systolic function is hyperdynamic with an ejection fraction visually estimated at 70 to 75 %. Differential includes hypertensive, hypertrophic, and infiltrative cardiomyopathy, among others. Consider cardiac MRI for further evaluation if clinically indicated. There is mild (grade 1) left ventricular diastolic dysfunction. Nuclear stress test: Normal myocardial perfusion scan, with no evidence of infarction or inducible ischemia. Stress electrocardiogram: No significant ischemic ST segment changes. Qualitative Perfusion: small-sized, moderate defect(s) in the basal inferior wall that is fixed suggestive of diaphragmatic attenuation artifact. The left ventricle is normal in function and normal in size. The post stress left ventricular EF is 76%.     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 Trops peaked at 536. EKG no ST changes, RBBB. Admitted to [facility] for ACS r/o. Pt seen and examined by cardiology. Troponins 484 > 536 > 440. TTE: Left ventricular wall thickness is moderately increased. Left ventricular systolic function is hyperdynamic with an ejection fraction visually estimated at 70 to 75 %. Differential includes hypertensive, hypertrophic, and infiltrative cardiomyopathy, among others. Consider cardiac MRI for further evaluation if clinically indicated. There is mild (grade 1) left ventricular diastolic dysfunction. Nuclear stress test: Normal myocardial perfusion scan, with no evidence of infarction or inducible ischemia. Stress electrocardiogram: No significant ischemic ST segment changes. Qualitative Perfusion: small-sized, moderate defect(s) in the basal inferior wall that is fixed suggestive of diaphragmatic attenuation artifact. The left ventricle is normal in function and normal in size. The post stress left ventricular EF is 76%.     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 Trops peaked at 536. EKG no ST changes, RBBB. Admitted to [facility] for ACS r/o. Pt seen and examined by cardiology. Troponins 484 > 536 > 440. Review of patient's history and chart shows multiple presentation with elevated troponin and appears this is close to her baseline. TTE: Left ventricular wall thickness is moderately increased. Left ventricular systolic function is hyperdynamic with an ejection fraction visually estimated at 70 to 75 %. Differential includes hypertensive, hypertrophic, and infiltrative cardiomyopathy, among others. Consider cardiac MRI for further evaluation if clinically indicated. There is mild (grade 1) left ventricular diastolic dysfunction. Nuclear stress test: Normal myocardial perfusion scan, with no evidence of infarction or inducible ischemia. Stress electrocardiogram: No significant ischemic ST segment changes. Qualitative Perfusion: small-sized, moderate defect(s) in the basal inferior wall that is fixed suggestive of diaphragmatic attenuation artifact. The left ventricle is normal in function and normal in size. The post stress left ventricular EF is 76%. Given findings on echo of LVH, will recommend cardiac MRI as an outpt. Patient was medically optimized, stable and ready for discharge. Plan of care and return precautions were discussed with the patient who verbally stated understanding.     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 Trops peaked at 536. EKG no ST changes, RBBB. Admitted to [facility] for ACS r/o. Pt seen and examined by cardiology. Troponins 484 > 536 > 440. Review of patient's history and chart shows multiple presentation with elevated troponin and appears this is close to her baseline. TTE: Left ventricular wall thickness is moderately increased. Left ventricular systolic function is hyperdynamic with an ejection fraction visually estimated at 70 to 75 %. Differential includes hypertensive, hypertrophic, and infiltrative cardiomyopathy, among others. Consider cardiac MRI for further evaluation if clinically indicated. There is mild (grade 1) left ventricular diastolic dysfunction. Nuclear stress test: Normal myocardial perfusion scan, with no evidence of infarction or inducible ischemia. Stress electrocardiogram: No significant ischemic ST segment changes. Qualitative Perfusion: small-sized, moderate defect(s) in the basal inferior wall that is fixed suggestive of diaphragmatic attenuation artifact. The left ventricle is normal in function and normal in size. The post stress left ventricular EF is 76%. Given findings on echo of LVH, will recommend cardiac MRI as an outpt. Pt is to follow up with cardiology. Patient was medically optimized, stable and ready for discharge. Plan of care and return precautions were discussed with the patient who verbally stated understanding.

## 2025-05-23 NOTE — DISCHARGE NOTE NURSING/CASE MANAGEMENT/SOCIAL WORK - NSDCPEFALRISK_GEN_ALL_CORE
For information on Fall & Injury Prevention, visit: [email or web address] OR  [email or web address] OR  https://www.cdc.gov/steadi/patient.html